# Patient Record
Sex: MALE | Race: BLACK OR AFRICAN AMERICAN | NOT HISPANIC OR LATINO | URBAN - METROPOLITAN AREA
[De-identification: names, ages, dates, MRNs, and addresses within clinical notes are randomized per-mention and may not be internally consistent; named-entity substitution may affect disease eponyms.]

---

## 2016-04-27 RX ORDER — FUROSEMIDE 40 MG
1 TABLET ORAL
Qty: 0 | Refills: 0 | COMMUNITY
Start: 2016-04-27

## 2017-03-06 ENCOUNTER — INPATIENT (INPATIENT)
Facility: HOSPITAL | Age: 67
LOS: 2 days | Discharge: HOME | DRG: 195 | End: 2017-03-09
Attending: HOSPITALIST | Admitting: HOSPITALIST
Payer: COMMERCIAL

## 2017-03-06 VITALS
SYSTOLIC BLOOD PRESSURE: 143 MMHG | RESPIRATION RATE: 22 BRPM | DIASTOLIC BLOOD PRESSURE: 83 MMHG | WEIGHT: 153 LBS | HEART RATE: 83 BPM | TEMPERATURE: 98 F | HEIGHT: 68 IN | OXYGEN SATURATION: 99 %

## 2017-03-06 DIAGNOSIS — J18.9 PNEUMONIA, UNSPECIFIED ORGANISM: ICD-10-CM

## 2017-03-06 DIAGNOSIS — I10 ESSENTIAL (PRIMARY) HYPERTENSION: ICD-10-CM

## 2017-03-06 DIAGNOSIS — I73.9 PERIPHERAL VASCULAR DISEASE, UNSPECIFIED: ICD-10-CM

## 2017-03-06 DIAGNOSIS — Z95.1 PRESENCE OF AORTOCORONARY BYPASS GRAFT: Chronic | ICD-10-CM

## 2017-03-06 DIAGNOSIS — R06.00 DYSPNEA, UNSPECIFIED: ICD-10-CM

## 2017-03-06 DIAGNOSIS — E78.5 HYPERLIPIDEMIA, UNSPECIFIED: ICD-10-CM

## 2017-03-06 DIAGNOSIS — E11.59 TYPE 2 DIABETES MELLITUS WITH OTHER CIRCULATORY COMPLICATIONS: ICD-10-CM

## 2017-03-06 DIAGNOSIS — Z95.9 PRESENCE OF CARDIAC AND VASCULAR IMPLANT AND GRAFT, UNSPECIFIED: Chronic | ICD-10-CM

## 2017-03-06 DIAGNOSIS — I25.10 ATHEROSCLEROTIC HEART DISEASE OF NATIVE CORONARY ARTERY WITHOUT ANGINA PECTORIS: ICD-10-CM

## 2017-03-06 DIAGNOSIS — Z95.828 PRESENCE OF OTHER VASCULAR IMPLANTS AND GRAFTS: Chronic | ICD-10-CM

## 2017-03-06 DIAGNOSIS — I50.9 HEART FAILURE, UNSPECIFIED: ICD-10-CM

## 2017-03-06 DIAGNOSIS — Z89.9 ACQUIRED ABSENCE OF LIMB, UNSPECIFIED: Chronic | ICD-10-CM

## 2017-03-06 LAB
ALBUMIN SERPL ELPH-MCNC: 3.9 G/DL — SIGNIFICANT CHANGE UP (ref 3.3–5)
ALP SERPL-CCNC: 90 U/L — SIGNIFICANT CHANGE UP (ref 40–120)
ALT FLD-CCNC: 26 U/L RC — SIGNIFICANT CHANGE UP (ref 10–45)
ANION GAP SERPL CALC-SCNC: 12 MMOL/L — SIGNIFICANT CHANGE UP (ref 5–17)
APTT BLD: 32.9 SEC — SIGNIFICANT CHANGE UP (ref 27.5–37.4)
AST SERPL-CCNC: 28 U/L — SIGNIFICANT CHANGE UP (ref 10–40)
BASOPHILS # BLD AUTO: 0 K/UL — SIGNIFICANT CHANGE UP (ref 0–0.2)
BASOPHILS NFR BLD AUTO: 0.4 % — SIGNIFICANT CHANGE UP (ref 0–2)
BILIRUB SERPL-MCNC: 0.6 MG/DL — SIGNIFICANT CHANGE UP (ref 0.2–1.2)
BUN SERPL-MCNC: 16 MG/DL — SIGNIFICANT CHANGE UP (ref 7–23)
CALCIUM SERPL-MCNC: 8.4 MG/DL — SIGNIFICANT CHANGE UP (ref 8.4–10.5)
CHLORIDE SERPL-SCNC: 98 MMOL/L — SIGNIFICANT CHANGE UP (ref 96–108)
CK MB BLD-MCNC: 3.4 % — SIGNIFICANT CHANGE UP (ref 0–3.5)
CK MB BLD-MCNC: 3.6 % — HIGH (ref 0–3.5)
CK MB CFR SERPL CALC: 10.5 NG/ML — HIGH (ref 0–6.7)
CK MB CFR SERPL CALC: 7.2 NG/ML — HIGH (ref 0–6.7)
CK SERPL-CCNC: 213 U/L — HIGH (ref 30–200)
CK SERPL-CCNC: 290 U/L — HIGH (ref 30–200)
CO2 SERPL-SCNC: 31 MMOL/L — SIGNIFICANT CHANGE UP (ref 22–31)
CREAT SERPL-MCNC: 1.17 MG/DL — SIGNIFICANT CHANGE UP (ref 0.5–1.3)
EOSINOPHIL # BLD AUTO: 0.4 K/UL — SIGNIFICANT CHANGE UP (ref 0–0.5)
EOSINOPHIL NFR BLD AUTO: 4.5 % — SIGNIFICANT CHANGE UP (ref 0–6)
GLUCOSE SERPL-MCNC: 234 MG/DL — HIGH (ref 70–99)
HCT VFR BLD CALC: 47.6 % — SIGNIFICANT CHANGE UP (ref 39–50)
HGB BLD-MCNC: 16.2 G/DL — SIGNIFICANT CHANGE UP (ref 13–17)
HMPV RNA SPEC QL NAA+PROBE: DETECTED
INR BLD: 1.24 RATIO — HIGH (ref 0.88–1.16)
LYMPHOCYTES # BLD AUTO: 1.8 K/UL — SIGNIFICANT CHANGE UP (ref 1–3.3)
LYMPHOCYTES # BLD AUTO: 20.2 % — SIGNIFICANT CHANGE UP (ref 13–44)
MCHC RBC-ENTMCNC: 29.2 PG — SIGNIFICANT CHANGE UP (ref 27–34)
MCHC RBC-ENTMCNC: 34.1 GM/DL — SIGNIFICANT CHANGE UP (ref 32–36)
MCV RBC AUTO: 85.6 FL — SIGNIFICANT CHANGE UP (ref 80–100)
MONOCYTES # BLD AUTO: 1 K/UL — HIGH (ref 0–0.9)
MONOCYTES NFR BLD AUTO: 10.4 % — SIGNIFICANT CHANGE UP (ref 2–14)
NEUTROPHILS # BLD AUTO: 5.9 K/UL — SIGNIFICANT CHANGE UP (ref 1.8–7.4)
NEUTROPHILS NFR BLD AUTO: 64.5 % — SIGNIFICANT CHANGE UP (ref 43–77)
NT-PROBNP SERPL-SCNC: 1068 PG/ML — HIGH (ref 0–300)
PLATELET # BLD AUTO: 150 K/UL — SIGNIFICANT CHANGE UP (ref 150–400)
POTASSIUM SERPL-MCNC: 4.3 MMOL/L — SIGNIFICANT CHANGE UP (ref 3.5–5.3)
POTASSIUM SERPL-SCNC: 4.3 MMOL/L — SIGNIFICANT CHANGE UP (ref 3.5–5.3)
PROT SERPL-MCNC: 7.5 G/DL — SIGNIFICANT CHANGE UP (ref 6–8.3)
PROTHROM AB SERPL-ACNC: 13.5 SEC — HIGH (ref 10–13.1)
RAPID RVP RESULT: DETECTED
RBC # BLD: 5.55 M/UL — SIGNIFICANT CHANGE UP (ref 4.2–5.8)
RBC # FLD: 12.8 % — SIGNIFICANT CHANGE UP (ref 10.3–14.5)
SODIUM SERPL-SCNC: 141 MMOL/L — SIGNIFICANT CHANGE UP (ref 135–145)
TROPONIN T SERPL-MCNC: <0.01 NG/ML — SIGNIFICANT CHANGE UP (ref 0–0.06)
TROPONIN T SERPL-MCNC: <0.01 NG/ML — SIGNIFICANT CHANGE UP (ref 0–0.06)
WBC # BLD: 9.2 K/UL — SIGNIFICANT CHANGE UP (ref 3.8–10.5)
WBC # FLD AUTO: 9.2 K/UL — SIGNIFICANT CHANGE UP (ref 3.8–10.5)

## 2017-03-06 PROCEDURE — 71010: CPT | Mod: 26

## 2017-03-06 PROCEDURE — 99285 EMERGENCY DEPT VISIT HI MDM: CPT | Mod: 25

## 2017-03-06 PROCEDURE — 93010 ELECTROCARDIOGRAM REPORT: CPT

## 2017-03-06 PROCEDURE — 99223 1ST HOSP IP/OBS HIGH 75: CPT | Mod: AI

## 2017-03-06 RX ORDER — INSULIN LISPRO 100/ML
VIAL (ML) SUBCUTANEOUS AT BEDTIME
Qty: 0 | Refills: 0 | Status: DISCONTINUED | OUTPATIENT
Start: 2017-03-06 | End: 2017-03-09

## 2017-03-06 RX ORDER — CLOPIDOGREL BISULFATE 75 MG/1
75 TABLET, FILM COATED ORAL DAILY
Qty: 0 | Refills: 0 | Status: DISCONTINUED | OUTPATIENT
Start: 2017-03-06 | End: 2017-03-09

## 2017-03-06 RX ORDER — DEXTROSE 50 % IN WATER 50 %
12.5 SYRINGE (ML) INTRAVENOUS ONCE
Qty: 0 | Refills: 0 | Status: DISCONTINUED | OUTPATIENT
Start: 2017-03-06 | End: 2017-03-09

## 2017-03-06 RX ORDER — VALSARTAN 80 MG/1
40 TABLET ORAL DAILY
Qty: 0 | Refills: 0 | Status: DISCONTINUED | OUTPATIENT
Start: 2017-03-06 | End: 2017-03-09

## 2017-03-06 RX ORDER — DEXTROSE 50 % IN WATER 50 %
25 SYRINGE (ML) INTRAVENOUS ONCE
Qty: 0 | Refills: 0 | Status: DISCONTINUED | OUTPATIENT
Start: 2017-03-06 | End: 2017-03-09

## 2017-03-06 RX ORDER — IPRATROPIUM/ALBUTEROL SULFATE 18-103MCG
3 AEROSOL WITH ADAPTER (GRAM) INHALATION ONCE
Qty: 0 | Refills: 0 | Status: COMPLETED | OUTPATIENT
Start: 2017-03-06 | End: 2017-03-06

## 2017-03-06 RX ORDER — IPRATROPIUM/ALBUTEROL SULFATE 18-103MCG
3 AEROSOL WITH ADAPTER (GRAM) INHALATION EVERY 6 HOURS
Qty: 0 | Refills: 0 | Status: DISCONTINUED | OUTPATIENT
Start: 2017-03-06 | End: 2017-03-06

## 2017-03-06 RX ORDER — HEPARIN SODIUM 5000 [USP'U]/ML
5000 INJECTION INTRAVENOUS; SUBCUTANEOUS EVERY 12 HOURS
Qty: 0 | Refills: 0 | Status: DISCONTINUED | OUTPATIENT
Start: 2017-03-06 | End: 2017-03-09

## 2017-03-06 RX ORDER — CARVEDILOL PHOSPHATE 80 MG/1
6.25 CAPSULE, EXTENDED RELEASE ORAL EVERY 12 HOURS
Qty: 0 | Refills: 0 | Status: DISCONTINUED | OUTPATIENT
Start: 2017-03-06 | End: 2017-03-09

## 2017-03-06 RX ORDER — ASPIRIN/CALCIUM CARB/MAGNESIUM 324 MG
81 TABLET ORAL DAILY
Qty: 0 | Refills: 0 | Status: DISCONTINUED | OUTPATIENT
Start: 2017-03-06 | End: 2017-03-09

## 2017-03-06 RX ORDER — IPRATROPIUM/ALBUTEROL SULFATE 18-103MCG
3 AEROSOL WITH ADAPTER (GRAM) INHALATION EVERY 4 HOURS
Qty: 0 | Refills: 0 | Status: DISCONTINUED | OUTPATIENT
Start: 2017-03-06 | End: 2017-03-09

## 2017-03-06 RX ORDER — GLUCAGON INJECTION, SOLUTION 0.5 MG/.1ML
1 INJECTION, SOLUTION SUBCUTANEOUS ONCE
Qty: 0 | Refills: 0 | Status: DISCONTINUED | OUTPATIENT
Start: 2017-03-06 | End: 2017-03-09

## 2017-03-06 RX ORDER — FUROSEMIDE 40 MG
40 TABLET ORAL DAILY
Qty: 0 | Refills: 0 | Status: DISCONTINUED | OUTPATIENT
Start: 2017-03-06 | End: 2017-03-09

## 2017-03-06 RX ORDER — INSULIN LISPRO 100/ML
VIAL (ML) SUBCUTANEOUS
Qty: 0 | Refills: 0 | Status: DISCONTINUED | OUTPATIENT
Start: 2017-03-06 | End: 2017-03-09

## 2017-03-06 RX ORDER — TIOTROPIUM BROMIDE 18 UG/1
1 CAPSULE ORAL; RESPIRATORY (INHALATION) DAILY
Qty: 0 | Refills: 0 | Status: DISCONTINUED | OUTPATIENT
Start: 2017-03-06 | End: 2017-03-09

## 2017-03-06 RX ORDER — DEXTROSE 50 % IN WATER 50 %
1 SYRINGE (ML) INTRAVENOUS ONCE
Qty: 0 | Refills: 0 | Status: DISCONTINUED | OUTPATIENT
Start: 2017-03-06 | End: 2017-03-09

## 2017-03-06 RX ORDER — ALBUTEROL 90 UG/1
1 AEROSOL, METERED ORAL EVERY 4 HOURS
Qty: 0 | Refills: 0 | Status: DISCONTINUED | OUTPATIENT
Start: 2017-03-06 | End: 2017-03-09

## 2017-03-06 RX ORDER — INSULIN GLARGINE 100 [IU]/ML
30 INJECTION, SOLUTION SUBCUTANEOUS AT BEDTIME
Qty: 0 | Refills: 0 | Status: DISCONTINUED | OUTPATIENT
Start: 2017-03-06 | End: 2017-03-09

## 2017-03-06 RX ORDER — ATORVASTATIN CALCIUM 80 MG/1
40 TABLET, FILM COATED ORAL AT BEDTIME
Qty: 0 | Refills: 0 | Status: DISCONTINUED | OUTPATIENT
Start: 2017-03-06 | End: 2017-03-09

## 2017-03-06 RX ORDER — SODIUM CHLORIDE 9 MG/ML
1000 INJECTION, SOLUTION INTRAVENOUS
Qty: 0 | Refills: 0 | Status: DISCONTINUED | OUTPATIENT
Start: 2017-03-06 | End: 2017-03-09

## 2017-03-06 RX ADMIN — Medication 3 MILLILITER(S): at 22:09

## 2017-03-06 RX ADMIN — Medication 1 DROP(S): at 22:09

## 2017-03-06 RX ADMIN — Medication 3 MILLILITER(S): at 16:47

## 2017-03-06 RX ADMIN — ATORVASTATIN CALCIUM 40 MILLIGRAM(S): 80 TABLET, FILM COATED ORAL at 22:09

## 2017-03-06 RX ADMIN — INSULIN GLARGINE 30 UNIT(S): 100 INJECTION, SOLUTION SUBCUTANEOUS at 22:08

## 2017-03-06 RX ADMIN — Medication 40 MILLIGRAM(S): at 22:09

## 2017-03-06 RX ADMIN — Medication 3 MILLILITER(S): at 16:46

## 2017-03-06 NOTE — H&P ADULT. - PROBLEM SELECTOR PLAN 2
given CXR finding of RLL opacity, levaquin was started  will cont for now ? superimposed bacterial PNA with new sx of sob in last 2 days  monitor oxygenation

## 2017-03-06 NOTE — H&P ADULT. - PROBLEM SELECTOR PLAN 3
cont lasix for now as pt tolerating PO and no s/s dehydration  cont arb/beta blocker for optimum regimen

## 2017-03-06 NOTE — ED ADULT NURSE NOTE - PMH
CAD (coronary artery disease)    Chronic congestive heart failure, unspecified congestive heart failure type    Diabetes    Hyperlipidemia    Hypertension    PAD (peripheral artery disease)    Stented coronary artery

## 2017-03-06 NOTE — ED ADULT TRIAGE NOTE - CHIEF COMPLAINT QUOTE
[facility] days with abnormal ekg per son denies chest pain productive cough

## 2017-03-06 NOTE — ED ADULT NURSE NOTE - OBJECTIVE STATEMENT
65 yo male A&OX3 presents to the ED with the c/o SOB and cold like symptoms x 7 days. Pt states that he hears fluid behind his lungs when he take a deep breath. According to the pt he was seen at urgent care who sent him in for abd ekg. Denies fevers or chills, HX of chf, DM, high cholesterol. Lungs have noted crackles b/l. Pt also has hx of stent placement on blood thinners. Abd round, soft non tender and non distended. According to the pt he doubled up on lasix for relief.

## 2017-03-06 NOTE — H&P ADULT. - HISTORY OF PRESENT ILLNESS
66 y/o male with pmh of HTN, HLD, DM2 (with complication s/p 3 toes amputated rt foot), CAD s/p CABG 4V (1997), PCI/stents about 5 years ago, PAD s/p RLE bypass graft, 4/16 admit for NSTEMI s/p stent to OM1 now a/w few days of cough productive of pink sputum and 2 d of sob/wheezing.  No f/c/anorexia, + sinus sx and sore throat.  No sick contacts.  Per ED notes went to urgent care and sent to ED for EKG changes.  Pt denies cp but has occasional palpitations.  + ORTA last two days increased from baseline.    In /83, hr 82, rr 22, sat 99, temp 98.4  Given levaquin 750mg and duoneb x 3  CXR c/w RLL opacity

## 2017-03-06 NOTE — ED PROVIDER NOTE - OBJECTIVE STATEMENT
66yom pmhx CAD s/p CABG HTN HLD PVD CHF DM c/o SOB and wheezing. Pt seen and eval in the Urgent care with abnl ekg and sent in for eval. Pt reports URI symx for past week now with productive cough with pinkish sputum. No fever or chills, No chest pain, No nausea or vomiting. Non smoker. NO visual changes.

## 2017-03-06 NOTE — H&P ADULT. - ASSESSMENT
66 m multiple medical problems a/w pneumometaneumovirus, ? superimposed bacterial pneumonia and concern for ACS

## 2017-03-06 NOTE — ED PROVIDER NOTE - ATTENDING CONTRIBUTION TO CARE
Patient with history of CHF/CAD presenting c/o productive cough/malaise and SOB for past 6 days.  No known sick contacts, no worsening lower extremity edema or weight gain.  SOB even at rest.  Worse with lying flat.  Denying CP.    On exam patient room air O2 sat in mid/low 90s, VS otherwise WNL, RRR S1/S2, no peripheral edema, lungs diffusely wheezy with bronchospastic cough, abdomen soft, NT, ND.    Suspect possible viral bronchitis, however given underlying cardiac history cannot rule out cardiogenic wheeze - labs, RVP, CXR, CE, albuterol, re-evaluate.

## 2017-03-06 NOTE — H&P ADULT. - RS GEN PE MLT RESP DETAILS PC
clear to auscultation bilaterally/airway patent/breath sounds equal/respirations non-labored/good air movement breath sounds equal/clear to auscultation bilaterally/airway patent/respirations non-labored/wheezes/good air movement

## 2017-03-06 NOTE — H&P ADULT. - PROBLEM SELECTOR PLAN 1
likely 2nd pneumometaneumovirus which often precipitates bronchospasm  cont duoneb q4hr for now and monitor clinically  start prednisone 40mg daily - will avoid IV for now given h/o DM and pt is clinically stable - speaking well in full sentences, not hypoxic  tessalon perles prn cough

## 2017-03-06 NOTE — H&P ADULT. - PROBLEM SELECTOR PLAN 5
concern for ekg at urgent care (not concerning EKG from ED and borderline ck/ck-mb  -telemtry monitoring  -ck Deyvi x 3  -can call outpt cards in am  cont asa/plavix concern for ekg at urgent care (not concerning EKG from ED and borderline ck/ck-mb  -telemtry monitoring  -ck Deyvi x 3  -can call outpt cards in am  cont asa/plavix  if next Deyvi are unremarkable, can start SQH for DVT prophylaxis

## 2017-03-06 NOTE — ED ADULT NURSE NOTE - PSH
S/P amputation  right great toe and 4th and 5th digit  S/P angioplasty with stent  about 5 years ago  S/P bypass graft of extremity  RLE  S/P CABG x 3

## 2017-03-06 NOTE — ED ADULT NURSE NOTE - CHIEF COMPLAINT QUOTE
[facility] days with abnormal ekg per son denies chest pain productive cough

## 2017-03-06 NOTE — H&P ADULT. - FAMILY HISTORY
Mother  Still living? Unknown  Family history of diabetes mellitus (DM), Age at diagnosis: Age Unknown

## 2017-03-06 NOTE — ED PROVIDER NOTE - CARE PLAN
Principal Discharge DX:	Dyspnea and respiratory abnormalities  Secondary Diagnosis:	Viral illness  Secondary Diagnosis:	Pneumonia

## 2017-03-07 LAB
ANION GAP SERPL CALC-SCNC: 15 MMOL/L — SIGNIFICANT CHANGE UP (ref 5–17)
BASOPHILS # BLD AUTO: 0.04 K/UL — SIGNIFICANT CHANGE UP (ref 0–0.2)
BASOPHILS NFR BLD AUTO: 0.3 % — SIGNIFICANT CHANGE UP (ref 0–2)
BUN SERPL-MCNC: 16 MG/DL — SIGNIFICANT CHANGE UP (ref 7–23)
CALCIUM SERPL-MCNC: 9.4 MG/DL — SIGNIFICANT CHANGE UP (ref 8.4–10.5)
CHLORIDE SERPL-SCNC: 96 MMOL/L — SIGNIFICANT CHANGE UP (ref 96–108)
CK MB BLD-MCNC: 3.8 % — HIGH (ref 0–3.5)
CK MB CFR SERPL CALC: 8.1 NG/ML — HIGH (ref 0–6.7)
CK SERPL-CCNC: 216 U/L — HIGH (ref 30–200)
CO2 SERPL-SCNC: 27 MMOL/L — SIGNIFICANT CHANGE UP (ref 22–31)
CREAT SERPL-MCNC: 1.12 MG/DL — SIGNIFICANT CHANGE UP (ref 0.5–1.3)
EOSINOPHIL # BLD AUTO: 0.03 K/UL — SIGNIFICANT CHANGE UP (ref 0–0.5)
EOSINOPHIL NFR BLD AUTO: 0.3 % — SIGNIFICANT CHANGE UP (ref 0–6)
GLUCOSE SERPL-MCNC: 250 MG/DL — HIGH (ref 70–99)
HBA1C BLD-MCNC: 9.5 % — HIGH (ref 4–5.6)
HCT VFR BLD CALC: 49 % — SIGNIFICANT CHANGE UP (ref 39–50)
HGB BLD-MCNC: 17.3 G/DL — HIGH (ref 13–17)
IMM GRANULOCYTES NFR BLD AUTO: 0.2 % — SIGNIFICANT CHANGE UP (ref 0–1.5)
LYMPHOCYTES # BLD AUTO: 1 K/UL — SIGNIFICANT CHANGE UP (ref 1–3.3)
LYMPHOCYTES # BLD AUTO: 8.6 % — LOW (ref 13–44)
MAGNESIUM SERPL-MCNC: 2 MG/DL — SIGNIFICANT CHANGE UP (ref 1.6–2.6)
MCHC RBC-ENTMCNC: 29.1 PG — SIGNIFICANT CHANGE UP (ref 27–34)
MCHC RBC-ENTMCNC: 35.3 GM/DL — SIGNIFICANT CHANGE UP (ref 32–36)
MCV RBC AUTO: 82.4 FL — SIGNIFICANT CHANGE UP (ref 80–100)
MONOCYTES # BLD AUTO: 0.28 K/UL — SIGNIFICANT CHANGE UP (ref 0–0.9)
MONOCYTES NFR BLD AUTO: 2.4 % — SIGNIFICANT CHANGE UP (ref 2–14)
NEUTROPHILS # BLD AUTO: 10.21 K/UL — HIGH (ref 1.8–7.4)
NEUTROPHILS NFR BLD AUTO: 88.2 % — HIGH (ref 43–77)
PHOSPHATE SERPL-MCNC: 2.3 MG/DL — LOW (ref 2.5–4.5)
PLATELET # BLD AUTO: 188 K/UL — SIGNIFICANT CHANGE UP (ref 150–400)
POTASSIUM SERPL-MCNC: 4.9 MMOL/L — SIGNIFICANT CHANGE UP (ref 3.5–5.3)
POTASSIUM SERPL-SCNC: 4.9 MMOL/L — SIGNIFICANT CHANGE UP (ref 3.5–5.3)
RBC # BLD: 5.95 M/UL — HIGH (ref 4.2–5.8)
RBC # FLD: 13.7 % — SIGNIFICANT CHANGE UP (ref 10.3–14.5)
SODIUM SERPL-SCNC: 138 MMOL/L — SIGNIFICANT CHANGE UP (ref 135–145)
TROPONIN T SERPL-MCNC: <0.01 NG/ML — SIGNIFICANT CHANGE UP (ref 0–0.06)
WBC # BLD: 11.58 K/UL — HIGH (ref 3.8–10.5)
WBC # FLD AUTO: 11.58 K/UL — HIGH (ref 3.8–10.5)

## 2017-03-07 RX ORDER — ACETAMINOPHEN 500 MG
650 TABLET ORAL ONCE
Qty: 0 | Refills: 0 | Status: COMPLETED | OUTPATIENT
Start: 2017-03-07 | End: 2017-03-07

## 2017-03-07 RX ADMIN — Medication 1 DROP(S): at 21:20

## 2017-03-07 RX ADMIN — Medication 40 MILLIGRAM(S): at 05:07

## 2017-03-07 RX ADMIN — INSULIN GLARGINE 30 UNIT(S): 100 INJECTION, SOLUTION SUBCUTANEOUS at 22:15

## 2017-03-07 RX ADMIN — Medication 63.75 MILLIMOLE(S): at 08:45

## 2017-03-07 RX ADMIN — Medication 3 MILLILITER(S): at 05:07

## 2017-03-07 RX ADMIN — Medication 5: at 14:42

## 2017-03-07 RX ADMIN — Medication 1 DROP(S): at 05:01

## 2017-03-07 RX ADMIN — CARVEDILOL PHOSPHATE 6.25 MILLIGRAM(S): 80 CAPSULE, EXTENDED RELEASE ORAL at 21:20

## 2017-03-07 RX ADMIN — CLOPIDOGREL BISULFATE 75 MILLIGRAM(S): 75 TABLET, FILM COATED ORAL at 11:27

## 2017-03-07 RX ADMIN — Medication 3 MILLILITER(S): at 14:45

## 2017-03-07 RX ADMIN — Medication 5: at 18:58

## 2017-03-07 RX ADMIN — Medication 81 MILLIGRAM(S): at 11:27

## 2017-03-07 RX ADMIN — VALSARTAN 40 MILLIGRAM(S): 80 TABLET ORAL at 05:07

## 2017-03-07 RX ADMIN — Medication 40 MILLIGRAM(S): at 22:45

## 2017-03-07 RX ADMIN — Medication 3 MILLILITER(S): at 21:20

## 2017-03-07 RX ADMIN — CARVEDILOL PHOSPHATE 6.25 MILLIGRAM(S): 80 CAPSULE, EXTENDED RELEASE ORAL at 05:07

## 2017-03-07 RX ADMIN — Medication 650 MILLIGRAM(S): at 21:19

## 2017-03-07 RX ADMIN — Medication 3: at 08:45

## 2017-03-07 RX ADMIN — Medication 3 MILLILITER(S): at 11:26

## 2017-03-07 RX ADMIN — Medication 40 MILLIGRAM(S): at 22:15

## 2017-03-07 RX ADMIN — ATORVASTATIN CALCIUM 40 MILLIGRAM(S): 80 TABLET, FILM COATED ORAL at 21:19

## 2017-03-07 NOTE — PROVIDER CONTACT NOTE (MEDICATION) - ACTION/TREATMENT ORDERED:
NP notified and made aware. pt educated on risks of refusing hep. no further interventions at this time. will continue to monitor.

## 2017-03-08 ENCOUNTER — TRANSCRIPTION ENCOUNTER (OUTPATIENT)
Age: 67
End: 2017-03-08

## 2017-03-08 LAB
ANION GAP SERPL CALC-SCNC: 12 MMOL/L — SIGNIFICANT CHANGE UP (ref 5–17)
BASOPHILS # BLD AUTO: 0.02 K/UL — SIGNIFICANT CHANGE UP (ref 0–0.2)
BASOPHILS NFR BLD AUTO: 0.2 % — SIGNIFICANT CHANGE UP (ref 0–2)
BUN SERPL-MCNC: 20 MG/DL — SIGNIFICANT CHANGE UP (ref 7–23)
CALCIUM SERPL-MCNC: 8.8 MG/DL — SIGNIFICANT CHANGE UP (ref 8.4–10.5)
CHLORIDE SERPL-SCNC: 96 MMOL/L — SIGNIFICANT CHANGE UP (ref 96–108)
CO2 SERPL-SCNC: 26 MMOL/L — SIGNIFICANT CHANGE UP (ref 22–31)
CREAT SERPL-MCNC: 1.03 MG/DL — SIGNIFICANT CHANGE UP (ref 0.5–1.3)
EOSINOPHIL # BLD AUTO: 0.03 K/UL — SIGNIFICANT CHANGE UP (ref 0–0.5)
EOSINOPHIL NFR BLD AUTO: 0.3 % — SIGNIFICANT CHANGE UP (ref 0–6)
GLUCOSE SERPL-MCNC: 259 MG/DL — HIGH (ref 70–99)
HCT VFR BLD CALC: 44.8 % — SIGNIFICANT CHANGE UP (ref 39–50)
HGB BLD-MCNC: 15.6 G/DL — SIGNIFICANT CHANGE UP (ref 13–17)
IMM GRANULOCYTES NFR BLD AUTO: 0.1 % — SIGNIFICANT CHANGE UP (ref 0–1.5)
LYMPHOCYTES # BLD AUTO: 1.05 K/UL — SIGNIFICANT CHANGE UP (ref 1–3.3)
LYMPHOCYTES # BLD AUTO: 10 % — LOW (ref 13–44)
MCHC RBC-ENTMCNC: 28.7 PG — SIGNIFICANT CHANGE UP (ref 27–34)
MCHC RBC-ENTMCNC: 34.8 GM/DL — SIGNIFICANT CHANGE UP (ref 32–36)
MCV RBC AUTO: 82.4 FL — SIGNIFICANT CHANGE UP (ref 80–100)
MONOCYTES # BLD AUTO: 0.27 K/UL — SIGNIFICANT CHANGE UP (ref 0–0.9)
MONOCYTES NFR BLD AUTO: 2.6 % — SIGNIFICANT CHANGE UP (ref 2–14)
NEUTROPHILS # BLD AUTO: 9.09 K/UL — HIGH (ref 1.8–7.4)
NEUTROPHILS NFR BLD AUTO: 86.8 % — HIGH (ref 43–77)
PLATELET # BLD AUTO: 216 K/UL — SIGNIFICANT CHANGE UP (ref 150–400)
POTASSIUM SERPL-MCNC: 4.4 MMOL/L — SIGNIFICANT CHANGE UP (ref 3.5–5.3)
POTASSIUM SERPL-SCNC: 4.4 MMOL/L — SIGNIFICANT CHANGE UP (ref 3.5–5.3)
RBC # BLD: 5.44 M/UL — SIGNIFICANT CHANGE UP (ref 4.2–5.8)
RBC # FLD: 13.7 % — SIGNIFICANT CHANGE UP (ref 10.3–14.5)
SODIUM SERPL-SCNC: 134 MMOL/L — LOW (ref 135–145)
WBC # BLD: 10.47 K/UL — SIGNIFICANT CHANGE UP (ref 3.8–10.5)
WBC # FLD AUTO: 10.47 K/UL — SIGNIFICANT CHANGE UP (ref 3.8–10.5)

## 2017-03-08 RX ADMIN — Medication 1 DROP(S): at 18:49

## 2017-03-08 RX ADMIN — Medication 40 MILLIGRAM(S): at 05:13

## 2017-03-08 RX ADMIN — INSULIN GLARGINE 30 UNIT(S): 100 INJECTION, SOLUTION SUBCUTANEOUS at 21:42

## 2017-03-08 RX ADMIN — VALSARTAN 40 MILLIGRAM(S): 80 TABLET ORAL at 05:13

## 2017-03-08 RX ADMIN — Medication 3: at 08:49

## 2017-03-08 RX ADMIN — Medication 1 DROP(S): at 11:11

## 2017-03-08 RX ADMIN — Medication 3 MILLILITER(S): at 22:26

## 2017-03-08 RX ADMIN — Medication 1 DROP(S): at 05:13

## 2017-03-08 RX ADMIN — Medication 4: at 13:16

## 2017-03-08 RX ADMIN — Medication 3 MILLILITER(S): at 11:09

## 2017-03-08 RX ADMIN — Medication 1 DROP(S): at 23:14

## 2017-03-08 RX ADMIN — Medication 3: at 21:41

## 2017-03-08 RX ADMIN — CARVEDILOL PHOSPHATE 6.25 MILLIGRAM(S): 80 CAPSULE, EXTENDED RELEASE ORAL at 08:51

## 2017-03-08 RX ADMIN — CARVEDILOL PHOSPHATE 6.25 MILLIGRAM(S): 80 CAPSULE, EXTENDED RELEASE ORAL at 18:50

## 2017-03-08 RX ADMIN — Medication 3 MILLILITER(S): at 13:18

## 2017-03-08 RX ADMIN — Medication 40 MILLIGRAM(S): at 22:25

## 2017-03-08 RX ADMIN — ATORVASTATIN CALCIUM 40 MILLIGRAM(S): 80 TABLET, FILM COATED ORAL at 21:42

## 2017-03-08 RX ADMIN — Medication 3: at 17:47

## 2017-03-08 RX ADMIN — Medication 3 MILLILITER(S): at 18:49

## 2017-03-08 RX ADMIN — Medication 3 MILLILITER(S): at 05:13

## 2017-03-08 RX ADMIN — HEPARIN SODIUM 5000 UNIT(S): 5000 INJECTION INTRAVENOUS; SUBCUTANEOUS at 18:51

## 2017-03-08 RX ADMIN — CLOPIDOGREL BISULFATE 75 MILLIGRAM(S): 75 TABLET, FILM COATED ORAL at 11:10

## 2017-03-08 RX ADMIN — Medication 81 MILLIGRAM(S): at 11:09

## 2017-03-08 NOTE — DISCHARGE NOTE ADULT - MEDICATION SUMMARY - MEDICATIONS TO TAKE
I will START or STAY ON the medications listed below when I get home from the hospital:    aspirin 81 mg oral delayed release tablet  -- 1 tab(s) by mouth once a day  -- Indication: For Anti-Platelet    valsartan 40 mg oral tablet  -- 1 tab(s) by mouth once a day  -- Indication: For High Blood Pressure    Lantus Solostar Pen 100 units/mL subcutaneous solution  -- 30 unit(s) subcutaneous once a day (at bedtime)  -- Indication: For Diabetes    atorvastatin 40 mg oral tablet  -- 1 tab(s) by mouth once a day (at bedtime)  -- Indication: For High Cholesterol    clopidogrel 75 mg oral tablet  -- 1 tab(s) by mouth once a day  -- Indication: For Anti-Platelet    carvedilol 6.25 mg oral tablet  -- 1 tab(s) by mouth every 12 hours  -- Indication: For Blood Pressure    furosemide 40 mg oral tablet  -- 1 tab(s) by mouth once a day  -- Indication: For Diuretic    ocular lubricant ophthalmic solution  -- 1 drop(s) to each affected eye 4 times a day  -- Indication: For Topical agent    levoFLOXacin 500 mg oral tablet  -- 1 tab(s) by mouth every 24 hours  -- Indication: For Antibiotic

## 2017-03-08 NOTE — DISCHARGE NOTE ADULT - SECONDARY DIAGNOSIS.
Diabetes Chronic congestive heart failure, unspecified congestive heart failure type PAD (peripheral artery disease)

## 2017-03-08 NOTE — DIETITIAN INITIAL EVALUATION ADULT. - ENERGY NEEDS
HT 67 inches,  pounds,  pounds,  pounds, BMI 23.4  Dxd with Dyspnea, Pneumovirus.  Skin intact.  takes Iron, Vit C and MVI at home.

## 2017-03-08 NOTE — DISCHARGE NOTE ADULT - PLAN OF CARE
improved; + respiratory virus panel Rx levaquin 500mg oral x 10 days controlled carb diet  continue present Insulin/ FSBS 2 gram sodium diet; continue home meds. Complete the antibiotic course as ordered. HgA1C this admission 9.5.  Make sure you get your HgA1c checked every three months.  If you take oral diabetes medications, check your blood glucose two times a day.  If you take insulin, check your blood glucose before meals and at bedtime.  It's important not to skip any meals.  Keep a log of your blood glucose results and always take it with you to your doctor appointments.  Keep a list of your current medications including injectables and over the counter medications and bring this medication list with you to all your doctor appointments.  If you have not seen your opthalmologist this year call for appointment.  Check your feet daily for redness, sores, or openings. Do not self treat. If no improvement in two days call your primary care physician for an appointment.  Low blood sugar (hypoglycemia) is a blood sugar below 70mg/dl. Check your blood sugar if you feel signs/symptoms of hypoglycemia. If your blood sugar is below 70 take 15 grams of carbohydrates (ex 4 oz of apple juice, 3-4 glucosr tablets, or 4-6 oz of regular soda) wait 15 minutes and repeat blood sugar to make sure it comes up above 70.  If your blood sugar is above 70 and you are due for a meal, have a meal.  If you are not due for a meal have a snack.  This snack helps keeps your blood sugar at a safe range.  controlled carb diet  continue present Insulin/ FSBS Weigh yourself daily.  If you gain 3lbs in 3 days, or 5lbs in a week call your Health Care Provider.  Do not eat or drink foods containing more than 2000mg of salt (sodium) in your diet every day.  Call your Health Care Provider if you have any swelling or increased swelling in your feet, ankles, and/or stomach.  Take all of your medication as directed.  If you become dizzy call your Health Care Provider.

## 2017-03-08 NOTE — DIETITIAN INITIAL EVALUATION ADULT. - OTHER INFO
Patient consulted for A1c 9.5.  Patient found walking around the room, pleasant and wanting to go home.  reports a good appetite and intake.  Lives with wife who does most of the cooking and shopping.  Patient admits that diabetes has not been as well controlled as usual during the winter months.  Fingersticks fasting run in 90'2.  After lunch, finger stick is high 100's.  Breakfast is usually a hot cereal with milk.  Lunch is homemade soup with chicken and vegetables.  Dinner is usually fish, chicken, beef with potato or rice and vegetables.  Snacks on biscuits or cake.  Weight stable.  Appetite good. No complaints.

## 2017-03-08 NOTE — DISCHARGE NOTE ADULT - PATIENT PORTAL LINK FT
“You can access the Follow[employer] Patient Portal, offered by [facility], by registering with the following website: [email or web address]”

## 2017-03-08 NOTE — DISCHARGE NOTE ADULT - HOSPITAL COURSE
to be completed by MD 64 y/o male with pmh of HTN, HLD, DM2 (with complication s/p 3 toes amputated rt foot), CAD s/p CABG 4V (1997), PCI/stents about 5 years ago, PAD s/p RLE bypass graft, 4/16 admit for NSTEMI s/p stent to OM1 now a/w few days of cough productive of pink sputum and 2 d of sob/wheezing.     Dx-- dyspnea/brochospasm, PNA,   CXR:Right lower lung opacity which is slightly more prominent as compared to prior xray which may represent atelectasis or an early pneumonia.    3/7	admitted with dyspnea/bronchospasm, pna----levaquin, lasix, duoneb  	cards consult [facility] called by attending 2/2 extebsuve cards history  	ekg changes and abnormal cardiac labs  3/8; OK to d/c today per cardio/Hosp. Levaquin 500mg daily x 10 days. continue present medications.Repeat TTE if pt. agrees to ICD per Cardio.- refusing @ present.   3/9 :Discharged home in stable condition         Follow up with PCP and  Cardiology in 1 - 2 wks         PCP informed via PCP EMR

## 2017-03-08 NOTE — DISCHARGE NOTE ADULT - CARE PROVIDERS DIRECT ADDRESSES
,DirectAddress_Unknown,DirectAddress_Unknown,DirectAddress_Unknown ,DirectAddress_Unknown,[email or web address],DirectAddress_Unknown

## 2017-03-08 NOTE — DISCHARGE NOTE ADULT - ADDITIONAL INSTRUCTIONS
followup PMD or Dr. De Leon  cardio Dr. Tony Millan within one week after discharge Follow up with Dr. Danielle, primary care doctor within 1 week of discharge. Follow up with Dr. Millan, Cardiologist within 1 week of discharge. Complete your antibiotic course as ordered.

## 2017-03-08 NOTE — DISCHARGE NOTE ADULT - CARE PROVIDER_API CALL
Gera Cooper (LOVELY), Internal Medicine  [address] [place] [address]  Phone: [phone]  Fax: [phone]    Tony Millan), Cardiovascular Disease; Internal Medicine  [address] [facility] [address]  [place] [address]  Phone: [phone]  Fax: [phone] Tony Millan), Cardiovascular Disease; Internal Medicine  [address]  [place] [address]  Phone: [phone]  Fax: [phone]    Joseph Danielle (MD), Medicine  [address] [place] [address]  Phone: [phone]  Fax: [phone]

## 2017-03-08 NOTE — DIETITIAN INITIAL EVALUATION ADULT. - NS AS NUTRI INTERV ED CONTENT
Reviewed diabetic meal planning including need for protein at all meals, and moderate CHO intake, portion control as well as need for activity./Recommended modifications/Purpose of the nutrition education

## 2017-03-09 VITALS — WEIGHT: 154.1 LBS

## 2017-03-09 PROCEDURE — 85730 THROMBOPLASTIN TIME PARTIAL: CPT

## 2017-03-09 PROCEDURE — 94640 AIRWAY INHALATION TREATMENT: CPT

## 2017-03-09 PROCEDURE — 87633 RESP VIRUS 12-25 TARGETS: CPT

## 2017-03-09 PROCEDURE — 99285 EMERGENCY DEPT VISIT HI MDM: CPT | Mod: 25

## 2017-03-09 PROCEDURE — 80048 BASIC METABOLIC PNL TOTAL CA: CPT

## 2017-03-09 PROCEDURE — 83880 ASSAY OF NATRIURETIC PEPTIDE: CPT

## 2017-03-09 PROCEDURE — 87581 M.PNEUMON DNA AMP PROBE: CPT

## 2017-03-09 PROCEDURE — 80053 COMPREHEN METABOLIC PANEL: CPT

## 2017-03-09 PROCEDURE — 85027 COMPLETE CBC AUTOMATED: CPT

## 2017-03-09 PROCEDURE — 71045 X-RAY EXAM CHEST 1 VIEW: CPT

## 2017-03-09 PROCEDURE — 87486 CHLMYD PNEUM DNA AMP PROBE: CPT

## 2017-03-09 PROCEDURE — 87798 DETECT AGENT NOS DNA AMP: CPT

## 2017-03-09 PROCEDURE — 82553 CREATINE MB FRACTION: CPT

## 2017-03-09 PROCEDURE — 96374 THER/PROPH/DIAG INJ IV PUSH: CPT

## 2017-03-09 PROCEDURE — 87040 BLOOD CULTURE FOR BACTERIA: CPT

## 2017-03-09 PROCEDURE — 85610 PROTHROMBIN TIME: CPT

## 2017-03-09 PROCEDURE — 82550 ASSAY OF CK (CPK): CPT

## 2017-03-09 PROCEDURE — 83735 ASSAY OF MAGNESIUM: CPT

## 2017-03-09 PROCEDURE — 83036 HEMOGLOBIN GLYCOSYLATED A1C: CPT

## 2017-03-09 PROCEDURE — 84484 ASSAY OF TROPONIN QUANT: CPT

## 2017-03-09 PROCEDURE — 93005 ELECTROCARDIOGRAM TRACING: CPT

## 2017-03-09 PROCEDURE — 84100 ASSAY OF PHOSPHORUS: CPT

## 2017-03-09 RX ORDER — CLOPIDOGREL BISULFATE 75 MG/1
1 TABLET, FILM COATED ORAL
Qty: 0 | Refills: 0 | COMMUNITY
Start: 2017-03-09

## 2017-03-09 RX ORDER — CLOPIDOGREL BISULFATE 75 MG/1
1 TABLET, FILM COATED ORAL
Qty: 0 | Refills: 0 | COMMUNITY

## 2017-03-09 RX ORDER — ATORVASTATIN CALCIUM 80 MG/1
1 TABLET, FILM COATED ORAL
Qty: 0 | Refills: 0 | COMMUNITY

## 2017-03-09 RX ORDER — CARVEDILOL PHOSPHATE 80 MG/1
1 CAPSULE, EXTENDED RELEASE ORAL
Qty: 0 | Refills: 0 | COMMUNITY
Start: 2017-03-09

## 2017-03-09 RX ORDER — ASPIRIN/CALCIUM CARB/MAGNESIUM 324 MG
1 TABLET ORAL
Qty: 0 | Refills: 0 | COMMUNITY
Start: 2017-03-09

## 2017-03-09 RX ORDER — ATORVASTATIN CALCIUM 80 MG/1
1 TABLET, FILM COATED ORAL
Qty: 0 | Refills: 0 | COMMUNITY
Start: 2017-03-09

## 2017-03-09 RX ORDER — VALSARTAN 80 MG/1
1 TABLET ORAL
Qty: 0 | Refills: 0 | COMMUNITY
Start: 2017-03-09

## 2017-03-09 RX ADMIN — Medication 3 MILLILITER(S): at 05:01

## 2017-03-09 RX ADMIN — Medication 3 MILLILITER(S): at 02:08

## 2017-03-09 RX ADMIN — Medication 3: at 11:45

## 2017-03-09 RX ADMIN — Medication 1 DROP(S): at 05:01

## 2017-03-09 RX ADMIN — Medication 3: at 07:55

## 2017-03-09 RX ADMIN — CARVEDILOL PHOSPHATE 6.25 MILLIGRAM(S): 80 CAPSULE, EXTENDED RELEASE ORAL at 05:01

## 2017-03-09 RX ADMIN — Medication 81 MILLIGRAM(S): at 11:33

## 2017-03-09 RX ADMIN — Medication 1 DROP(S): at 11:34

## 2017-03-09 RX ADMIN — CLOPIDOGREL BISULFATE 75 MILLIGRAM(S): 75 TABLET, FILM COATED ORAL at 11:33

## 2017-03-09 RX ADMIN — Medication 40 MILLIGRAM(S): at 05:01

## 2017-03-09 RX ADMIN — Medication 3 MILLILITER(S): at 11:37

## 2017-03-09 RX ADMIN — VALSARTAN 40 MILLIGRAM(S): 80 TABLET ORAL at 05:01

## 2017-03-13 LAB
CULTURE RESULTS: SIGNIFICANT CHANGE UP
CULTURE RESULTS: SIGNIFICANT CHANGE UP
SPECIMEN SOURCE: SIGNIFICANT CHANGE UP
SPECIMEN SOURCE: SIGNIFICANT CHANGE UP

## 2018-09-06 ENCOUNTER — INPATIENT (INPATIENT)
Facility: HOSPITAL | Age: 68
LOS: 17 days | Discharge: HOME | DRG: 253 | End: 2018-09-24
Attending: STUDENT IN AN ORGANIZED HEALTH CARE EDUCATION/TRAINING PROGRAM | Admitting: HOSPITALIST
Payer: SELF-PAY

## 2018-09-06 VITALS
SYSTOLIC BLOOD PRESSURE: 119 MMHG | HEIGHT: 67 IN | RESPIRATION RATE: 18 BRPM | TEMPERATURE: 98 F | DIASTOLIC BLOOD PRESSURE: 78 MMHG | OXYGEN SATURATION: 99 % | HEART RATE: 69 BPM | WEIGHT: 156.09 LBS

## 2018-09-06 DIAGNOSIS — Z95.9 PRESENCE OF CARDIAC AND VASCULAR IMPLANT AND GRAFT, UNSPECIFIED: Chronic | ICD-10-CM

## 2018-09-06 DIAGNOSIS — Z89.9 ACQUIRED ABSENCE OF LIMB, UNSPECIFIED: Chronic | ICD-10-CM

## 2018-09-06 DIAGNOSIS — E11.621 TYPE 2 DIABETES MELLITUS WITH FOOT ULCER: ICD-10-CM

## 2018-09-06 DIAGNOSIS — Z95.1 PRESENCE OF AORTOCORONARY BYPASS GRAFT: Chronic | ICD-10-CM

## 2018-09-06 DIAGNOSIS — Z95.828 PRESENCE OF OTHER VASCULAR IMPLANTS AND GRAFTS: Chronic | ICD-10-CM

## 2018-09-06 LAB
ALBUMIN SERPL ELPH-MCNC: 4.3 G/DL — SIGNIFICANT CHANGE UP (ref 3.3–5)
ALP SERPL-CCNC: 153 U/L — HIGH (ref 40–120)
ALT FLD-CCNC: 25 U/L — SIGNIFICANT CHANGE UP (ref 10–45)
ANION GAP SERPL CALC-SCNC: 13 MMOL/L — SIGNIFICANT CHANGE UP (ref 5–17)
AST SERPL-CCNC: 26 U/L — SIGNIFICANT CHANGE UP (ref 10–40)
BASOPHILS # BLD AUTO: 0 K/UL — SIGNIFICANT CHANGE UP (ref 0–0.2)
BASOPHILS NFR BLD AUTO: 0.2 % — SIGNIFICANT CHANGE UP (ref 0–2)
BILIRUB SERPL-MCNC: 1 MG/DL — SIGNIFICANT CHANGE UP (ref 0.2–1.2)
BUN SERPL-MCNC: 12 MG/DL — SIGNIFICANT CHANGE UP (ref 7–23)
CALCIUM SERPL-MCNC: 9.6 MG/DL — SIGNIFICANT CHANGE UP (ref 8.4–10.5)
CHLORIDE SERPL-SCNC: 100 MMOL/L — SIGNIFICANT CHANGE UP (ref 96–108)
CO2 SERPL-SCNC: 26 MMOL/L — SIGNIFICANT CHANGE UP (ref 22–31)
CREAT SERPL-MCNC: 0.99 MG/DL — SIGNIFICANT CHANGE UP (ref 0.5–1.3)
CRP SERPL-MCNC: 2.13 MG/DL — HIGH (ref 0–0.4)
EOSINOPHIL # BLD AUTO: 0.2 K/UL — SIGNIFICANT CHANGE UP (ref 0–0.5)
EOSINOPHIL NFR BLD AUTO: 2.4 % — SIGNIFICANT CHANGE UP (ref 0–6)
ERYTHROCYTE [SEDIMENTATION RATE] IN BLOOD: 14 MM/HR — SIGNIFICANT CHANGE UP (ref 0–20)
GAS PNL BLDV: SIGNIFICANT CHANGE UP
GLUCOSE SERPL-MCNC: 85 MG/DL — SIGNIFICANT CHANGE UP (ref 70–99)
HCT VFR BLD CALC: 48.3 % — SIGNIFICANT CHANGE UP (ref 39–50)
HGB BLD-MCNC: 15.5 G/DL — SIGNIFICANT CHANGE UP (ref 13–17)
LYMPHOCYTES # BLD AUTO: 1.1 K/UL — SIGNIFICANT CHANGE UP (ref 1–3.3)
LYMPHOCYTES # BLD AUTO: 12.2 % — LOW (ref 13–44)
MCHC RBC-ENTMCNC: 27.5 PG — SIGNIFICANT CHANGE UP (ref 27–34)
MCHC RBC-ENTMCNC: 32 GM/DL — SIGNIFICANT CHANGE UP (ref 32–36)
MCV RBC AUTO: 85.8 FL — SIGNIFICANT CHANGE UP (ref 80–100)
MONOCYTES # BLD AUTO: 0.7 K/UL — SIGNIFICANT CHANGE UP (ref 0–0.9)
MONOCYTES NFR BLD AUTO: 8.2 % — SIGNIFICANT CHANGE UP (ref 2–14)
NEUTROPHILS # BLD AUTO: 6.8 K/UL — SIGNIFICANT CHANGE UP (ref 1.8–7.4)
NEUTROPHILS NFR BLD AUTO: 76.9 % — SIGNIFICANT CHANGE UP (ref 43–77)
PLATELET # BLD AUTO: 223 K/UL — SIGNIFICANT CHANGE UP (ref 150–400)
POTASSIUM SERPL-MCNC: 5.1 MMOL/L — SIGNIFICANT CHANGE UP (ref 3.5–5.3)
POTASSIUM SERPL-SCNC: 5.1 MMOL/L — SIGNIFICANT CHANGE UP (ref 3.5–5.3)
PROT SERPL-MCNC: 6.9 G/DL — SIGNIFICANT CHANGE UP (ref 6–8.3)
RBC # BLD: 5.63 M/UL — SIGNIFICANT CHANGE UP (ref 4.2–5.8)
RBC # FLD: 14.9 % — HIGH (ref 10.3–14.5)
SODIUM SERPL-SCNC: 139 MMOL/L — SIGNIFICANT CHANGE UP (ref 135–145)
WBC # BLD: 8.8 K/UL — SIGNIFICANT CHANGE UP (ref 3.8–10.5)
WBC # FLD AUTO: 8.8 K/UL — SIGNIFICANT CHANGE UP (ref 3.8–10.5)

## 2018-09-06 PROCEDURE — 93010 ELECTROCARDIOGRAM REPORT: CPT

## 2018-09-06 PROCEDURE — 73660 X-RAY EXAM OF TOE(S): CPT | Mod: 26,LT

## 2018-09-06 PROCEDURE — 99284 EMERGENCY DEPT VISIT MOD MDM: CPT | Mod: 25

## 2018-09-06 PROCEDURE — 99223 1ST HOSP IP/OBS HIGH 75: CPT

## 2018-09-06 RX ORDER — VANCOMYCIN HCL 1 G
1000 VIAL (EA) INTRAVENOUS ONCE
Qty: 0 | Refills: 0 | Status: COMPLETED | OUTPATIENT
Start: 2018-09-06 | End: 2018-09-06

## 2018-09-06 RX ORDER — PIPERACILLIN AND TAZOBACTAM 4; .5 G/20ML; G/20ML
3.38 INJECTION, POWDER, LYOPHILIZED, FOR SOLUTION INTRAVENOUS ONCE
Qty: 0 | Refills: 0 | Status: COMPLETED | OUTPATIENT
Start: 2018-09-06 | End: 2018-09-06

## 2018-09-06 RX ADMIN — PIPERACILLIN AND TAZOBACTAM 200 GRAM(S): 4; .5 INJECTION, POWDER, LYOPHILIZED, FOR SOLUTION INTRAVENOUS at 18:36

## 2018-09-06 RX ADMIN — Medication 250 MILLIGRAM(S): at 19:22

## 2018-09-06 RX ADMIN — PIPERACILLIN AND TAZOBACTAM 3.38 GRAM(S): 4; .5 INJECTION, POWDER, LYOPHILIZED, FOR SOLUTION INTRAVENOUS at 19:06

## 2018-09-06 RX ADMIN — Medication 1000 MILLIGRAM(S): at 20:23

## 2018-09-06 NOTE — CONSULT NOTE ADULT - ASSESSMENT
68 yo M w/ likely osteomyelitis L 2nd digit   - Pt seen and examined  - Left foot stable at this time  - Outpt HAILEE/PVR and non audible on dopple pulses demonstrate poor vascularity to the foot  - Wound debrided and flushed w/ saline  - Cultured wound bed  - Dressed w/ betadine and DSD  - Discussed likely amputation with the pt   - Vascular likely planning for angio, will follow up vascular recommendations  - Admit for IV abx for full vascular and osteomyelitis work up  - D/w attending

## 2018-09-06 NOTE — ED PROVIDER NOTE - PHYSICAL EXAMINATION
left foot with ulcer on 2nd tow with surrounding grey and dusky skin. with erythema to distal aspect of foot.   weak pulses bilaterally in dp/tp.

## 2018-09-06 NOTE — ED PROVIDER NOTE - ATTENDING CONTRIBUTION TO CARE
Attending MD Hudson:  I personally have seen and examined this patient.  Resident note reviewed and agree on plan of care and except where noted.  See HPI, PE, and MDM for details.         Attending MD Hudson: A & O x 3, NAD, EOMI b/l, PERRL b/l; lungs CTAB, heart with reg rhythm without murmur; abdomen soft NTND; extremities with no edema; left 2nd toe with exposed bone to distal toe, dusky remainder of digit, faint erythema tracking to dorsum left foot, affect appropriate. neuro exam non focal with no motor or sensory deficits.

## 2018-09-06 NOTE — CONSULT NOTE ADULT - ASSESSMENT
Jesse Gonzalez is a 67 y.o. man with history of CHF, CAD s/p CABG (20 years ago) and stent, DM, and PVD s/p R 1,2,5 toe amputations who presents to the ED with a left 2nd toe wound x1 week, with exposed bone.    Plan:  - Podiatry is admitting for amputation  - HAILEE/PVR already complete  - Will evaluate for possible angiogram    hSar Carpenter, PGY2  [id number] Jesse Gonzalez is a 67 y.o. man with history of CHF, CAD s/p CABG (20 years ago) and stent, DM, and PVD s/p R 1,2,5 toe amputations who presents to the ED with a left 2nd toe wound x1 week, with exposed bone.    Plan:  - Podiatry is admitting for amputation  - HAILEE/PVR already complete  - CTA SJ with run off  - Will evaluate for possible angiogram pending CTA results    Shar Carpenter, PGY2  [phone]

## 2018-09-06 NOTE — ED PROVIDER NOTE - PROGRESS NOTE DETAILS
Paged vascular surgery. Awaiting callback. Spoke with vascular surgery. Recommending admission to medicine. Paged Dr. Ramin Burt. Awaiting callback. Spoke w/ Dr. Ramin Burt. Recommending admission to hospitalist. Spoke w/ Dr. Ramin Burt. Recommending admission to hospitalist. Paged hospitalist. Awaiting callback.

## 2018-09-06 NOTE — ED ADULT NURSE NOTE - OBJECTIVE STATEMENT
67 y.o. Male presents to the ED accompanied by family for left second toe wound. Hx DM type 2 - on insulin, HTN, hyperlipidemia, CAD - stent 20 years ago. Pt reports noticing a "black spot" on bottom of L second toe last week and throughout the week, pt's toe has "turned black" with open wound. Pt went to podiatry today and was told to come to ED for further evaluation and IV antibiotics. Pt denies sensation of L second toe. +swelling of L foot noted. +pedal pulses b/l. Limited ROM of toes on L foot. Hx of amputated R great toe, and R fourth and fifth toe. Denies CP, SOB, N/V/D, urinary/bowel complications, fever/chills. Pt is in no current distress. Comfort and safety provided. Will continue to monitor.

## 2018-09-06 NOTE — ED PROVIDER NOTE - OBJECTIVE STATEMENT
67yoM hx of cardiac disease pw left foot ulver, worsening, with worsening redness on toe, failing outpatient treatment. some weaknes. no fevers, chills, nausea, vomiting, diarrhea, chest pain, sob.

## 2018-09-06 NOTE — CONSULT NOTE ADULT - SUBJECTIVE AND OBJECTIVE BOX
Patient is a 67y old  Male who presents with a chief complaint of left foot 2nd digit wound    HPI: 68 yo M sent in by outside doctor for left foot wound and non palpable pulses. Pt states he noticed the wound about 2 weeks ago, and it has progressed rapidly over that time. Pt has history of previous amputations on the right foot at an outside hospital.       PAST MEDICAL & SURGICAL HISTORY:  Chronic congestive heart failure, unspecified congestive heart failure type  PAD (peripheral artery disease)  Stented coronary artery  Hyperlipidemia  Diabetes  Hypertension  CAD (coronary artery disease)  S/P amputation: right great toe and 4th and 5th digit  S/P bypass graft of extremity: RLE  S/P angioplasty with stent: about 5 years ago  S/P CABG x 3      MEDICATIONS  (STANDING):  vancomycin  IVPB 1000 milliGRAM(s) IV Intermittent once    MEDICATIONS  (PRN):      Allergies    No Known Allergies    Intolerances        VITALS:    Vital Signs Last 24 Hrs  T(C): 36.7 (06 Sep 2018 18:07), Max: 36.7 (06 Sep 2018 14:49)  T(F): 98 (06 Sep 2018 18:07), Max: 98.1 (06 Sep 2018 14:49)  HR: 78 (06 Sep 2018 18:07) (69 - 78)  BP: 148/90 (06 Sep 2018 18:07) (119/78 - 148/90)  BP(mean): --  RR: 16 (06 Sep 2018 18:07) (16 - 18)  SpO2: 96% (06 Sep 2018 18:07) (96% - 99%)    LABS:                CAPILLARY BLOOD GLUCOSE              LOWER EXTREMITY PHYSICAL EXAM:    Vascular: DP/PT 0/4, B/L, CFT <5 seconds B/L, Temperature gradient warm, B/L.   Neuro: Epicritic sensation absent to b/l feet  Musculoskeletal/Ortho: R foot amputations of digits 1, 4, and 5 (only 2+3 remain)  Skin:  Wound #1: left foot distal 2nd digit  Size: 0.8 cm in diameter  Depth: to bone  Wound bed: bone and subcutaneous tissue  Drainage: sanguinous, no purulence noted  Odor:  none  Periwound: hypkeratotic, ischemic changes and cellulitis noted to focal 2nd digit  Etiology: pressure/neuropathy

## 2018-09-06 NOTE — ED ADULT TRIAGE NOTE - CHIEF COMPLAINT QUOTE
left diabetic foot with gangrene sent from [facility] after they amputated part of the toe, needs IV antibiotics

## 2018-09-06 NOTE — ED PROVIDER NOTE - MEDICAL DECISION MAKING DETAILS
Attending MD Hudson: 67M with DM, CHF, CAD, PVD, pw gangrene of left 2nd toe, referred into ED by vascular surgery. Exam with gangrene of left 2nd toe, plan for vascular surg cs, iv abx, admission

## 2018-09-06 NOTE — CONSULT NOTE ADULT - SUBJECTIVE AND OBJECTIVE BOX
General Surgery Consult  Consulting surgical team: Vascular  Consulting attending: Javier    HPI: Jesse Gonzalez is a 67 y.o. man with history of CHF, CAD s/p CABG (20 years ago) and stent, DM, and PVD s/p R 1,2,5 toe amputations who presents to the ED with a left 2nd toe wound x1 week. The patient denies any trauma to the foot. The patient was seen by a vascular surgeon today (Dr. Lopes) who performed a debridement and directed him to go to the ED. The patient denies any associated fever, chills, diaphoresis, weakness, or fatigue.       PAST MEDICAL HISTORY:  Chronic congestive heart failure, unspecified congestive heart failure type  PAD (peripheral artery disease)  Stented coronary artery  Hyperlipidemia  Diabetes  Hypertension  CAD (coronary artery disease)      PAST SURGICAL HISTORY:  S/P amputation  S/P bypass graft of extremity  S/P angioplasty with stent  S/P CABG x 3      MEDICATIONS:  Home Medications:  aspirin 81 mg oral delayed release tablet: 1 tab(s) orally once a day (09 Mar 2017 09:43)  atorvastatin 40 mg oral tablet: 1 tab(s) orally once a day (at bedtime) (09 Mar 2017 09:43)  carvedilol 6.25 mg oral tablet: 1 tab(s) orally every 12 hours (09 Mar 2017 09:43)  clopidogrel 75 mg oral tablet: 1 tab(s) orally once a day (09 Mar 2017 09:43)  furosemide 40 mg oral tablet: 1 tab(s) orally once a day (06 Mar 2017 20:59)  Lantus Solostar Pen 100 units/mL subcutaneous solution: 30 unit(s) subcutaneous once a day (at bedtime) (06 Mar 2017 20:59)  ocular lubricant ophthalmic solution: 1 drop(s) to each affected eye 4 times a day (09 Mar 2017 09:43)  valsartan 40 mg oral tablet: 1 tab(s) orally once a day (09 Mar 2017 09:43)      ALLERGIES:  No Known Allergies      VITALS & I/Os:  Vital Signs Last 24 Hrs  T(C): 36.9 (06 Sep 2018 19:20), Max: 36.9 (06 Sep 2018 19:20)  T(F): 98.5 (06 Sep 2018 19:20), Max: 98.5 (06 Sep 2018 19:20)  HR: 76 (06 Sep 2018 19:20) (69 - 78)  BP: 148/82 (06 Sep 2018 19:20) (119/78 - 148/90)  BP(mean): --  RR: 18 (06 Sep 2018 19:20) (16 - 18)  SpO2: 95% (06 Sep 2018 19:20) (95% - 99%)      PHYSICAL EXAM:  General: No acute distress  Respiratory: Nonlabored  Cardiovascular: normotensive, regular rate   Extremities: Warm, distal aspect of left 2nd toe eroded with exposed bone, toe is also darkened   Vascular:  - RLE: DP + Pop signal, NO PT signal, palp fem  - LLE: DP + PT + Pop signal, palp fem      LABS:                        15.5   8.8   )-----------( 223      ( 06 Sep 2018 18:56 )             48.3     09-06    139  |  100  |  12  ----------------------------<  85  5.1   |  26  |  0.99    Ca    9.6      06 Sep 2018 18:56    TPro  6.9  /  Alb  4.3  /  TBili  1.0  /  DBili  x   /  AST  26  /  ALT  25  /  AlkPhos  153<H>  09-06    Lactate:  09-06 @ 18:56  2.1

## 2018-09-07 DIAGNOSIS — M86.10 OTHER ACUTE OSTEOMYELITIS, UNSPECIFIED SITE: ICD-10-CM

## 2018-09-07 DIAGNOSIS — I50.22 CHRONIC SYSTOLIC (CONGESTIVE) HEART FAILURE: ICD-10-CM

## 2018-09-07 DIAGNOSIS — I25.10 ATHEROSCLEROTIC HEART DISEASE OF NATIVE CORONARY ARTERY WITHOUT ANGINA PECTORIS: ICD-10-CM

## 2018-09-07 DIAGNOSIS — Z29.9 ENCOUNTER FOR PROPHYLACTIC MEASURES, UNSPECIFIED: ICD-10-CM

## 2018-09-07 DIAGNOSIS — E11.59 TYPE 2 DIABETES MELLITUS WITH OTHER CIRCULATORY COMPLICATIONS: ICD-10-CM

## 2018-09-07 LAB
ANION GAP SERPL CALC-SCNC: 9 MMOL/L — SIGNIFICANT CHANGE UP (ref 5–17)
APPEARANCE UR: CLEAR — SIGNIFICANT CHANGE UP
BACTERIA # UR AUTO: NEGATIVE — SIGNIFICANT CHANGE UP
BILIRUB UR-MCNC: NEGATIVE — SIGNIFICANT CHANGE UP
BUN SERPL-MCNC: 13 MG/DL — SIGNIFICANT CHANGE UP (ref 7–23)
CALCIUM SERPL-MCNC: 8.9 MG/DL — SIGNIFICANT CHANGE UP (ref 8.4–10.5)
CHLORIDE SERPL-SCNC: 101 MMOL/L — SIGNIFICANT CHANGE UP (ref 96–108)
CO2 SERPL-SCNC: 28 MMOL/L — SIGNIFICANT CHANGE UP (ref 22–31)
COLOR SPEC: YELLOW — SIGNIFICANT CHANGE UP
CREAT SERPL-MCNC: 1.03 MG/DL — SIGNIFICANT CHANGE UP (ref 0.5–1.3)
DIFF PNL FLD: NEGATIVE — SIGNIFICANT CHANGE UP
EPI CELLS # UR: 0 /HPF — SIGNIFICANT CHANGE UP
ERYTHROCYTE [SEDIMENTATION RATE] IN BLOOD: 14 MM/HR — SIGNIFICANT CHANGE UP (ref 0–20)
GLUCOSE BLDC GLUCOMTR-MCNC: 120 MG/DL — HIGH (ref 70–99)
GLUCOSE BLDC GLUCOMTR-MCNC: 191 MG/DL — HIGH (ref 70–99)
GLUCOSE BLDC GLUCOMTR-MCNC: 199 MG/DL — HIGH (ref 70–99)
GLUCOSE BLDC GLUCOMTR-MCNC: 221 MG/DL — HIGH (ref 70–99)
GLUCOSE BLDC GLUCOMTR-MCNC: 230 MG/DL — HIGH (ref 70–99)
GLUCOSE SERPL-MCNC: 130 MG/DL — HIGH (ref 70–99)
GLUCOSE UR QL: NEGATIVE — SIGNIFICANT CHANGE UP
HCT VFR BLD CALC: 42.8 % — SIGNIFICANT CHANGE UP (ref 39–50)
HGB BLD-MCNC: 14.7 G/DL — SIGNIFICANT CHANGE UP (ref 13–17)
HYALINE CASTS # UR AUTO: 0 /LPF — SIGNIFICANT CHANGE UP (ref 0–2)
KETONES UR-MCNC: ABNORMAL
LEUKOCYTE ESTERASE UR-ACNC: NEGATIVE — SIGNIFICANT CHANGE UP
MCHC RBC-ENTMCNC: 28.3 PG — SIGNIFICANT CHANGE UP (ref 27–34)
MCHC RBC-ENTMCNC: 34.3 GM/DL — SIGNIFICANT CHANGE UP (ref 32–36)
MCV RBC AUTO: 82.3 FL — SIGNIFICANT CHANGE UP (ref 80–100)
NITRITE UR-MCNC: NEGATIVE — SIGNIFICANT CHANGE UP
PH UR: 6 — SIGNIFICANT CHANGE UP (ref 5–8)
PLATELET # BLD AUTO: 180 K/UL — SIGNIFICANT CHANGE UP (ref 150–400)
POTASSIUM SERPL-MCNC: 4.5 MMOL/L — SIGNIFICANT CHANGE UP (ref 3.5–5.3)
POTASSIUM SERPL-SCNC: 4.5 MMOL/L — SIGNIFICANT CHANGE UP (ref 3.5–5.3)
PROT UR-MCNC: ABNORMAL
RBC # BLD: 5.2 M/UL — SIGNIFICANT CHANGE UP (ref 4.2–5.8)
RBC # FLD: 15.5 % — HIGH (ref 10.3–14.5)
RBC CASTS # UR COMP ASSIST: 1 /HPF — SIGNIFICANT CHANGE UP (ref 0–4)
SODIUM SERPL-SCNC: 138 MMOL/L — SIGNIFICANT CHANGE UP (ref 135–145)
SP GR SPEC: 1.02 — SIGNIFICANT CHANGE UP (ref 1.01–1.02)
UROBILINOGEN FLD QL: NEGATIVE — SIGNIFICANT CHANGE UP
WBC # BLD: 5.96 K/UL — SIGNIFICANT CHANGE UP (ref 3.8–10.5)
WBC # FLD AUTO: 5.96 K/UL — SIGNIFICANT CHANGE UP (ref 3.8–10.5)
WBC UR QL: 1 /HPF — SIGNIFICANT CHANGE UP (ref 0–5)

## 2018-09-07 PROCEDURE — 99233 SBSQ HOSP IP/OBS HIGH 50: CPT

## 2018-09-07 PROCEDURE — 75635 CT ANGIO ABDOMINAL ARTERIES: CPT | Mod: 26

## 2018-09-07 RX ORDER — INSULIN LISPRO 100/ML
VIAL (ML) SUBCUTANEOUS
Qty: 0 | Refills: 0 | Status: DISCONTINUED | OUTPATIENT
Start: 2018-09-07 | End: 2018-09-13

## 2018-09-07 RX ORDER — INFLUENZA VIRUS VACCINE 15; 15; 15; 15 UG/.5ML; UG/.5ML; UG/.5ML; UG/.5ML
0.5 SUSPENSION INTRAMUSCULAR ONCE
Qty: 0 | Refills: 0 | Status: DISCONTINUED | OUTPATIENT
Start: 2018-09-07 | End: 2018-09-24

## 2018-09-07 RX ORDER — DEXTROSE 50 % IN WATER 50 %
25 SYRINGE (ML) INTRAVENOUS ONCE
Qty: 0 | Refills: 0 | Status: DISCONTINUED | OUTPATIENT
Start: 2018-09-07 | End: 2018-09-13

## 2018-09-07 RX ORDER — FUROSEMIDE 40 MG
40 TABLET ORAL DAILY
Qty: 0 | Refills: 0 | Status: DISCONTINUED | OUTPATIENT
Start: 2018-09-07 | End: 2018-09-10

## 2018-09-07 RX ORDER — GLUCAGON INJECTION, SOLUTION 0.5 MG/.1ML
1 INJECTION, SOLUTION SUBCUTANEOUS ONCE
Qty: 0 | Refills: 0 | Status: DISCONTINUED | OUTPATIENT
Start: 2018-09-07 | End: 2018-09-13

## 2018-09-07 RX ORDER — HEPARIN SODIUM 5000 [USP'U]/ML
5000 INJECTION INTRAVENOUS; SUBCUTANEOUS EVERY 8 HOURS
Qty: 0 | Refills: 0 | Status: DISCONTINUED | OUTPATIENT
Start: 2018-09-07 | End: 2018-09-07

## 2018-09-07 RX ORDER — VANCOMYCIN HCL 1 G
1000 VIAL (EA) INTRAVENOUS EVERY 12 HOURS
Qty: 0 | Refills: 0 | Status: DISCONTINUED | OUTPATIENT
Start: 2018-09-07 | End: 2018-09-08

## 2018-09-07 RX ORDER — PIPERACILLIN AND TAZOBACTAM 4; .5 G/20ML; G/20ML
3.38 INJECTION, POWDER, LYOPHILIZED, FOR SOLUTION INTRAVENOUS EVERY 8 HOURS
Qty: 0 | Refills: 0 | Status: DISCONTINUED | OUTPATIENT
Start: 2018-09-07 | End: 2018-09-10

## 2018-09-07 RX ORDER — PIPERACILLIN AND TAZOBACTAM 4; .5 G/20ML; G/20ML
3.38 INJECTION, POWDER, LYOPHILIZED, FOR SOLUTION INTRAVENOUS ONCE
Qty: 0 | Refills: 0 | Status: COMPLETED | OUTPATIENT
Start: 2018-09-07 | End: 2018-09-07

## 2018-09-07 RX ORDER — CLOPIDOGREL BISULFATE 75 MG/1
75 TABLET, FILM COATED ORAL DAILY
Qty: 0 | Refills: 0 | Status: DISCONTINUED | OUTPATIENT
Start: 2018-09-07 | End: 2018-09-07

## 2018-09-07 RX ORDER — DEXTROSE 50 % IN WATER 50 %
12.5 SYRINGE (ML) INTRAVENOUS ONCE
Qty: 0 | Refills: 0 | Status: DISCONTINUED | OUTPATIENT
Start: 2018-09-07 | End: 2018-09-13

## 2018-09-07 RX ORDER — CARVEDILOL PHOSPHATE 80 MG/1
6.25 CAPSULE, EXTENDED RELEASE ORAL EVERY 12 HOURS
Qty: 0 | Refills: 0 | Status: DISCONTINUED | OUTPATIENT
Start: 2018-09-07 | End: 2018-09-10

## 2018-09-07 RX ORDER — INSULIN GLARGINE 100 [IU]/ML
15 INJECTION, SOLUTION SUBCUTANEOUS AT BEDTIME
Qty: 0 | Refills: 0 | Status: DISCONTINUED | OUTPATIENT
Start: 2018-09-07 | End: 2018-09-10

## 2018-09-07 RX ORDER — ASPIRIN/CALCIUM CARB/MAGNESIUM 324 MG
81 TABLET ORAL DAILY
Qty: 0 | Refills: 0 | Status: DISCONTINUED | OUTPATIENT
Start: 2018-09-07 | End: 2018-09-07

## 2018-09-07 RX ORDER — INSULIN LISPRO 100/ML
VIAL (ML) SUBCUTANEOUS AT BEDTIME
Qty: 0 | Refills: 0 | Status: DISCONTINUED | OUTPATIENT
Start: 2018-09-07 | End: 2018-09-13

## 2018-09-07 RX ORDER — ATORVASTATIN CALCIUM 80 MG/1
40 TABLET, FILM COATED ORAL AT BEDTIME
Qty: 0 | Refills: 0 | Status: DISCONTINUED | OUTPATIENT
Start: 2018-09-07 | End: 2018-09-13

## 2018-09-07 RX ADMIN — Medication 1: at 13:49

## 2018-09-07 RX ADMIN — Medication 250 MILLIGRAM(S): at 06:41

## 2018-09-07 RX ADMIN — Medication 2: at 17:34

## 2018-09-07 RX ADMIN — Medication 250 MILLIGRAM(S): at 23:01

## 2018-09-07 RX ADMIN — PIPERACILLIN AND TAZOBACTAM 25 GRAM(S): 4; .5 INJECTION, POWDER, LYOPHILIZED, FOR SOLUTION INTRAVENOUS at 13:49

## 2018-09-07 RX ADMIN — PIPERACILLIN AND TAZOBACTAM 200 GRAM(S): 4; .5 INJECTION, POWDER, LYOPHILIZED, FOR SOLUTION INTRAVENOUS at 05:22

## 2018-09-07 RX ADMIN — CARVEDILOL PHOSPHATE 6.25 MILLIGRAM(S): 80 CAPSULE, EXTENDED RELEASE ORAL at 17:09

## 2018-09-07 RX ADMIN — Medication 40 MILLIGRAM(S): at 14:24

## 2018-09-07 RX ADMIN — INSULIN GLARGINE 15 UNIT(S): 100 INJECTION, SOLUTION SUBCUTANEOUS at 22:18

## 2018-09-07 NOTE — H&P ADULT - NSHPLABSRESULTS_GEN_ALL_CORE
15.5   8.8   )-----------( 223      ( 06 Sep 2018 18:56 )             48.3           139  |  100  |  12  ----------------------------<  85  5.1   |  26  |  0.99    Ca    9.6      06 Sep 2018 18:56    TPro  6.9  /  Alb  4.3  /  TBili  1.0  /  DBili  x   /  AST  26  /  ALT  25  /  AlkPhos  153<H>              Urinalysis Basic - ( 07 Sep 2018 00:32 )    Color: Yellow / Appearance: Clear / S.018 / pH: x  Gluc: x / Ketone: Small  / Bili: Negative / Urobili: Negative   Blood: x / Protein: 30 mg/dL / Nitrite: Negative   Leuk Esterase: Negative / RBC: 1 /hpf / WBC 1 /hpf   Sq Epi: x / Non Sq Epi: 0 /hpf / Bacteria: Negative    I personally reviewed & interpreted the lab findings above; CBC and CMP unremarkable.   I personally reviewed & interpreted the radiographic findings; Xray of left foot c/w OM   I personally reviewed & interpreted the EKG findings; Nonischemic , lateral lead ischemia but unchanged from prior EKG

## 2018-09-07 NOTE — H&P ADULT - ASSESSMENT
66 yo M w/ hx of DM2 c/b prior right three toe amputation for infection, CAD w/ stent (last placed in 2016 per patient) & CABG, HTN, severe LV dysfunction (based on TTE 2016), PAD s/p RLE bypass grafting presenting with left 2nd digit toe infection 2/2 to osteomyelitis.

## 2018-09-07 NOTE — PROGRESS NOTE ADULT - SUBJECTIVE AND OBJECTIVE BOX
ERICKA [id number]  67y  Male  Type 2 diabetes mellitus with foot ulcer  H/o or current diagnosis of HF- ACEI/ARB contraindication unknown  H/o or current diagnosis of HF- no contraindication to ACEI/ARBs  H/o or current diagnosis of HF- ACEI/ARB contraindication unknown  Family history of diabetes mellitus (DM) (Father)  No pertinent family history in first degree relatives  Handoff  MEWS Score  Chronic congestive heart failure, unspecified congestive heart failure type  PAD (peripheral artery disease)  Stented coronary artery  Hyperlipidemia  Diabetes  Hypertension  CAD (coronary artery disease)  Diabetic foot ulcer  Prophylactic measure  Coronary artery disease involving native coronary artery of native heart without angina pectoris  Type 2 diabetes mellitus with other circulatory complication  Chronic systolic (congestive) heart failure  Acute osteomyelitis  S/P amputation  S/P bypass graft of extremity  S/P angioplasty with stent  S/P CABG x 3  GANGRENE L TOE/DIABETIC  RAD CDS  90+    atorvastatin 40 milliGRAM(s) Oral at bedtime  carvedilol 6.25 milliGRAM(s) Oral every 12 hours  dextrose 50% Injectable 12.5 Gram(s) IV Push once  dextrose 50% Injectable 25 Gram(s) IV Push once  dextrose 50% Injectable 25 Gram(s) IV Push once  furosemide    Tablet 40 milliGRAM(s) Oral daily  glucagon  Injectable 1 milliGRAM(s) IntraMuscular once PRN  influenza   Vaccine 0.5 milliLiter(s) IntraMuscular once  insulin glargine Injectable (LANTUS) 15 Unit(s) SubCutaneous at bedtime  insulin lispro (HumaLOG) corrective regimen sliding scale   SubCutaneous three times a day before meals  insulin lispro (HumaLOG) corrective regimen sliding scale   SubCutaneous at bedtime  piperacillin/tazobactam IVPB. 3.375 Gram(s) IV Intermittent every 8 hours  vancomycin  IVPB 1000 milliGRAM(s) IV Intermittent every 12 hours  No Known Allergies    CBC Full  -  ( 07 Sep 2018 10:25 )  WBC Count : 5.96 K/uL  Hemoglobin : 14.7 g/dL  Hematocrit : 42.8 %  Platelet Count - Automated : 180 K/uL  Mean Cell Volume : 82.3 fl  Mean Cell Hemoglobin : 28.3 pg  Mean Cell Hemoglobin Concentration : 34.3 gm/dL  Auto Neutrophil # : x  Auto Lymphocyte # : x  Auto Monocyte # : x  Auto Eosinophil # : x  Auto Basophil # : x  Auto Neutrophil % : x  Auto Lymphocyte % : x  Auto Monocyte % : x  Auto Eosinophil % : x  Auto Basophil % : x    Patient visited at bedside for infected left 2 toe probing to bone  DP and PT non-palpable with +PVD left foot  Await vascular plan and revasc prior to podiatric surgery. patient awaiting 2 toe amputation left foot.  Continue daily dressing changes  podiatry will follow

## 2018-09-07 NOTE — PROGRESS NOTE ADULT - PROBLEM SELECTOR PLAN 1
-patient presenting w/ OM and d/w podiatry and vascular, may need amputation.  hold ASA /plavix for now.   -c/w Vancomycin and Zosyn given patient is diabetic   -CT run off study pending per vascular.  -Follow up blood cultures.

## 2018-09-07 NOTE — ED ADULT NURSE REASSESSMENT NOTE - NS ED NURSE REASSESS COMMENT FT1
Patient resting comfortably in bed. Glucose 199. Given a urinal for UA sample. RN will continue to monitor.

## 2018-09-07 NOTE — PROGRESS NOTE ADULT - SUBJECTIVE AND OBJECTIVE BOX
VASCULAR SURGERY PROGRESS NOTE    Subjective: Patient seen and examined during morning rounds.    Objective:  Vital Signs Last 24 Hrs  T(C): 36.7 (07 Sep 2018 15:04), Max: 36.9 (06 Sep 2018 19:20)  T(F): 98.1 (07 Sep 2018 15:04), Max: 98.5 (06 Sep 2018 19:20)  HR: 83 (07 Sep 2018 15:04) (76 - 87)  BP: 145/97 (07 Sep 2018 15:04) (115/76 - 148/90)  BP(mean): --  RR: 19 (07 Sep 2018 15:04) (16 - 19)  SpO2: 97% (07 Sep 2018 15:04) (95% - 98%)    Physical Exam:  General: Well developed, well nourished, No Acute Distress  HEENT: Normocephalic Atraumatic, EOMI bl  Neurology: A&Ox3  Abdominal: Soft, Non-Tender, Non-Distended  Extremities: Warm, no clubbing, cyanosis or edema, FROM  Skin: warm, dry, normal color, no rash or abnormal lesions    I&O's Detail      MEDICATIONS  (STANDING):  atorvastatin 40 milliGRAM(s) Oral at bedtime  carvedilol 6.25 milliGRAM(s) Oral every 12 hours  dextrose 50% Injectable 12.5 Gram(s) IV Push once  dextrose 50% Injectable 25 Gram(s) IV Push once  dextrose 50% Injectable 25 Gram(s) IV Push once  furosemide    Tablet 40 milliGRAM(s) Oral daily  influenza   Vaccine 0.5 milliLiter(s) IntraMuscular once  insulin glargine Injectable (LANTUS) 15 Unit(s) SubCutaneous at bedtime  insulin lispro (HumaLOG) corrective regimen sliding scale   SubCutaneous three times a day before meals  insulin lispro (HumaLOG) corrective regimen sliding scale   SubCutaneous at bedtime  piperacillin/tazobactam IVPB. 3.375 Gram(s) IV Intermittent every 8 hours  vancomycin  IVPB 1000 milliGRAM(s) IV Intermittent every 12 hours    MEDICATIONS  (PRN):  glucagon  Injectable 1 milliGRAM(s) IntraMuscular once PRN Glucose LESS THAN 70 milligrams/deciliter      LABS:                        14.7   5.96  )-----------( 180      ( 07 Sep 2018 10:25 )             42.8       09-07    138  |  101  |  13  ----------------------------<  130<H>  4.5   |  28  |  1.03    Ca    8.9      07 Sep 2018 09:02    TPro  6.9  /  Alb  4.3  /  TBili  1.0  /  DBili  x   /  AST  26  /  ALT  25  /  AlkPhos  153<H>  09-06          Negative 09-07-18 @ 00:32 VASCULAR SURGERY PROGRESS NOTE    Subjective: Patient seen and examined during morning rounds.    Objective:  Vital Signs Last 24 Hrs  T(C): 36.7 (07 Sep 2018 15:04), Max: 36.9 (06 Sep 2018 19:20)  T(F): 98.1 (07 Sep 2018 15:04), Max: 98.5 (06 Sep 2018 19:20)  HR: 83 (07 Sep 2018 15:04) (76 - 87)  BP: 145/97 (07 Sep 2018 15:04) (115/76 - 148/90)  BP(mean): --  RR: 19 (07 Sep 2018 15:04) (16 - 19)  SpO2: 97% (07 Sep 2018 15:04) (95% - 98%)    Physical Exam:  General: Well developed, well nourished, No Acute Distress  HEENT: Normocephalic Atraumatic, EOMI bl  Neurology: A&Ox3  Abdominal: Soft, Non-Tender, Non-Distended  Extremities: Warm, distal aspect of left 2nd toe eroded with exposed bone, toe is also darkened; betadine on; +DP signals bilat    I&O's Detail      MEDICATIONS  (STANDING):  atorvastatin 40 milliGRAM(s) Oral at bedtime  carvedilol 6.25 milliGRAM(s) Oral every 12 hours  dextrose 50% Injectable 12.5 Gram(s) IV Push once  dextrose 50% Injectable 25 Gram(s) IV Push once  dextrose 50% Injectable 25 Gram(s) IV Push once  furosemide    Tablet 40 milliGRAM(s) Oral daily  influenza   Vaccine 0.5 milliLiter(s) IntraMuscular once  insulin glargine Injectable (LANTUS) 15 Unit(s) SubCutaneous at bedtime  insulin lispro (HumaLOG) corrective regimen sliding scale   SubCutaneous three times a day before meals  insulin lispro (HumaLOG) corrective regimen sliding scale   SubCutaneous at bedtime  piperacillin/tazobactam IVPB. 3.375 Gram(s) IV Intermittent every 8 hours  vancomycin  IVPB 1000 milliGRAM(s) IV Intermittent every 12 hours    MEDICATIONS  (PRN):  glucagon  Injectable 1 milliGRAM(s) IntraMuscular once PRN Glucose LESS THAN 70 milligrams/deciliter      LABS:                        14.7   5.96  )-----------( 180      ( 07 Sep 2018 10:25 )             42.8       09-07    138  |  101  |  13  ----------------------------<  130<H>  4.5   |  28  |  1.03    Ca    8.9      07 Sep 2018 09:02    TPro  6.9  /  Alb  4.3  /  TBili  1.0  /  DBili  x   /  AST  26  /  ALT  25  /  AlkPhos  153<H>  09-06          Negative 09-07-18 @ 00:32

## 2018-09-07 NOTE — H&P ADULT - HISTORY OF PRESENT ILLNESS
68 yo M w/ hx of DM2 c/b prior right three toe amputation for infection, CAD w/ stent (last placed in 2016 per patient) & CABG, HTN, severe LV dysfunction (based on TTE 2016), PAD s/p RLE bypass grafting presenting with left 2nd digit toe infection.    Patient reports one week hx of noticing a dark colored region of the left 2nd toe of foot. Patient reports he has noticed dark red drainage. He has had no trauma, tenderness, or fevers. He denies any saltwater exposure or any animal bites/dog/cat exposure to toe. He reports because of the persistent drainage he went to his primary care doctor yesterday for check-up and was told to come to the hospital. He denies recent antimicrobial therapy and otherwise has no symptoms. Specifically he has no chest pain, palpitation, atypical anginal symptoms, back pain, abdominal pain, nausea/vomiting.

## 2018-09-07 NOTE — H&P ADULT - PROBLEM SELECTOR PLAN 3
-Hold home basal therapy given risk of hypoglycemia is greater than hyperglycemia. Per pods/vascular no need for NPO for now given unclear on plan.   -c/w low dose humalog sliding scale.

## 2018-09-07 NOTE — PROGRESS NOTE ADULT - ASSESSMENT
68 yo M w/ hx of DM2 c/b prior right three toe amputation for infection, CAD w/ stent (last placed in 2016 per patient) & CABG, HTN, severe LV dysfunction (based on TTE 2016), PAD s/p RLE bypass grafting presenting with left 2nd digit toe infection 2/2 to osteomyelitis.

## 2018-09-07 NOTE — PROGRESS NOTE ADULT - ASSESSMENT
Jesse Gonzalez is a 67 y.o. man with history of CHF, CAD s/p CABG (20 years ago) and stent, DM, and PVD s/p R 1,2,5 toe amputations who presents to the ED with a left 2nd toe wound x1 week, with exposed bone.    Plan:  - Podiatry is following  - HAILEE/PVR already complete  - CTA BL with run off is pending  - Will evaluate for possible angiogram pending CTA results    ZENOBIA Haynes, PGY-2  Vascular Surgery  p. [phone]

## 2018-09-07 NOTE — H&P ADULT - NSHPPHYSICALEXAM_GEN_ALL_CORE
PHYSICAL EXAM:  Vital Signs Last 24 Hrs  T(C): 36.9 (09-07-18 @ 00:26)  T(F): 98.5 (09-07-18 @ 00:26), Max: 98.5 (09-06-18 @ 19:20)  HR: 87 (09-07-18 @ 00:26) (69 - 87)  BP: 132/80 (09-07-18 @ 00:26)  BP(mean): --  RR: 18 (09-07-18 @ 00:26) (16 - 18)  SpO2: 98% (09-07-18 @ 00:26) (95% - 99%)  Wt(kg): --    Constitutional: NAD, awake and alert  EYES: EOMI  ENT:  Normal Hearing, no tonsillar exudates   Neck: Soft and supple , no thyromegaly   Respiratory: Breath sounds are clear bilaterally, No wheezing, rales or rhonchi  Cardiovascular: S1 and S2, regular rate and rhythm, no Murmurs, gallops or rubs, no JVD,    Gastrointestinal: Bowel Sounds present, soft, nontender, nondistended, no guarding, no rebound  Extremities: No cyanosis or clubbing; warm to touch  Vascular: 2+ peripheral pulses lower ex  Neurological: No focal deficits, CN II-XII intact bilaterally, sensation to light touch intact in upper extremities but diminished in lower extremities , gait deferred. Pupils are equally reactive to light and symmetrical in size.   Musculoskeletal: 5/5 strength b/l upper and lower extremities; no joint swelling.  Skin: Left foot distal 2nd digit , +sangianous drain, +overlying cellulitis , no foul odor. Right foot amputation of digits 1, 4, and 5  Psych: no depression or anhedonia, AAOx3  HEME: no nose bleeds or lymphadenopathy PHYSICAL EXAM:  Vital Signs Last 24 Hrs  T(C): 36.9 (09-07-18 @ 00:26)  T(F): 98.5 (09-07-18 @ 00:26), Max: 98.5 (09-06-18 @ 19:20)  HR: 87 (09-07-18 @ 00:26) (69 - 87)  BP: 132/80 (09-07-18 @ 00:26)  BP(mean): --  RR: 18 (09-07-18 @ 00:26) (16 - 18)  SpO2: 98% (09-07-18 @ 00:26) (95% - 99%)  Wt(kg): --    Constitutional: NAD, awake and alert  EYES: EOMI  ENT:  Normal Hearing, no tonsillar exudates   Neck: Soft and supple , no thyromegaly   Respiratory: Breath sounds are clear bilaterally, No wheezing, rales or rhonchi  Cardiovascular: S1 and S2, regular rate and rhythm, no Murmurs, gallops or rubs, no JVD,    Gastrointestinal: Bowel Sounds present, soft, nontender, nondistended, no guarding, no rebound  Extremities: No cyanosis or clubbing; warm to touch  Vascular: No palpable pulses to lower extremities. Doppler difficult to . No signs of compartment syndrome   Neurological: No focal deficits, CN II-XII intact bilaterally, sensation to light touch intact in upper extremities but diminished in lower extremities , gait deferred. Pupils are equally reactive to light and symmetrical in size.   Musculoskeletal: 5/5 strength b/l upper and lower extremities; no joint swelling.  Skin: Left foot distal 2nd digit , +sangianous drain, +overlying cellulitis , no foul odor. Right foot amputation of digits 1, 4, and 5  Psych: no depression or anhedonia, AAOx3  HEME: no nose bleeds or lymphadenopathy

## 2018-09-07 NOTE — PROGRESS NOTE ADULT - SUBJECTIVE AND OBJECTIVE BOX
Patient is a 67y old  Male who presents with a chief complaint of left 2nd toe infection (07 Sep 2018 08:30)      INTERVAL HPI/OVERNIGHT EVENTS:  no overnight events       Type 2 diabetes mellitus with foot ulcer  H/o or current diagnosis of HF- ACEI/ARB contraindication unknown  H/o or current diagnosis of HF- no contraindication to ACEI/ARBs  H/o or current diagnosis of HF- ACEI/ARB contraindication unknown  Family history of diabetes mellitus (DM) (Father)  No pertinent family history in first degree relatives  Handoff  MEWS Score  Chronic congestive heart failure, unspecified congestive heart failure type  PAD (peripheral artery disease)  Stented coronary artery  Hyperlipidemia  Diabetes  Hypertension  CAD (coronary artery disease)  Diabetic foot ulcer  Prophylactic measure  Coronary artery disease involving native coronary artery of native heart without angina pectoris  Type 2 diabetes mellitus with other circulatory complication  Chronic systolic (congestive) heart failure  Acute osteomyelitis  S/P amputation  S/P bypass graft of extremity  S/P angioplasty with stent  S/P CABG x 3  GANGRENE L TOE/DIABETIC  RAD CDS  90+      Review of Systems: 12 point review of systems otherwise negative  ( - )fevers/chills  ( - ) dyspnea  ( - ) cough  ( - ) chest pain  ( - ) palpatations  ( - ) dizziness/lightheadedness  ( - ) nausea/vomiting  ( - ) abd pain  ( - ) diarrhea  ( - ) melena  ( - ) hematochezia  ( - ) dysuria  ( - ) hematuria  ( - ) leg swelling  ( -) calf tenderness  ( - ) motor weakness  ( - ) extremity numbness  ( - ) back pain  ( + ) tolerating POs  ( + ) BM    MEDICATIONS  (STANDING):  atorvastatin 40 milliGRAM(s) Oral at bedtime  carvedilol 6.25 milliGRAM(s) Oral every 12 hours  dextrose 50% Injectable 12.5 Gram(s) IV Push once  dextrose 50% Injectable 25 Gram(s) IV Push once  dextrose 50% Injectable 25 Gram(s) IV Push once  furosemide    Tablet 40 milliGRAM(s) Oral daily  influenza   Vaccine 0.5 milliLiter(s) IntraMuscular once  insulin glargine Injectable (LANTUS) 15 Unit(s) SubCutaneous at bedtime  insulin lispro (HumaLOG) corrective regimen sliding scale   SubCutaneous three times a day before meals  insulin lispro (HumaLOG) corrective regimen sliding scale   SubCutaneous at bedtime  piperacillin/tazobactam IVPB. 3.375 Gram(s) IV Intermittent every 8 hours  vancomycin  IVPB 1000 milliGRAM(s) IV Intermittent every 12 hours    MEDICATIONS  (PRN):  glucagon  Injectable 1 milliGRAM(s) IntraMuscular once PRN Glucose LESS THAN 70 milligrams/deciliter      Allergies    No Known Allergies    Intolerances          Vital Signs Last 24 Hrs  T(C): 36.7 (07 Sep 2018 15:04), Max: 36.9 (06 Sep 2018 19:20)  T(F): 98.1 (07 Sep 2018 15:04), Max: 98.5 (06 Sep 2018 19:20)  HR: 83 (07 Sep 2018 15:04) (76 - 87)  BP: 145/97 (07 Sep 2018 15:04) (115/76 - 148/90)  BP(mean): --  RR: 19 (07 Sep 2018 15:04) (16 - 19)  SpO2: 97% (07 Sep 2018 15:04) (95% - 98%)  CAPILLARY BLOOD GLUCOSE      POCT Blood Glucose.: 191 mg/dL (07 Sep 2018 13:05)  POCT Blood Glucose.: 120 mg/dL (07 Sep 2018 09:04)  POCT Blood Glucose.: 199 mg/dL (07 Sep 2018 00:15)        Physical Exam:    Daily     Daily   General:  Well appearing, NAD,   HEENT:  Nonicteric, PERRLA  CV:  RRR, no murmur,  Lungs:  CTA B/L, no wheezes,   Abdomen:  Soft, non-tender, no distended,  Extremities:  Toe covered in bandage as per patients request not unwrapped   Skin:  Warm and dry, no rashes  :  No degroot  Neuro:  AAOx3, non-focal, CN II-XII grossly intact  No Restraints    LABS:                        14.7   5.96  )-----------( 180      ( 07 Sep 2018 10:25 )             42.8     09-    138  |  101  |  13  ----------------------------<  130<H>  4.5   |  28  |  1.03    Ca    8.9      07 Sep 2018 09:02    TPro  6.9  /  Alb  4.3  /  TBili  1.0  /  DBili  x   /  AST  26  /  ALT  25  /  AlkPhos  153<H>  09-06      Urinalysis Basic - ( 07 Sep 2018 00:32 )    Color: Yellow / Appearance: Clear / S.018 / pH: x  Gluc: x / Ketone: Small  / Bili: Negative / Urobili: Negative   Blood: x / Protein: 30 mg/dL / Nitrite: Negative   Leuk Esterase: Negative / RBC: 1 /hpf / WBC 1 /hpf   Sq Epi: x / Non Sq Epi: 0 /hpf / Bacteria: Negative          RADIOLOGY & ADDITIONAL TESTS:    ---------------------------------------------------------------------------  I personally reviewed: [  ]EKG   [  ]CXR    [  ] CT    [  ]Other  ---------------------------------------------------------------------------  PLEASE CHECK WHEN PRESENT:     [  ]Heart Failure     [  ] Acute     [  ] Acute on Chronic     [  ] Chronic  -------------------------------------------------------------------     [  ]Diastolic [HFpEF]     [  ]Systolic [HFrEF]     [  ]Combined [HFpEF & HFrEF]     [  ]Other:  -------------------------------------------------------------------  [  ]KESHAV     [  ]ATN     [  ]Reneal Medullary Necrosis     [  ]Renal Cortical Necrosis     [  ]Other Pathological Lesions:    [  ]CKD 1  [  ]CKD 2  [  ]CKD 3  [  ]CKD 4  [  ]CKD 5  [  ]Other  -------------------------------------------------------------------  [  ]Other/Unspecified:    --------------------------------------------------------------------    Abdominal Nutritional Status  [  ]Malnutrition: See Nutrition Note  [  ]Cachexia  [  ]Other:   [  ]Supplement Ordered:  [  ]Morbid Obesity (BMI >=40]

## 2018-09-07 NOTE — H&P ADULT - PROBLEM SELECTOR PLAN 1
-patient presenting w/ OM and d/w podiatry and vascular, may need amputation.  In anticipation for amputation and in absences of angina/atypical angina and last stent in 2016, will hold ASA /plavix for now.   -c/w Vancomycin and Zosyn given patient is diabetic   -ESR unremarkable  -CT run off study pending per vascular. -patient presenting w/ OM and d/w podiatry and vascular, may need amputation.  In anticipation for amputation and in absences of angina/atypical angina and last stent in 2016, will hold ASA /plavix for now.   -c/w Vancomycin and Zosyn given patient is diabetic   -ESR unremarkable  -CT run off study pending per vascular.  -Unlikely this is cardioembolic source. -patient presenting w/ OM and d/w podiatry and vascular, may need amputation.  In anticipation for amputation and in absences of angina/atypical angina and last stent in 2016, will hold ASA /plavix for now.   -c/w Vancomycin and Zosyn given patient is diabetic   -ESR unremarkable  -CT run off study pending per vascular.  -Follow up blood cultures.   -Unlikely this is cardioembolic source.

## 2018-09-08 LAB
ANION GAP SERPL CALC-SCNC: 14 MMOL/L — SIGNIFICANT CHANGE UP (ref 5–17)
BUN SERPL-MCNC: 15 MG/DL — SIGNIFICANT CHANGE UP (ref 7–23)
CALCIUM SERPL-MCNC: 9.1 MG/DL — SIGNIFICANT CHANGE UP (ref 8.4–10.5)
CHLORIDE SERPL-SCNC: 98 MMOL/L — SIGNIFICANT CHANGE UP (ref 96–108)
CO2 SERPL-SCNC: 25 MMOL/L — SIGNIFICANT CHANGE UP (ref 22–31)
CREAT SERPL-MCNC: 1.08 MG/DL — SIGNIFICANT CHANGE UP (ref 0.5–1.3)
GLUCOSE BLDC GLUCOMTR-MCNC: 155 MG/DL — HIGH (ref 70–99)
GLUCOSE BLDC GLUCOMTR-MCNC: 161 MG/DL — HIGH (ref 70–99)
GLUCOSE BLDC GLUCOMTR-MCNC: 190 MG/DL — HIGH (ref 70–99)
GLUCOSE BLDC GLUCOMTR-MCNC: 352 MG/DL — HIGH (ref 70–99)
GLUCOSE SERPL-MCNC: 176 MG/DL — HIGH (ref 70–99)
MAGNESIUM SERPL-MCNC: 2.1 MG/DL — SIGNIFICANT CHANGE UP (ref 1.6–2.6)
PHOSPHATE SERPL-MCNC: 2.7 MG/DL — SIGNIFICANT CHANGE UP (ref 2.5–4.5)
POTASSIUM SERPL-MCNC: 4.2 MMOL/L — SIGNIFICANT CHANGE UP (ref 3.5–5.3)
POTASSIUM SERPL-SCNC: 4.2 MMOL/L — SIGNIFICANT CHANGE UP (ref 3.5–5.3)
SODIUM SERPL-SCNC: 137 MMOL/L — SIGNIFICANT CHANGE UP (ref 135–145)
VANCOMYCIN TROUGH SERPL-MCNC: 8.3 UG/ML — LOW (ref 10–20)

## 2018-09-08 PROCEDURE — 99233 SBSQ HOSP IP/OBS HIGH 50: CPT | Mod: GC

## 2018-09-08 RX ORDER — VANCOMYCIN HCL 1 G
1250 VIAL (EA) INTRAVENOUS EVERY 12 HOURS
Qty: 0 | Refills: 0 | Status: DISCONTINUED | OUTPATIENT
Start: 2018-09-08 | End: 2018-09-09

## 2018-09-08 RX ORDER — POLYETHYLENE GLYCOL 3350 17 G/17G
17 POWDER, FOR SOLUTION ORAL DAILY
Qty: 0 | Refills: 0 | Status: DISCONTINUED | OUTPATIENT
Start: 2018-09-08 | End: 2018-09-13

## 2018-09-08 RX ADMIN — PIPERACILLIN AND TAZOBACTAM 25 GRAM(S): 4; .5 INJECTION, POWDER, LYOPHILIZED, FOR SOLUTION INTRAVENOUS at 21:45

## 2018-09-08 RX ADMIN — Medication 40 MILLIGRAM(S): at 05:36

## 2018-09-08 RX ADMIN — PIPERACILLIN AND TAZOBACTAM 25 GRAM(S): 4; .5 INJECTION, POWDER, LYOPHILIZED, FOR SOLUTION INTRAVENOUS at 09:17

## 2018-09-08 RX ADMIN — POLYETHYLENE GLYCOL 3350 17 GRAM(S): 17 POWDER, FOR SOLUTION ORAL at 22:28

## 2018-09-08 RX ADMIN — Medication 3: at 21:46

## 2018-09-08 RX ADMIN — INSULIN GLARGINE 15 UNIT(S): 100 INJECTION, SOLUTION SUBCUTANEOUS at 21:47

## 2018-09-08 RX ADMIN — Medication 1: at 13:15

## 2018-09-08 RX ADMIN — Medication 166.67 MILLIGRAM(S): at 18:12

## 2018-09-08 RX ADMIN — CARVEDILOL PHOSPHATE 6.25 MILLIGRAM(S): 80 CAPSULE, EXTENDED RELEASE ORAL at 18:11

## 2018-09-08 RX ADMIN — Medication 1: at 18:11

## 2018-09-08 RX ADMIN — PIPERACILLIN AND TAZOBACTAM 25 GRAM(S): 4; .5 INJECTION, POWDER, LYOPHILIZED, FOR SOLUTION INTRAVENOUS at 01:20

## 2018-09-08 RX ADMIN — Medication 1: at 08:51

## 2018-09-08 RX ADMIN — PIPERACILLIN AND TAZOBACTAM 25 GRAM(S): 4; .5 INJECTION, POWDER, LYOPHILIZED, FOR SOLUTION INTRAVENOUS at 17:11

## 2018-09-08 RX ADMIN — ATORVASTATIN CALCIUM 40 MILLIGRAM(S): 80 TABLET, FILM COATED ORAL at 21:46

## 2018-09-08 RX ADMIN — CARVEDILOL PHOSPHATE 6.25 MILLIGRAM(S): 80 CAPSULE, EXTENDED RELEASE ORAL at 05:36

## 2018-09-08 NOTE — PROGRESS NOTE ADULT - SUBJECTIVE AND OBJECTIVE BOX
Patient is a 67y old  Male who presents with a chief complaint of toe infection (07 Sep 2018 17:57)    INTERVAL HPI/OVERNIGHT EVENTS:  Patient seen and evaluated at bedside.  Pt is resting comfortable in NAD. Denies N/V/F/C.  Pain rated at 0/10    Allergies    No Known Allergies    Intolerances    Vital Signs Last 24 Hrs  T(C): 37.1 (08 Sep 2018 04:53), Max: 37.1 (07 Sep 2018 21:00)  T(F): 98.7 (08 Sep 2018 04:53), Max: 98.7 (07 Sep 2018 21:00)  HR: 55 (08 Sep 2018 04:53) (55 - 83)  BP: 125/81 (08 Sep 2018 04:53) (120/77 - 145/97)  BP(mean): --  RR: 18 (08 Sep 2018 04:53) (18 - 20)  SpO2: 95% (08 Sep 2018 04:53) (94% - 97%)    LABS:                        14.7   5.96  )-----------( 180      ( 07 Sep 2018 10:25 )             42.8     -    137  |  98  |  15  ----------------------------<  176<H>  4.2   |  25  |  1.08    Ca    9.1      08 Sep 2018 06:37  Phos  2.7       Mg     2.1         TPro  6.9  /  Alb  4.3  /  TBili  1.0  /  DBili  x   /  AST  26  /  ALT  25  /  AlkPhos  153<H>        Urinalysis Basic - ( 07 Sep 2018 00:32 )    Color: Yellow / Appearance: Clear / S.018 / pH: x  Gluc: x / Ketone: Small  / Bili: Negative / Urobili: Negative   Blood: x / Protein: 30 mg/dL / Nitrite: Negative   Leuk Esterase: Negative / RBC: 1 /hpf / WBC 1 /hpf   Sq Epi: x / Non Sq Epi: 0 /hpf / Bacteria: Negative    CAPILLARY BLOOD GLUCOSE    POCT Blood Glucose.: 190 mg/dL (08 Sep 2018 08:35)  POCT Blood Glucose.: 221 mg/dL (07 Sep 2018 22:04)  POCT Blood Glucose.: 230 mg/dL (07 Sep 2018 17:29)  POCT Blood Glucose.: 191 mg/dL (07 Sep 2018 13:05)    Lower Extremity Physical Exam:  Vascular: DP/PT 0/4, B/L, CFT <5 seconds B/L, Temperature gradient warm, B/L.   Neuro: Epicritic sensation absent to b/l feet  Musculoskeletal/Ortho: R foot amputations of digits 1, 4, and 5 (only 2+3 remain)  Skin:  Wound #1: left foot distal 2nd digit  Size: 0.8 cm in diameter  Depth: to bone  Wound bed: bone and subcutaneous tissue  Drainage: sanguinous, no purulence noted  Odor:  none  Periwound: hypkeratotic, ischemic changes and cellulitis noted to focal 2nd digit  Etiology: pressure/neuropathy    RADIOLOGY & ADDITIONAL TESTS:

## 2018-09-08 NOTE — PROGRESS NOTE ADULT - SUBJECTIVE AND OBJECTIVE BOX
Julian Perez MD PGY3    Pager [phone]/ [phone]    For Night coverage 7pm-7am: NS- page [phone] Team1-3, page [phone] Team4 & Care Model  Sat/[place] Cross Coverage 12pm-7pm: NS- page [phone] for Team1-4, [facility]- pager forwarded to covering Resident    Patient is a 67y old  Male who presents with a chief complaint of toe infection (08 Sep 2018 09:24)      INTERVAL HPI/OVERNIGHT EVENTS: No overnight events. Denies CP, SOB, abdominal pain, nausea, vomiting.    MEDICATIONS  (STANDING):  atorvastatin 40 milliGRAM(s) Oral at bedtime  carvedilol 6.25 milliGRAM(s) Oral every 12 hours  dextrose 50% Injectable 12.5 Gram(s) IV Push once  dextrose 50% Injectable 25 Gram(s) IV Push once  dextrose 50% Injectable 25 Gram(s) IV Push once  furosemide    Tablet 40 milliGRAM(s) Oral daily  influenza   Vaccine 0.5 milliLiter(s) IntraMuscular once  insulin glargine Injectable (LANTUS) 15 Unit(s) SubCutaneous at bedtime  insulin lispro (HumaLOG) corrective regimen sliding scale   SubCutaneous three times a day before meals  insulin lispro (HumaLOG) corrective regimen sliding scale   SubCutaneous at bedtime  piperacillin/tazobactam IVPB. 3.375 Gram(s) IV Intermittent every 8 hours  vancomycin  IVPB 1250 milliGRAM(s) IV Intermittent every 12 hours    MEDICATIONS  (PRN):  glucagon  Injectable 1 milliGRAM(s) IntraMuscular once PRN Glucose LESS THAN 70 milligrams/deciliter      Allergies    No Known Allergies    Intolerances      Vital Signs Last 24 Hrs  T(C): 36.9 (08 Sep 2018 14:52), Max: 37.1 (07 Sep 2018 21:00)  T(F): 98.4 (08 Sep 2018 14:52), Max: 98.7 (07 Sep 2018 21:00)  HR: 73 (08 Sep 2018 14:52) (55 - 83)  BP: 135/79 (08 Sep 2018 14:52) (125/81 - 135/79)  BP(mean): --  RR: 18 (08 Sep 2018 14:52) (18 - 20)  SpO2: 95% (08 Sep 2018 14:52) (94% - 95%)  I&O's Summary    07 Sep 2018 07:01  -  08 Sep 2018 07:00  --------------------------------------------------------  IN: 470 mL / OUT: 700 mL / NET: -230 mL        PHYSICAL EXAM:  GENERAL: NAD, well-groomed, well-developed  HEAD:  Atraumatic, Normocephalic  EYES: EOMI, PERRLA, conjunctiva and sclera clear  ENMT: No tonsillar erythema, exudates, or enlargement; Moist mucous membranes.  NECK: Supple, No JVD  NERVOUS SYSTEM:  Alert & Oriented X3, Good concentration; Motor Strength 5/5 B/L upper and lower extremities  CHEST/LUNG: Clear to auscultation bilaterally; No rales, rhonchi, wheezing, or rubs  HEART: Regular rate and rhythm; No murmurs, rubs, or gallops  ABDOMEN: Soft, Nontender, Nondistended; Bowel sounds present  EXTREMITIES:  2+ Peripheral Pulses, No clubbing, cyanosis, or edema  LYMPH: No lymphadenopathy noted  Musculoskeletal/Ortho: R foot amputations of digits 1, 4, and 5 (only 2+3 remain)  Skin: Wound left foot distal 2nd digit, roughly 0.8 cm in diameter, no purulence     LABS:                        14.7   5.96  )-----------( 180      ( 07 Sep 2018 10:25 )             42.8     09-08    137  |  98  |  15  ----------------------------<  176<H>  4.2   |  25  |  1.08    Ca    9.1      08 Sep 2018 06:37  Phos  2.7     -  Mg     2.1     -08    TPro  6.9  /  Alb  4.3  /  TBili  1.0  /  DBili  x   /  AST  26  /  ALT  25  /  AlkPhos  153<H>  -06      Urinalysis Basic - ( 07 Sep 2018 00:32 )    Color: Yellow / Appearance: Clear / S.018 / pH: x  Gluc: x / Ketone: Small  / Bili: Negative / Urobili: Negative   Blood: x / Protein: 30 mg/dL / Nitrite: Negative   Leuk Esterase: Negative / RBC: 1 /hpf / WBC 1 /hpf   Sq Epi: x / Non Sq Epi: 0 /hpf / Bacteria: Negative      CAPILLARY BLOOD GLUCOSE      POCT Blood Glucose.: 161 mg/dL (08 Sep 2018 13:00)  POCT Blood Glucose.: 190 mg/dL (08 Sep 2018 08:35)  POCT Blood Glucose.: 221 mg/dL (07 Sep 2018 22:04)  POCT Blood Glucose.: 230 mg/dL (07 Sep 2018 17:29)      Microbiology   @ 22:17  .Blood Blood-Peripheral  --  --  --    < from: CT Angio Abd Aorta w/run-off w/ IV Cont (18 @ 23:23) >  Bilateral SFA occlusion.    Occluded right femoral bypass graft.    Limited evaluation below the knee with poor runoff.    < end of copied text >

## 2018-09-08 NOTE — PROGRESS NOTE ADULT - PROBLEM SELECTOR PLAN 1
-patient presenting w/ OM and d/w podiatry and vascular, may need amputation.  -c/w Vancomycin and Zosyn given patient is diabetic. Redose 1250mg Vanc BID as Vanc trough was 8.3  -CT run off study Bilateral SFA occlusion. Occluded right femoral bypass graft.  - BCX NGTD

## 2018-09-08 NOTE — PROGRESS NOTE ADULT - ASSESSMENT
68 yo M w/ likely osteomyelitis L 2nd digit   - Pt seen and examined  - Left foot stable at this time  - Outpt HAILEE/PVR and non audible on dopple pulses demonstrate poor vascularity to the foot  - Dressed w/ betadine and DSD  - Discussed likely amputation with the pt --> Vascular likely planning for angio, will follow up vascular recommendations --> Pod surg planning 2nd digit amputation   - D/w attending

## 2018-09-09 LAB
ANION GAP SERPL CALC-SCNC: 11 MMOL/L — SIGNIFICANT CHANGE UP (ref 5–17)
BUN SERPL-MCNC: 14 MG/DL — SIGNIFICANT CHANGE UP (ref 7–23)
CALCIUM SERPL-MCNC: 8.9 MG/DL — SIGNIFICANT CHANGE UP (ref 8.4–10.5)
CHLORIDE SERPL-SCNC: 100 MMOL/L — SIGNIFICANT CHANGE UP (ref 96–108)
CO2 SERPL-SCNC: 27 MMOL/L — SIGNIFICANT CHANGE UP (ref 22–31)
CREAT SERPL-MCNC: 1.03 MG/DL — SIGNIFICANT CHANGE UP (ref 0.5–1.3)
GLUCOSE BLDC GLUCOMTR-MCNC: 190 MG/DL — HIGH (ref 70–99)
GLUCOSE BLDC GLUCOMTR-MCNC: 234 MG/DL — HIGH (ref 70–99)
GLUCOSE BLDC GLUCOMTR-MCNC: 238 MG/DL — HIGH (ref 70–99)
GLUCOSE BLDC GLUCOMTR-MCNC: 262 MG/DL — HIGH (ref 70–99)
GLUCOSE SERPL-MCNC: 181 MG/DL — HIGH (ref 70–99)
MAGNESIUM SERPL-MCNC: 2 MG/DL — SIGNIFICANT CHANGE UP (ref 1.6–2.6)
PHOSPHATE SERPL-MCNC: 2.9 MG/DL — SIGNIFICANT CHANGE UP (ref 2.5–4.5)
POTASSIUM SERPL-MCNC: 4 MMOL/L — SIGNIFICANT CHANGE UP (ref 3.5–5.3)
POTASSIUM SERPL-SCNC: 4 MMOL/L — SIGNIFICANT CHANGE UP (ref 3.5–5.3)
SODIUM SERPL-SCNC: 138 MMOL/L — SIGNIFICANT CHANGE UP (ref 135–145)

## 2018-09-09 PROCEDURE — 99233 SBSQ HOSP IP/OBS HIGH 50: CPT | Mod: GC

## 2018-09-09 RX ORDER — VANCOMYCIN HCL 1 G
1250 VIAL (EA) INTRAVENOUS EVERY 12 HOURS
Qty: 0 | Refills: 0 | Status: DISCONTINUED | OUTPATIENT
Start: 2018-09-09 | End: 2018-09-10

## 2018-09-09 RX ADMIN — PIPERACILLIN AND TAZOBACTAM 25 GRAM(S): 4; .5 INJECTION, POWDER, LYOPHILIZED, FOR SOLUTION INTRAVENOUS at 13:37

## 2018-09-09 RX ADMIN — Medication 2: at 09:04

## 2018-09-09 RX ADMIN — Medication 166.67 MILLIGRAM(S): at 18:36

## 2018-09-09 RX ADMIN — CARVEDILOL PHOSPHATE 6.25 MILLIGRAM(S): 80 CAPSULE, EXTENDED RELEASE ORAL at 18:33

## 2018-09-09 RX ADMIN — Medication 166.67 MILLIGRAM(S): at 05:26

## 2018-09-09 RX ADMIN — Medication 2: at 13:37

## 2018-09-09 RX ADMIN — Medication 1: at 18:33

## 2018-09-09 RX ADMIN — Medication 1: at 22:48

## 2018-09-09 RX ADMIN — POLYETHYLENE GLYCOL 3350 17 GRAM(S): 17 POWDER, FOR SOLUTION ORAL at 13:37

## 2018-09-09 RX ADMIN — PIPERACILLIN AND TAZOBACTAM 25 GRAM(S): 4; .5 INJECTION, POWDER, LYOPHILIZED, FOR SOLUTION INTRAVENOUS at 05:26

## 2018-09-09 RX ADMIN — ATORVASTATIN CALCIUM 40 MILLIGRAM(S): 80 TABLET, FILM COATED ORAL at 22:00

## 2018-09-09 RX ADMIN — PIPERACILLIN AND TAZOBACTAM 25 GRAM(S): 4; .5 INJECTION, POWDER, LYOPHILIZED, FOR SOLUTION INTRAVENOUS at 22:00

## 2018-09-09 RX ADMIN — INSULIN GLARGINE 15 UNIT(S): 100 INJECTION, SOLUTION SUBCUTANEOUS at 22:48

## 2018-09-09 RX ADMIN — Medication 40 MILLIGRAM(S): at 05:27

## 2018-09-09 RX ADMIN — CARVEDILOL PHOSPHATE 6.25 MILLIGRAM(S): 80 CAPSULE, EXTENDED RELEASE ORAL at 05:27

## 2018-09-09 NOTE — PROGRESS NOTE ADULT - SUBJECTIVE AND OBJECTIVE BOX
Patient is a 67y old  Male who presents with a chief complaint of toe infection (09 Sep 2018 07:29)     INTERVAL HPI/OVERNIGHT EVENTS:  Patient seen and evaluated at bedside.  Pt is resting comfortable in NAD. Denies N/V/F/C.  Pain rated at 0/10    Allergies    No Known Allergies    Intolerances    Vital Signs Last 24 Hrs  T(C): 37.2 (09 Sep 2018 05:24), Max: 37.2 (09 Sep 2018 05:24)  T(F): 98.9 (09 Sep 2018 05:24), Max: 98.9 (09 Sep 2018 05:24)  HR: 84 (09 Sep 2018 05:24) (73 - 84)  BP: 126/76 (09 Sep 2018 05:24) (126/76 - 135/79)  BP(mean): --  RR: 18 (09 Sep 2018 05:24) (18 - 18)  SpO2: 96% (09 Sep 2018 05:24) (95% - 96%)    LABS:                        14.7   5.96  )-----------( 180      ( 07 Sep 2018 10:25 )             42.8     09-09    138  |  100  |  14  ----------------------------<  181<H>  4.0   |  27  |  1.03    Ca    8.9      09 Sep 2018 06:54  Phos  2.9     09-09  Mg     2.0     09-09    CAPILLARY BLOOD GLUCOSE    POCT Blood Glucose.: 238 mg/dL (09 Sep 2018 08:43)  POCT Blood Glucose.: 352 mg/dL (08 Sep 2018 21:43)  POCT Blood Glucose.: 155 mg/dL (08 Sep 2018 17:28)  POCT Blood Glucose.: 161 mg/dL (08 Sep 2018 13:00)    Lower Extremity Physical Exam:  Vascular: DP/PT 0/4, B/L, CFT <5 seconds B/L, Temperature gradient warm, B/L.   Neuro: Epicritic sensation absent to b/l feet  Musculoskeletal/Ortho: R foot amputations of digits 1, 4, and 5 (only 2+3 remain)  Skin:  Wound #1: left foot distal 2nd digit  Size: 0.8 cm in diameter  Depth: to bone  Wound bed: bone and subcutaneous tissue  Drainage: sanguinous, no purulence noted  Odor:  none  Periwound: hypkeratotic, ischemic changes and cellulitis noted to focal 2nd digit  Etiology: pressure/neuropathy    RADIOLOGY & ADDITIONAL TESTS:

## 2018-09-09 NOTE — PROGRESS NOTE ADULT - PROBLEM SELECTOR PLAN 4
-c/w home statin therapy  -hold ASA and plavix in setting of possible amputation -c/w home statin therapy  -hold ASA and plavix in setting of possible amputation, will discuss w/ cards re DAPT

## 2018-09-09 NOTE — PROGRESS NOTE ADULT - PROBLEM SELECTOR PLAN 1
-patient presenting w/ OM and d/w podiatry and vascular, may need amputation.  -c/w Vancomycin and Zosyn given patient is diabetic. Redose 1250mg Vanc BID as Vanc trough was 8.3  -CT run off study Bilateral SFA occlusion. Occluded right femoral bypass graft.  - BCX NGTD -patient presenting w/ OM and d/w podiatry and vascular, may need amputation.  -c/w Vancomycin and Zosyn given patient is diabetic. Redosed to 1250mg Vanc BID as Vanc trough was 8.3. Bcx NGTD.   -CT run off study showed Bilateral SFA occlusion. Occluded right femoral bypass graft.   - vascular planning for angio, will f/u vascular recs  - podiatry planning 2nd digit amputation after vascular procedure  - will c/s cardiology for cardiac optimization and risk stratification -patient presenting w/ OM   -c/w Vancomycin and Zosyn given patient is diabetic. Redosed to 1250mg Vanc BID as Vanc trough was 8.3. Bcx NGTD.   -CT run off study showed Bilateral SFA occlusion. Occluded right femoral bypass graft.   - vascular planning for angio, will f/u vascular recs  - podiatry planning 2nd digit amputation after vascular procedure  - will c/s cardiology for cardiac optimization and risk stratification

## 2018-09-09 NOTE — PROGRESS NOTE ADULT - SUBJECTIVE AND OBJECTIVE BOX
ERICKA GUILLERMO  67y  Male      Patient is a 67y old  Male who presents with a chief complaint of toe infection (08 Sep 2018 16:08)      INTERVAL HPI/OVERNIGHT EVENTS:      T(C): 37.2 (09-09-18 @ 05:24), Max: 37.2 (09-09-18 @ 05:24)  HR: 84 (09-09-18 @ 05:24) (73 - 84)  BP: 126/76 (09-09-18 @ 05:24) (126/76 - 135/79)  RR: 18 (09-09-18 @ 05:24) (18 - 18)  SpO2: 96% (09-09-18 @ 05:24) (95% - 96%)  Wt(kg): --Vital Signs Last 24 Hrs    PHYSICAL EXAM:  GENERAL: NAD, well-groomed, well-developed  HEAD:  Atraumatic, Normocephalic  EYES: EOMI, PERRLA, conjunctiva and sclera clear  ENMT: No tonsillar erythema, exudates, or enlargement; Moist mucous membranes.  NECK: Supple, No JVD  NERVOUS SYSTEM:  Alert & Oriented X3, Good concentration; Motor Strength 5/5 B/L upper and lower extremities  CHEST/LUNG: Clear to auscultation bilaterally; No rales, rhonchi, wheezing, or rubs  HEART: Regular rate and rhythm; No murmurs, rubs, or gallops  ABDOMEN: Soft, Nontender, Nondistended; Bowel sounds present  EXTREMITIES:  2+ Peripheral Pulses, No clubbing, cyanosis, or edema  LYMPH: No lymphadenopathy noted  Musculoskeletal/Ortho: R foot amputations of digits 1, 4, and 5 (only 2+3 remain)  Skin: Wound left foot distal 2nd digit, roughly 0.8 cm in diameter, no purulence     Consultant(s) Notes Reviewed:  [x ] YES  [ ] NO  Care Discussed with Consultants/Other Providers [ x] YES  [ ] NO    LABS:                        14.7   5.96  )-----------( 180      ( 07 Sep 2018 10:25 )             42.8     09-09    138  |  100  |  14  ----------------------------<  181<H>  4.0   |  27  |  1.03    Ca    8.9      09 Sep 2018 06:54  Phos  2.9     09-09  Mg     2.0     09-09          CAPILLARY BLOOD GLUCOSE      POCT Blood Glucose.: 352 mg/dL (08 Sep 2018 21:43)  POCT Blood Glucose.: 155 mg/dL (08 Sep 2018 17:28)  POCT Blood Glucose.: 161 mg/dL (08 Sep 2018 13:00)  POCT Blood Glucose.: 190 mg/dL (08 Sep 2018 08:35)            RADIOLOGY & ADDITIONAL TESTS:    Imaging Personally Reviewed:  [ ] YES  [ ] NO ERICKA GUILLERMO  67y  Male      Patient is a 67y old  Male who presents with a chief complaint of toe infection (08 Sep 2018 16:08)    INTERVAL HPI/OVERNIGHT EVENTS:      T(C): 37.2 (09-09-18 @ 05:24), Max: 37.2 (09-09-18 @ 05:24)  HR: 84 (09-09-18 @ 05:24) (73 - 84)  BP: 126/76 (09-09-18 @ 05:24) (126/76 - 135/79)  RR: 18 (09-09-18 @ 05:24) (18 - 18)  SpO2: 96% (09-09-18 @ 05:24) (95% - 96%)  Wt(kg): --Vital Signs Last 24 Hrs    PHYSICAL EXAM:  GENERAL: NAD, well-groomed, well-developed  HEAD:  Atraumatic, Normocephalic  EYES: EOMI, PERRLA, conjunctiva and sclera clear  ENMT: No tonsillar erythema, exudates, or enlargement; Moist mucous membranes.  NECK: Supple, No JVD  NERVOUS SYSTEM:  Alert & Oriented X3, Good concentration; Motor Strength 5/5 B/L upper and lower extremities  CHEST/LUNG: Clear to auscultation bilaterally; No rales, rhonchi, wheezing, or rubs  HEART: Regular rate and rhythm; No murmurs, rubs, or gallops  ABDOMEN: Soft, Nontender, Nondistended; Bowel sounds present  EXTREMITIES:  2+ Peripheral Pulses, No clubbing, cyanosis, or edema  LYMPH: No lymphadenopathy noted  Musculoskeletal/Ortho: R foot amputations of digits 1, 4, and 5 (only 2+3 remain)  Skin: Wound left foot distal 2nd digit, roughly 0.8 cm in diameter, no purulence     Consultant(s) Notes Reviewed:  [x ] YES  [ ] NO  Care Discussed with Consultants/Other Providers [ x] YES  [ ] NO    LABS:                        14.7   5.96  )-----------( 180      ( 07 Sep 2018 10:25 )             42.8     09-09    138  |  100  |  14  ----------------------------<  181<H>  4.0   |  27  |  1.03    Ca    8.9      09 Sep 2018 06:54  Phos  2.9     09-09  Mg     2.0     09-09          CAPILLARY BLOOD GLUCOSE      POCT Blood Glucose.: 352 mg/dL (08 Sep 2018 21:43)  POCT Blood Glucose.: 155 mg/dL (08 Sep 2018 17:28)  POCT Blood Glucose.: 161 mg/dL (08 Sep 2018 13:00)  POCT Blood Glucose.: 190 mg/dL (08 Sep 2018 08:35)            RADIOLOGY & ADDITIONAL TESTS:    Imaging Personally Reviewed:  [ ] YES  [ ] NO ERICKA GUILLERMO  67y  Male      Patient is a 67y old  Male who presents with a chief complaint of toe infection (08 Sep 2018 16:08)    INTERVAL HPI/OVERNIGHT EVENTS: No events overnight. Pt denies CP, SOB, F/C.       T(C): 37.2 (09-09-18 @ 05:24), Max: 37.2 (09-09-18 @ 05:24)  HR: 84 (09-09-18 @ 05:24) (73 - 84)  BP: 126/76 (09-09-18 @ 05:24) (126/76 - 135/79)  RR: 18 (09-09-18 @ 05:24) (18 - 18)  SpO2: 96% (09-09-18 @ 05:24) (95% - 96%)  Wt(kg): --Vital Signs Last 24 Hrs    PHYSICAL EXAM:  GENERAL: NAD, well-groomed, well-developed  HEAD:  Atraumatic, Normocephalic  EYES: EOMI, PERRLA, conjunctiva and sclera clear  ENMT: No tonsillar erythema, exudates, or enlargement; Moist mucous membranes.  NECK: Supple, No JVD  NERVOUS SYSTEM:  Alert & Oriented X3, Good concentration; Motor Strength 5/5 B/L upper and lower extremities  CHEST/LUNG: Clear to auscultation bilaterally; No rales, rhonchi, wheezing, or rubs  HEART: Regular rate and rhythm; No murmurs, rubs, or gallops  ABDOMEN: Soft, Nontender, Nondistended; Bowel sounds present  EXTREMITIES:  2+ Peripheral Pulses, No clubbing, cyanosis, or edema  LYMPH: No lymphadenopathy noted  Musculoskeletal/Ortho: R foot amputations of digits 1, 4, and 5 (only 2+3 remain)  Skin: Wound left foot distal 2nd digit, roughly 0.8 cm in diameter, no purulence     Consultant(s) Notes Reviewed:  [x ] YES  [ ] NO  Care Discussed with Consultants/Other Providers [ x] YES  [ ] NO    LABS:                        14.7   5.96  )-----------( 180      ( 07 Sep 2018 10:25 )             42.8     09-09    138  |  100  |  14  ----------------------------<  181<H>  4.0   |  27  |  1.03    Ca    8.9      09 Sep 2018 06:54  Phos  2.9     09-09  Mg     2.0     09-09      CAPILLARY BLOOD GLUCOSE      POCT Blood Glucose.: 352 mg/dL (08 Sep 2018 21:43)  POCT Blood Glucose.: 155 mg/dL (08 Sep 2018 17:28)  POCT Blood Glucose.: 161 mg/dL (08 Sep 2018 13:00)  POCT Blood Glucose.: 190 mg/dL (08 Sep 2018 08:35)            RADIOLOGY & ADDITIONAL TESTS:    Imaging Personally Reviewed:  [ ] YES  [ ] NO

## 2018-09-09 NOTE — PROGRESS NOTE ADULT - ASSESSMENT
66 yo M w/ likely osteomyelitis L 2nd digit   - Pt seen and examined  - Left foot stable at this time  - Outpt HAILEE/PVR and non audible on dopple pulses demonstrate poor vascularity to the foot  - Dressed w/ betadine and DSD  - Discussed likely amputation with the pt --> Vascular planning for angio, will follow up vascular recommendations --> Pod surg planning 2nd digit amputation  - Please optimize for OR (sedation w/ local anesthesia) and document clearance in chart. Pt has previous CABG  x3, rec Cardiac c/s for cardiac optimization and risk stratification. Thank you.   - D/w attending

## 2018-09-09 NOTE — PROGRESS NOTE ADULT - PROBLEM SELECTOR PLAN 3
-Continue Lantus 15 half of home dose.   -sliding scale. -Continue Lantus 15, half of home dose.   -sliding scale.

## 2018-09-10 DIAGNOSIS — M86.9 OSTEOMYELITIS, UNSPECIFIED: ICD-10-CM

## 2018-09-10 DIAGNOSIS — Z01.818 ENCOUNTER FOR OTHER PREPROCEDURAL EXAMINATION: ICD-10-CM

## 2018-09-10 LAB
GLUCOSE BLDC GLUCOMTR-MCNC: 115 MG/DL — HIGH (ref 70–99)
GLUCOSE BLDC GLUCOMTR-MCNC: 212 MG/DL — HIGH (ref 70–99)
GLUCOSE BLDC GLUCOMTR-MCNC: 229 MG/DL — HIGH (ref 70–99)
GLUCOSE BLDC GLUCOMTR-MCNC: 262 MG/DL — HIGH (ref 70–99)
VANCOMYCIN TROUGH SERPL-MCNC: 15.9 UG/ML — SIGNIFICANT CHANGE UP (ref 10–20)

## 2018-09-10 PROCEDURE — 99223 1ST HOSP IP/OBS HIGH 75: CPT

## 2018-09-10 PROCEDURE — 99233 SBSQ HOSP IP/OBS HIGH 50: CPT | Mod: GC

## 2018-09-10 RX ORDER — CARVEDILOL PHOSPHATE 80 MG/1
25 CAPSULE, EXTENDED RELEASE ORAL EVERY 12 HOURS
Qty: 0 | Refills: 0 | Status: DISCONTINUED | OUTPATIENT
Start: 2018-09-10 | End: 2018-09-13

## 2018-09-10 RX ORDER — FUROSEMIDE 40 MG
40 TABLET ORAL
Qty: 0 | Refills: 0 | Status: DISCONTINUED | OUTPATIENT
Start: 2018-09-10 | End: 2018-09-10

## 2018-09-10 RX ORDER — CARVEDILOL PHOSPHATE 80 MG/1
25 CAPSULE, EXTENDED RELEASE ORAL EVERY 12 HOURS
Qty: 0 | Refills: 0 | Status: DISCONTINUED | OUTPATIENT
Start: 2018-09-10 | End: 2018-09-10

## 2018-09-10 RX ORDER — INSULIN GLARGINE 100 [IU]/ML
20 INJECTION, SOLUTION SUBCUTANEOUS AT BEDTIME
Qty: 0 | Refills: 0 | Status: DISCONTINUED | OUTPATIENT
Start: 2018-09-10 | End: 2018-09-13

## 2018-09-10 RX ADMIN — Medication 40 MILLIGRAM(S): at 05:57

## 2018-09-10 RX ADMIN — CARVEDILOL PHOSPHATE 6.25 MILLIGRAM(S): 80 CAPSULE, EXTENDED RELEASE ORAL at 05:57

## 2018-09-10 RX ADMIN — ATORVASTATIN CALCIUM 40 MILLIGRAM(S): 80 TABLET, FILM COATED ORAL at 22:10

## 2018-09-10 RX ADMIN — INSULIN GLARGINE 20 UNIT(S): 100 INJECTION, SOLUTION SUBCUTANEOUS at 22:10

## 2018-09-10 RX ADMIN — Medication 2: at 13:04

## 2018-09-10 RX ADMIN — PIPERACILLIN AND TAZOBACTAM 25 GRAM(S): 4; .5 INJECTION, POWDER, LYOPHILIZED, FOR SOLUTION INTRAVENOUS at 07:46

## 2018-09-10 RX ADMIN — POLYETHYLENE GLYCOL 3350 17 GRAM(S): 17 POWDER, FOR SOLUTION ORAL at 12:29

## 2018-09-10 RX ADMIN — Medication 3: at 08:51

## 2018-09-10 RX ADMIN — CARVEDILOL PHOSPHATE 25 MILLIGRAM(S): 80 CAPSULE, EXTENDED RELEASE ORAL at 17:59

## 2018-09-10 RX ADMIN — Medication 166.67 MILLIGRAM(S): at 05:57

## 2018-09-10 NOTE — PROGRESS NOTE ADULT - PROBLEM SELECTOR PLAN 1
-patient presenting w/ OM   -c/w Vancomycin and Zosyn given patient is diabetic. Redosed to 1250mg Vanc BID as Vanc trough was 8.3. Bcx NGTD.   -CT run off study showed Bilateral SFA occlusion. Occluded right femoral bypass graft.   - vascular planning for angio, will f/u vascular recs  - podiatry planning 2nd digit amputation after vascular procedure  - will c/s cardiology for cardiac optimization and risk stratification -patient presenting w/ OM, afebrile, no leukocytosis   -d/c abx today as osteo likely chronic, 2/2 to low perfusion. Bcx NGTD.   -CT run off study showed Bilateral SFA occlusion. Occluded right femoral bypass graft.   - vascular planning for angio, will f/u vascular recs  - podiatry planning 2nd digit amputation after vascular procedure  - ordered TTE per cards recs, will obtain outpatient stress test records

## 2018-09-10 NOTE — CONSULT NOTE ADULT - ASSESSMENT
68 yo M w/ hx of DM2 c/b prior right three toe amputation for infection, CAD w/ stent (last placed in 2016 per patient) & CABG, HTN, severe LV dysfunction (based on TTE 2016), PAD s/p RLE bypass grafting presenting with left 2nd digit toe infection.

## 2018-09-10 NOTE — CONSULT NOTE ADULT - SUBJECTIVE AND OBJECTIVE BOX
CARDIOLOGY CONSULTATION NOTE                                                                                             Consult requested by:  Dr. kaufman    Reason for Consultation: preop clearance    History obtained by: Patient and medical record     obtained: No    Chief complaint:    Patient is a 67y old  Male who presents with a chief complaint of toe infection (09 Sep 2018 09:19)      HPI:  66 yo M w/ hx of DM2 c/b prior right three toe amputation for infection, CAD w/ stent (last placed in 2016 per patient) & CABG, HTN, severe LV dysfunction (based on TTE 2016), PAD s/p RLE bypass grafting presenting with left 2nd digit toe infection. Patient reports one week hx of noticing a dark colored region of the left 2nd toe of foot. Patient reports he has noticed dark red drainage. He has had no trauma, tenderness, or fevers. He denies any saltwater exposure or any animal bites/dog/cat exposure to toe. He reports because of the persistent drainage he went to his primary care doctor yesterday for check-up and was told to come to the hospital. He denies recent antimicrobial therapy and otherwise has no symptoms. Patient states that he is short of breath after walking two blocks which is new for him and one month ago he had a stress test which was positive at his cardiologist office. No c/o chest pain, PND, dizziness, loc, diaphoresis, palpitations.       REVIEW OF SYMPTOMS: Cardiovascular:  See HPI. No chest pain,  No dyspnea,  No syncope,  No palpitations, No dizziness, No Orthopnea,      No Paroxsymal nocturnal dyspnea;  Respiratory:  No Dyspnea, No cough,     Genitourinary:  No dysuria, no hematuria; Gastrointestinal:  No nausea, no vomiting. No diarrhea.  No abdominal pain. No dark color stool, no melena ; Neurological: No headache, no dizziness, no slurred speech;  Psychiatric: No agitation, no anxiety.  ALL OTHER REVIEW OF SYSTEMS ARE NEGATIVE.    ALLERGIES: No Known Allergies      CURRENT MEDICATIONS:  carvedilol 6.25 milliGRAM(s) Oral every 12 hours  furosemide    Tablet 40 milliGRAM(s) Oral daily     polyethylene glycol 3350  atorvastatin  dextrose 50% Injectable  dextrose 50% Injectable  dextrose 50% Injectable  influenza   Vaccine  insulin glargine Injectable (LANTUS)  insulin lispro (HumaLOG) corrective regimen sliding scale  insulin lispro (HumaLOG) corrective regimen sliding scale  piperacillin/tazobactam IVPB.  vancomycin  IVPB      HOME MEDICATIONS:  as per med rec    PAST MEDICAL HISTORY:  Chronic congestive heart failure, unspecified congestive heart failure type  PAD (peripheral artery disease)  Stented coronary artery  Hyperlipidemia  Diabetes  Hypertension  CAD (coronary artery disease)      PAST SURGICAL HISTORY:  S/P amputation  S/P bypass graft of extremity  S/P angioplasty with stent  S/P CABG x 3      FAMILY HISTORY:  Family history of diabetes mellitus (DM) (Father)      SOCIAL HISTORY:  Denies smoking/alcohol/drugs        Vital Signs Last 24 Hrs  T(C): 36.6 (10 Sep 2018 04:46), Max: 36.8 (09 Sep 2018 14:31)  T(F): 97.9 (10 Sep 2018 04:46), Max: 98.3 (09 Sep 2018 14:31)  HR: 73 (10 Sep 2018 04:46) (69 - 73)  BP: 127/85 (10 Sep 2018 04:46) (124/73 - 145/89)  BP(mean): --  RR: 18 (10 Sep 2018 04:46) (18 - 18)  SpO2: 95% (10 Sep 2018 04:46) (94% - 96%)      PHYSICAL EXAM:  Constitutional: Comfortable . No acute distress.   HEENT: Atraumatic and normcephalic , neck is supple . no JVD. No carotid bruit. PEERL   CNS: A&Ox3. No focal deficits. EOMI.   Lymph Nodes: Cervical : Not palpable.  Respiratory: CTAB  Cardiovascular: S1S2 RRR. No murmur/rubs or gallop.  Gastrointestinal: Soft non-tender and non distended . +Bowel sounds.   Extremities: No edema.   Psychiatric: Calm . no agitation.  Skin: No skin rash/ulcers visualized to face, hands or feet.    Intake and output:   09-08 @ 07:01  -  09-09 @ 07:00  --------------------------------------------------------  IN: 1160 mL / OUT: 0 mL / NET: 1160 mL        LABS:    09-09    138  |  100  |  14  ----------------------------<  181<H>  4.0   |  27  |  1.03    Ca    8.9      09 Sep 2018 06:54  Phos  2.9     09-09  Mg     2.0     09-09        ;p-BNP=        INTERPRETATION OF TELEMETRY: Reviewed by me.   ECG: Reviewed by me. sinus rhythm, first degree av block, T wave inversion lateral leads, EKG unchanged from 2017    RADIOLOGY & ADDITIONAL STUDIES:    X-ray:  reviewed by me. no vascular congestion    < from: Transthoracic Echocardiogram (04.26.16 @ 16:03) >  Conclusions:  1. Tethered mitralvalve leaflets with normal opening.  Moderate mitral regurgitation.  2. Mildly dilated left atrium.  LA volume index = 35 cc/m2.  3. Severe global left ventricular systolic dysfunction.  Endocardial visualization enhanced with intravenous  injection of echo contrast (Definity).  4. Normal right ventricular size with mildly decreased  right ventricular systolic function.  *** No previous Echo exam.    < end of copied text >

## 2018-09-10 NOTE — PROGRESS NOTE ADULT - SUBJECTIVE AND OBJECTIVE BOX
ERICKA GUILLERMO  67y  Male    Subjective: No events overnight.      T(C): 36.6 (09-10-18 @ 04:46), Max: 36.8 (09-09-18 @ 14:31)  HR: 73 (09-10-18 @ 04:46) (69 - 73)  BP: 127/85 (09-10-18 @ 04:46) (124/73 - 145/89)  RR: 18 (09-10-18 @ 04:46) (18 - 18)  SpO2: 95% (09-10-18 @ 04:46) (94% - 96%)     PHYSICAL EXAM:  GENERAL: NAD, well-groomed, well-developed  HEAD:  Atraumatic, Normocephalic  EYES: EOMI, PERRLA, conjunctiva and sclera clear  ENMT: No tonsillar erythema, exudates, or enlargement; Moist mucous membranes.  NECK: Supple, No JVD  NERVOUS SYSTEM:  Alert & Oriented X3, Good concentration; Motor Strength 5/5 B/L upper and lower extremities  CHEST/LUNG: Clear to auscultation bilaterally; No rales, rhonchi, wheezing, or rubs  HEART: Regular rate and rhythm; No murmurs, rubs, or gallops  ABDOMEN: Soft, Nontender, Nondistended; Bowel sounds present  EXTREMITIES:  2+ Peripheral Pulses, No clubbing, cyanosis, or edema  LYMPH: No lymphadenopathy noted  Musculoskeletal/Ortho: R foot amputations of digits 1, 4, and 5 (only 2+3 remain)  Skin: Wound left foot distal 2nd digit, roughly 0.8 cm in diameter, no purulence     Consultant(s) Notes Reviewed:  [x ] YES  [ ] NO  Care Discussed with Consultants/Other Providers [ x] YES  [ ] NO    LABS:  09-09    138  |  100  |  14  ----------------------------<  181<H>  4.0   |  27  |  1.03    Ca    8.9      09 Sep 2018 06:54  Phos  2.9     09-09  Mg     2.0     09-09      RADIOLOGY, EKG & ADDITIONAL TESTS: Reviewed.         RADIOLOGY & ADDITIONAL TESTS:    Imaging Personally Reviewed:  [ ] YES  [ ] NO  MedsMEDICATIONS  (STANDING):  atorvastatin 40 milliGRAM(s) Oral at bedtime  carvedilol 6.25 milliGRAM(s) Oral every 12 hours  dextrose 50% Injectable 12.5 Gram(s) IV Push once  dextrose 50% Injectable 25 Gram(s) IV Push once  dextrose 50% Injectable 25 Gram(s) IV Push once  furosemide    Tablet 40 milliGRAM(s) Oral daily  influenza   Vaccine 0.5 milliLiter(s) IntraMuscular once  insulin glargine Injectable (LANTUS) 20 Unit(s) SubCutaneous at bedtime  insulin lispro (HumaLOG) corrective regimen sliding scale   SubCutaneous three times a day before meals  insulin lispro (HumaLOG) corrective regimen sliding scale   SubCutaneous at bedtime  piperacillin/tazobactam IVPB. 3.375 Gram(s) IV Intermittent every 8 hours  polyethylene glycol 3350 17 Gram(s) Oral daily  vancomycin  IVPB 1250 milliGRAM(s) IV Intermittent every 12 hours    MEDICATIONS  (PRN):  glucagon  Injectable 1 milliGRAM(s) IntraMuscular once PRN Glucose LESS THAN 70 milligrams/deciliter ERICKA GUILLERMO  67y  Male    Subjective: No events overnight. Pt denies CP, SOB, F/C.        T(C): 36.6 (09-10-18 @ 04:46), Max: 36.8 (09-09-18 @ 14:31)  HR: 73 (09-10-18 @ 04:46) (69 - 73)  BP: 127/85 (09-10-18 @ 04:46) (124/73 - 145/89)  RR: 18 (09-10-18 @ 04:46) (18 - 18)  SpO2: 95% (09-10-18 @ 04:46) (94% - 96%)     PHYSICAL EXAM:  GENERAL: NAD, well-groomed, well-developed  HEAD:  Atraumatic, Normocephalic  EYES: EOMI, PERRLA, conjunctiva and sclera clear  ENMT: No tonsillar erythema, exudates, or enlargement; Moist mucous membranes.  NECK: Supple, No JVD  NERVOUS SYSTEM:  Alert & Oriented X3, Good concentration; Motor Strength 5/5 B/L upper and lower extremities  CHEST/LUNG: Clear to auscultation bilaterally; No rales, rhonchi, wheezing, or rubs  HEART: Regular rate and rhythm; No murmurs, rubs, or gallops  ABDOMEN: Soft, Nontender, Nondistended; Bowel sounds present  EXTREMITIES:  2+ Peripheral Pulses, No clubbing, cyanosis, or edema  LYMPH: No lymphadenopathy noted  Musculoskeletal/Ortho: R foot amputations of digits 1, 4, and 5 (only 2+3 remain)  Skin: Wound left foot distal 2nd digit, roughly 0.8 cm in diameter, no purulence     Consultant(s) Notes Reviewed:  [x ] YES  [ ] NO  Care Discussed with Consultants/Other Providers [ x] YES  [ ] NO    LABS:  09-09    138  |  100  |  14  ----------------------------<  181<H>  4.0   |  27  |  1.03    Ca    8.9      09 Sep 2018 06:54  Phos  2.9     09-09  Mg     2.0     09-09      RADIOLOGY, EKG & ADDITIONAL TESTS: Reviewed.         RADIOLOGY & ADDITIONAL TESTS:    Imaging Personally Reviewed:  [ ] YES  [ ] NO  MedsMEDICATIONS  (STANDING):  atorvastatin 40 milliGRAM(s) Oral at bedtime  carvedilol 6.25 milliGRAM(s) Oral every 12 hours  dextrose 50% Injectable 12.5 Gram(s) IV Push once  dextrose 50% Injectable 25 Gram(s) IV Push once  dextrose 50% Injectable 25 Gram(s) IV Push once  furosemide    Tablet 40 milliGRAM(s) Oral daily  influenza   Vaccine 0.5 milliLiter(s) IntraMuscular once  insulin glargine Injectable (LANTUS) 20 Unit(s) SubCutaneous at bedtime  insulin lispro (HumaLOG) corrective regimen sliding scale   SubCutaneous three times a day before meals  insulin lispro (HumaLOG) corrective regimen sliding scale   SubCutaneous at bedtime  piperacillin/tazobactam IVPB. 3.375 Gram(s) IV Intermittent every 8 hours  polyethylene glycol 3350 17 Gram(s) Oral daily  vancomycin  IVPB 1250 milliGRAM(s) IV Intermittent every 12 hours    MEDICATIONS  (PRN):  glucagon  Injectable 1 milliGRAM(s) IntraMuscular once PRN Glucose LESS THAN 70 milligrams/deciliter

## 2018-09-10 NOTE — PROGRESS NOTE ADULT - PROBLEM SELECTOR PLAN 3
-Continue Lantus 15, half of home dose.   -sliding scale. FS high, increased Lantus from 15 to 20 today.   -sliding scale.

## 2018-09-10 NOTE — PROGRESS NOTE ADULT - PROBLEM SELECTOR PLAN 4
-c/w home statin therapy  -hold ASA and plavix in setting of possible amputation, will discuss w/ cards re DAPT -c/w home statin therapy  -hold ASA and plavix in setting of possible amputation

## 2018-09-10 NOTE — CONSULT NOTE ADULT - PROBLEM SELECTOR RECOMMENDATION 9
Pre-operative cardiovascular risk evaluation and management. No cardiac symptoms. Non-ischemic ECG unchanged from 2017. METs < 4.  RCRI (revised cardiac risk index score) >11%. Warner perioperative cardiac risk score 0.17. Patient has had a recent stress test which as per patient was abnormal. Please obtain results.  Patient does complain of shortness of breath after walking two blocks and climbing two flights of stairs which is he has noticed recently. Continue ASA 81mg po daily, check TTE for evaluation of LV function and existing valvular disease, continue coreg 6.25mg po bid, plavix 75mg po daily, lipitor 40mg po daily.

## 2018-09-10 NOTE — PROGRESS NOTE ADULT - SUBJECTIVE AND OBJECTIVE BOX
ERICKA GUILLERMO  67y  Male    Complaints:  Subjective:         T(C): 36.6 (09-10-18 @ 04:46), Max: 36.8 (09-09-18 @ 14:31)  HR: 73 (09-10-18 @ 04:46) (69 - 73)  BP: 127/85 (09-10-18 @ 04:46) (124/73 - 145/89)  RR: 18 (09-10-18 @ 04:46) (18 - 18)  SpO2: 95% (09-10-18 @ 04:46) (94% - 96%)    PHYSICAL EXAM:    Consultant(s) Notes Reviewed:  [x ] YES  [ ] NO  Care Discussed with Consultants/Other Providers [ x] YES  [ ] NO    LABS:        RADIOLOGY & ADDITIONAL TESTS:    Imaging Personally Reviewed:  [ ] YES  [ ] NO  MedsMEDICATIONS  (STANDING):  atorvastatin 40 milliGRAM(s) Oral at bedtime  carvedilol 6.25 milliGRAM(s) Oral every 12 hours  dextrose 50% Injectable 12.5 Gram(s) IV Push once  dextrose 50% Injectable 25 Gram(s) IV Push once  dextrose 50% Injectable 25 Gram(s) IV Push once  furosemide    Tablet 40 milliGRAM(s) Oral daily  influenza   Vaccine 0.5 milliLiter(s) IntraMuscular once  insulin glargine Injectable (LANTUS) 15 Unit(s) SubCutaneous at bedtime  insulin lispro (HumaLOG) corrective regimen sliding scale   SubCutaneous three times a day before meals  insulin lispro (HumaLOG) corrective regimen sliding scale   SubCutaneous at bedtime  piperacillin/tazobactam IVPB. 3.375 Gram(s) IV Intermittent every 8 hours  polyethylene glycol 3350 17 Gram(s) Oral daily  vancomycin  IVPB 1250 milliGRAM(s) IV Intermittent every 12 hours    MEDICATIONS  (PRN):  glucagon  Injectable 1 milliGRAM(s) IntraMuscular once PRN Glucose LESS THAN 70 milligrams/deciliter      HEALTH ISSUES - PROBLEM Dx:  Preoperative clearance: Preoperative clearance  Prophylactic measure: Prophylactic measure  Coronary artery disease involving native coronary artery of native heart without angina pectoris: Coronary artery disease involving native coronary artery of native heart without angina pectoris  Type 2 diabetes mellitus with other circulatory complication: Type 2 diabetes mellitus with other circulatory complication  Chronic systolic (congestive) heart failure: Chronic systolic (congestive) heart failure  Acute osteomyelitis: Acute osteomyelitis          PLAN:      DISPOSITION:

## 2018-09-10 NOTE — CONSULT NOTE ADULT - ATTENDING COMMENTS
Patient seen and examined. PAD w tissue loss. CTA ordered. Dr. Jung to cover in my absence, will perform angiogram if indicated.
Thank you. My team will follow.    Agus Griffin M.D, F.A.C.C  [facility]   [phone]

## 2018-09-10 NOTE — PROGRESS NOTE ADULT - PROBLEM SELECTOR PLAN 2
- C/w Coreq and Lasix  - Hold ACE-I in setting of contrast - C/w Coreg and Lasix  - Hold ACE-I in setting of contrast

## 2018-09-11 LAB
CULTURE RESULTS: SIGNIFICANT CHANGE UP
CULTURE RESULTS: SIGNIFICANT CHANGE UP
GLUCOSE BLDC GLUCOMTR-MCNC: 169 MG/DL — HIGH (ref 70–99)
GLUCOSE BLDC GLUCOMTR-MCNC: 172 MG/DL — HIGH (ref 70–99)
GLUCOSE BLDC GLUCOMTR-MCNC: 222 MG/DL — HIGH (ref 70–99)
GLUCOSE BLDC GLUCOMTR-MCNC: 252 MG/DL — HIGH (ref 70–99)
SPECIMEN SOURCE: SIGNIFICANT CHANGE UP
SPECIMEN SOURCE: SIGNIFICANT CHANGE UP

## 2018-09-11 PROCEDURE — 99232 SBSQ HOSP IP/OBS MODERATE 35: CPT

## 2018-09-11 PROCEDURE — 99233 SBSQ HOSP IP/OBS HIGH 50: CPT | Mod: GC

## 2018-09-11 PROCEDURE — 93306 TTE W/DOPPLER COMPLETE: CPT | Mod: 26

## 2018-09-11 RX ADMIN — Medication 1: at 13:07

## 2018-09-11 RX ADMIN — Medication 2: at 17:35

## 2018-09-11 RX ADMIN — Medication 1: at 21:51

## 2018-09-11 RX ADMIN — CARVEDILOL PHOSPHATE 25 MILLIGRAM(S): 80 CAPSULE, EXTENDED RELEASE ORAL at 05:50

## 2018-09-11 RX ADMIN — INSULIN GLARGINE 20 UNIT(S): 100 INJECTION, SOLUTION SUBCUTANEOUS at 21:51

## 2018-09-11 RX ADMIN — Medication 1: at 09:15

## 2018-09-11 RX ADMIN — CARVEDILOL PHOSPHATE 25 MILLIGRAM(S): 80 CAPSULE, EXTENDED RELEASE ORAL at 17:36

## 2018-09-11 RX ADMIN — ATORVASTATIN CALCIUM 40 MILLIGRAM(S): 80 TABLET, FILM COATED ORAL at 21:51

## 2018-09-11 NOTE — PROGRESS NOTE ADULT - SUBJECTIVE AND OBJECTIVE BOX
VASCULAR SURGERY PROGRESS NOTE    Subjective: No acute events overnight. Patient seen and examined during afternoon rounds. Patient indicated being bored and tired of being in the hospital.    Objective:  Vital Signs Last 24 Hrs  T(C): 36.6 (11 Sep 2018 17:33), Max: 36.9 (11 Sep 2018 05:12)  T(F): 97.8 (11 Sep 2018 17:33), Max: 98.4 (11 Sep 2018 05:12)  HR: 86 (11 Sep 2018 17:33) (68 - 86)  BP: 159/98 (11 Sep 2018 17:33) (114/67 - 159/98)  BP(mean): --  RR: 17 (11 Sep 2018 17:33) (17 - 18)  SpO2: 95% (11 Sep 2018 12:39) (92% - 98%)    Physical Exam:  General: Well developed, well nourished, No Acute Distress  HEENT: Normocephalic Atraumatic, EOMI bl  Neurology: A&Ox3  Abdominal: Soft, Non-Tender, Non-Distended  Extremities: Warm, distal aspect of left 2nd toe eroded with exposed bone, toe is also darkened; betadine on; +DP signals bilat    I&O's Detail    10 Sep 2018 07:01  -  11 Sep 2018 07:00  --------------------------------------------------------  IN:    Oral Fluid: 600 mL  Total IN: 600 mL    OUT:    Voided: 200 mL  Total OUT: 200 mL    Total NET: 400 mL      11 Sep 2018 07:01  -  11 Sep 2018 18:13  --------------------------------------------------------  IN:    Oral Fluid: 480 mL  Total IN: 480 mL    OUT:  Total OUT: 0 mL    Total NET: 480 mL      MEDICATIONS  (STANDING):  atorvastatin 40 milliGRAM(s) Oral at bedtime  carvedilol 25 milliGRAM(s) Oral every 12 hours  dextrose 50% Injectable 12.5 Gram(s) IV Push once  dextrose 50% Injectable 25 Gram(s) IV Push once  dextrose 50% Injectable 25 Gram(s) IV Push once  influenza   Vaccine 0.5 milliLiter(s) IntraMuscular once  insulin glargine Injectable (LANTUS) 20 Unit(s) SubCutaneous at bedtime  insulin lispro (HumaLOG) corrective regimen sliding scale   SubCutaneous three times a day before meals  insulin lispro (HumaLOG) corrective regimen sliding scale   SubCutaneous at bedtime  polyethylene glycol 3350 17 Gram(s) Oral daily    MEDICATIONS  (PRN):  glucagon  Injectable 1 milliGRAM(s) IntraMuscular once PRN Glucose LESS THAN 70 milligrams/deciliter

## 2018-09-11 NOTE — PROGRESS NOTE ADULT - SUBJECTIVE AND OBJECTIVE BOX
ERICKA GUILLERMO  67y  Male    Subjective: No events overnight. Pt denies CP, SOB, F/C, N/V.      T(C): 36.9 (09-11-18 @ 05:12), Max: 36.9 (09-11-18 @ 05:12)  HR: 69 (09-11-18 @ 05:12) (68 - 81)  BP: 132/78 (09-11-18 @ 05:12) (132/77 - 134/81)  RR: 18 (09-11-18 @ 05:12) (18 - 18)  SpO2: 98% (09-11-18 @ 05:12) (92% - 98%)       PHYSICAL EXAM:  GENERAL: NAD, well-groomed, well-developed  HEAD:  Atraumatic, Normocephalic  EYES: EOMI, PERRLA, conjunctiva and sclera clear  ENMT: No tonsillar erythema, exudates, or enlargement; Moist mucous membranes.  NECK: Supple, No JVD  NERVOUS SYSTEM:  Alert & Oriented X3, Good concentration; Motor Strength 5/5 B/L upper and lower extremities  CHEST/LUNG: Clear to auscultation bilaterally; No rales, rhonchi, wheezing, or rubs  HEART: Regular rate and rhythm; No murmurs, rubs, or gallops  ABDOMEN: Soft, Nontender, Nondistended; Bowel sounds present  EXTREMITIES:  2+ Peripheral Pulses, No clubbing, cyanosis, or edema  Musculoskeletal/Ortho: R foot amputations of digits 1, 4, and 5 (only 2+3 remain)  Skin: Wound left foot distal 2nd digit, roughly 0.8 cm in diameter, no purulence     Consultant(s) Notes Reviewed:  [x ] YES  [ ] NO  Care Discussed with Consultants/Other Providers [ x] YES  [ ] NO    LABS:    RADIOLOGY & ADDITIONAL TESTS:    Imaging Personally Reviewed:  [ ] YES  [ ] NO  MedsMEDICATIONS  (STANDING):  atorvastatin 40 milliGRAM(s) Oral at bedtime  carvedilol 25 milliGRAM(s) Oral every 12 hours  dextrose 50% Injectable 12.5 Gram(s) IV Push once  dextrose 50% Injectable 25 Gram(s) IV Push once  dextrose 50% Injectable 25 Gram(s) IV Push once  influenza   Vaccine 0.5 milliLiter(s) IntraMuscular once  insulin glargine Injectable (LANTUS) 20 Unit(s) SubCutaneous at bedtime  insulin lispro (HumaLOG) corrective regimen sliding scale   SubCutaneous three times a day before meals  insulin lispro (HumaLOG) corrective regimen sliding scale   SubCutaneous at bedtime  polyethylene glycol 3350 17 Gram(s) Oral daily    MEDICATIONS  (PRN):  glucagon  Injectable 1 milliGRAM(s) IntraMuscular once PRN Glucose LESS THAN 70 milligrams/deciliter

## 2018-09-11 NOTE — PROGRESS NOTE ADULT - PROBLEM SELECTOR PLAN 1
-patient presenting w/ OM, afebrile, no leukocytosis   -d/c abx today as osteo likely chronic, 2/2 to low perfusion. Bcx NGTD.   -CT run off study showed Bilateral SFA occlusion. Occluded right femoral bypass graft.   - vascular planning for angio, will f/u vascular recs  - podiatry planning 2nd digit amputation after vascular procedure  - ordered TTE per cards recs, will obtain outpatient stress test records

## 2018-09-11 NOTE — PROGRESS NOTE ADULT - SUBJECTIVE AND OBJECTIVE BOX
Cardiology Attending Progress Note    CHIEF COMPLAINT/REASON FOR CONSULT: pre-op risk stratification    HISTORY OF PRESENT ILLNESS:    66 yo M w/ DM2 c/b prior right three toe amputation for infection, CAD s/p PCI (last stent placed in 2016 per patient), s/p CABG, HTN, HFrEF (severe LV dysfunction, TTE 2016), PAD s/p RLE bypass grafting presenting with left 2nd digit toe infection, now for possible amputation.    INTERVAL EVENTS:   Patient seen and examined. Overall he states that he feels well. No Chest Pain. No SOB. No HA/Dizziness. No Palpitations. No N/V/D/F/C.      Allergies    No Known Allergies    Intolerances    	    MEDICATIONS:  carvedilol 25 milliGRAM(s) Oral every 12 hours  polyethylene glycol 3350 17 Gram(s) Oral daily  atorvastatin 40 milliGRAM(s) Oral at bedtime  dextrose 50% Injectable 12.5 Gram(s) IV Push once  dextrose 50% Injectable 25 Gram(s) IV Push once  dextrose 50% Injectable 25 Gram(s) IV Push once  glucagon  Injectable 1 milliGRAM(s) IntraMuscular once PRN  insulin glargine Injectable (LANTUS) 20 Unit(s) SubCutaneous at bedtime  insulin lispro (HumaLOG) corrective regimen sliding scale   SubCutaneous three times a day before meals  insulin lispro (HumaLOG) corrective regimen sliding scale   SubCutaneous at bedtime    influenza   Vaccine 0.5 milliLiter(s) IntraMuscular once      PAST MEDICAL & SURGICAL HISTORY:  Chronic congestive heart failure, unspecified congestive heart failure type  PAD (peripheral artery disease)  Stented coronary artery  Hyperlipidemia  Diabetes  Hypertension  CAD (coronary artery disease)  S/P amputation: right great toe and 4th and 5th digit  S/P bypass graft of extremity: RLE  S/P angioplasty with stent: about 5 years ago  S/P CABG x 3      FAMILY HISTORY:  Family history of diabetes mellitus (DM) (Father)      SOCIAL HISTORY:    Never smoked, no ETOH, no IVDU    REVIEW OF SYSTEMS:    CONSTITUTIONAL: No weakness, fevers or chills  EYES/ENT: No visual changes;  No vertigo or throat pain   NECK: No pain or stiffness  RESPIRATORY: No cough, wheezing, hemoptysis; No shortness of breath  CARDIOVASCULAR: No chest pain or palpitations  GASTROINTESTINAL: No abdominal or epigastric pain. No nausea, vomiting, or hematemesis; No diarrhea or constipation. No melena or hematochezia.  GENITOURINARY: No dysuria, frequency or hematuria  NEUROLOGICAL: No numbness or weakness  SKIN: No itching, burning, rashes, or lesions   All other review of systems is negative unless indicated above.    PHYSICAL EXAM:  T(C): 36.8 (09-11-18 @ 12:39), Max: 36.9 (09-11-18 @ 05:12)  HR: 68 (09-11-18 @ 12:39) (68 - 81)  BP: 114/67 (09-11-18 @ 12:39) (114/67 - 132/78)  RR: 18 (09-11-18 @ 12:39) (18 - 18)  SpO2: 95% (09-11-18 @ 12:39) (92% - 98%)  Wt(kg): --  I&O's Summary    10 Sep 2018 07:01  -  11 Sep 2018 07:00  --------------------------------------------------------  IN: 600 mL / OUT: 200 mL / NET: 400 mL    11 Sep 2018 07:01  -  11 Sep 2018 14:45  --------------------------------------------------------  IN: 480 mL / OUT: 0 mL / NET: 480 mL        Appearance: Normal	  HEENT:   Normal oral mucosa, PERRL, EOMI	  Lymphatic: No lymphadenopathy  Cardiovascular: Normal S1 S2, No JVD, 1/6 GREYSON  Respiratory: Lungs clear to auscultation	  Psychiatry: A & O x 3, Mood & affect appropriate  Gastrointestinal:  Soft, Non-tender, + BS	  Skin: No rashes, No ecchymoses, No cyanosis	  Neurologic: Non-focal  Extremities: Normal range of motion, No clubbing, cyanosis or edema  Vascular: pedal pulses diminished BL LE    LABS:	 	      Telemetry: NSR, no events    ECG: NSR first degree av block, T wave inversion lateral leads, unchanged from 2017    CXR: Clear lungs BL    < from: Transthoracic Echocardiogram (04.26.16 @ 16:03) >  Conclusions:  1. Tethered mitralvalve leaflets with normal opening.  Moderate mitral regurgitation.  2. Mildly dilated left atrium.  LA volume index = 35 cc/m2.  3. Severe global left ventricular systolic dysfunction.  Endocardial visualization enhanced with intravenous  injection of echo contrast (Definity).  4. Normal right ventricular size with mildly decreased  right ventricular systolic function.  *** No previous Echo exam.    < end of copied text >    CT-Aorta with run-off:  IMPRESSION:   Bilateral SFA occlusion.    Occluded right femoral bypass graft.    Limited evaluation below the knee with poor runoff.      A/P: 66 yo M w/ DM2 c/b prior right three toe amputation for infection, CAD s/p PCI (last stent placed in 2016 per patient), s/p CABG, HTN, HFrEF (severe LV dysfunction, TTE 2016), PAD s/p RLE bypass grafting presenting with left 2nd digit toe infection, now for possible amputation.    1. CAD s/p CABG s/p PCI (last 2016) - Cont ASA 81 mg po QD  -Cont Atorvastatin 40 mg po QHS    2. HFrEF - Last TTE 2016 with severe global LV dysfunction, moderate MR  -Cont Coreg 25 mg po BID  -Appears well compensated at this time, no indication for diuretics    3. Cardiovascular Risk Stratification - RCRI =  3 ( ischemic heart disease, HFrEF, DM2 on insulin ).  - Overall this patient is as high risk (>11%) for cardiac death, nonfatal myocardial infarction, and nonfatal cardiac arrest perioperatively for this moderate risk procedure (toe amputation, angiography)  -Please order routine TTE prior to the planned procedure  -He is able to walk 2 flights of stairs without difficulty (>4METS), however he reports a recent stress test with imaging performed at his cardiolgists office. Would obtain results to ensure no significant reversible ischemia is noted. Please call his outpatient cardiologist (Dr. Sj Ochoa - [phone]) to obtain records.  -Cont ASA 81 mg po QD through the procedure    Thank you for this interesting consult. My team will continue to follow along with you.      Zaid White MD  Cardiology Attending  [facility]  Cell: [phone]  (Cardiology Nocturnist cell number available 7 PM -7 AM every night; available day time weekdays for follow-up only; day time weekends covered by general cardiology consult service)

## 2018-09-11 NOTE — PROGRESS NOTE ADULT - ASSESSMENT
66 yo M w/ likely osteomyelitis L 2nd digit   - Pt seen and examined  - Left foot stable at this time  - Outpt HAILEE/PVR and non audible on dopple pulses demonstrate poor vascularity to the foot --> Vasc surg planning angio at some point while in-house   - Dressed w/ betadine and DSD  - Discussed likely amputation with the pt --> Vascular planning for angio, will follow up vascular recommendations --> Pod surg planning 2nd digit amputation  - Please optimize for OR (sedation w/ local anesthesia) and document clearance in chart. Pt has previous CABG  x3, rec Cardiac c/s for cardiac optimization and risk stratification. Thank you.   - D/w attending

## 2018-09-11 NOTE — PROGRESS NOTE ADULT - ASSESSMENT
Jesse Gonzalez is a 67 y.o. man with history of CHF, CAD s/p CABG (20 years ago) and stent, DM, and PVD s/p R 1,2,5 toe amputations who presents to the ED with a left 2nd toe wound x1 week, with exposed bone.    Plan:  - Podiatry is following  - HAILEE/PVR and CTA already complete  - Please document cardiac optimization and risk stratification. Last note from cardiology indicated TTE was pending and waiting for outside records, which patient reports have already been delivered.  - OR planning for angiogram    ZENOBIA Haynes, PGY-2  Vascular Surgery  p. [phone]

## 2018-09-11 NOTE — PROGRESS NOTE ADULT - SUBJECTIVE AND OBJECTIVE BOX
Patient is a 67y old  Male who presents with a chief complaint of toe infection (11 Sep 2018 14:44)       INTERVAL HPI/OVERNIGHT EVENTS:  Patient seen and evaluated at bedside.  Pt is resting comfortable in NAD. Denies N/V/F/C.  Pain rated at X/10    Allergies    No Known Allergies    Intolerances        Vital Signs Last 24 Hrs  T(C): 36.8 (11 Sep 2018 12:39), Max: 36.9 (11 Sep 2018 05:12)  T(F): 98.3 (11 Sep 2018 12:39), Max: 98.4 (11 Sep 2018 05:12)  HR: 68 (11 Sep 2018 12:39) (68 - 81)  BP: 114/67 (11 Sep 2018 12:39) (114/67 - 132/78)  BP(mean): --  RR: 18 (11 Sep 2018 12:39) (18 - 18)  SpO2: 95% (11 Sep 2018 12:39) (92% - 98%)    LABS:    CAPILLARY BLOOD GLUCOSE    POCT Blood Glucose.: 172 mg/dL (11 Sep 2018 12:52)  POCT Blood Glucose.: 169 mg/dL (11 Sep 2018 08:36)  POCT Blood Glucose.: 229 mg/dL (10 Sep 2018 21:47)  POCT Blood Glucose.: 115 mg/dL (10 Sep 2018 17:31)      Lower Extremity Physical Exam:  Vascular: DP/PT 0/4, B/L, CFT <5 seconds B/L, Temperature gradient warm, B/L.   Neuro: Epicritic sensation absent to b/l feet  Musculoskeletal/Ortho: R foot amputations of digits 1, 4, and 5 (only 2+3 remain)  Skin:  Wound #1: left foot distal 2nd digit  Size: 0.8 cm in diameter  Depth: to bone  Wound bed: bone and subcutaneous tissue  Drainage: sanguinous, no purulence noted  Odor:  none  Periwound: hypkeratotic, ischemic changes and cellulitis noted to focal 2nd digit  Etiology: pressure/neuropathy    RADIOLOGY & ADDITIONAL TESTS:

## 2018-09-12 ENCOUNTER — TRANSCRIPTION ENCOUNTER (OUTPATIENT)
Age: 68
End: 2018-09-12

## 2018-09-12 LAB
ANION GAP SERPL CALC-SCNC: 12 MMOL/L — SIGNIFICANT CHANGE UP (ref 5–17)
BUN SERPL-MCNC: 19 MG/DL — SIGNIFICANT CHANGE UP (ref 7–23)
CALCIUM SERPL-MCNC: 9.7 MG/DL — SIGNIFICANT CHANGE UP (ref 8.4–10.5)
CHLORIDE SERPL-SCNC: 101 MMOL/L — SIGNIFICANT CHANGE UP (ref 96–108)
CO2 SERPL-SCNC: 26 MMOL/L — SIGNIFICANT CHANGE UP (ref 22–31)
CREAT SERPL-MCNC: 1.16 MG/DL — SIGNIFICANT CHANGE UP (ref 0.5–1.3)
GLUCOSE BLDC GLUCOMTR-MCNC: 138 MG/DL — HIGH (ref 70–99)
GLUCOSE BLDC GLUCOMTR-MCNC: 146 MG/DL — HIGH (ref 70–99)
GLUCOSE BLDC GLUCOMTR-MCNC: 158 MG/DL — HIGH (ref 70–99)
GLUCOSE BLDC GLUCOMTR-MCNC: 173 MG/DL — HIGH (ref 70–99)
GLUCOSE SERPL-MCNC: 138 MG/DL — HIGH (ref 70–99)
HBA1C BLD-MCNC: 8.8 % — HIGH (ref 4–5.6)
HCT VFR BLD CALC: 40.8 % — SIGNIFICANT CHANGE UP (ref 39–50)
HGB BLD-MCNC: 13.7 G/DL — SIGNIFICANT CHANGE UP (ref 13–17)
MAGNESIUM SERPL-MCNC: 2.2 MG/DL — SIGNIFICANT CHANGE UP (ref 1.6–2.6)
MCHC RBC-ENTMCNC: 28.8 PG — SIGNIFICANT CHANGE UP (ref 27–34)
MCHC RBC-ENTMCNC: 33.6 GM/DL — SIGNIFICANT CHANGE UP (ref 32–36)
MCV RBC AUTO: 85.7 FL — SIGNIFICANT CHANGE UP (ref 80–100)
PHOSPHATE SERPL-MCNC: 3.9 MG/DL — SIGNIFICANT CHANGE UP (ref 2.5–4.5)
PLATELET # BLD AUTO: 206 K/UL — SIGNIFICANT CHANGE UP (ref 150–400)
POTASSIUM SERPL-MCNC: 4.1 MMOL/L — SIGNIFICANT CHANGE UP (ref 3.5–5.3)
POTASSIUM SERPL-SCNC: 4.1 MMOL/L — SIGNIFICANT CHANGE UP (ref 3.5–5.3)
RBC # BLD: 4.76 M/UL — SIGNIFICANT CHANGE UP (ref 4.2–5.8)
RBC # FLD: 15 % — HIGH (ref 10.3–14.5)
SODIUM SERPL-SCNC: 139 MMOL/L — SIGNIFICANT CHANGE UP (ref 135–145)
WBC # BLD: 7.1 K/UL — SIGNIFICANT CHANGE UP (ref 3.8–10.5)
WBC # FLD AUTO: 7.1 K/UL — SIGNIFICANT CHANGE UP (ref 3.8–10.5)

## 2018-09-12 PROCEDURE — 99232 SBSQ HOSP IP/OBS MODERATE 35: CPT

## 2018-09-12 PROCEDURE — 99233 SBSQ HOSP IP/OBS HIGH 50: CPT | Mod: GC

## 2018-09-12 RX ORDER — ASPIRIN/CALCIUM CARB/MAGNESIUM 324 MG
81 TABLET ORAL DAILY
Qty: 0 | Refills: 0 | Status: DISCONTINUED | OUTPATIENT
Start: 2018-09-12 | End: 2018-09-13

## 2018-09-12 RX ORDER — FUROSEMIDE 40 MG
40 TABLET ORAL
Qty: 0 | Refills: 0 | Status: DISCONTINUED | OUTPATIENT
Start: 2018-09-12 | End: 2018-09-12

## 2018-09-12 RX ADMIN — CARVEDILOL PHOSPHATE 25 MILLIGRAM(S): 80 CAPSULE, EXTENDED RELEASE ORAL at 17:31

## 2018-09-12 RX ADMIN — INSULIN GLARGINE 20 UNIT(S): 100 INJECTION, SOLUTION SUBCUTANEOUS at 22:36

## 2018-09-12 RX ADMIN — CARVEDILOL PHOSPHATE 25 MILLIGRAM(S): 80 CAPSULE, EXTENDED RELEASE ORAL at 06:25

## 2018-09-12 RX ADMIN — Medication 1: at 13:26

## 2018-09-12 RX ADMIN — Medication 81 MILLIGRAM(S): at 12:31

## 2018-09-12 RX ADMIN — ATORVASTATIN CALCIUM 40 MILLIGRAM(S): 80 TABLET, FILM COATED ORAL at 22:36

## 2018-09-12 NOTE — DIETITIAN INITIAL EVALUATION ADULT. - NS AS NUTRI INTERV ED APPLICATION
gave pt 15-20 minute verbal and written education on T2DM nutrition therapy. Discussed keeping carbohydrates consistent throughout the day, and having protein and fiber with each meal to delay carbohydrate absorption and blood sugar spikes. Spoke about eliminating fruit juice unless in the event of a low blood sugar. Label reading was discussed, and exercise as medically cleared to do so. Understanding appeared to be good.

## 2018-09-12 NOTE — PROGRESS NOTE ADULT - PROBLEM SELECTOR PLAN 4
-c/w home statin therapy  -hold plavix in setting of possible amputation  - per cards, may give aspirin through procedure

## 2018-09-12 NOTE — DIETITIAN INITIAL EVALUATION ADULT. - SIGNS/SYMPTOMS
HbA1c 8.8%, 24hr dietary recall with excessive carbohydrate choices such as fruit juice at breakfast

## 2018-09-12 NOTE — DIETITIAN INITIAL EVALUATION ADULT. - ENERGY NEEDS
Ht: 5'7" Wt: 156.7lbs BMI: 24.5kg/m2 IBW: 148lbs(+/-10%) 106%IBW  66 yo M admitted 9/6 w/ hx of DM2 c/b prior right three toe amputation for infection, CAD w/ stent (last placed in 2016 per patient) & CABG, HTN, severe LV dysfunction (based on TTE 2016), PAD s/p RLE bypass grafting presenting with left 2nd digit toe infection 2/2 to osteomyelitis, now for possible amputation.   1+ B/L ankle Edema / L toe diabetic ulcer. No pressure related injuries.

## 2018-09-12 NOTE — PROGRESS NOTE ADULT - SUBJECTIVE AND OBJECTIVE BOX
VASCULAR SURGERY PROGRESS NOTE    Subjective: No acute events overnight. Patient seen and examined during morning rounds.    Objective:  Vital Signs Last 24 Hrs  T(C): 36.5 (12 Sep 2018 06:28), Max: 36.8 (11 Sep 2018 12:39)  T(F): 97.7 (12 Sep 2018 06:28), Max: 98.3 (11 Sep 2018 12:39)  HR: 73 (12 Sep 2018 06:28) (68 - 86)  BP: 144/92 (12 Sep 2018 06:28) (114/67 - 159/98)  BP(mean): --  RR: 18 (12 Sep 2018 06:28) (17 - 18)  SpO2: 100% (12 Sep 2018 06:28) (95% - 100%)      I&O's Detail    11 Sep 2018 07:01  -  12 Sep 2018 07:00  --------------------------------------------------------  IN:    Oral Fluid: 480 mL  Total IN: 480 mL    OUT:  Total OUT: 0 mL    Total NET: 480 mL      MEDICATIONS  (STANDING):  aspirin  chewable 81 milliGRAM(s) Oral daily  atorvastatin 40 milliGRAM(s) Oral at bedtime  carvedilol 25 milliGRAM(s) Oral every 12 hours  dextrose 50% Injectable 12.5 Gram(s) IV Push once  dextrose 50% Injectable 25 Gram(s) IV Push once  dextrose 50% Injectable 25 Gram(s) IV Push once  influenza   Vaccine 0.5 milliLiter(s) IntraMuscular once  insulin glargine Injectable (LANTUS) 20 Unit(s) SubCutaneous at bedtime  insulin lispro (HumaLOG) corrective regimen sliding scale   SubCutaneous three times a day before meals  insulin lispro (HumaLOG) corrective regimen sliding scale   SubCutaneous at bedtime  polyethylene glycol 3350 17 Gram(s) Oral daily    MEDICATIONS  (PRN):  glucagon  Injectable 1 milliGRAM(s) IntraMuscular once PRN Glucose LESS THAN 70 milligrams/deciliter      LABS:                        13.7   7.1   )-----------( 206      ( 12 Sep 2018 06:51 )             40.8       09-12    139  |  101  |  19  ----------------------------<  138<H>  4.1   |  26  |  1.16    Ca    9.7      12 Sep 2018 06:48  Phos  3.9     09-12  Mg     2.2     09-12      --   09-06-18 @ 22:17   No growth at 5 days.

## 2018-09-12 NOTE — PROGRESS NOTE ADULT - SUBJECTIVE AND OBJECTIVE BOX
Cardiology Attending Progress Note    CHIEF COMPLAINT/REASON FOR CONSULT: pre-op risk stratification    HISTORY OF PRESENT ILLNESS:    66 yo M w/ DM2 c/b prior right three toe amputation for infection, CAD s/p PCI (last stent placed in 2016 per patient), s/p CABG, HTN, HFrEF ( TTE 09/11/2018 with severe global LV dysfunction LVEF 15-20%, moderate MR, severe diastolic dysfunction, decreased RV function, mild pHTN)., PAD s/p RLE bypass grafting presenting with left 2nd digit toe infection, now for possible amputation.    INTERVAL EVENTS:   Patient seen and examined. Overall he states that he feels well. No Chest Pain. No SOB. No HA/Dizziness. No Palpitations. No N/V/D/F/C.    Allergies    No Known Allergies    Intolerances    Home Medications:  aspirin 81 mg oral delayed release tablet: 1 tab(s) orally once a day (10 Sep 2018 15:47)  atorvastatin 40 mg oral tablet: 1 tab(s) orally once a day (at bedtime) (10 Sep 2018 15:47)  carvedilol 25 mg oral tablet: 1 tab(s) orally 2 times a day (10 Sep 2018 15:47)  clopidogrel 75 mg oral tablet: 1 tab(s) orally once a day (10 Sep 2018 15:47)  Entresto 24 mg-26 mg oral tablet: 1 tab(s) orally 2 times a day (10 Sep 2018 15:47)  furosemide 40 mg oral tablet: 1 tab(s) orally 2 times a day (10 Sep 2018 15:47)  Lantus Solostar Pen 100 units/mL subcutaneous solution: 30 unit(s) subcutaneous once a day (at bedtime) (10 Sep 2018 15:47)    	    MEDICATIONS:  aspirin  chewable 81 milliGRAM(s) Oral daily  carvedilol 25 milliGRAM(s) Oral every 12 hours          polyethylene glycol 3350 17 Gram(s) Oral daily    atorvastatin 40 milliGRAM(s) Oral at bedtime  dextrose 50% Injectable 12.5 Gram(s) IV Push once  dextrose 50% Injectable 25 Gram(s) IV Push once  dextrose 50% Injectable 25 Gram(s) IV Push once  glucagon  Injectable 1 milliGRAM(s) IntraMuscular once PRN  insulin glargine Injectable (LANTUS) 20 Unit(s) SubCutaneous at bedtime  insulin lispro (HumaLOG) corrective regimen sliding scale   SubCutaneous three times a day before meals  insulin lispro (HumaLOG) corrective regimen sliding scale   SubCutaneous at bedtime    influenza   Vaccine 0.5 milliLiter(s) IntraMuscular once      PAST MEDICAL & SURGICAL HISTORY:  Chronic congestive heart failure, unspecified congestive heart failure type  PAD (peripheral artery disease)  Stented coronary artery  Hyperlipidemia  Diabetes  Hypertension  CAD (coronary artery disease)  S/P amputation: right great toe and 4th and 5th digit  S/P bypass graft of extremity: RLE  S/P angioplasty with stent: about 5 years ago  S/P CABG x 3      FAMILY HISTORY:  Family history of diabetes mellitus (DM) (Father)      SOCIAL HISTORY:    Never smoked, no ETOH, no IVDU    REVIEW OF SYSTEMS:    CONSTITUTIONAL: No weakness, fevers or chills  EYES/ENT: No visual changes;  No vertigo or throat pain   NECK: No pain or stiffness  RESPIRATORY: No cough, wheezing, hemoptysis; No shortness of breath  CARDIOVASCULAR: No chest pain or palpitations  GASTROINTESTINAL: No abdominal or epigastric pain. No nausea, vomiting, or hematemesis; No diarrhea or constipation. No melena or hematochezia.  GENITOURINARY: No dysuria, frequency or hematuria  NEUROLOGICAL: No numbness or weakness  SKIN: No itching, burning, rashes, or lesions   All other review of systems is negative unless indicated above.    PHYSICAL EXAM:  T(C): 36.5 (09-12-18 @ 06:28), Max: 36.8 (09-11-18 @ 12:39)  HR: 73 (09-12-18 @ 06:28) (68 - 86)  BP: 144/92 (09-12-18 @ 06:28) (114/67 - 159/98)  RR: 18 (09-12-18 @ 06:28) (17 - 18)  SpO2: 100% (09-12-18 @ 06:28) (95% - 100%)  Wt(kg): --  I&O's Summary    11 Sep 2018 07:01  -  12 Sep 2018 07:00  --------------------------------------------------------  IN: 480 mL / OUT: 0 mL / NET: 480 mL        Appearance: Normal	  HEENT:   Normal oral mucosa, PERRL, EOMI	  Lymphatic: No lymphadenopathy  Cardiovascular: Normal S1 S2, No JVD, 1/6 GREYSON  Respiratory: Lungs clear to auscultation	  Psychiatry: A & O x 3, Mood & affect appropriate  Gastrointestinal:  Soft, Non-tender, + BS	  Skin: No rashes, No ecchymoses, No cyanosis	  Neurologic: Non-focal  Extremities: Normal range of motion, No clubbing, cyanosis or edema  Vascular: pedal pulses diminished BL LE  LABS:	 	    CBC Full  -  ( 12 Sep 2018 06:51 )  WBC Count : 7.1 K/uL  Hemoglobin : 13.7 g/dL  Hematocrit : 40.8 %  Platelet Count - Automated : 206 K/uL  Mean Cell Volume : 85.7 fl  Mean Cell Hemoglobin : 28.8 pg  Mean Cell Hemoglobin Concentration : 33.6 gm/dL  Auto Neutrophil # : x  Auto Lymphocyte # : x  Auto Monocyte # : x  Auto Eosinophil # : x  Auto Basophil # : x  Auto Neutrophil % : x  Auto Lymphocyte % : x  Auto Monocyte % : x  Auto Eosinophil % : x  Auto Basophil % : x    09-12    139  |  101  |  19  ----------------------------<  138<H>  4.1   |  26  |  1.16    Ca    9.7      12 Sep 2018 06:48  Phos  3.9     09-12  Mg     2.2     09-12        HgA1c: Hemoglobin A1C, Whole Blood: 8.8 % (09-12 @ 07:39)      Telemetry: NSR, no events    ECG: NSR first degree av block, T wave inversion lateral leads, unchanged from 2017    CXR: Clear lungs BL    TTE: 09/11/2018:  EF (Visual Estimate): 15-20 %  Doppler Peak Velocity (m/sec): AoV=1.1  ------------------------------------------------------------------------  Observations:  Mitral Valve: Tethered mitral valve leaflets with normal  opening. Moderate mitral regurgitation.  Aortic Valve/Aorta: Normal trileaflet aortic valve. Peak  transaortic valve gradient equals 5 mm Hg. Minimal aortic  regurgitation.  Peak left ventricular outflow tract  gradient equals 1 mm Hg.  Aortic Root: 3.2 cm.  LVOT diameter: 1.6 cm.  Left Atrium: Severely dilated left atrium.  LA volume index  = 58 cc/m2.  Left Ventricle: Severe global left ventricular systolic  dysfunction with increased apical and patricia-apical  contractility relative to other segments.  Septal motion is  consistent with conduction defect.  Normal left ventricular  internal dimensions and wall thicknesses. Severe diastolic  dysfunction (Stage III).  Right Heart: Moderate right atrial enlargement. Normal  right ventricular size with decreased right ventricular  systolic function. Normal tricuspid valve. Mild tricuspid  regurgitation. Mild pulmonic regurgitation.  Pericardium/Pleura: Normal pericardium with no pericardial  effusion.  Hemodynamic: Estimated right ventricular systolic pressure  equals 49 mm Hg, assuming right atrial pressure equals 8 mm  Hg, consistent with mild pulmonary hypertension.  ------------------------------------------------------------------------  Conclusions:  1. Tethered mitral valve leaflets with normal opening.  Moderate mitral regurgitation.  2. Severely dilated left atrium.  LA volume index = 58  cc/m2.  3. Severe global left ventricular systolic dysfunction with  increased apical and patricia-apical contractility relative to  other segments.  Septal motion is consistent with  conduction defect.  4. Severe diastolic dysfunction (Stage III).  5. Normal right ventricular size with decreased right  ventricular systolic function.  6. Estimated pulmonary artery systolic pressure equals 49  mm Hg, assuming right atrial pressure equals 10 - 15 mm Hg,  consistent with mild pulmonary pressures.  *** Compared with echocardiogram of 4/26/2016, no  significant changes noted.      CT-Aorta with run-off:  IMPRESSION:   Bilateral SFA occlusion.    Occluded right femoral bypass graft.    Limited evaluation below the knee with poor runoff.    MPI: (Done at his cardiolgogists office with Dr. Burt) - 06/13/2018:  Large area of severe infarct in apical anterior segements  Medium area of severe infarct located in the basal inferior segment  LVEF 25  -No areas of reversible ischemia were noted.    A/P: 66 yo M w/ DM2 c/b prior right three toe amputation for infection, CAD s/p PCI (last stent placed in 2016 per patient), s/p CABG, HTN, HFrEF ( TTE 09/11/2018 with severe global LV dysfunction LVEF 15-20%, moderate MR, severe diastolic dysfunction, decreased RV function, mild pHTN)., PAD s/p RLE bypass grafting presenting with left 2nd digit toe infection, now for possible amputation.    1. CAD s/p CABG s/p PCI (last 2016) - Cont ASA 81 mg po QD  -Cont Atorvastatin 40 mg po QHS    2. HFrEF - TTE 09/11/2018 with severe global LV dysfunction LVEF 15-20%, moderate MR, severe diastolic dysfunction, decreased RV function, mild pHTN.  -Cont Coreg 25 mg po BID  -Appears well compensated at this time  -Resume maintenance dosing of lasix 40 mg po BID    3. HTN - BP elevated  -Start Lisinopril 20 mg po QD for afterload reduction in the setting of CHF and moderate MR    4. Cardiovascular Risk Stratification - RCRI =  3 ( ischemic heart disease, HFrEF, DM2 on insulin ).  - Overall this patient is as high risk (>11%) for cardiac death, nonfatal myocardial infarction, and nonfatal cardiac arrest perioperatively for this moderate risk procedure (toe amputation, angiography)  -He is able to walk 2 flights of stairs without difficulty (>4METS). Recent MPI done at his cardiologists office with evidence of prior infarct in the apical anterior and basal inferior segments consistent with HFrEF with severe LV dysfunction seen on TTE, but no areas of reversible ischemia.  -Cont ASA 81 mg po QD through the procedure  -Cont Coreg 25 mg po BID through the procedure  -No further diagnostic or interventional procedures are required prior to the planned procedure.      -Cardiology will sign off at this time. Please re-consult as needed.  -Outpatient Cardiologist: Dr. Sj Ochoa - [phone]    Zaid White MD  Cardiology Attending  [facility]  Cell: [phone]  (Cardiology Nocturnist cell number available 7 PM -7 AM every night; available day time weekdays for follow-up only; day time weekends covered by general cardiology consult service)

## 2018-09-12 NOTE — DIETITIAN INITIAL EVALUATION ADULT. - OTHER INFO
Seeing pt for length of stay. Pt's UBW is 156lbs and states that he's been that weight for a few years now. RD note from March 2017 had a wt of 157lbs. Wt seems to be stable. Current wt is 156.7lbs 24hr typical dietary recall obtained. Lives with wife, who does most of the cooking/shopping. Breakfast is usually a hot cereal with milk, eggs, coffee, and fruit juice.  Lunch is soup or sandwich, Dinner is usually fish, chicken, beef with potato or rice and vegetables. No chewing/swallowing difficulties. Confirms NKFAs. No GI distress. Stated last BM was this morning.

## 2018-09-12 NOTE — DIETITIAN INITIAL EVALUATION ADULT. - PROBLEM SELECTOR PLAN 1
-patient presenting w/ OM and d/w podiatry and vascular, may need amputation.  In anticipation for amputation and in absences of angina/atypical angina and last stent in 2016, will hold ASA /plavix for now.   -c/w Vancomycin and Zosyn given patient is diabetic   -ESR unremarkable  -CT run off study pending per vascular.  -Follow up blood cultures.   -Unlikely this is cardioembolic source.

## 2018-09-12 NOTE — DIETITIAN INITIAL EVALUATION ADULT. - ADHERENCE
poor/pt had some knowledge on carbohydrate counting and consistency, however 24hr dietary recall did include fruit juice, and higher carbohydrates in the morning. HbA1c was 8.8%. HbA1c from previous RD note in March 2017 was 9.5%; has been decreasing, but still overall poor glycemic control.

## 2018-09-12 NOTE — PROGRESS NOTE ADULT - ASSESSMENT
Jesse Gonzalez is a 67 y.o. man with history of CHF, CAD s/p CABG (20 years ago) and stent, DM, and PVD s/p R 1,2,5 toe amputations who presents to the ED with a left 2nd toe wound x1 week, with exposed bone.    Plan:  - Podiatry is following  - HAILEE/PVR and CTA already complete  - Appreciate cardiology's patient optimization and risk stratification (already documented in chart)  - OR planning for LE angiogram tomorrow  - Patient seen and discussed with Dr. Erich Haynes, PGY-2  Vascular Surgery  p. [phone]

## 2018-09-12 NOTE — PROGRESS NOTE ADULT - SUBJECTIVE AND OBJECTIVE BOX
ERICKA GUILLERMO  67y  Male      Patient is a 67y old  Male who presents with a chief complaint of toe infection (11 Sep 2018 18:13)    INTERVAL HPI/OVERNIGHT EVENTS: No events overnight. Pt denies CP, endorses SOB when lying flat that is unchanged from baseline.     T(C): 36.5 (09-12-18 @ 06:28), Max: 36.8 (09-11-18 @ 12:39)  HR: 73 (09-12-18 @ 06:28) (68 - 86)  BP: 144/92 (09-12-18 @ 06:28) (114/67 - 159/98)  RR: 18 (09-12-18 @ 06:28) (17 - 18)  SpO2: 100% (09-12-18 @ 06:28) (95% - 100%)      PHYSICAL EXAM:  GENERAL: NAD, well-groomed, well-developed  HEAD:  Atraumatic, Normocephalic  EYES: EOMI, PERRLA, conjunctiva and sclera clear  ENMT: No tonsillar erythema, exudates, or enlargement; Moist mucous membranes.  NECK: Supple, No JVD  NERVOUS SYSTEM:  Alert & Oriented X3, Good concentration; Motor Strength 5/5 B/L upper and lower extremities  CHEST/LUNG: Clear to auscultation bilaterally; No rales, rhonchi, wheezing, or rubs  HEART: Regular rate and rhythm; No murmurs, rubs, or gallops  ABDOMEN: Soft, Nontender, Nondistended; Bowel sounds present  EXTREMITIES:  2+ Peripheral Pulses, No clubbing, cyanosis, or edema  Musculoskeletal/Ortho: R foot amputations of digits 1, 4, and 5 (only 2+3 remain)  Skin: Wound left foot distal 2nd digit, roughly 0.8 cm in diameter, no purulence     Consultant(s) Notes Reviewed:  [x ] YES  [ ] NO  Care Discussed with Consultants/Other Providers [ x] YES  [ ] NO    LABS:                        13.7   7.1   )-----------( 206      ( 12 Sep 2018 06:51 )             40.8     09-12    139  |  101  |  19  ----------------------------<  138<H>  4.1   |  26  |  1.16    Ca    9.7      12 Sep 2018 06:48  Phos  3.9     09-12  Mg     2.2     09-12          CAPILLARY BLOOD GLUCOSE      POCT Blood Glucose.: 252 mg/dL (11 Sep 2018 21:36)  POCT Blood Glucose.: 222 mg/dL (11 Sep 2018 17:23)  POCT Blood Glucose.: 172 mg/dL (11 Sep 2018 12:52)  POCT Blood Glucose.: 169 mg/dL (11 Sep 2018 08:36)            RADIOLOGY & ADDITIONAL TESTS:    Imaging Personally Reviewed:  [ ] YES  [ ] NO

## 2018-09-12 NOTE — PROGRESS NOTE ADULT - PROBLEM SELECTOR PLAN 1
-patient presenting w/ OM, afebrile, no leukocytosis   -Abx d/c-ed as osteo likely chronic, 2/2 to low perfusion. Bcx NGTD.   -CT run off study showed Bilateral SFA occlusion. Occluded right femoral bypass graft.   - vascular planning for angio, will f/u vascular recs  - podiatry planning 2nd digit amputation after vascular procedure  - TTE results: EF 15-20%, moderate MR, severely dilated LA, severe global LV systolic dysfunction with increased apical and patricia-apical contractility relative to  other segments, Severe diastolic dysfunction (Stage III). Unchanged from 4/2016.   - results of outpatient stress test obtained and placed in chart  - Per cardiology, pt RCI 3

## 2018-09-12 NOTE — DIETITIAN INITIAL EVALUATION ADULT. - PERTINENT LABORATORY DATA
HbA1c 8.8% / Glucose 138, 252, 222 / Na 139 / K 4.1 HbA1c 8.8% / Fingersticks: (9/12): 138 (9/11): 169-252 / Na 139 / K 4.1

## 2018-09-13 LAB
ANION GAP SERPL CALC-SCNC: 11 MMOL/L — SIGNIFICANT CHANGE UP (ref 5–17)
BLD GP AB SCN SERPL QL: NEGATIVE — SIGNIFICANT CHANGE UP
BUN SERPL-MCNC: 19 MG/DL — SIGNIFICANT CHANGE UP (ref 7–23)
CALCIUM SERPL-MCNC: 9.6 MG/DL — SIGNIFICANT CHANGE UP (ref 8.4–10.5)
CHLORIDE SERPL-SCNC: 101 MMOL/L — SIGNIFICANT CHANGE UP (ref 96–108)
CO2 SERPL-SCNC: 27 MMOL/L — SIGNIFICANT CHANGE UP (ref 22–31)
CREAT SERPL-MCNC: 1.22 MG/DL — SIGNIFICANT CHANGE UP (ref 0.5–1.3)
GLUCOSE BLDC GLUCOMTR-MCNC: 78 MG/DL — SIGNIFICANT CHANGE UP (ref 70–99)
GLUCOSE BLDC GLUCOMTR-MCNC: 82 MG/DL — SIGNIFICANT CHANGE UP (ref 70–99)
GLUCOSE BLDC GLUCOMTR-MCNC: 91 MG/DL — SIGNIFICANT CHANGE UP (ref 70–99)
GLUCOSE BLDC GLUCOMTR-MCNC: 94 MG/DL — SIGNIFICANT CHANGE UP (ref 70–99)
GLUCOSE BLDC GLUCOMTR-MCNC: 95 MG/DL — SIGNIFICANT CHANGE UP (ref 70–99)
GLUCOSE SERPL-MCNC: 110 MG/DL — HIGH (ref 70–99)
HCT VFR BLD CALC: 42.2 % — SIGNIFICANT CHANGE UP (ref 39–50)
HGB BLD-MCNC: 14.2 G/DL — SIGNIFICANT CHANGE UP (ref 13–17)
INR BLD: 1.39 RATIO — HIGH (ref 0.88–1.16)
MCHC RBC-ENTMCNC: 28.9 PG — SIGNIFICANT CHANGE UP (ref 27–34)
MCHC RBC-ENTMCNC: 33.6 GM/DL — SIGNIFICANT CHANGE UP (ref 32–36)
MCV RBC AUTO: 86.1 FL — SIGNIFICANT CHANGE UP (ref 80–100)
PLATELET # BLD AUTO: 215 K/UL — SIGNIFICANT CHANGE UP (ref 150–400)
POTASSIUM SERPL-MCNC: 4.3 MMOL/L — SIGNIFICANT CHANGE UP (ref 3.5–5.3)
POTASSIUM SERPL-SCNC: 4.3 MMOL/L — SIGNIFICANT CHANGE UP (ref 3.5–5.3)
PROTHROM AB SERPL-ACNC: 15.2 SEC — HIGH (ref 9.8–12.7)
RBC # BLD: 4.91 M/UL — SIGNIFICANT CHANGE UP (ref 4.2–5.8)
RBC # FLD: 15.5 % — HIGH (ref 10.3–14.5)
RH IG SCN BLD-IMP: NEGATIVE — SIGNIFICANT CHANGE UP
RH IG SCN BLD-IMP: NEGATIVE — SIGNIFICANT CHANGE UP
SODIUM SERPL-SCNC: 139 MMOL/L — SIGNIFICANT CHANGE UP (ref 135–145)
WBC # BLD: 6.2 K/UL — SIGNIFICANT CHANGE UP (ref 3.8–10.5)
WBC # FLD AUTO: 6.2 K/UL — SIGNIFICANT CHANGE UP (ref 3.8–10.5)

## 2018-09-13 PROCEDURE — 37226: CPT | Mod: 26

## 2018-09-13 PROCEDURE — 76937 US GUIDE VASCULAR ACCESS: CPT | Mod: 26

## 2018-09-13 PROCEDURE — 99233 SBSQ HOSP IP/OBS HIGH 50: CPT | Mod: GC

## 2018-09-13 RX ORDER — SODIUM CHLORIDE 9 MG/ML
1000 INJECTION, SOLUTION INTRAVENOUS
Qty: 0 | Refills: 0 | Status: DISCONTINUED | OUTPATIENT
Start: 2018-09-13 | End: 2018-09-13

## 2018-09-13 RX ORDER — DEXTROSE 50 % IN WATER 50 %
25 SYRINGE (ML) INTRAVENOUS ONCE
Qty: 0 | Refills: 0 | Status: DISCONTINUED | OUTPATIENT
Start: 2018-09-13 | End: 2018-09-24

## 2018-09-13 RX ORDER — ONDANSETRON 8 MG/1
4 TABLET, FILM COATED ORAL ONCE
Qty: 0 | Refills: 0 | Status: DISCONTINUED | OUTPATIENT
Start: 2018-09-13 | End: 2018-09-13

## 2018-09-13 RX ORDER — FUROSEMIDE 40 MG
40 TABLET ORAL ONCE
Qty: 0 | Refills: 0 | Status: COMPLETED | OUTPATIENT
Start: 2018-09-13 | End: 2018-09-13

## 2018-09-13 RX ORDER — DEXTROSE 50 % IN WATER 50 %
12.5 SYRINGE (ML) INTRAVENOUS ONCE
Qty: 0 | Refills: 0 | Status: DISCONTINUED | OUTPATIENT
Start: 2018-09-13 | End: 2018-09-24

## 2018-09-13 RX ORDER — CARVEDILOL PHOSPHATE 80 MG/1
25 CAPSULE, EXTENDED RELEASE ORAL EVERY 12 HOURS
Qty: 0 | Refills: 0 | Status: DISCONTINUED | OUTPATIENT
Start: 2018-09-13 | End: 2018-09-24

## 2018-09-13 RX ORDER — FUROSEMIDE 40 MG
40 TABLET ORAL
Qty: 0 | Refills: 0 | Status: CANCELLED | OUTPATIENT
Start: 2018-09-14 | End: 2018-09-13

## 2018-09-13 RX ORDER — DEXTROSE 50 % IN WATER 50 %
12.5 SYRINGE (ML) INTRAVENOUS ONCE
Qty: 0 | Refills: 0 | Status: DISCONTINUED | OUTPATIENT
Start: 2018-09-13 | End: 2018-09-13

## 2018-09-13 RX ORDER — POLYETHYLENE GLYCOL 3350 17 G/17G
17 POWDER, FOR SOLUTION ORAL DAILY
Qty: 0 | Refills: 0 | Status: DISCONTINUED | OUTPATIENT
Start: 2018-09-13 | End: 2018-09-24

## 2018-09-13 RX ORDER — INSULIN LISPRO 100/ML
VIAL (ML) SUBCUTANEOUS AT BEDTIME
Qty: 0 | Refills: 0 | Status: DISCONTINUED | OUTPATIENT
Start: 2018-09-13 | End: 2018-09-18

## 2018-09-13 RX ORDER — ASPIRIN/CALCIUM CARB/MAGNESIUM 324 MG
81 TABLET ORAL DAILY
Qty: 0 | Refills: 0 | Status: DISCONTINUED | OUTPATIENT
Start: 2018-09-13 | End: 2018-09-24

## 2018-09-13 RX ORDER — ATORVASTATIN CALCIUM 80 MG/1
40 TABLET, FILM COATED ORAL AT BEDTIME
Qty: 0 | Refills: 0 | Status: DISCONTINUED | OUTPATIENT
Start: 2018-09-13 | End: 2018-09-24

## 2018-09-13 RX ORDER — INSULIN GLARGINE 100 [IU]/ML
20 INJECTION, SOLUTION SUBCUTANEOUS AT BEDTIME
Qty: 0 | Refills: 0 | Status: DISCONTINUED | OUTPATIENT
Start: 2018-09-13 | End: 2018-09-17

## 2018-09-13 RX ORDER — INSULIN LISPRO 100/ML
VIAL (ML) SUBCUTANEOUS
Qty: 0 | Refills: 0 | Status: DISCONTINUED | OUTPATIENT
Start: 2018-09-13 | End: 2018-09-18

## 2018-09-13 RX ORDER — HYDROMORPHONE HYDROCHLORIDE 2 MG/ML
0.5 INJECTION INTRAMUSCULAR; INTRAVENOUS; SUBCUTANEOUS
Qty: 0 | Refills: 0 | Status: DISCONTINUED | OUTPATIENT
Start: 2018-09-13 | End: 2018-09-13

## 2018-09-13 RX ORDER — HEPARIN SODIUM 5000 [USP'U]/ML
5000 INJECTION INTRAVENOUS; SUBCUTANEOUS EVERY 8 HOURS
Qty: 0 | Refills: 0 | Status: DISCONTINUED | OUTPATIENT
Start: 2018-09-13 | End: 2018-09-19

## 2018-09-13 RX ORDER — GLUCAGON INJECTION, SOLUTION 0.5 MG/.1ML
1 INJECTION, SOLUTION SUBCUTANEOUS ONCE
Qty: 0 | Refills: 0 | Status: DISCONTINUED | OUTPATIENT
Start: 2018-09-13 | End: 2018-09-24

## 2018-09-13 RX ADMIN — INSULIN GLARGINE 20 UNIT(S): 100 INJECTION, SOLUTION SUBCUTANEOUS at 22:38

## 2018-09-13 RX ADMIN — Medication 40 MILLIGRAM(S): at 08:56

## 2018-09-13 RX ADMIN — SODIUM CHLORIDE 15 MILLILITER(S): 9 INJECTION, SOLUTION INTRAVENOUS at 14:04

## 2018-09-13 RX ADMIN — CARVEDILOL PHOSPHATE 25 MILLIGRAM(S): 80 CAPSULE, EXTENDED RELEASE ORAL at 05:40

## 2018-09-13 RX ADMIN — CARVEDILOL PHOSPHATE 25 MILLIGRAM(S): 80 CAPSULE, EXTENDED RELEASE ORAL at 19:56

## 2018-09-13 RX ADMIN — Medication 81 MILLIGRAM(S): at 11:33

## 2018-09-13 RX ADMIN — ATORVASTATIN CALCIUM 40 MILLIGRAM(S): 80 TABLET, FILM COATED ORAL at 22:38

## 2018-09-13 RX ADMIN — HEPARIN SODIUM 5000 UNIT(S): 5000 INJECTION INTRAVENOUS; SUBCUTANEOUS at 22:40

## 2018-09-13 NOTE — PROGRESS NOTE ADULT - SUBJECTIVE AND OBJECTIVE BOX
Patient is a 67y old  Male who presents with a chief complaint of toe infection (13 Sep 2018 07:21)     INTERVAL HPI/OVERNIGHT EVENTS:  Patient seen and evaluated at bedside.  Pt is resting comfortable in NAD. Denies N/V/F/C.  Pain rated at 0/10    Allergies    No Known Allergies    Intolerances    Vital Signs Last 24 Hrs  T(C): 36.4 (13 Sep 2018 09:36), Max: 36.8 (12 Sep 2018 22:02)  T(F): 97.5 (13 Sep 2018 09:36), Max: 98.2 (12 Sep 2018 22:02)  HR: 90 (13 Sep 2018 09:36) (60 - 90)  BP: 127/86 (13 Sep 2018 09:36) (122/79 - 153/95)  BP(mean): --  RR: 18 (13 Sep 2018 09:36) (18 - 18)  SpO2: 99% (13 Sep 2018 09:36) (97% - 99%)    LABS:                        14.2   6.2   )-----------( 215      ( 13 Sep 2018 06:10 )             42.2     09-13    139  |  101  |  19  ----------------------------<  110<H>  4.3   |  27  |  1.22    Ca    9.6      13 Sep 2018 06:09  Phos  3.9     09-12  Mg     2.2     09-12      PT/INR - ( 13 Sep 2018 06:10 )   PT: 15.2 sec;   INR: 1.39 ratio      CAPILLARY BLOOD GLUCOSE    POCT Blood Glucose.: 78 mg/dL (13 Sep 2018 12:42)  POCT Blood Glucose.: 95 mg/dL (13 Sep 2018 08:48)  POCT Blood Glucose.: 91 mg/dL (13 Sep 2018 05:42)  POCT Blood Glucose.: 173 mg/dL (12 Sep 2018 21:54)  POCT Blood Glucose.: 146 mg/dL (12 Sep 2018 17:33)    Lower Extremity Physical Exam:  Vascular: DP/PT 0/4, B/L, CFT <5 seconds B/L, Temperature gradient warm, B/L.   Neuro: Epicritic sensation absent to b/l feet  Musculoskeletal/Ortho: R foot amputations of digits 1, 4, and 5 (only 2+3 remain)  Skin:  Wound #1: left foot distal 2nd digit  Size: 0.8 cm in diameter  Depth: to bone  Wound bed: bone and subcutaneous tissue  Drainage: sanguinous, no purulence noted  Odor:  none  Periwound: hypkeratotic, ischemic changes and cellulitis noted to focal 2nd digit  Etiology: pressure/neuropathy    RADIOLOGY & ADDITIONAL TESTS:

## 2018-09-13 NOTE — PROGRESS NOTE ADULT - PROBLEM SELECTOR PLAN 1
-patient presenting w/ OM, afebrile, no leukocytosis   -Abx d/c-ed as osteo likely chronic, 2/2 to low perfusion. Bcx NGTD.   -CT run off study showed Bilateral SFA occlusion. Occluded right femoral bypass graft.   - plan for angio today   - podiatry planning 2nd digit amputation after vascular procedure

## 2018-09-13 NOTE — CHART NOTE - NSCHARTNOTEFT_GEN_A_CORE
SURGERY POST-OP NOTE:    S: Patient underwent LLE angiogram with placement of L iliac stent, tolerated procedure without any complications, sent to PACU, and then to the floors.  Patient denies chest pain, shortness of breath, nausea, vomiting, lightheadedness, or dizziness.      O:  T(C): 36.5 (09-13-18 @ 19:30), Max: 37.2 (09-13-18 @ 16:40)  HR: 71 (09-13-18 @ 19:30) (64 - 71)  BP: 156/94 (09-13-18 @ 19:30) (139/85 - 156/94)  RR: 18 (09-13-18 @ 19:30) (14 - 18)  SpO2: 94% (09-13-18 @ 19:30) (94% - 100%)  Wt(kg): --                        14.2   6.2   )-----------( 215      ( 13 Sep 2018 06:10 )             42.2        09-13    139  |  101  |  19  ----------------------------<  110<H>  4.3   |  27  |  1.22    Ca    9.6      13 Sep 2018 06:09  Phos  3.9     09-12  Mg     2.2     09-12      Gen: NAD, lying in bed comfortably  HEENT: NC/AT  RESP: nonlabored breathing  Abd: Soft, nontender, distended, L groin dressing c/d/i, no hematoma or collections in the groin  EXT: L 2nd toe bone exposed w/ dried tissue, cold, nonpalpable DP/PT, R foot also cold, nonpalpable DP/PT     Assessment/Plan:  67y Male now POD#0 s/p LLE angiogram, recovering appropriately on the floor    - f/u with primary medicine team for cardiac risk stratification and optimization for possible bypass planning  - f/u vein mapping results  - please resume Abx  - Pain control PRN  - resume reg diet  - resume ASA/plavix,  - encourage OOB/amb              Darryl Martinez PGY-1

## 2018-09-13 NOTE — PROGRESS NOTE ADULT - SUBJECTIVE AND OBJECTIVE BOX
ERICKA GUILLERMO  67y  Male      Patient is a 67y old  Male who presents with a chief complaint of toe infection (12 Sep 2018 11:43)      INTERVAL HPI/OVERNIGHT EVENTS: No events overnight, plan for LE angio today. Denies F/C, SOB, CP.          REVIEW OF SYSTEMS:  CONSTITUTIONAL: No fever, weight loss, or fatigue  EYES: No eye pain, visual disturbances, or discharge  ENMT:  No difficulty hearing, tinnitus, vertigo; No sinus or throat pain  NECK: No pain or stiffness  BREASTS: No pain, masses, or nipple discharge  RESPIRATORY: No cough, wheezing, chills or hemoptysis; No shortness of breath  CARDIOVASCULAR: No chest pain, palpitations, dizziness, or leg swelling  GASTROINTESTINAL: No abdominal or epigastric pain. No nausea, vomiting, or hematemesis; No diarrhea or constipation. No melena or hematochezia.  GENITOURINARY: No dysuria, frequency, hematuria, or incontinence  NEUROLOGICAL: No headaches, memory loss, loss of strength, numbness, or tremors  SKIN: No itching, burning, rashes, or lesions   LYMPH NODES: No enlarged glands  ENDOCRINE: No heat or cold intolerance; No hair loss  MUSCULOSKELETAL: No joint pain or swelling; No muscle, back, or extremity pain  PSYCHIATRIC: No depression, anxiety, mood swings, or difficulty sleeping  HEME/LYMPH: No easy bruising, or bleeding gums  ALLERY AND IMMUNOLOGIC: No hives or eczema    T(C): 36.5 (09-13-18 @ 05:06), Max: 36.8 (09-12-18 @ 22:02)  HR: 63 (09-13-18 @ 05:06) (63 - 76)  BP: 132/85 (09-13-18 @ 05:06) (129/79 - 153/95)  RR: 18 (09-13-18 @ 05:06) (18 - 18)  SpO2: 98% (09-13-18 @ 05:06) (97% - 99%)    PHYSICAL EXAM:  GENERAL: NAD, well-groomed, well-developed  HEAD:  Atraumatic, Normocephalic  EYES: EOMI, PERRLA, conjunctiva and sclera clear  ENMT: No tonsillar erythema, exudates, or enlargement; Moist mucous membranes.  NECK: Supple, No JVD  NERVOUS SYSTEM:  Alert & Oriented X3, Good concentration; Motor Strength 5/5 B/L upper and lower extremities  CHEST/LUNG: Clear to auscultation bilaterally; No rales, rhonchi, wheezing, or rubs  HEART: Regular rate and rhythm; No murmurs, rubs, or gallops  ABDOMEN: Soft, Nontender, Nondistended; Bowel sounds present  EXTREMITIES:  2+ Peripheral Pulses, No clubbing, cyanosis, or edema  Musculoskeletal/Ortho: R foot amputations of digits 1, 4, and 5 (only 2+3 remain)  Skin: Wound left foot distal 2nd digit, roughly 0.8 cm in diameter, no purulence     Consultant(s) Notes Reviewed:  [x ] YES  [ ] NO  Care Discussed with Consultants/Other Providers [ x] YES  [ ] NO    LABS:                        14.2   6.2   )-----------( 215      ( 13 Sep 2018 06:10 )             42.2     09-13    139  |  101  |  19  ----------------------------<  110<H>  4.3   |  27  |  1.22    Ca    9.6      13 Sep 2018 06:09  Phos  3.9     09-12  Mg     2.2     09-12      PT/INR - ( 13 Sep 2018 06:10 )   PT: 15.2 sec;   INR: 1.39 ratio             CAPILLARY BLOOD GLUCOSE      POCT Blood Glucose.: 91 mg/dL (13 Sep 2018 05:42)  POCT Blood Glucose.: 173 mg/dL (12 Sep 2018 21:54)  POCT Blood Glucose.: 146 mg/dL (12 Sep 2018 17:33)  POCT Blood Glucose.: 158 mg/dL (12 Sep 2018 12:58)  POCT Blood Glucose.: 138 mg/dL (12 Sep 2018 08:37)      RADIOLOGY & ADDITIONAL TESTS:    Imaging Personally Reviewed:  [ ] YES  [ ] NO ERICKA GUILLERMO  67y  Male      Patient is a 67y old  Male who presents with a chief complaint of toe infection (12 Sep 2018 11:43)      INTERVAL HPI/OVERNIGHT EVENTS: No events overnight, plan for LE angio today. Endorses some SOB today when lying flat that improves on standing.       T(C): 36.5 (09-13-18 @ 05:06), Max: 36.8 (09-12-18 @ 22:02)  HR: 63 (09-13-18 @ 05:06) (63 - 76)  BP: 132/85 (09-13-18 @ 05:06) (129/79 - 153/95)  RR: 18 (09-13-18 @ 05:06) (18 - 18)  SpO2: 98% (09-13-18 @ 05:06) (97% - 99%)    PHYSICAL EXAM:  GENERAL: NAD, well-groomed, well-developed  HEAD:  Atraumatic, Normocephalic  EYES: EOMI, PERRLA, conjunctiva and sclera clear  ENMT: No tonsillar erythema, exudates, or enlargement; Moist mucous membranes.  NECK: Supple, No JVD  NERVOUS SYSTEM:  Alert & Oriented X3, Good concentration; Motor Strength 5/5 B/L upper and lower extremities  CHEST/LUNG: bibasilar crackles  HEART: Regular rate and rhythm; No murmurs, rubs, or gallops  ABDOMEN: Soft, Nontender, Nondistended; Bowel sounds present  EXTREMITIES:  LE edema  Musculoskeletal/Ortho: R foot amputations of digits 1, 4, and 5 (only 2+3 remain)  Skin: Wound left foot distal 2nd digit, roughly 0.8 cm in diameter, no purulence     Consultant(s) Notes Reviewed:  [x ] YES  [ ] NO  Care Discussed with Consultants/Other Providers [ x] YES  [ ] NO    LABS:                        14.2   6.2   )-----------( 215      ( 13 Sep 2018 06:10 )             42.2     09-13    139  |  101  |  19  ----------------------------<  110<H>  4.3   |  27  |  1.22    Ca    9.6      13 Sep 2018 06:09  Phos  3.9     09-12  Mg     2.2     09-12      PT/INR - ( 13 Sep 2018 06:10 )   PT: 15.2 sec;   INR: 1.39 ratio             CAPILLARY BLOOD GLUCOSE      POCT Blood Glucose.: 91 mg/dL (13 Sep 2018 05:42)  POCT Blood Glucose.: 173 mg/dL (12 Sep 2018 21:54)  POCT Blood Glucose.: 146 mg/dL (12 Sep 2018 17:33)  POCT Blood Glucose.: 158 mg/dL (12 Sep 2018 12:58)  POCT Blood Glucose.: 138 mg/dL (12 Sep 2018 08:37)      RADIOLOGY & ADDITIONAL TESTS:    Imaging Personally Reviewed:  [ ] YES  [ ] NO

## 2018-09-13 NOTE — PROGRESS NOTE ADULT - PROBLEM SELECTOR PLAN 2
- C/w Coreg  - will restart lasix and ACE-I after procedure  - TTE results: EF 15-20%, moderate MR, severely dilated LA, severe global LV systolic dysfunction with increased apical and patricia-apical contractility relative to  other segments, Severe diastolic dysfunction (Stage III). Unchanged from 4/2016. - fluid overloaded today, given 1 dose 40 IV Lasix in am  - C/w Coreg  - will restart lasix and ACE-I after procedure  - TTE results: EF 15-20%, moderate MR, severely dilated LA, severe global LV systolic dysfunction with increased apical and patricia-apical contractility relative to  other segments, Severe diastolic dysfunction (Stage III). Unchanged from 4/2016.

## 2018-09-13 NOTE — PROGRESS NOTE ADULT - ASSESSMENT
68 yo M w/ likely osteomyelitis L 2nd digit   - Pt seen and examined  - Left foot stable at this time  - Outpt HAILEE/PVR and non audible on dopple pulses demonstrate poor vascularity to the foot --> Vasc surg planning angio at some point while in-house   - Dressed w/ betadine and DSD  - Discussed likely amputation with the pt --> Vascular planning for angio, will follow up vascular recommendations --> Pod surg planning 2nd digit amputation  - Please optimize for OR (sedation w/ local anesthesia) and document clearance in chart. Pt has previous CABG  x3, rec Cardiac c/s for cardiac optimization and risk stratification. Thank you.   - D/w attending 68 yo M w/ likely osteomyelitis L 2nd digit   - Pt seen and examined  - Left foot stable at this time  - Outpt HAILEE/PVR and non audible on dopple pulses demonstrate poor vascularity to the foot --> Vasc surg planning angio at some point while in-house   - Dressed w/ betadine and DSD  - Discussed likely amputation with the pt --> Vascular planning for angio, will follow up vascular recommendations --> Pod surg planning 2nd digit amputation  - Appreciate Cards work-up and clearance   - D/w attending

## 2018-09-14 LAB
ANION GAP SERPL CALC-SCNC: 14 MMOL/L — SIGNIFICANT CHANGE UP (ref 5–17)
BUN SERPL-MCNC: 23 MG/DL — SIGNIFICANT CHANGE UP (ref 7–23)
CALCIUM SERPL-MCNC: 8.6 MG/DL — SIGNIFICANT CHANGE UP (ref 8.4–10.5)
CHLORIDE SERPL-SCNC: 102 MMOL/L — SIGNIFICANT CHANGE UP (ref 96–108)
CO2 SERPL-SCNC: 24 MMOL/L — SIGNIFICANT CHANGE UP (ref 22–31)
CREAT SERPL-MCNC: 1.34 MG/DL — HIGH (ref 0.5–1.3)
GLUCOSE BLDC GLUCOMTR-MCNC: 168 MG/DL — HIGH (ref 70–99)
GLUCOSE BLDC GLUCOMTR-MCNC: 191 MG/DL — HIGH (ref 70–99)
GLUCOSE BLDC GLUCOMTR-MCNC: 194 MG/DL — HIGH (ref 70–99)
GLUCOSE BLDC GLUCOMTR-MCNC: 76 MG/DL — SIGNIFICANT CHANGE UP (ref 70–99)
GLUCOSE SERPL-MCNC: 82 MG/DL — SIGNIFICANT CHANGE UP (ref 70–99)
HCT VFR BLD CALC: 38.9 % — LOW (ref 39–50)
HGB BLD-MCNC: 13.1 G/DL — SIGNIFICANT CHANGE UP (ref 13–17)
MCHC RBC-ENTMCNC: 28.9 PG — SIGNIFICANT CHANGE UP (ref 27–34)
MCHC RBC-ENTMCNC: 33.6 GM/DL — SIGNIFICANT CHANGE UP (ref 32–36)
MCV RBC AUTO: 86 FL — SIGNIFICANT CHANGE UP (ref 80–100)
PLATELET # BLD AUTO: 203 K/UL — SIGNIFICANT CHANGE UP (ref 150–400)
POTASSIUM SERPL-MCNC: 4 MMOL/L — SIGNIFICANT CHANGE UP (ref 3.5–5.3)
POTASSIUM SERPL-SCNC: 4 MMOL/L — SIGNIFICANT CHANGE UP (ref 3.5–5.3)
RBC # BLD: 4.52 M/UL — SIGNIFICANT CHANGE UP (ref 4.2–5.8)
RBC # FLD: 16.1 % — HIGH (ref 10.3–14.5)
SODIUM SERPL-SCNC: 140 MMOL/L — SIGNIFICANT CHANGE UP (ref 135–145)
WBC # BLD: 8.2 K/UL — SIGNIFICANT CHANGE UP (ref 3.8–10.5)
WBC # FLD AUTO: 8.2 K/UL — SIGNIFICANT CHANGE UP (ref 3.8–10.5)

## 2018-09-14 PROCEDURE — 99233 SBSQ HOSP IP/OBS HIGH 50: CPT | Mod: GC

## 2018-09-14 RX ADMIN — CARVEDILOL PHOSPHATE 25 MILLIGRAM(S): 80 CAPSULE, EXTENDED RELEASE ORAL at 06:04

## 2018-09-14 RX ADMIN — Medication 1: at 17:52

## 2018-09-14 RX ADMIN — Medication 81 MILLIGRAM(S): at 11:10

## 2018-09-14 RX ADMIN — CARVEDILOL PHOSPHATE 25 MILLIGRAM(S): 80 CAPSULE, EXTENDED RELEASE ORAL at 17:11

## 2018-09-14 RX ADMIN — HEPARIN SODIUM 5000 UNIT(S): 5000 INJECTION INTRAVENOUS; SUBCUTANEOUS at 06:04

## 2018-09-14 RX ADMIN — HEPARIN SODIUM 5000 UNIT(S): 5000 INJECTION INTRAVENOUS; SUBCUTANEOUS at 12:52

## 2018-09-14 RX ADMIN — Medication 1: at 12:51

## 2018-09-14 RX ADMIN — HEPARIN SODIUM 5000 UNIT(S): 5000 INJECTION INTRAVENOUS; SUBCUTANEOUS at 22:37

## 2018-09-14 RX ADMIN — ATORVASTATIN CALCIUM 40 MILLIGRAM(S): 80 TABLET, FILM COATED ORAL at 22:36

## 2018-09-14 RX ADMIN — INSULIN GLARGINE 20 UNIT(S): 100 INJECTION, SOLUTION SUBCUTANEOUS at 22:36

## 2018-09-14 NOTE — PROGRESS NOTE ADULT - SUBJECTIVE AND OBJECTIVE BOX
ERICKA GUILLERMO  67y  Male      Patient is a 67y old  Male who presents with a chief complaint of toe infection (13 Sep 2018 13:25)      INTERVAL HPI/OVERNIGHT EVENTS: Pt underwent angio yesterday w/ placement of L iliac stent.         T(C): 36.9 (09-14-18 @ 05:01), Max: 37.2 (09-13-18 @ 16:40)  HR: 70 (09-14-18 @ 05:01) (60 - 90)  BP: 125/76 (09-14-18 @ 05:01) (122/79 - 156/94)  RR: 18 (09-14-18 @ 05:01) (14 - 18)  SpO2: 94% (09-14-18 @ 05:01) (94% - 100%)    PHYSICAL EXAM:  GENERAL: NAD, well-groomed, well-developed  HEAD:  Atraumatic, Normocephalic  EYES: EOMI, PERRLA, conjunctiva and sclera clear  ENMT: No tonsillar erythema, exudates, or enlargement; Moist mucous membranes.  NECK: Supple, No JVD  NERVOUS SYSTEM:  Alert & Oriented X3, Good concentration; Motor Strength 5/5 B/L upper and lower extremities  CHEST/LUNG: bibasilar crackles  HEART: Regular rate and rhythm; No murmurs, rubs, or gallops  ABDOMEN: Soft, Nontender, Nondistended; Bowel sounds present  EXTREMITIES:  LE edema  Musculoskeletal/Ortho: R foot amputations of digits 1, 4, and 5 (only 2+3 remain)  Skin: Wound left foot distal 2nd digit, roughly 0.8 cm in diameter, no purulence   Consultant(s) Notes Reviewed:  [x ] YES  [ ] NO  Care Discussed with Consultants/Other Providers [ x] YES  [ ] NO    LABS:                        13.1   8.2   )-----------( 203      ( 14 Sep 2018 07:05 )             38.9     09-13    139  |  101  |  19  ----------------------------<  110<H>  4.3   |  27  |  1.22    Ca    9.6      13 Sep 2018 06:09      PT/INR - ( 13 Sep 2018 06:10 )   PT: 15.2 sec;   INR: 1.39 ratio             CAPILLARY BLOOD GLUCOSE      POCT Blood Glucose.: 94 mg/dL (13 Sep 2018 22:30)  POCT Blood Glucose.: 82 mg/dL (13 Sep 2018 18:03)  POCT Blood Glucose.: 78 mg/dL (13 Sep 2018 12:42)  POCT Blood Glucose.: 95 mg/dL (13 Sep 2018 08:48)            RADIOLOGY & ADDITIONAL TESTS:    Imaging Personally Reviewed:  [ ] YES  [ ] NO ERICKA GUILLERMO  67y  Male      Patient is a 67y old  Male who presents with a chief complaint of toe infection (13 Sep 2018 13:25)      INTERVAL HPI/OVERNIGHT EVENTS: Pt underwent angio yesterday w/ placement of L iliac stent. Today denying CP, SOB, F/C.       T(C): 36.9 (09-14-18 @ 05:01), Max: 37.2 (09-13-18 @ 16:40)  HR: 70 (09-14-18 @ 05:01) (60 - 90)  BP: 125/76 (09-14-18 @ 05:01) (122/79 - 156/94)  RR: 18 (09-14-18 @ 05:01) (14 - 18)  SpO2: 94% (09-14-18 @ 05:01) (94% - 100%)    PHYSICAL EXAM:  GENERAL: NAD, well-groomed, well-developed  HEAD:  Atraumatic, Normocephalic  EYES: EOMI, PERRLA, conjunctiva and sclera clear  ENMT: No tonsillar erythema, exudates, or enlargement; Moist mucous membranes.  NECK: Supple, No JVD  NERVOUS SYSTEM:  Alert & Oriented X3   CHEST/LUNG: CTABL  HEART: Regular rate and rhythm; No murmurs, rubs, or gallops  ABDOMEN: Soft, Nontender, Nondistended; Bowel sounds present  EXTREMITIES:  LE edema  Musculoskeletal/Ortho: R foot amputations of digits 1, 4, and 5 (only 2+3 remain)  Skin: Wound left foot distal 2nd digit, roughly 0.8 cm in diameter, no purulence     Consultant(s) Notes Reviewed:  [x ] YES  [ ] NO  Care Discussed with Consultants/Other Providers [ x] YES  [ ] NO    LABS:                        13.1   8.2   )-----------( 203      ( 14 Sep 2018 07:05 )             38.9     09-13    139  |  101  |  19  ----------------------------<  110<H>  4.3   |  27  |  1.22    Ca    9.6      13 Sep 2018 06:09      PT/INR - ( 13 Sep 2018 06:10 )   PT: 15.2 sec;   INR: 1.39 ratio             CAPILLARY BLOOD GLUCOSE      POCT Blood Glucose.: 94 mg/dL (13 Sep 2018 22:30)  POCT Blood Glucose.: 82 mg/dL (13 Sep 2018 18:03)  POCT Blood Glucose.: 78 mg/dL (13 Sep 2018 12:42)  POCT Blood Glucose.: 95 mg/dL (13 Sep 2018 08:48)            RADIOLOGY & ADDITIONAL TESTS:    Imaging Personally Reviewed:  [ ] YES  [ ] NO

## 2018-09-14 NOTE — PROGRESS NOTE ADULT - SUBJECTIVE AND OBJECTIVE BOX
ERICKA [id number]  67y  Male  Type 2 diabetes mellitus with foot ulcer  H/o or current diagnosis of HF- ACEI/ARB contraindication unknown  H/o or current diagnosis of HF- no contraindication to ACEI/ARBs  H/o or current diagnosis of HF- ACEI/ARB contraindication unknown  Family history of diabetes mellitus (DM) (Father)  No pertinent family history in first degree relatives  Handoff  MEWS Score  Chronic congestive heart failure, unspecified congestive heart failure type  PAD (peripheral artery disease)  Stented coronary artery  Hyperlipidemia  Diabetes  Hypertension  CAD (coronary artery disease)  Diabetic foot ulcer  Osteomyelitis  Preoperative clearance  Prophylactic measure  Coronary artery disease involving native coronary artery of native heart without angina pectoris  Type 2 diabetes mellitus with other circulatory complication  Chronic systolic (congestive) heart failure  Acute osteomyelitis  S/P amputation  S/P bypass graft of extremity  S/P angioplasty with stent  S/P CABG x 3  GANGRENE L TOE/DIABETIC  RAD CDS  90+    aspirin enteric coated 81 milliGRAM(s) Oral daily  atorvastatin 40 milliGRAM(s) Oral at bedtime  carvedilol 25 milliGRAM(s) Oral every 12 hours  dextrose 50% Injectable 12.5 Gram(s) IV Push once  dextrose 50% Injectable 25 Gram(s) IV Push once  dextrose 50% Injectable 25 Gram(s) IV Push once  glucagon  Injectable 1 milliGRAM(s) IntraMuscular once PRN  heparin  Injectable 5000 Unit(s) SubCutaneous every 8 hours  influenza   Vaccine 0.5 milliLiter(s) IntraMuscular once  insulin glargine Injectable (LANTUS) 20 Unit(s) SubCutaneous at bedtime  insulin lispro (HumaLOG) corrective regimen sliding scale   SubCutaneous three times a day before meals  insulin lispro (HumaLOG) corrective regimen sliding scale   SubCutaneous at bedtime  polyethylene glycol 3350 17 Gram(s) Oral daily  No Known Allergies    CBC Full  -  ( 14 Sep 2018 07:05 )  WBC Count : 8.2 K/uL  Hemoglobin : 13.1 g/dL  Hematocrit : 38.9 %  Platelet Count - Automated : 203 K/uL  Mean Cell Volume : 86.0 fl  Mean Cell Hemoglobin : 28.9 pg  Mean Cell Hemoglobin Concentration : 33.6 gm/dL  Auto Neutrophil # : x  Auto Lymphocyte # : x  Auto Monocyte # : x  Auto Eosinophil # : x  Auto Basophil # : x  Auto Neutrophil % : x  Auto Lymphocyte % : x  Auto Monocyte % : x  Auto Eosinophil % : x  Auto Basophil % : x  Patient seen at bedside with gangrene left 2 toe progressing. Patient waiting on vascular plan with possible bypass. 2 toe is becoming more ischemic and necrotic. podiatric surgery after vascular clearance. continue daily dressing changes. will discuss plan with vascular and podiatry will follow

## 2018-09-14 NOTE — PROGRESS NOTE ADULT - PROBLEM SELECTOR PLAN 2
- C/w Coreg  - restarted on lasix 40 mg bid  - ACE-I after procedure  - TTE results: EF 15-20%, moderate MR, severely dilated LA, severe global LV systolic dysfunction with increased apical and patricia-apical contractility relative to  other segments, Severe diastolic dysfunction (Stage III). Unchanged from 4/2016.

## 2018-09-14 NOTE — PROGRESS NOTE ADULT - PROBLEM SELECTOR PLAN 3
FS high, increased Lantus from 15 to 20 today.   -sliding scale. on Lantus 20 U. FS within good range.  -sliding scale.

## 2018-09-14 NOTE — PROGRESS NOTE ADULT - PROBLEM SELECTOR PLAN 4
-c/w home statin therapy  - c/w ASA  -hold plavix in setting of possible amputation -c/w home statin therapy  - c/w ASA  - holding plavix in setting of possible amputation

## 2018-09-14 NOTE — CHART NOTE - NSCHARTNOTEFT_GEN_A_CORE
NUTRITION FOLLOW UP NOTE   Nutrition consult received for education, seen by another RD on 9/12.    66 y/o male with history of L 3rd toe amputation now with gangrene left 2 toe progressing. Patient waiting on vascular plan with possible bypass. 2 toe is becoming more ischemic and necrotic. podiatric surgery after vascular clearance.       Source: Patient [x]    Family [ ]     other [x]    Diet : Consistent Carbohydrate (with evening snacks)       Patient reports:     PO intake:  100%     Source for PO intake [ ] Patient [ ] family [x] chart [ ] staff [ ] other        No new Wt, last weighed on 9/12: 156.7lbs/.    Pertinent Medications: MEDICATIONS  (STANDING):  aspirin enteric coated 81 milliGRAM(s) Oral daily  atorvastatin 40 milliGRAM(s) Oral at bedtime  carvedilol 25 milliGRAM(s) Oral every 12 hours  dextrose 50% Injectable 12.5 Gram(s) IV Push once  dextrose 50% Injectable 25 Gram(s) IV Push once  dextrose 50% Injectable 25 Gram(s) IV Push once  heparin  Injectable 5000 Unit(s) SubCutaneous every 8 hours  influenza   Vaccine 0.5 milliLiter(s) IntraMuscular once  insulin glargine Injectable (LANTUS) 20 Unit(s) SubCutaneous at bedtime  insulin lispro (HumaLOG) corrective regimen sliding scale   SubCutaneous three times a day before meals  insulin lispro (HumaLOG) corrective regimen sliding scale   SubCutaneous at bedtime  polyethylene glycol 3350 17 Gram(s) Oral daily    MEDICATIONS  (PRN):  glucagon  Injectable 1 milliGRAM(s) IntraMuscular once PRN Glucose LESS THAN 70 milligrams/deciliter    Pertinent Labs:  09-14 Na140 mmol/L Glu 82 mg/dL K+ 4.0 mmol/L Cr  1.34 mg/dL<H> BUN 23 mg/dL 09-12 Phos 3.9 mg/dL 09-12 [id number] 8.8 %<H>  Finger sticks: 9/14: 76mg/dL, 9/13: 78-95mg/dL    Skin: DM ulcer L 3rd toe, necrotic L 2nd toe    Estimated Needs:   [x] no change since previous assessment  [ ] recalculated:       Previous Nutrition Diagnosis: [x] limited adherence to nutrition-related recommendations         Nutrition Diagnosis is [x] ongoing        New Nutrition Diagnosis: [x] not applicable       Interventions:     Recommend  Continue to encourage good po intake at meals   Continue to provide food preferences within diet restrictions as feasible   Reviewed T2DM Nutrition Therapy. Discussed balanced meal pattern, monitoring portion sizes, carbohydrate counting, including protein at each meal and snack, and limiting concentrated sweets. Reinforced importance of self-monitoring blood glucose levels.        Monitoring and Evaluation:     [x] PO intake [x] Tolerance to diet prescription [x] weights [x] follow up per protocol    RD to remain available for further nutritional interventions as indicated/requested by medical team/pt.   Semaj Haque, JOSE, CDN, CDE. Pager: [phone] NUTRITION FOLLOW UP NOTE   Nutrition consult received for education, seen by another RD on 9/12.    68 y/o male with history of L 3rd toe amputation now with gangrene left 2 toe progressing. Patient waiting on vascular plan with possible bypass. 2 toe is becoming more ischemic and necrotic. podiatric surgery after vascular clearance.       Source: Patient [x]    Family [ ]     other [x] comprehensive chart review     Diet : Consistent Carbohydrate (with evening snacks)       Patient reports good appetite, denies any GI distress. Amenable to additional diet education.     PO intake:  100%     Source for PO intake [ ] Patient [ ] family [x] chart [ ] staff [ ] other        No new Wt, last weighed on 9/12: 156.7lbs/.    Pertinent Medications: MEDICATIONS  (STANDING):  aspirin enteric coated 81 milliGRAM(s) Oral daily  atorvastatin 40 milliGRAM(s) Oral at bedtime  carvedilol 25 milliGRAM(s) Oral every 12 hours  dextrose 50% Injectable 12.5 Gram(s) IV Push once  dextrose 50% Injectable 25 Gram(s) IV Push once  dextrose 50% Injectable 25 Gram(s) IV Push once  heparin  Injectable 5000 Unit(s) SubCutaneous every 8 hours  influenza   Vaccine 0.5 milliLiter(s) IntraMuscular once  insulin glargine Injectable (LANTUS) 20 Unit(s) SubCutaneous at bedtime  insulin lispro (HumaLOG) corrective regimen sliding scale   SubCutaneous three times a day before meals  insulin lispro (HumaLOG) corrective regimen sliding scale   SubCutaneous at bedtime  polyethylene glycol 3350 17 Gram(s) Oral daily    MEDICATIONS  (PRN):  glucagon  Injectable 1 milliGRAM(s) IntraMuscular once PRN Glucose LESS THAN 70 milligrams/deciliter    Pertinent Labs:  09-14 Na140 mmol/L Glu 82 mg/dL K+ 4.0 mmol/L Cr  1.34 mg/dL<H> BUN 23 mg/dL 09-12 Phos 3.9 mg/dL 09-12 [id number] 8.8 %<H>  Finger sticks: 9/14: 76mg/dL, 9/13: 78-95mg/dL    Skin: DM ulcer L 3rd toe, necrotic L 2nd toe    Estimated Needs:   [x] no change since previous assessment  [ ] recalculated:       Previous Nutrition Diagnosis: [x] limited adherence to nutrition-related recommendations         Nutrition Diagnosis is [x] ongoing        New Nutrition Diagnosis: [x] not applicable       Interventions:     Recommend  Continue to encourage good po intake at meals   Continue to provide food preferences within diet restrictions as feasible   Reviewed T2DM Nutrition Therapy. Discussed balanced meal pattern, monitoring portion sizes, carbohydrate counting, including protein at each meal and snack, and limiting concentrated sweets. Reinforced importance of self-monitoring blood glucose levels.        Monitoring and Evaluation:     [x] PO intake [x] Tolerance to diet prescription [x] weights [x] follow up per protocol    RD to remain available for further nutritional interventions as indicated/requested by medical team/pt.   Semaj Haque RD, CDN, CDE. Pager: [phone]

## 2018-09-14 NOTE — PROGRESS NOTE ADULT - ASSESSMENT
68 yo M w/ hx of DM2 c/b prior right three toe amputation for infection, CAD w/ stent (last placed in 2016 per patient) & CABG, HTN, severe LV dysfunction (based on TTE 2016), PAD s/p RLE bypass grafting presenting with left 2nd digit toe infection 2/2 to osteomyelitis. 68 yo M w/ hx of DM2 c/b prior right three toe amputation for infection, CAD w/ stent (last placed in 2016 per patient) & CABG, HTN, severe LV dysfunction (based on TTE 2016), PAD s/p RLE bypass grafting presenting with left 2nd digit toe infection 2/2 to osteomyelitis. Pt s/p LLE angio w/ L iliac stent placement.

## 2018-09-14 NOTE — PROGRESS NOTE ADULT - PROBLEM SELECTOR PLAN 1
-patient presenting w/ OM, afebrile, no leukocytosis. CT run off study showed Bilateral SFA occlusion. Occluded right femoral bypass graft.   - LLE angio w/ L iliac placement performed yesterday.   - f/u vascular recs, possible bypass planning  - Abx d/c-ed as osteo likely chronic, 2/2 to low perfusion. Bcx NGTD.   - podiatry planning 2nd digit amputation after vascular procedure

## 2018-09-15 LAB
GLUCOSE BLDC GLUCOMTR-MCNC: 104 MG/DL — HIGH (ref 70–99)
GLUCOSE BLDC GLUCOMTR-MCNC: 118 MG/DL — HIGH (ref 70–99)
GLUCOSE BLDC GLUCOMTR-MCNC: 206 MG/DL — HIGH (ref 70–99)
GLUCOSE BLDC GLUCOMTR-MCNC: 219 MG/DL — HIGH (ref 70–99)

## 2018-09-15 PROCEDURE — 93970 EXTREMITY STUDY: CPT | Mod: 26

## 2018-09-15 PROCEDURE — 99233 SBSQ HOSP IP/OBS HIGH 50: CPT | Mod: GC

## 2018-09-15 PROCEDURE — 99232 SBSQ HOSP IP/OBS MODERATE 35: CPT

## 2018-09-15 RX ORDER — FUROSEMIDE 40 MG
40 TABLET ORAL
Qty: 0 | Refills: 0 | Status: DISCONTINUED | OUTPATIENT
Start: 2018-09-15 | End: 2018-09-15

## 2018-09-15 RX ADMIN — INSULIN GLARGINE 20 UNIT(S): 100 INJECTION, SOLUTION SUBCUTANEOUS at 22:01

## 2018-09-15 RX ADMIN — Medication 81 MILLIGRAM(S): at 12:15

## 2018-09-15 RX ADMIN — CARVEDILOL PHOSPHATE 25 MILLIGRAM(S): 80 CAPSULE, EXTENDED RELEASE ORAL at 07:26

## 2018-09-15 RX ADMIN — ATORVASTATIN CALCIUM 40 MILLIGRAM(S): 80 TABLET, FILM COATED ORAL at 21:43

## 2018-09-15 RX ADMIN — CARVEDILOL PHOSPHATE 25 MILLIGRAM(S): 80 CAPSULE, EXTENDED RELEASE ORAL at 18:00

## 2018-09-15 RX ADMIN — Medication 2: at 18:02

## 2018-09-15 RX ADMIN — HEPARIN SODIUM 5000 UNIT(S): 5000 INJECTION INTRAVENOUS; SUBCUTANEOUS at 07:26

## 2018-09-15 RX ADMIN — Medication 40 MILLIGRAM(S): at 14:08

## 2018-09-15 NOTE — PROGRESS NOTE ADULT - PROBLEM SELECTOR PLAN 1
-patient presenting w/ OM, afebrile, no leukocytosis. CT run off study showed Bilateral SFA occlusion. Occluded right femoral bypass graft.   - LLE angio w/ L iliac placement performed yesterday.   - f/u vascular recs, possible bypass planning  - Abx d/c-ed as osteo likely chronic, 2/2 to low perfusion. Bcx NGTD.   - podiatry planning 2nd digit amputation after vascular procedure -patient presenting w/ OM, afebrile, no leukocytosis. CT run off study showed Bilateral SFA occlusion. Occluded right femoral bypass graft.   - s/p LLE angio w/ L iliac placement    - f/u vascular recs, possible bypass planning after vein mapping   - Abx d/c-ed as osteo likely chronic, 2/2 to low perfusion. Bcx NGTD.   - podiatry planning 2nd digit amputation after vascular procedure

## 2018-09-15 NOTE — PROGRESS NOTE ADULT - ASSESSMENT
68 yo M w/ hx of DM2 c/b prior right three toe amputation for infection, CAD w/ stent (last placed in 2016 per patient) & CABG, HTN, severe LV dysfunction (based on TTE 2016), PAD s/p RLE bypass grafting presenting with left 2nd digit toe infection 2/2 to osteomyelitis. Pt s/p LLE angio w/ L iliac stent placement.

## 2018-09-15 NOTE — PROGRESS NOTE ADULT - ASSESSMENT
A/P: 67 year old M patient with PMH of DM2 c/b prior right three toe amputation for infection, CAD s/p PCI (last stent placed in 2016 per patient), s/p CABG, HTN, HFrEF (TTE 09/11/2018 with severe global LV dysfunction LVEF 15-20%, moderate MR, severe diastolic dysfunction, decreased RV function, mild pHTN), PAD s/p RLE bypass grafting presenting with left 2nd digit toe infection, now for possible amputation.    - clinically stable with no chest pain, palpitations, and sob  - htn well-controlled with coreg  - slight volume overload on exam with LE edema, lasix on hold due to laura  - previous recommendation of lisinopril, start when laura resolved  - rcri score of 3, equivalent for 11% risk for major cardiac event  - recent mpi report reviewed, no further cardiac work-up at this time prior to LLE bypass    Chico Pedroza, #[id number]  Cardiology Fellow

## 2018-09-15 NOTE — PROGRESS NOTE ADULT - SUBJECTIVE AND OBJECTIVE BOX
ERICKA GUILLERMO  67y  Male      Patient is a 67y old  Male who presents with a chief complaint of toe infection (14 Sep 2018 07:57)      INTERVAL HPI/OVERNIGHT EVENTS: No events overnight.       T(C): 36.8 (09-15-18 @ 04:19), Max: 37 (09-14-18 @ 14:44)  HR: 64 (09-15-18 @ 04:19) (64 - 68)  BP: 125/80 (09-15-18 @ 04:19) (119/79 - 125/80)  RR: 18 (09-15-18 @ 04:19) (18 - 18)  SpO2: 94% (09-15-18 @ 04:19) (94% - 95%)      PHYSICAL EXAM:  GENERAL: NAD, well-groomed, well-developed  HEAD:  Atraumatic, Normocephalic  EYES: EOMI, PERRLA, conjunctiva and sclera clear  ENMT: No tonsillar erythema, exudates, or enlargement; Moist mucous membranes.  NECK: Supple, No JVD  NERVOUS SYSTEM:  Alert & Oriented X3   CHEST/LUNG: CTABL  HEART: Regular rate and rhythm; No murmurs, rubs, or gallops  ABDOMEN: Soft, Nontender, Nondistended; Bowel sounds present  EXTREMITIES:  LE edema  Musculoskeletal/Ortho: R foot amputations of digits 1, 4, and 5 (only 2+3 remain)  Skin: Wound left foot distal 2nd digit, roughly 0.8 cm in diameter, no purulence     Consultant(s) Notes Reviewed:  [x ] YES  [ ] NO  Care Discussed with Consultants/Other Providers [ x] YES  [ ] NO    LABS:                        13.1   8.2   )-----------( 203      ( 14 Sep 2018 07:05 )             38.9     09-14    140  |  102  |  23  ----------------------------<  82  4.0   |  24  |  1.34<H>    Ca    8.6      14 Sep 2018 07:02          CAPILLARY BLOOD GLUCOSE      POCT Blood Glucose.: 194 mg/dL (14 Sep 2018 21:40)  POCT Blood Glucose.: 191 mg/dL (14 Sep 2018 17:48)  POCT Blood Glucose.: 168 mg/dL (14 Sep 2018 12:40)  POCT Blood Glucose.: 76 mg/dL (14 Sep 2018 08:37)            RADIOLOGY & ADDITIONAL TESTS:    Imaging Personally Reviewed:  [ ] YES  [ ] NO ERICKA GUILLERMO  67y  Male      Patient is a 67y old  Male who presents with a chief complaint of toe infection (14 Sep 2018 07:57)      INTERVAL HPI/OVERNIGHT EVENTS: No events overnight. Pt denies CP, SOB, F/C.     T(C): 36.8 (09-15-18 @ 04:19), Max: 37 (09-14-18 @ 14:44)  HR: 64 (09-15-18 @ 04:19) (64 - 68)  BP: 125/80 (09-15-18 @ 04:19) (119/79 - 125/80)  RR: 18 (09-15-18 @ 04:19) (18 - 18)  SpO2: 94% (09-15-18 @ 04:19) (94% - 95%)    PHYSICAL EXAM:  GENERAL: NAD, well-groomed, well-developed  HEAD:  Atraumatic, Normocephalic  EYES: EOMI, PERRLA, conjunctiva and sclera clear  ENMT: No tonsillar erythema, exudates, or enlargement; Moist mucous membranes.  NECK: Supple, No JVD  NERVOUS SYSTEM:  Alert & Oriented X3   CHEST/LUNG: CTABL  HEART: Regular rate and rhythm; No murmurs, rubs, or gallops  ABDOMEN: Soft, Nontender, Nondistended; Bowel sounds present  EXTREMITIES:  LE edema  Musculoskeletal/Ortho: R foot amputations of digits 1, 4, and 5 (only 2+3 remain)  Skin: Wound left foot distal 2nd digit, roughly 0.8 cm in diameter, no purulence. Site of LLE angio C/D/I.      Consultant(s) Notes Reviewed:  [x ] YES  [ ] NO  Care Discussed with Consultants/Other Providers [ x] YES  [ ] NO    LABS:                        13.1   8.2   )-----------( 203      ( 14 Sep 2018 07:05 )             38.9     09-14    140  |  102  |  23  ----------------------------<  82  4.0   |  24  |  1.34<H>    Ca    8.6      14 Sep 2018 07:02          CAPILLARY BLOOD GLUCOSE      POCT Blood Glucose.: 194 mg/dL (14 Sep 2018 21:40)  POCT Blood Glucose.: 191 mg/dL (14 Sep 2018 17:48)  POCT Blood Glucose.: 168 mg/dL (14 Sep 2018 12:40)  POCT Blood Glucose.: 76 mg/dL (14 Sep 2018 08:37)            RADIOLOGY & ADDITIONAL TESTS:    Imaging Personally Reviewed:  [ ] YES  [ ] NO

## 2018-09-15 NOTE — PROGRESS NOTE ADULT - SUBJECTIVE AND OBJECTIVE BOX
Cardiology Progress Note    HPI:  68 yo M w/ hx of DM2 c/b prior right three toe amputation for infection, CAD w/ stent (last placed in 2016 per patient) & CABG, HTN, severe LV dysfunction (based on TTE 2016), PAD s/p RLE bypass grafting presenting with left 2nd digit toe infection.    Patient reports one week hx of noticing a dark colored region of the left 2nd toe of foot. Patient reports he has noticed dark red drainage. He has had no trauma, tenderness, or fevers. He denies any saltwater exposure or any animal bites/dog/cat exposure to toe. He reports because of the persistent drainage he went to his primary care doctor yesterday for check-up and was told to come to the hospital. He denies recent antimicrobial therapy and otherwise has no symptoms. Specifically he has no chest pain, palpitation, atypical anginal symptoms, back pain, abdominal pain, nausea/vomiting. (07 Sep 2018 03:59)      Interval events: Pt sitting up in a chair. Resting comfortably. Noted he tolerated LLE angio and iliac stent with no complications. Denies any chest pain, palpitations, and sob. Noted persistent discomfort in L foot but otherwise doing well overall.    Tele:    Medications:  aspirin enteric coated 81 milliGRAM(s) Oral daily  atorvastatin 40 milliGRAM(s) Oral at bedtime  carvedilol 25 milliGRAM(s) Oral every 12 hours  dextrose 50% Injectable 12.5 Gram(s) IV Push once  dextrose 50% Injectable 25 Gram(s) IV Push once  dextrose 50% Injectable 25 Gram(s) IV Push once  glucagon  Injectable 1 milliGRAM(s) IntraMuscular once PRN  heparin  Injectable 5000 Unit(s) SubCutaneous every 8 hours  influenza   Vaccine 0.5 milliLiter(s) IntraMuscular once  insulin glargine Injectable (LANTUS) 20 Unit(s) SubCutaneous at bedtime  insulin lispro (HumaLOG) corrective regimen sliding scale   SubCutaneous three times a day before meals  insulin lispro (HumaLOG) corrective regimen sliding scale   SubCutaneous at bedtime  polyethylene glycol 3350 17 Gram(s) Oral daily      Review of Systems:  Constitutional: [ ] Fever [ ] Chills [ ] Fatigue [ ] Weight change   HEENT: [ ] Blurred vision [ ] Eye Pain [ ] Headache [ ] Runny nose [ ] Sore Throat   Respiratory: [ ] Cough [ ] Wheezing [ ] Shortness of breath  Cardiovascular: [ ] Chest Pain [ ] Palpitations [ ] ORTA [ ] PND [ ] Orthopnea  Gastrointestinal: [ ] Abdominal Pain [ ] Diarrhea [ ] Constipation [ ] Hemorrhoids [ ] Nausea [ ] Vomiting  Genitourinary: [ ] Nocturia [ ] Dysuria [ ] Incontinence  Extremities: [ ] Swelling [ ] Joint Pain  Neurologic: [ ] Focal deficit [ ] Paresthesias [ ] Syncope  Lymphatic: [ ] Swelling [ ] Lymphadenopathy   Skin: [ ] Rash [ ] Ecchymoses [ ] Wounds [ ] Lesions  Psychiatry: [ ] Depression [ ] Suicidal/Homicidal Ideation [ ] Anxiety [ ] Sleep Disturbances  [ ] 10 point review of systems is otherwise negative except as mentioned above            [ ]Unable to obtain    Vitals:  T(C): 36.7 (09-15-18 @ 15:05), Max: 36.9 (09-14-18 @ 21:35)  HR: 64 (09-15-18 @ 15:05) (64 - 64)  BP: 133/86 (09-15-18 @ 15:05) (120/81 - 133/86)  BP(mean): --  RR: 18 (09-15-18 @ 15:05) (18 - 18)  SpO2: 95% (09-15-18 @ 15:05) (94% - 95%)  Wt(kg): --  Daily     Daily   I&O's Summary    14 Sep 2018 07:01  -  15 Sep 2018 07:00  --------------------------------------------------------  IN: 0 mL / OUT: 900 mL / NET: -900 mL        Physical Exam:  NAD, resting comfortably in a chair  PERRL, EOMI  Normal oral muscosa NC/AT  L iliac stent site c/d/i, S1/S2 appreciated, RRR, 1/6 GREYSON, b/l LE cool to touch and 1+ pitting edema, no elevation in JVP  Clear to auscultation bilaterally, no wheezing, no crackles  Soft, Non-tender, Non-distended, BS+  No clubbing, No joint deformity, R foot toe amputations noted   Non-focal  No lymphadenopathy  AAOx3, Mood & affect appropriate  No rashes, No ecchymoses, No cyanosis  Procedural Access Site: No hematoma, Non-tender to palpation, 2+ pulse , No bruit, No Ecchymosis    Labs:                        13.1   8.2   )-----------( 203      ( 14 Sep 2018 07:05 )             38.9     09-14    140  |  102  |  23  ----------------------------<  82  4.0   |  24  |  1.34<H>    Ca    8.6      14 Sep 2018 07:02                    New results/imaging:

## 2018-09-16 DIAGNOSIS — N17.9 ACUTE KIDNEY FAILURE, UNSPECIFIED: ICD-10-CM

## 2018-09-16 LAB
GLUCOSE BLDC GLUCOMTR-MCNC: 144 MG/DL — HIGH (ref 70–99)
GLUCOSE BLDC GLUCOMTR-MCNC: 148 MG/DL — HIGH (ref 70–99)
GLUCOSE BLDC GLUCOMTR-MCNC: 201 MG/DL — HIGH (ref 70–99)
GLUCOSE BLDC GLUCOMTR-MCNC: 215 MG/DL — HIGH (ref 70–99)

## 2018-09-16 PROCEDURE — 99233 SBSQ HOSP IP/OBS HIGH 50: CPT | Mod: GC

## 2018-09-16 RX ADMIN — Medication 2: at 13:18

## 2018-09-16 RX ADMIN — CARVEDILOL PHOSPHATE 25 MILLIGRAM(S): 80 CAPSULE, EXTENDED RELEASE ORAL at 18:13

## 2018-09-16 RX ADMIN — Medication 81 MILLIGRAM(S): at 11:27

## 2018-09-16 RX ADMIN — CARVEDILOL PHOSPHATE 25 MILLIGRAM(S): 80 CAPSULE, EXTENDED RELEASE ORAL at 05:49

## 2018-09-16 RX ADMIN — Medication 2: at 18:13

## 2018-09-16 RX ADMIN — INSULIN GLARGINE 20 UNIT(S): 100 INJECTION, SOLUTION SUBCUTANEOUS at 22:43

## 2018-09-16 RX ADMIN — ATORVASTATIN CALCIUM 40 MILLIGRAM(S): 80 TABLET, FILM COATED ORAL at 22:43

## 2018-09-16 NOTE — PROGRESS NOTE ADULT - PROBLEM SELECTOR PLAN 1
-patient presenting w/ OM, afebrile, no leukocytosis. CT run off study showed Bilateral SFA occlusion. Occluded right femoral bypass graft.   - s/p LLE angio w/ L iliac placement    - f/u vascular recs, possible bypass planning after vein mapping   - Abx d/c-ed as osteo likely chronic, 2/2 to low perfusion. Bcx NGTD.   - podiatry planning 2nd digit amputation after vascular procedure - Patient with KESHAV on 9/14, likely in setting of recent angio/contrast load  - will need repeat BMP  - Hold ACE-I for now, avoid nephrotoxic agents

## 2018-09-16 NOTE — PROGRESS NOTE ADULT - PROBLEM SELECTOR PROBLEM 3
Type 2 diabetes mellitus with other circulatory complication Chronic systolic (congestive) heart failure

## 2018-09-16 NOTE — PROGRESS NOTE ADULT - ASSESSMENT
Patient seen at bedside with gangrene left 2 toe progressing. Patient waiting on vascular plan with possible bypass.   ·	Pt & children at bedside, concerned with bypass pending discussion with vascular team for plan  ·	Pt and family considering a second opinion  ·	Dressing left clean dry intact at this time  ·	No podiatric intervention at this time until revascularization performed  ·	d/w attending Patient seen at bedside with gangrene left 2 toe progressing. Patient waiting on vascular plan with possible bypass.   ·	Pt & children at bedside, concerned with bypass pending discussion with vascular team for plan  ·	Pt and family considering a second opinion  ·	Dressing left clean dry intact at this time  ·	No podiatric intervention at this time until revascularization performed

## 2018-09-16 NOTE — PROGRESS NOTE ADULT - PROBLEM SELECTOR PLAN 2
- C/w Coreg  - restarted on lasix 40 mg bid  - ACE-I after procedure  - TTE results: EF 15-20%, moderate MR, severely dilated LA, severe global LV systolic dysfunction with increased apical and patricia-apical contractility relative to  other segments, Severe diastolic dysfunction (Stage III). Unchanged from 4/2016. -patient presenting w/ OM, afebrile, no leukocytosis. CT run off study showed Bilateral SFA occlusion. Occluded right femoral bypass graft.   - s/p LLE angio w/ L iliac placement    - f/u vascular recs, possible bypass planning after vein mapping   - Abx d/c-ed as osteo likely chronic, 2/2 to low perfusion. Bcx NGTD.   - podiatry planning 2nd digit amputation after vascular procedure

## 2018-09-16 NOTE — PROGRESS NOTE ADULT - PROBLEM SELECTOR PLAN 4
-c/w home statin therapy  - c/w ASA  - holding plavix in setting of possible amputation on Lantus 20 U. FS within good range.  -sliding scale.

## 2018-09-16 NOTE — PROGRESS NOTE ADULT - PROBLEM SELECTOR PROBLEM 5
Prophylactic measure Coronary artery disease involving native coronary artery of native heart without angina pectoris

## 2018-09-16 NOTE — PROGRESS NOTE ADULT - PROBLEM SELECTOR PLAN 5
- Improve score 0, no VTE ppx -c/w home statin therapy  - c/w ASA  - holding plavix in setting of possible amputation

## 2018-09-16 NOTE — PROGRESS NOTE ADULT - PROBLEM SELECTOR PROBLEM 4
Coronary artery disease involving native coronary artery of native heart without angina pectoris Type 2 diabetes mellitus with other circulatory complication

## 2018-09-16 NOTE — PROGRESS NOTE ADULT - SUBJECTIVE AND OBJECTIVE BOX
Julian Perez MD PGY3    Pager [phone]/ [phone]    For Night coverage 7pm-7am: NS- page [phone] Team1-3, page [phone] Team4 & Care Model  Sat/[place] Cross Coverage 12pm-7pm: NS- page [phone] for Team1-4, [facility]- pager forwarded to covering Resident    Patient is a 67y old  Male who presents with a chief complaint of toe infection (15 Sep 2018 17:58)      INTERVAL HPI/OVERNIGHT EVENTS:    MEDICATIONS  (STANDING):  aspirin enteric coated 81 milliGRAM(s) Oral daily  atorvastatin 40 milliGRAM(s) Oral at bedtime  carvedilol 25 milliGRAM(s) Oral every 12 hours  dextrose 50% Injectable 12.5 Gram(s) IV Push once  dextrose 50% Injectable 25 Gram(s) IV Push once  dextrose 50% Injectable 25 Gram(s) IV Push once  heparin  Injectable 5000 Unit(s) SubCutaneous every 8 hours  influenza   Vaccine 0.5 milliLiter(s) IntraMuscular once  insulin glargine Injectable (LANTUS) 20 Unit(s) SubCutaneous at bedtime  insulin lispro (HumaLOG) corrective regimen sliding scale   SubCutaneous three times a day before meals  insulin lispro (HumaLOG) corrective regimen sliding scale   SubCutaneous at bedtime  polyethylene glycol 3350 17 Gram(s) Oral daily    MEDICATIONS  (PRN):  glucagon  Injectable 1 milliGRAM(s) IntraMuscular once PRN Glucose LESS THAN 70 milligrams/deciliter      Allergies    No Known Allergies    Intolerances        REVIEW OF SYSTEMS:  CONSTITUTIONAL: No fever, weight loss, or fatigue  EYES: No eye pain, visual disturbances, or discharge  ENMT:  No difficulty hearing, tinnitus, vertigo; No sinus or throat pain  NECK: No pain or stiffness  BREASTS: No pain or masses  RESPIRATORY: No cough, wheezing, chills or hemoptysis; No shortness of breath  CARDIOVASCULAR: No chest pain, palpitations, dizziness, or leg swelling  GASTROINTESTINAL: No abdominal or epigastric pain. No nausea, vomiting, or hematemesis; No diarrhea or constipation. GENITOURINARY: No dysuria, frequency, hematuria, or incontinence  NEUROLOGICAL: No headaches, memory loss, loss of strength, numbness, or tremors  SKIN: No itching, burning, rashes, or lesions   LYMPH NODES: No enlarged glands  ENDOCRINE: No heat or cold intolerance  MUSCULOSKELETAL: No joint pain or swelling; No muscle, back, or extremity pain  PSYCHIATRIC: No depression, anxiety, mood swings, or difficulty sleeping  HEME/LYMPH: No easy bruising, or bleeding gums  ALLERGY AND IMMUNOLOGIC: No hives or eczema    Vital Signs Last 24 Hrs  T(C): 36.7 (16 Sep 2018 04:41), Max: 37.1 (15 Sep 2018 21:25)  T(F): 98.1 (16 Sep 2018 04:41), Max: 98.7 (15 Sep 2018 21:25)  HR: 65 (16 Sep 2018 04:41) (64 - 69)  BP: 109/61 (16 Sep 2018 04:41) (109/61 - 134/84)  BP(mean): --  RR: 18 (16 Sep 2018 04:41) (18 - 18)  SpO2: 95% (16 Sep 2018 04:41) (95% - 95%)  I&O's Summary      PHYSICAL EXAM:  GENERAL: NAD, well-groomed, well-developed  HEAD:  Atraumatic, Normocephalic  EYES: EOMI, PERRLA, conjunctiva and sclera clear  ENMT: No tonsillar erythema, exudates, or enlargement; Moist mucous membranes, Good dentition, No lesions  NECK: Supple, No JVD  NERVOUS SYSTEM:  Alert & Oriented X3, Good concentration; Motor Strength 5/5 B/L upper and lower extremities  CHEST/LUNG: Clear to auscultation bilaterally; No rales, rhonchi, wheezing, or rubs  HEART: Regular rate and rhythm; No murmurs, rubs, or gallops  ABDOMEN: Soft, Nontender, Nondistended; Bowel sounds present  EXTREMITIES:  2+ Peripheral Pulses, No clubbing, cyanosis, or edema  LYMPH: No lymphadenopathy noted  SKIN: No rashes or lesions    LABS:              CAPILLARY BLOOD GLUCOSE      POCT Blood Glucose.: 219 mg/dL (15 Sep 2018 21:56)  POCT Blood Glucose.: 206 mg/dL (15 Sep 2018 17:59)  POCT Blood Glucose.: 104 mg/dL (15 Sep 2018 12:22)  POCT Blood Glucose.: 118 mg/dL (15 Sep 2018 07:55)      Microbiology        RADIOLOGY & ADDITIONAL TESTS:    Imaging Personally Reviewed:  [ ] YES  [ ] NO    Consultant(s) Notes Reviewed:  [ ] YES  [ ] NO    Care Discussed with Consultants/Other Providers [ ] YES  [ ] NO Julian Perez MD PGY3    Pager [phone]/ [phone]    For Night coverage 7pm-7am: NS- page [phone] Team1-3, page [phone] Team4 & Care Model  Sat/[place] Cross Coverage 12pm-7pm: NS- page [phone] for Team1-4, [facility]- pager forwarded to covering Resident    Patient is a 67y old  Male who presents with a chief complaint of toe infection (15 Sep 2018 17:58)      INTERVAL HPI/OVERNIGHT EVENTS: No acute overnight events. Patient states he does not want Fem-pop bypass. Denies any fevers, chills, nausea, vomiting, diarrhea,.     MEDICATIONS  (STANDING):  aspirin enteric coated 81 milliGRAM(s) Oral daily  atorvastatin 40 milliGRAM(s) Oral at bedtime  carvedilol 25 milliGRAM(s) Oral every 12 hours  dextrose 50% Injectable 12.5 Gram(s) IV Push once  dextrose 50% Injectable 25 Gram(s) IV Push once  dextrose 50% Injectable 25 Gram(s) IV Push once  heparin  Injectable 5000 Unit(s) SubCutaneous every 8 hours  influenza   Vaccine 0.5 milliLiter(s) IntraMuscular once  insulin glargine Injectable (LANTUS) 20 Unit(s) SubCutaneous at bedtime  insulin lispro (HumaLOG) corrective regimen sliding scale   SubCutaneous three times a day before meals  insulin lispro (HumaLOG) corrective regimen sliding scale   SubCutaneous at bedtime  polyethylene glycol 3350 17 Gram(s) Oral daily    MEDICATIONS  (PRN):  glucagon  Injectable 1 milliGRAM(s) IntraMuscular once PRN Glucose LESS THAN 70 milligrams/deciliter      Allergies    No Known Allergies    Intolerances    Vital Signs Last 24 Hrs  T(C): 36.7 (16 Sep 2018 04:41), Max: 37.1 (15 Sep 2018 21:25)  T(F): 98.1 (16 Sep 2018 04:41), Max: 98.7 (15 Sep 2018 21:25)  HR: 65 (16 Sep 2018 04:41) (64 - 69)  BP: 109/61 (16 Sep 2018 04:41) (109/61 - 134/84)  BP(mean): --  RR: 18 (16 Sep 2018 04:41) (18 - 18)  SpO2: 95% (16 Sep 2018 04:41) (95% - 95%)  I&O's Summary      PHYSICAL EXAM:  GENERAL: NAD, well-groomed, well-developed  HEAD:  Atraumatic, Normocephalic  EYES: EOMI, PERRLA, conjunctiva and sclera clear  ENMT: No tonsillar erythema, exudates, or enlargement; Moist mucous membranes.  NECK: Supple, No JVD  NERVOUS SYSTEM:  Alert & Oriented X3   CHEST/LUNG: CTABL  HEART: Regular rate and rhythm; No murmurs, rubs, or gallops  ABDOMEN: Soft, Nontender, Nondistended; Bowel sounds present. L groin cath site TTP no hematoma appreciable.   EXTREMITIES:  LE edema  Musculoskeletal/Ortho: R foot amputations of digits 1, 4, and 5 (only 2+3 remain)  Skin: Wound left foot distal 2nd digit, roughly 0.8 cm in diameter, no purulence. Site of LLE angio C/D/I, TTP    LABS:              CAPILLARY BLOOD GLUCOSE      POCT Blood Glucose.: 219 mg/dL (15 Sep 2018 21:56)  POCT Blood Glucose.: 206 mg/dL (15 Sep 2018 17:59)  POCT Blood Glucose.: 104 mg/dL (15 Sep 2018 12:22)  POCT Blood Glucose.: 118 mg/dL (15 Sep 2018 07:55)      Microbiology

## 2018-09-16 NOTE — PROGRESS NOTE ADULT - PROBLEM SELECTOR PLAN 3
on Lantus 20 U. FS within good range.  -sliding scale. - C/w Coreg  - restarted on lasix 40 mg bid  - Awaiting repeat BMP to assess restarting ACE-I as patient had KESHAV on 9/14  - TTE results: EF 15-20%, moderate MR, severely dilated LA, severe global LV systolic dysfunction with increased apical and patricia-apical contractility relative to  other segments, Severe diastolic dysfunction (Stage III). Unchanged from 4/2016.

## 2018-09-16 NOTE — PROGRESS NOTE ADULT - ASSESSMENT
66 yo M w/ hx of DM2 c/b prior right three toe amputation for infection, CAD w/ stent (last placed in 2016 per patient) & CABG, HTN, severe LV dysfunction (based on TTE 2016), PAD s/p RLE bypass grafting presenting with left 2nd digit toe infection 2/2 to osteomyelitis. Pt s/p LLE angio w/ L iliac stent placement. 68 yo M w/ hx of DM2 c/b prior right three toe amputation for infection, CAD w/ stent (last placed in 2016 per patient) & CABG, HTN, severe LV dysfunction (based on TTE 2016), PAD s/p RLE bypass grafting presenting with left 2nd digit toe infection 2/2 to osteomyelitis. Pt s/p LLE angio w/ L iliac stent placement, awaiting Vascular input regarding alternatives to LLE bypass.

## 2018-09-16 NOTE — PROGRESS NOTE ADULT - SUBJECTIVE AND OBJECTIVE BOX
Patient is a 67y old  Male who presents with a chief complaint of toe infection (16 Sep 2018 07:49)       INTERVAL HPI/OVERNIGHT EVENTS:  Patient seen and evaluated at bedside.  Pt is resting comfortable in NAD. Denies N/V/F/C.     Allergies    No Known Allergies    Intolerances        Vital Signs Last 24 Hrs  T(C): 36.7 (16 Sep 2018 04:41), Max: 37.1 (15 Sep 2018 21:25)  T(F): 98.1 (16 Sep 2018 04:41), Max: 98.7 (15 Sep 2018 21:25)  HR: 65 (16 Sep 2018 04:41) (64 - 69)  BP: 109/61 (16 Sep 2018 04:41) (109/61 - 134/84)  BP(mean): --  RR: 18 (16 Sep 2018 04:41) (18 - 18)  SpO2: 95% (16 Sep 2018 04:41) (95% - 95%)    LABS:              CAPILLARY BLOOD GLUCOSE      POCT Blood Glucose.: 148 mg/dL (16 Sep 2018 08:38)  POCT Blood Glucose.: 219 mg/dL (15 Sep 2018 21:56)  POCT Blood Glucose.: 206 mg/dL (15 Sep 2018 17:59)  POCT Blood Glucose.: 104 mg/dL (15 Sep 2018 12:22)      Lower Extremity Physical Exam:  Dressing left clean dry intact    RADIOLOGY & ADDITIONAL TESTS:

## 2018-09-17 ENCOUNTER — TRANSCRIPTION ENCOUNTER (OUTPATIENT)
Age: 68
End: 2018-09-17

## 2018-09-17 LAB
ANION GAP SERPL CALC-SCNC: 11 MMOL/L — SIGNIFICANT CHANGE UP (ref 5–17)
BLD GP AB SCN SERPL QL: NEGATIVE — SIGNIFICANT CHANGE UP
BUN SERPL-MCNC: 18 MG/DL — SIGNIFICANT CHANGE UP (ref 7–23)
CALCIUM SERPL-MCNC: 9.6 MG/DL — SIGNIFICANT CHANGE UP (ref 8.4–10.5)
CHLORIDE SERPL-SCNC: 98 MMOL/L — SIGNIFICANT CHANGE UP (ref 96–108)
CO2 SERPL-SCNC: 26 MMOL/L — SIGNIFICANT CHANGE UP (ref 22–31)
CREAT SERPL-MCNC: 1.24 MG/DL — SIGNIFICANT CHANGE UP (ref 0.5–1.3)
GLUCOSE BLDC GLUCOMTR-MCNC: 111 MG/DL — HIGH (ref 70–99)
GLUCOSE BLDC GLUCOMTR-MCNC: 143 MG/DL — HIGH (ref 70–99)
GLUCOSE BLDC GLUCOMTR-MCNC: 182 MG/DL — HIGH (ref 70–99)
GLUCOSE BLDC GLUCOMTR-MCNC: 203 MG/DL — HIGH (ref 70–99)
GLUCOSE SERPL-MCNC: 151 MG/DL — HIGH (ref 70–99)
POTASSIUM SERPL-MCNC: 4.8 MMOL/L — SIGNIFICANT CHANGE UP (ref 3.5–5.3)
POTASSIUM SERPL-SCNC: 4.8 MMOL/L — SIGNIFICANT CHANGE UP (ref 3.5–5.3)
RH IG SCN BLD-IMP: NEGATIVE — SIGNIFICANT CHANGE UP
SODIUM SERPL-SCNC: 135 MMOL/L — SIGNIFICANT CHANGE UP (ref 135–145)

## 2018-09-17 PROCEDURE — 99233 SBSQ HOSP IP/OBS HIGH 50: CPT | Mod: GC

## 2018-09-17 RX ORDER — INSULIN GLARGINE 100 [IU]/ML
10 INJECTION, SOLUTION SUBCUTANEOUS AT BEDTIME
Qty: 0 | Refills: 0 | Status: DISCONTINUED | OUTPATIENT
Start: 2018-09-17 | End: 2018-09-17

## 2018-09-17 RX ORDER — FUROSEMIDE 40 MG
40 TABLET ORAL
Qty: 0 | Refills: 0 | Status: DISCONTINUED | OUTPATIENT
Start: 2018-09-17 | End: 2018-09-18

## 2018-09-17 RX ORDER — INSULIN GLARGINE 100 [IU]/ML
16 INJECTION, SOLUTION SUBCUTANEOUS AT BEDTIME
Qty: 0 | Refills: 0 | Status: DISCONTINUED | OUTPATIENT
Start: 2018-09-17 | End: 2018-09-17

## 2018-09-17 RX ORDER — INSULIN GLARGINE 100 [IU]/ML
10 INJECTION, SOLUTION SUBCUTANEOUS AT BEDTIME
Qty: 0 | Refills: 0 | Status: DISCONTINUED | OUTPATIENT
Start: 2018-09-18 | End: 2018-09-18

## 2018-09-17 RX ORDER — INSULIN GLARGINE 100 [IU]/ML
5 INJECTION, SOLUTION SUBCUTANEOUS ONCE
Qty: 0 | Refills: 0 | Status: COMPLETED | OUTPATIENT
Start: 2018-09-17 | End: 2018-09-17

## 2018-09-17 RX ORDER — SODIUM CHLORIDE 9 MG/ML
1000 INJECTION INTRAMUSCULAR; INTRAVENOUS; SUBCUTANEOUS
Qty: 0 | Refills: 0 | Status: DISCONTINUED | OUTPATIENT
Start: 2018-09-17 | End: 2018-09-18

## 2018-09-17 RX ADMIN — HEPARIN SODIUM 5000 UNIT(S): 5000 INJECTION INTRAVENOUS; SUBCUTANEOUS at 08:07

## 2018-09-17 RX ADMIN — CARVEDILOL PHOSPHATE 25 MILLIGRAM(S): 80 CAPSULE, EXTENDED RELEASE ORAL at 05:34

## 2018-09-17 RX ADMIN — Medication 40 MILLIGRAM(S): at 21:42

## 2018-09-17 RX ADMIN — Medication 2: at 18:06

## 2018-09-17 RX ADMIN — Medication 81 MILLIGRAM(S): at 12:00

## 2018-09-17 RX ADMIN — HEPARIN SODIUM 5000 UNIT(S): 5000 INJECTION INTRAVENOUS; SUBCUTANEOUS at 15:31

## 2018-09-17 RX ADMIN — HEPARIN SODIUM 5000 UNIT(S): 5000 INJECTION INTRAVENOUS; SUBCUTANEOUS at 21:42

## 2018-09-17 RX ADMIN — ATORVASTATIN CALCIUM 40 MILLIGRAM(S): 80 TABLET, FILM COATED ORAL at 21:41

## 2018-09-17 RX ADMIN — Medication 40 MILLIGRAM(S): at 12:00

## 2018-09-17 RX ADMIN — INSULIN GLARGINE 5 UNIT(S): 100 INJECTION, SOLUTION SUBCUTANEOUS at 22:04

## 2018-09-17 RX ADMIN — CARVEDILOL PHOSPHATE 25 MILLIGRAM(S): 80 CAPSULE, EXTENDED RELEASE ORAL at 18:09

## 2018-09-17 NOTE — PROGRESS NOTE ADULT - ASSESSMENT
68 yo M w/ hx of DM2 c/b prior right three toe amputation for infection, CAD w/ stent (last placed in 2016 per patient) & CABG, HTN, severe LV dysfunction (based on TTE 2016), PAD s/p RLE bypass grafting presenting with left 2nd digit toe infection 2/2 to osteomyelitis. Pt s/p LLE angio w/ L iliac stent placement, awaiting Vascular input regarding alternatives to LLE bypass. 68 yo M w/ hx of DM2 c/b prior right three toe amputation for infection, CAD w/ stent (last placed in 2016 per patient) & CABG, HTN, severe LV dysfunction (based on TTE 2016), PAD s/p RLE bypass grafting presenting with left 2nd digit toe infection 2/2 to osteomyelitis. Pt s/p LLE angio w/ L iliac stent placement, awaiting Vascular LLE bypass tomorrow.

## 2018-09-17 NOTE — PROGRESS NOTE ADULT - SUBJECTIVE AND OBJECTIVE BOX
Silvio Little, PGY1  Pager: [phone]  After 7: Night Float pager  Alternate contact:     Patient is a 67y old  Male who presents with a chief complaint of toe infection (17 Sep 2018 08:43)      SUBJECTIVE / OVERNIGHT EVENTS: No acute events overnight. Patient denies n/v, abdominal, GI or  complaints.     MEDICATIONS  (STANDING):  aspirin enteric coated 81 milliGRAM(s) Oral daily  atorvastatin 40 milliGRAM(s) Oral at bedtime  carvedilol 25 milliGRAM(s) Oral every 12 hours  dextrose 50% Injectable 12.5 Gram(s) IV Push once  dextrose 50% Injectable 25 Gram(s) IV Push once  dextrose 50% Injectable 25 Gram(s) IV Push once  heparin  Injectable 5000 Unit(s) SubCutaneous every 8 hours  influenza   Vaccine 0.5 milliLiter(s) IntraMuscular once  insulin glargine Injectable (LANTUS) 20 Unit(s) SubCutaneous at bedtime  insulin lispro (HumaLOG) corrective regimen sliding scale   SubCutaneous three times a day before meals  insulin lispro (HumaLOG) corrective regimen sliding scale   SubCutaneous at bedtime  polyethylene glycol 3350 17 Gram(s) Oral daily    MEDICATIONS  (PRN):  glucagon  Injectable 1 milliGRAM(s) IntraMuscular once PRN Glucose LESS THAN 70 milligrams/deciliter      Vital Signs Last 24 Hrs  T(C): 36.8 (17 Sep 2018 05:33), Max: 36.8 (16 Sep 2018 13:37)  T(F): 98.3 (17 Sep 2018 05:33), Max: 98.3 (16 Sep 2018 13:37)  HR: 71 (17 Sep 2018 05:33) (69 - 71)  BP: 123/77 (17 Sep 2018 05:33) (115/73 - 123/77)  BP(mean): --  RR: 18 (17 Sep 2018 05:33) (18 - 18)  SpO2: 97% (17 Sep 2018 05:33) (96% - 97%)  CAPILLARY BLOOD GLUCOSE      POCT Blood Glucose.: 144 mg/dL (16 Sep 2018 22:09)  POCT Blood Glucose.: 201 mg/dL (16 Sep 2018 17:42)  POCT Blood Glucose.: 215 mg/dL (16 Sep 2018 12:34)    I&O's Summary      PHYSICAL EXAM:  GENERAL: NAD, well-developed  EYES: EOMI, PERRLA, conjunctiva and sclera clear  NECK:  No JVD  CHEST/LUNG: CTABL ; No wheeze  HEART: RRR; No murmurs  ABDOMEN: Soft, Nontender, Nondistended; Bowel sounds present  : [place]  EXTREMITIES:  2+ Peripheral Pulses, No edema  PSYCH: AAOx3  NEUROLOGY: non-focal  SKIN: No rashes or lesions. No sacral ulcer    LABS:                    Microbiology;        RADIOLOGY & ADDITIONAL TESTS:    Imaging Personally Reviewed:          Consultant(s) Notes Reviewed:      Care Discussed with Consultants/Other Providers: Silvio Little, PGY1  Pager: [phone]  After 7: Night Float pager    Patient is a 67y old  Male who presents with a chief complaint of toe infection (17 Sep 2018 08:43)      SUBJECTIVE / OVERNIGHT EVENTS: No acute events overnight. Patient denies n/v, abdominal, GI or  complaints. Has been having some shortness of breath. Normally takes lasix at home for these symptoms.     MEDICATIONS  (STANDING):  aspirin enteric coated 81 milliGRAM(s) Oral daily  atorvastatin 40 milliGRAM(s) Oral at bedtime  carvedilol 25 milliGRAM(s) Oral every 12 hours  dextrose 50% Injectable 12.5 Gram(s) IV Push once  dextrose 50% Injectable 25 Gram(s) IV Push once  dextrose 50% Injectable 25 Gram(s) IV Push once  heparin  Injectable 5000 Unit(s) SubCutaneous every 8 hours  influenza   Vaccine 0.5 milliLiter(s) IntraMuscular once  insulin glargine Injectable (LANTUS) 20 Unit(s) SubCutaneous at bedtime  insulin lispro (HumaLOG) corrective regimen sliding scale   SubCutaneous three times a day before meals  insulin lispro (HumaLOG) corrective regimen sliding scale   SubCutaneous at bedtime  polyethylene glycol 3350 17 Gram(s) Oral daily    MEDICATIONS  (PRN):  glucagon  Injectable 1 milliGRAM(s) IntraMuscular once PRN Glucose LESS THAN 70 milligrams/deciliter      Vital Signs Last 24 Hrs  T(C): 36.8 (17 Sep 2018 05:33), Max: 36.8 (16 Sep 2018 13:37)  T(F): 98.3 (17 Sep 2018 05:33), Max: 98.3 (16 Sep 2018 13:37)  HR: 71 (17 Sep 2018 05:33) (69 - 71)  BP: 123/77 (17 Sep 2018 05:33) (115/73 - 123/77)  BP(mean): --  RR: 18 (17 Sep 2018 05:33) (18 - 18)  SpO2: 97% (17 Sep 2018 05:33) (96% - 97%)  CAPILLARY BLOOD GLUCOSE      POCT Blood Glucose.: 144 mg/dL (16 Sep 2018 22:09)  POCT Blood Glucose.: 201 mg/dL (16 Sep 2018 17:42)  POCT Blood Glucose.: 215 mg/dL (16 Sep 2018 12:34)    I&O's Summary      PHYSICAL EXAM:  GENERAL: NAD, well-developed  EYES: EOMI, PERRLA, conjunctiva and sclera clear  NECK: b/l JVD seen while patient sitting   CHEST/LUNG: CTABL; No wheeze  HEART: RRR; No murmurs  ABDOMEN: Soft, Nontender, Nondistended; Bowel sounds present  EXTREMITIES: Peripheral Pulses difficult to palpate, 3+ pitting edema bilaterally, gangrene of the 2nd L toe  PSYCH: AAOx3  NEUROLOGY: non-focal  SKIN: No rashes or lesions. No sacral ulcer    LABS:                    Microbiology;        RADIOLOGY & ADDITIONAL TESTS:    Imaging Personally Reviewed:          Consultant(s) Notes Reviewed:      Care Discussed with Consultants/Other Providers:

## 2018-09-17 NOTE — CHART NOTE - NSCHARTNOTEFT_GEN_A_CORE
Surgeon: Dr. Partida  Procedure: Left leg bypass    Vital Signs Last 24 Hrs  T(C): 36.8 (17 Sep 2018 05:33), Max: 36.8 (16 Sep 2018 13:37)  T(F): 98.3 (17 Sep 2018 05:33), Max: 98.3 (16 Sep 2018 13:37)  HR: 71 (17 Sep 2018 05:33) (69 - 71)  BP: 123/77 (17 Sep 2018 05:33) (115/73 - 123/77)  BP(mean): --  RR: 18 (17 Sep 2018 05:33) (18 - 18)  SpO2: 95% (17 Sep 2018 09:41) (95% - 97%)        135  |  98  |  18  ----------------------------<  151<H>  4.8   |  26  |  1.24    Ca    9.6      17 Sep 2018 09:01        Daily     Daily Weight in k.9 (17 Sep 2018 10:45)    EKG: complete  CXR: ordered  Type and Screen #1: ordered  Type and Screen #2: ordered    - Blood: Not needed.         Plan:  - OR 18 Left leg bypass with Dr. Jung  - NPO after midnight except meds  - IVF while NPO  - Adjust Diabetic orders for NPO period.  - Perioperative antibiotics not needed.  - Morning Labs: CBC, BMP, coags, type & screen  - Medical clearance for OR complete     - Permits:  > Consent in chart.  > Case scheduled with OR.  > May continue VTE ppx perioperativley Surgeon: Dr. Partida  Procedure: Left leg bypass    Vital Signs Last 24 Hrs  T(C): 36.8 (17 Sep 2018 05:33), Max: 36.8 (16 Sep 2018 13:37)  T(F): 98.3 (17 Sep 2018 05:33), Max: 98.3 (16 Sep 2018 13:37)  HR: 71 (17 Sep 2018 05:33) (69 - 71)  BP: 123/77 (17 Sep 2018 05:33) (115/73 - 123/77)  BP(mean): --  RR: 18 (17 Sep 2018 05:33) (18 - 18)  SpO2: 95% (17 Sep 2018 09:41) (95% - 97%)        135  |  98  |  18  ----------------------------<  151<H>  4.8   |  26  |  1.24    Ca    9.6      17 Sep 2018 09:01        Daily     Daily Weight in k.9 (17 Sep 2018 10:45)    EKG: complete  CXR: ordered  Type and Screen #1: ordered  Type and Screen #2: ordered    - Blood: Not needed.         Plan:  - OR 18 Left leg bypass with Dr. Jung  - NPO after midnight except meds  - IVF while NPO  - Adjust Diabetic orders for NPO period.  - Perioperative antibiotics not needed.  - Morning Labs: CBC, BMP, coags, type & screen  - Medical clearance for OR complete     - Permits:  > Will place Consent in chart.  > Case scheduled with OR.  > May continue VTE ppx perioperativley Surgeon: Dr. Partida  Procedure: Left leg bypass    Vital Signs Last 24 Hrs  T(C): 36.8 (17 Sep 2018 05:33), Max: 36.8 (16 Sep 2018 13:37)  T(F): 98.3 (17 Sep 2018 05:33), Max: 98.3 (16 Sep 2018 13:37)  HR: 71 (17 Sep 2018 05:33) (69 - 71)  BP: 123/77 (17 Sep 2018 05:33) (115/73 - 123/77)  BP(mean): --  RR: 18 (17 Sep 2018 05:33) (18 - 18)  SpO2: 95% (17 Sep 2018 09:41) (95% - 97%)        135  |  98  |  18  ----------------------------<  151<H>  4.8   |  26  |  1.24    Ca    9.6      17 Sep 2018 09:01        Daily     Daily Weight in k.9 (17 Sep 2018 10:45)    EKG: complete  Type and Screen #1: ordered  Type and Screen #2: ordered    - Blood: Not needed.         Plan:  - OR 18 Left leg bypass with Dr. Jung  - NPO after midnight except meds  - IVF while NPO  - Adjust Diabetic orders for NPO period.  - Perioperative antibiotics not needed.  - Morning Labs: CBC, BMP, coags, type & screen  - Medical clearance for OR complete     - Permits:  > Will place Consent in chart.  > Case scheduled with OR.  > May continue VTE ppx perioperativley

## 2018-09-17 NOTE — PROGRESS NOTE ADULT - SUBJECTIVE AND OBJECTIVE BOX
Patient is a 67y old  Male who presents with a chief complaint of toe infection (16 Sep 2018 12:00)       INTERVAL HPI/OVERNIGHT EVENTS:  Patient seen and evaluated at bedside.  Pt is resting comfortable in NAD. Denies N/V/F/C.  Pain rated at X/10    Allergies    No Known Allergies    Intolerances        Vital Signs Last 24 Hrs  T(C): 36.8 (17 Sep 2018 05:33), Max: 36.8 (16 Sep 2018 13:37)  T(F): 98.3 (17 Sep 2018 05:33), Max: 98.3 (16 Sep 2018 13:37)  HR: 71 (17 Sep 2018 05:33) (69 - 71)  BP: 123/77 (17 Sep 2018 05:33) (115/73 - 123/77)  BP(mean): --  RR: 18 (17 Sep 2018 05:33) (18 - 18)  SpO2: 97% (17 Sep 2018 05:33) (96% - 97%)    LABS:              CAPILLARY BLOOD GLUCOSE      POCT Blood Glucose.: 144 mg/dL (16 Sep 2018 22:09)  POCT Blood Glucose.: 201 mg/dL (16 Sep 2018 17:42)  POCT Blood Glucose.: 215 mg/dL (16 Sep 2018 12:34)      Lower Extremity Physical Exam:    Vascular: DP/PT 0/4, B/L, CFT <5 seconds B/L, Temperature gradient warm, B/L.   Neuro: Epicritic sensation absent to b/l feet  Musculoskeletal/Ortho: R foot amputations of digits 1, 4, and 5 (only 2+3 remain)  Skin:  Wound #1: left foot distal 2nd digit  Size: 0.8 cm in diameter  Depth: to bone  Wound bed: bone and subcutaneous tissue  Drainage: sanguinous, no purulence noted  Odor:  none  Periwound: hypkeratotic, ischemic changes and cellulitis noted to focal 2nd digit  Etiology: pressure/neuropathy  RADIOLOGY & ADDITIONAL TESTS:

## 2018-09-17 NOTE — PROGRESS NOTE ADULT - ASSESSMENT
Patient seen at bedside with gangrene left 2 toe progressing. Patient waiting on vascular plan with possible bypass.   ·	Ulceration stable at this time no acute SOI  ·	Pt seems agreeable to vascular plan for bypass tomorrow  ·	DSD betadine applied  ·	Planning for OR later this week following bypass  ·	d/w attending

## 2018-09-17 NOTE — PROGRESS NOTE ADULT - PROBLEM SELECTOR PLAN 3
- C/w Coreg  - restarted on lasix 40 mg bid  - Awaiting repeat BMP to assess restarting ACE-I as patient had KESHAV on 9/14  - TTE results: EF 15-20%, moderate MR, severely dilated LA, severe global LV systolic dysfunction with increased apical and patricia-apical contractility relative to  other segments, Severe diastolic dysfunction (Stage III). Unchanged from 4/2016.

## 2018-09-17 NOTE — PROGRESS NOTE ADULT - PROBLEM SELECTOR PLAN 2
-patient presenting w/ OM, afebrile, no leukocytosis. CT run off study showed Bilateral SFA occlusion. Occluded right femoral bypass graft.   - s/p LLE angio w/ L iliac placement    - f/u vascular recs, possible bypass planning after vein mapping vasu 9/18  - Abx d/c-ed as osteo likely chronic, 2/2 to low perfusion. Bcx NGTD.   - podiatry planning 2nd digit amputation after vascular procedure -patient presenting w/ OM, afebrile, no leukocytosis. CT run off study showed Bilateral SFA occlusion. Occluded right femoral bypass graft.   - s/p LLE angio w/ L iliac placement    - f/u vascular recs, bypass procedure tomorrow   - Abx d/c-ed as osteo likely chronic, 2/2 to low perfusion. Bcx NGTD.   - podiatry planning 2nd digit amputation after vascular procedure

## 2018-09-17 NOTE — PROGRESS NOTE ADULT - PROBLEM SELECTOR PLAN 4
on Lantus 20 U. FS within good range.  -sliding scale. on Lantus 20 U. FS within good range.  Decrease to 10 while NPO.   -sliding scale.

## 2018-09-17 NOTE — PROGRESS NOTE ADULT - PROBLEM SELECTOR PLAN 1
- Patient with KESHAV on 9/14, likely in setting of recent angio/contrast load  - will need repeat BMP  - Hold ACE-I for now, avoid nephrotoxic agents - Patient with KESHAV on 9/14, likely in setting of recent angio/contrast load  - now resolved  - patient restarted on lasix  - continue to monitor BMP  - Hold ACE-I for now, avoid nephrotoxic agents

## 2018-09-18 ENCOUNTER — TRANSCRIPTION ENCOUNTER (OUTPATIENT)
Age: 68
End: 2018-09-18

## 2018-09-18 LAB
ANION GAP SERPL CALC-SCNC: 15 MMOL/L — SIGNIFICANT CHANGE UP (ref 5–17)
APPEARANCE UR: CLEAR — SIGNIFICANT CHANGE UP
APTT BLD: 34.5 SEC — SIGNIFICANT CHANGE UP (ref 27.5–37.4)
BILIRUB UR-MCNC: NEGATIVE — SIGNIFICANT CHANGE UP
BLD GP AB SCN SERPL QL: NEGATIVE — SIGNIFICANT CHANGE UP
BUN SERPL-MCNC: 16 MG/DL — SIGNIFICANT CHANGE UP (ref 7–23)
CALCIUM SERPL-MCNC: 9.1 MG/DL — SIGNIFICANT CHANGE UP (ref 8.4–10.5)
CHLORIDE SERPL-SCNC: 103 MMOL/L — SIGNIFICANT CHANGE UP (ref 96–108)
CO2 SERPL-SCNC: 25 MMOL/L — SIGNIFICANT CHANGE UP (ref 22–31)
COLOR SPEC: SIGNIFICANT CHANGE UP
CREAT SERPL-MCNC: 1.09 MG/DL — SIGNIFICANT CHANGE UP (ref 0.5–1.3)
DIFF PNL FLD: NEGATIVE — SIGNIFICANT CHANGE UP
GAS PNL BLDA: SIGNIFICANT CHANGE UP
GLUCOSE BLDC GLUCOMTR-MCNC: 113 MG/DL — HIGH (ref 70–99)
GLUCOSE BLDC GLUCOMTR-MCNC: 187 MG/DL — HIGH (ref 70–99)
GLUCOSE BLDC GLUCOMTR-MCNC: 203 MG/DL — HIGH (ref 70–99)
GLUCOSE BLDC GLUCOMTR-MCNC: 75 MG/DL — SIGNIFICANT CHANGE UP (ref 70–99)
GLUCOSE BLDC GLUCOMTR-MCNC: 81 MG/DL — SIGNIFICANT CHANGE UP (ref 70–99)
GLUCOSE BLDC GLUCOMTR-MCNC: 85 MG/DL — SIGNIFICANT CHANGE UP (ref 70–99)
GLUCOSE SERPL-MCNC: 80 MG/DL — SIGNIFICANT CHANGE UP (ref 70–99)
GLUCOSE UR QL: NEGATIVE — SIGNIFICANT CHANGE UP
HCT VFR BLD CALC: 40.1 % — SIGNIFICANT CHANGE UP (ref 39–50)
HGB BLD-MCNC: 13.2 G/DL — SIGNIFICANT CHANGE UP (ref 13–17)
INR BLD: 1.42 RATIO — HIGH (ref 0.88–1.16)
KETONES UR-MCNC: NEGATIVE — SIGNIFICANT CHANGE UP
LEUKOCYTE ESTERASE UR-ACNC: NEGATIVE — SIGNIFICANT CHANGE UP
MAGNESIUM SERPL-MCNC: 2.2 MG/DL — SIGNIFICANT CHANGE UP (ref 1.6–2.6)
MCHC RBC-ENTMCNC: 28.7 PG — SIGNIFICANT CHANGE UP (ref 27–34)
MCHC RBC-ENTMCNC: 33 GM/DL — SIGNIFICANT CHANGE UP (ref 32–36)
MCV RBC AUTO: 87 FL — SIGNIFICANT CHANGE UP (ref 80–100)
NITRITE UR-MCNC: NEGATIVE — SIGNIFICANT CHANGE UP
PH UR: 6.5 — SIGNIFICANT CHANGE UP (ref 5–8)
PHOSPHATE SERPL-MCNC: 3.5 MG/DL — SIGNIFICANT CHANGE UP (ref 2.5–4.5)
PLATELET # BLD AUTO: 181 K/UL — SIGNIFICANT CHANGE UP (ref 150–400)
POTASSIUM SERPL-MCNC: 4 MMOL/L — SIGNIFICANT CHANGE UP (ref 3.5–5.3)
POTASSIUM SERPL-SCNC: 4 MMOL/L — SIGNIFICANT CHANGE UP (ref 3.5–5.3)
PROT UR-MCNC: NEGATIVE — SIGNIFICANT CHANGE UP
PROTHROM AB SERPL-ACNC: 15.4 SEC — HIGH (ref 9.8–12.7)
RBC # BLD: 4.62 M/UL — SIGNIFICANT CHANGE UP (ref 4.2–5.8)
RBC # FLD: 16.8 % — HIGH (ref 10.3–14.5)
RH IG SCN BLD-IMP: NEGATIVE — SIGNIFICANT CHANGE UP
SODIUM SERPL-SCNC: 143 MMOL/L — SIGNIFICANT CHANGE UP (ref 135–145)
SP GR SPEC: 1.01 — SIGNIFICANT CHANGE UP
UROBILINOGEN FLD QL: NEGATIVE — SIGNIFICANT CHANGE UP
WBC # BLD: 7.6 K/UL — SIGNIFICANT CHANGE UP (ref 3.8–10.5)
WBC # FLD AUTO: 7.6 K/UL — SIGNIFICANT CHANGE UP (ref 3.8–10.5)

## 2018-09-18 PROCEDURE — 35656 BPG FEMORAL-POPLITEAL: CPT

## 2018-09-18 PROCEDURE — 99233 SBSQ HOSP IP/OBS HIGH 50: CPT | Mod: GC

## 2018-09-18 PROCEDURE — 75710 ARTERY X-RAYS ARM/LEG: CPT | Mod: 26

## 2018-09-18 RX ORDER — ONDANSETRON 8 MG/1
4 TABLET, FILM COATED ORAL ONCE
Qty: 0 | Refills: 0 | Status: DISCONTINUED | OUTPATIENT
Start: 2018-09-19 | End: 2018-09-20

## 2018-09-18 RX ORDER — DEXTROSE 50 % IN WATER 50 %
50 SYRINGE (ML) INTRAVENOUS ONCE
Qty: 0 | Refills: 0 | Status: COMPLETED | OUTPATIENT
Start: 2018-09-18 | End: 2018-09-18

## 2018-09-18 RX ORDER — NALOXONE HYDROCHLORIDE 4 MG/.1ML
0.1 SPRAY NASAL
Qty: 0 | Refills: 0 | Status: DISCONTINUED | OUTPATIENT
Start: 2018-09-19 | End: 2018-09-24

## 2018-09-18 RX ORDER — HYDROMORPHONE HYDROCHLORIDE 2 MG/ML
0.5 INJECTION INTRAMUSCULAR; INTRAVENOUS; SUBCUTANEOUS
Qty: 0 | Refills: 0 | Status: DISCONTINUED | OUTPATIENT
Start: 2018-09-19 | End: 2018-09-20

## 2018-09-18 RX ORDER — SODIUM CHLORIDE 9 MG/ML
1000 INJECTION, SOLUTION INTRAVENOUS
Qty: 0 | Refills: 0 | Status: DISCONTINUED | OUTPATIENT
Start: 2018-09-18 | End: 2018-09-19

## 2018-09-18 RX ORDER — ONDANSETRON 8 MG/1
4 TABLET, FILM COATED ORAL EVERY 6 HOURS
Qty: 0 | Refills: 0 | Status: DISCONTINUED | OUTPATIENT
Start: 2018-09-19 | End: 2018-09-24

## 2018-09-18 RX ORDER — HYDROMORPHONE HYDROCHLORIDE 2 MG/ML
0.25 INJECTION INTRAMUSCULAR; INTRAVENOUS; SUBCUTANEOUS
Qty: 0 | Refills: 0 | Status: DISCONTINUED | OUTPATIENT
Start: 2018-09-19 | End: 2018-09-20

## 2018-09-18 RX ORDER — HYDROMORPHONE HYDROCHLORIDE 2 MG/ML
30 INJECTION INTRAMUSCULAR; INTRAVENOUS; SUBCUTANEOUS
Qty: 0 | Refills: 0 | Status: DISCONTINUED | OUTPATIENT
Start: 2018-09-19 | End: 2018-09-20

## 2018-09-18 RX ADMIN — Medication 50 MILLILITER(S): at 17:02

## 2018-09-18 RX ADMIN — CARVEDILOL PHOSPHATE 25 MILLIGRAM(S): 80 CAPSULE, EXTENDED RELEASE ORAL at 17:00

## 2018-09-18 RX ADMIN — Medication 40 MILLIGRAM(S): at 05:18

## 2018-09-18 RX ADMIN — Medication 50 MILLILITER(S): at 11:31

## 2018-09-18 RX ADMIN — SODIUM CHLORIDE 25 MILLILITER(S): 9 INJECTION INTRAMUSCULAR; INTRAVENOUS; SUBCUTANEOUS at 01:03

## 2018-09-18 RX ADMIN — Medication 40 MILLIGRAM(S): at 17:00

## 2018-09-18 RX ADMIN — SODIUM CHLORIDE 50 MILLILITER(S): 9 INJECTION, SOLUTION INTRAVENOUS at 17:02

## 2018-09-18 RX ADMIN — CARVEDILOL PHOSPHATE 25 MILLIGRAM(S): 80 CAPSULE, EXTENDED RELEASE ORAL at 05:18

## 2018-09-18 NOTE — PROGRESS NOTE ADULT - PROBLEM SELECTOR PLAN 1
- Patient with KESHAV on 9/14, likely in setting of recent angio/contrast load  - now resolved  - patient restarted on lasix 40 BID  - continue to monitor BMP  - Hold ACE-I for now, avoid nephrotoxic agents

## 2018-09-18 NOTE — PROGRESS NOTE ADULT - SUBJECTIVE AND OBJECTIVE BOX
Silvio Little, PGY1  Pager: [phone]  After 7: Night Float pager  Alternate contact:     Patient is a 67y old  Male who presents with a chief complaint of toe infection (18 Sep 2018 08:31)      SUBJECTIVE / OVERNIGHT EVENTS: No acute events overnight. Patient only got 5 U of Lantus as opposed to usual 20U since NPO for procedure today. Patient doing well. Denies shortness of breath. No chest pain, nausea, or vomiting. Regular bowel movements.     MEDICATIONS  (STANDING):  aspirin enteric coated 81 milliGRAM(s) Oral daily  atorvastatin 40 milliGRAM(s) Oral at bedtime  carvedilol 25 milliGRAM(s) Oral every 12 hours  dextrose 50% Injectable 12.5 Gram(s) IV Push once  dextrose 50% Injectable 25 Gram(s) IV Push once  dextrose 50% Injectable 25 Gram(s) IV Push once  furosemide    Tablet 40 milliGRAM(s) Oral two times a day  heparin  Injectable 5000 Unit(s) SubCutaneous every 8 hours  influenza   Vaccine 0.5 milliLiter(s) IntraMuscular once  insulin glargine Injectable (LANTUS) 10 Unit(s) SubCutaneous at bedtime  insulin lispro (HumaLOG) corrective regimen sliding scale   SubCutaneous three times a day before meals  insulin lispro (HumaLOG) corrective regimen sliding scale   SubCutaneous at bedtime  polyethylene glycol 3350 17 Gram(s) Oral daily  sodium chloride 0.9%. 1000 milliLiter(s) (25 mL/Hr) IV Continuous <Continuous>    MEDICATIONS  (PRN):  glucagon  Injectable 1 milliGRAM(s) IntraMuscular once PRN Glucose LESS THAN 70 milligrams/deciliter      Vital Signs Last 24 Hrs  T(C): 36.5 (18 Sep 2018 04:38), Max: 37.2 (17 Sep 2018 20:12)  T(F): 97.7 (18 Sep 2018 04:38), Max: 99 (17 Sep 2018 20:12)  HR: 68 (18 Sep 2018 04:38) (68 - 78)  BP: 120/73 (18 Sep 2018 04:38) (120/73 - 135/84)  BP(mean): --  RR: 18 (18 Sep 2018 04:38) (18 - 20)  SpO2: 96% (18 Sep 2018 04:38) (95% - 98%)  CAPILLARY BLOOD GLUCOSE      POCT Blood Glucose.: 85 mg/dL (18 Sep 2018 05:48)  POCT Blood Glucose.: 182 mg/dL (17 Sep 2018 21:29)  POCT Blood Glucose.: 203 mg/dL (17 Sep 2018 17:35)  POCT Blood Glucose.: 111 mg/dL (17 Sep 2018 12:56)    I&O's Summary    17 Sep 2018 07:01  -  18 Sep 2018 07:00  --------------------------------------------------------  IN: 1120 mL / OUT: 1400 mL / NET: -280 mL    18 Sep 2018 07:01  -  18 Sep 2018 09:34  --------------------------------------------------------  IN: 0 mL / OUT: 375 mL / NET: -375 mL        PHYSICAL EXAM:  GENERAL: NAD, well-developed  EYES: conjunctiva and sclera clear  CHEST/LUNG: CTABL ; No wheeze  HEART: RRR; No murmurs  ABDOMEN: Soft, Nontender, Nondistended; Bowel sounds present  EXTREMITIES:  2+ pitting edema, bilaterally up to knees/ L 2nd toe gangrenous   PSYCH: AAOx3  NEUROLOGY: non-focal  SKIN: No rashes or lesions. No sacral ulcer    LABS:                        13.2   7.6   )-----------( 181      ( 18 Sep 2018 04:59 )             40.1     -18    143  |  103  |  16  ----------------------------<  80  4.0   |  25  |  1.09    Ca    9.1      18 Sep 2018 04:59  Phos  3.5       Mg     2.2     18      PT/INR - ( 18 Sep 2018 04:59 )   PT: 15.4 sec;   INR: 1.42 ratio         PTT - ( 18 Sep 2018 04:59 )  PTT:34.5 sec      Urinalysis Basic - ( 18 Sep 2018 07:24 )    Color: Light Yellow / Appearance: Clear / S.008 / pH: x  Gluc: x / Ketone: Negative  / Bili: Negative / Urobili: Negative   Blood: x / Protein: Negative / Nitrite: Negative   Leuk Esterase: Negative / RBC: x / WBC x   Sq Epi: x / Non Sq Epi: x / Bacteria: x

## 2018-09-18 NOTE — PROGRESS NOTE ADULT - ASSESSMENT
Patient seen at bedside with gangrene left 2 toe progressing. Patient waiting on vascular plan with possible bypass.   ·	Dressing left clean dry intact  ·	Bypass today with vascular team  ·	OR planning following bypass with podiatry for amputation toe  ·	d/w attending

## 2018-09-18 NOTE — PROGRESS NOTE ADULT - ATTENDING COMMENTS
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  plan angio and revascularization
Agree.  Raeann seen and examined, clinically well today.   On vanc/zosyn, redose vanc w/ lower trough.   WE will need to coordinate w/ podiatry and vascular re: OR time to optimize from insulin standpoint. Holding anti-platelets for now prior to OR.   Constipated, miralax.
Awaiting vascular to see patient to decide on angio. Discussed with vascular fellow, to see patient today.
Vein mapping done.   Vascular considering OR on Tuesday. Will f/u.
Pending vascular input for possible bypass.   Podiatry planning surgical intervention after vascular procedures are complete.
Agree.  Continue antibiotics for now. Vanc increased w/ lower trough.   For angiogram, then OR w/ podiatry (will confirm in this order).   Cardiac consulted for cardiac optimization prior to OR.  Can cont beta blocker, on DAPT at home- currently holding in anticipation of OR, await cardiology input.  Please tighten glu control, fasting is fine though pre-meal can be improved w/ standing lispro. Please halve lantus evening prior to OR.
Agree.  Unusual that sugars low-normal in 70s-80s today after lantus 5 last evening (baseline dose is 20). NPO today while awaiting procedure, though still documented that he received less than half his usual dose. Never symptomatic, remained HD stable. We repeated several FS and gave an amp (x2) to get > 100, kept dropping. Tried to avoid D5 drip given fluid overload on lasix bid now, though will initiate this afternoon prior to OR to ensure he doesn't keep dropping. No lantus tonight. Glu drip d/w team, RN and team d/w vascular: will be maintained on this drip for now, q2 hr finger stick initially. Otherwise INR 1.4, about INR of FFP, d/w vascular who is okay with this INR for surgery. Hold PM lasix after surg, can resume tomorrow assuming continued Hd stability. ASA/heparin ok per vascular, cont beta blocker. Patient on schedule for tonight. D/w family and patient at length.
Agree.  KESHAV resolved, beginning lasix today w/ JVD and 3+ pitting edema. Still satting well on RA.   Keep coreg and ASA, holding 2nd antiplatelet prior to surg.   Lantus halved while NPO.   Surgery set for tmrw, AM labs in.   Discussed w/ patient at length today. He understands the procedure he's going for including relevant risks, benefits and alternatives. Patient explicitly confirmed this with me. Vascular also discussed w/ family, consent is signed and in the chart.

## 2018-09-18 NOTE — PROGRESS NOTE ADULT - PROBLEM SELECTOR PLAN 3
- C/w Coreg  - restarted on lasix 40 mg bid, patient lost about 7 pounds since   - Awaiting repeat BMP to assess restarting ACE-I as patient had KESHAV on 9/14  - TTE results: EF 15-20%, moderate MR, severely dilated LA, severe global LV systolic dysfunction with increased apical and patricia-apical contractility relative to  other segments, Severe diastolic dysfunction (Stage III). Unchanged from 4/2016. - C/w Coreg  - restarted on lasix 40 mg bid, put off some weight since  - Awaiting repeat BMP to assess restarting ACE-I as patient had KESHAV on 9/14  - TTE results: EF 15-20%, moderate MR, severely dilated LA, severe global LV systolic dysfunction with increased apical and patricia-apical contractility relative to  other segments, Severe diastolic dysfunction (Stage III). Unchanged from 4/2016.

## 2018-09-18 NOTE — PRE-OP CHECKLIST - SELECT TESTS ORDERED
PT/PTT/Type and Screen/BMP/CBC/INR/Urinalysis INR/Urinalysis/POCT Blood Glucose/PT/PTT/Type and Screen/CBC/BMP/187

## 2018-09-18 NOTE — PROGRESS NOTE ADULT - PROBLEM SELECTOR PLAN 2
-patient presenting w/ OM, afebrile, no leukocytosis. CT run off study showed Bilateral SFA occlusion. Occluded right femoral bypass graft.   - Patient scheduled for bypass procedure today with vascular surgery   - s/p LLE angio w/ L iliac placement    - Abx d/c-ed as osteo likely chronic, 2/2 to low perfusion. Bcx NGTD.   - podiatry planning 2nd digit amputation after vascular procedure

## 2018-09-18 NOTE — PROGRESS NOTE ADULT - PROBLEM SELECTOR PLAN 4
on Lantus 20 U. FS within good range.  Decrease to 10 while NPO. Only got 5 U last night for procedure today  -Will resume Lantus 20 U qhs today    -sliding scale. on Lantus 20 U at home. FS within good range.  Decrease to 10 while NPO. Only got 5 U last night for procedure today  -Will hold Lantus and all insulin has Fingersticks have been on the lower end, FS 75  -Patient got 1 amp of D50 which brought up FS to 113  -Started on D5 IVF since patient hypoglycemic and NPO for procedure  -NPO after 9 am on 9/19 for L toe amputation on Lantus 20 U at home. FS within good range.  Decrease to 10 while NPO. Only got 5 U last night for procedure today  -Will hold Lantus and all insulin has Fingersticks have been on the lower end, FS 75  -Patient got 1 amp of D50 which brought up FS to 113  -Started on D5 IVF since patient hypoglycemic and NPO for procedure  -NPO after 9 am on 9/19 for L toe amputation at 5pm, podiatry

## 2018-09-18 NOTE — PROGRESS NOTE ADULT - SUBJECTIVE AND OBJECTIVE BOX
Patient is a 67y old  Male who presents with a chief complaint of toe infection (17 Sep 2018 09:01)       INTERVAL HPI/OVERNIGHT EVENTS:  Patient seen and evaluated at bedside.  Pt is resting comfortable in NAD. Denies N/V/F/C.    Allergies    No Known Allergies    Intolerances        Vital Signs Last 24 Hrs  T(C): 36.5 (18 Sep 2018 04:38), Max: 37.2 (17 Sep 2018 20:12)  T(F): 97.7 (18 Sep 2018 04:38), Max: 99 (17 Sep 2018 20:12)  HR: 68 (18 Sep 2018 04:38) (68 - 78)  BP: 120/73 (18 Sep 2018 04:38) (120/73 - 135/84)  BP(mean): --  RR: 18 (18 Sep 2018 04:38) (18 - 20)  SpO2: 96% (18 Sep 2018 04:38) (95% - 98%)    LABS:                        13.2   7.6   )-----------( 181      ( 18 Sep 2018 04:59 )             40.1     09-18    143  |  103  |  16  ----------------------------<  80  4.0   |  25  |  1.09    Ca    9.1      18 Sep 2018 04:59  Phos  3.5     09-18  Mg     2.2     09-18      PT/INR - ( 18 Sep 2018 04:59 )   PT: 15.4 sec;   INR: 1.42 ratio         PTT - ( 18 Sep 2018 04:59 )  PTT:34.5 sec    CAPILLARY BLOOD GLUCOSE      POCT Blood Glucose.: 85 mg/dL (18 Sep 2018 05:48)  POCT Blood Glucose.: 182 mg/dL (17 Sep 2018 21:29)  POCT Blood Glucose.: 203 mg/dL (17 Sep 2018 17:35)  POCT Blood Glucose.: 111 mg/dL (17 Sep 2018 12:56)  POCT Blood Glucose.: 143 mg/dL (17 Sep 2018 09:04)      Lower Extremity Physical Exam:  Dressing left clean dry intact    RADIOLOGY & ADDITIONAL TESTS:

## 2018-09-18 NOTE — PROGRESS NOTE ADULT - ASSESSMENT
68 yo M w/ hx of DM2 c/b prior right three toe amputation for infection, CAD w/ stent (last placed in 2016 per patient) & CABG, HTN, severe LV dysfunction (based on TTE 2016), PAD s/p RLE bypass grafting presenting with left 2nd digit toe infection 2/2 to osteomyelitis. Pt s/p LLE angio w/ L iliac stent placement, awaiting Vascular LLE today.

## 2018-09-19 LAB
ANION GAP SERPL CALC-SCNC: 15 MMOL/L — SIGNIFICANT CHANGE UP (ref 5–17)
ANION GAP SERPL CALC-SCNC: 17 MMOL/L — SIGNIFICANT CHANGE UP (ref 5–17)
APTT BLD: 143.8 SEC — CRITICAL HIGH (ref 27.5–37.4)
APTT BLD: > 200 SEC (ref 27.5–37.4)
APTT BLD: > 200 SEC (ref 27.5–37.4)
BASOPHILS # BLD AUTO: 0 K/UL — SIGNIFICANT CHANGE UP (ref 0–0.2)
BASOPHILS NFR BLD AUTO: 0.1 % — SIGNIFICANT CHANGE UP (ref 0–2)
BUN SERPL-MCNC: 15 MG/DL — SIGNIFICANT CHANGE UP (ref 7–23)
BUN SERPL-MCNC: 16 MG/DL — SIGNIFICANT CHANGE UP (ref 7–23)
CALCIUM SERPL-MCNC: 7.9 MG/DL — LOW (ref 8.4–10.5)
CALCIUM SERPL-MCNC: 8.6 MG/DL — SIGNIFICANT CHANGE UP (ref 8.4–10.5)
CHLORIDE SERPL-SCNC: 100 MMOL/L — SIGNIFICANT CHANGE UP (ref 96–108)
CHLORIDE SERPL-SCNC: 102 MMOL/L — SIGNIFICANT CHANGE UP (ref 96–108)
CO2 SERPL-SCNC: 20 MMOL/L — LOW (ref 22–31)
CO2 SERPL-SCNC: 21 MMOL/L — LOW (ref 22–31)
CREAT SERPL-MCNC: 0.9 MG/DL — SIGNIFICANT CHANGE UP (ref 0.5–1.3)
CREAT SERPL-MCNC: 1.16 MG/DL — SIGNIFICANT CHANGE UP (ref 0.5–1.3)
EOSINOPHIL # BLD AUTO: 0 K/UL — SIGNIFICANT CHANGE UP (ref 0–0.5)
EOSINOPHIL NFR BLD AUTO: 0.1 % — SIGNIFICANT CHANGE UP (ref 0–6)
GLUCOSE BLDC GLUCOMTR-MCNC: 142 MG/DL — HIGH (ref 70–99)
GLUCOSE BLDC GLUCOMTR-MCNC: 159 MG/DL — HIGH (ref 70–99)
GLUCOSE BLDC GLUCOMTR-MCNC: 162 MG/DL — HIGH (ref 70–99)
GLUCOSE SERPL-MCNC: 148 MG/DL — HIGH (ref 70–99)
GLUCOSE SERPL-MCNC: 164 MG/DL — HIGH (ref 70–99)
HCT VFR BLD CALC: 36.9 % — LOW (ref 39–50)
HCT VFR BLD CALC: 37.7 % — LOW (ref 39–50)
HCT VFR BLD CALC: 38.4 % — LOW (ref 39–50)
HGB BLD-MCNC: 12.4 G/DL — LOW (ref 13–17)
HGB BLD-MCNC: 12.7 G/DL — LOW (ref 13–17)
HGB BLD-MCNC: 12.8 G/DL — LOW (ref 13–17)
INR BLD: 1.72 RATIO — HIGH (ref 0.88–1.16)
INR BLD: 1.76 RATIO — HIGH (ref 0.88–1.16)
INR BLD: 1.83 RATIO — HIGH (ref 0.88–1.16)
LYMPHOCYTES # BLD AUTO: 0.9 K/UL — LOW (ref 1–3.3)
LYMPHOCYTES # BLD AUTO: 5.9 % — LOW (ref 13–44)
MAGNESIUM SERPL-MCNC: 1.9 MG/DL — SIGNIFICANT CHANGE UP (ref 1.6–2.6)
MCHC RBC-ENTMCNC: 28.7 PG — SIGNIFICANT CHANGE UP (ref 27–34)
MCHC RBC-ENTMCNC: 29.2 PG — SIGNIFICANT CHANGE UP (ref 27–34)
MCHC RBC-ENTMCNC: 29.4 PG — SIGNIFICANT CHANGE UP (ref 27–34)
MCHC RBC-ENTMCNC: 33 GM/DL — SIGNIFICANT CHANGE UP (ref 32–36)
MCHC RBC-ENTMCNC: 33.5 GM/DL — SIGNIFICANT CHANGE UP (ref 32–36)
MCHC RBC-ENTMCNC: 33.9 GM/DL — SIGNIFICANT CHANGE UP (ref 32–36)
MCV RBC AUTO: 86.7 FL — SIGNIFICANT CHANGE UP (ref 80–100)
MCV RBC AUTO: 87 FL — SIGNIFICANT CHANGE UP (ref 80–100)
MCV RBC AUTO: 87 FL — SIGNIFICANT CHANGE UP (ref 80–100)
MONOCYTES # BLD AUTO: 1.2 K/UL — HIGH (ref 0–0.9)
MONOCYTES NFR BLD AUTO: 7.6 % — SIGNIFICANT CHANGE UP (ref 2–14)
NEUTROPHILS # BLD AUTO: 13.3 K/UL — HIGH (ref 1.8–7.4)
NEUTROPHILS NFR BLD AUTO: 86.3 % — HIGH (ref 43–77)
PHOSPHATE SERPL-MCNC: 4.2 MG/DL — SIGNIFICANT CHANGE UP (ref 2.5–4.5)
PLATELET # BLD AUTO: 175 K/UL — SIGNIFICANT CHANGE UP (ref 150–400)
PLATELET # BLD AUTO: 198 K/UL — SIGNIFICANT CHANGE UP (ref 150–400)
PLATELET # BLD AUTO: 209 K/UL — SIGNIFICANT CHANGE UP (ref 150–400)
POTASSIUM SERPL-MCNC: 3.8 MMOL/L — SIGNIFICANT CHANGE UP (ref 3.5–5.3)
POTASSIUM SERPL-MCNC: 4.1 MMOL/L — SIGNIFICANT CHANGE UP (ref 3.5–5.3)
POTASSIUM SERPL-SCNC: 3.8 MMOL/L — SIGNIFICANT CHANGE UP (ref 3.5–5.3)
POTASSIUM SERPL-SCNC: 4.1 MMOL/L — SIGNIFICANT CHANGE UP (ref 3.5–5.3)
PROTHROM AB SERPL-ACNC: 19 SEC — HIGH (ref 9.8–12.7)
PROTHROM AB SERPL-ACNC: 19.2 SEC — HIGH (ref 9.8–12.7)
PROTHROM AB SERPL-ACNC: 20 SEC — HIGH (ref 9.8–12.7)
RBC # BLD: 4.24 M/UL — SIGNIFICANT CHANGE UP (ref 4.2–5.8)
RBC # BLD: 4.34 M/UL — SIGNIFICANT CHANGE UP (ref 4.2–5.8)
RBC # BLD: 4.42 M/UL — SIGNIFICANT CHANGE UP (ref 4.2–5.8)
RBC # FLD: 16.5 % — HIGH (ref 10.3–14.5)
RBC # FLD: 16.5 % — HIGH (ref 10.3–14.5)
RBC # FLD: 16.8 % — HIGH (ref 10.3–14.5)
SODIUM SERPL-SCNC: 136 MMOL/L — SIGNIFICANT CHANGE UP (ref 135–145)
SODIUM SERPL-SCNC: 139 MMOL/L — SIGNIFICANT CHANGE UP (ref 135–145)
WBC # BLD: 11 K/UL — HIGH (ref 3.8–10.5)
WBC # BLD: 15.4 K/UL — HIGH (ref 3.8–10.5)
WBC # BLD: 16.9 K/UL — HIGH (ref 3.8–10.5)
WBC # FLD AUTO: 11 K/UL — HIGH (ref 3.8–10.5)
WBC # FLD AUTO: 15.4 K/UL — HIGH (ref 3.8–10.5)
WBC # FLD AUTO: 16.9 K/UL — HIGH (ref 3.8–10.5)

## 2018-09-19 PROCEDURE — 73630 X-RAY EXAM OF FOOT: CPT | Mod: 26,LT

## 2018-09-19 PROCEDURE — 99233 SBSQ HOSP IP/OBS HIGH 50: CPT

## 2018-09-19 RX ORDER — OXYCODONE AND ACETAMINOPHEN 5; 325 MG/1; MG/1
1 TABLET ORAL EVERY 6 HOURS
Qty: 0 | Refills: 0 | Status: DISCONTINUED | OUTPATIENT
Start: 2018-09-19 | End: 2018-09-24

## 2018-09-19 RX ORDER — INSULIN LISPRO 100/ML
VIAL (ML) SUBCUTANEOUS
Qty: 0 | Refills: 0 | Status: DISCONTINUED | OUTPATIENT
Start: 2018-09-19 | End: 2018-09-24

## 2018-09-19 RX ORDER — DEXTROSE 50 % IN WATER 50 %
12.5 SYRINGE (ML) INTRAVENOUS ONCE
Qty: 0 | Refills: 0 | Status: DISCONTINUED | OUTPATIENT
Start: 2018-09-19 | End: 2018-09-24

## 2018-09-19 RX ORDER — HEPARIN SODIUM 5000 [USP'U]/ML
1800 INJECTION INTRAVENOUS; SUBCUTANEOUS
Qty: 25000 | Refills: 0 | Status: DISCONTINUED | OUTPATIENT
Start: 2018-09-19 | End: 2018-09-19

## 2018-09-19 RX ORDER — CEFAZOLIN SODIUM 1 G
2000 VIAL (EA) INJECTION EVERY 8 HOURS
Qty: 0 | Refills: 0 | Status: COMPLETED | OUTPATIENT
Start: 2018-09-19 | End: 2018-09-19

## 2018-09-19 RX ORDER — FUROSEMIDE 40 MG
40 TABLET ORAL
Qty: 0 | Refills: 0 | Status: DISCONTINUED | OUTPATIENT
Start: 2018-09-19 | End: 2018-09-24

## 2018-09-19 RX ORDER — SODIUM CHLORIDE 9 MG/ML
1000 INJECTION, SOLUTION INTRAVENOUS
Qty: 0 | Refills: 0 | Status: DISCONTINUED | OUTPATIENT
Start: 2018-09-19 | End: 2018-09-19

## 2018-09-19 RX ORDER — INSULIN LISPRO 100/ML
VIAL (ML) SUBCUTANEOUS AT BEDTIME
Qty: 0 | Refills: 0 | Status: DISCONTINUED | OUTPATIENT
Start: 2018-09-19 | End: 2018-09-24

## 2018-09-19 RX ORDER — ACETAMINOPHEN 500 MG
650 TABLET ORAL EVERY 6 HOURS
Qty: 0 | Refills: 0 | Status: DISCONTINUED | OUTPATIENT
Start: 2018-09-19 | End: 2018-09-24

## 2018-09-19 RX ORDER — INSULIN GLARGINE 100 [IU]/ML
20 INJECTION, SOLUTION SUBCUTANEOUS AT BEDTIME
Qty: 0 | Refills: 0 | Status: DISCONTINUED | OUTPATIENT
Start: 2018-09-19 | End: 2018-09-20

## 2018-09-19 RX ORDER — SODIUM CHLORIDE 9 MG/ML
3 INJECTION INTRAMUSCULAR; INTRAVENOUS; SUBCUTANEOUS EVERY 8 HOURS
Qty: 0 | Refills: 0 | Status: DISCONTINUED | OUTPATIENT
Start: 2018-09-19 | End: 2018-09-24

## 2018-09-19 RX ORDER — HEPARIN SODIUM 5000 [USP'U]/ML
1600 INJECTION INTRAVENOUS; SUBCUTANEOUS
Qty: 25000 | Refills: 0 | Status: DISCONTINUED | OUTPATIENT
Start: 2018-09-19 | End: 2018-09-20

## 2018-09-19 RX ADMIN — HYDROMORPHONE HYDROCHLORIDE 30 MILLILITER(S): 2 INJECTION INTRAMUSCULAR; INTRAVENOUS; SUBCUTANEOUS at 01:00

## 2018-09-19 RX ADMIN — HYDROMORPHONE HYDROCHLORIDE 30 MILLILITER(S): 2 INJECTION INTRAMUSCULAR; INTRAVENOUS; SUBCUTANEOUS at 08:24

## 2018-09-19 RX ADMIN — HYDROMORPHONE HYDROCHLORIDE 30 MILLILITER(S): 2 INJECTION INTRAMUSCULAR; INTRAVENOUS; SUBCUTANEOUS at 23:12

## 2018-09-19 RX ADMIN — ATORVASTATIN CALCIUM 40 MILLIGRAM(S): 80 TABLET, FILM COATED ORAL at 23:21

## 2018-09-19 RX ADMIN — HYDROMORPHONE HYDROCHLORIDE 0.25 MILLIGRAM(S): 2 INJECTION INTRAMUSCULAR; INTRAVENOUS; SUBCUTANEOUS at 00:30

## 2018-09-19 RX ADMIN — Medication 100 MILLIGRAM(S): at 15:03

## 2018-09-19 RX ADMIN — Medication 1: at 17:06

## 2018-09-19 RX ADMIN — Medication 100 MILLIGRAM(S): at 05:10

## 2018-09-19 RX ADMIN — HEPARIN SODIUM 16 UNIT(S)/HR: 5000 INJECTION INTRAVENOUS; SUBCUTANEOUS at 16:46

## 2018-09-19 RX ADMIN — SODIUM CHLORIDE 3 MILLILITER(S): 9 INJECTION INTRAMUSCULAR; INTRAVENOUS; SUBCUTANEOUS at 22:34

## 2018-09-19 RX ADMIN — SODIUM CHLORIDE 3 MILLILITER(S): 9 INJECTION INTRAMUSCULAR; INTRAVENOUS; SUBCUTANEOUS at 14:00

## 2018-09-19 RX ADMIN — INSULIN GLARGINE 20 UNIT(S): 100 INJECTION, SOLUTION SUBCUTANEOUS at 23:47

## 2018-09-19 RX ADMIN — HYDROMORPHONE HYDROCHLORIDE 30 MILLILITER(S): 2 INJECTION INTRAMUSCULAR; INTRAVENOUS; SUBCUTANEOUS at 20:29

## 2018-09-19 RX ADMIN — HEPARIN SODIUM 18 UNIT(S)/HR: 5000 INJECTION INTRAVENOUS; SUBCUTANEOUS at 03:00

## 2018-09-19 RX ADMIN — HEPARIN SODIUM 14 UNIT(S)/HR: 5000 INJECTION INTRAVENOUS; SUBCUTANEOUS at 23:14

## 2018-09-19 RX ADMIN — HYDROMORPHONE HYDROCHLORIDE 0.25 MILLIGRAM(S): 2 INJECTION INTRAMUSCULAR; INTRAVENOUS; SUBCUTANEOUS at 00:45

## 2018-09-19 RX ADMIN — HEPARIN SODIUM 16 UNIT(S)/HR: 5000 INJECTION INTRAVENOUS; SUBCUTANEOUS at 11:57

## 2018-09-19 RX ADMIN — SODIUM CHLORIDE 3 MILLILITER(S): 9 INJECTION INTRAMUSCULAR; INTRAVENOUS; SUBCUTANEOUS at 05:07

## 2018-09-19 NOTE — BRIEF OPERATIVE NOTE - PROCEDURE
<<-----Click on this checkbox to enter Procedure Femoral popliteal bypass, left  09/19/2018    Active  [id number]

## 2018-09-19 NOTE — PROGRESS NOTE ADULT - ASSESSMENT
A/P: 68M POD0 s/p Left femoral to popliteal artery bypass with PTFE graft.    - Podiatry OR  today for toe amp at 5pm  - Hep gtt at rate 18 ml/hr regardless of PTT  - CLD, but NPO @9am per Podiatry  - pain control with PCA  - keep -130s, avoid pressors  - d/c degroot  - bedrest, keep LLE straight  - monitor pedal pulses (L DP, R AT)  - PT consult

## 2018-09-19 NOTE — PROGRESS NOTE ADULT - PROBLEM SELECTOR PLAN 6
- Improve score 0, no VTE ppx; change to hep sq after surgery, 1st dose tomorrow am -pt currently on hep gtt

## 2018-09-19 NOTE — BRIEF OPERATIVE NOTE - PRE-OP DX
Gangrene  09/19/2018    Active  Nixon aBss  PAD (peripheral artery disease)  09/19/2018    Active  Edouard Lo

## 2018-09-19 NOTE — PROGRESS NOTE ADULT - SUBJECTIVE AND OBJECTIVE BOX
VASCULAR SURGERY PROGRESS NOTE        SUBJECTIVE: Pt seen and examined at bedside in pacu. s/p L fem-pop bypass Pt resting comfortably, Denies LE pain, chest pain, sob, palpitation, HA, fever, chills, N/v.  Using PCA for pain control.        OBJECTIVE:     ** VITAL SIGNS / I&O's **    Vital Signs Last 24 Hrs  T(C): 36.8 (19 Sep 2018 07:00), Max: 36.8 (18 Sep 2018 12:43)  T(F): 98.2 (19 Sep 2018 07:00), Max: 98.2 (18 Sep 2018 12:43)  HR: 66 (19 Sep 2018 08:00) (59 - 95)  BP: 137/76 (19 Sep 2018 08:00) (119/65 - 157/98)  BP(mean): 101 (19 Sep 2018 08:00) (87 - 104)  RR: 15 (19 Sep 2018 08:00) (14 - 20)  SpO2: 100% (19 Sep 2018 08:00) (93% - 100%)      18 Sep 2018 07:01  -  19 Sep 2018 07:00  --------------------------------------------------------  IN:    dextrose 5%.: 25 mL    heparin Infusion: 72 mL    IV PiggyBack: 100 mL    lactated ringers.: 525 mL  Total IN: 722 mL    OUT:    Indwelling Catheter - Urethral: 770 mL    Voided: 900 mL  Total OUT: 1670 mL    Total NET: -948 mL      19 Sep 2018 07:01  -  19 Sep 2018 08:54  --------------------------------------------------------  IN:    heparin Infusion: 18 mL    lactated ringers.: 75 mL  Total IN: 93 mL    OUT:  Total OUT: 0 mL    Total NET: 93 mL          Physical Exam:  General Appearance: Appears well, NAD,   HEENT: NC/AT  RESP: nonlabored  L groin: soft, no active bleeding or hematoma present, NTTP, dressing c/d/i   b/l LE: soft, warm, sensation/motor intact, L DP signal, R AT signal      ** LABS **                          12.4   16.9  )-----------( 175      ( 19 Sep 2018 05:27 )             36.9     19 Sep 2018 05:27    139    |  102    |  15     ----------------------------<  164    4.1     |  20     |  0.90     Ca    7.9        19 Sep 2018 05:27  Phos  4.2       19 Sep 2018 05:27  Mg     1.9       19 Sep 2018 05:27      PT/INR - ( 19 Sep 2018 00:54 )   PT: 19.0 sec;   INR: 1.72 ratio         PTT - ( 19 Sep 2018 00:54 )  PTT:143.8 sec  CAPILLARY BLOOD GLUCOSE      POCT Blood Glucose.: 203 mg/dL (18 Sep 2018 17:42)  POCT Blood Glucose.: 187 mg/dL (18 Sep 2018 17:26)  POCT Blood Glucose.: 81 mg/dL (18 Sep 2018 16:39)  POCT Blood Glucose.: 113 mg/dL (18 Sep 2018 14:03)  POCT Blood Glucose.: 75 mg/dL (18 Sep 2018 11:08)            Urinalysis Basic - ( 18 Sep 2018 07:24 )    Color: Light Yellow / Appearance: Clear / S.008 / pH: x  Gluc: x / Ketone: Negative  / Bili: Negative / Urobili: Negative   Blood: x / Protein: Negative / Nitrite: Negative   Leuk Esterase: Negative / RBC: x / WBC x   Sq Epi: x / Non Sq Epi: x / Bacteria: x        MEDICATIONS  (STANDING):  aspirin enteric coated 81 milliGRAM(s) Oral daily  atorvastatin 40 milliGRAM(s) Oral at bedtime  carvedilol 25 milliGRAM(s) Oral every 12 hours  ceFAZolin   IVPB 2000 milliGRAM(s) IV Intermittent every 8 hours  dextrose 50% Injectable 12.5 Gram(s) IV Push once  dextrose 50% Injectable 12.5 Gram(s) IV Push once  dextrose 50% Injectable 25 Gram(s) IV Push once  dextrose 50% Injectable 25 Gram(s) IV Push once  furosemide    Tablet 40 milliGRAM(s) Oral two times a day  heparin  Infusion 1800 Unit(s)/Hr (18 mL/Hr) IV Continuous <Continuous>  HYDROmorphone PCA (1 mG/mL) 30 milliLiter(s) PCA Continuous PCA Continuous  influenza   Vaccine 0.5 milliLiter(s) IntraMuscular once  lactated ringers. 1000 milliLiter(s) (75 mL/Hr) IV Continuous <Continuous>  polyethylene glycol 3350 17 Gram(s) Oral daily  sodium chloride 0.9% lock flush 3 milliLiter(s) IV Push every 8 hours    MEDICATIONS  (PRN):  glucagon  Injectable 1 milliGRAM(s) IntraMuscular once PRN Glucose LESS THAN 70 milligrams/deciliter  HYDROmorphone  Injectable 0.25 milliGRAM(s) IV Push every 10 minutes PRN Moderate Pain (4 - 6)  HYDROmorphone PCA (1 mG/mL) Rescue Clinician Bolus 0.5 milliGRAM(s) IV Push every 15 minutes PRN for Pain Scale GREATER THAN 6  naloxone Injectable 0.1 milliGRAM(s) IV Push every 3 minutes PRN For ANY of the following changes in patient status:  A. RR LESS THAN 10 breaths per minute, B. Oxygen saturation LESS THAN 90%, C. Sedation score of 6  ondansetron Injectable 4 milliGRAM(s) IV Push every 6 hours PRN Nausea  ondansetron Injectable 4 milliGRAM(s) IV Push once PRN Nausea and/or Vomiting

## 2018-09-19 NOTE — PROGRESS NOTE ADULT - ASSESSMENT
Pt is scheduled for left foot partial 2nd ray resection with Dr. Cronin at 5PM.   CXR on sunrise.  EKG on sunrise.  Medical/Cardiac clearance since 9/9 and documented in chart.  Consent signed and in chart.  Procedure was explained to patient in detail. All alternatives, risks and complications were discussed. All questions answered.

## 2018-09-19 NOTE — PROGRESS NOTE ADULT - PROBLEM SELECTOR PLAN 2
-patient presenting w/ OM, afebrile, no leukocytosis. CT run off study showed Bilateral SFA occlusion. Occluded right femoral bypass graft.   - s/p LLE angio w/ L iliac placement    - f/u vascular recs, bypass procedure today   - Abx d/c-ed as osteo likely chronic, 2/2 to low perfusion. Bcx NGTD.   - podiatry planning 2nd digit amputation today

## 2018-09-19 NOTE — PROGRESS NOTE ADULT - ASSESSMENT
68 yo M w/ hx of DM2 c/b prior right three toe amputation for infection, CAD w/ stent (last placed in 2016 per patient) & CABG, HTN, severe LV dysfunction (based on TTE 2016), PAD s/p RLE bypass grafting presenting with left 2nd digit toe infection 2/2 to osteomyelitis. Pt s/p LLE angio w/ L iliac stent placement, now for L toe amputation

## 2018-09-19 NOTE — PROGRESS NOTE ADULT - ASSESSMENT
A/P: 68M POD0 s/p Left femoral to popliteal artery bypass with PTFE graft.  - Post op Labs WNL  - start Hep gtt at rate 18 ml/hr regardless of PTT  - CLD  - pain control with PCA  - keep -130s, avoid pressors  - monitor I&O, degroot in place  - bedrest, keep LLE straight  - monitor pedal pulses (L DP, R AT)  - AM labs  - re-eval at 6 AM  - will continue to monitor    Radha Aleman PA-C  [id number]

## 2018-09-19 NOTE — PROGRESS NOTE ADULT - PROBLEM SELECTOR PLAN 4
on Lantus 20 U. FS within good range.  Decrease to 10 while NPO for surgery, can restart 20 after procedures today  -sliding scale.

## 2018-09-19 NOTE — BRIEF OPERATIVE NOTE - POST-OP DX
Gangrene  09/19/2018    Active  Nixon Bass  PAD (peripheral artery disease)  09/19/2018    Active  Edouard Lo

## 2018-09-19 NOTE — PROGRESS NOTE ADULT - SUBJECTIVE AND OBJECTIVE BOX
Day __1_ of Anesthesia Pain Management Service    SUBJECTIVE:    Pain Scale Score	At rest: ___ 	With Activity: ___ 	[ x] Refer to charted pain scores    THERAPY:    [ ] IV PCA Morphine		[ ] 5 mg/mL	[ ] 1 mg/mL  [x ] IV PCA Hydromorphone	[ ] 5 mg/mL	[x ] 1 mg/mL  [ ] IV PCA Fentanyl		[ ] 50 micrograms/mL    Demand dose 0.2mg    lockout  6  (minutes) 0Continuous Rate          MEDICATIONS  (STANDING):  aspirin enteric coated 81 milliGRAM(s) Oral daily  atorvastatin 40 milliGRAM(s) Oral at bedtime  carvedilol 25 milliGRAM(s) Oral every 12 hours  ceFAZolin   IVPB 2000 milliGRAM(s) IV Intermittent every 8 hours  dextrose 50% Injectable 12.5 Gram(s) IV Push once  dextrose 50% Injectable 12.5 Gram(s) IV Push once  dextrose 50% Injectable 25 Gram(s) IV Push once  dextrose 50% Injectable 25 Gram(s) IV Push once  furosemide    Tablet 40 milliGRAM(s) Oral two times a day  heparin  Infusion 1800 Unit(s)/Hr (18 mL/Hr) IV Continuous <Continuous>  HYDROmorphone PCA (1 mG/mL) 30 milliLiter(s) PCA Continuous PCA Continuous  influenza   Vaccine 0.5 milliLiter(s) IntraMuscular once  lactated ringers. 1000 milliLiter(s) (75 mL/Hr) IV Continuous <Continuous>  polyethylene glycol 3350 17 Gram(s) Oral daily  sodium chloride 0.9% lock flush 3 milliLiter(s) IV Push every 8 hours    MEDICATIONS  (PRN):  glucagon  Injectable 1 milliGRAM(s) IntraMuscular once PRN Glucose LESS THAN 70 milligrams/deciliter  HYDROmorphone  Injectable 0.25 milliGRAM(s) IV Push every 10 minutes PRN Moderate Pain (4 - 6)  HYDROmorphone PCA (1 mG/mL) Rescue Clinician Bolus 0.5 milliGRAM(s) IV Push every 15 minutes PRN for Pain Scale GREATER THAN 6  naloxone Injectable 0.1 milliGRAM(s) IV Push every 3 minutes PRN For ANY of the following changes in patient status:  A. RR LESS THAN 10 breaths per minute, B. Oxygen saturation LESS THAN 90%, C. Sedation score of 6  ondansetron Injectable 4 milliGRAM(s) IV Push every 6 hours PRN Nausea  ondansetron Injectable 4 milliGRAM(s) IV Push once PRN Nausea and/or Vomiting      OBJECTIVE:    Sedation Score:	[x ] Alert	[ ] Drowsy 	[ ] Arousable	[ ] Asleep	[ ] Unresponsive    Side Effects:	[ x] None	[ ] Nausea	[ ] Vomiting	[ ] Pruritus  		[ ] Other:    Vital Signs Last 24 Hrs  T(C): 36.8 (19 Sep 2018 07:00), Max: 36.8 (18 Sep 2018 12:43)  T(F): 98.2 (19 Sep 2018 07:00), Max: 98.2 (18 Sep 2018 12:43)  HR: 66 (19 Sep 2018 10:00) (59 - 95)  BP: 138/74 (19 Sep 2018 09:00) (119/65 - 157/98)  BP(mean): 99 (19 Sep 2018 09:00) (87 - 104)  RR: 16 (19 Sep 2018 10:00) (14 - 20)  SpO2: 100% (19 Sep 2018 10:00) (93% - 100%)    ASSESSMENT/ PLAN    Therapy to  be:	[x ] Continue   [ ] Discontinued   [ ] Change to prn Analgesics    Documentation and Verification of current medications:   [X] Done	[ ] Not done, not elligible    Comments: I was physically present for the key portionjs of the evaluation and management service provided. I agree with the above history, physical, and plan which I have reviewed and edited where appropriate

## 2018-09-19 NOTE — PROGRESS NOTE ADULT - SUBJECTIVE AND OBJECTIVE BOX
SUBJECTIVE:    No acute events overnight, afebrile, hds.  Pt's pain controlled for now in left foot.    VITAL SIGNS:    Vital Signs Last 24 Hrs  T(C): 36.8 (19 Sep 2018 07:00), Max: 36.8 (18 Sep 2018 16:50)  T(F): 98.2 (19 Sep 2018 07:00), Max: 98.2 (18 Sep 2018 16:50)  HR: 69 (19 Sep 2018 14:00) (59 - 95)  BP: 128/75 (19 Sep 2018 14:00) (119/65 - 157/98)  BP(mean): 95 (19 Sep 2018 14:00) (87 - 106)  RR: 16 (19 Sep 2018 14:00) (14 - 20)  SpO2: 100% (19 Sep 2018 14:00) (97% - 100%)    PHYSICAL EXAM:     GENERAL: no acute distress  HEENT: NC/AT, EOMI, neck supple, MMM  RESPIRATORY: LCTAB/L, no rhonchi, rales, or wheezing  CARDIOVASCULAR: RRR, no murmurs, gallops, rubs  ABDOMINAL: soft, non-tender, non-distended, positive bowel sounds   EXTREMITIES: no clubbing, cyanosis, or edema; left foot toe ulcer in dressing                          12.8   15.4  )-----------( 198      ( 19 Sep 2018 09:19 )             37.7     09-19    139  |  102  |  15  ----------------------------<  164<H>  4.1   |  20<L>  |  0.90    Ca    7.9<L>      19 Sep 2018 05:27  Phos  4.2     09-19  Mg     1.9     09-19        CAPILLARY BLOOD GLUCOSE      POCT Blood Glucose.: 162 mg/dL (19 Sep 2018 12:31)  POCT Blood Glucose.: 203 mg/dL (18 Sep 2018 17:42)  POCT Blood Glucose.: 187 mg/dL (18 Sep 2018 17:26)  POCT Blood Glucose.: 81 mg/dL (18 Sep 2018 16:39)      MEDICATIONS  (STANDING):  aspirin enteric coated 81 milliGRAM(s) Oral daily  atorvastatin 40 milliGRAM(s) Oral at bedtime  carvedilol 25 milliGRAM(s) Oral every 12 hours  dextrose 50% Injectable 12.5 Gram(s) IV Push once  dextrose 50% Injectable 12.5 Gram(s) IV Push once  dextrose 50% Injectable 25 Gram(s) IV Push once  dextrose 50% Injectable 25 Gram(s) IV Push once  furosemide    Tablet 40 milliGRAM(s) Oral two times a day  heparin  Infusion 1600 Unit(s)/Hr (16 mL/Hr) IV Continuous <Continuous>  HYDROmorphone PCA (1 mG/mL) 30 milliLiter(s) PCA Continuous PCA Continuous  influenza   Vaccine 0.5 milliLiter(s) IntraMuscular once  insulin lispro (HumaLOG) corrective regimen sliding scale   SubCutaneous three times a day before meals  insulin lispro (HumaLOG) corrective regimen sliding scale   SubCutaneous at bedtime  lactated ringers. 1000 milliLiter(s) (75 mL/Hr) IV Continuous <Continuous>  polyethylene glycol 3350 17 Gram(s) Oral daily  sodium chloride 0.9% lock flush 3 milliLiter(s) IV Push every 8 hours

## 2018-09-19 NOTE — PROGRESS NOTE ADULT - SUBJECTIVE AND OBJECTIVE BOX
Patient is a 68y old  Male who presents with a chief complaint of toe infection (19 Sep 2018 02:23)      INTERVAL HPI/OVERNIGHT EVENTS:   Pt is scheduled for left foot partial 2nd ray resection with Dr. Cronin at 5PM. Patient is aware of procedure and is NPO since 9AM.    MEDICATIONS  (STANDING):  aspirin enteric coated 81 milliGRAM(s) Oral daily  atorvastatin 40 milliGRAM(s) Oral at bedtime  carvedilol 25 milliGRAM(s) Oral every 12 hours  ceFAZolin   IVPB 2000 milliGRAM(s) IV Intermittent every 8 hours  dextrose 50% Injectable 12.5 Gram(s) IV Push once  dextrose 50% Injectable 12.5 Gram(s) IV Push once  dextrose 50% Injectable 25 Gram(s) IV Push once  dextrose 50% Injectable 25 Gram(s) IV Push once  furosemide    Tablet 40 milliGRAM(s) Oral two times a day  heparin  Infusion 1800 Unit(s)/Hr (18 mL/Hr) IV Continuous <Continuous>  HYDROmorphone PCA (1 mG/mL) 30 milliLiter(s) PCA Continuous PCA Continuous  influenza   Vaccine 0.5 milliLiter(s) IntraMuscular once  lactated ringers. 1000 milliLiter(s) (75 mL/Hr) IV Continuous <Continuous>  polyethylene glycol 3350 17 Gram(s) Oral daily  sodium chloride 0.9% lock flush 3 milliLiter(s) IV Push every 8 hours    MEDICATIONS  (PRN):  glucagon  Injectable 1 milliGRAM(s) IntraMuscular once PRN Glucose LESS THAN 70 milligrams/deciliter  HYDROmorphone  Injectable 0.25 milliGRAM(s) IV Push every 10 minutes PRN Moderate Pain (4 - 6)  HYDROmorphone PCA (1 mG/mL) Rescue Clinician Bolus 0.5 milliGRAM(s) IV Push every 15 minutes PRN for Pain Scale GREATER THAN 6  naloxone Injectable 0.1 milliGRAM(s) IV Push every 3 minutes PRN For ANY of the following changes in patient status:  A. RR LESS THAN 10 breaths per minute, B. Oxygen saturation LESS THAN 90%, C. Sedation score of 6  ondansetron Injectable 4 milliGRAM(s) IV Push every 6 hours PRN Nausea  ondansetron Injectable 4 milliGRAM(s) IV Push once PRN Nausea and/or Vomiting      Allergies    No Known Allergies    Intolerances        Vital Signs Last 24 Hrs  T(C): 36.8 (19 Sep 2018 07:00), Max: 36.8 (18 Sep 2018 12:43)  T(F): 98.2 (19 Sep 2018 07:00), Max: 98.2 (18 Sep 2018 12:43)  HR: 66 (19 Sep 2018 08:00) (59 - 95)  BP: 137/76 (19 Sep 2018 08:00) (119/65 - 157/98)  BP(mean): 101 (19 Sep 2018 08:00) (87 - 104)  RR: 15 (19 Sep 2018 08:00) (14 - 20)  SpO2: 100% (19 Sep 2018 08:00) (93% - 100%)    LABS:                        12.4   16.9  )-----------( 175      ( 19 Sep 2018 05:27 )             36.9     09-19    139  |  102  |  15  ----------------------------<  164<H>  4.1   |  20<L>  |  0.90    Ca    7.9<L>      19 Sep 2018 05:27  Phos  4.2     -19  Mg     1.9     09-19      PT/INR - ( 19 Sep 2018 00:54 )   PT: 19.0 sec;   INR: 1.72 ratio         PTT - ( 19 Sep 2018 00:54 )  PTT:143.8 sec  Urinalysis Basic - ( 18 Sep 2018 07:24 )    Color: Light Yellow / Appearance: Clear / S.008 / pH: x  Gluc: x / Ketone: Negative  / Bili: Negative / Urobili: Negative   Blood: x / Protein: Negative / Nitrite: Negative   Leuk Esterase: Negative / RBC: x / WBC x   Sq Epi: x / Non Sq Epi: x / Bacteria: x      CAPILLARY BLOOD GLUCOSE      POCT Blood Glucose.: 203 mg/dL (18 Sep 2018 17:42)  POCT Blood Glucose.: 187 mg/dL (18 Sep 2018 17:26)  POCT Blood Glucose.: 81 mg/dL (18 Sep 2018 16:39)  POCT Blood Glucose.: 113 mg/dL (18 Sep 2018 14:03)  POCT Blood Glucose.: 75 mg/dL (18 Sep 2018 11:08)      RADIOLOGY & ADDITIONAL TESTS:

## 2018-09-19 NOTE — PROGRESS NOTE ADULT - PROBLEM SELECTOR PLAN 1
- Patient with KESHAV on 9/14, likely in setting of recent angio/contrast load  - now resolved  - patient restarted on lasix  - continue to monitor BMP  - Hold ACE-I for now, avoid nephrotoxic agents

## 2018-09-19 NOTE — PROGRESS NOTE ADULT - SUBJECTIVE AND OBJECTIVE BOX
VASCULAR SURGERY POST OP NOTE    STATUS POST:  Femoral popliteal bypass, left    POST OPERATIVE DAY #: 0    SUBJECTIVE: Pt seen and examined at bedside in pacu. Pt resting comfortably, c/o of dryness in mouth.  Denies LE pain, chest pain, sob, palpitation, HA, fever, chills, N/v.  Using PCA for pain control.        OBJECTIVE:  Vital Signs Last 24 Hrs  T(C): 36 (19 Sep 2018 00:15), Max: 36.8 (18 Sep 2018 06:39)  T(F): 96.8 (19 Sep 2018 00:15), Max: 98.2 (18 Sep 2018 06:39)  HR: 63 (19 Sep 2018 02:00) (62 - 95)  BP: 135/74 (19 Sep 2018 02:00) (119/65 - 157/98)  BP(mean): 99 (19 Sep 2018 02:00) (87 - 104)  RR: 17 (19 Sep 2018 02:00) (15 - 20)  SpO2: 97% (19 Sep 2018 02:00) (93% - 100%)    I&O's Summary    17 Sep 2018 07:01  -  18 Sep 2018 07:00  --------------------------------------------------------  IN: 1120 mL / OUT: 1400 mL / NET: -280 mL    18 Sep 2018 07:01  -  19 Sep 2018 02:24  --------------------------------------------------------  IN: 350 mL / OUT: 1180 mL / NET: -830 mL      Physical Exam:  General Appearance: Appears well, NAD, NC for O2  Neck: Supple  Chest: Equal expansion bilaterally, equal breath sounds  CV: Pulse regular presently  Abd: soft, NT, ND  L groin: soft, no active bleeding or hematoma present, NTTP, dressing c/d/i   b/l LE: soft, warm, sensation/motor intact, L DP signal, R AT signal    LABS:                        12.7   11.0  )-----------( 209      ( 19 Sep 2018 00:54 )             38.4     09-    136  |  100  |  16  ----------------------------<  148<H>  3.8   |  21<L>  |  1.16    Ca    8.6      19 Sep 2018 00:54  Phos  3.5       Mg     2.2     18      PT/INR - ( 19 Sep 2018 00:54 )   PT: 19.0 sec;   INR: 1.72 ratio         PTT - ( 19 Sep 2018 00:54 )  PTT:143.8 sec  Urinalysis Basic - ( 18 Sep 2018 07:24 )    Color: Light Yellow / Appearance: Clear / S.008 / pH: x  Gluc: x / Ketone: Negative  / Bili: Negative / Urobili: Negative   Blood: x / Protein: Negative / Nitrite: Negative   Leuk Esterase: Negative / RBC: x / WBC x   Sq Epi: x / Non Sq Epi: x / Bacteria: x        RADIOLOGY & ADDITIONAL STUDIES:

## 2018-09-19 NOTE — BRIEF OPERATIVE NOTE - OPERATION/FINDINGS
Left groin access  Aorto-iliac angiogram   Left iliac stent   LLE angiogram- reconstitution at BK pop with AT runoff  Mynx closure
s/p left foot partial 2nd toe amputation - closed    - minimal bleeding intraoperatively
Left femoral to popliteal artery bypass with PTFE graft.  Intra-operative angiogram - patent proximal and distal anastomoses anastomoses. Flow to peroneal and AT->DP arteries.    Dopplerable Left DP and Right AT post-operatively  >

## 2018-09-20 ENCOUNTER — RESULT REVIEW (OUTPATIENT)
Age: 68
End: 2018-09-20

## 2018-09-20 LAB
ANION GAP SERPL CALC-SCNC: 13 MMOL/L — SIGNIFICANT CHANGE UP (ref 5–17)
APTT BLD: 128.1 SEC — CRITICAL HIGH (ref 27.5–37.4)
APTT BLD: 191.3 SEC — CRITICAL HIGH (ref 27.5–37.4)
APTT BLD: 69.3 SEC — HIGH (ref 27.5–37.4)
BUN SERPL-MCNC: 23 MG/DL — SIGNIFICANT CHANGE UP (ref 7–23)
CALCIUM SERPL-MCNC: 7.9 MG/DL — LOW (ref 8.4–10.5)
CHLORIDE SERPL-SCNC: 103 MMOL/L — SIGNIFICANT CHANGE UP (ref 96–108)
CO2 SERPL-SCNC: 23 MMOL/L — SIGNIFICANT CHANGE UP (ref 22–31)
CREAT SERPL-MCNC: 1.16 MG/DL — SIGNIFICANT CHANGE UP (ref 0.5–1.3)
GLUCOSE BLDC GLUCOMTR-MCNC: 180 MG/DL — HIGH (ref 70–99)
GLUCOSE BLDC GLUCOMTR-MCNC: 220 MG/DL — HIGH (ref 70–99)
GLUCOSE BLDC GLUCOMTR-MCNC: 247 MG/DL — HIGH (ref 70–99)
GLUCOSE BLDC GLUCOMTR-MCNC: 307 MG/DL — HIGH (ref 70–99)
GLUCOSE SERPL-MCNC: 280 MG/DL — HIGH (ref 70–99)
GRAM STN FLD: SIGNIFICANT CHANGE UP
HCT VFR BLD CALC: 34.2 % — LOW (ref 39–50)
HCT VFR BLD CALC: 38 % — LOW (ref 39–50)
HGB BLD-MCNC: 11.3 G/DL — LOW (ref 13–17)
HGB BLD-MCNC: 12.3 G/DL — LOW (ref 13–17)
INR BLD: 1.69 RATIO — HIGH (ref 0.88–1.16)
MAGNESIUM SERPL-MCNC: 1.9 MG/DL — SIGNIFICANT CHANGE UP (ref 1.6–2.6)
MCHC RBC-ENTMCNC: 28.5 PG — SIGNIFICANT CHANGE UP (ref 27–34)
MCHC RBC-ENTMCNC: 28.7 PG — SIGNIFICANT CHANGE UP (ref 27–34)
MCHC RBC-ENTMCNC: 32.4 GM/DL — SIGNIFICANT CHANGE UP (ref 32–36)
MCHC RBC-ENTMCNC: 33 GM/DL — SIGNIFICANT CHANGE UP (ref 32–36)
MCV RBC AUTO: 87 FL — SIGNIFICANT CHANGE UP (ref 80–100)
MCV RBC AUTO: 87.8 FL — SIGNIFICANT CHANGE UP (ref 80–100)
PHOSPHATE SERPL-MCNC: 4 MG/DL — SIGNIFICANT CHANGE UP (ref 2.5–4.5)
PLATELET # BLD AUTO: 183 K/UL — SIGNIFICANT CHANGE UP (ref 150–400)
PLATELET # BLD AUTO: 208 K/UL — SIGNIFICANT CHANGE UP (ref 150–400)
POTASSIUM SERPL-MCNC: 4.6 MMOL/L — SIGNIFICANT CHANGE UP (ref 3.5–5.3)
POTASSIUM SERPL-SCNC: 4.6 MMOL/L — SIGNIFICANT CHANGE UP (ref 3.5–5.3)
PROTHROM AB SERPL-ACNC: 18.4 SEC — HIGH (ref 9.8–12.7)
RBC # BLD: 3.93 M/UL — LOW (ref 4.2–5.8)
RBC # BLD: 4.33 M/UL — SIGNIFICANT CHANGE UP (ref 4.2–5.8)
RBC # FLD: 16.4 % — HIGH (ref 10.3–14.5)
RBC # FLD: 16.8 % — HIGH (ref 10.3–14.5)
SODIUM SERPL-SCNC: 139 MMOL/L — SIGNIFICANT CHANGE UP (ref 135–145)
SPECIMEN SOURCE: SIGNIFICANT CHANGE UP
WBC # BLD: 14.7 K/UL — HIGH (ref 3.8–10.5)
WBC # BLD: 16.1 K/UL — HIGH (ref 3.8–10.5)
WBC # FLD AUTO: 14.7 K/UL — HIGH (ref 3.8–10.5)
WBC # FLD AUTO: 16.1 K/UL — HIGH (ref 3.8–10.5)

## 2018-09-20 PROCEDURE — 99233 SBSQ HOSP IP/OBS HIGH 50: CPT

## 2018-09-20 PROCEDURE — 88304 TISSUE EXAM BY PATHOLOGIST: CPT | Mod: 26

## 2018-09-20 PROCEDURE — 88305 TISSUE EXAM BY PATHOLOGIST: CPT | Mod: 26

## 2018-09-20 PROCEDURE — 88311 DECALCIFY TISSUE: CPT | Mod: 26

## 2018-09-20 RX ORDER — CEFAZOLIN SODIUM 1 G
VIAL (EA) INJECTION
Qty: 0 | Refills: 0 | Status: DISCONTINUED | OUTPATIENT
Start: 2018-09-20 | End: 2018-09-21

## 2018-09-20 RX ORDER — CEFAZOLIN SODIUM 1 G
1000 VIAL (EA) INJECTION ONCE
Qty: 0 | Refills: 0 | Status: COMPLETED | OUTPATIENT
Start: 2018-09-20 | End: 2018-09-20

## 2018-09-20 RX ORDER — CEFAZOLIN SODIUM 1 G
1000 VIAL (EA) INJECTION EVERY 8 HOURS
Qty: 0 | Refills: 0 | Status: DISCONTINUED | OUTPATIENT
Start: 2018-09-20 | End: 2018-09-21

## 2018-09-20 RX ORDER — OXYCODONE HYDROCHLORIDE 5 MG/1
5 TABLET ORAL EVERY 4 HOURS
Qty: 0 | Refills: 0 | Status: DISCONTINUED | OUTPATIENT
Start: 2018-09-20 | End: 2018-09-24

## 2018-09-20 RX ORDER — OXYCODONE HYDROCHLORIDE 5 MG/1
10 TABLET ORAL EVERY 4 HOURS
Qty: 0 | Refills: 0 | Status: DISCONTINUED | OUTPATIENT
Start: 2018-09-20 | End: 2018-09-24

## 2018-09-20 RX ORDER — HEPARIN SODIUM 5000 [USP'U]/ML
1100 INJECTION INTRAVENOUS; SUBCUTANEOUS
Qty: 25000 | Refills: 0 | Status: DISCONTINUED | OUTPATIENT
Start: 2018-09-20 | End: 2018-09-20

## 2018-09-20 RX ORDER — INSULIN GLARGINE 100 [IU]/ML
30 INJECTION, SOLUTION SUBCUTANEOUS AT BEDTIME
Qty: 0 | Refills: 0 | Status: DISCONTINUED | OUTPATIENT
Start: 2018-09-20 | End: 2018-09-24

## 2018-09-20 RX ORDER — HEPARIN SODIUM 5000 [USP'U]/ML
800 INJECTION INTRAVENOUS; SUBCUTANEOUS
Qty: 25000 | Refills: 0 | Status: DISCONTINUED | OUTPATIENT
Start: 2018-09-20 | End: 2018-09-21

## 2018-09-20 RX ADMIN — HEPARIN SODIUM 8 UNIT(S)/HR: 5000 INJECTION INTRAVENOUS; SUBCUTANEOUS at 23:26

## 2018-09-20 RX ADMIN — Medication 40 MILLIGRAM(S): at 17:54

## 2018-09-20 RX ADMIN — HYDROMORPHONE HYDROCHLORIDE 30 MILLILITER(S): 2 INJECTION INTRAMUSCULAR; INTRAVENOUS; SUBCUTANEOUS at 01:22

## 2018-09-20 RX ADMIN — OXYCODONE HYDROCHLORIDE 10 MILLIGRAM(S): 5 TABLET ORAL at 20:30

## 2018-09-20 RX ADMIN — Medication 2: at 06:29

## 2018-09-20 RX ADMIN — SODIUM CHLORIDE 3 MILLILITER(S): 9 INJECTION INTRAMUSCULAR; INTRAVENOUS; SUBCUTANEOUS at 23:14

## 2018-09-20 RX ADMIN — HEPARIN SODIUM 8 UNIT(S)/HR: 5000 INJECTION INTRAVENOUS; SUBCUTANEOUS at 15:46

## 2018-09-20 RX ADMIN — SODIUM CHLORIDE 3 MILLILITER(S): 9 INJECTION INTRAMUSCULAR; INTRAVENOUS; SUBCUTANEOUS at 06:16

## 2018-09-20 RX ADMIN — Medication 100 MILLIGRAM(S): at 23:18

## 2018-09-20 RX ADMIN — Medication 100 MILLIGRAM(S): at 12:35

## 2018-09-20 RX ADMIN — CARVEDILOL PHOSPHATE 25 MILLIGRAM(S): 80 CAPSULE, EXTENDED RELEASE ORAL at 17:54

## 2018-09-20 RX ADMIN — SODIUM CHLORIDE 3 MILLILITER(S): 9 INJECTION INTRAMUSCULAR; INTRAVENOUS; SUBCUTANEOUS at 14:01

## 2018-09-20 RX ADMIN — HYDROMORPHONE HYDROCHLORIDE 30 MILLILITER(S): 2 INJECTION INTRAMUSCULAR; INTRAVENOUS; SUBCUTANEOUS at 08:09

## 2018-09-20 RX ADMIN — Medication 4: at 11:18

## 2018-09-20 RX ADMIN — Medication 81 MILLIGRAM(S): at 12:35

## 2018-09-20 RX ADMIN — ATORVASTATIN CALCIUM 40 MILLIGRAM(S): 80 TABLET, FILM COATED ORAL at 23:18

## 2018-09-20 RX ADMIN — OXYCODONE HYDROCHLORIDE 10 MILLIGRAM(S): 5 TABLET ORAL at 19:55

## 2018-09-20 RX ADMIN — INSULIN GLARGINE 30 UNIT(S): 100 INJECTION, SOLUTION SUBCUTANEOUS at 23:18

## 2018-09-20 RX ADMIN — Medication 1: at 18:36

## 2018-09-20 RX ADMIN — HEPARIN SODIUM 11 UNIT(S)/HR: 5000 INJECTION INTRAVENOUS; SUBCUTANEOUS at 06:55

## 2018-09-20 NOTE — PROVIDER CONTACT NOTE (CRITICAL VALUE NOTIFICATION) - ACTION/TREATMENT ORDERED:
Hold heparin for an hour and then will order to decrease to 8 mL/hour.
assess for bleeding
no heparin drip now and restart at lower rate

## 2018-09-20 NOTE — PROVIDER CONTACT NOTE (CRITICAL VALUE NOTIFICATION) - ASSESSMENT
Patient a&ox4. Patient VSS. Patient safety maintained
no bleeding
no signs and symptoms of bleeding.

## 2018-09-20 NOTE — PROGRESS NOTE ADULT - SUBJECTIVE AND OBJECTIVE BOX
Day 2\1 of Anesthesia Pain Management Service    SUBJECTIVE: Patient is doing well with IV PCA    Pain Scale Score:	[X] Refer to charted pain scores    THERAPY:    [ ] IV PCA Morphine		[ ] 5 mg/mL	[ ] 1 mg/mL  [X] IV PCA Hydromorphone	[ ] 5 mg/mL	[X] 1 mg/mL  [ ] IV PCA Fentanyl		[ ] 50 micrograms/mL    Demand dose: 0.2 mg     Lockout: 6 minutes   Continuous Rate: 0 mg/hr  4 Hour Limit: 4 mg    MEDICATIONS  (STANDING):  aspirin enteric coated 81 milliGRAM(s) Oral daily  atorvastatin 40 milliGRAM(s) Oral at bedtime  carvedilol 25 milliGRAM(s) Oral every 12 hours  dextrose 50% Injectable 12.5 Gram(s) IV Push once  dextrose 50% Injectable 12.5 Gram(s) IV Push once  dextrose 50% Injectable 25 Gram(s) IV Push once  dextrose 50% Injectable 25 Gram(s) IV Push once  furosemide    Tablet 40 milliGRAM(s) Oral two times a day  heparin  Infusion 1100 Unit(s)/Hr (11 mL/Hr) IV Continuous <Continuous>  HYDROmorphone PCA (1 mG/mL) 30 milliLiter(s) PCA Continuous PCA Continuous  influenza   Vaccine 0.5 milliLiter(s) IntraMuscular once  insulin glargine Injectable (LANTUS) 20 Unit(s) SubCutaneous at bedtime  insulin lispro (HumaLOG) corrective regimen sliding scale   SubCutaneous three times a day before meals  insulin lispro (HumaLOG) corrective regimen sliding scale   SubCutaneous at bedtime  polyethylene glycol 3350 17 Gram(s) Oral daily  sodium chloride 0.9% lock flush 3 milliLiter(s) IV Push every 8 hours    MEDICATIONS  (PRN):  acetaminophen   Tablet .. 650 milliGRAM(s) Oral every 6 hours PRN Mild Pain (1 - 3)  glucagon  Injectable 1 milliGRAM(s) IntraMuscular once PRN Glucose LESS THAN 70 milligrams/deciliter  HYDROmorphone  Injectable 0.25 milliGRAM(s) IV Push every 10 minutes PRN Moderate Pain (4 - 6)  HYDROmorphone PCA (1 mG/mL) Rescue Clinician Bolus 0.5 milliGRAM(s) IV Push every 15 minutes PRN for Pain Scale GREATER THAN 6  naloxone Injectable 0.1 milliGRAM(s) IV Push every 3 minutes PRN For ANY of the following changes in patient status:  A. RR LESS THAN 10 breaths per minute, B. Oxygen saturation LESS THAN 90%, C. Sedation score of 6  ondansetron Injectable 4 milliGRAM(s) IV Push every 6 hours PRN Nausea  ondansetron Injectable 4 milliGRAM(s) IV Push once PRN Nausea and/or Vomiting  oxyCODONE    5 mG/acetaminophen 325 mG 1 Tablet(s) Oral every 6 hours PRN Moderate Pain (4 - 6)      OBJECTIVE:    Sedation Score:	[ X] Alert	[ ] Drowsy 	[ ] Arousable	[ ] Asleep	[ ] Unresponsive    Side Effects:	[X ] None	[ ] Nausea	[ ] Vomiting	[ ] Pruritus  		[ ] Other:    Vital Signs Last 24 Hrs  T(C): 36.6 (20 Sep 2018 07:30), Max: 36.8 (20 Sep 2018 03:00)  T(F): 97.9 (20 Sep 2018 07:30), Max: 98.2 (20 Sep 2018 03:00)  HR: 79 (20 Sep 2018 07:30) (66 - 85)  BP: 126/63 (20 Sep 2018 07:30) (109/63 - 146/78)  BP(mean): 89 (20 Sep 2018 07:30) (82 - 106)  RR: 16 (20 Sep 2018 07:30) (14 - 19)  SpO2: 100% (20 Sep 2018 07:30) (97% - 100%)    ASSESSMENT/ PLAN    Therapy to  be:               [X] Continued   [ ] Discontinued   [ ] Changed to PRN Analgesics    Documentation and Verification of current medications:   [X] Done	[ ] Not done, not eligible    Comments: Using0-2x/hr. Reeducated to use. alert

## 2018-09-20 NOTE — PROGRESS NOTE ADULT - PROBLEM SELECTOR PLAN 5
-c/w home statin therapy  - c/w ASA  - holding plavix patricia-opertaively, will f/u with pulm when safe to resume -c/w home statin therapy  - c/w ASA  - will restart plavix as per d/w podiatry -c/w home statin therapy  - c/w ASA  - will restart plavix once ok with vascular

## 2018-09-20 NOTE — PROGRESS NOTE ADULT - PROBLEM SELECTOR PLAN 2
-patient presenting w/ OM, afebrile, no leukocytosis. CT run off study showed Bilateral SFA occlusion. Occluded right femoral bypass graft.   - s/p LLE angio w/ L iliac placement    - s/b bypass, and s/p left 2nd digit toe amputations  - case discussed with podiatry - they recommended IV Ancef for now, and then d/c home on po abx likely in next 1-2 days -patient presenting w/ OM, afebrile, no leukocytosis. CT run off study showed Bilateral SFA occlusion. Occluded right femoral bypass graft.   - s/p LLE angio w/ L iliac placement    - s/b bypass, and s/p left 2nd digit toe amputations  - case discussed with podiatry - they recommended IV Ancef for now, and then d/c home on po abx likely tomorrow -patient presenting w/ OM, afebrile, no leukocytosis. CT run off study showed Bilateral SFA occlusion. Occluded right femoral bypass graft.   - s/p LLE angio w/ L iliac placement    - s/b bypass, and s/p left 2nd digit toe amputations  - case discussed with podiatry - they recommended IV Ancef for now, and then d/c home on po abx likely tomorrow, they also state plavix can be restarted -patient presenting w/ OM, afebrile, no leukocytosis. CT run off study showed Bilateral SFA occlusion. Occluded right femoral bypass graft.   - s/p LLE angio w/ L iliac placement    - s/b bypass, and s/p left 2nd digit toe amputations  - case discussed with podiatry - they recommended IV Ancef for now, and then d/c home on po abx likely tomorrow, they also state plavix can be restarted from their perspective but case also discussed with vascular resident, they will be discussing with attending when safe to restart plavix definitvely

## 2018-09-20 NOTE — PROGRESS NOTE ADULT - ASSESSMENT
68 yo M w/ hx of DM2 c/b prior right three toe amputation for infection, CAD w/ stent (last placed in 2016 per patient) & CABG, HTN, severe LV dysfunction (based on TTE 2016), PAD s/p RLE bypass grafting presenting with left 2nd digit toe infection 2/2 to osteomyelitis. Pt s/p LLE angio w/ L iliac stent placement, s/p L toe 2nd digit amputation

## 2018-09-20 NOTE — PROGRESS NOTE ADULT - ASSESSMENT
A/P: 68M s/p Left femoral to popliteal artery bypass with PTFE graft and partial 2nd toe amputation 9/19.      - Hep gtt   - reg diet  - pain control with PCA  - keep -130s, avoid pressors  - f/u UOP  - OOB as tolerated  - Weight bearing per podiatry   - monitor pedal pulses (L DP, R AT)  - PT consult

## 2018-09-20 NOTE — PROGRESS NOTE ADULT - SUBJECTIVE AND OBJECTIVE BOX
Patient is a 68y old  Male who presents with a chief complaint of toe infection (20 Sep 2018 10:06)       INTERVAL HPI/OVERNIGHT EVENTS:  Patient seen and evaluated at bedside.  Pt is resting comfortable in NAD. Denies N/V/F/C.    Allergies    No Known Allergies    Intolerances        Vital Signs Last 24 Hrs  T(C): 37.3 (20 Sep 2018 10:00), Max: 37.3 (20 Sep 2018 10:00)  T(F): 99.1 (20 Sep 2018 10:00), Max: 99.1 (20 Sep 2018 10:00)  HR: 83 (20 Sep 2018 12:00) (66 - 85)  BP: 133/68 (20 Sep 2018 12:00) (109/63 - 143/77)  BP(mean): 91 (20 Sep 2018 09:00) (82 - 104)  RR: 14 (20 Sep 2018 12:00) (14 - 19)  SpO2: 100% (20 Sep 2018 12:00) (97% - 100%)    LABS:                        11.3   14.7  )-----------( 183      ( 20 Sep 2018 05:00 )             34.2     09-20    139  |  103  |  23  ----------------------------<  280<H>  4.6   |  23  |  1.16    Ca    7.9<L>      20 Sep 2018 05:00  Phos  4.0     09-20  Mg     1.9     09-20      PT/INR - ( 20 Sep 2018 05:00 )   PT: 18.4 sec;   INR: 1.69 ratio         PTT - ( 20 Sep 2018 05:00 )  PTT:191.3 sec    CAPILLARY BLOOD GLUCOSE      POCT Blood Glucose.: 307 mg/dL (20 Sep 2018 11:14)  POCT Blood Glucose.: 247 mg/dL (20 Sep 2018 06:27)  POCT Blood Glucose.: 142 mg/dL (19 Sep 2018 23:05)  POCT Blood Glucose.: 159 mg/dL (19 Sep 2018 16:51)  POCT Blood Glucose.: 162 mg/dL (19 Sep 2018 12:31)      Lower Extremity Physical Exam:  surgical site well coapted no SOI, no drainage, flap viable at this time    RADIOLOGY & ADDITIONAL TESTS:  < from: Xray Foot AP + Lateral + Oblique, Left (09.19.18 @ 23:00) >    EXAM:  FOOT COMPLETE LEFT (MIN 3 VIEWS)                            PROCEDURE DATE:  09/19/2018            INTERPRETATION:  EXAMINATION: 3 views of the left foot    CLINICAL INFORMATION: Partial second toe amputation    Comparison: 9/6/2018    IMPRESSION:     Interval amputation of the second ray at the level of the midshaft of the   proximal phalanx. No other interval change.    Findings of Charcot arthropathy at the midfoot. Vascular calcifications.                    MAYTE SPARROW M.D., ATTENDING RADIOLOGIST  This document has been electronically signed. Sep 20 2018  7:48AM                < end of copied text >

## 2018-09-20 NOTE — PROGRESS NOTE ADULT - SUBJECTIVE AND OBJECTIVE BOX
VASCULAR SURGERY PROGRESS NOTE        SUBJECTIVE: Pt seen and examined at bedside in pacu. s/p L fem-pop bypass 9/19 and partial 2nd toe amputation 9/19. Pt resting comfortably, No new complaints. No acute events overnight. Using PCA for pain control.        OBJECTIVE:     ** VITAL SIGNS / I&O's **    Vital Signs Last 24 Hrs  T(C): 36.6 (20 Sep 2018 07:30), Max: 36.8 (20 Sep 2018 03:00)  T(F): 97.9 (20 Sep 2018 07:30), Max: 98.2 (20 Sep 2018 03:00)  HR: 79 (20 Sep 2018 07:30) (66 - 85)  BP: 126/63 (20 Sep 2018 07:30) (109/63 - 146/78)  BP(mean): 89 (20 Sep 2018 07:30) (82 - 106)  RR: 16 (20 Sep 2018 07:30) (14 - 19)  SpO2: 100% (20 Sep 2018 07:30) (97% - 100%)    I&O's Detail    19 Sep 2018 07:01  -  20 Sep 2018 07:00  --------------------------------------------------------  IN:    heparin Infusion: 54 mL    heparin Infusion: 84 mL    heparin Infusion: 14 mL    lactated ringers.: 450 mL    Oral Fluid: 340 mL    Solution: 240 mL  Total IN: 1182 mL    OUT:    Voided: 750 mL  Total OUT: 750 mL    Total NET: 432 mL      20 Sep 2018 07:01  -  20 Sep 2018 08:43  --------------------------------------------------------  IN:    heparin Infusion: 11 mL    Solution: 60 mL  Total IN: 71 mL    OUT:  Total OUT: 0 mL    Total NET: 71 mL    Physical Exam:  General Appearance: Appears well, NAD,   HEENT: NC/AT  RESP: nonlabored  L groin: soft, no active bleeding or hematoma present, NTTP, incision c/d/i, dressings changed  b/l LE: soft, warm, sensation/motor intact, L DP signal, R AT signal, LLE incision c/d/i, dressing changed      ** LABS **                                     11.3   14.7  )-----------( 183      ( 20 Sep 2018 05:00 )             34.2   09-20    139  |  103  |  23  ----------------------------<  280<H>  4.6   |  23  |  1.16    Ca    7.9<L>      20 Sep 2018 05:00  Phos  4.0     09-20  Mg     1.9     09-20    PT/INR - ( 20 Sep 2018 05:00 )   PT: 18.4 sec;   INR: 1.69 ratio         PTT - ( 20 Sep 2018 05:00 )  PTT:191.3 sec      MEDICATIONS  (STANDING):  aspirin enteric coated 81 milliGRAM(s) Oral daily  atorvastatin 40 milliGRAM(s) Oral at bedtime  carvedilol 25 milliGRAM(s) Oral every 12 hours  dextrose 50% Injectable 12.5 Gram(s) IV Push once  dextrose 50% Injectable 12.5 Gram(s) IV Push once  dextrose 50% Injectable 25 Gram(s) IV Push once  dextrose 50% Injectable 25 Gram(s) IV Push once  furosemide    Tablet 40 milliGRAM(s) Oral two times a day  heparin  Infusion 1100 Unit(s)/Hr (11 mL/Hr) IV Continuous <Continuous>  HYDROmorphone PCA (1 mG/mL) 30 milliLiter(s) PCA Continuous PCA Continuous  influenza   Vaccine 0.5 milliLiter(s) IntraMuscular once  insulin glargine Injectable (LANTUS) 20 Unit(s) SubCutaneous at bedtime  insulin lispro (HumaLOG) corrective regimen sliding scale   SubCutaneous three times a day before meals  insulin lispro (HumaLOG) corrective regimen sliding scale   SubCutaneous at bedtime  polyethylene glycol 3350 17 Gram(s) Oral daily  sodium chloride 0.9% lock flush 3 milliLiter(s) IV Push every 8 hours    MEDICATIONS  (PRN):  acetaminophen   Tablet .. 650 milliGRAM(s) Oral every 6 hours PRN Mild Pain (1 - 3)  glucagon  Injectable 1 milliGRAM(s) IntraMuscular once PRN Glucose LESS THAN 70 milligrams/deciliter  HYDROmorphone  Injectable 0.25 milliGRAM(s) IV Push every 10 minutes PRN Moderate Pain (4 - 6)  HYDROmorphone PCA (1 mG/mL) Rescue Clinician Bolus 0.5 milliGRAM(s) IV Push every 15 minutes PRN for Pain Scale GREATER THAN 6  naloxone Injectable 0.1 milliGRAM(s) IV Push every 3 minutes PRN For ANY of the following changes in patient status:  A. RR LESS THAN 10 breaths per minute, B. Oxygen saturation LESS THAN 90%, C. Sedation score of 6  ondansetron Injectable 4 milliGRAM(s) IV Push every 6 hours PRN Nausea  ondansetron Injectable 4 milliGRAM(s) IV Push once PRN Nausea and/or Vomiting  oxyCODONE    5 mG/acetaminophen 325 mG 1 Tablet(s) Oral every 6 hours PRN Moderate Pain (4 - 6)

## 2018-09-20 NOTE — PROGRESS NOTE ADULT - PROBLEM SELECTOR PLAN 3
- C/w Coreg  - on lasix 40 mg bid  - Cr at 1.16  - TTE results: EF 15-20%, moderate MR, severely dilated LA, severe global LV systolic dysfunction with increased apical and patricia-apical contractility relative to  other segments, Severe diastolic dysfunction (Stage III). Unchanged from 4/2016.

## 2018-09-20 NOTE — PROGRESS NOTE ADULT - ASSESSMENT
69yo M s/p right foot partial 2nd toe resection 9/19  ·	Pt seen evaluated  ·	Surgical site well coapted  ·	No acute SOI at this time  ·	Low concern residual OM  ·	Will follow cultures, if remains negative likely stable for discharge tomorrow on po abx  ·	dsd applied  ·	seen w/ attending

## 2018-09-20 NOTE — PROGRESS NOTE ADULT - SUBJECTIVE AND OBJECTIVE BOX
SUBJECTIVE:    No acute events overnight, afebrile, hds.  No cp, sob, n/v, abd pn.    VITAL SIGNS:    Vital Signs Last 24 Hrs  T(C): 36.7 (20 Sep 2018 13:46), Max: 37.3 (20 Sep 2018 10:00)  T(F): 98.1 (20 Sep 2018 13:46), Max: 99.1 (20 Sep 2018 10:00)  HR: 94 (20 Sep 2018 13:46) (66 - 94)  BP: 151/81 (20 Sep 2018 13:46) (109/63 - 151/81)  BP(mean): 91 (20 Sep 2018 09:00) (82 - 104)  RR: 15 (20 Sep 2018 13:46) (14 - 19)  SpO2: 100% (20 Sep 2018 13:46) (98% - 100%)    PHYSICAL EXAM:     GENERAL: no acute distress  HEENT: NC/AT, EOMI, neck supple, MMM  RESPIRATORY: LCTAB/L, no rhonchi, rales, or wheezing  CARDIOVASCULAR: RRR, no murmurs, gallops, rubs  ABDOMINAL: soft, non-tender, non-distended, positive bowel sounds   EXTREMITIES: no clubbing, cyanosis, Left foot in gauze dressing                          12.3   16.1  )-----------( 208      ( 20 Sep 2018 13:38 )             38.0     09-20    139  |  103  |  23  ----------------------------<  280<H>  4.6   |  23  |  1.16    Ca    7.9<L>      20 Sep 2018 05:00  Phos  4.0     09-20  Mg     1.9     09-20        CAPILLARY BLOOD GLUCOSE      POCT Blood Glucose.: 307 mg/dL (20 Sep 2018 11:14)  POCT Blood Glucose.: 247 mg/dL (20 Sep 2018 06:27)  POCT Blood Glucose.: 142 mg/dL (19 Sep 2018 23:05)  POCT Blood Glucose.: 159 mg/dL (19 Sep 2018 16:51)      MEDICATIONS  (STANDING):  aspirin enteric coated 81 milliGRAM(s) Oral daily  atorvastatin 40 milliGRAM(s) Oral at bedtime  carvedilol 25 milliGRAM(s) Oral every 12 hours  ceFAZolin   IVPB 1000 milliGRAM(s) IV Intermittent every 8 hours  ceFAZolin   IVPB      dextrose 50% Injectable 12.5 Gram(s) IV Push once  dextrose 50% Injectable 12.5 Gram(s) IV Push once  dextrose 50% Injectable 25 Gram(s) IV Push once  dextrose 50% Injectable 25 Gram(s) IV Push once  furosemide    Tablet 40 milliGRAM(s) Oral two times a day  heparin  Infusion 800 Unit(s)/Hr (8 mL/Hr) IV Continuous <Continuous>  influenza   Vaccine 0.5 milliLiter(s) IntraMuscular once  insulin glargine Injectable (LANTUS) 20 Unit(s) SubCutaneous at bedtime  insulin lispro (HumaLOG) corrective regimen sliding scale   SubCutaneous three times a day before meals  insulin lispro (HumaLOG) corrective regimen sliding scale   SubCutaneous at bedtime  polyethylene glycol 3350 17 Gram(s) Oral daily  sodium chloride 0.9% lock flush 3 milliLiter(s) IV Push every 8 hours

## 2018-09-20 NOTE — CHART NOTE - NSCHARTNOTEFT_GEN_A_CORE
Post-operative Check    SUBJECTIVE: No acute events in the immediate post-operative period. Pain well controlled.     OBJECTIVE:  T(C): 36.8 (09-20-18 @ 03:00), Max: 36.8 (09-19-18 @ 07:00)  HR: 72 (09-20-18 @ 03:00) (59 - 85)  BP: 116/59 (09-20-18 @ 03:00) (116/59 - 146/78)  RR: 14 (09-20-18 @ 04:00) (14 - 19)  SpO2: 99% (09-20-18 @ 04:00) (97% - 100%)      09-18-18 @ 07:01  -  09-19-18 @ 07:00  --------------------------------------------------------  IN: 722 mL / OUT: 1670 mL / NET: -948 mL    09-19-18 @ 07:01  -  09-20-18 @ 04:00  --------------------------------------------------------  IN: 738 mL / OUT: 250 mL / NET: 488 mL      Physical Exam:  General Appearance: Appears well, NAD,   HEENT: NC/AT  RESP: nonlabored  L groin: soft, no active bleeding or hematoma present, NTTP, dressing c/d/i   b/l LE: soft, warm, sensation/motor intact, R PT signal, left foot covered by bandage      ASSESSMENT:   ERICKA GUILLERMO is a 68y Male POD#0 s/p left foot partial 2nd toe amputation    PLAN:  - Pain management  - Follow UOP  - Resume regular diet  - Appropriate for transfer to the floor Post-operative Check    Exam completed at 9/20/2018 at 12:22am  SUBJECTIVE: No acute events in the immediate post-operative period. Pain well controlled.     OBJECTIVE:  T(C): 36.8 (09-20-18 @ 03:00), Max: 36.8 (09-19-18 @ 07:00)  HR: 72 (09-20-18 @ 03:00) (59 - 85)  BP: 116/59 (09-20-18 @ 03:00) (116/59 - 146/78)  RR: 14 (09-20-18 @ 04:00) (14 - 19)  SpO2: 99% (09-20-18 @ 04:00) (97% - 100%)      09-18-18 @ 07:01  -  09-19-18 @ 07:00  --------------------------------------------------------  IN: 722 mL / OUT: 1670 mL / NET: -948 mL    09-19-18 @ 07:01  -  09-20-18 @ 04:00  --------------------------------------------------------  IN: 738 mL / OUT: 250 mL / NET: 488 mL      Physical Exam:  General Appearance: Appears well, NAD,   HEENT: NC/AT  RESP: nonlabored  L groin: soft, no active bleeding or hematoma present, NTTP, dressing c/d/i   b/l LE: soft, warm, sensation/motor intact, R PT signal, left foot covered by bandage      ASSESSMENT:   ERICKA GUILLERMO is a 68y Male POD#0 s/p left foot partial 2nd toe amputation    PLAN:  - Pain management  - Follow UOP  - Resume regular diet  - Appropriate for transfer to the floor

## 2018-09-21 ENCOUNTER — TRANSCRIPTION ENCOUNTER (OUTPATIENT)
Age: 68
End: 2018-09-21

## 2018-09-21 DIAGNOSIS — K59.00 CONSTIPATION, UNSPECIFIED: ICD-10-CM

## 2018-09-21 LAB
ANION GAP SERPL CALC-SCNC: 10 MMOL/L — SIGNIFICANT CHANGE UP (ref 5–17)
APTT BLD: 60.4 SEC — HIGH (ref 27.5–37.4)
BUN SERPL-MCNC: 19 MG/DL — SIGNIFICANT CHANGE UP (ref 7–23)
CALCIUM SERPL-MCNC: 8.7 MG/DL — SIGNIFICANT CHANGE UP (ref 8.4–10.5)
CHLORIDE SERPL-SCNC: 97 MMOL/L — SIGNIFICANT CHANGE UP (ref 96–108)
CO2 SERPL-SCNC: 28 MMOL/L — SIGNIFICANT CHANGE UP (ref 22–31)
CREAT SERPL-MCNC: 1.18 MG/DL — SIGNIFICANT CHANGE UP (ref 0.5–1.3)
GLUCOSE BLDC GLUCOMTR-MCNC: 122 MG/DL — HIGH (ref 70–99)
GLUCOSE BLDC GLUCOMTR-MCNC: 145 MG/DL — HIGH (ref 70–99)
GLUCOSE BLDC GLUCOMTR-MCNC: 164 MG/DL — HIGH (ref 70–99)
GLUCOSE BLDC GLUCOMTR-MCNC: 196 MG/DL — HIGH (ref 70–99)
GLUCOSE SERPL-MCNC: 145 MG/DL — HIGH (ref 70–99)
HCT VFR BLD CALC: 33.4 % — LOW (ref 39–50)
HGB BLD-MCNC: 11 G/DL — LOW (ref 13–17)
MCHC RBC-ENTMCNC: 28.9 PG — SIGNIFICANT CHANGE UP (ref 27–34)
MCHC RBC-ENTMCNC: 32.9 GM/DL — SIGNIFICANT CHANGE UP (ref 32–36)
MCV RBC AUTO: 87.7 FL — SIGNIFICANT CHANGE UP (ref 80–100)
PLATELET # BLD AUTO: 195 K/UL — SIGNIFICANT CHANGE UP (ref 150–400)
POTASSIUM SERPL-MCNC: 4.2 MMOL/L — SIGNIFICANT CHANGE UP (ref 3.5–5.3)
POTASSIUM SERPL-SCNC: 4.2 MMOL/L — SIGNIFICANT CHANGE UP (ref 3.5–5.3)
RBC # BLD: 3.81 M/UL — LOW (ref 4.2–5.8)
RBC # FLD: 16.4 % — HIGH (ref 10.3–14.5)
SODIUM SERPL-SCNC: 135 MMOL/L — SIGNIFICANT CHANGE UP (ref 135–145)
WBC # BLD: 14.1 K/UL — HIGH (ref 3.8–10.5)
WBC # FLD AUTO: 14.1 K/UL — HIGH (ref 3.8–10.5)

## 2018-09-21 PROCEDURE — 99233 SBSQ HOSP IP/OBS HIGH 50: CPT

## 2018-09-21 RX ORDER — RIVAROXABAN 15 MG-20MG
15 KIT ORAL
Qty: 0 | Refills: 0 | Status: DISCONTINUED | OUTPATIENT
Start: 2018-09-21 | End: 2018-09-24

## 2018-09-21 RX ADMIN — ATORVASTATIN CALCIUM 40 MILLIGRAM(S): 80 TABLET, FILM COATED ORAL at 21:05

## 2018-09-21 RX ADMIN — Medication 40 MILLIGRAM(S): at 18:11

## 2018-09-21 RX ADMIN — SODIUM CHLORIDE 3 MILLILITER(S): 9 INJECTION INTRAMUSCULAR; INTRAVENOUS; SUBCUTANEOUS at 06:19

## 2018-09-21 RX ADMIN — SODIUM CHLORIDE 3 MILLILITER(S): 9 INJECTION INTRAMUSCULAR; INTRAVENOUS; SUBCUTANEOUS at 15:02

## 2018-09-21 RX ADMIN — OXYCODONE HYDROCHLORIDE 10 MILLIGRAM(S): 5 TABLET ORAL at 21:35

## 2018-09-21 RX ADMIN — INSULIN GLARGINE 30 UNIT(S): 100 INJECTION, SOLUTION SUBCUTANEOUS at 21:08

## 2018-09-21 RX ADMIN — HEPARIN SODIUM 8 UNIT(S)/HR: 5000 INJECTION INTRAVENOUS; SUBCUTANEOUS at 08:12

## 2018-09-21 RX ADMIN — Medication 100 MILLIGRAM(S): at 06:21

## 2018-09-21 RX ADMIN — RIVAROXABAN 15 MILLIGRAM(S): KIT at 08:55

## 2018-09-21 RX ADMIN — Medication 1 TABLET(S): at 21:05

## 2018-09-21 RX ADMIN — OXYCODONE HYDROCHLORIDE 10 MILLIGRAM(S): 5 TABLET ORAL at 01:52

## 2018-09-21 RX ADMIN — CARVEDILOL PHOSPHATE 25 MILLIGRAM(S): 80 CAPSULE, EXTENDED RELEASE ORAL at 06:21

## 2018-09-21 RX ADMIN — OXYCODONE HYDROCHLORIDE 10 MILLIGRAM(S): 5 TABLET ORAL at 01:22

## 2018-09-21 RX ADMIN — SODIUM CHLORIDE 3 MILLILITER(S): 9 INJECTION INTRAMUSCULAR; INTRAVENOUS; SUBCUTANEOUS at 21:00

## 2018-09-21 RX ADMIN — OXYCODONE HYDROCHLORIDE 10 MILLIGRAM(S): 5 TABLET ORAL at 21:05

## 2018-09-21 RX ADMIN — Medication 81 MILLIGRAM(S): at 13:30

## 2018-09-21 RX ADMIN — RIVAROXABAN 15 MILLIGRAM(S): KIT at 21:05

## 2018-09-21 RX ADMIN — Medication 1: at 21:09

## 2018-09-21 RX ADMIN — Medication 40 MILLIGRAM(S): at 06:21

## 2018-09-21 RX ADMIN — Medication 100 MILLIGRAM(S): at 13:30

## 2018-09-21 RX ADMIN — Medication 1: at 15:34

## 2018-09-21 RX ADMIN — OXYCODONE HYDROCHLORIDE 10 MILLIGRAM(S): 5 TABLET ORAL at 08:50

## 2018-09-21 RX ADMIN — CARVEDILOL PHOSPHATE 25 MILLIGRAM(S): 80 CAPSULE, EXTENDED RELEASE ORAL at 18:11

## 2018-09-21 NOTE — PHYSICAL THERAPY INITIAL EVALUATION ADULT - ADDITIONAL COMMENTS
Pt lives with spouse and son in a private house with 3 [address] and no steps to enter. Pt was ind with all ADLs and amb with RW.

## 2018-09-21 NOTE — PROGRESS NOTE ADULT - PROBLEM SELECTOR PLAN 5
-c/w home statin therapy  - c/w ASA  - will restart plavix once ok with vascular -c/w home statin therapy  - c/w ASA  - consider restarting Plavix

## 2018-09-21 NOTE — DISCHARGE NOTE ADULT - PLAN OF CARE
healing Podiatry Discharge Instructions:  Follow up: Please follow up with  __Suze__ within 1 week of discharge from the hospital, please call [phone]_ for appointment and discuss that you recently were seen in the hospital.  Wound Care: Please leave your dressing clean dry intact until your follow up appointment   Weight bearing: Please weight bear as tolerated in a surgical shoe on left foot   Antibiotics: Please continue as instructed. wound healing Please follow up with Dr. Cordero 1 week after discharge. Please call his office to make an appointment [phone] Podiatry Discharge Instructions:  Follow up: Please follow up with Dr. Arciniega within 1 week of discharge from the hospital, please call [phone] for appointment and discuss that you recently were seen in the hospital.    Weight bearing: Please weight bear as tolerated in a surgical shoe on left foot Please follow up with Dr. Jung 1 week after discharge. Please call his office to make an appointment [phone] Please follow up with Dr. Jung one week after discharge. Please call his office to make an appointment [phone]. Please discuss at your follow up at appointment, how much longer to stay on Xarelto.  You were given a 21 day supply, and will need a new prescription if necessary to continue based on your discussion with Dr. Jung.

## 2018-09-21 NOTE — PHYSICAL THERAPY INITIAL EVALUATION ADULT - PERTINENT HX OF CURRENT PROBLEM, REHAB EVAL
Pt is a 67 y/o male admitted to [facility] on 9/6/18 w/ hx of DM2 c/b prior right three toe amputation for infection, CAD w/ stent (last placed in 2016 per patient) & CABG, HTN, severe LV dysfunction (based on TTE 2016), PAD s/p RLE bypass grafting presenting with left 2nd digit toe infection.

## 2018-09-21 NOTE — PROGRESS NOTE ADULT - PROBLEM SELECTOR PLAN 3
- C/w Coreg  - on lasix 40 mg bid  - Cr at 1.16  - TTE results: EF 15-20%, moderate MR, severely dilated LA, severe global LV systolic dysfunction with increased apical and patricia-apical contractility relative to  other segments, Severe diastolic dysfunction (Stage III). Unchanged from 4/2016. - C/w Coreg  - c/w lasix 40 mg bid  - Cr at 1.18  - TTE results: EF 15-20%, moderate MR, severely dilated LA, severe global LV systolic dysfunction with increased apical and patricia-apical contractility relative to  other segments, Severe diastolic dysfunction (Stage III). Unchanged from 4/2016.

## 2018-09-21 NOTE — DISCHARGE NOTE ADULT - ADDITIONAL INSTRUCTIONS
WOUND CARE: left foot daily dressing changes with gauze placed above incision site and  wrap with cling, secure with paper tape, change once a day.  Left leg: Please keep incision sites clean and dry, shower only.  Do not bathe or immerse incision sites in water for a prolonged amount of time.  Steri-strips may fall of on their own in the shower. WOUND CARE:   Left foot daily dressing changes with gauze placed above incision site and  wrap with cling, secure with paper tape, change once a day.    Left leg: Please keep incision sites clean and dry, shower only.  Do not bathe or immerse incision sites in water for a prolonged amount of time.  Steri-strips may fall of on their own in the shower.  Left gron: place sterile dry gauze directly above incision site and secure with paper tape, change daily   Leg blisters: Silvadeen Cream, apply three times a day. WOUND CARE:   Left foot daily dressing changes with gauze placed above incision site and  wrap with cling, secure with paper tape, change once a day.    Left leg: Please keep incision sites clean and dry, shower only.  Do not bathe or immerse incision sites in water for a prolonged amount of time.  Steri-strips may fall of on their own in the shower.  Left gron: place sterile dry gauze directly above incision site and secure with paper tape, change daily   Leg blisters: Silvadeen Cream, apply three times a day. May pat blisters dry, if blisters are actively draining.

## 2018-09-21 NOTE — PHYSICAL THERAPY INITIAL EVALUATION ADULT - STRENGTHENING, PT EVAL
GOAL: Pt will improve  LLE strength to 3+/5 , for increased limb stability, to improve gait and facilitate stair negotiation in 4 weeks.

## 2018-09-21 NOTE — DISCHARGE NOTE ADULT - HOSPITAL COURSE
68 yo M w/ hx of DM2 c/b prior right three toe amputation for infection, CAD w/ stent (last placed in 2016 per patient) & CABG, HTN, severe LV dysfunction (based on TTE 2016), PAD s/p RLE bypass grafting presenting with left 2nd digit toe infection.    Patient reports one week hx of noticing a dark colored region of the left 2nd toe of foot. Patient reports he has noticed dark red drainage. He has had no trauma, tenderness, or fevers. He denies any saltwater exposure or any animal bites/dog/cat exposure to toe. He reports because of the persistent drainage he went to his primary care doctor yesterday for check-up and was told to come to the hospital. He denies recent antimicrobial therapy and otherwise has no symptoms. Specifically he has no chest pain, palpitation, atypical anginal symptoms, back pain, abdominal pain, nausea/vomiting.     Pt was admitted for symptomatic relief, pain control, IV fluids and further evaluation and treatment. On  9/8: patient with likely chronic OM, currently ON vanc/zosyn, awaiting Vascular recs regarding angio vs. amputation  On 9/9,  plan for angio by vascular followed by amputation by podiatry, continuing w/ vanc and zosyn. On 9/10: attempting to obtain outpatient cardiology records (results of stress test). On 9/12: cleared by Cards for angio. On 9/14, pt underwent LLE angio and stent to L iliac. On 9/15, vein mapping performed, per vascular for, bypass planning for Tuesday 9/18. Called cards for clearance for procedure.  BUN/Cr checked before restarting Lasix.    On 9/18, pt underwent Left femoral to popliteal artery bypass with PTFE graft. Intra-operative angiogram - patent proximal and distal anastomoses anastomoses. Flow to peroneal and AT->DP arteries, dopplerable Left DP and Right AT post-operatively: OR for Left fem-pop bypass with PTFE graft. On 9/19 OR by podiatry for L toe amputation. on 9/20: transferred to the floor  Physical therapy evaluated pt and recommended Subacute Rehabilitation Facility.    Pt refused rehab, and prefered to go home with Visiting Nurse Services. Pain is now well controlled.  Pt is tolerating a diet, voiding and ambulating.  Pt is ready for discharge in stable condition.  Pt will follow up with Dr. Jung, Vascular Surgery, and Dr. Arciniega Podiatry, as an outpatient in one to two weeks. 66 yo M w/ hx of DM2 c/b prior right three toe amputation for infection, CAD w/ stent (last placed in 2016 per patient) & CABG, HTN, severe LV dysfunction (based on TTE 2016), PAD s/p RLE bypass grafting presenting with left 2nd digit toe infection.    Patient reports one week hx of noticing a dark colored region of the left 2nd toe of foot. Patient reports he has noticed dark red drainage. He has had no trauma, tenderness, or fevers. He denies any saltwater exposure or any animal bites/dog/cat exposure to toe. He reports because of the persistent drainage he went to his primary care doctor yesterday for check-up and was told to come to the hospital. He denies recent antimicrobial therapy and otherwise has no symptoms. Specifically he has no chest pain, palpitation, atypical anginal symptoms, back pain, abdominal pain, nausea/vomiting.     Pt was admitted for symptomatic relief, pain control, IV fluids and further evaluation and treatment. On  9/8: patient with likely chronic OM, currently ON vanc/zosyn, awaiting Vascular recs regarding angio vs. amputation.    On 9/9,  plan for angio by vascular followed by amputation by podiatry, continuing w/ vanc and zosyn. On 9/10: attempting to obtain outpatient cardiology records (results of stress test). On 9/12: cleared by Cards for angio. On 9/14, pt underwent LLE angio and stent to L iliac. On 9/15, vein mapping performed, per vascular for, bypass planning for Tuesday 9/18. Called cards for clearance for procedure.  BUN/Cr checked before restarting Lasix.    On 9/18, pt underwent Left femoral to popliteal artery bypass with PTFE graft. Intra-operative angiogram - patent proximal and distal anastomoses anastomoses. Flow to peroneal and AT->DP arteries, dopplerable Left DP and Right AT post-operatively: OR for Left fem-pop bypass with PTFE graft. On 9/19 OR by podiatry for L toe amputation. on 9/20: transferred to the floor  Physical therapy evaluated pt and recommended Subacute Rehabilitation Facility.    Pt is tolerating a diet, voiding and ambulating.  Pt is ready for discharge in stable condition.  Pt will follow up with Dr. Jung, Vascular Surgery, and Dr. Arciniega Podiatry, as an outpatient in one to two weeks.

## 2018-09-21 NOTE — PROGRESS NOTE ADULT - PROBLEM SELECTOR PLAN 6
-pt currently on hep gtt - would start bowel regimen since pt is on opiate analgesia  - start Senna, Colace

## 2018-09-21 NOTE — DISCHARGE NOTE ADULT - PATIENT PORTAL LINK FT
You can access the [facility] Patient Portal, offered by [facility], by registering with the following website: [email or web address]

## 2018-09-21 NOTE — DISCHARGE NOTE ADULT - INSTRUCTIONS
Regular diet  Activity as tolerated. Avoid heavy lifting, no heavy exercise  or straining over 15 lbs for the next two weeks;  Driving- Please do not drive until your pain is well controlled and you do not need to take pain medications.  You may shower-Do not submerge or scrub incision sites.

## 2018-09-21 NOTE — DISCHARGE NOTE ADULT - CONDITIONS AT DISCHARGE
Vital signs stable, left foot dressing clean dry and intact, left leg incison healing well with steris, blisters noted around incision

## 2018-09-21 NOTE — PROGRESS NOTE ADULT - ASSESSMENT
· Assessment		  67 yo M s/p right foot partial 2nd toe resection 9/19  ·	Pt seen evaluated  ·	dressing clean dry and intact  ·	Low concern for residual OM  ·	cultures remain negative, pt is stable for discharge on PO abx x14 days   ·	d w/ attending

## 2018-09-21 NOTE — PROGRESS NOTE ADULT - ASSESSMENT
A/P: 68M s/p Left femoral to popliteal artery bypass with PTFE graft and partial right 2nd toe amputation 9/19.      - Hep gtt discontinued, started on Xarelto   - reg diet  - pain control   - keep -130s, avoid pressors  - OOB as tolerated  - Weight bearing per podiatry   - monitor pedal pulses (L DP, R AT)  - PT consult  - discharge planning     Leyla Cook PA-C   Vascular Surgery #[id number] A/P: 68M s/p Left femoral to popliteal artery bypass with PTFE graft and partial left 2nd toe amputation 9/19.      - Hep gtt discontinued, started on Xarelto   - reg diet  - pain control   - keep -130s, avoid pressors  - OOB as tolerated  - Weight bearing per podiatry   - monitor pedal pulses (L DP, R AT)  - PT consult  - daily dressing changes with gauze and cling   - discharge planning     Leyla Cook PA-C   Vascular Surgery #[id number]

## 2018-09-21 NOTE — PROGRESS NOTE ADULT - SUBJECTIVE AND OBJECTIVE BOX
Surgery Progress Note: Vascular Surgery     S: Patient seen and examined. No acute events overnight. Resting comfortably. Pain is well controlled.     O:  Vital Signs Last 24 Hrs  T(C): 36.9 (21 Sep 2018 10:11), Max: 37.7 (21 Sep 2018 01:24)  T(F): 98.4 (21 Sep 2018 10:11), Max: 99.9 (21 Sep 2018 01:24)  HR: 70 (21 Sep 2018 10:11) (69 - 94)  BP: 118/78 (21 Sep 2018 10:11) (118/78 - 151/81)  BP(mean): --  RR: 16 (21 Sep 2018 10:11) (15 - 18)  SpO2: 97% (21 Sep 2018 10:11) (92% - 100%)    I&O's Detail    20 Sep 2018 07:01  -  21 Sep 2018 07:00  --------------------------------------------------------  IN:    heparin Infusion: 55 mL    heparin Infusion: 128 mL    Oral Fluid: 1140 mL    Solution: 60 mL  Total IN: 1383 mL    OUT:    Voided: 1575 mL  Total OUT: 1575 mL    Total NET: -192 mL          MEDICATIONS  (STANDING):  aspirin enteric coated 81 milliGRAM(s) Oral daily  atorvastatin 40 milliGRAM(s) Oral at bedtime  carvedilol 25 milliGRAM(s) Oral every 12 hours  ceFAZolin   IVPB 1000 milliGRAM(s) IV Intermittent every 8 hours  ceFAZolin   IVPB      dextrose 50% Injectable 12.5 Gram(s) IV Push once  dextrose 50% Injectable 12.5 Gram(s) IV Push once  dextrose 50% Injectable 25 Gram(s) IV Push once  dextrose 50% Injectable 25 Gram(s) IV Push once  furosemide    Tablet 40 milliGRAM(s) Oral two times a day  influenza   Vaccine 0.5 milliLiter(s) IntraMuscular once  insulin glargine Injectable (LANTUS) 30 Unit(s) SubCutaneous at bedtime  insulin lispro (HumaLOG) corrective regimen sliding scale   SubCutaneous three times a day before meals  insulin lispro (HumaLOG) corrective regimen sliding scale   SubCutaneous at bedtime  polyethylene glycol 3350 17 Gram(s) Oral daily  rivaroxaban 15 milliGRAM(s) Oral two times a day  sodium chloride 0.9% lock flush 3 milliLiter(s) IV Push every 8 hours    MEDICATIONS  (PRN):  acetaminophen   Tablet .. 650 milliGRAM(s) Oral every 6 hours PRN Mild Pain (1 - 3)  glucagon  Injectable 1 milliGRAM(s) IntraMuscular once PRN Glucose LESS THAN 70 milligrams/deciliter  naloxone Injectable 0.1 milliGRAM(s) IV Push every 3 minutes PRN For ANY of the following changes in patient status:  A. RR LESS THAN 10 breaths per minute, B. Oxygen saturation LESS THAN 90%, C. Sedation score of 6  ondansetron Injectable 4 milliGRAM(s) IV Push every 6 hours PRN Nausea  oxyCODONE    5 mG/acetaminophen 325 mG 1 Tablet(s) Oral every 6 hours PRN Moderate Pain (4 - 6)  oxyCODONE    IR 5 milliGRAM(s) Oral every 4 hours PRN Moderate Pain (4 - 6)  oxyCODONE    IR 10 milliGRAM(s) Oral every 4 hours PRN Severe Pain (7 - 10)                            11.0   14.1  )-----------( 195      ( 21 Sep 2018 07:15 )             33.4       09-21    135  |  97  |  19  ----------------------------<  145<H>  4.2   |  28  |  1.18    Ca    8.7      21 Sep 2018 07:13  Phos  4.0     09-20  Mg     1.9     09-20        Physical Exam:  Gen: Laying in bed, NAD, AAO x 3   Resp: Unlabored breathing   L groin:  soft, no active bleed or hematoma, nontender to palpation, incision c/d/i  Extremities: soft, warm, sensation/motor intact, bilateral Pt/DP signals, right foot dressing changed, steri strips on incisions on upper and lower leg

## 2018-09-21 NOTE — DISCHARGE NOTE ADULT - CARE PROVIDER_API CALL
Nagi Jung), Surgery  Vascular  [address] [facility]  [address], [place] [address]  Phone: [phone]  Fax: [phone]    Darryl Arciniega), Podiatric Medicine and Surgery  [address] [place] [address]  Phone: [phone]  Fax: [phone]

## 2018-09-21 NOTE — PHYSICAL THERAPY INITIAL EVALUATION ADULT - PRECAUTIONS/LIMITATIONS, REHAB EVAL
Pt reports one week hx of noticing a dark colored region of the left 2nd toe of foot. Pt reports he has noticed dark red drainage. He has had no trauma, tenderness, or fevers. He reports because of the persistent drainage he went to his primary care doctor yesterday for check-up and was told to come to the hospital.  XR L foot: Soft tissue swelling involving the distal left second digit with erosion of the tuft of the distal phalanx. Findings are suspicious for   osteomyelitis. CTA ABDOMEN, PELVIS, AND LOWER EXTREMITIES: Bilateral SFA occlusion. Occluded right femoral bypass graft. Limited evaluation below the knee with poor runoff. moderately reduced LV function

## 2018-09-21 NOTE — PHYSICAL THERAPY INITIAL EVALUATION ADULT - GAIT DEVIATIONS NOTED, PT EVAL
decreased martin/decreased velocity of limb motion/decreased weight-shifting ability/decreased step length

## 2018-09-21 NOTE — PHYSICAL THERAPY INITIAL EVALUATION ADULT - DID THE PATIENT HAVE SURGERY?
s/p LLE angio and stent to L iliac (9/14), s/p L toe amputation (9/19), s/p Left femoral to popliteal artery bypass with PTFE graft (9/19)/yes

## 2018-09-21 NOTE — PROGRESS NOTE ADULT - PROBLEM SELECTOR PLAN 1
Cr of 1.16, eGFR wnl  - continue to monitor BMP  - Hold ACE-I for now, avoid nephrotoxic agents - now resolved ; Cr 1.18 today;  eGFR wnl  - continue to monitor BMP  - consider restarting Entresto - now resolved ; Cr 1.18 today;  eGFR wnl  - continue to monitor BMP  - consider restarting ACEI

## 2018-09-21 NOTE — DISCHARGE NOTE ADULT - CARE PLAN
Principal Discharge DX:	S/P amputation  Goal:	healing  Assessment and plan of treatment:	Podiatry Discharge Instructions:  Follow up: Please follow up with  __Suze__ within 1 week of discharge from the hospital, please call [phone]_ for appointment and discuss that you recently were seen in the hospital.  Wound Care: Please leave your dressing clean dry intact until your follow up appointment   Weight bearing: Please weight bear as tolerated in a surgical shoe on left foot   Antibiotics: Please continue as instructed. Principal Discharge DX:	S/P amputation  Goal:	healing  Assessment and plan of treatment:	Podiatry Discharge Instructions:  Follow up: Please follow up with  __Suze__ within 1 week of discharge from the hospital, please call [phone]_ for appointment and discuss that you recently were seen in the hospital.  Wound Care: Please leave your dressing clean dry intact until your follow up appointment   Weight bearing: Please weight bear as tolerated in a surgical shoe on left foot   Antibiotics: Please continue as instructed.  Secondary Diagnosis:	S/P angioplasty with stent  Goal:	wound healing  Assessment and plan of treatment:	Please follow up with Dr. Cordero 1 week after discharge. Please call his office to make an appointment [phone] Principal Discharge DX:	S/P amputation  Goal:	healing  Assessment and plan of treatment:	Podiatry Discharge Instructions:  Follow up: Please follow up with Dr. Arciniega within 1 week of discharge from the hospital, please call [phone] for appointment and discuss that you recently were seen in the hospital.    Weight bearing: Please weight bear as tolerated in a surgical shoe on left foot  Secondary Diagnosis:	S/P angioplasty with stent  Goal:	wound healing  Assessment and plan of treatment:	Please follow up with Dr. Jung 1 week after discharge. Please call his office to make an appointment [phone] Principal Discharge DX:	S/P amputation  Goal:	healing  Assessment and plan of treatment:	Podiatry Discharge Instructions:  Follow up: Please follow up with Dr. Arciniega within 1 week of discharge from the hospital, please call [phone] for appointment and discuss that you recently were seen in the hospital.    Weight bearing: Please weight bear as tolerated in a surgical shoe on left foot  Secondary Diagnosis:	S/P angioplasty with stent  Goal:	wound healing  Assessment and plan of treatment:	Please follow up with Dr. Jung one week after discharge. Please call his office to make an appointment [phone]. Please discuss at your follow up at appointment, how much longer to stay on Xarelto.  You were given a 21 day supply, and will need a new prescription if necessary to continue based on your discussion with Dr. Jung.

## 2018-09-21 NOTE — CHART NOTE - NSCHARTNOTEFT_GEN_A_CORE
Source: Patient [x ]    Family [ ]     other [x ] chart since 4/27/2016  seen for follow up  Reason for Admission: toe infection  S/P Femoral popliteal bypass, left  09/19/2018      s/p left foot partial 2nd toe amputation - closed      Diet : CSTCHOSN      Patient reports [ ] nausea  [ ] vomiting [ ] diarrhea [ ] constipation  [ ]chewing problems [ ] swallowing issues  [ ] other: no issues/concerns related to food/nutrition, refused diet ed reinforcement     PO intake:  < 50% [ ] 50-75% [ ]   % [ x]  other :     Source for PO intake [x ] Patient [ ] family [ ] chart [ ] staff [ ] other          Current Weight: 73.9 on 9/17  % Weight Change: 4% gain since 9/12    Pertinent Medications: MEDICATIONS  (STANDING):  aspirin enteric coated 81 milliGRAM(s) Oral daily  atorvastatin 40 milliGRAM(s) Oral at bedtime  carvedilol 25 milliGRAM(s) Oral every 12 hours  ceFAZolin   IVPB 1000 milliGRAM(s) IV Intermittent every 8 hours  ceFAZolin   IVPB      dextrose 50% Injectable 12.5 Gram(s) IV Push once  dextrose 50% Injectable 12.5 Gram(s) IV Push once  dextrose 50% Injectable 25 Gram(s) IV Push once  dextrose 50% Injectable 25 Gram(s) IV Push once  furosemide    Tablet 40 milliGRAM(s) Oral two times a day  influenza   Vaccine 0.5 milliLiter(s) IntraMuscular once  insulin glargine Injectable (LANTUS) 30 Unit(s) SubCutaneous at bedtime  insulin lispro (HumaLOG) corrective regimen sliding scale   SubCutaneous three times a day before meals  insulin lispro (HumaLOG) corrective regimen sliding scale   SubCutaneous at bedtime  polyethylene glycol 3350 17 Gram(s) Oral daily  rivaroxaban 15 milliGRAM(s) Oral two times a day  sodium chloride 0.9% lock flush 3 milliLiter(s) IV Push every 8 hours    MEDICATIONS  (PRN):  acetaminophen   Tablet .. 650 milliGRAM(s) Oral every 6 hours PRN Mild Pain (1 - 3)  glucagon  Injectable 1 milliGRAM(s) IntraMuscular once PRN Glucose LESS THAN 70 milligrams/deciliter  naloxone Injectable 0.1 milliGRAM(s) IV Push every 3 minutes PRN For ANY of the following changes in patient status:  A. RR LESS THAN 10 breaths per minute, B. Oxygen saturation LESS THAN 90%, C. Sedation score of 6  ondansetron Injectable 4 milliGRAM(s) IV Push every 6 hours PRN Nausea  oxyCODONE    5 mG/acetaminophen 325 mG 1 Tablet(s) Oral every 6 hours PRN Moderate Pain (4 - 6)  oxyCODONE    IR 5 milliGRAM(s) Oral every 4 hours PRN Moderate Pain (4 - 6)  oxyCODONE    IR 10 milliGRAM(s) Oral every 4 hours PRN Severe Pain (7 - 10)    Pertinent Labs:  09-21 Na135 mmol/L Glu 145 mg/dL<H> K+ 4.2 mmol/L Cr  1.18 mg/dL BUN 19 mg/dL 09-20 Phos 4.0 mg/dL 09-12 [id number] 8.8 %<H>      CAPILLARY BLOOD GLUCOSE      POCT Blood Glucose.: 145 mg/dL (21 Sep 2018 10:03)  POCT Blood Glucose.: 122 mg/dL (21 Sep 2018 09:05)  POCT Blood Glucose.: 220 mg/dL (20 Sep 2018 22:37)  POCT Blood Glucose.: 180 mg/dL (20 Sep 2018 18:30)    Hemoglobin A1C, Whole Blood: 8.8 % (09-12 @ 07:39)        Skin: +2 scrotum, no pressure ulcers as per flow sheet    Estimated Needs:   [x ] no change since previous assessment  [ ] recalculated:       Previous Nutrition Diagnosis:  [x] limited adherence to nutrition-related recommendations    [ ] Inadequate Energy Intake [ ]Inadequate Oral Intake [ ] Excessive Energy Intake     [ ] Underweight [ ] Increased Nutrient Needs [ ] Overweight/Obesity     [ ] Altered GI Function [ ] Unintended Weight Loss [ ] Food & Nutrition Related Knowledge Deficit [ ] Malnutrition          Nutrition Diagnosis is [ x] ongoing  [ ] resolved [ ] not applicable          New Nutrition Diagnosis: [x ] not applicable    [ ] Inadequate Protein Energy Intake [ ]Inadequate Oral Intake [ ] Excessive Energy Intake     [ ] Underweight [ ] Increased Nutrient Needs [ ] Overweight/Obesity     [ ] Altered GI Function [ ] Unintended Weight Loss [ ] Food & Nutrition Related Knowledge Deficit[ ] Limited Adherence to nutrition related recommendations [ ] Malnutrition  [ ] other: Free text           Recommend    [x ] Continue CSTCHOSN               Monitoring and Evaluation:     [x ] PO intake [x ] Tolerance to diet prescription [x ] weights [x ] follow up per protocol    [ ] other:

## 2018-09-21 NOTE — DISCHARGE NOTE ADULT - MEDICATION SUMMARY - MEDICATIONS TO TAKE
I will START or STAY ON the medications listed below when I get home from the hospital:    aspirin 81 mg oral delayed release tablet  -- 1 tab(s) by mouth once a day  -- Indication: For Coronary artery disease involving native coronary artery of native heart without angina pectoris    acetaminophen 325 mg oral tablet  -- 2 tab(s) by mouth every 6 hours, As needed, Mild Pain (1 - 3)  -- Indication: For Pain    oxyCODONE 5 mg oral tablet  -- 1-2 tab(s) by mouth every 6-8 hours, As Needed -Moderate to Severe Pain  MDD:4  -- Indication: For Pain    Entresto 24 mg-26 mg oral tablet  -- 1 tab(s) by mouth 2 times a day  -- Indication: For HTN    rivaroxaban 15 mg oral tablet  -- 1 tab(s) by mouth 2 times a day  -- Indication: For Anticoag    Lantus Solostar Pen 100 units/mL subcutaneous solution  -- 30 unit(s) subcutaneous once a day (at bedtime)  -- Indication: For diabetes    atorvastatin 40 mg oral tablet  -- 1 tab(s) by mouth once a day (at bedtime)  -- Indication: For hypercholesterolemia    carvedilol 25 mg oral tablet  -- 1 tab(s) by mouth 2 times a day  -- Indication: For Coronary artery disease involving native coronary artery of native heart without angina pectoris    Silvadene 1% topical cream  -- Apply on skin to affected area 3 times a day MDD:3  -- For external use only.    -- Indication: For blisters    furosemide 40 mg oral tablet  -- 1 tab(s) by mouth 2 times a day  -- Indication: For HTN    amoxicillin-clavulanate 875 mg-125 mg oral tablet  -- 1 tab(s) by mouth 2 times a day MDD:2  -- Indication: For infection I will START or STAY ON the medications listed below when I get home from the hospital:    aspirin 81 mg oral delayed release tablet  -- 1 tab(s) by mouth once a day  -- Indication: For Coronary artery disease involving native coronary artery of native heart without angina pectoris    acetaminophen 325 mg oral tablet  -- 2 tab(s) by mouth every 6 hours, As needed, Mild Pain (1 - 3)  -- Indication: For Pain    oxyCODONE 5 mg oral tablet  -- 1-2 tab(s) by mouth every 6-8 hours, As Needed -Moderate to Severe Pain  MDD:4  -- Indication: For Pain    Entresto 24 mg-26 mg oral tablet  -- 1 tab(s) by mouth 2 times a day  -- Indication: For HTN    rivaroxaban 15 mg oral tablet  -- 1 tab(s) by mouth 2 times a day MDD:2  -- Indication: For Anticoagulation    Lantus Solostar Pen 100 units/mL subcutaneous solution  -- 30 unit(s) subcutaneous once a day (at bedtime)  -- Indication: For diabetes    atorvastatin 40 mg oral tablet  -- 1 tab(s) by mouth once a day (at bedtime)  -- Indication: For hypercholesterolemia    carvedilol 25 mg oral tablet  -- 1 tab(s) by mouth 2 times a day  -- Indication: For Coronary artery disease involving native coronary artery of native heart without angina pectoris    Silvadene 1% topical cream  -- Apply on skin to affected area 3 times a day MDD:3  -- For external use only.    -- Indication: For blisters    furosemide 40 mg oral tablet  -- 1 tab(s) by mouth 2 times a day  -- Indication: For HTN    amoxicillin-clavulanate 875 mg-125 mg oral tablet  -- 1 tab(s) by mouth 2 times a day MDD:2  -- Indication: For infection

## 2018-09-21 NOTE — PROGRESS NOTE ADULT - SUBJECTIVE AND OBJECTIVE BOX
Patient is a 68y old  Male who presents with a chief complaint of toe infection (21 Sep 2018 07:58)      SUBJECTIVE / OVERNIGHT EVENTS:    MEDICATIONS  (STANDING):  aspirin enteric coated 81 milliGRAM(s) Oral daily  atorvastatin 40 milliGRAM(s) Oral at bedtime  carvedilol 25 milliGRAM(s) Oral every 12 hours  ceFAZolin   IVPB 1000 milliGRAM(s) IV Intermittent every 8 hours  ceFAZolin   IVPB      dextrose 50% Injectable 12.5 Gram(s) IV Push once  dextrose 50% Injectable 12.5 Gram(s) IV Push once  dextrose 50% Injectable 25 Gram(s) IV Push once  dextrose 50% Injectable 25 Gram(s) IV Push once  furosemide    Tablet 40 milliGRAM(s) Oral two times a day  influenza   Vaccine 0.5 milliLiter(s) IntraMuscular once  insulin glargine Injectable (LANTUS) 30 Unit(s) SubCutaneous at bedtime  insulin lispro (HumaLOG) corrective regimen sliding scale   SubCutaneous three times a day before meals  insulin lispro (HumaLOG) corrective regimen sliding scale   SubCutaneous at bedtime  polyethylene glycol 3350 17 Gram(s) Oral daily  rivaroxaban 15 milliGRAM(s) Oral two times a day  sodium chloride 0.9% lock flush 3 milliLiter(s) IV Push every 8 hours    MEDICATIONS  (PRN):  acetaminophen   Tablet .. 650 milliGRAM(s) Oral every 6 hours PRN Mild Pain (1 - 3)  glucagon  Injectable 1 milliGRAM(s) IntraMuscular once PRN Glucose LESS THAN 70 milligrams/deciliter  naloxone Injectable 0.1 milliGRAM(s) IV Push every 3 minutes PRN For ANY of the following changes in patient status:  A. RR LESS THAN 10 breaths per minute, B. Oxygen saturation LESS THAN 90%, C. Sedation score of 6  ondansetron Injectable 4 milliGRAM(s) IV Push every 6 hours PRN Nausea  oxyCODONE    5 mG/acetaminophen 325 mG 1 Tablet(s) Oral every 6 hours PRN Moderate Pain (4 - 6)  oxyCODONE    IR 5 milliGRAM(s) Oral every 4 hours PRN Moderate Pain (4 - 6)  oxyCODONE    IR 10 milliGRAM(s) Oral every 4 hours PRN Severe Pain (7 - 10)      Vital Signs Last 24 Hrs  T(C): 37.3 (21 Sep 2018 06:07), Max: 37.7 (21 Sep 2018 01:24)  T(F): 99.1 (21 Sep 2018 06:07), Max: 99.9 (21 Sep 2018 01:24)  HR: 75 (21 Sep 2018 06:07) (69 - 94)  BP: 125/75 (21 Sep 2018 06:07) (120/76 - 151/81)  BP(mean): --  RR: 17 (21 Sep 2018 06:07) (14 - 18)  SpO2: 95% (21 Sep 2018 06:07) (92% - 100%)  CAPILLARY BLOOD GLUCOSE      POCT Blood Glucose.: 220 mg/dL (20 Sep 2018 22:37)  POCT Blood Glucose.: 180 mg/dL (20 Sep 2018 18:30)  POCT Blood Glucose.: 307 mg/dL (20 Sep 2018 11:14)    I&O's Summary    20 Sep 2018 07:01  -  21 Sep 2018 07:00  --------------------------------------------------------  IN: 1383 mL / OUT: 1575 mL / NET: -192 mL        PHYSICAL EXAM:  GENERAL: NAD, well-developed  HEAD:  Atraumatic, Normocephalic  EYES: EOMI, PERRLA, conjunctiva and sclera clear  NECK: Supple, No JVD  CHEST/LUNG: Clear to auscultation bilaterally; No wheeze  HEART: Regular rate and rhythm; No murmurs, rubs, or gallops  ABDOMEN: Soft, Nontender, Nondistended; Bowel sounds present  EXTREMITIES:  2+ Peripheral Pulses, No clubbing, cyanosis, or edema  PSYCH: AAOx3  NEUROLOGY: non-focal  SKIN: No rashes or lesions    LABS:                        11.0   14.1  )-----------( 195      ( 21 Sep 2018 07:15 )             33.4     09-21    135  |  97  |  19  ----------------------------<  145<H>  4.2   |  28  |  1.18    Ca    8.7      21 Sep 2018 07:13  Phos  4.0     09-20  Mg     1.9     09-20      PT/INR - ( 20 Sep 2018 05:00 )   PT: 18.4 sec;   INR: 1.69 ratio         PTT - ( 21 Sep 2018 07:15 )  PTT:60.4 sec          RADIOLOGY & ADDITIONAL TESTS:    Imaging Personally Reviewed:    Consultant(s) Notes Reviewed:      Care Discussed with Consultants/Other Providers: Patient is a 68y old  Male who presents with a chief complaint of toe infection (21 Sep 2018 07:58)      SUBJECTIVE / OVERNIGHT EVENTS: Pt seen and examined. No acute events overnight. Pt denies fever/chills. He denies LLE pain. He does c/o left sided back pain ( reports lying on that side since his procedure). He does c/o constipation x  2 days    MEDICATIONS  (STANDING):  aspirin enteric coated 81 milliGRAM(s) Oral daily  atorvastatin 40 milliGRAM(s) Oral at bedtime  carvedilol 25 milliGRAM(s) Oral every 12 hours  ceFAZolin   IVPB 1000 milliGRAM(s) IV Intermittent every 8 hours  ceFAZolin   IVPB      dextrose 50% Injectable 12.5 Gram(s) IV Push once  dextrose 50% Injectable 12.5 Gram(s) IV Push once  dextrose 50% Injectable 25 Gram(s) IV Push once  dextrose 50% Injectable 25 Gram(s) IV Push once  furosemide    Tablet 40 milliGRAM(s) Oral two times a day  influenza   Vaccine 0.5 milliLiter(s) IntraMuscular once  insulin glargine Injectable (LANTUS) 30 Unit(s) SubCutaneous at bedtime  insulin lispro (HumaLOG) corrective regimen sliding scale   SubCutaneous three times a day before meals  insulin lispro (HumaLOG) corrective regimen sliding scale   SubCutaneous at bedtime  polyethylene glycol 3350 17 Gram(s) Oral daily  rivaroxaban 15 milliGRAM(s) Oral two times a day  sodium chloride 0.9% lock flush 3 milliLiter(s) IV Push every 8 hours    MEDICATIONS  (PRN):  acetaminophen   Tablet .. 650 milliGRAM(s) Oral every 6 hours PRN Mild Pain (1 - 3)  glucagon  Injectable 1 milliGRAM(s) IntraMuscular once PRN Glucose LESS THAN 70 milligrams/deciliter  naloxone Injectable 0.1 milliGRAM(s) IV Push every 3 minutes PRN For ANY of the following changes in patient status:  A. RR LESS THAN 10 breaths per minute, B. Oxygen saturation LESS THAN 90%, C. Sedation score of 6  ondansetron Injectable 4 milliGRAM(s) IV Push every 6 hours PRN Nausea  oxyCODONE    5 mG/acetaminophen 325 mG 1 Tablet(s) Oral every 6 hours PRN Moderate Pain (4 - 6)  oxyCODONE    IR 5 milliGRAM(s) Oral every 4 hours PRN Moderate Pain (4 - 6)  oxyCODONE    IR 10 milliGRAM(s) Oral every 4 hours PRN Severe Pain (7 - 10)      Vital Signs Last 24 Hrs  T(C): 37.3 (21 Sep 2018 06:07), Max: 37.7 (21 Sep 2018 01:24)  T(F): 99.1 (21 Sep 2018 06:07), Max: 99.9 (21 Sep 2018 01:24)  HR: 75 (21 Sep 2018 06:07) (69 - 94)  BP: 125/75 (21 Sep 2018 06:07) (120/76 - 151/81)  BP(mean): --  RR: 17 (21 Sep 2018 06:07) (14 - 18)  SpO2: 95% (21 Sep 2018 06:07) (92% - 100%)  CAPILLARY BLOOD GLUCOSE      POCT Blood Glucose.: 220 mg/dL (20 Sep 2018 22:37)  POCT Blood Glucose.: 180 mg/dL (20 Sep 2018 18:30)  POCT Blood Glucose.: 307 mg/dL (20 Sep 2018 11:14)    I&O's Summary    20 Sep 2018 07:01  -  21 Sep 2018 07:00  --------------------------------------------------------  IN: 1383 mL / OUT: 1575 mL / NET: -192 mL        PHYSICAL EXAM:  GENERAL: NAD, anicteric, afebrile to touch.  HEAD:  Atraumatic, Normocephalic  EYES: EOMI, PERRLA, conjunctiva and sclera clear  NECK: Supple, No JVD  CHEST/LUNG: Clear to auscultation bilaterally; No wheeze  HEART: Regular rate and rhythm; No murmurs, rubs, or gallops  ABDOMEN: distended, Nontender, Bowel sounds present  EXTREMITIES:  left foot dressing CDI; graft site dressing CDI; 4-5 fluid filled blebs on medial aspect of the thigh  PSYCH: AAOx3  NEUROLOGY: non-focal  SKIN: No rashes or lesions    LABS:                        11.0   14.1  )-----------( 195      ( 21 Sep 2018 07:15 )             33.4     09-21    135  |  97  |  19  ----------------------------<  145<H>  4.2   |  28  |  1.18    Ca    8.7      21 Sep 2018 07:13  Phos  4.0     09-20  Mg     1.9     09-20      PT/INR - ( 20 Sep 2018 05:00 )   PT: 18.4 sec;   INR: 1.69 ratio         PTT - ( 21 Sep 2018 07:15 )  PTT:60.4 sec            Consultant(s) Notes Reviewed: Podiatry     Care Discussed with Consultants/Other Providers:

## 2018-09-21 NOTE — PROGRESS NOTE ADULT - PROBLEM SELECTOR PLAN 2
-patient presenting w/ OM, afebrile, no leukocytosis. CT run off study showed Bilateral SFA occlusion. Occluded right femoral bypass graft.   - s/p LLE angio w/ L iliac placement    - s/b bypass, and s/p left 2nd digit toe amputations  - case discussed with podiatry - they recommended IV Ancef for now, and then d/c home on po abx likely tomorrow, they also state plavix can be restarted from their perspective but case also discussed with vascular resident, they will be discussing with attending when safe to restart plavix definitvely - afebrile, no leukocytosis  - per Podiatry no residual signs of OM  - consider switching to PO abx  -  s/p LLE angio w/ L iliac placement    - s/b bypass, and s/p left 2nd digit toe amputations

## 2018-09-21 NOTE — PROGRESS NOTE ADULT - SUBJECTIVE AND OBJECTIVE BOX
Podiatry pager #: [phone]    Patient is a 68y old  Male who presents with a chief complaint of toe infection (20 Sep 2018 14:38)       INTERVAL HPI/OVERNIGHT EVENTS:  Patient seen and evaluated at bedside.  Pt is resting comfortable in NAD. Denies N/V/F/C.      Allergies    No Known Allergies    Intolerances        Vital Signs Last 24 Hrs  T(C): 37.3 (21 Sep 2018 06:07), Max: 37.7 (21 Sep 2018 01:24)  T(F): 99.1 (21 Sep 2018 06:07), Max: 99.9 (21 Sep 2018 01:24)  HR: 75 (21 Sep 2018 06:07) (69 - 94)  BP: 125/75 (21 Sep 2018 06:07) (120/76 - 151/81)  BP(mean): 91 (20 Sep 2018 09:00) (91 - 91)  RR: 17 (21 Sep 2018 06:07) (14 - 18)  SpO2: 95% (21 Sep 2018 06:07) (92% - 100%)    LABS:                        11.0   14.1  )-----------( 195      ( 21 Sep 2018 07:15 )             33.4     09-20    139  |  103  |  23  ----------------------------<  280<H>  4.6   |  23  |  1.16    Ca    7.9<L>      20 Sep 2018 05:00  Phos  4.0     09-20  Mg     1.9     09-20      PT/INR - ( 20 Sep 2018 05:00 )   PT: 18.4 sec;   INR: 1.69 ratio         PTT - ( 21 Sep 2018 07:15 )  PTT:60.4 sec    CAPILLARY BLOOD GLUCOSE      POCT Blood Glucose.: 220 mg/dL (20 Sep 2018 22:37)  POCT Blood Glucose.: 180 mg/dL (20 Sep 2018 18:30)  POCT Blood Glucose.: 307 mg/dL (20 Sep 2018 11:14)      Lower Extremity Physical Exam:  dressing CDI     RADIOLOGY & ADDITIONAL TESTS:

## 2018-09-21 NOTE — PHYSICAL THERAPY INITIAL EVALUATION ADULT - PLANNED THERAPY INTERVENTIONS, PT EVAL
transfer training/gait training/stair negotiation: GOAL: Pt will be able to negotiate 3 steps +HR independently with reciprocal pattern in 2 weeks./strengthening/bed mobility training

## 2018-09-22 LAB
ANION GAP SERPL CALC-SCNC: 10 MMOL/L — SIGNIFICANT CHANGE UP (ref 5–17)
APTT BLD: 51.9 SEC — HIGH (ref 27.5–37.4)
BUN SERPL-MCNC: 18 MG/DL — SIGNIFICANT CHANGE UP (ref 7–23)
CALCIUM SERPL-MCNC: 8.5 MG/DL — SIGNIFICANT CHANGE UP (ref 8.4–10.5)
CHLORIDE SERPL-SCNC: 100 MMOL/L — SIGNIFICANT CHANGE UP (ref 96–108)
CO2 SERPL-SCNC: 28 MMOL/L — SIGNIFICANT CHANGE UP (ref 22–31)
CREAT SERPL-MCNC: 1.16 MG/DL — SIGNIFICANT CHANGE UP (ref 0.5–1.3)
GLUCOSE BLDC GLUCOMTR-MCNC: 129 MG/DL — HIGH (ref 70–99)
GLUCOSE BLDC GLUCOMTR-MCNC: 166 MG/DL — HIGH (ref 70–99)
GLUCOSE BLDC GLUCOMTR-MCNC: 219 MG/DL — HIGH (ref 70–99)
GLUCOSE BLDC GLUCOMTR-MCNC: 235 MG/DL — HIGH (ref 70–99)
GLUCOSE BLDC GLUCOMTR-MCNC: 265 MG/DL — HIGH (ref 70–99)
GLUCOSE SERPL-MCNC: 112 MG/DL — HIGH (ref 70–99)
HCT VFR BLD CALC: 32.2 % — LOW (ref 39–50)
HGB BLD-MCNC: 10.6 G/DL — LOW (ref 13–17)
MAGNESIUM SERPL-MCNC: 2 MG/DL — SIGNIFICANT CHANGE UP (ref 1.6–2.6)
MCHC RBC-ENTMCNC: 28.7 PG — SIGNIFICANT CHANGE UP (ref 27–34)
MCHC RBC-ENTMCNC: 33 GM/DL — SIGNIFICANT CHANGE UP (ref 32–36)
MCV RBC AUTO: 87.1 FL — SIGNIFICANT CHANGE UP (ref 80–100)
PHOSPHATE SERPL-MCNC: 2.6 MG/DL — SIGNIFICANT CHANGE UP (ref 2.5–4.5)
PLATELET # BLD AUTO: 202 K/UL — SIGNIFICANT CHANGE UP (ref 150–400)
POTASSIUM SERPL-MCNC: 4.8 MMOL/L — SIGNIFICANT CHANGE UP (ref 3.5–5.3)
POTASSIUM SERPL-SCNC: 4.8 MMOL/L — SIGNIFICANT CHANGE UP (ref 3.5–5.3)
RBC # BLD: 3.7 M/UL — LOW (ref 4.2–5.8)
RBC # FLD: 16.4 % — HIGH (ref 10.3–14.5)
SODIUM SERPL-SCNC: 138 MMOL/L — SIGNIFICANT CHANGE UP (ref 135–145)
WBC # BLD: 13.6 K/UL — HIGH (ref 3.8–10.5)
WBC # FLD AUTO: 13.6 K/UL — HIGH (ref 3.8–10.5)

## 2018-09-22 PROCEDURE — 99232 SBSQ HOSP IP/OBS MODERATE 35: CPT

## 2018-09-22 RX ORDER — ACETAMINOPHEN 500 MG
2 TABLET ORAL
Qty: 0 | Refills: 0 | COMMUNITY
Start: 2018-09-22

## 2018-09-22 RX ORDER — RIVAROXABAN 15 MG-20MG
1 KIT ORAL
Qty: 0 | Refills: 0 | COMMUNITY
Start: 2018-09-22

## 2018-09-22 RX ORDER — OXYCODONE HYDROCHLORIDE 5 MG/1
1 TABLET ORAL
Qty: 16 | Refills: 0 | OUTPATIENT
Start: 2018-09-22 | End: 2018-09-25

## 2018-09-22 RX ADMIN — RIVAROXABAN 15 MILLIGRAM(S): KIT at 22:24

## 2018-09-22 RX ADMIN — Medication 2: at 17:36

## 2018-09-22 RX ADMIN — Medication 1: at 22:24

## 2018-09-22 RX ADMIN — SODIUM CHLORIDE 3 MILLILITER(S): 9 INJECTION INTRAMUSCULAR; INTRAVENOUS; SUBCUTANEOUS at 13:52

## 2018-09-22 RX ADMIN — Medication 40 MILLIGRAM(S): at 17:45

## 2018-09-22 RX ADMIN — SODIUM CHLORIDE 3 MILLILITER(S): 9 INJECTION INTRAMUSCULAR; INTRAVENOUS; SUBCUTANEOUS at 05:29

## 2018-09-22 RX ADMIN — RIVAROXABAN 15 MILLIGRAM(S): KIT at 08:45

## 2018-09-22 RX ADMIN — OXYCODONE HYDROCHLORIDE 10 MILLIGRAM(S): 5 TABLET ORAL at 18:30

## 2018-09-22 RX ADMIN — ATORVASTATIN CALCIUM 40 MILLIGRAM(S): 80 TABLET, FILM COATED ORAL at 22:24

## 2018-09-22 RX ADMIN — CARVEDILOL PHOSPHATE 25 MILLIGRAM(S): 80 CAPSULE, EXTENDED RELEASE ORAL at 17:37

## 2018-09-22 RX ADMIN — INSULIN GLARGINE 30 UNIT(S): 100 INJECTION, SOLUTION SUBCUTANEOUS at 22:25

## 2018-09-22 RX ADMIN — Medication 1 TABLET(S): at 22:24

## 2018-09-22 RX ADMIN — Medication 1 TABLET(S): at 10:40

## 2018-09-22 RX ADMIN — OXYCODONE AND ACETAMINOPHEN 1 TABLET(S): 5; 325 TABLET ORAL at 10:39

## 2018-09-22 RX ADMIN — Medication 1: at 13:58

## 2018-09-22 RX ADMIN — SODIUM CHLORIDE 3 MILLILITER(S): 9 INJECTION INTRAMUSCULAR; INTRAVENOUS; SUBCUTANEOUS at 22:25

## 2018-09-22 RX ADMIN — CARVEDILOL PHOSPHATE 25 MILLIGRAM(S): 80 CAPSULE, EXTENDED RELEASE ORAL at 05:30

## 2018-09-22 RX ADMIN — Medication 81 MILLIGRAM(S): at 12:56

## 2018-09-22 RX ADMIN — Medication 1 APPLICATION(S): at 17:45

## 2018-09-22 RX ADMIN — Medication 40 MILLIGRAM(S): at 05:30

## 2018-09-22 RX ADMIN — OXYCODONE AND ACETAMINOPHEN 1 TABLET(S): 5; 325 TABLET ORAL at 11:30

## 2018-09-22 RX ADMIN — OXYCODONE HYDROCHLORIDE 10 MILLIGRAM(S): 5 TABLET ORAL at 17:44

## 2018-09-22 NOTE — PROGRESS NOTE ADULT - SUBJECTIVE AND OBJECTIVE BOX
Patient is a 68y old  Male who presents with a chief complaint of toe infection (22 Sep 2018 09:56)       INTERVAL HPI/OVERNIGHT EVENTS:  Patient seen and evaluated at bedside.  Pt is resting comfortable in NAD. Denies N/V/F/C.     Allergies    No Known Allergies    Intolerances        Vital Signs Last 24 Hrs  T(C): 36.3 (22 Sep 2018 10:54), Max: 37.7 (22 Sep 2018 01:02)  T(F): 97.4 (22 Sep 2018 10:54), Max: 99.8 (22 Sep 2018 01:02)  HR: 78 (22 Sep 2018 10:54) (71 - 79)  BP: 118/73 (22 Sep 2018 10:54) (110/71 - 122/76)  BP(mean): --  RR: 18 (22 Sep 2018 10:54) (16 - 18)  SpO2: 94% (22 Sep 2018 10:54) (94% - 97%)    LABS:                        10.6   13.6  )-----------( 202      ( 22 Sep 2018 05:24 )             32.2     09-22    138  |  100  |  18  ----------------------------<  112<H>  4.8   |  28  |  1.16    Ca    8.5      22 Sep 2018 05:24  Phos  2.6     09-22  Mg     2.0     09-22      PTT - ( 22 Sep 2018 05:24 )  PTT:51.9 sec    CAPILLARY BLOOD GLUCOSE      POCT Blood Glucose.: 129 mg/dL (22 Sep 2018 08:03)  POCT Blood Glucose.: 196 mg/dL (21 Sep 2018 21:08)  POCT Blood Glucose.: 164 mg/dL (21 Sep 2018 15:29)      Lower Extremity Physical Exam:  dressing CDI

## 2018-09-22 NOTE — PROGRESS NOTE ADULT - PROBLEM SELECTOR PLAN 1
- now resolved ; Cr 1.18 today;  eGFR wnl  - continue to monitor BMP  - consider restarting Entresto - now resolved ; Cr 1.16 today;  eGFR wnl  - consider restarting Entresto - now resolved ; Cr 1.16 today;  eGFR wnl  - consider restarting ACEI

## 2018-09-22 NOTE — PROGRESS NOTE ADULT - PROBLEM SELECTOR PLAN 3
- C/w Coreg  - c/w lasix 40 mg bid  - Cr at 1.18  - TTE results: EF 15-20%, moderate MR, severely dilated LA, severe global LV systolic dysfunction with increased apical and patricia-apical contractility relative to  other segments, Severe diastolic dysfunction (Stage III). Unchanged from 4/2016. - C/w Coreg  - c/w lasix 40 mg bid  - Cr at 1.16  - TTE results: EF 15-20%, moderate MR, severely dilated LA, severe global LV systolic dysfunction with increased apical and patricia-apical contractility relative to  other segments, Severe diastolic dysfunction (Stage III). Unchanged from 4/2016.

## 2018-09-22 NOTE — PROGRESS NOTE ADULT - ASSESSMENT
69 yo M s/p right foot partial 2nd toe resection 9/19  ·	Pt seen evaluated  ·	dressing clean dry and intact  ·	Low concern for residual OM  ·	cultures remain negative, pt is stable for discharge on PO abx x14 days   ·	d w/ attending

## 2018-09-22 NOTE — PROGRESS NOTE ADULT - SUBJECTIVE AND OBJECTIVE BOX
Surgery Progress Note: Vascular Surgery     S: Patient seen and examined. No acute events overnight. Resting comfortably. Pain is well controlled. Patient wants to go home today    O:  Vital Signs Last 24 Hrs  T(C): 37.6 (22 Sep 2018 05:13), Max: 37.7 (22 Sep 2018 01:02)  T(F): 99.7 (22 Sep 2018 05:13), Max: 99.8 (22 Sep 2018 01:02)  HR: 79 (22 Sep 2018 05:13) (70 - 79)  BP: 122/72 (22 Sep 2018 05:13) (110/71 - 125/75)  BP(mean): --  RR: 18 (22 Sep 2018 05:13) (16 - 18)  SpO2: 96% (22 Sep 2018 05:13) (95% - 97%)    I&O's Detail    20 Sep 2018 07:01  -  21 Sep 2018 07:00  --------------------------------------------------------  IN:    heparin Infusion: 55 mL    heparin Infusion: 128 mL    Oral Fluid: 1140 mL    Solution: 60 mL  Total IN: 1383 mL    OUT:    Voided: 1575 mL  Total OUT: 1575 mL    Total NET: -192 mL      21 Sep 2018 07:01  -  22 Sep 2018 06:01  --------------------------------------------------------  IN:    Oral Fluid: 1300 mL  Total IN: 1300 mL    OUT:    Voided: 1500 mL  Total OUT: 1500 mL    Total NET: -200 mL                MEDICATIONS  (STANDING):  amoxicillin  875 milliGRAM(s)/clavulanate 1 Tablet(s) Oral two times a day  aspirin enteric coated 81 milliGRAM(s) Oral daily  atorvastatin 40 milliGRAM(s) Oral at bedtime  carvedilol 25 milliGRAM(s) Oral every 12 hours  dextrose 50% Injectable 12.5 Gram(s) IV Push once  dextrose 50% Injectable 12.5 Gram(s) IV Push once  dextrose 50% Injectable 25 Gram(s) IV Push once  dextrose 50% Injectable 25 Gram(s) IV Push once  furosemide    Tablet 40 milliGRAM(s) Oral two times a day  influenza   Vaccine 0.5 milliLiter(s) IntraMuscular once  insulin glargine Injectable (LANTUS) 30 Unit(s) SubCutaneous at bedtime  insulin lispro (HumaLOG) corrective regimen sliding scale   SubCutaneous three times a day before meals  insulin lispro (HumaLOG) corrective regimen sliding scale   SubCutaneous at bedtime  polyethylene glycol 3350 17 Gram(s) Oral daily  rivaroxaban 15 milliGRAM(s) Oral two times a day  sodium chloride 0.9% lock flush 3 milliLiter(s) IV Push every 8 hours    MEDICATIONS  (PRN):  acetaminophen   Tablet .. 650 milliGRAM(s) Oral every 6 hours PRN Mild Pain (1 - 3)  glucagon  Injectable 1 milliGRAM(s) IntraMuscular once PRN Glucose LESS THAN 70 milligrams/deciliter  naloxone Injectable 0.1 milliGRAM(s) IV Push every 3 minutes PRN For ANY of the following changes in patient status:  A. RR LESS THAN 10 breaths per minute, B. Oxygen saturation LESS THAN 90%, C. Sedation score of 6  ondansetron Injectable 4 milliGRAM(s) IV Push every 6 hours PRN Nausea  oxyCODONE    5 mG/acetaminophen 325 mG 1 Tablet(s) Oral every 6 hours PRN Moderate Pain (4 - 6)  oxyCODONE    IR 5 milliGRAM(s) Oral every 4 hours PRN Moderate Pain (4 - 6)  oxyCODONE    IR 10 milliGRAM(s) Oral every 4 hours PRN Severe Pain (7 - 10)                                       10.6   13.6  )-----------( 202      ( 22 Sep 2018 05:24 )             32.2       09-22    138  |  100  |  18  ----------------------------<  112<H>  4.8   |  28  |  1.16    Ca    8.5      22 Sep 2018 05:24  Phos  2.6     09-22  Mg     2.0     09-22    PTT - ( 22 Sep 2018 05:24 )  PTT:51.9 sec      Physical Exam:  Gen: Laying in bed, NAD   Resp: Unlabored breathing   L groin:  soft, no active bleed or hematoma, nontender to palpation, incision c/d/i, blisters present where tape was previously  Extremities: soft, warm, sensation/motor intact, bilateral Pt/DP signals, right foot dressing changed, steri strips on incisions on upper and lower leg, blisters present where patient previously had tape placed

## 2018-09-22 NOTE — PROGRESS NOTE ADULT - PROBLEM SELECTOR PLAN 2
- afebrile, no leukocytosis  - per Podiatry no residual signs of OM  - consider switching to PO abx  -  s/p LLE angio w/ L iliac placement    - s/b bypass, and s/p left 2nd digit toe amputations - afebrile, no leukocytosis  - per Podiatry no residual signs of OM  - now on PO abx ( Augmentin)  -  s/p LLE angio w/ L iliac placement    - s/b bypass, and s/p left 2nd digit toe amputations

## 2018-09-22 NOTE — PROGRESS NOTE ADULT - SUBJECTIVE AND OBJECTIVE BOX
Patient is a 68y old  Male who presents with a chief complaint of toe infection (22 Sep 2018 06:00)      SUBJECTIVE / OVERNIGHT EVENTS:    MEDICATIONS  (STANDING):  amoxicillin  875 milliGRAM(s)/clavulanate 1 Tablet(s) Oral two times a day  aspirin enteric coated 81 milliGRAM(s) Oral daily  atorvastatin 40 milliGRAM(s) Oral at bedtime  carvedilol 25 milliGRAM(s) Oral every 12 hours  dextrose 50% Injectable 12.5 Gram(s) IV Push once  dextrose 50% Injectable 12.5 Gram(s) IV Push once  dextrose 50% Injectable 25 Gram(s) IV Push once  dextrose 50% Injectable 25 Gram(s) IV Push once  furosemide    Tablet 40 milliGRAM(s) Oral two times a day  influenza   Vaccine 0.5 milliLiter(s) IntraMuscular once  insulin glargine Injectable (LANTUS) 30 Unit(s) SubCutaneous at bedtime  insulin lispro (HumaLOG) corrective regimen sliding scale   SubCutaneous three times a day before meals  insulin lispro (HumaLOG) corrective regimen sliding scale   SubCutaneous at bedtime  polyethylene glycol 3350 17 Gram(s) Oral daily  rivaroxaban 15 milliGRAM(s) Oral two times a day  silver sulfADIAZINE 1% Cream 1 Application(s) Topical two times a day  sodium chloride 0.9% lock flush 3 milliLiter(s) IV Push every 8 hours    MEDICATIONS  (PRN):  acetaminophen   Tablet .. 650 milliGRAM(s) Oral every 6 hours PRN Mild Pain (1 - 3)  glucagon  Injectable 1 milliGRAM(s) IntraMuscular once PRN Glucose LESS THAN 70 milligrams/deciliter  naloxone Injectable 0.1 milliGRAM(s) IV Push every 3 minutes PRN For ANY of the following changes in patient status:  A. RR LESS THAN 10 breaths per minute, B. Oxygen saturation LESS THAN 90%, C. Sedation score of 6  ondansetron Injectable 4 milliGRAM(s) IV Push every 6 hours PRN Nausea  oxyCODONE    5 mG/acetaminophen 325 mG 1 Tablet(s) Oral every 6 hours PRN Moderate Pain (4 - 6)  oxyCODONE    IR 5 milliGRAM(s) Oral every 4 hours PRN Moderate Pain (4 - 6)  oxyCODONE    IR 10 milliGRAM(s) Oral every 4 hours PRN Severe Pain (7 - 10)      Vital Signs Last 24 Hrs  T(C): 37.6 (22 Sep 2018 05:13), Max: 37.7 (22 Sep 2018 01:02)  T(F): 99.7 (22 Sep 2018 05:13), Max: 99.8 (22 Sep 2018 01:02)  HR: 79 (22 Sep 2018 05:13) (70 - 79)  BP: 122/72 (22 Sep 2018 05:13) (110/71 - 122/76)  BP(mean): --  RR: 18 (22 Sep 2018 05:13) (16 - 18)  SpO2: 96% (22 Sep 2018 05:13) (95% - 97%)  CAPILLARY BLOOD GLUCOSE      POCT Blood Glucose.: 129 mg/dL (22 Sep 2018 08:03)  POCT Blood Glucose.: 196 mg/dL (21 Sep 2018 21:08)  POCT Blood Glucose.: 164 mg/dL (21 Sep 2018 15:29)  POCT Blood Glucose.: 145 mg/dL (21 Sep 2018 10:03)    I&O's Summary    21 Sep 2018 07:01  -  22 Sep 2018 07:00  --------------------------------------------------------  IN: 1300 mL / OUT: 2100 mL / NET: -800 mL        PHYSICAL EXAM:  GENERAL: NAD, well-developed  HEAD:  Atraumatic, Normocephalic  EYES: EOMI, PERRLA, conjunctiva and sclera clear  NECK: Supple, No JVD  CHEST/LUNG: Clear to auscultation bilaterally; No wheeze  HEART: Regular rate and rhythm; No murmurs, rubs, or gallops  ABDOMEN: Soft, Nontender, Nondistended; Bowel sounds present  EXTREMITIES:  2+ Peripheral Pulses, No clubbing, cyanosis, or edema  PSYCH: AAOx3  NEUROLOGY: non-focal  SKIN: No rashes or lesions    LABS:                        10.6   13.6  )-----------( 202      ( 22 Sep 2018 05:24 )             32.2     09-22    138  |  100  |  18  ----------------------------<  112<H>  4.8   |  28  |  1.16    Ca    8.5      22 Sep 2018 05:24  Phos  2.6     09-22  Mg     2.0     09-22      PTT - ( 22 Sep 2018 05:24 )  PTT:51.9 sec          RADIOLOGY & ADDITIONAL TESTS:    Imaging Personally Reviewed:    Consultant(s) Notes Reviewed:      Care Discussed with Consultants/Other Providers: Patient is a 68y old  Male who presents with a chief complaint of toe infection (22 Sep 2018 06:00)      SUBJECTIVE / OVERNIGHT EVENTS:  Pt seen and examined. No acute events overnight. Pt denies LLE pain.   Seen by PT ; reccommendation is VERONICA but pt would like to be discharged home with VNS service. Abx has been switched to PO.    MEDICATIONS  (STANDING):  amoxicillin  875 milliGRAM(s)/clavulanate 1 Tablet(s) Oral two times a day  aspirin enteric coated 81 milliGRAM(s) Oral daily  atorvastatin 40 milliGRAM(s) Oral at bedtime  carvedilol 25 milliGRAM(s) Oral every 12 hours  dextrose 50% Injectable 12.5 Gram(s) IV Push once  dextrose 50% Injectable 12.5 Gram(s) IV Push once  dextrose 50% Injectable 25 Gram(s) IV Push once  dextrose 50% Injectable 25 Gram(s) IV Push once  furosemide    Tablet 40 milliGRAM(s) Oral two times a day  influenza   Vaccine 0.5 milliLiter(s) IntraMuscular once  insulin glargine Injectable (LANTUS) 30 Unit(s) SubCutaneous at bedtime  insulin lispro (HumaLOG) corrective regimen sliding scale   SubCutaneous three times a day before meals  insulin lispro (HumaLOG) corrective regimen sliding scale   SubCutaneous at bedtime  polyethylene glycol 3350 17 Gram(s) Oral daily  rivaroxaban 15 milliGRAM(s) Oral two times a day  silver sulfADIAZINE 1% Cream 1 Application(s) Topical two times a day  sodium chloride 0.9% lock flush 3 milliLiter(s) IV Push every 8 hours    MEDICATIONS  (PRN):  acetaminophen   Tablet .. 650 milliGRAM(s) Oral every 6 hours PRN Mild Pain (1 - 3)  glucagon  Injectable 1 milliGRAM(s) IntraMuscular once PRN Glucose LESS THAN 70 milligrams/deciliter  naloxone Injectable 0.1 milliGRAM(s) IV Push every 3 minutes PRN For ANY of the following changes in patient status:  A. RR LESS THAN 10 breaths per minute, B. Oxygen saturation LESS THAN 90%, C. Sedation score of 6  ondansetron Injectable 4 milliGRAM(s) IV Push every 6 hours PRN Nausea  oxyCODONE    5 mG/acetaminophen 325 mG 1 Tablet(s) Oral every 6 hours PRN Moderate Pain (4 - 6)  oxyCODONE    IR 5 milliGRAM(s) Oral every 4 hours PRN Moderate Pain (4 - 6)  oxyCODONE    IR 10 milliGRAM(s) Oral every 4 hours PRN Severe Pain (7 - 10)      Vital Signs Last 24 Hrs  T(C): 37.6 (22 Sep 2018 05:13), Max: 37.7 (22 Sep 2018 01:02)  T(F): 99.7 (22 Sep 2018 05:13), Max: 99.8 (22 Sep 2018 01:02)  HR: 79 (22 Sep 2018 05:13) (70 - 79)  BP: 122/72 (22 Sep 2018 05:13) (110/71 - 122/76)  BP(mean): --  RR: 18 (22 Sep 2018 05:13) (16 - 18)  SpO2: 96% (22 Sep 2018 05:13) (95% - 97%)  CAPILLARY BLOOD GLUCOSE      POCT Blood Glucose.: 129 mg/dL (22 Sep 2018 08:03)  POCT Blood Glucose.: 196 mg/dL (21 Sep 2018 21:08)  POCT Blood Glucose.: 164 mg/dL (21 Sep 2018 15:29)  POCT Blood Glucose.: 145 mg/dL (21 Sep 2018 10:03)    I&O's Summary    21 Sep 2018 07:01  -  22 Sep 2018 07:00  --------------------------------------------------------  IN: 1300 mL / OUT: 2100 mL / NET: -800 mL        PHYSICAL EXAM:  GENERAL: NAD, anicteric, afebrile  HEAD:  Atraumatic, Normocephalic  EYES: EOMI, PERRLA, conjunctiva and sclera clear  NECK: Supple, No JVD  CHEST/LUNG: Clear to auscultation bilaterally; No wheeze  HEART: Regular rate and rhythm; No murmurs, rubs, or gallops  ABDOMEN: distended, Nontender, Bowel sounds present  EXTREMITIES:  left foot dressing CDI; graft site dressing CDI; 4-5 fluid filled blebs on medial aspect of the thigh  PSYCH: AAOx3  NEUROLOGY: non-focal  SKIN: No rashes or lesions    LABS:                        10.6   13.6  )-----------( 202      ( 22 Sep 2018 05:24 )             32.2     09-22    138  |  100  |  18  ----------------------------<  112<H>  4.8   |  28  |  1.16    Ca    8.5      22 Sep 2018 05:24  Phos  2.6     09-22  Mg     2.0     09-22      PTT - ( 22 Sep 2018 05:24 )  PTT:51.9 sec          RADIOLOGY & ADDITIONAL TESTS:    Imaging Personally Reviewed:    Consultant(s) Notes Reviewed:      Care Discussed with Consultants/Other Providers:

## 2018-09-22 NOTE — PROGRESS NOTE ADULT - PROBLEM SELECTOR PLAN 6
- would start bowel regimen since pt is on opiate analgesia  - start Senna, Colace - c/w start Catia Cedeno

## 2018-09-23 LAB
ANION GAP SERPL CALC-SCNC: 14 MMOL/L — SIGNIFICANT CHANGE UP (ref 5–17)
APTT BLD: 54 SEC — HIGH (ref 27.5–37.4)
BUN SERPL-MCNC: 21 MG/DL — SIGNIFICANT CHANGE UP (ref 7–23)
CALCIUM SERPL-MCNC: 8.1 MG/DL — LOW (ref 8.4–10.5)
CHLORIDE SERPL-SCNC: 97 MMOL/L — SIGNIFICANT CHANGE UP (ref 96–108)
CO2 SERPL-SCNC: 24 MMOL/L — SIGNIFICANT CHANGE UP (ref 22–31)
CREAT SERPL-MCNC: 1.09 MG/DL — SIGNIFICANT CHANGE UP (ref 0.5–1.3)
GLUCOSE BLDC GLUCOMTR-MCNC: 160 MG/DL — HIGH (ref 70–99)
GLUCOSE BLDC GLUCOMTR-MCNC: 172 MG/DL — HIGH (ref 70–99)
GLUCOSE BLDC GLUCOMTR-MCNC: 225 MG/DL — HIGH (ref 70–99)
GLUCOSE BLDC GLUCOMTR-MCNC: 254 MG/DL — HIGH (ref 70–99)
GLUCOSE SERPL-MCNC: 177 MG/DL — HIGH (ref 70–99)
HCT VFR BLD CALC: 30.8 % — LOW (ref 39–50)
HGB BLD-MCNC: 10.4 G/DL — LOW (ref 13–17)
MAGNESIUM SERPL-MCNC: 2 MG/DL — SIGNIFICANT CHANGE UP (ref 1.6–2.6)
MCHC RBC-ENTMCNC: 29.2 PG — SIGNIFICANT CHANGE UP (ref 27–34)
MCHC RBC-ENTMCNC: 33.7 GM/DL — SIGNIFICANT CHANGE UP (ref 32–36)
MCV RBC AUTO: 86.6 FL — SIGNIFICANT CHANGE UP (ref 80–100)
PHOSPHATE SERPL-MCNC: 2.5 MG/DL — SIGNIFICANT CHANGE UP (ref 2.5–4.5)
PLATELET # BLD AUTO: 223 K/UL — SIGNIFICANT CHANGE UP (ref 150–400)
POTASSIUM SERPL-MCNC: 3.8 MMOL/L — SIGNIFICANT CHANGE UP (ref 3.5–5.3)
POTASSIUM SERPL-SCNC: 3.8 MMOL/L — SIGNIFICANT CHANGE UP (ref 3.5–5.3)
RBC # BLD: 3.56 M/UL — LOW (ref 4.2–5.8)
RBC # FLD: 16.4 % — HIGH (ref 10.3–14.5)
SODIUM SERPL-SCNC: 135 MMOL/L — SIGNIFICANT CHANGE UP (ref 135–145)
WBC # BLD: 13.9 K/UL — HIGH (ref 3.8–10.5)
WBC # FLD AUTO: 13.9 K/UL — HIGH (ref 3.8–10.5)

## 2018-09-23 PROCEDURE — 99232 SBSQ HOSP IP/OBS MODERATE 35: CPT

## 2018-09-23 RX ORDER — LISINOPRIL 2.5 MG/1
5 TABLET ORAL DAILY
Qty: 0 | Refills: 0 | Status: DISCONTINUED | OUTPATIENT
Start: 2018-09-23 | End: 2018-09-24

## 2018-09-23 RX ADMIN — Medication 3: at 16:37

## 2018-09-23 RX ADMIN — Medication 1 TABLET(S): at 09:28

## 2018-09-23 RX ADMIN — INSULIN GLARGINE 30 UNIT(S): 100 INJECTION, SOLUTION SUBCUTANEOUS at 21:53

## 2018-09-23 RX ADMIN — RIVAROXABAN 15 MILLIGRAM(S): KIT at 09:28

## 2018-09-23 RX ADMIN — Medication 1 TABLET(S): at 21:23

## 2018-09-23 RX ADMIN — SODIUM CHLORIDE 3 MILLILITER(S): 9 INJECTION INTRAMUSCULAR; INTRAVENOUS; SUBCUTANEOUS at 21:22

## 2018-09-23 RX ADMIN — POLYETHYLENE GLYCOL 3350 17 GRAM(S): 17 POWDER, FOR SOLUTION ORAL at 11:46

## 2018-09-23 RX ADMIN — Medication 1 APPLICATION(S): at 19:39

## 2018-09-23 RX ADMIN — SODIUM CHLORIDE 3 MILLILITER(S): 9 INJECTION INTRAMUSCULAR; INTRAVENOUS; SUBCUTANEOUS at 13:05

## 2018-09-23 RX ADMIN — Medication 2: at 14:11

## 2018-09-23 RX ADMIN — Medication 81 MILLIGRAM(S): at 11:46

## 2018-09-23 RX ADMIN — OXYCODONE HYDROCHLORIDE 10 MILLIGRAM(S): 5 TABLET ORAL at 04:26

## 2018-09-23 RX ADMIN — CARVEDILOL PHOSPHATE 25 MILLIGRAM(S): 80 CAPSULE, EXTENDED RELEASE ORAL at 04:26

## 2018-09-23 RX ADMIN — ATORVASTATIN CALCIUM 40 MILLIGRAM(S): 80 TABLET, FILM COATED ORAL at 21:23

## 2018-09-23 RX ADMIN — Medication 40 MILLIGRAM(S): at 19:47

## 2018-09-23 RX ADMIN — CARVEDILOL PHOSPHATE 25 MILLIGRAM(S): 80 CAPSULE, EXTENDED RELEASE ORAL at 19:47

## 2018-09-23 RX ADMIN — OXYCODONE HYDROCHLORIDE 10 MILLIGRAM(S): 5 TABLET ORAL at 21:30

## 2018-09-23 RX ADMIN — RIVAROXABAN 15 MILLIGRAM(S): KIT at 21:23

## 2018-09-23 RX ADMIN — Medication 1 APPLICATION(S): at 04:27

## 2018-09-23 RX ADMIN — Medication 62.5 MILLIMOLE(S): at 11:46

## 2018-09-23 RX ADMIN — Medication 40 MILLIGRAM(S): at 04:26

## 2018-09-23 RX ADMIN — Medication 1: at 09:28

## 2018-09-23 RX ADMIN — SODIUM CHLORIDE 3 MILLILITER(S): 9 INJECTION INTRAMUSCULAR; INTRAVENOUS; SUBCUTANEOUS at 04:24

## 2018-09-23 RX ADMIN — OXYCODONE HYDROCHLORIDE 10 MILLIGRAM(S): 5 TABLET ORAL at 20:48

## 2018-09-23 NOTE — PROGRESS NOTE ADULT - SUBJECTIVE AND OBJECTIVE BOX
Patient is a 68y old  Male who presents with a chief complaint of toe infection (23 Sep 2018 00:36)       INTERVAL HPI/OVERNIGHT EVENTS:  Patient seen and evaluated at bedside.  Pt is resting comfortable in NAD. Denies N/V/F/C.      Allergies    No Known Allergies    Intolerances        Vital Signs Last 24 Hrs  T(C): 37.4 (23 Sep 2018 04:29), Max: 37.6 (23 Sep 2018 01:09)  T(F): 99.3 (23 Sep 2018 04:29), Max: 99.7 (23 Sep 2018 01:09)  HR: 84 (23 Sep 2018 04:29) (69 - 84)  BP: 117/56 (23 Sep 2018 04:29) (117/56 - 133/79)  BP(mean): --  RR: 18 (23 Sep 2018 04:29) (18 - 18)  SpO2: 96% (23 Sep 2018 04:29) (96% - 98%)    LABS:                        10.4   13.9  )-----------( 223      ( 23 Sep 2018 07:15 )             30.8     09-23    135  |  97  |  21  ----------------------------<  177<H>  3.8   |  24  |  1.09    Ca    8.1<L>      23 Sep 2018 07:15  Phos  2.5     09-23  Mg     2.0     09-23      PTT - ( 23 Sep 2018 07:15 )  PTT:54.0 sec    CAPILLARY BLOOD GLUCOSE      POCT Blood Glucose.: 160 mg/dL (23 Sep 2018 09:23)  POCT Blood Glucose.: 265 mg/dL (22 Sep 2018 22:21)  POCT Blood Glucose.: 235 mg/dL (22 Sep 2018 21:11)  POCT Blood Glucose.: 219 mg/dL (22 Sep 2018 17:20)  POCT Blood Glucose.: 166 mg/dL (22 Sep 2018 13:52)      Lower Extremity Physical Exam:  s/p left foot partial 2nd toe amputation, surgical site stable with suture intact, no signs of dehiscence, no erythema, no drainage, slight warmth to touch noted.

## 2018-09-23 NOTE — PROGRESS NOTE ADULT - ASSESSMENT
69 yo M s/p right foot partial 2nd toe resection 9/19  ·	Pt seen evaluated  ·	surgical site stable at this time  ·	slight warmth noted to the forefoot w/ no erythema or drainage  ·	Low concern for residual OM  ·	cultures remain negative, rec PO Augmentin x14 days   ·	will d/w attending

## 2018-09-23 NOTE — PROGRESS NOTE ADULT - SUBJECTIVE AND OBJECTIVE BOX
Surgery Progress Note: Vascular Surgery     S: Patient seen and examined. No acute events overnight. Pain is well controlled. Patient and son had a discussion yday, decided to follow PT rec for rehab.    OBJECTIVE:     ** VITAL SIGNS / I&O's **    Vital Signs Last 24 Hrs  T(C): 36.9 (22 Sep 2018 21:57), Max: 37.7 (22 Sep 2018 01:02)  T(F): 98.4 (22 Sep 2018 21:57), Max: 99.8 (22 Sep 2018 01:02)  HR: 83 (22 Sep 2018 21:57) (69 - 83)  BP: 133/79 (22 Sep 2018 21:57) (118/73 - 133/79)  BP(mean): --  RR: 18 (22 Sep 2018 21:57) (16 - 18)  SpO2: 97% (22 Sep 2018 21:57) (94% - 98%)      21 Sep 2018 07:01  -  22 Sep 2018 07:00  --------------------------------------------------------  IN:    Oral Fluid: 1300 mL  Total IN: 1300 mL    OUT:    Voided: 2100 mL  Total OUT: 2100 mL    Total NET: -800 mL      22 Sep 2018 07:01  -  23 Sep 2018 00:39  --------------------------------------------------------  IN:    Oral Fluid: 840 mL  Total IN: 840 mL    OUT:    Voided: 1250 mL  Total OUT: 1250 mL    Total NET: -410 mL          Physical Exam:  Gen: Laying in bed, NAD   Resp: Unlabored breathing  L groin:  soft, no active bleed or hematoma, nontender to palpation, incision clean, but draining some serosanguinous discharge, blisters present where tape was previously  Extremities: soft, warm, sensation/motor intact, bilateral Pt/DP signals, right foot dressing changed, steri strips on incisions on upper and lower leg, blisters present where patient previously had tape placed        ** LABS **                          10.6   13.6  )-----------( 202      ( 22 Sep 2018 05:24 )             32.2     22 Sep 2018 05:24    138    |  100    |  18     ----------------------------<  112    4.8     |  28     |  1.16     Ca    8.5        22 Sep 2018 05:24  Phos  2.6       22 Sep 2018 05:24  Mg     2.0       22 Sep 2018 05:24      PTT - ( 22 Sep 2018 05:24 )  PTT:51.9 sec  CAPILLARY BLOOD GLUCOSE      POCT Blood Glucose.: 265 mg/dL (22 Sep 2018 22:21)  POCT Blood Glucose.: 235 mg/dL (22 Sep 2018 21:11)  POCT Blood Glucose.: 219 mg/dL (22 Sep 2018 17:20)  POCT Blood Glucose.: 166 mg/dL (22 Sep 2018 13:52)  POCT Blood Glucose.: 129 mg/dL (22 Sep 2018 08:03)            Culture - Tissue with Gram Stain (collected 20 Sep 2018 10:29)  Source: .Tissue Other, clear margin left 2nd toe  Gram Stain (20 Sep 2018 14:35):    No polymorphonuclear leukocytes seen per low power field    No organisms seen per oil power field  Preliminary Report (21 Sep 2018 13:18):    No growth          MEDICATIONS  (STANDING):  amoxicillin  875 milliGRAM(s)/clavulanate 1 Tablet(s) Oral two times a day  aspirin enteric coated 81 milliGRAM(s) Oral daily  atorvastatin 40 milliGRAM(s) Oral at bedtime  carvedilol 25 milliGRAM(s) Oral every 12 hours  dextrose 50% Injectable 12.5 Gram(s) IV Push once  dextrose 50% Injectable 12.5 Gram(s) IV Push once  dextrose 50% Injectable 25 Gram(s) IV Push once  dextrose 50% Injectable 25 Gram(s) IV Push once  furosemide    Tablet 40 milliGRAM(s) Oral two times a day  influenza   Vaccine 0.5 milliLiter(s) IntraMuscular once  insulin glargine Injectable (LANTUS) 30 Unit(s) SubCutaneous at bedtime  insulin lispro (HumaLOG) corrective regimen sliding scale   SubCutaneous three times a day before meals  insulin lispro (HumaLOG) corrective regimen sliding scale   SubCutaneous at bedtime  polyethylene glycol 3350 17 Gram(s) Oral daily  rivaroxaban 15 milliGRAM(s) Oral two times a day  silver sulfADIAZINE 1% Cream 1 Application(s) Topical two times a day  sodium chloride 0.9% lock flush 3 milliLiter(s) IV Push every 8 hours    MEDICATIONS  (PRN):  acetaminophen   Tablet .. 650 milliGRAM(s) Oral every 6 hours PRN Mild Pain (1 - 3)  glucagon  Injectable 1 milliGRAM(s) IntraMuscular once PRN Glucose LESS THAN 70 milligrams/deciliter  naloxone Injectable 0.1 milliGRAM(s) IV Push every 3 minutes PRN For ANY of the following changes in patient status:  A. RR LESS THAN 10 breaths per minute, B. Oxygen saturation LESS THAN 90%, C. Sedation score of 6  ondansetron Injectable 4 milliGRAM(s) IV Push every 6 hours PRN Nausea  oxyCODONE    5 mG/acetaminophen 325 mG 1 Tablet(s) Oral every 6 hours PRN Moderate Pain (4 - 6)  oxyCODONE    IR 5 milliGRAM(s) Oral every 4 hours PRN Moderate Pain (4 - 6)  oxyCODONE    IR 10 milliGRAM(s) Oral every 4 hours PRN Severe Pain (7 - 10)

## 2018-09-23 NOTE — PROGRESS NOTE ADULT - PROBLEM SELECTOR PLAN 5
-c/w home statin therapy  - c/w ASA  - consider restarting Plavix -c/w home statin therapy  - c/w ASA  - hold off on Plavix per Primary team

## 2018-09-23 NOTE — PROGRESS NOTE ADULT - ASSESSMENT
A/P: 68M s/p Left femoral to popliteal artery bypass with PTFE graft and partial left 2nd toe amputation 9/19.    - Keep on xaralto & ASA, but NO plavix, per Dr. Jung  - cont local blister wound care w/ silvadene    - reg diet  - pain control   - keep -130s, avoid pressors  - OOB as tolerated  - Weight bearing per podiatry   - monitor pedal pulses (L DP, R AT)  - daily dressing changes with gauze and cling (NO tape)  - PT rec: VERONICA A/P: 68M s/p Left femoral to popliteal artery bypass with PTFE graft and partial left 2nd toe amputation 9/19.    - Keep on xaralto & ASA, but NO plavix, per Dr. Jung  - cont local blister wound care w/ silvadene    - reg diet  - pain control   - keep -130s, avoid pressors  - OOB as tolerated  - Weight bearing per podiatry   - monitor pedal pulses (L DP, R AT)  - daily dressing changes with gauze and cling (NO tape)  - PT rec: VERONICA  - Dispo: [facility]ONICA Monday 9/24

## 2018-09-23 NOTE — PROGRESS NOTE ADULT - ASSESSMENT
66 yo M w/ hx of DM2 c/b prior right three toe amputation for infection, CAD w/ stent (last placed in 2016 per patient) & CABG, HTN, severe LV dysfunction (based on TTE 2016), PAD s/p RLE bypass grafting presenting with left 2nd digit toe infection 2/2 to osteomyelitis. Pt s/p LLE angio w/ L iliac stent placement, s/p L toe 2nd digit amputation

## 2018-09-23 NOTE — PROGRESS NOTE ADULT - PROBLEM SELECTOR PLAN 3
- C/w Coreg  - c/w lasix 40 mg bid  - Cr at 1.16  - TTE results: EF 15-20%, moderate MR, severely dilated LA, severe global LV systolic dysfunction with increased apical and patricia-apical contractility relative to  other segments, Severe diastolic dysfunction (Stage III). Unchanged from 4/2016. - C/w Coreg  - c/w lasix 40 mg bid  - Cr at 1.09  - consider restarting ACE inhibitor prior to discharge ; Lisinopril 5mg qd ; can uptitrate as BP tolerates  - Cardiology f/up

## 2018-09-23 NOTE — PROGRESS NOTE ADULT - SUBJECTIVE AND OBJECTIVE BOX
Patient is a 68y old  Male who presents with a chief complaint of toe infection (23 Sep 2018 11:02)      SUBJECTIVE / OVERNIGHT EVENTS:    MEDICATIONS  (STANDING):  amoxicillin  875 milliGRAM(s)/clavulanate 1 Tablet(s) Oral two times a day  aspirin enteric coated 81 milliGRAM(s) Oral daily  atorvastatin 40 milliGRAM(s) Oral at bedtime  carvedilol 25 milliGRAM(s) Oral every 12 hours  dextrose 50% Injectable 12.5 Gram(s) IV Push once  dextrose 50% Injectable 12.5 Gram(s) IV Push once  dextrose 50% Injectable 25 Gram(s) IV Push once  dextrose 50% Injectable 25 Gram(s) IV Push once  furosemide    Tablet 40 milliGRAM(s) Oral two times a day  influenza   Vaccine 0.5 milliLiter(s) IntraMuscular once  insulin glargine Injectable (LANTUS) 30 Unit(s) SubCutaneous at bedtime  insulin lispro (HumaLOG) corrective regimen sliding scale   SubCutaneous three times a day before meals  insulin lispro (HumaLOG) corrective regimen sliding scale   SubCutaneous at bedtime  polyethylene glycol 3350 17 Gram(s) Oral daily  rivaroxaban 15 milliGRAM(s) Oral two times a day  silver sulfADIAZINE 1% Cream 1 Application(s) Topical two times a day  sodium chloride 0.9% lock flush 3 milliLiter(s) IV Push every 8 hours    MEDICATIONS  (PRN):  acetaminophen   Tablet .. 650 milliGRAM(s) Oral every 6 hours PRN Mild Pain (1 - 3)  glucagon  Injectable 1 milliGRAM(s) IntraMuscular once PRN Glucose LESS THAN 70 milligrams/deciliter  naloxone Injectable 0.1 milliGRAM(s) IV Push every 3 minutes PRN For ANY of the following changes in patient status:  A. RR LESS THAN 10 breaths per minute, B. Oxygen saturation LESS THAN 90%, C. Sedation score of 6  ondansetron Injectable 4 milliGRAM(s) IV Push every 6 hours PRN Nausea  oxyCODONE    5 mG/acetaminophen 325 mG 1 Tablet(s) Oral every 6 hours PRN Moderate Pain (4 - 6)  oxyCODONE    IR 5 milliGRAM(s) Oral every 4 hours PRN Moderate Pain (4 - 6)  oxyCODONE    IR 10 milliGRAM(s) Oral every 4 hours PRN Severe Pain (7 - 10)      Vital Signs Last 24 Hrs  T(C): 36.8 (23 Sep 2018 11:25), Max: 37.6 (23 Sep 2018 01:09)  T(F): 98.2 (23 Sep 2018 11:25), Max: 99.7 (23 Sep 2018 01:09)  HR: 73 (23 Sep 2018 11:25) (73 - 84)  BP: 114/66 (23 Sep 2018 11:25) (114/66 - 133/79)  BP(mean): --  RR: 17 (23 Sep 2018 11:25) (17 - 18)  SpO2: 93% (23 Sep 2018 11:25) (93% - 98%)  CAPILLARY BLOOD GLUCOSE      POCT Blood Glucose.: 160 mg/dL (23 Sep 2018 09:23)  POCT Blood Glucose.: 265 mg/dL (22 Sep 2018 22:21)  POCT Blood Glucose.: 235 mg/dL (22 Sep 2018 21:11)  POCT Blood Glucose.: 219 mg/dL (22 Sep 2018 17:20)    I&O's Summary    22 Sep 2018 07:01  -  23 Sep 2018 07:00  --------------------------------------------------------  IN: 1560 mL / OUT: 2100 mL / NET: -540 mL        PHYSICAL EXAM:  GENERAL: NAD, well-developed  HEAD:  Atraumatic, Normocephalic  EYES: EOMI, PERRLA, conjunctiva and sclera clear  NECK: Supple, No JVD  CHEST/LUNG: Clear to auscultation bilaterally; No wheeze  HEART: Regular rate and rhythm; No murmurs, rubs, or gallops  ABDOMEN: Soft, Nontender, Nondistended; Bowel sounds present  EXTREMITIES:  2+ Peripheral Pulses, No clubbing, cyanosis, or edema  PSYCH: AAOx3  NEUROLOGY: non-focal  SKIN: No rashes or lesions    LABS:                        10.4   13.9  )-----------( 223      ( 23 Sep 2018 07:15 )             30.8     09-23    135  |  97  |  21  ----------------------------<  177<H>  3.8   |  24  |  1.09    Ca    8.1<L>      23 Sep 2018 07:15  Phos  2.5     09-23  Mg     2.0     09-23      PTT - ( 23 Sep 2018 07:15 )  PTT:54.0 sec          RADIOLOGY & ADDITIONAL TESTS:    Imaging Personally Reviewed:    Consultant(s) Notes Reviewed:      Care Discussed with Consultants/Other Providers: Patient is a 68y old  Male who presents with a chief complaint of toe infection (23 Sep 2018 11:02)      SUBJECTIVE / OVERNIGHT EVENTS:   Pt seen and examined. No acute events overnight. Pt denies LLE pain.   Seen by PT ; reccommendation is VERONICA ; possible discharge tomorrow.      MEDICATIONS  (STANDING):  amoxicillin  875 milliGRAM(s)/clavulanate 1 Tablet(s) Oral two times a day  aspirin enteric coated 81 milliGRAM(s) Oral daily  atorvastatin 40 milliGRAM(s) Oral at bedtime  carvedilol 25 milliGRAM(s) Oral every 12 hours  dextrose 50% Injectable 12.5 Gram(s) IV Push once  dextrose 50% Injectable 12.5 Gram(s) IV Push once  dextrose 50% Injectable 25 Gram(s) IV Push once  dextrose 50% Injectable 25 Gram(s) IV Push once  furosemide    Tablet 40 milliGRAM(s) Oral two times a day  influenza   Vaccine 0.5 milliLiter(s) IntraMuscular once  insulin glargine Injectable (LANTUS) 30 Unit(s) SubCutaneous at bedtime  insulin lispro (HumaLOG) corrective regimen sliding scale   SubCutaneous three times a day before meals  insulin lispro (HumaLOG) corrective regimen sliding scale   SubCutaneous at bedtime  polyethylene glycol 3350 17 Gram(s) Oral daily  rivaroxaban 15 milliGRAM(s) Oral two times a day  silver sulfADIAZINE 1% Cream 1 Application(s) Topical two times a day  sodium chloride 0.9% lock flush 3 milliLiter(s) IV Push every 8 hours    MEDICATIONS  (PRN):  acetaminophen   Tablet .. 650 milliGRAM(s) Oral every 6 hours PRN Mild Pain (1 - 3)  glucagon  Injectable 1 milliGRAM(s) IntraMuscular once PRN Glucose LESS THAN 70 milligrams/deciliter  naloxone Injectable 0.1 milliGRAM(s) IV Push every 3 minutes PRN For ANY of the following changes in patient status:  A. RR LESS THAN 10 breaths per minute, B. Oxygen saturation LESS THAN 90%, C. Sedation score of 6  ondansetron Injectable 4 milliGRAM(s) IV Push every 6 hours PRN Nausea  oxyCODONE    5 mG/acetaminophen 325 mG 1 Tablet(s) Oral every 6 hours PRN Moderate Pain (4 - 6)  oxyCODONE    IR 5 milliGRAM(s) Oral every 4 hours PRN Moderate Pain (4 - 6)  oxyCODONE    IR 10 milliGRAM(s) Oral every 4 hours PRN Severe Pain (7 - 10)      Vital Signs Last 24 Hrs  T(C): 36.8 (23 Sep 2018 11:25), Max: 37.6 (23 Sep 2018 01:09)  T(F): 98.2 (23 Sep 2018 11:25), Max: 99.7 (23 Sep 2018 01:09)  HR: 73 (23 Sep 2018 11:25) (73 - 84)  BP: 114/66 (23 Sep 2018 11:25) (114/66 - 133/79)  BP(mean): --  RR: 17 (23 Sep 2018 11:25) (17 - 18)  SpO2: 93% (23 Sep 2018 11:25) (93% - 98%)  CAPILLARY BLOOD GLUCOSE      POCT Blood Glucose.: 160 mg/dL (23 Sep 2018 09:23)  POCT Blood Glucose.: 265 mg/dL (22 Sep 2018 22:21)  POCT Blood Glucose.: 235 mg/dL (22 Sep 2018 21:11)  POCT Blood Glucose.: 219 mg/dL (22 Sep 2018 17:20)    I&O's Summary    22 Sep 2018 07:01  -  23 Sep 2018 07:00  --------------------------------------------------------  IN: 1560 mL / OUT: 2100 mL / NET: -540 mL        PHYSICAL EXAM:  GENERAL: NAD, anicteric, afebrile  HEAD:  Atraumatic, Normocephalic  EYES: EOMI, PERRLA, conjunctiva and sclera clear  NECK: Supple, No JVD  CHEST/LUNG: Clear to auscultation bilaterally; No wheeze  HEART: Regular rate and rhythm; No murmurs, rubs, or gallops  ABDOMEN: Soft, Nontender, Nondistended; Bowel sounds present  EXTREMITIES:  soft, warm, sensation/motor intact, bilateral Pt/DP signals, right foot dressing changed, steri strips on incisions on upper and lower leg, blisters present where patient previously had tape placed  PSYCH: AAOx3  NEUROLOGY: non-focal  SKIN: No rashes or lesions    LABS:                        10.4   13.9  )-----------( 223      ( 23 Sep 2018 07:15 )             30.8     09-23    135  |  97  |  21  ----------------------------<  177<H>  3.8   |  24  |  1.09    Ca    8.1<L>      23 Sep 2018 07:15  Phos  2.5     09-23  Mg     2.0     09-23      PTT - ( 23 Sep 2018 07:15 )  PTT:54.0 sec              Consultant(s) Notes Reviewed:  Podiatry, Surgery

## 2018-09-23 NOTE — PROGRESS NOTE ADULT - PROBLEM SELECTOR PLAN 1
- now resolved ; Cr 1.16 today;  eGFR wnl  - consider restarting Entresto - now resolved ; Cr 1. 09 today;  eGFR wnl  - consider restarting ACE inhibitor prior to discharge ; Lisinopril 5mg qd ; can uptitrate as BP tolerates  - Cardiology f/up

## 2018-09-23 NOTE — PROGRESS NOTE ADULT - PROBLEM SELECTOR PLAN 2
- afebrile, no leukocytosis  - per Podiatry no residual signs of OM  - now on PO abx ( Augmentin)  -  s/p LLE angio w/ L iliac placement    - s/b bypass, and s/p left 2nd digit toe amputations

## 2018-09-24 VITALS
RESPIRATION RATE: 17 BRPM | TEMPERATURE: 98 F | DIASTOLIC BLOOD PRESSURE: 54 MMHG | HEART RATE: 63 BPM | OXYGEN SATURATION: 94 % | SYSTOLIC BLOOD PRESSURE: 92 MMHG

## 2018-09-24 LAB
ANION GAP SERPL CALC-SCNC: 10 MMOL/L — SIGNIFICANT CHANGE UP (ref 5–17)
BUN SERPL-MCNC: 20 MG/DL — SIGNIFICANT CHANGE UP (ref 7–23)
CALCIUM SERPL-MCNC: 8.2 MG/DL — LOW (ref 8.4–10.5)
CHLORIDE SERPL-SCNC: 98 MMOL/L — SIGNIFICANT CHANGE UP (ref 96–108)
CO2 SERPL-SCNC: 27 MMOL/L — SIGNIFICANT CHANGE UP (ref 22–31)
CREAT SERPL-MCNC: 1.08 MG/DL — SIGNIFICANT CHANGE UP (ref 0.5–1.3)
GLUCOSE BLDC GLUCOMTR-MCNC: 129 MG/DL — HIGH (ref 70–99)
GLUCOSE BLDC GLUCOMTR-MCNC: 173 MG/DL — HIGH (ref 70–99)
GLUCOSE SERPL-MCNC: 79 MG/DL — SIGNIFICANT CHANGE UP (ref 70–99)
HCT VFR BLD CALC: 33.7 % — LOW (ref 39–50)
HGB BLD-MCNC: 10.5 G/DL — LOW (ref 13–17)
MAGNESIUM SERPL-MCNC: 2.2 MG/DL — SIGNIFICANT CHANGE UP (ref 1.6–2.6)
MCHC RBC-ENTMCNC: 27.2 PG — SIGNIFICANT CHANGE UP (ref 27–34)
MCHC RBC-ENTMCNC: 31.2 GM/DL — LOW (ref 32–36)
MCV RBC AUTO: 87.3 FL — SIGNIFICANT CHANGE UP (ref 80–100)
PHOSPHATE SERPL-MCNC: 3.6 MG/DL — SIGNIFICANT CHANGE UP (ref 2.5–4.5)
PLATELET # BLD AUTO: 199 K/UL — SIGNIFICANT CHANGE UP (ref 150–400)
POTASSIUM SERPL-MCNC: 4.5 MMOL/L — SIGNIFICANT CHANGE UP (ref 3.5–5.3)
POTASSIUM SERPL-SCNC: 4.5 MMOL/L — SIGNIFICANT CHANGE UP (ref 3.5–5.3)
RBC # BLD: 3.86 M/UL — LOW (ref 4.2–5.8)
RBC # FLD: 16.3 % — HIGH (ref 10.3–14.5)
SODIUM SERPL-SCNC: 135 MMOL/L — SIGNIFICANT CHANGE UP (ref 135–145)
WBC # BLD: 11.3 K/UL — HIGH (ref 3.8–10.5)
WBC # FLD AUTO: 11.3 K/UL — HIGH (ref 3.8–10.5)

## 2018-09-24 PROCEDURE — 85610 PROTHROMBIN TIME: CPT

## 2018-09-24 PROCEDURE — 86140 C-REACTIVE PROTEIN: CPT

## 2018-09-24 PROCEDURE — 86901 BLOOD TYPING SEROLOGIC RH(D): CPT

## 2018-09-24 PROCEDURE — 82962 GLUCOSE BLOOD TEST: CPT

## 2018-09-24 PROCEDURE — 83036 HEMOGLOBIN GLYCOSYLATED A1C: CPT

## 2018-09-24 PROCEDURE — 96375 TX/PRO/DX INJ NEW DRUG ADDON: CPT | Mod: XU

## 2018-09-24 PROCEDURE — 76000 FLUOROSCOPY <1 HR PHYS/QHP: CPT

## 2018-09-24 PROCEDURE — 82330 ASSAY OF CALCIUM: CPT

## 2018-09-24 PROCEDURE — 88305 TISSUE EXAM BY PATHOLOGIST: CPT

## 2018-09-24 PROCEDURE — 96374 THER/PROPH/DIAG INJ IV PUSH: CPT | Mod: XU

## 2018-09-24 PROCEDURE — C1769: CPT

## 2018-09-24 PROCEDURE — 85730 THROMBOPLASTIN TIME PARTIAL: CPT

## 2018-09-24 PROCEDURE — 93306 TTE W/DOPPLER COMPLETE: CPT

## 2018-09-24 PROCEDURE — 99285 EMERGENCY DEPT VISIT HI MDM: CPT | Mod: 25

## 2018-09-24 PROCEDURE — 85652 RBC SED RATE AUTOMATED: CPT

## 2018-09-24 PROCEDURE — 73660 X-RAY EXAM OF TOE(S): CPT

## 2018-09-24 PROCEDURE — 88311 DECALCIFY TISSUE: CPT

## 2018-09-24 PROCEDURE — C1874: CPT

## 2018-09-24 PROCEDURE — 80053 COMPREHEN METABOLIC PANEL: CPT

## 2018-09-24 PROCEDURE — 97161 PT EVAL LOW COMPLEX 20 MIN: CPT

## 2018-09-24 PROCEDURE — 93005 ELECTROCARDIOGRAM TRACING: CPT

## 2018-09-24 PROCEDURE — 87070 CULTURE OTHR SPECIMN AEROBIC: CPT

## 2018-09-24 PROCEDURE — 80202 ASSAY OF VANCOMYCIN: CPT

## 2018-09-24 PROCEDURE — 88304 TISSUE EXAM BY PATHOLOGIST: CPT

## 2018-09-24 PROCEDURE — 82947 ASSAY GLUCOSE BLOOD QUANT: CPT

## 2018-09-24 PROCEDURE — 82803 BLOOD GASES ANY COMBINATION: CPT

## 2018-09-24 PROCEDURE — C1889: CPT

## 2018-09-24 PROCEDURE — 97530 THERAPEUTIC ACTIVITIES: CPT

## 2018-09-24 PROCEDURE — 85027 COMPLETE CBC AUTOMATED: CPT

## 2018-09-24 PROCEDURE — 84100 ASSAY OF PHOSPHORUS: CPT

## 2018-09-24 PROCEDURE — C1768: CPT

## 2018-09-24 PROCEDURE — 86900 BLOOD TYPING SEROLOGIC ABO: CPT

## 2018-09-24 PROCEDURE — 87040 BLOOD CULTURE FOR BACTERIA: CPT

## 2018-09-24 PROCEDURE — 97116 GAIT TRAINING THERAPY: CPT

## 2018-09-24 PROCEDURE — 85014 HEMATOCRIT: CPT

## 2018-09-24 PROCEDURE — 83605 ASSAY OF LACTIC ACID: CPT

## 2018-09-24 PROCEDURE — 81003 URINALYSIS AUTO W/O SCOPE: CPT

## 2018-09-24 PROCEDURE — 81001 URINALYSIS AUTO W/SCOPE: CPT

## 2018-09-24 PROCEDURE — 93970 EXTREMITY STUDY: CPT

## 2018-09-24 PROCEDURE — P9016: CPT

## 2018-09-24 PROCEDURE — 86850 RBC ANTIBODY SCREEN: CPT

## 2018-09-24 PROCEDURE — 83735 ASSAY OF MAGNESIUM: CPT

## 2018-09-24 PROCEDURE — 75635 CT ANGIO ABDOMINAL ARTERIES: CPT

## 2018-09-24 PROCEDURE — 84295 ASSAY OF SERUM SODIUM: CPT

## 2018-09-24 PROCEDURE — 82435 ASSAY OF BLOOD CHLORIDE: CPT

## 2018-09-24 PROCEDURE — 86923 COMPATIBILITY TEST ELECTRIC: CPT

## 2018-09-24 PROCEDURE — 80048 BASIC METABOLIC PNL TOTAL CA: CPT

## 2018-09-24 PROCEDURE — 73630 X-RAY EXAM OF FOOT: CPT

## 2018-09-24 PROCEDURE — C1760: CPT

## 2018-09-24 PROCEDURE — 36430 TRANSFUSION BLD/BLD COMPNT: CPT

## 2018-09-24 PROCEDURE — 84132 ASSAY OF SERUM POTASSIUM: CPT

## 2018-09-24 PROCEDURE — C1894: CPT

## 2018-09-24 RX ADMIN — Medication 81 MILLIGRAM(S): at 13:55

## 2018-09-24 RX ADMIN — RIVAROXABAN 15 MILLIGRAM(S): KIT at 09:37

## 2018-09-24 RX ADMIN — Medication 1 APPLICATION(S): at 05:28

## 2018-09-24 RX ADMIN — CARVEDILOL PHOSPHATE 25 MILLIGRAM(S): 80 CAPSULE, EXTENDED RELEASE ORAL at 05:27

## 2018-09-24 RX ADMIN — SODIUM CHLORIDE 3 MILLILITER(S): 9 INJECTION INTRAMUSCULAR; INTRAVENOUS; SUBCUTANEOUS at 05:16

## 2018-09-24 RX ADMIN — POLYETHYLENE GLYCOL 3350 17 GRAM(S): 17 POWDER, FOR SOLUTION ORAL at 13:55

## 2018-09-24 RX ADMIN — SODIUM CHLORIDE 3 MILLILITER(S): 9 INJECTION INTRAMUSCULAR; INTRAVENOUS; SUBCUTANEOUS at 13:38

## 2018-09-24 RX ADMIN — Medication 40 MILLIGRAM(S): at 05:27

## 2018-09-24 RX ADMIN — OXYCODONE HYDROCHLORIDE 10 MILLIGRAM(S): 5 TABLET ORAL at 14:28

## 2018-09-24 RX ADMIN — Medication 1 TABLET(S): at 09:37

## 2018-09-24 RX ADMIN — OXYCODONE HYDROCHLORIDE 10 MILLIGRAM(S): 5 TABLET ORAL at 13:58

## 2018-09-24 RX ADMIN — LISINOPRIL 5 MILLIGRAM(S): 2.5 TABLET ORAL at 05:28

## 2018-09-24 RX ADMIN — OXYCODONE HYDROCHLORIDE 10 MILLIGRAM(S): 5 TABLET ORAL at 09:37

## 2018-09-24 RX ADMIN — Medication 1: at 09:36

## 2018-09-24 RX ADMIN — OXYCODONE HYDROCHLORIDE 10 MILLIGRAM(S): 5 TABLET ORAL at 10:07

## 2018-09-24 NOTE — PROGRESS NOTE ADULT - SUBJECTIVE AND OBJECTIVE BOX
Letter of Medical Compliance:    Due to patient's deconditioning and generalized weakness secondary to recent Left leg bypass surgery.  Patient will require a wheelchair with swing away  legs. This is necessary to achieve daily tasks and therapies which cannot be achieved with the use of a cane or rolling walker.  Patient and family are in agreement with wheelchair use at home and assistance will be provided if needed.    Otilia Lopes PA-C  Office of Dr. Jung: [phone] if any questions

## 2018-09-24 NOTE — PROGRESS NOTE ADULT - SUBJECTIVE AND OBJECTIVE BOX
SUBJECTIVE: Pt seen, no c/os, no ON events    Vital Signs Last 24 Hrs  T(C): 36.8 (24 Sep 2018 05:11), Max: 37.4 (24 Sep 2018 00:33)  T(F): 98.3 (24 Sep 2018 05:11), Max: 99.3 (24 Sep 2018 00:33)  HR: 69 (24 Sep 2018 05:11) (69 - 74)  BP: 111/66 (24 Sep 2018 05:11) (111/66 - 128/74)  BP(mean): --  RR: 17 (24 Sep 2018 05:11) (17 - 18)  SpO2: 96% (24 Sep 2018 05:11) (93% - 99%)    General Appearance: Appears well, NAD  Chest: Equal expansion bilaterally,  Abdomen: Soft, nontense,   L groin:  soft, no active bleed or hematoma, nontender to palpation, incision c/d/i, blisters present where tape was previously  Extremities: soft, warm, sensation/motor intact, bilateral Pt/DP signals, right foot dressing changed, steri strips on incisions on upper and lower leg, blisters present where patient previously had tape placed    I&O's Summary    23 Sep 2018 07:01  -  24 Sep 2018 07:00  --------------------------------------------------------  IN: 1280 mL / OUT: 1200 mL / NET: 80 mL      I&O's Detail    23 Sep 2018 07:01  -  24 Sep 2018 07:00  --------------------------------------------------------  IN:    Oral Fluid: 1280 mL  Total IN: 1280 mL    OUT:    Voided: 1200 mL  Total OUT: 1200 mL    Total NET: 80 mL          MEDICATIONS  (STANDING):  amoxicillin  875 milliGRAM(s)/clavulanate 1 Tablet(s) Oral two times a day  aspirin enteric coated 81 milliGRAM(s) Oral daily  atorvastatin 40 milliGRAM(s) Oral at bedtime  carvedilol 25 milliGRAM(s) Oral every 12 hours  dextrose 50% Injectable 12.5 Gram(s) IV Push once  dextrose 50% Injectable 12.5 Gram(s) IV Push once  dextrose 50% Injectable 25 Gram(s) IV Push once  dextrose 50% Injectable 25 Gram(s) IV Push once  furosemide    Tablet 40 milliGRAM(s) Oral two times a day  influenza   Vaccine 0.5 milliLiter(s) IntraMuscular once  insulin glargine Injectable (LANTUS) 30 Unit(s) SubCutaneous at bedtime  insulin lispro (HumaLOG) corrective regimen sliding scale   SubCutaneous three times a day before meals  insulin lispro (HumaLOG) corrective regimen sliding scale   SubCutaneous at bedtime  lisinopril 5 milliGRAM(s) Oral daily  polyethylene glycol 3350 17 Gram(s) Oral daily  rivaroxaban 15 milliGRAM(s) Oral two times a day  silver sulfADIAZINE 1% Cream 1 Application(s) Topical two times a day  sodium chloride 0.9% lock flush 3 milliLiter(s) IV Push every 8 hours    MEDICATIONS  (PRN):  acetaminophen   Tablet .. 650 milliGRAM(s) Oral every 6 hours PRN Mild Pain (1 - 3)  glucagon  Injectable 1 milliGRAM(s) IntraMuscular once PRN Glucose LESS THAN 70 milligrams/deciliter  naloxone Injectable 0.1 milliGRAM(s) IV Push every 3 minutes PRN For ANY of the following changes in patient status:  A. RR LESS THAN 10 breaths per minute, B. Oxygen saturation LESS THAN 90%, C. Sedation score of 6  ondansetron Injectable 4 milliGRAM(s) IV Push every 6 hours PRN Nausea  oxyCODONE    5 mG/acetaminophen 325 mG 1 Tablet(s) Oral every 6 hours PRN Moderate Pain (4 - 6)  oxyCODONE    IR 5 milliGRAM(s) Oral every 4 hours PRN Moderate Pain (4 - 6)  oxyCODONE    IR 10 milliGRAM(s) Oral every 4 hours PRN Severe Pain (7 - 10)      LABS:                        10.4   13.9  )-----------( 223      ( 23 Sep 2018 07:15 )             30.8     09-24    135  |  98  |  20  ----------------------------<  79  4.5   |  27  |  1.08    Ca    8.2<L>      24 Sep 2018 07:10  Phos  3.6     09-24  Mg     2.2     09-24      PTT - ( 23 Sep 2018 07:15 )  PTT:54.0 sec      RADIOLOGY & ADDITIONAL STUDIES:

## 2018-09-24 NOTE — PROGRESS NOTE ADULT - ASSESSMENT
A/P: 68M s/p Left femoral to popliteal artery bypass with PTFE graft and partial left 2nd toe amputation 9/19.    - Keep on xaralto & ASA, but NO plavix, per Dr. Jung  - cont local blister wound care w/ silvadene    - reg diet  - pain control   - keep -130s, avoid pressors  - OOB as tolerated  - Weight bearing per podiatry   - monitor pedal pulses (L DP, R AT)  - daily dressing changes with gauze and cling (NO tape)  - PT rec: VERONICA  - Dispo: VERONICA Monday 9/24    [phone]

## 2018-09-24 NOTE — PROGRESS NOTE ADULT - ASSESSMENT
69 yo M s/p right foot partial 2nd toe resection 9/19  ·	Pt seen evaluated  ·	surgical site stable at this time  ·	pt stable for d/c from podiatry stand point   ·	Low concern for residual OM  ·	cultures remain negative, rec PO Augmentin x14 days   ·	seen w/ attending

## 2018-09-24 NOTE — PROGRESS NOTE ADULT - SUBJECTIVE AND OBJECTIVE BOX
Patient is a 68y old  Male who presents with a chief complaint of toe infection (24 Sep 2018 08:00)       INTERVAL HPI/OVERNIGHT EVENTS:  Patient seen and evaluated at bedside.  Pt is resting comfortable in NAD. Denies N/V/F/C.      Allergies    No Known Allergies    Intolerances        Vital Signs Last 24 Hrs  T(C): 36.8 (24 Sep 2018 09:35), Max: 37.4 (24 Sep 2018 00:33)  T(F): 98.2 (24 Sep 2018 09:35), Max: 99.3 (24 Sep 2018 00:33)  HR: 63 (24 Sep 2018 09:35) (63 - 74)  BP: 92/54 (24 Sep 2018 09:35) (92/54 - 128/74)  BP(mean): --  RR: 17 (24 Sep 2018 09:35) (17 - 18)  SpO2: 94% (24 Sep 2018 09:35) (93% - 99%)    LABS:                        10.5   11.3  )-----------( 199      ( 24 Sep 2018 07:10 )             33.7     09-24    135  |  98  |  20  ----------------------------<  79  4.5   |  27  |  1.08    Ca    8.2<L>      24 Sep 2018 07:10  Phos  3.6     09-24  Mg     2.2     09-24      PTT - ( 23 Sep 2018 07:15 )  PTT:54.0 sec    CAPILLARY BLOOD GLUCOSE      POCT Blood Glucose.: 173 mg/dL (24 Sep 2018 08:43)  POCT Blood Glucose.: 172 mg/dL (23 Sep 2018 21:42)  POCT Blood Glucose.: 254 mg/dL (23 Sep 2018 16:25)  POCT Blood Glucose.: 225 mg/dL (23 Sep 2018 14:09)      Lower Extremity Physical Exam:  s/p left foot partial 2nd toe amputation, surgical site stable with suture intact, no signs of dehiscence, no erythema, no drainage, no signs of infection

## 2018-09-24 NOTE — PROGRESS NOTE ADULT - PROVIDER SPECIALTY LIST ADULT
Anesthesia
Cardiology
Cardiology
Hospitalist
Internal Medicine
Podiatry
Surgery
Vascular Surgery
Cardiology
Internal Medicine
Podiatry
Podiatry
Hospitalist
Hospitalist

## 2018-09-24 NOTE — PROGRESS NOTE ADULT - REASON FOR ADMISSION
toe infection

## 2018-09-25 LAB
CULTURE RESULTS: NO GROWTH — SIGNIFICANT CHANGE UP
SPECIMEN SOURCE: SIGNIFICANT CHANGE UP

## 2018-10-06 ENCOUNTER — INPATIENT (INPATIENT)
Facility: HOSPITAL | Age: 68
LOS: 23 days | Discharge: HOME | End: 2018-10-30
Attending: STUDENT IN AN ORGANIZED HEALTH CARE EDUCATION/TRAINING PROGRAM | Admitting: STUDENT IN AN ORGANIZED HEALTH CARE EDUCATION/TRAINING PROGRAM
Payer: COMMERCIAL

## 2018-10-06 VITALS
OXYGEN SATURATION: 100 % | RESPIRATION RATE: 16 BRPM | DIASTOLIC BLOOD PRESSURE: 68 MMHG | TEMPERATURE: 99 F | SYSTOLIC BLOOD PRESSURE: 110 MMHG | HEART RATE: 71 BPM

## 2018-10-06 DIAGNOSIS — Z95.828 PRESENCE OF OTHER VASCULAR IMPLANTS AND GRAFTS: Chronic | ICD-10-CM

## 2018-10-06 DIAGNOSIS — Z95.9 PRESENCE OF CARDIAC AND VASCULAR IMPLANT AND GRAFT, UNSPECIFIED: Chronic | ICD-10-CM

## 2018-10-06 DIAGNOSIS — Z95.1 PRESENCE OF AORTOCORONARY BYPASS GRAFT: Chronic | ICD-10-CM

## 2018-10-06 DIAGNOSIS — Z89.9 ACQUIRED ABSENCE OF LIMB, UNSPECIFIED: Chronic | ICD-10-CM

## 2018-10-06 LAB
ALBUMIN SERPL ELPH-MCNC: 3.7 G/DL — SIGNIFICANT CHANGE UP (ref 3.3–5)
ALP SERPL-CCNC: 226 U/L — HIGH (ref 40–120)
ALT FLD-CCNC: 72 U/L — HIGH (ref 4–41)
APTT BLD: 50.1 SEC — HIGH (ref 27.5–37.4)
AST SERPL-CCNC: 40 U/L — SIGNIFICANT CHANGE UP (ref 4–40)
BASE EXCESS BLDV CALC-SCNC: 3.2 MMOL/L — SIGNIFICANT CHANGE UP
BASOPHILS # BLD AUTO: 0.05 K/UL — SIGNIFICANT CHANGE UP (ref 0–0.2)
BASOPHILS NFR BLD AUTO: 0.6 % — SIGNIFICANT CHANGE UP (ref 0–2)
BILIRUB SERPL-MCNC: 1.5 MG/DL — HIGH (ref 0.2–1.2)
BLOOD GAS VENOUS - CREATININE: 1.2 MG/DL — SIGNIFICANT CHANGE UP (ref 0.5–1.3)
BUN SERPL-MCNC: 30 MG/DL — HIGH (ref 7–23)
CALCIUM SERPL-MCNC: 9 MG/DL — SIGNIFICANT CHANGE UP (ref 8.4–10.5)
CHLORIDE BLDV-SCNC: 104 MMOL/L — SIGNIFICANT CHANGE UP (ref 96–108)
CHLORIDE SERPL-SCNC: 100 MMOL/L — SIGNIFICANT CHANGE UP (ref 98–107)
CO2 SERPL-SCNC: 27 MMOL/L — SIGNIFICANT CHANGE UP (ref 22–31)
CREAT SERPL-MCNC: 1.37 MG/DL — HIGH (ref 0.5–1.3)
EOSINOPHIL # BLD AUTO: 0.21 K/UL — SIGNIFICANT CHANGE UP (ref 0–0.5)
EOSINOPHIL NFR BLD AUTO: 2.5 % — SIGNIFICANT CHANGE UP (ref 0–6)
GAS PNL BLDV: 137 MMOL/L — SIGNIFICANT CHANGE UP (ref 136–146)
GLUCOSE BLDV-MCNC: 143 — HIGH (ref 70–99)
GLUCOSE SERPL-MCNC: 137 MG/DL — HIGH (ref 70–99)
HCO3 BLDV-SCNC: 25 MMOL/L — SIGNIFICANT CHANGE UP (ref 20–27)
HCT VFR BLD CALC: 33.7 % — LOW (ref 39–50)
HCT VFR BLDV CALC: 34.5 % — LOW (ref 39–51)
HGB BLD-MCNC: 10.4 G/DL — LOW (ref 13–17)
HGB BLDV-MCNC: 11.2 G/DL — LOW (ref 13–17)
IMM GRANULOCYTES # BLD AUTO: 0.02 # — SIGNIFICANT CHANGE UP
IMM GRANULOCYTES NFR BLD AUTO: 0.2 % — SIGNIFICANT CHANGE UP (ref 0–1.5)
INR BLD: 3.55 — HIGH (ref 0.88–1.17)
LACTATE BLDV-MCNC: 3.5 MMOL/L — HIGH (ref 0.5–2)
LYMPHOCYTES # BLD AUTO: 1.4 K/UL — SIGNIFICANT CHANGE UP (ref 1–3.3)
LYMPHOCYTES # BLD AUTO: 16.6 % — SIGNIFICANT CHANGE UP (ref 13–44)
MCHC RBC-ENTMCNC: 27.5 PG — SIGNIFICANT CHANGE UP (ref 27–34)
MCHC RBC-ENTMCNC: 30.9 % — LOW (ref 32–36)
MCV RBC AUTO: 89.2 FL — SIGNIFICANT CHANGE UP (ref 80–100)
MONOCYTES # BLD AUTO: 0.67 K/UL — SIGNIFICANT CHANGE UP (ref 0–0.9)
MONOCYTES NFR BLD AUTO: 7.9 % — SIGNIFICANT CHANGE UP (ref 2–14)
NEUTROPHILS # BLD AUTO: 6.1 K/UL — SIGNIFICANT CHANGE UP (ref 1.8–7.4)
NEUTROPHILS NFR BLD AUTO: 72.2 % — SIGNIFICANT CHANGE UP (ref 43–77)
NRBC # FLD: 0 — SIGNIFICANT CHANGE UP
NT-PROBNP SERPL-SCNC: 4369 PG/ML — SIGNIFICANT CHANGE UP
PCO2 BLDV: 56 MMHG — HIGH (ref 41–51)
PH BLDV: 7.33 PH — SIGNIFICANT CHANGE UP (ref 7.32–7.43)
PLATELET # BLD AUTO: 248 K/UL — SIGNIFICANT CHANGE UP (ref 150–400)
PMV BLD: 11.9 FL — SIGNIFICANT CHANGE UP (ref 7–13)
PO2 BLDV: 16 MMHG — LOW (ref 35–40)
POTASSIUM BLDV-SCNC: 4.7 MMOL/L — HIGH (ref 3.4–4.5)
POTASSIUM SERPL-MCNC: 5 MMOL/L — SIGNIFICANT CHANGE UP (ref 3.5–5.3)
POTASSIUM SERPL-SCNC: 5 MMOL/L — SIGNIFICANT CHANGE UP (ref 3.5–5.3)
PROT SERPL-MCNC: 7.6 G/DL — SIGNIFICANT CHANGE UP (ref 6–8.3)
PROTHROM AB SERPL-ACNC: 41.9 SEC — HIGH (ref 9.8–13.1)
RBC # BLD: 3.78 M/UL — LOW (ref 4.2–5.8)
RBC # FLD: 20.3 % — HIGH (ref 10.3–14.5)
SAO2 % BLDV: 9.8 % — LOW (ref 60–85)
SODIUM SERPL-SCNC: 141 MMOL/L — SIGNIFICANT CHANGE UP (ref 135–145)
WBC # BLD: 8.45 K/UL — SIGNIFICANT CHANGE UP (ref 3.8–10.5)
WBC # FLD AUTO: 8.45 K/UL — SIGNIFICANT CHANGE UP (ref 3.8–10.5)

## 2018-10-06 PROCEDURE — 71046 X-RAY EXAM CHEST 2 VIEWS: CPT | Mod: 26

## 2018-10-06 RX ORDER — VANCOMYCIN HCL 1 G
1000 VIAL (EA) INTRAVENOUS ONCE
Qty: 0 | Refills: 0 | Status: COMPLETED | OUTPATIENT
Start: 2018-10-06 | End: 2018-10-07

## 2018-10-06 RX ORDER — MORPHINE SULFATE 50 MG/1
4 CAPSULE, EXTENDED RELEASE ORAL ONCE
Qty: 0 | Refills: 0 | Status: DISCONTINUED | OUTPATIENT
Start: 2018-10-06 | End: 2018-10-06

## 2018-10-06 RX ORDER — PIPERACILLIN AND TAZOBACTAM 4; .5 G/20ML; G/20ML
3.38 INJECTION, POWDER, LYOPHILIZED, FOR SOLUTION INTRAVENOUS ONCE
Qty: 0 | Refills: 0 | Status: COMPLETED | OUTPATIENT
Start: 2018-10-06 | End: 2018-10-07

## 2018-10-06 RX ORDER — SODIUM CHLORIDE 9 MG/ML
500 INJECTION INTRAMUSCULAR; INTRAVENOUS; SUBCUTANEOUS ONCE
Qty: 0 | Refills: 0 | Status: COMPLETED | OUTPATIENT
Start: 2018-10-06 | End: 2018-10-06

## 2018-10-06 RX ADMIN — MORPHINE SULFATE 4 MILLIGRAM(S): 50 CAPSULE, EXTENDED RELEASE ORAL at 20:55

## 2018-10-06 RX ADMIN — SODIUM CHLORIDE 500 MILLILITER(S): 9 INJECTION INTRAMUSCULAR; INTRAVENOUS; SUBCUTANEOUS at 21:50

## 2018-10-06 RX ADMIN — MORPHINE SULFATE 4 MILLIGRAM(S): 50 CAPSULE, EXTENDED RELEASE ORAL at 20:39

## 2018-10-06 NOTE — ED PROVIDER NOTE - ATTENDING CONTRIBUTION TO CARE
DR. CAAL, ATTENDING MD-  I performed a face to face bedside interview with patient regarding history of present illness, review of symptoms and past medical history. I completed an independent physical exam.  I have discussed patient's plan of care with the resident.   Documentation as above in the note.    67 y/o male h/o chf, cabg, htn, hld, dm with recent fem pop bypass 3 wks prior with pain, redness, swelling, white d/c from surg incision sites x2 days.  Denies f/c, ha, neck stiffness, cp, sob, cough, abd pain, n/v/d, dysuria.  Afebrile, vs wnl, nad, ctabil, s1s2 rrr no m/r/g, abd soft non ttp no r/g, no cva tenderness b/l, b/l le swelling, pitting edema, left leg with mild erythema around incision sites with granulation tissue.  Infection vs chf exac vs post-op pain.  Obtain cbc, bmp, blood cx x2, lactate, bnp, consult surg, give pain med, reassess.

## 2018-10-06 NOTE — ED ADULT NURSE NOTE - NSIMPLEMENTINTERV_GEN_ALL_ED
Implemented All Universal Safety Interventions:  [place] to call system. Call bell, personal items and telephone within reach. Instruct patient to call for assistance. Room bathroom lighting operational. Non-slip footwear when patient is off stretcher. Physically safe environment: no spills, clutter or unnecessary equipment. Stretcher in lowest position, wheels locked, appropriate side rails in place.

## 2018-10-06 NOTE — ED ADULT TRIAGE NOTE - CHIEF COMPLAINT QUOTE
pt bibems from home, here for left leg pain, purulent drainage, and redness s/p femoral artery bypass done at [facility] 3 weeks ago. no fevers at home.

## 2018-10-06 NOTE — ED PROVIDER NOTE - OBJECTIVE STATEMENT
EM PGY1 Evi Jones MD: 69yo male with PMH of femoral to popliteal artery bypass b/l with stents, CAD s/p CABG on ASA and Plavix, DM, CHF, HLD, HTN who presents with left leg infection s/p left femoral to popliteal artery bypass 3 weeks ago by Dr. Jung at [facility]. States that over 2 days, has been having purulent and bloody drainage with increasing erythema around incision sites. Pt had an appointment with surgeon but was canceled, has another appointment this week. Took 7 day course of amoxicillin. Dyspnea unchanged from baseline. Denies fever, chills, chest pain, lightheadedness, N/V/D, abdominal pain, numbness, tingling, weakness. EM PGY1 Evi Jones MD: 67yo male with PMH of femoral to popliteal artery bypass b/l with stents, CAD s/p CABG on ASA and Plavix, DM, CHF, HLD, HTN who presents with left leg infection s/p left femoral to popliteal artery bypass 3 weeks ago by Dr. Jung at [facility]. States that over 2 days, has been having purulent and bloody drainage with increasing erythema around incision sites. Pt had an appointment with surgeon but was canceled, has another appointment this week. Took 7 day course of amoxicillin. Dyspnea unchanged from baseline. Ambulating with walker, difficulty due to pain and swelling. Denies fever, chills, chest pain, lightheadedness, N/V/D, abdominal pain, numbness, tingling, weakness. EM PGY1 Evi Jones MD: 67yo male with PMH of femoropopliteal artery bypass b/l with stents, CAD s/p CABG on ASA and Plavix, DM s/p multiple toe amputations, CHF, HLD, HTN who presents with left leg infection s/p left femoral to popliteal artery bypass 3 weeks ago by Dr. Jung at [facility]. States that over 2 days, has been having purulent and bloody drainage with increasing erythema around incision sites. Pt had an appointment with surgeon but was canceled, has another appointment this week. Took 7 day course of amoxicillin. Dyspnea unchanged from baseline. Ambulating with walker, difficulty due to pain and swelling. Denies fever, chills, chest pain, lightheadedness, N/V/D, abdominal pain, numbness, tingling, weakness.

## 2018-10-06 NOTE — ED PROVIDER NOTE - MEDICAL DECISION MAKING DETAILS
EM PGY1 Evi Jones MD: 67yo male with PMH of femoral to popliteal artery bypass b/l with stents, CAD s/p CABG on ASA and Plavix, DM, CHF, HLD, HTN who presents with left leg infection s/p left femoral to popliteal artery bypass 3 weeks ago. No s/s of systemic infection. Plan: Labs, cultures, rectal temp. Will consult surgery for abx and recommendations.

## 2018-10-06 NOTE — ED PROVIDER NOTE - PHYSICAL EXAMINATION
EM PGY1 Evi Joens MD:   CONSTITUTIONAL: Nontoxic, well nourished, well developed, elderly male, resting comfortably in no acute distress  HEAD: Normocephalic; atraumatic  EYES: Normal inspection, EOMI  ENMT: External appears normal  NECK: Supple; non-tender  CARD: RRR; no audible murmurs, rubs, or gallops  RESP: No respiratory distress, lungs ctab/l  ABD: distended; non-tender; no rebound or guarding  EXT: 2+ LE pitting edema R>L, no calf tenderness; radial pulses equal and intact b/l, left 2nd digit and right 1st, 4th and 5th digit amputations of the feet   SKIN: incision site at proximal left femoral site clean and dry, distal incisions at left medal thigh and calf with purulent material, not currently draining, with surrounding erythema; popped blister on left leg   NEURO: aaox3

## 2018-10-06 NOTE — ED PROVIDER NOTE - PROGRESS NOTE DETAILS
EM PGY1 Evi Jones MD: Surgery recommends getting CTA of abd/pelvis and left extremity. EM PGY1 Evi Jones MD: BNP 4369. No s/s of fluid overload. Lungs are clear, no SOB, no CP. Pt is resting comfortably. States that pain in left leg is coming back. Will give morphine. Pt got 500mL of fluid, will repeat lactate. Awaiting CTA of LE. Surgery cancelled abd/pelvis. EM PGY1 Evi Jones MD: BNP 4369. No s/s of fluid overload. Lungs are clear, no SOB, no CP. Pt is resting comfortably. States that pain in left leg is coming back. Will give morphine. Pt got 500mL of fluid, will repeat lactate. Awaiting CTA of LE. Surgery cancelled abd/pelvis. Start on Zosyn and vancomycin.

## 2018-10-06 NOTE — ED ADULT NURSE NOTE - OBJECTIVE STATEMENT
Pt rcvd to room 7 c/o L leg pain, swelling, and purulent drainage and forming wounds from a femoral to popliteal art bypass that was done 3 weeks ago with amputation to L 2nd toe. Increased pain after surgery. + purulent drainage observed to LLE incision. Wound observed to L popliteal area. B/L lower extrem edema observed worse to L leg with redness. Denies CP, N/V, worsening SOB, numbness/tingling, fever. Hx: CABG x 3, stents, CAD, PAD, HTN, DM. Currently on plavix. Evaluated by MD. Will continue to monitor. Aox4, ambulatory at baseline but has been using a walker/cane since sx.

## 2018-10-06 NOTE — ED PROVIDER NOTE - NS ED ROS FT
EM PGY1 Evi Jones MD:   General: denies fever, chills  HENT: denies nasal congestion, sore throat, rhinorrhea  Eyes: denies vision changes  CV: denies chest pain  Resp: +difficulty breathing, denies cough  Abdominal: denies nausea, vomiting, diarrhea, abdominal pain, blood in stool, dark stool  : denies pain with urination  MSK: +pain the left leg  Neuro: denies headaches, numbness, tingling, dizziness, lightheadedness.  Skin: +blister

## 2018-10-06 NOTE — ED ADULT NURSE NOTE - CHIEF COMPLAINT QUOTE
pt bibems from home, here for left leg pain, purulent drainage, and redness s/p femoral artery bypass done at [facility] 3 weeks ago. no fevers at home.

## 2018-10-07 DIAGNOSIS — L03.116 CELLULITIS OF LEFT LOWER LIMB: ICD-10-CM

## 2018-10-07 DIAGNOSIS — M79.605 PAIN IN LEFT LEG: ICD-10-CM

## 2018-10-07 DIAGNOSIS — R09.89 OTHER SPECIFIED SYMPTOMS AND SIGNS INVOLVING THE CIRCULATORY AND RESPIRATORY SYSTEMS: ICD-10-CM

## 2018-10-07 DIAGNOSIS — E11.52 TYPE 2 DIABETES MELLITUS WITH DIABETIC PERIPHERAL ANGIOPATHY WITH GANGRENE: ICD-10-CM

## 2018-10-07 LAB
APTT BLD: 142.5 SEC — CRITICAL HIGH (ref 27.5–37.4)
BASE EXCESS BLDV CALC-SCNC: 5.2 MMOL/L — SIGNIFICANT CHANGE UP
BLOOD GAS VENOUS - CREATININE: 1.03 MG/DL — SIGNIFICANT CHANGE UP (ref 0.5–1.3)
CHLORIDE BLDV-SCNC: 104 MMOL/L — SIGNIFICANT CHANGE UP (ref 96–108)
GAS PNL BLDV: 136 MMOL/L — SIGNIFICANT CHANGE UP (ref 136–146)
GLUCOSE BLDV-MCNC: 108 — HIGH (ref 70–99)
HCO3 BLDV-SCNC: 27 MMOL/L — SIGNIFICANT CHANGE UP (ref 20–27)
HCT VFR BLDV CALC: 32 % — LOW (ref 39–51)
HGB BLDV-MCNC: 10.4 G/DL — LOW (ref 13–17)
LACTATE BLDV-MCNC: 1.3 MMOL/L — SIGNIFICANT CHANGE UP (ref 0.5–2)
PCO2 BLDV: 49 MMHG — SIGNIFICANT CHANGE UP (ref 41–51)
PH BLDV: 7.4 PH — SIGNIFICANT CHANGE UP (ref 7.32–7.43)
PO2 BLDV: 19 MMHG — LOW (ref 35–40)
POTASSIUM BLDV-SCNC: 4.3 MMOL/L — SIGNIFICANT CHANGE UP (ref 3.4–4.5)
SAO2 % BLDV: 16.7 % — LOW (ref 60–85)
SPECIMEN SOURCE: SIGNIFICANT CHANGE UP
SPECIMEN SOURCE: SIGNIFICANT CHANGE UP

## 2018-10-07 PROCEDURE — 99253 IP/OBS CNSLTJ NEW/EST LOW 45: CPT | Mod: 24

## 2018-10-07 PROCEDURE — 73701 CT LOWER EXTREMITY W/DYE: CPT | Mod: 26,LT

## 2018-10-07 RX ORDER — SENNA PLUS 8.6 MG/1
2 TABLET ORAL AT BEDTIME
Qty: 0 | Refills: 0 | Status: DISCONTINUED | OUTPATIENT
Start: 2018-10-07 | End: 2018-10-15

## 2018-10-07 RX ORDER — DEXTROSE 50 % IN WATER 50 %
12.5 SYRINGE (ML) INTRAVENOUS ONCE
Qty: 0 | Refills: 0 | Status: DISCONTINUED | OUTPATIENT
Start: 2018-10-07 | End: 2018-10-15

## 2018-10-07 RX ORDER — HEPARIN SODIUM 5000 [USP'U]/ML
1300 INJECTION INTRAVENOUS; SUBCUTANEOUS
Qty: 25000 | Refills: 0 | Status: DISCONTINUED | OUTPATIENT
Start: 2018-10-07 | End: 2018-10-08

## 2018-10-07 RX ORDER — INSULIN LISPRO 100/ML
VIAL (ML) SUBCUTANEOUS EVERY 6 HOURS
Qty: 0 | Refills: 0 | Status: DISCONTINUED | OUTPATIENT
Start: 2018-10-07 | End: 2018-10-07

## 2018-10-07 RX ORDER — FUROSEMIDE 40 MG
40 TABLET ORAL
Qty: 0 | Refills: 0 | Status: DISCONTINUED | OUTPATIENT
Start: 2018-10-07 | End: 2018-10-10

## 2018-10-07 RX ORDER — INSULIN LISPRO 100/ML
VIAL (ML) SUBCUTANEOUS AT BEDTIME
Qty: 0 | Refills: 0 | Status: DISCONTINUED | OUTPATIENT
Start: 2018-10-07 | End: 2018-10-15

## 2018-10-07 RX ORDER — GLUCAGON INJECTION, SOLUTION 0.5 MG/.1ML
1 INJECTION, SOLUTION SUBCUTANEOUS ONCE
Qty: 0 | Refills: 0 | Status: DISCONTINUED | OUTPATIENT
Start: 2018-10-07 | End: 2018-10-15

## 2018-10-07 RX ORDER — INSULIN LISPRO 100/ML
VIAL (ML) SUBCUTANEOUS
Qty: 0 | Refills: 0 | Status: DISCONTINUED | OUTPATIENT
Start: 2018-10-07 | End: 2018-10-07

## 2018-10-07 RX ORDER — ATORVASTATIN CALCIUM 80 MG/1
40 TABLET, FILM COATED ORAL AT BEDTIME
Qty: 0 | Refills: 0 | Status: DISCONTINUED | OUTPATIENT
Start: 2018-10-07 | End: 2018-10-15

## 2018-10-07 RX ORDER — PIPERACILLIN AND TAZOBACTAM 4; .5 G/20ML; G/20ML
3.38 INJECTION, POWDER, LYOPHILIZED, FOR SOLUTION INTRAVENOUS EVERY 8 HOURS
Qty: 0 | Refills: 0 | Status: DISCONTINUED | OUTPATIENT
Start: 2018-10-07 | End: 2018-10-15

## 2018-10-07 RX ORDER — DEXTROSE 50 % IN WATER 50 %
15 SYRINGE (ML) INTRAVENOUS ONCE
Qty: 0 | Refills: 0 | Status: DISCONTINUED | OUTPATIENT
Start: 2018-10-07 | End: 2018-10-15

## 2018-10-07 RX ORDER — INSULIN LISPRO 100/ML
VIAL (ML) SUBCUTANEOUS
Qty: 0 | Refills: 0 | Status: DISCONTINUED | OUTPATIENT
Start: 2018-10-07 | End: 2018-10-12

## 2018-10-07 RX ORDER — VANCOMYCIN HCL 1 G
1000 VIAL (EA) INTRAVENOUS DAILY
Qty: 0 | Refills: 0 | Status: DISCONTINUED | OUTPATIENT
Start: 2018-10-07 | End: 2018-10-09

## 2018-10-07 RX ORDER — INFLUENZA VIRUS VACCINE 15; 15; 15; 15 UG/.5ML; UG/.5ML; UG/.5ML; UG/.5ML
0.5 SUSPENSION INTRAMUSCULAR ONCE
Qty: 0 | Refills: 0 | Status: COMPLETED | OUTPATIENT
Start: 2018-10-07 | End: 2018-10-30

## 2018-10-07 RX ORDER — DEXTROSE 50 % IN WATER 50 %
25 SYRINGE (ML) INTRAVENOUS ONCE
Qty: 0 | Refills: 0 | Status: DISCONTINUED | OUTPATIENT
Start: 2018-10-07 | End: 2018-10-15

## 2018-10-07 RX ORDER — ONDANSETRON 8 MG/1
4 TABLET, FILM COATED ORAL EVERY 6 HOURS
Qty: 0 | Refills: 0 | Status: DISCONTINUED | OUTPATIENT
Start: 2018-10-07 | End: 2018-10-12

## 2018-10-07 RX ORDER — METOPROLOL TARTRATE 50 MG
5 TABLET ORAL EVERY 6 HOURS
Qty: 0 | Refills: 0 | Status: DISCONTINUED | OUTPATIENT
Start: 2018-10-07 | End: 2018-10-12

## 2018-10-07 RX ORDER — SODIUM CHLORIDE 9 MG/ML
1000 INJECTION, SOLUTION INTRAVENOUS
Qty: 0 | Refills: 0 | Status: DISCONTINUED | OUTPATIENT
Start: 2018-10-07 | End: 2018-10-10

## 2018-10-07 RX ORDER — OXYCODONE HYDROCHLORIDE 5 MG/1
5 TABLET ORAL EVERY 6 HOURS
Qty: 0 | Refills: 0 | Status: DISCONTINUED | OUTPATIENT
Start: 2018-10-07 | End: 2018-10-14

## 2018-10-07 RX ORDER — ASPIRIN/CALCIUM CARB/MAGNESIUM 324 MG
81 TABLET ORAL DAILY
Qty: 0 | Refills: 0 | Status: DISCONTINUED | OUTPATIENT
Start: 2018-10-07 | End: 2018-10-15

## 2018-10-07 RX ORDER — DOCUSATE SODIUM 100 MG
100 CAPSULE ORAL THREE TIMES A DAY
Qty: 0 | Refills: 0 | Status: DISCONTINUED | OUTPATIENT
Start: 2018-10-07 | End: 2018-10-15

## 2018-10-07 RX ORDER — SACUBITRIL AND VALSARTAN 24; 26 MG/1; MG/1
1 TABLET, FILM COATED ORAL
Qty: 0 | Refills: 0 | Status: DISCONTINUED | OUTPATIENT
Start: 2018-10-07 | End: 2018-10-15

## 2018-10-07 RX ORDER — ACETAMINOPHEN 500 MG
650 TABLET ORAL EVERY 6 HOURS
Qty: 0 | Refills: 0 | Status: DISCONTINUED | OUTPATIENT
Start: 2018-10-07 | End: 2018-10-10

## 2018-10-07 RX ORDER — SODIUM CHLORIDE 9 MG/ML
1000 INJECTION, SOLUTION INTRAVENOUS
Qty: 0 | Refills: 0 | Status: DISCONTINUED | OUTPATIENT
Start: 2018-10-07 | End: 2018-10-09

## 2018-10-07 RX ADMIN — Medication 5 MILLIGRAM(S): at 13:06

## 2018-10-07 RX ADMIN — OXYCODONE HYDROCHLORIDE 5 MILLIGRAM(S): 5 TABLET ORAL at 13:44

## 2018-10-07 RX ADMIN — MORPHINE SULFATE 4 MILLIGRAM(S): 50 CAPSULE, EXTENDED RELEASE ORAL at 00:16

## 2018-10-07 RX ADMIN — PIPERACILLIN AND TAZOBACTAM 25 GRAM(S): 4; .5 INJECTION, POWDER, LYOPHILIZED, FOR SOLUTION INTRAVENOUS at 17:28

## 2018-10-07 RX ADMIN — OXYCODONE HYDROCHLORIDE 5 MILLIGRAM(S): 5 TABLET ORAL at 22:05

## 2018-10-07 RX ADMIN — Medication 81 MILLIGRAM(S): at 13:05

## 2018-10-07 RX ADMIN — Medication 40 MILLIGRAM(S): at 17:28

## 2018-10-07 RX ADMIN — SODIUM CHLORIDE 50 MILLILITER(S): 9 INJECTION, SOLUTION INTRAVENOUS at 07:49

## 2018-10-07 RX ADMIN — MORPHINE SULFATE 4 MILLIGRAM(S): 50 CAPSULE, EXTENDED RELEASE ORAL at 00:30

## 2018-10-07 RX ADMIN — HEPARIN SODIUM 13 UNIT(S)/HR: 5000 INJECTION INTRAVENOUS; SUBCUTANEOUS at 15:41

## 2018-10-07 RX ADMIN — Medication 5 MILLIGRAM(S): at 17:27

## 2018-10-07 RX ADMIN — Medication 40 MILLIGRAM(S): at 05:54

## 2018-10-07 RX ADMIN — Medication 2: at 02:15

## 2018-10-07 RX ADMIN — PIPERACILLIN AND TAZOBACTAM 25 GRAM(S): 4; .5 INJECTION, POWDER, LYOPHILIZED, FOR SOLUTION INTRAVENOUS at 07:56

## 2018-10-07 RX ADMIN — Medication 100 MILLIGRAM(S): at 05:54

## 2018-10-07 RX ADMIN — PIPERACILLIN AND TAZOBACTAM 3.38 GRAM(S): 4; .5 INJECTION, POWDER, LYOPHILIZED, FOR SOLUTION INTRAVENOUS at 01:19

## 2018-10-07 RX ADMIN — ATORVASTATIN CALCIUM 40 MILLIGRAM(S): 80 TABLET, FILM COATED ORAL at 21:01

## 2018-10-07 RX ADMIN — PIPERACILLIN AND TAZOBACTAM 200 GRAM(S): 4; .5 INJECTION, POWDER, LYOPHILIZED, FOR SOLUTION INTRAVENOUS at 00:16

## 2018-10-07 RX ADMIN — Medication 100 MILLIGRAM(S): at 13:05

## 2018-10-07 RX ADMIN — OXYCODONE HYDROCHLORIDE 5 MILLIGRAM(S): 5 TABLET ORAL at 21:06

## 2018-10-07 RX ADMIN — OXYCODONE HYDROCHLORIDE 5 MILLIGRAM(S): 5 TABLET ORAL at 14:59

## 2018-10-07 RX ADMIN — Medication 250 MILLIGRAM(S): at 13:06

## 2018-10-07 RX ADMIN — SODIUM CHLORIDE 50 MILLILITER(S): 9 INJECTION, SOLUTION INTRAVENOUS at 02:21

## 2018-10-07 RX ADMIN — Medication 250 MILLIGRAM(S): at 01:19

## 2018-10-07 RX ADMIN — Medication 1: at 18:29

## 2018-10-07 RX ADMIN — SACUBITRIL AND VALSARTAN 1 TABLET(S): 24; 26 TABLET, FILM COATED ORAL at 17:27

## 2018-10-07 RX ADMIN — SODIUM CHLORIDE 500 MILLILITER(S): 9 INJECTION INTRAMUSCULAR; INTRAVENOUS; SUBCUTANEOUS at 00:08

## 2018-10-07 RX ADMIN — SODIUM CHLORIDE 50 MILLILITER(S): 9 INJECTION, SOLUTION INTRAVENOUS at 21:01

## 2018-10-07 NOTE — H&P ADULT - HISTORY OF PRESENT ILLNESS
68 M – h/o DM, CAD (s/p stent in 2016), CABG, HTN, CHF (EF 20% by last TTE on 9/11/18), PAD – s/p L KEI stenting on 9/11, LLE CFA to below-knee bypass with PTFE for long-segment SFA occlusion on 9/18, and L 2nd toe amputation on 9/19. Patient complaining of two days of LLE pain, swelling, and redness. Earlier on the day of admission, he reports discharge of "pus" from the incisions above and below the level of his knee (nothing noted from his groin incision), followed thereafter by a "large" amount of bloody discharge. There has been no further bleeding since. He denies any fevers/chills, and denies any pain in the L foot during this time. He has been ambulating at home fairly regularly. Of note, he is also reporting swelling of the RLE over the past two weeks, without any associated pain or redness.  He has not yet seen Dr. Jung (his operating surgeon) for a post-op appointment. He was begun on anticoagulation – heparin gtt early post-op, continued as Xarelto for discharge – due to concern for sluggish flow through the graft intra-operatively and as prophylaxis against graft thrombosis.

## 2018-10-07 NOTE — H&P ADULT - NSHPLABSRESULTS_GEN_ALL_CORE
10.4   8.45  )-----------( 248      ( 06 Oct 2018 20:28 )             33.7                 PT/INR - ( 06 Oct 2018 20:23 )   PT: 41.9 SEC;   INR: 3.55          PTT - ( 06 Oct 2018 20:23 )  PTT:50.1 SEC    10-06    141  |  100  |  30<H>  ----------------------------<  137<H>  5.0   |  27  |  1.37<H>    Ca    9.0      06 Oct 2018 20:28    TPro  7.6  /  Alb  3.7  /  TBili  1.5<H>  /  DBili  x   /  AST  40  /  ALT  72<H>  /  AlkPhos  226<H>  10-06      LIVER FUNCTIONS - ( 06 Oct 2018 20:28 )  Alb: 3.7 g/dL / Pro: 7.6 g/dL / ALK PHOS: 226 u/L / ALT: 72 u/L / AST: 40 u/L / GGT: x                 IMAGING  LLE CT with IV contrast performed, official read is pending. On review, there appears to be phlegmon deep to skin along medial border of popliteal fossa, no discrete abscess cavity.

## 2018-10-07 NOTE — H&P ADULT - PROBLEM SELECTOR PLAN 1
- Admit to Vascular Surgery  - IV antibiotics (Vancomycin/Zosyn)  - Conservative IVF, given cardiac function  - PRN pain control  - Q4 neurovascular checks, with vital signs  - Hold anticoagulation for now  - Continue ASA 81  - F/U official read of CT LLE

## 2018-10-07 NOTE — PROGRESS NOTE ADULT - SUBJECTIVE AND OBJECTIVE BOX
Vascular Surgery Progress Note    Subjective:   Pt seen & examined at bedside. Patient is complaining of shooting pain in the left leg.  Denies fevers/chills/nausea/vomiting/SOB/chest pain.      Medications:  piperacillin/tazobactam IVPB. 3.375  vancomycin  IVPB 1000  aspirin enteric coated 81  furosemide   Injectable 40  heparin  Infusion 1300  metoprolol tartrate Injectable 5  piperacillin/tazobactam IVPB. 3.375  sacubitril 24 mG/valsartan 26 mG 1  vancomycin  IVPB 1000      Vital Signs Last 24 Hrs  T(C): 36.6 (07 Oct 2018 13:00), Max: 37.3 (06 Oct 2018 18:50)  T(F): 97.8 (07 Oct 2018 13:00), Max: 99.1 (06 Oct 2018 18:50)  HR: 71 (07 Oct 2018 13:00) (56 - 73)  BP: 132/65 (07 Oct 2018 13:00) (110/68 - 133/71)  BP(mean): --  RR: 17 (07 Oct 2018 13:00) (15 - 17)  SpO2: 99% (07 Oct 2018 13:00) (97% - 100%)    I&O's Summary    07 Oct 2018 07:01  -  07 Oct 2018 15:40  --------------------------------------------------------  IN: 0 mL / OUT: 400 mL / NET: -400 mL        Physical Exam:  Gen: NAD  CV: No increased WOB  Extr: LLE TTP, erythematous and mildly swollen.  Incision below knee oozing small amount of pus   Pulses:  LLE +DP and graft signals    LABS:                        10.4   8.45  )-----------( 248      ( 06 Oct 2018 20:28 )             33.7     10-06    141  |  100  |  30<H>  ----------------------------<  137<H>  5.0   |  27  |  1.37<H>    Ca    9.0      06 Oct 2018 20:28    TPro  7.6  /  Alb  3.7  /  TBili  1.5<H>  /  DBili  x   /  AST  40  /  ALT  72<H>  /  AlkPhos  226<H>  10-06    PT/INR - ( 06 Oct 2018 20:23 )   PT: 41.9 SEC;   INR: 3.55          PTT - ( 06 Oct 2018 20:23 )  PTT:50.1 SEC    < from: CT Lower Extremity w/ IV Cont, Left (10.07.18 @ 00:41) >  IMPRESSION:    1.  Status post common femoral artery to popliteal artery bypass graft   which is patent.  2.  Small peripheral enhancing fluid collection within the proximal   sartorius muscle at the level of surgery in the inguinal region.   Differential includes postsurgical change such as seroma or hematoma.   Superimposed infection is not excluded.  3.  Fluid collection in the left inguinal region may represent seroma.   Superimposed infection is not excluded.  4.  Rim-enhancing fluid surrounding the bypass graft at the level of the   distal femur. Although these findings may be postoperative, superimposed   infection is not excluded.  5.  Moderate sized heterogeneous fluid collection resulting in mass   effect upon the medial gastrocnemius muscle within the calf. Differential   includes hematoma. Superimposed infection is not excluded.  6.  Heterogeneous fluid collection within the medial subcutaneous tissues   at the level of the distal graft at the medial distal thigh likely a   hematoma. Superimposed infection is not excluded.  7.  Extensive soft tissue swelling some of which may be infectious in   etiology. Given bilaterality of findings, superimposed anasarca is   suspected.  8.  Small right hip joint effusion and small knee joint effusion of   infectious or inflammatory etiology    < end of copied text >

## 2018-10-07 NOTE — H&P ADULT - NSHPPHYSICALEXAM_GEN_ALL_CORE
Vital Signs Last 24 Hrs  T(C): 36.5 (07 Oct 2018 01:19), Max: 37.3 (06 Oct 2018 18:50)  T(F): 97.7 (07 Oct 2018 01:19), Max: 99.1 (06 Oct 2018 18:50)  HR: 71 (07 Oct 2018 00:16) (71 - 73)  BP: 133/71 (07 Oct 2018 00:16) (110/68 - 133/71)  BP(mean): --  RR: 16 (07 Oct 2018 00:16) (16 - 16)  SpO2: 97% (07 Oct 2018 00:16) (97% - 100%)    GEN: NAD, alert and oriented x 3  HEENT: WNL  CHEST: Symmetrical chest rise, no increased work of breathing.  HEART: RRR, non-muffled heart sounds  ABD: Soft, non-tender, non-distended  EXT/VASC:                          RLE - Warm, capillary refill < 2 secs, motor/sensory grossly intact, hallux amputation site is well-healed, bypass scar is well-healed. Diffuse pitting edema, > LLE.                                       femoral  -  palpable [ x ]       doppler signal [  ]                                       popliteal  -  palpable [  ]       doppler signal [  ]                                       DP -  palpable [  ]       doppler signal [  ]                                       PT -  palpable [  ]       doppler signal [  ]                       LLE - Groin incision is without erythema/induration or drainage, and non-tender. Erythema and induration tracking from the area above knee medially around the posterior (popliteal fossa) and down the posterior and medial regions of the leg. Above knee medial incision with coagulated blood and no spontaneous drainage. Below knee medial incision with spontaneous sero-purulent drainage (no blood). No fluctuant areas palpable. No crepitus/bullae/skin necrosis. Limb is warm, motor/sensory grossly intact. Second digit amputation site is clean/dry/intact.                                       femoral  -  palpable [  ]       doppler signal [  ]                                       popliteal  -  palpable [  ]       doppler signal [  ]                                       DP -  palpable [  ]       doppler signal [ x ]                                       PT -  palpable [  ]       doppler signal [  ]

## 2018-10-07 NOTE — PROGRESS NOTE ADULT - ASSESSMENT
Assessment:  68y male, s/p LLE CFA to below knee popliteal arterial bypass with PTFE on 9/18, returning with SSI.  CTA reveals that the graft is patent, fluid collection surrounding the graft indicating infection tracking down the leg.  Vital signs stable, afebrile.    Plan:  - Arterial duplex LLE ordered  - IV vancomycin and zosyn   - heparin drip, follow up ptt at 7:30p  - consistent carb diet  - started on home meds  - Dr. Jung to see patient tomorrow, will discuss possible surgical interventions at that time

## 2018-10-07 NOTE — H&P ADULT - ASSESSMENT
68y male, s/p LLE CFA to below knee popliteal arterial bypass with PTFE, returning with SSI. It is unclear at this time (prior to official CT read) how deeply the infection tracks, and whether or not it involves the synthetic graft material. Patient is nontoxic-appearing, and bypass appears patent, no endangerment of either extremity

## 2018-10-08 LAB
APTT BLD: 126.9 SEC — CRITICAL HIGH (ref 27.5–37.4)
APTT BLD: 131 SEC — CRITICAL HIGH (ref 27.5–37.4)
APTT BLD: 69.8 SEC — HIGH (ref 27.5–37.4)
APTT BLD: 90.4 SEC — HIGH (ref 27.5–37.4)
BASOPHILS # BLD AUTO: 0.06 K/UL — SIGNIFICANT CHANGE UP (ref 0–0.2)
BASOPHILS NFR BLD AUTO: 0.9 % — SIGNIFICANT CHANGE UP (ref 0–2)
BUN SERPL-MCNC: 22 MG/DL — SIGNIFICANT CHANGE UP (ref 7–23)
CALCIUM SERPL-MCNC: 8.3 MG/DL — LOW (ref 8.4–10.5)
CHLORIDE SERPL-SCNC: 97 MMOL/L — LOW (ref 98–107)
CO2 SERPL-SCNC: 22 MMOL/L — SIGNIFICANT CHANGE UP (ref 22–31)
CREAT SERPL-MCNC: 1.21 MG/DL — SIGNIFICANT CHANGE UP (ref 0.5–1.3)
EOSINOPHIL # BLD AUTO: 0.14 K/UL — SIGNIFICANT CHANGE UP (ref 0–0.5)
EOSINOPHIL NFR BLD AUTO: 2 % — SIGNIFICANT CHANGE UP (ref 0–6)
GLUCOSE SERPL-MCNC: 194 MG/DL — HIGH (ref 70–99)
HBA1C BLD-MCNC: 7.1 % — HIGH (ref 4–5.6)
HCT VFR BLD CALC: 32.3 % — LOW (ref 39–50)
HGB BLD-MCNC: 10.2 G/DL — LOW (ref 13–17)
IMM GRANULOCYTES # BLD AUTO: 0.03 # — SIGNIFICANT CHANGE UP
IMM GRANULOCYTES NFR BLD AUTO: 0.4 % — SIGNIFICANT CHANGE UP (ref 0–1.5)
INR BLD: 2.6 — HIGH (ref 0.88–1.17)
LYMPHOCYTES # BLD AUTO: 0.57 K/UL — LOW (ref 1–3.3)
LYMPHOCYTES # BLD AUTO: 8.2 % — LOW (ref 13–44)
MAGNESIUM SERPL-MCNC: 2.1 MG/DL — SIGNIFICANT CHANGE UP (ref 1.6–2.6)
MCHC RBC-ENTMCNC: 27.9 PG — SIGNIFICANT CHANGE UP (ref 27–34)
MCHC RBC-ENTMCNC: 31.6 % — LOW (ref 32–36)
MCV RBC AUTO: 88.5 FL — SIGNIFICANT CHANGE UP (ref 80–100)
MONOCYTES # BLD AUTO: 0.74 K/UL — SIGNIFICANT CHANGE UP (ref 0–0.9)
MONOCYTES NFR BLD AUTO: 10.6 % — SIGNIFICANT CHANGE UP (ref 2–14)
NEUTROPHILS # BLD AUTO: 5.43 K/UL — SIGNIFICANT CHANGE UP (ref 1.8–7.4)
NEUTROPHILS NFR BLD AUTO: 77.9 % — HIGH (ref 43–77)
NRBC # FLD: 0 — SIGNIFICANT CHANGE UP
PHOSPHATE SERPL-MCNC: 2.9 MG/DL — SIGNIFICANT CHANGE UP (ref 2.5–4.5)
PLATELET # BLD AUTO: 235 K/UL — SIGNIFICANT CHANGE UP (ref 150–400)
PMV BLD: 12.2 FL — SIGNIFICANT CHANGE UP (ref 7–13)
POTASSIUM SERPL-MCNC: 4.3 MMOL/L — SIGNIFICANT CHANGE UP (ref 3.5–5.3)
POTASSIUM SERPL-SCNC: 4.3 MMOL/L — SIGNIFICANT CHANGE UP (ref 3.5–5.3)
PROTHROM AB SERPL-ACNC: 30.5 SEC — HIGH (ref 9.8–13.1)
RBC # BLD: 3.65 M/UL — LOW (ref 4.2–5.8)
RBC # FLD: 20.1 % — HIGH (ref 10.3–14.5)
SODIUM SERPL-SCNC: 133 MMOL/L — LOW (ref 135–145)
WBC # BLD: 6.97 K/UL — SIGNIFICANT CHANGE UP (ref 3.8–10.5)
WBC # FLD AUTO: 6.97 K/UL — SIGNIFICANT CHANGE UP (ref 3.8–10.5)

## 2018-10-08 RX ORDER — HEPARIN SODIUM 5000 [USP'U]/ML
900 INJECTION INTRAVENOUS; SUBCUTANEOUS
Qty: 25000 | Refills: 0 | Status: DISCONTINUED | OUTPATIENT
Start: 2018-10-08 | End: 2018-10-13

## 2018-10-08 RX ADMIN — OXYCODONE HYDROCHLORIDE 5 MILLIGRAM(S): 5 TABLET ORAL at 15:43

## 2018-10-08 RX ADMIN — Medication 5 MILLIGRAM(S): at 00:22

## 2018-10-08 RX ADMIN — OXYCODONE HYDROCHLORIDE 5 MILLIGRAM(S): 5 TABLET ORAL at 11:40

## 2018-10-08 RX ADMIN — Medication 2: at 13:18

## 2018-10-08 RX ADMIN — OXYCODONE HYDROCHLORIDE 5 MILLIGRAM(S): 5 TABLET ORAL at 22:11

## 2018-10-08 RX ADMIN — ATORVASTATIN CALCIUM 40 MILLIGRAM(S): 80 TABLET, FILM COATED ORAL at 22:11

## 2018-10-08 RX ADMIN — PIPERACILLIN AND TAZOBACTAM 25 GRAM(S): 4; .5 INJECTION, POWDER, LYOPHILIZED, FOR SOLUTION INTRAVENOUS at 17:03

## 2018-10-08 RX ADMIN — HEPARIN SODIUM 11 UNIT(S)/HR: 5000 INJECTION INTRAVENOUS; SUBCUTANEOUS at 01:04

## 2018-10-08 RX ADMIN — Medication 81 MILLIGRAM(S): at 13:18

## 2018-10-08 RX ADMIN — SODIUM CHLORIDE 50 MILLILITER(S): 9 INJECTION, SOLUTION INTRAVENOUS at 08:43

## 2018-10-08 RX ADMIN — Medication 5 MILLIGRAM(S): at 06:08

## 2018-10-08 RX ADMIN — OXYCODONE HYDROCHLORIDE 5 MILLIGRAM(S): 5 TABLET ORAL at 16:53

## 2018-10-08 RX ADMIN — Medication 2: at 17:42

## 2018-10-08 RX ADMIN — HEPARIN SODIUM 9 UNIT(S)/HR: 5000 INJECTION INTRAVENOUS; SUBCUTANEOUS at 09:13

## 2018-10-08 RX ADMIN — Medication 2: at 09:01

## 2018-10-08 RX ADMIN — PIPERACILLIN AND TAZOBACTAM 25 GRAM(S): 4; .5 INJECTION, POWDER, LYOPHILIZED, FOR SOLUTION INTRAVENOUS at 07:25

## 2018-10-08 RX ADMIN — Medication 40 MILLIGRAM(S): at 06:08

## 2018-10-08 RX ADMIN — Medication 100 MILLIGRAM(S): at 13:18

## 2018-10-08 RX ADMIN — Medication 250 MILLIGRAM(S): at 13:16

## 2018-10-08 RX ADMIN — Medication 5 MILLIGRAM(S): at 13:18

## 2018-10-08 RX ADMIN — PIPERACILLIN AND TAZOBACTAM 25 GRAM(S): 4; .5 INJECTION, POWDER, LYOPHILIZED, FOR SOLUTION INTRAVENOUS at 00:21

## 2018-10-08 RX ADMIN — SACUBITRIL AND VALSARTAN 1 TABLET(S): 24; 26 TABLET, FILM COATED ORAL at 06:08

## 2018-10-08 RX ADMIN — OXYCODONE HYDROCHLORIDE 5 MILLIGRAM(S): 5 TABLET ORAL at 09:07

## 2018-10-08 RX ADMIN — Medication 40 MILLIGRAM(S): at 17:41

## 2018-10-08 NOTE — PROGRESS NOTE ADULT - ASSESSMENT
Surgical site infection, possible graft infection  - broad spectrum abx.  - at this point, pt non toxic.  will monitor response to abx.  No drainable collection on CT, phlegmon seems superficial.  - low threshold for I and d of the lower leg wound, may need graft excision

## 2018-10-08 NOTE — PROGRESS NOTE ADULT - ASSESSMENT
Assessment:  68y male, s/p LLE CFA to below knee popliteal arterial bypass with PTFE on 9/18, returning with SSI.  CTA reveals that the graft is patent, fluid collection surrounding the graft indicating infection tracking down the leg.  Vital signs stable, afebrile. Will continue abx, no surgery.     Plan:  - Arterial duplex LLE ordered  - IV vancomycin and zosyn   - heparin drip, follow up ptt at 7:30p  - consistent carb diet  - started on home meds  - continue abx treatment, no surgery for now

## 2018-10-08 NOTE — PROGRESS NOTE ADULT - SUBJECTIVE AND OBJECTIVE BOX
s/p left fem to below knee pop bypass with ptfe, with serosanguinous drainage from the bk incision as well as surrounding cellulitis.  pt denies fevers, chills.    Vital Signs Last 24 Hrs  T(C): 36.9 (08 Oct 2018 10:22), Max: 37.2 (08 Oct 2018 00:19)  T(F): 98.5 (08 Oct 2018 10:22), Max: 98.9 (08 Oct 2018 00:19)  HR: 72 (08 Oct 2018 10:22) (71 - 85)  BP: 111/62 (08 Oct 2018 10:22) (110/67 - 132/65)  BP(mean): --  RR: 18 (08 Oct 2018 10:22) (16 - 18)  SpO2: 97% (08 Oct 2018 10:22) (93% - 99%)                        10.2   6.97  )-----------( 235      ( 08 Oct 2018 07:33 )             32.3   10-08    133<L>  |  97<L>  |  22  ----------------------------<  194<H>  4.3   |  22  |  1.21    Ca    8.3<L>      08 Oct 2018 07:33  Phos  2.9     10-08  Mg     2.1     10-08    TPro  7.6  /  Alb  3.7  /  TBili  1.5<H>  /  DBili  x   /  AST  40  /  ALT  72<H>  /  AlkPhos  226<H>  10-06    mild serosang drainage fro mthe below knee pop incision.  patricia incisional erythema and tenderness, without underlying fluctuance    CT with subcutaneous phlegmon at the below knee incision.  non enhancing fluid adjacent to the graft at the proximally

## 2018-10-08 NOTE — PROGRESS NOTE ADULT - SUBJECTIVE AND OBJECTIVE BOX
GENERAL SURGERY DAILY PROGRESS NOTE:     Subjective:  Pt seen and examined on am rounds. No acute events overnight. Dressing off, pt prefers as it causes some pain. Denies foot pain. Denies f/c/n/v.       Objective:  Physical Exam:  Gen: NAD  CV: No increased WOB  Extr: LLE TTP, erythematous and mildly swollen.  Incision below knee oozing small amount of pus   Pulses:  LLE +DP and graft signals    MEDICATIONS  (STANDING):  aspirin enteric coated 81 milliGRAM(s) Oral daily  atorvastatin 40 milliGRAM(s) Oral at bedtime  dextrose 5%. 1000 milliLiter(s) (50 mL/Hr) IV Continuous <Continuous>  dextrose 50% Injectable 12.5 Gram(s) IV Push once  dextrose 50% Injectable 25 Gram(s) IV Push once  dextrose 50% Injectable 25 Gram(s) IV Push once  docusate sodium 100 milliGRAM(s) Oral three times a day  furosemide   Injectable 40 milliGRAM(s) IV Push two times a day  heparin  Infusion 900 Unit(s)/Hr (9 mL/Hr) IV Continuous <Continuous>  influenza   Vaccine 0.5 milliLiter(s) IntraMuscular once  insulin lispro (HumaLOG) corrective regimen sliding scale   SubCutaneous three times a day before meals  insulin lispro (HumaLOG) corrective regimen sliding scale   SubCutaneous at bedtime  lactated ringers. 1000 milliLiter(s) (50 mL/Hr) IV Continuous <Continuous>  metoprolol tartrate Injectable 5 milliGRAM(s) IV Push every 6 hours  piperacillin/tazobactam IVPB. 3.375 Gram(s) IV Intermittent every 8 hours  sacubitril 24 mG/valsartan 26 mG 1 Tablet(s) Oral two times a day  vancomycin  IVPB 1000 milliGRAM(s) IV Intermittent daily    MEDICATIONS  (PRN):  acetaminophen    Suspension .. 650 milliGRAM(s) Oral every 6 hours PRN Mild Pain (1 - 3)  dextrose 40% Gel 15 Gram(s) Oral once PRN Blood Glucose LESS THAN 70 milliGRAM(s)/deciliter  glucagon  Injectable 1 milliGRAM(s) IntraMuscular once PRN Glucose LESS THAN 70 milligrams/deciliter  ondansetron Injectable 4 milliGRAM(s) IV Push every 6 hours PRN Nausea  oxyCODONE    IR 5 milliGRAM(s) Oral every 6 hours PRN moderate to severe pain  senna 2 Tablet(s) Oral at bedtime PRN Constipation      Vital Signs Last 24 Hrs  T(C): 36.9 (08 Oct 2018 10:22), Max: 37.2 (08 Oct 2018 00:19)  T(F): 98.5 (08 Oct 2018 10:22), Max: 98.9 (08 Oct 2018 00:19)  HR: 72 (08 Oct 2018 10:22) (72 - 85)  BP: 111/62 (08 Oct 2018 10:22) (110/67 - 122/72)  BP(mean): --  RR: 18 (08 Oct 2018 10:22) (16 - 18)  SpO2: 97% (08 Oct 2018 10:22) (93% - 97%)    I&O's Detail    07 Oct 2018 07:01  -  08 Oct 2018 07:00  --------------------------------------------------------  IN:    heparin Infusion: 65 mL    IV PiggyBack: 350 mL    lactated ringers.: 600 mL  Total IN: 1015 mL    OUT:    Voided: 1400 mL  Total OUT: 1400 mL    Total NET: -385 mL          Daily     Daily     LABS:                        10.2   6.97  )-----------( 235      ( 08 Oct 2018 07:33 )             32.3     10-08    133<L>  |  97<L>  |  22  ----------------------------<  194<H>  4.3   |  22  |  1.21    Ca    8.3<L>      08 Oct 2018 07:33  Phos  2.9     10-08  Mg     2.1     10-08    TPro  7.6  /  Alb  3.7  /  TBili  1.5<H>  /  DBili  x   /  AST  40  /  ALT  72<H>  /  AlkPhos  226<H>  10-06    PT/INR - ( 08 Oct 2018 07:33 )   PT: 30.5 SEC;   INR: 2.60          PTT - ( 08 Oct 2018 07:33 )  PTT:131.0 SEC      RADIOLOGY & ADDITIONAL STUDIES:

## 2018-10-09 LAB
APTT BLD: 59.3 SEC — HIGH (ref 27.5–37.4)
APTT BLD: 59.7 SEC — HIGH (ref 27.5–37.4)
BUN SERPL-MCNC: 21 MG/DL — SIGNIFICANT CHANGE UP (ref 7–23)
CALCIUM SERPL-MCNC: 8.2 MG/DL — LOW (ref 8.4–10.5)
CHLORIDE SERPL-SCNC: 97 MMOL/L — LOW (ref 98–107)
CO2 SERPL-SCNC: 23 MMOL/L — SIGNIFICANT CHANGE UP (ref 22–31)
CREAT SERPL-MCNC: 1.2 MG/DL — SIGNIFICANT CHANGE UP (ref 0.5–1.3)
GLUCOSE SERPL-MCNC: 212 MG/DL — HIGH (ref 70–99)
HCT VFR BLD CALC: 31 % — LOW (ref 39–50)
HGB BLD-MCNC: 9.7 G/DL — LOW (ref 13–17)
INR BLD: 1.75 — HIGH (ref 0.88–1.17)
MAGNESIUM SERPL-MCNC: 2.2 MG/DL — SIGNIFICANT CHANGE UP (ref 1.6–2.6)
MCHC RBC-ENTMCNC: 27.2 PG — SIGNIFICANT CHANGE UP (ref 27–34)
MCHC RBC-ENTMCNC: 31.3 % — LOW (ref 32–36)
MCV RBC AUTO: 86.8 FL — SIGNIFICANT CHANGE UP (ref 80–100)
NRBC # FLD: 0 — SIGNIFICANT CHANGE UP
PHOSPHATE SERPL-MCNC: 2.3 MG/DL — LOW (ref 2.5–4.5)
PLATELET # BLD AUTO: 212 K/UL — SIGNIFICANT CHANGE UP (ref 150–400)
PMV BLD: 11.9 FL — SIGNIFICANT CHANGE UP (ref 7–13)
POTASSIUM SERPL-MCNC: 4 MMOL/L — SIGNIFICANT CHANGE UP (ref 3.5–5.3)
POTASSIUM SERPL-SCNC: 4 MMOL/L — SIGNIFICANT CHANGE UP (ref 3.5–5.3)
PROTHROM AB SERPL-ACNC: 19.6 SEC — HIGH (ref 9.8–13.1)
RBC # BLD: 3.57 M/UL — LOW (ref 4.2–5.8)
RBC # FLD: 19.9 % — HIGH (ref 10.3–14.5)
SODIUM SERPL-SCNC: 134 MMOL/L — LOW (ref 135–145)
VANCOMYCIN TROUGH SERPL-MCNC: 8.9 UG/ML — LOW (ref 10–20)
WBC # BLD: 6.5 K/UL — SIGNIFICANT CHANGE UP (ref 3.8–10.5)
WBC # FLD AUTO: 6.5 K/UL — SIGNIFICANT CHANGE UP (ref 3.8–10.5)

## 2018-10-09 RX ORDER — VANCOMYCIN HCL 1 G
1000 VIAL (EA) INTRAVENOUS EVERY 12 HOURS
Qty: 0 | Refills: 0 | Status: DISCONTINUED | OUTPATIENT
Start: 2018-10-09 | End: 2018-10-15

## 2018-10-09 RX ORDER — INSULIN GLARGINE 100 [IU]/ML
5 INJECTION, SOLUTION SUBCUTANEOUS AT BEDTIME
Qty: 0 | Refills: 0 | Status: DISCONTINUED | OUTPATIENT
Start: 2018-10-09 | End: 2018-10-12

## 2018-10-09 RX ORDER — VANCOMYCIN HCL 1 G
1250 VIAL (EA) INTRAVENOUS EVERY 12 HOURS
Qty: 0 | Refills: 0 | Status: DISCONTINUED | OUTPATIENT
Start: 2018-10-09 | End: 2018-10-09

## 2018-10-09 RX ORDER — VANCOMYCIN HCL 1 G
1250 VIAL (EA) INTRAVENOUS EVERY 24 HOURS
Qty: 0 | Refills: 0 | Status: DISCONTINUED | OUTPATIENT
Start: 2018-10-09 | End: 2018-10-09

## 2018-10-09 RX ADMIN — PIPERACILLIN AND TAZOBACTAM 25 GRAM(S): 4; .5 INJECTION, POWDER, LYOPHILIZED, FOR SOLUTION INTRAVENOUS at 01:04

## 2018-10-09 RX ADMIN — Medication 5 MILLIGRAM(S): at 06:16

## 2018-10-09 RX ADMIN — ATORVASTATIN CALCIUM 40 MILLIGRAM(S): 80 TABLET, FILM COATED ORAL at 21:38

## 2018-10-09 RX ADMIN — OXYCODONE HYDROCHLORIDE 5 MILLIGRAM(S): 5 TABLET ORAL at 18:42

## 2018-10-09 RX ADMIN — OXYCODONE HYDROCHLORIDE 5 MILLIGRAM(S): 5 TABLET ORAL at 11:15

## 2018-10-09 RX ADMIN — INSULIN GLARGINE 5 UNIT(S): 100 INJECTION, SOLUTION SUBCUTANEOUS at 21:37

## 2018-10-09 RX ADMIN — Medication 63.75 MILLIMOLE(S): at 11:52

## 2018-10-09 RX ADMIN — OXYCODONE HYDROCHLORIDE 5 MILLIGRAM(S): 5 TABLET ORAL at 19:15

## 2018-10-09 RX ADMIN — SACUBITRIL AND VALSARTAN 1 TABLET(S): 24; 26 TABLET, FILM COATED ORAL at 06:16

## 2018-10-09 RX ADMIN — Medication 40 MILLIGRAM(S): at 18:43

## 2018-10-09 RX ADMIN — PIPERACILLIN AND TAZOBACTAM 25 GRAM(S): 4; .5 INJECTION, POWDER, LYOPHILIZED, FOR SOLUTION INTRAVENOUS at 08:05

## 2018-10-09 RX ADMIN — OXYCODONE HYDROCHLORIDE 5 MILLIGRAM(S): 5 TABLET ORAL at 10:44

## 2018-10-09 RX ADMIN — Medication 5 MILLIGRAM(S): at 01:21

## 2018-10-09 RX ADMIN — Medication 3: at 17:48

## 2018-10-09 RX ADMIN — Medication 100 MILLIGRAM(S): at 21:38

## 2018-10-09 RX ADMIN — Medication 5 MILLIGRAM(S): at 12:57

## 2018-10-09 RX ADMIN — SODIUM CHLORIDE 50 MILLILITER(S): 9 INJECTION, SOLUTION INTRAVENOUS at 10:45

## 2018-10-09 RX ADMIN — Medication 5 MILLIGRAM(S): at 18:43

## 2018-10-09 RX ADMIN — SACUBITRIL AND VALSARTAN 1 TABLET(S): 24; 26 TABLET, FILM COATED ORAL at 18:43

## 2018-10-09 RX ADMIN — PIPERACILLIN AND TAZOBACTAM 25 GRAM(S): 4; .5 INJECTION, POWDER, LYOPHILIZED, FOR SOLUTION INTRAVENOUS at 17:10

## 2018-10-09 RX ADMIN — Medication 2: at 12:57

## 2018-10-09 RX ADMIN — HEPARIN SODIUM 9 UNIT(S)/HR: 5000 INJECTION INTRAVENOUS; SUBCUTANEOUS at 01:24

## 2018-10-09 RX ADMIN — Medication 250 MILLIGRAM(S): at 15:33

## 2018-10-09 RX ADMIN — Medication 81 MILLIGRAM(S): at 12:57

## 2018-10-09 RX ADMIN — Medication 2: at 08:57

## 2018-10-09 RX ADMIN — Medication 40 MILLIGRAM(S): at 06:14

## 2018-10-09 NOTE — PROGRESS NOTE ADULT - SUBJECTIVE AND OBJECTIVE BOX
Vascular Surgery Progress Note    Subjective:   Pt seen & examined at bedside. Pain is well controlled. No new complaints. No F/C, N/V, CP/SOB.    Medications:  piperacillin/tazobactam IVPB. 3.375  vancomycin  IVPB 1000  aspirin enteric coated 81  furosemide   Injectable 40  heparin  Infusion 900  metoprolol tartrate Injectable 5  piperacillin/tazobactam IVPB. 3.375  sacubitril 24 mG/valsartan 26 mG 1  vancomycin  IVPB 1000      Vital Signs Last 24 Hrs  T(C): 36.8 (09 Oct 2018 10:21), Max: 36.9 (08 Oct 2018 14:10)  T(F): 98.3 (09 Oct 2018 10:21), Max: 98.4 (08 Oct 2018 14:10)  HR: 76 (09 Oct 2018 10:21) (72 - 78)  BP: 109/65 (09 Oct 2018 10:21) (106/60 - 125/70)  BP(mean): --  RR: 18 (09 Oct 2018 10:21) (17 - 18)  SpO2: 98% (09 Oct 2018 10:21) (96% - 99%)    I&O's Summary    08 Oct 2018 07:01  -  09 Oct 2018 07:00  --------------------------------------------------------  IN: 0 mL / OUT: 2050 mL / NET: -2050 mL    09 Oct 2018 07:01  -  09 Oct 2018 11:03  --------------------------------------------------------  IN: 0 mL / OUT: 550 mL / NET: -550 mL        Physical Exam:  Gen: NAD  CV: No increased WOB  Extr: LLE mild TTP. Incision below knee with loose gauze covering, dry.  Dried blood over incision, no expressible drainage.  Erythema surrounding BK incision, appears somewhat improved since yesterday  Pulses:  LLE +DP and graft signals    LABS:                        9.7    6.50  )-----------( 212      ( 09 Oct 2018 06:04 )             31.0     10-09    134<L>  |  97<L>  |  21  ----------------------------<  212<H>  4.0   |  23  |  1.20    Ca    8.2<L>      09 Oct 2018 06:04  Phos  2.3     10-09  Mg     2.2     10-09      PT/INR - ( 09 Oct 2018 06:04 )   PT: 19.6 SEC;   INR: 1.75          PTT - ( 09 Oct 2018 06:04 )  PTT:59.7 SEC

## 2018-10-09 NOTE — PROGRESS NOTE ADULT - ASSESSMENT
68y male, s/p LLE CFA to below knee popliteal arterial bypass with PTFE on 9/18, returning with SSI.  CTA reveals that the graft is patent, fluid collection surrounding the graft indicating infection tracking down the leg.  Vital signs stable, afebrile. Will continue abx, no surgery.     Plan:  - Arterial duplex LLE ordered  - IV vancomycin and zosyn, FU vanco trough  - heparin drip, currently therapeutic  - consistent carb diet  - continue abx treatment, no surgery for now 68y male, s/p LLE CFA to below knee popliteal arterial bypass with PTFE on 9/18, returning with SSI.  CTA reveals that the graft is patent,  Vital signs stable, afebrile. Will continue abx, no surgery.     Plan:  - Arterial duplex LLE ordered  - IV vancomycin and zosyn, FU vanco trough  - heparin drip, currently therapeutic  - consistent carb diet  - continue abx treatment, no surgery for now

## 2018-10-10 LAB
BUN SERPL-MCNC: 17 MG/DL — SIGNIFICANT CHANGE UP (ref 7–23)
CALCIUM SERPL-MCNC: 8 MG/DL — LOW (ref 8.4–10.5)
CHLORIDE SERPL-SCNC: 97 MMOL/L — LOW (ref 98–107)
CO2 SERPL-SCNC: 24 MMOL/L — SIGNIFICANT CHANGE UP (ref 22–31)
CREAT SERPL-MCNC: 1.07 MG/DL — SIGNIFICANT CHANGE UP (ref 0.5–1.3)
GLUCOSE SERPL-MCNC: 207 MG/DL — HIGH (ref 70–99)
HCT VFR BLD CALC: 32.4 % — LOW (ref 39–50)
HGB BLD-MCNC: 10.2 G/DL — LOW (ref 13–17)
MAGNESIUM SERPL-MCNC: 2 MG/DL — SIGNIFICANT CHANGE UP (ref 1.6–2.6)
MCHC RBC-ENTMCNC: 27.9 PG — SIGNIFICANT CHANGE UP (ref 27–34)
MCHC RBC-ENTMCNC: 31.5 % — LOW (ref 32–36)
MCV RBC AUTO: 88.5 FL — SIGNIFICANT CHANGE UP (ref 80–100)
NRBC # FLD: 0 — SIGNIFICANT CHANGE UP
PHOSPHATE SERPL-MCNC: 2.4 MG/DL — LOW (ref 2.5–4.5)
PLATELET # BLD AUTO: 214 K/UL — SIGNIFICANT CHANGE UP (ref 150–400)
PMV BLD: 12.1 FL — SIGNIFICANT CHANGE UP (ref 7–13)
POTASSIUM SERPL-MCNC: 3.8 MMOL/L — SIGNIFICANT CHANGE UP (ref 3.5–5.3)
POTASSIUM SERPL-SCNC: 3.8 MMOL/L — SIGNIFICANT CHANGE UP (ref 3.5–5.3)
RBC # BLD: 3.66 M/UL — LOW (ref 4.2–5.8)
RBC # FLD: 19.8 % — HIGH (ref 10.3–14.5)
SODIUM SERPL-SCNC: 135 MMOL/L — SIGNIFICANT CHANGE UP (ref 135–145)
WBC # BLD: 6.31 K/UL — SIGNIFICANT CHANGE UP (ref 3.8–10.5)
WBC # FLD AUTO: 6.31 K/UL — SIGNIFICANT CHANGE UP (ref 3.8–10.5)

## 2018-10-10 PROCEDURE — 99231 SBSQ HOSP IP/OBS SF/LOW 25: CPT | Mod: 24

## 2018-10-10 RX ORDER — FUROSEMIDE 40 MG
40 TABLET ORAL
Qty: 0 | Refills: 0 | Status: DISCONTINUED | OUTPATIENT
Start: 2018-10-10 | End: 2018-10-15

## 2018-10-10 RX ORDER — ACETAMINOPHEN 500 MG
650 TABLET ORAL EVERY 6 HOURS
Qty: 0 | Refills: 0 | Status: DISCONTINUED | OUTPATIENT
Start: 2018-10-10 | End: 2018-10-15

## 2018-10-10 RX ADMIN — Medication 2: at 09:13

## 2018-10-10 RX ADMIN — OXYCODONE HYDROCHLORIDE 5 MILLIGRAM(S): 5 TABLET ORAL at 01:35

## 2018-10-10 RX ADMIN — INSULIN GLARGINE 5 UNIT(S): 100 INJECTION, SOLUTION SUBCUTANEOUS at 22:50

## 2018-10-10 RX ADMIN — SACUBITRIL AND VALSARTAN 1 TABLET(S): 24; 26 TABLET, FILM COATED ORAL at 06:09

## 2018-10-10 RX ADMIN — Medication 250 MILLIGRAM(S): at 02:56

## 2018-10-10 RX ADMIN — PIPERACILLIN AND TAZOBACTAM 25 GRAM(S): 4; .5 INJECTION, POWDER, LYOPHILIZED, FOR SOLUTION INTRAVENOUS at 00:19

## 2018-10-10 RX ADMIN — Medication 40 MILLIGRAM(S): at 18:01

## 2018-10-10 RX ADMIN — OXYCODONE HYDROCHLORIDE 5 MILLIGRAM(S): 5 TABLET ORAL at 00:56

## 2018-10-10 RX ADMIN — Medication 81 MILLIGRAM(S): at 12:10

## 2018-10-10 RX ADMIN — Medication 3: at 18:01

## 2018-10-10 RX ADMIN — Medication 5 MILLIGRAM(S): at 00:19

## 2018-10-10 RX ADMIN — HEPARIN SODIUM 9 UNIT(S)/HR: 5000 INJECTION INTRAVENOUS; SUBCUTANEOUS at 02:56

## 2018-10-10 RX ADMIN — Medication 5 MILLIGRAM(S): at 06:09

## 2018-10-10 RX ADMIN — ATORVASTATIN CALCIUM 40 MILLIGRAM(S): 80 TABLET, FILM COATED ORAL at 21:25

## 2018-10-10 RX ADMIN — SACUBITRIL AND VALSARTAN 1 TABLET(S): 24; 26 TABLET, FILM COATED ORAL at 18:01

## 2018-10-10 RX ADMIN — Medication 650 MILLIGRAM(S): at 10:39

## 2018-10-10 RX ADMIN — PIPERACILLIN AND TAZOBACTAM 25 GRAM(S): 4; .5 INJECTION, POWDER, LYOPHILIZED, FOR SOLUTION INTRAVENOUS at 08:33

## 2018-10-10 RX ADMIN — HEPARIN SODIUM 9 UNIT(S)/HR: 5000 INJECTION INTRAVENOUS; SUBCUTANEOUS at 00:57

## 2018-10-10 RX ADMIN — Medication 650 MILLIGRAM(S): at 11:08

## 2018-10-10 RX ADMIN — Medication 250 MILLIGRAM(S): at 14:56

## 2018-10-10 RX ADMIN — Medication 5 MILLIGRAM(S): at 18:01

## 2018-10-10 RX ADMIN — Medication 5 MILLIGRAM(S): at 12:10

## 2018-10-10 RX ADMIN — Medication 40 MILLIGRAM(S): at 06:09

## 2018-10-10 RX ADMIN — Medication 3: at 12:14

## 2018-10-10 RX ADMIN — PIPERACILLIN AND TAZOBACTAM 25 GRAM(S): 4; .5 INJECTION, POWDER, LYOPHILIZED, FOR SOLUTION INTRAVENOUS at 16:52

## 2018-10-10 NOTE — PROGRESS NOTE ADULT - SUBJECTIVE AND OBJECTIVE BOX
Vascular Surgery Progress Note    Subjective:   Pt seen & examined at bedside.  LLE pain is well controlled.  Patient denies any fevers/chills or nausea/vomiting.    Medications:  piperacillin/tazobactam IVPB. 3.375  vancomycin  IVPB 1000  aspirin enteric coated 81  furosemide    Tablet 40  heparin  Infusion 900  metoprolol tartrate Injectable 5  piperacillin/tazobactam IVPB. 3.375  sacubitril 24 mG/valsartan 26 mG 1  vancomycin  IVPB 1000      Vital Signs Last 24 Hrs  T(C): 37 (10 Oct 2018 21:21), Max: 37 (10 Oct 2018 21:21)  T(F): 98.6 (10 Oct 2018 21:21), Max: 98.6 (10 Oct 2018 21:21)  HR: 74 (10 Oct 2018 21:21) (61 - 75)  BP: 129/76 (10 Oct 2018 21:21) (105/57 - 131/77)  BP(mean): --  RR: 16 (10 Oct 2018 21:21) (16 - 18)  SpO2: 100% (10 Oct 2018 21:21) (97% - 100%)    I&O's Summary    09 Oct 2018 07:01  -  10 Oct 2018 07:00  --------------------------------------------------------  IN: 0 mL / OUT: 2700 mL / NET: -2700 mL    10 Oct 2018 07:01  -  10 Oct 2018 22:19  --------------------------------------------------------  IN: 0 mL / OUT: 1100 mL / NET: -1100 mL        Physical Exam:  Gen: NAD  CV: No increased WOB  Extr: LLE mild TTP. Incision below knee now dry, small amount of overlying dried blood  Erythema surrounding BK incision appears improved.  Pulses:  LLE +DP and graft signals    LABS:                        10.2   6.31  )-----------( 214      ( 10 Oct 2018 06:20 )             32.4     10-10    135  |  97<L>  |  17  ----------------------------<  207<H>  3.8   |  24  |  1.07    Ca    8.0<L>      10 Oct 2018 06:20  Phos  2.4     10-10  Mg     2.0     10-10      PT/INR - ( 09 Oct 2018 06:04 )   PT: 19.6 SEC;   INR: 1.75          PTT - ( 09 Oct 2018 23:14 )  PTT:59.3 SEC

## 2018-10-10 NOTE — PROGRESS NOTE ADULT - ASSESSMENT
soft tissue infection, unclear if graft is involved.  continue IV abx for now, monitor for signs of systemic infection

## 2018-10-10 NOTE — PROGRESS NOTE ADULT - ASSESSMENT
68y male, s/p LLE CFA to below knee popliteal arterial bypass with PTFE on 9/18, returning with SSI.  CTA reveals that the graft is patent,  Vital signs stable, afebrile. Will continue abx, no surgery.     Plan:  - Arterial duplex LLE ordered  - IV vancomycin and zosyn, FU vanco trough tomorrow AM  - heparin drip, currently therapeutic  - consistent carb diet  - continue to monitor vital signs, labs

## 2018-10-10 NOTE — PROGRESS NOTE ADULT - SUBJECTIVE AND OBJECTIVE BOX
pt states pain is improved. no fevers/chills.      Vital Signs Last 24 Hrs  T(C): 36.7 (10 Oct 2018 10:00), Max: 36.8 (09 Oct 2018 14:35)  T(F): 98 (10 Oct 2018 10:00), Max: 98.2 (09 Oct 2018 14:35)  HR: 66 (10 Oct 2018 12:07) (66 - 83)  BP: 111/65 (10 Oct 2018 12:07) (105/57 - 133/65)  BP(mean): --  RR: 18 (10 Oct 2018 12:07) (16 - 18)  SpO2: 100% (10 Oct 2018 12:07) (97% - 100%)                        10.2   6.31  )-----------( 214      ( 10 Oct 2018 06:20 )             32.4   10-10    135  |  97<L>  |  17  ----------------------------<  207<H>  3.8   |  24  |  1.07    Ca    8.0<L>      10 Oct 2018 06:20  Phos  2.4     10-10  Mg     2.0     10-10    less periincisional erythema  no flucutance, however there is still fullness of the calf as well as serosanguinous drainage from the wound  there is no tenderness in the thigh along the course of the graft or in the groin

## 2018-10-11 LAB
APTT BLD: 58.2 SEC — HIGH (ref 27.5–37.4)
BACTERIA BLD CULT: SIGNIFICANT CHANGE UP
BACTERIA BLD CULT: SIGNIFICANT CHANGE UP
BUN SERPL-MCNC: 16 MG/DL — SIGNIFICANT CHANGE UP (ref 7–23)
CALCIUM SERPL-MCNC: 8.4 MG/DL — SIGNIFICANT CHANGE UP (ref 8.4–10.5)
CHLORIDE SERPL-SCNC: 99 MMOL/L — SIGNIFICANT CHANGE UP (ref 98–107)
CO2 SERPL-SCNC: 22 MMOL/L — SIGNIFICANT CHANGE UP (ref 22–31)
CREAT SERPL-MCNC: 1.03 MG/DL — SIGNIFICANT CHANGE UP (ref 0.5–1.3)
GLUCOSE SERPL-MCNC: 206 MG/DL — HIGH (ref 70–99)
HCT VFR BLD CALC: 31.5 % — LOW (ref 39–50)
HGB BLD-MCNC: 9.9 G/DL — LOW (ref 13–17)
INR BLD: 1.51 — HIGH (ref 0.88–1.17)
MAGNESIUM SERPL-MCNC: 2.1 MG/DL — SIGNIFICANT CHANGE UP (ref 1.6–2.6)
MCHC RBC-ENTMCNC: 27 PG — SIGNIFICANT CHANGE UP (ref 27–34)
MCHC RBC-ENTMCNC: 31.4 % — LOW (ref 32–36)
MCV RBC AUTO: 86.1 FL — SIGNIFICANT CHANGE UP (ref 80–100)
NRBC # FLD: 0 — SIGNIFICANT CHANGE UP
PHOSPHATE SERPL-MCNC: 1.9 MG/DL — LOW (ref 2.5–4.5)
PLATELET # BLD AUTO: 210 K/UL — SIGNIFICANT CHANGE UP (ref 150–400)
PMV BLD: 11.8 FL — SIGNIFICANT CHANGE UP (ref 7–13)
POTASSIUM SERPL-MCNC: 3.9 MMOL/L — SIGNIFICANT CHANGE UP (ref 3.5–5.3)
POTASSIUM SERPL-SCNC: 3.9 MMOL/L — SIGNIFICANT CHANGE UP (ref 3.5–5.3)
PROTHROM AB SERPL-ACNC: 16.9 SEC — HIGH (ref 9.8–13.1)
RBC # BLD: 3.66 M/UL — LOW (ref 4.2–5.8)
RBC # FLD: 19.6 % — HIGH (ref 10.3–14.5)
SODIUM SERPL-SCNC: 135 MMOL/L — SIGNIFICANT CHANGE UP (ref 135–145)
VANCOMYCIN TROUGH SERPL-MCNC: 15.5 UG/ML — SIGNIFICANT CHANGE UP (ref 10–20)
WBC # BLD: 6.44 K/UL — SIGNIFICANT CHANGE UP (ref 3.8–10.5)
WBC # FLD AUTO: 6.44 K/UL — SIGNIFICANT CHANGE UP (ref 3.8–10.5)

## 2018-10-11 RX ADMIN — PIPERACILLIN AND TAZOBACTAM 25 GRAM(S): 4; .5 INJECTION, POWDER, LYOPHILIZED, FOR SOLUTION INTRAVENOUS at 23:10

## 2018-10-11 RX ADMIN — INSULIN GLARGINE 5 UNIT(S): 100 INJECTION, SOLUTION SUBCUTANEOUS at 21:53

## 2018-10-11 RX ADMIN — PIPERACILLIN AND TAZOBACTAM 25 GRAM(S): 4; .5 INJECTION, POWDER, LYOPHILIZED, FOR SOLUTION INTRAVENOUS at 00:27

## 2018-10-11 RX ADMIN — HEPARIN SODIUM 9 UNIT(S)/HR: 5000 INJECTION INTRAVENOUS; SUBCUTANEOUS at 04:37

## 2018-10-11 RX ADMIN — OXYCODONE HYDROCHLORIDE 5 MILLIGRAM(S): 5 TABLET ORAL at 17:00

## 2018-10-11 RX ADMIN — Medication 63.75 MILLIMOLE(S): at 16:06

## 2018-10-11 RX ADMIN — OXYCODONE HYDROCHLORIDE 5 MILLIGRAM(S): 5 TABLET ORAL at 16:14

## 2018-10-11 RX ADMIN — ATORVASTATIN CALCIUM 40 MILLIGRAM(S): 80 TABLET, FILM COATED ORAL at 21:36

## 2018-10-11 RX ADMIN — OXYCODONE HYDROCHLORIDE 5 MILLIGRAM(S): 5 TABLET ORAL at 07:05

## 2018-10-11 RX ADMIN — Medication 5 MILLIGRAM(S): at 23:14

## 2018-10-11 RX ADMIN — Medication 40 MILLIGRAM(S): at 05:22

## 2018-10-11 RX ADMIN — Medication 2: at 08:33

## 2018-10-11 RX ADMIN — Medication 250 MILLIGRAM(S): at 02:50

## 2018-10-11 RX ADMIN — SACUBITRIL AND VALSARTAN 1 TABLET(S): 24; 26 TABLET, FILM COATED ORAL at 18:05

## 2018-10-11 RX ADMIN — PIPERACILLIN AND TAZOBACTAM 25 GRAM(S): 4; .5 INJECTION, POWDER, LYOPHILIZED, FOR SOLUTION INTRAVENOUS at 15:33

## 2018-10-11 RX ADMIN — Medication 40 MILLIGRAM(S): at 18:05

## 2018-10-11 RX ADMIN — SACUBITRIL AND VALSARTAN 1 TABLET(S): 24; 26 TABLET, FILM COATED ORAL at 05:21

## 2018-10-11 RX ADMIN — Medication 250 MILLIGRAM(S): at 14:09

## 2018-10-11 RX ADMIN — Medication 1: at 21:53

## 2018-10-11 RX ADMIN — Medication 2: at 13:09

## 2018-10-11 RX ADMIN — Medication 5 MILLIGRAM(S): at 18:05

## 2018-10-11 RX ADMIN — HEPARIN SODIUM 9 UNIT(S)/HR: 5000 INJECTION INTRAVENOUS; SUBCUTANEOUS at 07:29

## 2018-10-11 RX ADMIN — Medication 2: at 18:05

## 2018-10-11 RX ADMIN — Medication 81 MILLIGRAM(S): at 12:31

## 2018-10-11 RX ADMIN — OXYCODONE HYDROCHLORIDE 5 MILLIGRAM(S): 5 TABLET ORAL at 07:40

## 2018-10-11 RX ADMIN — Medication 5 MILLIGRAM(S): at 13:09

## 2018-10-11 RX ADMIN — PIPERACILLIN AND TAZOBACTAM 25 GRAM(S): 4; .5 INJECTION, POWDER, LYOPHILIZED, FOR SOLUTION INTRAVENOUS at 07:29

## 2018-10-11 NOTE — PROGRESS NOTE ADULT - SUBJECTIVE AND OBJECTIVE BOX
VASCULAR SURGERY  Pager: [phone]      INTERVAL EVENTS/SUBJECTIVE:   No acute events overnight.   Left leg still draining serosanguinous fluid.  ______________________________________________  OBJECTIVE:   T(C): 36.7 (10-11-18 @ 18:02), Max: 37 (10-10-18 @ 21:21)  HR: 81 (10-11-18 @ 18:02) (74 - 82)  BP: 134/74 (10-11-18 @ 18:02) (116/62 - 135/73)  RR: 18 (10-11-18 @ 18:02) (16 - 18)  SpO2: 99% (10-11-18 @ 18:02) (98% - 100%)  Wt(kg): --  CAPILLARY BLOOD GLUCOSE      POCT Blood Glucose.: 229 mg/dL (11 Oct 2018 17:50)  POCT Blood Glucose.: 241 mg/dL (11 Oct 2018 12:42)  POCT Blood Glucose.: 205 mg/dL (11 Oct 2018 08:30)  POCT Blood Glucose.: 215 mg/dL (10 Oct 2018 22:14)    I&O's Detail    10 Oct 2018 07:01  -  11 Oct 2018 07:00  --------------------------------------------------------  IN:  Total IN: 0 mL    OUT:    Voided: 1600 mL  Total OUT: 1600 mL    Total NET: -1600 mL      11 Oct 2018 07:01  -  11 Oct 2018 18:42  --------------------------------------------------------  IN:  Total IN: 0 mL    OUT:    Voided: 1450 mL  Total OUT: 1450 mL    Total NET: -1450 mL          Physical exam:  Gen: NAD  Vascular: Left medial, below knee incision with serosanguinous drainage. PT and graft signals.  ______________________________________________  LABS:  CBC Full  -  ( 11 Oct 2018 06:27 )  WBC Count : 6.44 K/uL  Hemoglobin : 9.9 g/dL  Hematocrit : 31.5 %  Platelet Count - Automated : 210 K/uL  Mean Cell Volume : 86.1 fL  Mean Cell Hemoglobin : 27.0 pg  Mean Cell Hemoglobin Concentration : 31.4 %  Auto Neutrophil # : x  Auto Lymphocyte # : x  Auto Monocyte # : x  Auto Eosinophil # : x  Auto Basophil # : x  Auto Neutrophil % : x  Auto Lymphocyte % : x  Auto Monocyte % : x  Auto Eosinophil % : x  Auto Basophil % : x    10-11    135  |  99  |  16  ----------------------------<  206<H>  3.9   |  22  |  1.03    Ca    8.4      11 Oct 2018 06:27  Phos  1.9     10-11  Mg     2.1     10-11

## 2018-10-11 NOTE — PROGRESS NOTE ADULT - ASSESSMENT
68y male, s/p LLE CFA to below knee popliteal arterial bypass with PTFE on 9/18, returning with SSI.  CTA reveals that the graft is patent.  Will continue treating with antibiotics.  No leukocytosis or other systemic signs of infection.    Plan:  - IV vancomycin and zosyn, FU vanco trough tomorrow AM  - C/w heparin gtt. Patient takes Eliquis at home.  - Consistent carb diet  - Will consider re-imaging    Pager [phone]

## 2018-10-12 ENCOUNTER — APPOINTMENT (OUTPATIENT)
Dept: VASCULAR SURGERY | Facility: CLINIC | Age: 68
End: 2018-10-12

## 2018-10-12 LAB
APTT BLD: 48.8 SEC — HIGH (ref 27.5–37.4)
APTT BLD: 93.9 SEC — HIGH (ref 27.5–37.4)
BUN SERPL-MCNC: 13 MG/DL — SIGNIFICANT CHANGE UP (ref 7–23)
CALCIUM SERPL-MCNC: 8.9 MG/DL — SIGNIFICANT CHANGE UP (ref 8.4–10.5)
CHLORIDE SERPL-SCNC: 98 MMOL/L — SIGNIFICANT CHANGE UP (ref 98–107)
CO2 SERPL-SCNC: 23 MMOL/L — SIGNIFICANT CHANGE UP (ref 22–31)
CREAT SERPL-MCNC: 0.99 MG/DL — SIGNIFICANT CHANGE UP (ref 0.5–1.3)
GLUCOSE SERPL-MCNC: 197 MG/DL — HIGH (ref 70–99)
HCT VFR BLD CALC: 32.9 % — LOW (ref 39–50)
HGB BLD-MCNC: 10.4 G/DL — LOW (ref 13–17)
MAGNESIUM SERPL-MCNC: 2.1 MG/DL — SIGNIFICANT CHANGE UP (ref 1.6–2.6)
MCHC RBC-ENTMCNC: 27.8 PG — SIGNIFICANT CHANGE UP (ref 27–34)
MCHC RBC-ENTMCNC: 31.6 % — LOW (ref 32–36)
MCV RBC AUTO: 88 FL — SIGNIFICANT CHANGE UP (ref 80–100)
NRBC # FLD: 0 — SIGNIFICANT CHANGE UP
PHOSPHATE SERPL-MCNC: 2.5 MG/DL — SIGNIFICANT CHANGE UP (ref 2.5–4.5)
PLATELET # BLD AUTO: 219 K/UL — SIGNIFICANT CHANGE UP (ref 150–400)
PMV BLD: 12 FL — SIGNIFICANT CHANGE UP (ref 7–13)
POTASSIUM SERPL-MCNC: 4.2 MMOL/L — SIGNIFICANT CHANGE UP (ref 3.5–5.3)
POTASSIUM SERPL-SCNC: 4.2 MMOL/L — SIGNIFICANT CHANGE UP (ref 3.5–5.3)
RBC # BLD: 3.74 M/UL — LOW (ref 4.2–5.8)
RBC # FLD: 19.5 % — HIGH (ref 10.3–14.5)
SODIUM SERPL-SCNC: 135 MMOL/L — SIGNIFICANT CHANGE UP (ref 135–145)
VANCOMYCIN TROUGH SERPL-MCNC: 16.8 UG/ML — SIGNIFICANT CHANGE UP (ref 10–20)
WBC # BLD: 6.49 K/UL — SIGNIFICANT CHANGE UP (ref 3.8–10.5)
WBC # FLD AUTO: 6.49 K/UL — SIGNIFICANT CHANGE UP (ref 3.8–10.5)

## 2018-10-12 RX ORDER — CARVEDILOL PHOSPHATE 80 MG/1
25 CAPSULE, EXTENDED RELEASE ORAL EVERY 12 HOURS
Qty: 0 | Refills: 0 | Status: DISCONTINUED | OUTPATIENT
Start: 2018-10-12 | End: 2018-10-15

## 2018-10-12 RX ORDER — INSULIN GLARGINE 100 [IU]/ML
10 INJECTION, SOLUTION SUBCUTANEOUS AT BEDTIME
Qty: 0 | Refills: 0 | Status: DISCONTINUED | OUTPATIENT
Start: 2018-10-12 | End: 2018-10-15

## 2018-10-12 RX ORDER — INSULIN LISPRO 100/ML
2 VIAL (ML) SUBCUTANEOUS
Qty: 0 | Refills: 0 | Status: DISCONTINUED | OUTPATIENT
Start: 2018-10-12 | End: 2018-10-15

## 2018-10-12 RX ORDER — INSULIN LISPRO 100/ML
VIAL (ML) SUBCUTANEOUS
Qty: 0 | Refills: 0 | Status: DISCONTINUED | OUTPATIENT
Start: 2018-10-12 | End: 2018-10-15

## 2018-10-12 RX ADMIN — Medication 3: at 17:58

## 2018-10-12 RX ADMIN — Medication 2: at 12:24

## 2018-10-12 RX ADMIN — OXYCODONE HYDROCHLORIDE 5 MILLIGRAM(S): 5 TABLET ORAL at 07:00

## 2018-10-12 RX ADMIN — SACUBITRIL AND VALSARTAN 1 TABLET(S): 24; 26 TABLET, FILM COATED ORAL at 05:29

## 2018-10-12 RX ADMIN — CARVEDILOL PHOSPHATE 25 MILLIGRAM(S): 80 CAPSULE, EXTENDED RELEASE ORAL at 18:05

## 2018-10-12 RX ADMIN — SACUBITRIL AND VALSARTAN 1 TABLET(S): 24; 26 TABLET, FILM COATED ORAL at 17:57

## 2018-10-12 RX ADMIN — Medication 3: at 08:57

## 2018-10-12 RX ADMIN — Medication 40 MILLIGRAM(S): at 17:57

## 2018-10-12 RX ADMIN — HEPARIN SODIUM 11 UNIT(S)/HR: 5000 INJECTION INTRAVENOUS; SUBCUTANEOUS at 10:31

## 2018-10-12 RX ADMIN — Medication 250 MILLIGRAM(S): at 16:30

## 2018-10-12 RX ADMIN — OXYCODONE HYDROCHLORIDE 5 MILLIGRAM(S): 5 TABLET ORAL at 07:25

## 2018-10-12 RX ADMIN — Medication 2 UNIT(S): at 17:58

## 2018-10-12 RX ADMIN — OXYCODONE HYDROCHLORIDE 5 MILLIGRAM(S): 5 TABLET ORAL at 21:50

## 2018-10-12 RX ADMIN — ATORVASTATIN CALCIUM 40 MILLIGRAM(S): 80 TABLET, FILM COATED ORAL at 21:47

## 2018-10-12 RX ADMIN — OXYCODONE HYDROCHLORIDE 5 MILLIGRAM(S): 5 TABLET ORAL at 22:27

## 2018-10-12 RX ADMIN — Medication 250 MILLIGRAM(S): at 03:11

## 2018-10-12 RX ADMIN — Medication 3: at 22:26

## 2018-10-12 RX ADMIN — Medication 5 MILLIGRAM(S): at 05:30

## 2018-10-12 RX ADMIN — PIPERACILLIN AND TAZOBACTAM 25 GRAM(S): 4; .5 INJECTION, POWDER, LYOPHILIZED, FOR SOLUTION INTRAVENOUS at 18:50

## 2018-10-12 RX ADMIN — Medication 100 MILLIGRAM(S): at 21:47

## 2018-10-12 RX ADMIN — Medication 40 MILLIGRAM(S): at 05:29

## 2018-10-12 RX ADMIN — PIPERACILLIN AND TAZOBACTAM 25 GRAM(S): 4; .5 INJECTION, POWDER, LYOPHILIZED, FOR SOLUTION INTRAVENOUS at 07:00

## 2018-10-12 RX ADMIN — Medication 5 MILLIGRAM(S): at 12:24

## 2018-10-12 RX ADMIN — Medication 81 MILLIGRAM(S): at 12:24

## 2018-10-12 RX ADMIN — Medication 100 MILLIGRAM(S): at 13:31

## 2018-10-12 RX ADMIN — INSULIN GLARGINE 10 UNIT(S): 100 INJECTION, SOLUTION SUBCUTANEOUS at 22:26

## 2018-10-12 NOTE — PROGRESS NOTE ADULT - SUBJECTIVE AND OBJECTIVE BOX
GENERAL SURGERY DAILY PROGRESS NOTE:     Subjective:  Pt seen and examined during am rounds. No acute events overnight. Pain well controlled. Denies n/v/f/c.     Objective:  Physical exam:  Gen: NAD  Vascular: Left medial, below knee incision with serosanguinous drainage. PT and graft signals.    MEDICATIONS  (STANDING):  aspirin enteric coated 81 milliGRAM(s) Oral daily  atorvastatin 40 milliGRAM(s) Oral at bedtime  carvedilol 25 milliGRAM(s) Oral every 12 hours  dextrose 50% Injectable 12.5 Gram(s) IV Push once  dextrose 50% Injectable 25 Gram(s) IV Push once  dextrose 50% Injectable 25 Gram(s) IV Push once  docusate sodium 100 milliGRAM(s) Oral three times a day  furosemide    Tablet 40 milliGRAM(s) Oral two times a day  heparin  Infusion 900 Unit(s)/Hr (11 mL/Hr) IV Continuous <Continuous>  influenza   Vaccine 0.5 milliLiter(s) IntraMuscular once  insulin glargine Injectable (LANTUS) 10 Unit(s) SubCutaneous at bedtime  insulin lispro (HumaLOG) corrective regimen sliding scale   SubCutaneous three times a day before meals  insulin lispro (HumaLOG) corrective regimen sliding scale   SubCutaneous at bedtime  insulin lispro Injectable (HumaLOG) 2 Unit(s) SubCutaneous three times a day before meals  metoprolol tartrate Injectable 5 milliGRAM(s) IV Push every 6 hours  piperacillin/tazobactam IVPB. 3.375 Gram(s) IV Intermittent every 8 hours  sacubitril 24 mG/valsartan 26 mG 1 Tablet(s) Oral two times a day  vancomycin  IVPB 1000 milliGRAM(s) IV Intermittent every 12 hours    MEDICATIONS  (PRN):  acetaminophen   Tablet .. 650 milliGRAM(s) Oral every 6 hours PRN Temp greater or equal to 38C (100.4F), Mild Pain (1 - 3)  dextrose 40% Gel 15 Gram(s) Oral once PRN Blood Glucose LESS THAN 70 milliGRAM(s)/deciliter  glucagon  Injectable 1 milliGRAM(s) IntraMuscular once PRN Glucose LESS THAN 70 milligrams/deciliter  ondansetron Injectable 4 milliGRAM(s) IV Push every 6 hours PRN Nausea  oxyCODONE    IR 5 milliGRAM(s) Oral every 6 hours PRN moderate to severe pain  senna 2 Tablet(s) Oral at bedtime PRN Constipation      Vital Signs Last 24 Hrs  T(C): 36.7 (12 Oct 2018 13:00), Max: 37.3 (12 Oct 2018 10:10)  T(F): 98.1 (12 Oct 2018 13:00), Max: 99.1 (12 Oct 2018 10:10)  HR: 80 (12 Oct 2018 13:00) (74 - 81)  BP: 128/76 (12 Oct 2018 13:00) (126/75 - 146/85)  BP(mean): --  RR: 18 (12 Oct 2018 13:00) (16 - 18)  SpO2: 98% (12 Oct 2018 13:00) (98% - 99%)    I&O's Detail    11 Oct 2018 07:01  -  12 Oct 2018 07:00  --------------------------------------------------------  IN:  Total IN: 0 mL    OUT:    Voided: 2750 mL  Total OUT: 2750 mL    Total NET: -2750 mL      12 Oct 2018 07:01  -  12 Oct 2018 16:35  --------------------------------------------------------  IN:  Total IN: 0 mL    OUT:    Voided: 525 mL  Total OUT: 525 mL    Total NET: -525 mL          Daily     Daily Weight in k.6 (12 Oct 2018 01:10)    LABS:                        10.4   6.49  )-----------( 219      ( 12 Oct 2018 06:18 )             32.9     10-12    135  |  98  |  13  ----------------------------<  197<H>  4.2   |  23  |  0.99    Ca    8.9      12 Oct 2018 06:18  Phos  2.5     10-12  Mg     2.1     10-12      PT/INR - ( 11 Oct 2018 06:27 )   PT: 16.9 SEC;   INR: 1.51          PTT - ( 12 Oct 2018 06:18 )  PTT:48.8 SEC      RADIOLOGY & ADDITIONAL STUDIES:

## 2018-10-12 NOTE — PROGRESS NOTE ADULT - ASSESSMENT
68y male, s/p LLE CFA to below knee popliteal arterial bypass with PTFE on 9/18, returning with SSI.  CTA reveals that the graft is patent,  Vital signs stable, afebrile. Will continue abx, no surgery currently, however potential plan for OR monday.     Plan:  - potential plan for OR monday, pt still deciding  - IV vancomycin and zosyn, FU vanco trough tomorrow AM  - heparin drip, currently therapeutic  - consistent carb diet  - continue to monitor vital signs, labs    [id number], C team

## 2018-10-13 LAB
APTT BLD: 83.5 SEC — HIGH (ref 27.5–37.4)
APTT BLD: 93.8 SEC — HIGH (ref 27.5–37.4)
BUN SERPL-MCNC: 12 MG/DL — SIGNIFICANT CHANGE UP (ref 7–23)
CALCIUM SERPL-MCNC: 8.1 MG/DL — LOW (ref 8.4–10.5)
CHLORIDE SERPL-SCNC: 99 MMOL/L — SIGNIFICANT CHANGE UP (ref 98–107)
CO2 SERPL-SCNC: 22 MMOL/L — SIGNIFICANT CHANGE UP (ref 22–31)
CREAT SERPL-MCNC: 1.07 MG/DL — SIGNIFICANT CHANGE UP (ref 0.5–1.3)
GLUCOSE SERPL-MCNC: 157 MG/DL — HIGH (ref 70–99)
HCT VFR BLD CALC: 30.3 % — LOW (ref 39–50)
HGB BLD-MCNC: 9.5 G/DL — LOW (ref 13–17)
INR BLD: 1.53 — HIGH (ref 0.88–1.17)
MAGNESIUM SERPL-MCNC: 2 MG/DL — SIGNIFICANT CHANGE UP (ref 1.6–2.6)
MCHC RBC-ENTMCNC: 27.5 PG — SIGNIFICANT CHANGE UP (ref 27–34)
MCHC RBC-ENTMCNC: 31.4 % — LOW (ref 32–36)
MCV RBC AUTO: 87.6 FL — SIGNIFICANT CHANGE UP (ref 80–100)
NRBC # FLD: 0 — SIGNIFICANT CHANGE UP
PHOSPHATE SERPL-MCNC: 2.8 MG/DL — SIGNIFICANT CHANGE UP (ref 2.5–4.5)
PLATELET # BLD AUTO: 181 K/UL — SIGNIFICANT CHANGE UP (ref 150–400)
PMV BLD: 11.3 FL — SIGNIFICANT CHANGE UP (ref 7–13)
POTASSIUM SERPL-MCNC: 4.2 MMOL/L — SIGNIFICANT CHANGE UP (ref 3.5–5.3)
POTASSIUM SERPL-SCNC: 4.2 MMOL/L — SIGNIFICANT CHANGE UP (ref 3.5–5.3)
PROTHROM AB SERPL-ACNC: 17.1 SEC — HIGH (ref 9.8–13.1)
RBC # BLD: 3.46 M/UL — LOW (ref 4.2–5.8)
RBC # FLD: 19 % — HIGH (ref 10.3–14.5)
SODIUM SERPL-SCNC: 135 MMOL/L — SIGNIFICANT CHANGE UP (ref 135–145)
WBC # BLD: 5.34 K/UL — SIGNIFICANT CHANGE UP (ref 3.8–10.5)
WBC # FLD AUTO: 5.34 K/UL — SIGNIFICANT CHANGE UP (ref 3.8–10.5)

## 2018-10-13 PROCEDURE — 99222 1ST HOSP IP/OBS MODERATE 55: CPT | Mod: GC

## 2018-10-13 PROCEDURE — 99231 SBSQ HOSP IP/OBS SF/LOW 25: CPT | Mod: 24

## 2018-10-13 RX ORDER — HEPARIN SODIUM 5000 [USP'U]/ML
1100 INJECTION INTRAVENOUS; SUBCUTANEOUS
Qty: 25000 | Refills: 0 | Status: DISCONTINUED | OUTPATIENT
Start: 2018-10-13 | End: 2018-10-15

## 2018-10-13 RX ADMIN — SACUBITRIL AND VALSARTAN 1 TABLET(S): 24; 26 TABLET, FILM COATED ORAL at 17:57

## 2018-10-13 RX ADMIN — Medication 40 MILLIGRAM(S): at 05:17

## 2018-10-13 RX ADMIN — OXYCODONE HYDROCHLORIDE 5 MILLIGRAM(S): 5 TABLET ORAL at 09:00

## 2018-10-13 RX ADMIN — HEPARIN SODIUM 11 UNIT(S)/HR: 5000 INJECTION INTRAVENOUS; SUBCUTANEOUS at 00:35

## 2018-10-13 RX ADMIN — PIPERACILLIN AND TAZOBACTAM 25 GRAM(S): 4; .5 INJECTION, POWDER, LYOPHILIZED, FOR SOLUTION INTRAVENOUS at 03:24

## 2018-10-13 RX ADMIN — Medication 2 UNIT(S): at 08:26

## 2018-10-13 RX ADMIN — HEPARIN SODIUM 11 UNIT(S)/HR: 5000 INJECTION INTRAVENOUS; SUBCUTANEOUS at 12:49

## 2018-10-13 RX ADMIN — CARVEDILOL PHOSPHATE 25 MILLIGRAM(S): 80 CAPSULE, EXTENDED RELEASE ORAL at 05:17

## 2018-10-13 RX ADMIN — Medication 100 MILLIGRAM(S): at 05:17

## 2018-10-13 RX ADMIN — PIPERACILLIN AND TAZOBACTAM 25 GRAM(S): 4; .5 INJECTION, POWDER, LYOPHILIZED, FOR SOLUTION INTRAVENOUS at 19:02

## 2018-10-13 RX ADMIN — OXYCODONE HYDROCHLORIDE 5 MILLIGRAM(S): 5 TABLET ORAL at 23:37

## 2018-10-13 RX ADMIN — Medication 2 UNIT(S): at 17:57

## 2018-10-13 RX ADMIN — Medication 250 MILLIGRAM(S): at 16:35

## 2018-10-13 RX ADMIN — HEPARIN SODIUM 11 UNIT(S)/HR: 5000 INJECTION INTRAVENOUS; SUBCUTANEOUS at 06:23

## 2018-10-13 RX ADMIN — CARVEDILOL PHOSPHATE 25 MILLIGRAM(S): 80 CAPSULE, EXTENDED RELEASE ORAL at 17:57

## 2018-10-13 RX ADMIN — INSULIN GLARGINE 10 UNIT(S): 100 INJECTION, SOLUTION SUBCUTANEOUS at 23:44

## 2018-10-13 RX ADMIN — ATORVASTATIN CALCIUM 40 MILLIGRAM(S): 80 TABLET, FILM COATED ORAL at 22:20

## 2018-10-13 RX ADMIN — Medication 40 MILLIGRAM(S): at 17:57

## 2018-10-13 RX ADMIN — PIPERACILLIN AND TAZOBACTAM 25 GRAM(S): 4; .5 INJECTION, POWDER, LYOPHILIZED, FOR SOLUTION INTRAVENOUS at 11:11

## 2018-10-13 RX ADMIN — Medication 81 MILLIGRAM(S): at 11:11

## 2018-10-13 RX ADMIN — OXYCODONE HYDROCHLORIDE 5 MILLIGRAM(S): 5 TABLET ORAL at 08:00

## 2018-10-13 RX ADMIN — Medication 100 MILLIGRAM(S): at 13:31

## 2018-10-13 RX ADMIN — SACUBITRIL AND VALSARTAN 1 TABLET(S): 24; 26 TABLET, FILM COATED ORAL at 05:17

## 2018-10-13 RX ADMIN — Medication 100 MILLIGRAM(S): at 22:20

## 2018-10-13 RX ADMIN — Medication 2 UNIT(S): at 13:31

## 2018-10-13 RX ADMIN — Medication 2: at 17:57

## 2018-10-13 RX ADMIN — Medication 250 MILLIGRAM(S): at 03:24

## 2018-10-13 NOTE — PROGRESS NOTE ADULT - ASSESSMENT
s/p left fem pop bypass with ssi  - extensive discussion with the patient.  I explained given the soft tissue infection of the lower leg, and sterile fluid collection adjacent to the proximal anastamosis, risk of developing graft infection.  Plan to do groin exploration, washout, and sartorious muscle flap, as well as incision and drainage of the lower incision, as an attempt to salvage the graft from infection.  Pt agreeable to surgery.

## 2018-10-13 NOTE — CONSULT NOTE ADULT - SUBJECTIVE AND OBJECTIVE BOX
Date of Admission:  10/7/18  CHIEF COMPLAINT:  left leg pain and drainage  HISTORY OF PRESENT ILLNESS:  Patient is a 69 yo M with PMH of DM2 c/b prior amputations of toes, CAD w/ stent (last placed in 2016 per patient) & CABG, HTN, severe LV dysfunction, (recent ECHO in September 2018, and last cath 2016), PAD s/p RLE bypass grafting, s/p LLE CFA to below knee popliteal arterial bypass with PTFE on 9/18. Patient now returning with SSI.  CTA reveals that the graft is patent.   Vascular surgeon,Dr. Alvarado, would like cardiac clearance and optimization, he plans to take Mr. Gonzalez to the OR Monday for groin incision I&D of perigraft fluid collection with vac placement. and I&D distal incision SSI and leave open after recovered from initial surgery.      NO KNOWN ALLERGIES    MEDICATIONS:  aspirin enteric coated 81 milliGRAM(s) Oral daily  carvedilol 25 milliGRAM(s) Oral every 12 hours  furosemide    Tablet 40 milliGRAM(s) Oral two times a day  heparin  Infusion 1100 Unit(s)/Hr IV Continuous <Continuous>  sacubitril 24 mG/valsartan 26 mG 1 Tablet(s) Oral two times a day  piperacillin/tazobactam IVPB. 3.375 Gram(s) IV Intermittent every 8 hours  vancomycin  IVPB 1000 milliGRAM(s) IV Intermittent every 12 hours  acetaminophen   Tablet .. 650 milliGRAM(s) Oral every 6 hours PRN  oxyCODONE    IR 5 milliGRAM(s) Oral every 6 hours PRN    docusate sodium 100 milliGRAM(s) Oral three times a day  senna 2 Tablet(s) Oral at bedtime PRN    atorvastatin 40 milliGRAM(s) Oral at bedtime  dextrose 40% Gel 15 Gram(s) Oral once PRN  dextrose 50% Injectable 12.5 Gram(s) IV Push once  dextrose 50% Injectable 25 Gram(s) IV Push once  dextrose 50% Injectable 25 Gram(s) IV Push once  glucagon  Injectable 1 milliGRAM(s) IntraMuscular once PRN  insulin glargine Injectable (LANTUS) 10 Unit(s) SubCutaneous at bedtime  insulin lispro (HumaLOG) corrective regimen sliding scale   SubCutaneous three times a day before meals  insulin lispro (HumaLOG) corrective regimen sliding scale   SubCutaneous at bedtime  insulin lispro Injectable (HumaLOG) 2 Unit(s) SubCutaneous three times a day before meals    influenza   Vaccine 0.5 milliLiter(s) IntraMuscular once      PAST MEDICAL & SURGICAL HISTORY:  Chronic congestive heart failure, unspecified congestive heart failure type  PAD (peripheral artery disease)  Stented coronary artery  Hyperlipidemia  Diabetes  Hypertension  CAD (coronary artery disease)  S/P amputation: right great toe and 4th and 5th digit  S/P bypass graft of extremity: RLE  S/P angioplasty with stent: about 5 years ago  S/P CABG x 3      FAMILY HISTORY:  Family history of diabetes mellitus (DM)      SOCIAL HISTORY:    [ ] Non-smoker  [ ] Smoker  [ ] Alcohol      REVIEW OF SYSTEMS:  See HPI. Otherwise, 10 point ROS done and otherwise negative.      T(C): 36.7 (10-13-18 @ 13:31), Max: 37.1 (10-13-18 @ 10:12)  HR: 68 (10-13-18 @ 13:31) (64 - 81)  BP: 128/67 (10-13-18 @ 13:31) (99/51 - 138/86)  RR: 16 (10-13-18 @ 13:31) (16 - 18)  SpO2: 99% (10-13-18 @ 13:31) (97% - 100%)  Wt(kg): --  I&O's Summary    12 Oct 2018 07:01  -  13 Oct 2018 07:00  --------------------------------------------------------  IN: 416 mL / OUT: 1075 mL / NET: -659 mL    13 Oct 2018 07:01  -  13 Oct 2018 14:26  --------------------------------------------------------  IN: 133 mL / OUT: 300 mL / NET: -167 mL        Physical Exam:    LABS:	   	    CBC Full  -  ( 13 Oct 2018 05:20 )  WBC Count : 5.34 K/uL  Hemoglobin : 9.5 g/dL  Hematocrit : 30.3 %  Platelet Count - Automated : 181 K/uL  Mean Cell Volume : 87.6 fL  Mean Cell Hemoglobin : 27.5 pg  Mean Cell Hemoglobin Concentration : 31.4 %  Auto Neutrophil # : x  Auto Lymphocyte # : x  Auto Monocyte # : x  Auto Eosinophil # : x  Auto Basophil # : x  Auto Neutrophil % : x  Auto Lymphocyte % : x  Auto Monocyte % : x  Auto Eosinophil % : x  Auto Basophil % : x    10-13    135  |  99  |  12  ----------------------------<  157<H>  4.2   |  22  |  1.07  10-12    135  |  98  |  13  ----------------------------<  197<H>  4.2   |  23  |  0.99    Ca    8.1<L>      13 Oct 2018 05:20  Ca    8.9      12 Oct 2018 06:18  Phos  2.8     10-13  Phos  2.5     10-12  Mg     2.0     10-13    TELEMETRY: 	  normal sinus rhtyhm  ECG:  	  RADIOLOGY:  OTHER: 	    PREVIOUS DIAGNOSTIC TESTING:     Transthoracic Echocardiogram (09.11.18 @ 14:54) >  EF (Visual Estimate): 15-20 %  Conclusions:  1. Tethered mitral valve leaflets with normal opening.  Moderate mitral regurgitation.  2. Severely dilated left atrium.  LA volume index = 58  cc/m2.  3. Severe global left ventricular systolic dysfunction with  increased apical and patricia-apical contractility relative to  other segments.  Septal motion is consistent with  conduction defect.  4. Severe diastolic dysfunction (Stage III).  5. Normal right ventricular size with decreased right  ventricular systolic function.  6. Estimated pulmonary artery systolic pressure equals 49  mm Hg, assuming right atrial pressure equals 10 - 15 mm Hg,  consistent with mild pulmonary pressures.  *** Compared with echocardiogram of 4/26/2016, no  significant changes noted.     2016 MOST RECENT CARDIAC Catheterization:   from: Cardiac Cath Lab - Adult (04.26.16 @ 17:43)   EF15%  CORONARY VESSELS: The coronary circulation is right dominant.  LM:   --  Distal left main: There was a 100 % stenosis.  RCA:   --  Mid RCA: There was a 100 % stenosis.  GRAFTS:   --  Graft to the mid LAD: The graft was a LIMA. Graft angiography  showed minor luminal irregularities.  --  Graft to the 1st diagonal: The graft was a saphenous vein graft from  the aorta. It was occluded.  --  Graft to the 1st obtuse marginal: The graft was a saphenous vein graft  from the aorta. There was a 90 % stenosis in the proximal third of the  graft.  COMPLICATIONS: There were no complications.  DIAGNOSTIC RECOMMENDATIONS: The patient should continue with the present  medications.  Prepared and signed by  Ankit Alexander M.D.  Signed 04/29/2016 10:11:06    < end of copied text > Date of Admission:  10/7/18  CHIEF COMPLAINT:  left leg pain and drainage  HISTORY OF PRESENT ILLNESS:  Patient is a 67 yo M with PMH of DM2 c/b prior amputations of toes, CAD w/ stent (last placed in 2016 per patient) & CABG, HTN, severe LV dysfunction, (recent ECHO in September 2018, and last cath 2016), PAD s/p RLE bypass grafting, s/p LLE CFA to below knee popliteal arterial bypass with PTFE on 9/18. Patient now returning with SSI.  CTA reveals that the graft is patent.   Vascular surgeon,Dr. Alvarado, would like cardiac clearance and optimization, he plans to take Mr. Gonzalez to the OR Monday for groin incision I&D of perigraft fluid collection with vac placement.    NO KNOWN ALLERGIES    MEDICATIONS:  aspirin enteric coated 81 milliGRAM(s) Oral daily  carvedilol 25 milliGRAM(s) Oral every 12 hours  furosemide    Tablet 40 milliGRAM(s) Oral two times a day  heparin  Infusion 1100 Unit(s)/Hr IV Continuous <Continuous>  sacubitril 24 mG/valsartan 26 mG 1 Tablet(s) Oral two times a day  piperacillin/tazobactam IVPB. 3.375 Gram(s) IV Intermittent every 8 hours  vancomycin  IVPB 1000 milliGRAM(s) IV Intermittent every 12 hours  acetaminophen   Tablet .. 650 milliGRAM(s) Oral every 6 hours PRN  oxyCODONE    IR 5 milliGRAM(s) Oral every 6 hours PRN    docusate sodium 100 milliGRAM(s) Oral three times a day  senna 2 Tablet(s) Oral at bedtime PRN    atorvastatin 40 milliGRAM(s) Oral at bedtime  dextrose 40% Gel 15 Gram(s) Oral once PRN  dextrose 50% Injectable 12.5 Gram(s) IV Push once  dextrose 50% Injectable 25 Gram(s) IV Push once  dextrose 50% Injectable 25 Gram(s) IV Push once  glucagon  Injectable 1 milliGRAM(s) IntraMuscular once PRN  insulin glargine Injectable (LANTUS) 10 Unit(s) SubCutaneous at bedtime  insulin lispro (HumaLOG) corrective regimen sliding scale   SubCutaneous three times a day before meals  insulin lispro (HumaLOG) corrective regimen sliding scale   SubCutaneous at bedtime  insulin lispro Injectable (HumaLOG) 2 Unit(s) SubCutaneous three times a day before meals    influenza   Vaccine 0.5 milliLiter(s) IntraMuscular once      PAST MEDICAL & SURGICAL HISTORY:  Chronic congestive heart failure, unspecified congestive heart failure type  PAD (peripheral artery disease)  Stented coronary artery  Hyperlipidemia  Diabetes  Hypertension  CAD (coronary artery disease)  S/P amputation: right great toe and 4th and 5th digit  S/P bypass graft of extremity: RLE  S/P angioplasty with stent: about 5 years ago  S/P CABG x 3      FAMILY HISTORY:  Family history of diabetes mellitus (DM)      SOCIAL HISTORY:    [ ] Non-smoker  [ ] Smoker  [ ] Alcohol      REVIEW OF SYSTEMS:  See HPI. Otherwise, 10 point ROS done and otherwise negative.  NAD, resting comfortably in a chair  PERRL, EOMI  Normal oral mus[place]a NC/AT  L iliac stent site c/d/i, S1/S2 appreciated, RRR, 1/6 GREYSON, b/l LE cool to touch and 1+ pitting edema, no elevation in JVP  Clear to auscultation bilaterally, no wheezing, no crackles  Soft, Non-tender, Non-distended, BS+  No clubbing, No joint deformity, R foot toe amputations noted   Non-focal  No lymphadenopathy  AAOx3, Mood & affect appropriate  No rashes, No ecchymoses, No cyanosis  Procedural Access Site: No hematoma, Non-tender to palpation, 2+ pulse , No bruit, No Ecchymosis    T(C): 36.7 (10-13-18 @ 13:31), Max: 37.1 (10-13-18 @ 10:12)  HR: 68 (10-13-18 @ 13:31) (64 - 81)  BP: 128/67 (10-13-18 @ 13:31) (99/51 - 138/86)  RR: 16 (10-13-18 @ 13:31) (16 - 18)  SpO2: 99% (10-13-18 @ 13:31) (97% - 100%)  Wt(kg): --  I&O's Summary    12 Oct 2018 07:01  -  13 Oct 2018 07:00  --------------------------------------------------------  IN: 416 mL / OUT: 1075 mL / NET: -659 mL    13 Oct 2018 07:01  -  13 Oct 2018 14:26  --------------------------------------------------------  IN: 133 mL / OUT: 300 mL / NET: -167 mL        Physical Exam:    LABS:	   	    CBC Full  -  ( 13 Oct 2018 05:20 )  WBC Count : 5.34 K/uL  Hemoglobin : 9.5 g/dL  Hematocrit : 30.3 %  Platelet Count - Automated : 181 K/uL  Mean Cell Volume : 87.6 fL  Mean Cell Hemoglobin : 27.5 pg  Mean Cell Hemoglobin Concentration : 31.4 %  Auto Neutrophil # : x  Auto Lymphocyte # : x  Auto Monocyte # : x  Auto Eosinophil # : x  Auto Basophil # : x  Auto Neutrophil % : x  Auto Lymphocyte % : x  Auto Monocyte % : x  Auto Eosinophil % : x  Auto Basophil % : x    10-13    135  |  99  |  12  ----------------------------<  157<H>  4.2   |  22  |  1.07  10-12    135  |  98  |  13  ----------------------------<  197<H>  4.2   |  23  |  0.99    Ca    8.1<L>      13 Oct 2018 05:20  Ca    8.9      12 Oct 2018 06:18  Phos  2.8     10-13  Phos  2.5     10-12  Mg     2.0     10-13    TELEMETRY: 	  normal sinus rhtyhm  ECG:  	  RADIOLOGY:  OTHER: 	    PREVIOUS DIAGNOSTIC TESTING:     Transthoracic Echocardiogram (09.11.18 @ 14:54) >  EF (Visual Estimate): 15-20 %  Conclusions:  1. Tethered mitral valve leaflets with normal opening.  Moderate mitral regurgitation.  2. Severely dilated left atrium.  LA volume index = 58  cc/m2.  3. Severe global left ventricular systolic dysfunction with  increased apical and patricia-apical contractility relative to  other segments.  Septal motion is consistent with  conduction defect.  4. Severe diastolic dysfunction (Stage III).  5. Normal right ventricular size with decreased right  ventricular systolic function.  6. Estimated pulmonary artery systolic pressure equals 49  mm Hg, assuming right atrial pressure equals 10 - 15 mm Hg,  consistent with mild pulmonary pressures.  *** Compared with echocardiogram of 4/26/2016, no  significant changes noted.     2016 MOST RECENT CARDIAC Catheterization:   from: Cardiac Cath Lab - Adult (04.26.16 @ 17:43)   EF15%  CORONARY VESSELS: The coronary circulation is right dominant.  LM:   --  Distal left main: There was a 100 % stenosis.  RCA:   --  Mid RCA: There was a 100 % stenosis.  GRAFTS:   --  Graft to the mid LAD: The graft was a LIMA. Graft angiography  showed minor luminal irregularities.  --  Graft to the 1st diagonal: The graft was a saphenous vein graft from  the aorta. It was occluded.  --  Graft to the 1st obtuse marginal: The graft was a saphenous vein graft  from the aorta. There was a 90 % stenosis in the proximal third of the  graft.  COMPLICATIONS: There were no complications.  DIAGNOSTIC RECOMMENDATIONS: The patient should continue with the present  medications.  Prepared and signed by  Ankit Alexander M.D.  Signed 04/29/2016 10:11:06    < end of copied text > Date of Admission:  10/7/18  CHIEF COMPLAINT:  left leg pain and drainage  HISTORY OF PRESENT ILLNESS:  Patient is a 69 yo M with PMH of DM2 c/b prior amputations of toes, CAD w/ stent (last placed in 2016 per patient) & CABG, HTN, severe LV dysfunction, (recent ECHO in September 2018, and last cath 2016), PAD s/p RLE bypass grafting, s/p LLE CFA to below knee popliteal arterial bypass with PTFE on 9/18. Patient now returning with SSI.  CTA reveals that the graft is patent.   Vascular surgeon,Dr. Alvarado, would like cardiac clearance and optimization, he plans to take Mr. Gonzalez to the OR Monday for groin incision I&D of perigraft fluid collection with vac placement.    NO KNOWN ALLERGIES    MEDICATIONS:  aspirin enteric coated 81 milliGRAM(s) Oral daily  carvedilol 25 milliGRAM(s) Oral every 12 hours  furosemide    Tablet 40 milliGRAM(s) Oral two times a day  heparin  Infusion 1100 Unit(s)/Hr IV Continuous <Continuous>  sacubitril 24 mG/valsartan 26 mG 1 Tablet(s) Oral two times a day  piperacillin/tazobactam IVPB. 3.375 Gram(s) IV Intermittent every 8 hours  vancomycin  IVPB 1000 milliGRAM(s) IV Intermittent every 12 hours  acetaminophen   Tablet .. 650 milliGRAM(s) Oral every 6 hours PRN  oxyCODONE    IR 5 milliGRAM(s) Oral every 6 hours PRN    docusate sodium 100 milliGRAM(s) Oral three times a day  senna 2 Tablet(s) Oral at bedtime PRN    atorvastatin 40 milliGRAM(s) Oral at bedtime  dextrose 40% Gel 15 Gram(s) Oral once PRN  dextrose 50% Injectable 12.5 Gram(s) IV Push once  dextrose 50% Injectable 25 Gram(s) IV Push once  dextrose 50% Injectable 25 Gram(s) IV Push once  glucagon  Injectable 1 milliGRAM(s) IntraMuscular once PRN  insulin glargine Injectable (LANTUS) 10 Unit(s) SubCutaneous at bedtime  insulin lispro (HumaLOG) corrective regimen sliding scale   SubCutaneous three times a day before meals  insulin lispro (HumaLOG) corrective regimen sliding scale   SubCutaneous at bedtime  insulin lispro Injectable (HumaLOG) 2 Unit(s) SubCutaneous three times a day before meals    influenza   Vaccine 0.5 milliLiter(s) IntraMuscular once      PAST MEDICAL & SURGICAL HISTORY:  Chronic congestive heart failure, unspecified congestive heart failure type  PAD (peripheral artery disease)  Stented coronary artery  Hyperlipidemia  Diabetes  Hypertension  CAD (coronary artery disease)  S/P amputation: right great toe and 4th and 5th digit  S/P bypass graft of extremity: RLE  S/P angioplasty with stent: about 5 years ago  S/P CABG x 3      FAMILY HISTORY:  Family history of diabetes mellitus (DM)      SOCIAL HISTORY:    [ ] Non-smoker  [ ] Smoker  [ ] Alcohol    REVIEW OF SYSTEMS:  See HPI. Otherwise, 10 point ROS done and otherwise negative.  PHYSICAL EXAM:    General: NAD, resting comfortably in a chair  Resp: Clear to auscultation bilaterally, no wheezing, no crackles  Cardiac: regular rate and rhythm +S1+S2 no MRG  Abdomen: Soft, Non-tender, Non-distended, BS+  Ext: No edema, some discolaration of the toes bilaterally, bilateral toe amputations      T(C): 36.7 (10-13-18 @ 13:31), Max: 37.1 (10-13-18 @ 10:12)  HR: 68 (10-13-18 @ 13:31) (64 - 81)  BP: 128/67 (10-13-18 @ 13:31) (99/51 - 138/86)  RR: 16 (10-13-18 @ 13:31) (16 - 18)  SpO2: 99% (10-13-18 @ 13:31) (97% - 100%)  Wt(kg): --  I&O's Summary    12 Oct 2018 07:01  -  13 Oct 2018 07:00  --------------------------------------------------------  IN: 416 mL / OUT: 1075 mL / NET: -659 mL    13 Oct 2018 07:01  -  13 Oct 2018 14:26  --------------------------------------------------------  IN: 133 mL / OUT: 300 mL / NET: -167 mL      LABS:	   	    CBC Full  -  ( 13 Oct 2018 05:20 )  WBC Count : 5.34 K/uL  Hemoglobin : 9.5 g/dL  Hematocrit : 30.3 %  Platelet Count - Automated : 181 K/uL  Mean Cell Volume : 87.6 fL  Mean Cell Hemoglobin : 27.5 pg  Mean Cell Hemoglobin Concentration : 31.4 %  Auto Neutrophil # : x  Auto Lymphocyte # : x  Auto Monocyte # : x  Auto Eosinophil # : x  Auto Basophil # : x  Auto Neutrophil % : x  Auto Lymphocyte % : x  Auto Monocyte % : x  Auto Eosinophil % : x  Auto Basophil % : x    10-13    135  |  99  |  12  ----------------------------<  157<H>  4.2   |  22  |  1.07  10-12    135  |  98  |  13  ----------------------------<  197<H>  4.2   |  23  |  0.99    Ca    8.1<L>      13 Oct 2018 05:20  Ca    8.9      12 Oct 2018 06:18  Phos  2.8     10-13  Phos  2.5     10-12  Mg     2.0     10-13    TELEMETRY: 	  normal sinus rhtyhm  ECG:  	  RADIOLOGY:  OTHER: 	    PREVIOUS DIAGNOSTIC TESTING:     Transthoracic Echocardiogram (09.11.18 @ 14:54) >  EF (Visual Estimate): 15-20 %  Conclusions:  1. Tethered mitral valve leaflets with normal opening.  Moderate mitral regurgitation.  2. Severely dilated left atrium.  LA volume index = 58  cc/m2.  3. Severe global left ventricular systolic dysfunction with  increased apical and patricia-apical contractility relative to  other segments.  Septal motion is consistent with  conduction defect.  4. Severe diastolic dysfunction (Stage III).  5. Normal right ventricular size with decreased right  ventricular systolic function.  6. Estimated pulmonary artery systolic pressure equals 49  mm Hg, assuming right atrial pressure equals 10 - 15 mm Hg,  consistent with mild pulmonary pressures.  *** Compared with echocardiogram of 4/26/2016, no  significant changes noted.     2016 MOST RECENT CARDIAC Catheterization:   from: Cardiac Cath Lab - Adult (04.26.16 @ 17:43)   EF15%  CORONARY VESSELS: The coronary circulation is right dominant.  LM:   --  Distal left main: There was a 100 % stenosis.  RCA:   --  Mid RCA: There was a 100 % stenosis.  GRAFTS:   --  Graft to the mid LAD: The graft was a LIMA. Graft angiography  showed minor luminal irregularities.  --  Graft to the 1st diagonal: The graft was a saphenous vein graft from  the aorta. It was occluded.  --  Graft to the 1st obtuse marginal: The graft was a saphenous vein graft  from the aorta. There was a 90 % stenosis in the proximal third of the  graft.  COMPLICATIONS: There were no complications.  DIAGNOSTIC RECOMMENDATIONS: The patient should continue with the present  medications.  Prepared and signed by  Ankit Alexander M.D.  Signed 04/29/2016 10:11:06    < end of copied text >

## 2018-10-13 NOTE — PROGRESS NOTE ADULT - SUBJECTIVE AND OBJECTIVE BOX
Vascular Surgery Progress Note    Subjective:   Pt seen & examined at bedside. Pain is well controlled. No new complaints. No F/C, N/V, CP/SOB.  Pt's son at bedside, surgical vs nonsurgical options discussed.    Medications:  piperacillin/tazobactam IVPB. 3.375  vancomycin  IVPB 1000  aspirin enteric coated 81  carvedilol 25  furosemide    Tablet 40  heparin  Infusion 900  piperacillin/tazobactam IVPB. 3.375  sacubitril 24 mG/valsartan 26 mG 1  vancomycin  IVPB 1000      Vital Signs Last 24 Hrs  T(C): 37.1 (13 Oct 2018 10:12), Max: 37.1 (13 Oct 2018 10:12)  T(F): 98.7 (13 Oct 2018 10:12), Max: 98.7 (13 Oct 2018 10:12)  HR: 68 (13 Oct 2018 10:12) (64 - 81)  BP: 99/51 (13 Oct 2018 10:12) (99/51 - 138/86)  BP(mean): --  RR: 17 (13 Oct 2018 10:12) (17 - 18)  SpO2: 99% (13 Oct 2018 10:12) (97% - 100%)    I&O's Summary    12 Oct 2018 07:01  -  13 Oct 2018 07:00  --------------------------------------------------------  IN: 416 mL / OUT: 1075 mL / NET: -659 mL    13 Oct 2018 07:01  -  13 Oct 2018 12:34  --------------------------------------------------------  IN: 133 mL / OUT: 300 mL / NET: -167 mL        Physical Exam:  Gen: NAD, resting comfortably in bed  CV: No increased WOB  Extr: moderate serosanguinous/purulent discharge from below-knee incision, mild erythema and tenderness to palpation.    Pulses: +PT/DP signals LLE    LABS:                        9.5    5.34  )-----------( 181      ( 13 Oct 2018 05:20 )             30.3     10-13    135  |  99  |  12  ----------------------------<  157<H>  4.2   |  22  |  1.07    Ca    8.1<L>      13 Oct 2018 05:20  Phos  2.8     10-13  Mg     2.0     10-13      PT/INR - ( 13 Oct 2018 05:20 )   PT: 17.1 SEC;   INR: 1.53          PTT - ( 13 Oct 2018 05:20 )  PTT:93.8 SEC

## 2018-10-13 NOTE — CONSULT NOTE ADULT - ASSESSMENT
Patient is a 68y male , s/p LLE CFA to below knee popliteal arterial bypass with PTFE on 9/18, returning with SSI.  CTA reveals that the graft is patent. Dr. Alvarado, vascular surgeon would plans to take Mr. Gonzalez to the OR Monday for groin incision I&D of perigraft fluid collection with vac placement. and I&D distal incision SSI and leave open after recovered from initial surgery.  Cardiology called for clearance/cardiac optimization prior to surgery.  PLAN: Patient is a 68y male , s/p LLE CFA to below knee popliteal arterial bypass with PTFE on 9/18, returning with SSI.  CTA reveals that the graft is patent. Dr. Alvarado, vascular surgeon would plans to take Mr. Gonzalez to the OR Monday for groin incision I&D of perigraft fluid collection with vac placement. and I&D distal incision SSI and leave open after recovered from initial surgery.  Cardiology called for clearance/cardiac optimization prior to surgery.  PLAN: Patient is clinically stable with no chest pain or palpitations, He has no shortness of breath. Patient is optimized with entresto, coreg, and lasix. He does not appear volume overloaded and HTN is well-controlled. BUN and creatinine are baseline. Patient has an RCRI score of 3, which equals a 11% risk for major cardiac event. Prior to the LLE bypass last month, he had a recent MPI report which revealed no further cardiac work up needed. There is no active ischemia, no arrythmia. Please obtain  a 12-lead EKG prior to surgery for review.  Patient is medically optimized for I&D. Plan discussed with Dr. Bishop.

## 2018-10-13 NOTE — PROGRESS NOTE ADULT - SUBJECTIVE AND OBJECTIVE BOX
no fevers/chills.  continues to have serosanguinous drainage from the lower leg incision.  no pain.    Vital Signs Last 24 Hrs  T(C): 37.1 (13 Oct 2018 10:12), Max: 37.1 (13 Oct 2018 10:12)  T(F): 98.7 (13 Oct 2018 10:12), Max: 98.7 (13 Oct 2018 10:12)  HR: 68 (13 Oct 2018 10:12) (64 - 81)  BP: 99/51 (13 Oct 2018 10:12) (99/51 - 138/86)  BP(mean): --  RR: 17 (13 Oct 2018 10:12) (17 - 18)  SpO2: 99% (13 Oct 2018 10:12) (97% - 100%)                          9.5    5.34  )-----------( 181      ( 13 Oct 2018 05:20 )             30.3   awake, alert  improved periincisional erythema  serosanguinous drainage  the groin incision has no associated cellulitis

## 2018-10-13 NOTE — PROGRESS NOTE ADULT - ASSESSMENT
68y male, s/p LLE CFA to below knee popliteal arterial bypass with PTFE on 9/18, returning with SSI.  CTA reveals that the graft is patent,  Vital signs stable, afebrile. Will continue abx, patient is now more amenable to surgery after discussing options with son at bedside.      Plan:  - plan for OR Monday for groin incision I&D of perigraft fluid collection with vac placement.  Will then I&D distal incision SSI and leave open after recovered from initial surgery.    - IV vancomycin and zosyn, FU vanco trough tomorrow AM  - heparin drip, currently therapeutic  - consistent carb diet  - continue to monitor vital signs, labs

## 2018-10-14 ENCOUNTER — TRANSCRIPTION ENCOUNTER (OUTPATIENT)
Age: 68
End: 2018-10-14

## 2018-10-14 LAB
APTT BLD: 77.9 SEC — HIGH (ref 27.5–37.4)
BLD GP AB SCN SERPL QL: NEGATIVE — SIGNIFICANT CHANGE UP
BUN SERPL-MCNC: 17 MG/DL — SIGNIFICANT CHANGE UP (ref 7–23)
CALCIUM SERPL-MCNC: 8.1 MG/DL — LOW (ref 8.4–10.5)
CHLORIDE SERPL-SCNC: 99 MMOL/L — SIGNIFICANT CHANGE UP (ref 98–107)
CO2 SERPL-SCNC: 24 MMOL/L — SIGNIFICANT CHANGE UP (ref 22–31)
CREAT SERPL-MCNC: 1.17 MG/DL — SIGNIFICANT CHANGE UP (ref 0.5–1.3)
GLUCOSE SERPL-MCNC: 200 MG/DL — HIGH (ref 70–99)
HCT VFR BLD CALC: 31.7 % — LOW (ref 39–50)
HGB BLD-MCNC: 9.9 G/DL — LOW (ref 13–17)
MAGNESIUM SERPL-MCNC: 2.1 MG/DL — SIGNIFICANT CHANGE UP (ref 1.6–2.6)
MCHC RBC-ENTMCNC: 27 PG — SIGNIFICANT CHANGE UP (ref 27–34)
MCHC RBC-ENTMCNC: 31.2 % — LOW (ref 32–36)
MCV RBC AUTO: 86.4 FL — SIGNIFICANT CHANGE UP (ref 80–100)
NRBC # FLD: 0 — SIGNIFICANT CHANGE UP
PHOSPHATE SERPL-MCNC: 2.7 MG/DL — SIGNIFICANT CHANGE UP (ref 2.5–4.5)
PLATELET # BLD AUTO: 181 K/UL — SIGNIFICANT CHANGE UP (ref 150–400)
PMV BLD: 11.4 FL — SIGNIFICANT CHANGE UP (ref 7–13)
POTASSIUM SERPL-MCNC: 3.8 MMOL/L — SIGNIFICANT CHANGE UP (ref 3.5–5.3)
POTASSIUM SERPL-SCNC: 3.8 MMOL/L — SIGNIFICANT CHANGE UP (ref 3.5–5.3)
RBC # BLD: 3.67 M/UL — LOW (ref 4.2–5.8)
RBC # FLD: 19.1 % — HIGH (ref 10.3–14.5)
RH IG SCN BLD-IMP: NEGATIVE — SIGNIFICANT CHANGE UP
SODIUM SERPL-SCNC: 135 MMOL/L — SIGNIFICANT CHANGE UP (ref 135–145)
VANCOMYCIN TROUGH SERPL-MCNC: 18.5 UG/ML — SIGNIFICANT CHANGE UP (ref 10–20)
WBC # BLD: 5.07 K/UL — SIGNIFICANT CHANGE UP (ref 3.8–10.5)
WBC # FLD AUTO: 5.07 K/UL — SIGNIFICANT CHANGE UP (ref 3.8–10.5)

## 2018-10-14 RX ORDER — SODIUM CHLORIDE 9 MG/ML
1000 INJECTION INTRAMUSCULAR; INTRAVENOUS; SUBCUTANEOUS
Qty: 0 | Refills: 0 | Status: DISCONTINUED | OUTPATIENT
Start: 2018-10-15 | End: 2018-10-15

## 2018-10-14 RX ADMIN — OXYCODONE HYDROCHLORIDE 5 MILLIGRAM(S): 5 TABLET ORAL at 08:47

## 2018-10-14 RX ADMIN — Medication 100 MILLIGRAM(S): at 14:34

## 2018-10-14 RX ADMIN — Medication 100 MILLIGRAM(S): at 21:06

## 2018-10-14 RX ADMIN — SACUBITRIL AND VALSARTAN 1 TABLET(S): 24; 26 TABLET, FILM COATED ORAL at 05:54

## 2018-10-14 RX ADMIN — Medication 40 MILLIGRAM(S): at 17:39

## 2018-10-14 RX ADMIN — Medication 2 UNIT(S): at 08:37

## 2018-10-14 RX ADMIN — OXYCODONE HYDROCHLORIDE 5 MILLIGRAM(S): 5 TABLET ORAL at 08:17

## 2018-10-14 RX ADMIN — PIPERACILLIN AND TAZOBACTAM 25 GRAM(S): 4; .5 INJECTION, POWDER, LYOPHILIZED, FOR SOLUTION INTRAVENOUS at 19:30

## 2018-10-14 RX ADMIN — Medication 3: at 17:35

## 2018-10-14 RX ADMIN — Medication 2 UNIT(S): at 17:35

## 2018-10-14 RX ADMIN — INSULIN GLARGINE 10 UNIT(S): 100 INJECTION, SOLUTION SUBCUTANEOUS at 22:46

## 2018-10-14 RX ADMIN — SACUBITRIL AND VALSARTAN 1 TABLET(S): 24; 26 TABLET, FILM COATED ORAL at 17:39

## 2018-10-14 RX ADMIN — ATORVASTATIN CALCIUM 40 MILLIGRAM(S): 80 TABLET, FILM COATED ORAL at 21:06

## 2018-10-14 RX ADMIN — Medication 81 MILLIGRAM(S): at 11:38

## 2018-10-14 RX ADMIN — OXYCODONE HYDROCHLORIDE 5 MILLIGRAM(S): 5 TABLET ORAL at 15:04

## 2018-10-14 RX ADMIN — Medication 250 MILLIGRAM(S): at 16:15

## 2018-10-14 RX ADMIN — OXYCODONE HYDROCHLORIDE 5 MILLIGRAM(S): 5 TABLET ORAL at 00:00

## 2018-10-14 RX ADMIN — Medication 2 UNIT(S): at 12:21

## 2018-10-14 RX ADMIN — Medication 2: at 12:21

## 2018-10-14 RX ADMIN — Medication 2: at 08:36

## 2018-10-14 RX ADMIN — PIPERACILLIN AND TAZOBACTAM 25 GRAM(S): 4; .5 INJECTION, POWDER, LYOPHILIZED, FOR SOLUTION INTRAVENOUS at 04:16

## 2018-10-14 RX ADMIN — Medication 250 MILLIGRAM(S): at 04:16

## 2018-10-14 RX ADMIN — PIPERACILLIN AND TAZOBACTAM 25 GRAM(S): 4; .5 INJECTION, POWDER, LYOPHILIZED, FOR SOLUTION INTRAVENOUS at 11:38

## 2018-10-14 RX ADMIN — CARVEDILOL PHOSPHATE 25 MILLIGRAM(S): 80 CAPSULE, EXTENDED RELEASE ORAL at 17:39

## 2018-10-14 RX ADMIN — Medication 40 MILLIGRAM(S): at 05:54

## 2018-10-14 RX ADMIN — OXYCODONE HYDROCHLORIDE 5 MILLIGRAM(S): 5 TABLET ORAL at 14:34

## 2018-10-14 NOTE — PROGRESS NOTE ADULT - ASSESSMENT
68y male, s/p LLE CFA to below knee popliteal arterial bypass with PTFE on 9/18, returning with SSI.  CTA reveals that the graft is patent,  Vital signs stable, afebrile. Will continue abx, patient is now more amenable to surgery after discussing options with son at bedside.      Plan:  - plan for OR Monday for groin incision I&D of perigraft fluid collection with vac placement.  Will then I&D distal incision SSI and leave open after recovered from initial surgery.    - cleared by cardiology  - IV vancomycin and zosyn, FU vanco trough tomorrow AM  - heparin drip, currently therapeutic  - consistent carb diet  - continue to monitor vital signs, labs

## 2018-10-14 NOTE — CHART NOTE - NSCHARTNOTEFT_GEN_A_CORE
Pre-operative Note    - Pre-operative Diagnosis: infection over graft site    - Procedure: Left groin washout, possible muscle flap    - Labs:                        9.9    5.07  )-----------( 181      ( 14 Oct 2018 06:06 )             31.7     10-14    135  |  99  |  17  ----------------------------<  200<H>  3.8   |  24  |  1.17    Ca    8.1<L>      14 Oct 2018 06:06  Phos  2.7     10-14  Mg     2.1     10-14      PT/INR - ( 13 Oct 2018 05:20 )   PT: 17.1 SEC;   INR: 1.53          PTT - ( 14 Oct 2018 06:06 )  PTT:77.9 SEC  Type & Screen #1: ordered 10/14  Type & Screen #2: ordered 10/15    - CXR: 10/6, clear lungs    - EKG: in chart    - Blood: 2 units on hold    - Orders:  > NPO at midnight  > IVF at midnight  > Perioperative antibiotics not needed.  > Morning Labs: CBC, BMP, coags, type & screen  > Diabetic orders adjusted for NPO period.    - Permits:  > Consent in chart.  > Case scheduled with OR.

## 2018-10-14 NOTE — PROGRESS NOTE ADULT - SUBJECTIVE AND OBJECTIVE BOX
GENERAL SURGERY DAILY PROGRESS NOTE:     Subjective:  Pt seen and examined during am rounds. No acute events overnight. Dressing changed. Denies n/v/f/c. Cleared by cardiology.       Objective:  Physical Exam:  Gen: NAD, resting comfortably in bed  CV: No increased WOB  Extr: moderate serosanguinous/purulent discharge from below-knee incision, mild erythema and tenderness to palpation.    Pulses: +PT/DP signals LLE      MEDICATIONS  (STANDING):  aspirin enteric coated 81 milliGRAM(s) Oral daily  atorvastatin 40 milliGRAM(s) Oral at bedtime  carvedilol 25 milliGRAM(s) Oral every 12 hours  dextrose 50% Injectable 12.5 Gram(s) IV Push once  dextrose 50% Injectable 25 Gram(s) IV Push once  dextrose 50% Injectable 25 Gram(s) IV Push once  docusate sodium 100 milliGRAM(s) Oral three times a day  furosemide    Tablet 40 milliGRAM(s) Oral two times a day  heparin  Infusion 1100 Unit(s)/Hr (11 mL/Hr) IV Continuous <Continuous>  influenza   Vaccine 0.5 milliLiter(s) IntraMuscular once  insulin glargine Injectable (LANTUS) 10 Unit(s) SubCutaneous at bedtime  insulin lispro (HumaLOG) corrective regimen sliding scale   SubCutaneous three times a day before meals  insulin lispro (HumaLOG) corrective regimen sliding scale   SubCutaneous at bedtime  insulin lispro Injectable (HumaLOG) 2 Unit(s) SubCutaneous three times a day before meals  piperacillin/tazobactam IVPB. 3.375 Gram(s) IV Intermittent every 8 hours  sacubitril 24 mG/valsartan 26 mG 1 Tablet(s) Oral two times a day  vancomycin  IVPB 1000 milliGRAM(s) IV Intermittent every 12 hours    MEDICATIONS  (PRN):  acetaminophen   Tablet .. 650 milliGRAM(s) Oral every 6 hours PRN Temp greater or equal to 38C (100.4F), Mild Pain (1 - 3)  dextrose 40% Gel 15 Gram(s) Oral once PRN Blood Glucose LESS THAN 70 milliGRAM(s)/deciliter  glucagon  Injectable 1 milliGRAM(s) IntraMuscular once PRN Glucose LESS THAN 70 milligrams/deciliter  oxyCODONE    IR 5 milliGRAM(s) Oral every 6 hours PRN moderate to severe pain  senna 2 Tablet(s) Oral at bedtime PRN Constipation      Vital Signs Last 24 Hrs  T(C): 36.7 (14 Oct 2018 05:50), Max: 37.1 (13 Oct 2018 10:12)  T(F): 98.1 (14 Oct 2018 05:50), Max: 98.7 (13 Oct 2018 10:12)  HR: 72 (14 Oct 2018 05:50) (68 - 76)  BP: 110/63 (14 Oct 2018 05:50) (99/51 - 133/76)  BP(mean): --  RR: 17 (14 Oct 2018 05:50) (16 - 17)  SpO2: 98% (14 Oct 2018 05:50) (98% - 100%)    I&O's Detail    13 Oct 2018 07:01  -  14 Oct 2018 07:00  --------------------------------------------------------  IN:    heparin Infusion: 44 mL    heparin Infusion: 198 mL    IV PiggyBack: 700 mL  Total IN: 942 mL    OUT:    Voided: 1345 mL  Total OUT: 1345 mL    Total NET: -403 mL          Daily     Daily     LABS:                        9.9    5.07  )-----------( 181      ( 14 Oct 2018 06:06 )             31.7     10-14    135  |  99  |  17  ----------------------------<  200<H>  3.8   |  24  |  1.17    Ca    8.1<L>      14 Oct 2018 06:06  Phos  2.7     10-14  Mg     2.1     10-14      PT/INR - ( 13 Oct 2018 05:20 )   PT: 17.1 SEC;   INR: 1.53          PTT - ( 14 Oct 2018 06:06 )  PTT:77.9 SEC      RADIOLOGY & ADDITIONAL STUDIES:

## 2018-10-15 LAB
APTT BLD: 34.6 SEC — SIGNIFICANT CHANGE UP (ref 27.5–37.4)
APTT BLD: 36 SEC — SIGNIFICANT CHANGE UP (ref 27.5–37.4)
APTT BLD: 64.3 SEC — HIGH (ref 27.5–37.4)
BASE EXCESS BLDA CALC-SCNC: -0.9 MMOL/L — SIGNIFICANT CHANGE UP
BASE EXCESS BLDA CALC-SCNC: 0.8 MMOL/L — SIGNIFICANT CHANGE UP
BLD GP AB SCN SERPL QL: NEGATIVE — SIGNIFICANT CHANGE UP
BUN SERPL-MCNC: 15 MG/DL — SIGNIFICANT CHANGE UP (ref 7–23)
BUN SERPL-MCNC: 18 MG/DL — SIGNIFICANT CHANGE UP (ref 7–23)
CA-I BLDA-SCNC: 1.1 MMOL/L — LOW (ref 1.15–1.29)
CA-I BLDA-SCNC: 1.14 MMOL/L — LOW (ref 1.15–1.29)
CALCIUM SERPL-MCNC: 8.2 MG/DL — LOW (ref 8.4–10.5)
CALCIUM SERPL-MCNC: 8.7 MG/DL — SIGNIFICANT CHANGE UP (ref 8.4–10.5)
CHLORIDE SERPL-SCNC: 101 MMOL/L — SIGNIFICANT CHANGE UP (ref 98–107)
CHLORIDE SERPL-SCNC: 102 MMOL/L — SIGNIFICANT CHANGE UP (ref 98–107)
CK MB BLD-MCNC: 3.69 NG/ML — SIGNIFICANT CHANGE UP (ref 1–6.6)
CK SERPL-CCNC: 76 U/L — SIGNIFICANT CHANGE UP (ref 30–200)
CO2 SERPL-SCNC: 22 MMOL/L — SIGNIFICANT CHANGE UP (ref 22–31)
CO2 SERPL-SCNC: 24 MMOL/L — SIGNIFICANT CHANGE UP (ref 22–31)
CREAT SERPL-MCNC: 1.08 MG/DL — SIGNIFICANT CHANGE UP (ref 0.5–1.3)
CREAT SERPL-MCNC: 1.21 MG/DL — SIGNIFICANT CHANGE UP (ref 0.5–1.3)
GLUCOSE BLDA-MCNC: 139 MG/DL — HIGH (ref 70–99)
GLUCOSE BLDA-MCNC: 143 MG/DL — HIGH (ref 70–99)
GLUCOSE SERPL-MCNC: 141 MG/DL — HIGH (ref 70–99)
GLUCOSE SERPL-MCNC: 147 MG/DL — HIGH (ref 70–99)
GRAM STN WND: SIGNIFICANT CHANGE UP
HCO3 BLDA-SCNC: 24 MMOL/L — SIGNIFICANT CHANGE UP (ref 22–26)
HCO3 BLDA-SCNC: 25 MMOL/L — SIGNIFICANT CHANGE UP (ref 22–26)
HCT VFR BLD CALC: 28.1 % — LOW (ref 39–50)
HCT VFR BLD CALC: 28.2 % — LOW (ref 39–50)
HCT VFR BLD CALC: 31.6 % — LOW (ref 39–50)
HCT VFR BLDA CALC: 28.1 % — LOW (ref 39–51)
HCT VFR BLDA CALC: 30 % — LOW (ref 39–51)
HGB BLD-MCNC: 8.8 G/DL — LOW (ref 13–17)
HGB BLD-MCNC: 8.9 G/DL — LOW (ref 13–17)
HGB BLD-MCNC: 9.8 G/DL — LOW (ref 13–17)
HGB BLDA-MCNC: 9.1 G/DL — LOW (ref 13–17)
HGB BLDA-MCNC: 9.7 G/DL — LOW (ref 13–17)
INR BLD: 1.38 — HIGH (ref 0.88–1.17)
INR BLD: 1.4 — HIGH (ref 0.88–1.17)
LACTATE BLDA-SCNC: 0.9 MMOL/L — SIGNIFICANT CHANGE UP (ref 0.5–2)
MAGNESIUM SERPL-MCNC: 2 MG/DL — SIGNIFICANT CHANGE UP (ref 1.6–2.6)
MAGNESIUM SERPL-MCNC: 2.1 MG/DL — SIGNIFICANT CHANGE UP (ref 1.6–2.6)
MCHC RBC-ENTMCNC: 26.9 PG — LOW (ref 27–34)
MCHC RBC-ENTMCNC: 27.3 PG — SIGNIFICANT CHANGE UP (ref 27–34)
MCHC RBC-ENTMCNC: 27.4 PG — SIGNIFICANT CHANGE UP (ref 27–34)
MCHC RBC-ENTMCNC: 31 % — LOW (ref 32–36)
MCHC RBC-ENTMCNC: 31.3 % — LOW (ref 32–36)
MCHC RBC-ENTMCNC: 31.6 % — LOW (ref 32–36)
MCV RBC AUTO: 85.9 FL — SIGNIFICANT CHANGE UP (ref 80–100)
MCV RBC AUTO: 86.8 FL — SIGNIFICANT CHANGE UP (ref 80–100)
MCV RBC AUTO: 88 FL — SIGNIFICANT CHANGE UP (ref 80–100)
NRBC # FLD: 0 — SIGNIFICANT CHANGE UP
PCO2 BLDA: 36 MMHG — SIGNIFICANT CHANGE UP (ref 35–48)
PCO2 BLDA: 39 MMHG — SIGNIFICANT CHANGE UP (ref 35–48)
PH BLDA: 7.42 PH — SIGNIFICANT CHANGE UP (ref 7.35–7.45)
PH BLDA: 7.42 PH — SIGNIFICANT CHANGE UP (ref 7.35–7.45)
PHOSPHATE SERPL-MCNC: 2.8 MG/DL — SIGNIFICANT CHANGE UP (ref 2.5–4.5)
PHOSPHATE SERPL-MCNC: 2.9 MG/DL — SIGNIFICANT CHANGE UP (ref 2.5–4.5)
PLATELET # BLD AUTO: 166 K/UL — SIGNIFICANT CHANGE UP (ref 150–400)
PLATELET # BLD AUTO: 179 K/UL — SIGNIFICANT CHANGE UP (ref 150–400)
PLATELET # BLD AUTO: 179 K/UL — SIGNIFICANT CHANGE UP (ref 150–400)
PMV BLD: 10.8 FL — SIGNIFICANT CHANGE UP (ref 7–13)
PMV BLD: 11.3 FL — SIGNIFICANT CHANGE UP (ref 7–13)
PMV BLD: 11.5 FL — SIGNIFICANT CHANGE UP (ref 7–13)
PO2 BLDA: 102 MMHG — SIGNIFICANT CHANGE UP (ref 83–108)
PO2 BLDA: 190 MMHG — HIGH (ref 83–108)
POTASSIUM BLDA-SCNC: 3.4 MMOL/L — SIGNIFICANT CHANGE UP (ref 3.4–4.5)
POTASSIUM BLDA-SCNC: 3.6 MMOL/L — SIGNIFICANT CHANGE UP (ref 3.4–4.5)
POTASSIUM SERPL-MCNC: 3.8 MMOL/L — SIGNIFICANT CHANGE UP (ref 3.5–5.3)
POTASSIUM SERPL-MCNC: 4.1 MMOL/L — SIGNIFICANT CHANGE UP (ref 3.5–5.3)
POTASSIUM SERPL-SCNC: 3.8 MMOL/L — SIGNIFICANT CHANGE UP (ref 3.5–5.3)
POTASSIUM SERPL-SCNC: 4.1 MMOL/L — SIGNIFICANT CHANGE UP (ref 3.5–5.3)
PROTHROM AB SERPL-ACNC: 15.6 SEC — HIGH (ref 9.8–13.1)
PROTHROM AB SERPL-ACNC: 16 SEC — HIGH (ref 9.8–13.1)
RBC # BLD: 3.25 M/UL — LOW (ref 4.2–5.8)
RBC # BLD: 3.27 M/UL — LOW (ref 4.2–5.8)
RBC # BLD: 3.59 M/UL — LOW (ref 4.2–5.8)
RBC # FLD: 18.9 % — HIGH (ref 10.3–14.5)
RBC # FLD: 19 % — HIGH (ref 10.3–14.5)
RBC # FLD: 19.2 % — HIGH (ref 10.3–14.5)
RH IG SCN BLD-IMP: NEGATIVE — SIGNIFICANT CHANGE UP
SAO2 % BLDA: 97.9 % — SIGNIFICANT CHANGE UP (ref 95–99)
SAO2 % BLDA: 99.7 % — HIGH (ref 95–99)
SODIUM BLDA-SCNC: 133 MMOL/L — LOW (ref 136–146)
SODIUM BLDA-SCNC: 137 MMOL/L — SIGNIFICANT CHANGE UP (ref 136–146)
SODIUM SERPL-SCNC: 138 MMOL/L — SIGNIFICANT CHANGE UP (ref 135–145)
SODIUM SERPL-SCNC: 139 MMOL/L — SIGNIFICANT CHANGE UP (ref 135–145)
SPECIMEN SOURCE: SIGNIFICANT CHANGE UP
TROPONIN T, HIGH SENSITIVITY: 44 NG/L — SIGNIFICANT CHANGE UP (ref ?–14)
TROPONIN T, HIGH SENSITIVITY: 47 NG/L — SIGNIFICANT CHANGE UP (ref ?–14)
WBC # BLD: 5.17 K/UL — SIGNIFICANT CHANGE UP (ref 3.8–10.5)
WBC # BLD: 6.39 K/UL — SIGNIFICANT CHANGE UP (ref 3.8–10.5)
WBC # BLD: 8 K/UL — SIGNIFICANT CHANGE UP (ref 3.8–10.5)
WBC # FLD AUTO: 5.17 K/UL — SIGNIFICANT CHANGE UP (ref 3.8–10.5)
WBC # FLD AUTO: 6.39 K/UL — SIGNIFICANT CHANGE UP (ref 3.8–10.5)
WBC # FLD AUTO: 8 K/UL — SIGNIFICANT CHANGE UP (ref 3.8–10.5)

## 2018-10-15 PROCEDURE — 93010 ELECTROCARDIOGRAM REPORT: CPT

## 2018-10-15 PROCEDURE — 71045 X-RAY EXAM CHEST 1 VIEW: CPT | Mod: 26

## 2018-10-15 PROCEDURE — 15738 MUSCLE-SKIN GRAFT LEG: CPT | Mod: 78

## 2018-10-15 PROCEDURE — 35741: CPT | Mod: 78

## 2018-10-15 PROCEDURE — 35721: CPT | Mod: 78

## 2018-10-15 PROCEDURE — 99233 SBSQ HOSP IP/OBS HIGH 50: CPT

## 2018-10-15 RX ORDER — CARVEDILOL PHOSPHATE 80 MG/1
25 CAPSULE, EXTENDED RELEASE ORAL EVERY 12 HOURS
Qty: 0 | Refills: 0 | Status: DISCONTINUED | OUTPATIENT
Start: 2018-10-15 | End: 2018-10-30

## 2018-10-15 RX ORDER — OXYCODONE HYDROCHLORIDE 5 MG/1
5 TABLET ORAL EVERY 4 HOURS
Qty: 0 | Refills: 0 | Status: DISCONTINUED | OUTPATIENT
Start: 2018-10-15 | End: 2018-10-22

## 2018-10-15 RX ORDER — ASPIRIN/CALCIUM CARB/MAGNESIUM 324 MG
81 TABLET ORAL DAILY
Qty: 0 | Refills: 0 | Status: DISCONTINUED | OUTPATIENT
Start: 2018-10-15 | End: 2018-10-15

## 2018-10-15 RX ORDER — VANCOMYCIN HCL 1 G
1000 VIAL (EA) INTRAVENOUS EVERY 12 HOURS
Qty: 0 | Refills: 0 | Status: DISCONTINUED | OUTPATIENT
Start: 2018-10-15 | End: 2018-10-16

## 2018-10-15 RX ORDER — INSULIN LISPRO 100/ML
VIAL (ML) SUBCUTANEOUS EVERY 6 HOURS
Qty: 0 | Refills: 0 | Status: DISCONTINUED | OUTPATIENT
Start: 2018-10-15 | End: 2018-10-15

## 2018-10-15 RX ORDER — HYDROMORPHONE HYDROCHLORIDE 2 MG/ML
0.5 INJECTION INTRAMUSCULAR; INTRAVENOUS; SUBCUTANEOUS ONCE
Qty: 0 | Refills: 0 | Status: DISCONTINUED | OUTPATIENT
Start: 2018-10-15 | End: 2018-10-15

## 2018-10-15 RX ORDER — ACETAMINOPHEN 500 MG
650 TABLET ORAL EVERY 6 HOURS
Qty: 0 | Refills: 0 | Status: DISCONTINUED | OUTPATIENT
Start: 2018-10-15 | End: 2018-10-30

## 2018-10-15 RX ORDER — ASPIRIN/CALCIUM CARB/MAGNESIUM 324 MG
81 TABLET ORAL DAILY
Qty: 0 | Refills: 0 | Status: DISCONTINUED | OUTPATIENT
Start: 2018-10-15 | End: 2018-10-30

## 2018-10-15 RX ORDER — ACETAMINOPHEN 500 MG
1000 TABLET ORAL ONCE
Qty: 0 | Refills: 0 | Status: COMPLETED | OUTPATIENT
Start: 2018-10-15 | End: 2018-10-15

## 2018-10-15 RX ORDER — NOREPINEPHRINE BITARTRATE/D5W 8 MG/250ML
0.05 PLASTIC BAG, INJECTION (ML) INTRAVENOUS
Qty: 16 | Refills: 0 | Status: DISCONTINUED | OUTPATIENT
Start: 2018-10-15 | End: 2018-10-15

## 2018-10-15 RX ORDER — ATORVASTATIN CALCIUM 80 MG/1
40 TABLET, FILM COATED ORAL AT BEDTIME
Qty: 0 | Refills: 0 | Status: DISCONTINUED | OUTPATIENT
Start: 2018-10-15 | End: 2018-10-30

## 2018-10-15 RX ORDER — INSULIN GLARGINE 100 [IU]/ML
10 INJECTION, SOLUTION SUBCUTANEOUS AT BEDTIME
Qty: 0 | Refills: 0 | Status: DISCONTINUED | OUTPATIENT
Start: 2018-10-15 | End: 2018-10-16

## 2018-10-15 RX ORDER — OXYCODONE HYDROCHLORIDE 5 MG/1
10 TABLET ORAL EVERY 4 HOURS
Qty: 0 | Refills: 0 | Status: DISCONTINUED | OUTPATIENT
Start: 2018-10-15 | End: 2018-10-22

## 2018-10-15 RX ORDER — PIPERACILLIN AND TAZOBACTAM 4; .5 G/20ML; G/20ML
3.38 INJECTION, POWDER, LYOPHILIZED, FOR SOLUTION INTRAVENOUS EVERY 8 HOURS
Qty: 0 | Refills: 0 | Status: DISCONTINUED | OUTPATIENT
Start: 2018-10-15 | End: 2018-10-17

## 2018-10-15 RX ORDER — INSULIN LISPRO 100/ML
VIAL (ML) SUBCUTANEOUS
Qty: 0 | Refills: 0 | Status: DISCONTINUED | OUTPATIENT
Start: 2018-10-15 | End: 2018-10-19

## 2018-10-15 RX ORDER — INSULIN LISPRO 100/ML
2 VIAL (ML) SUBCUTANEOUS
Qty: 0 | Refills: 0 | Status: DISCONTINUED | OUTPATIENT
Start: 2018-10-15 | End: 2018-10-30

## 2018-10-15 RX ORDER — HEPARIN SODIUM 5000 [USP'U]/ML
1100 INJECTION INTRAVENOUS; SUBCUTANEOUS
Qty: 25000 | Refills: 0 | Status: DISCONTINUED | OUTPATIENT
Start: 2018-10-15 | End: 2018-10-17

## 2018-10-15 RX ORDER — OXYCODONE HYDROCHLORIDE 5 MG/1
10 TABLET ORAL EVERY 6 HOURS
Qty: 0 | Refills: 0 | Status: DISCONTINUED | OUTPATIENT
Start: 2018-10-15 | End: 2018-10-15

## 2018-10-15 RX ORDER — OXYCODONE HYDROCHLORIDE 5 MG/1
5 TABLET ORAL EVERY 6 HOURS
Qty: 0 | Refills: 0 | Status: DISCONTINUED | OUTPATIENT
Start: 2018-10-15 | End: 2018-10-15

## 2018-10-15 RX ORDER — SODIUM CHLORIDE 9 MG/ML
1000 INJECTION, SOLUTION INTRAVENOUS
Qty: 0 | Refills: 0 | Status: DISCONTINUED | OUTPATIENT
Start: 2018-10-15 | End: 2018-10-16

## 2018-10-15 RX ORDER — POTASSIUM PHOSPHATE, MONOBASIC POTASSIUM PHOSPHATE, DIBASIC 236; 224 MG/ML; MG/ML
15 INJECTION, SOLUTION INTRAVENOUS ONCE
Qty: 0 | Refills: 0 | Status: COMPLETED | OUTPATIENT
Start: 2018-10-15 | End: 2018-10-15

## 2018-10-15 RX ADMIN — HYDROMORPHONE HYDROCHLORIDE 0.5 MILLIGRAM(S): 2 INJECTION INTRAMUSCULAR; INTRAVENOUS; SUBCUTANEOUS at 13:15

## 2018-10-15 RX ADMIN — OXYCODONE HYDROCHLORIDE 10 MILLIGRAM(S): 5 TABLET ORAL at 19:56

## 2018-10-15 RX ADMIN — OXYCODONE HYDROCHLORIDE 10 MILLIGRAM(S): 5 TABLET ORAL at 11:30

## 2018-10-15 RX ADMIN — HYDROMORPHONE HYDROCHLORIDE 0.5 MILLIGRAM(S): 2 INJECTION INTRAMUSCULAR; INTRAVENOUS; SUBCUTANEOUS at 13:25

## 2018-10-15 RX ADMIN — POTASSIUM PHOSPHATE, MONOBASIC POTASSIUM PHOSPHATE, DIBASIC 62.5 MILLIMOLE(S): 236; 224 INJECTION, SOLUTION INTRAVENOUS at 15:32

## 2018-10-15 RX ADMIN — Medication 81 MILLIGRAM(S): at 18:32

## 2018-10-15 RX ADMIN — Medication 40 MILLIGRAM(S): at 05:10

## 2018-10-15 RX ADMIN — Medication 400 MILLIGRAM(S): at 12:00

## 2018-10-15 RX ADMIN — SODIUM CHLORIDE 75 MILLILITER(S): 9 INJECTION INTRAMUSCULAR; INTRAVENOUS; SUBCUTANEOUS at 00:31

## 2018-10-15 RX ADMIN — Medication 250 MILLIGRAM(S): at 02:13

## 2018-10-15 RX ADMIN — Medication 1000 MILLIGRAM(S): at 12:30

## 2018-10-15 RX ADMIN — Medication 2: at 18:29

## 2018-10-15 RX ADMIN — CARVEDILOL PHOSPHATE 25 MILLIGRAM(S): 80 CAPSULE, EXTENDED RELEASE ORAL at 05:10

## 2018-10-15 RX ADMIN — ATORVASTATIN CALCIUM 40 MILLIGRAM(S): 80 TABLET, FILM COATED ORAL at 21:55

## 2018-10-15 RX ADMIN — OXYCODONE HYDROCHLORIDE 10 MILLIGRAM(S): 5 TABLET ORAL at 11:46

## 2018-10-15 RX ADMIN — INSULIN GLARGINE 10 UNIT(S): 100 INJECTION, SOLUTION SUBCUTANEOUS at 21:54

## 2018-10-15 RX ADMIN — PIPERACILLIN AND TAZOBACTAM 25 GRAM(S): 4; .5 INJECTION, POWDER, LYOPHILIZED, FOR SOLUTION INTRAVENOUS at 03:23

## 2018-10-15 RX ADMIN — Medication 2 UNIT(S): at 18:29

## 2018-10-15 RX ADMIN — PIPERACILLIN AND TAZOBACTAM 25 GRAM(S): 4; .5 INJECTION, POWDER, LYOPHILIZED, FOR SOLUTION INTRAVENOUS at 14:16

## 2018-10-15 RX ADMIN — PIPERACILLIN AND TAZOBACTAM 25 GRAM(S): 4; .5 INJECTION, POWDER, LYOPHILIZED, FOR SOLUTION INTRAVENOUS at 21:55

## 2018-10-15 RX ADMIN — OXYCODONE HYDROCHLORIDE 10 MILLIGRAM(S): 5 TABLET ORAL at 19:26

## 2018-10-15 RX ADMIN — Medication 250 MILLIGRAM(S): at 18:32

## 2018-10-15 RX ADMIN — SACUBITRIL AND VALSARTAN 1 TABLET(S): 24; 26 TABLET, FILM COATED ORAL at 05:10

## 2018-10-15 NOTE — CONSULT NOTE ADULT - SUBJECTIVE AND OBJECTIVE BOX
HISTORY OF PRESENT ILLNESS:  ERICKA GUILLERMO is a 68y Male h/o DM, CAD (s/p stent in 2016), CABG, HTN, CHF (EF 20% by last TTE on 9/11/18), PAD – s/p L KEI stenting on 9/11, LLE CFA to below-knee bypass with PTFE for long-segment SFA occlusion on 9/18, and L 2nd toe amputation on 9/19. Patient complaining of two days of LLE pain, swelling, and redness. Earlier on the day of admission, he reports discharge of "pus" from the incisions above and below the level of his knee (nothing noted from his groin incision), followed thereafter by a "large" amount of bloody discharge. There has been no further bleeding since. He denies any fevers/chills, and denies any pain in the L foot during this time. He has been ambulating at home fairly regularly. Of note, he is also reporting swelling of the RLE over the past two weeks, without any associated pain or redness. He was started on anticoagulation – heparin gtt early post-op, continued as Xarelto for discharge – due to concern for sluggish flow through the graft intra-operatively and as prophylaxis against graft thrombosis.        PAST MEDICAL HISTORY: Chronic congestive heart failure, unspecified congestive heart failure type  PAD (peripheral artery disease)  Stented coronary artery  Hyperlipidemia  Diabetes  Hypertension  CAD (coronary artery disease)      PAST SURGICAL HISTORY: S/P amputation  S/P bypass graft of extremity  S/P angioplasty with stent  S/P CABG x 3      FAMILY HISTORY: Family history of diabetes mellitus (DM)  No pertinent family history in first degree relatives      SOCIAL HISTORY: Former smoker, no tobacco use in > 10 years, no ETOH use, no illicit drug use    CODE STATUS: Full code     HOME MEDICATIONS:  · 	rivaroxaban 15 mg oral tablet: 1 tab(s) orally 2 times a day MDD:2  · 	oxyCODONE 5 mg oral tablet: 1-2 tab(s) orally every 6-8 hours, As Needed -Moderate to Severe Pain  MDD:4  · 	amoxicillin-clavulanate 875 mg-125 mg oral tablet: 1 tab(s) orally 2 times a day MDD:2  · 	Silvadene 1% topical cream: Apply topically to affected area 3 times a day MDD:3  · 	acetaminophen 325 mg oral tablet: 2 tab(s) orally every 6 hours, As needed, Mild Pain (1 - 3)  · 	atorvastatin 40 mg oral tablet: 1 tab(s) orally once a day (at bedtime)  · 	aspirin 81 mg oral delayed release tablet: 1 tab(s) orally once a day  · 	Lantus Solostar Pen 100 units/mL subcutaneous solution: 30 unit(s) subcutaneous once a day (at bedtime)  · 	Entresto 24 mg-26 mg oral tablet: 1 tab(s) orally 2 times a day  · 	furosemide 40 mg oral tablet: 1 tab(s) orally 2 times a day  · 	carvedilol 25 mg oral tablet: 1 tab(s) orally 2 times a day    ALLERGIES: No Known Allergies      VITAL SIGNS:  ICU Vital Signs Last 24 Hrs  T(C): 36.6 (15 Oct 2018 10:55), Max: 36.7 (14 Oct 2018 14:05)  T(F): 97.9 (15 Oct 2018 10:55), Max: 98.1 (15 Oct 2018 06:55)  HR: 73 (15 Oct 2018 11:00) (64 - 76)  BP: 117/47 (15 Oct 2018 10:55) (107/63 - 128/72)  BP(mean): 70 (15 Oct 2018 10:55) (70 - 70)  ABP: 115/50 (15 Oct 2018 11:00) (115/50 - 115/50)  ABP(mean): 71 (15 Oct 2018 11:00) (71 - 71)  RR: 11 (15 Oct 2018 11:00) (11 - 18)  SpO2: 99% (15 Oct 2018 11:00) (98% - 100%)      NEURO  Exam: AOx3, minimal pain at incision  acetaminophen   Tablet .. 650 milliGRAM(s) Oral every 6 hours PRN Mild Pain (1 - 3)  oxyCODONE    IR 5 milliGRAM(s) Oral every 6 hours PRN Moderate Pain (4 - 6)  oxyCODONE    IR 10 milliGRAM(s) Oral every 6 hours PRN Severe Pain (7 - 10)      RESPIRATORY  ABG - ( 15 Oct 2018 08:32 )  pH: 7.42  /  pCO2: 36    /  pO2: 190   / HCO3: 24    / Base Excess: -0.9  /  SaO2: 99.7    Lactate: x      Exam: unlabored breathing      CARDIOVASCULAR  Exam: RRR, normotensive, intraop ASHLEY significant for EF of 10% globally, dopplerable PT/DP bilaterally  Cardiac Rhythm: Sinus       GI/NUTRITION  Exam: soft, NT, ND  Diet: NPO, advance as tolerated       GENITOURINARY/RENAL  lactated ringers. 1000 milliLiter(s) IV Continuous <Continuous>      10-14 @ 07:01  -  10-15 @ 07:00  --------------------------------------------------------  IN:    heparin Infusion: 33 mL    IV PiggyBack: 350 mL    Oral Fluid: 200 mL    sodium chloride 0.9%: 600 mL  Total IN: 1183 mL    OUT:    Voided: 2250 mL  Total OUT: 2250 mL    Total NET: -1067 mL      10-15 @ 07:01  -  10-15 @ 11:51  --------------------------------------------------------  IN:  Total IN: 0 mL    OUT:    Indwelling Catheter - Urethral: 250 mL    Voided: 300 mL  Total OUT: 550 mL    Total NET: -550 mL        Weight (kg): 70.8 (10-15 @ 06:42)  10-15    139  |  102  |  18  ----------------------------<  141<H>  4.1   |  24  |  1.21    Ca    8.7      15 Oct 2018 03:54  Phos  2.9     10-15  Mg     2.1     10-15      [x ] Palacios catheter, indication: urine output monitoring in critically ill patient    HEMATOLOGIC  [ ] VTE Prophylaxis: will restart hep gtt if patient remains stable post op                          9.8    5.17  )-----------( 179      ( 15 Oct 2018 03:54 )             31.6     PT/INR - ( 15 Oct 2018 03:54 )   PT: 15.6 SEC;   INR: 1.40          PTT - ( 15 Oct 2018 03:54 )  PTT:34.6 SEC  Transfusion: [ ] PRBC	[ ] Platelets	[ ] FFP	[ ] Cryoprecipitate      INFECTIOUS DISEASES  influenza   Vaccine 0.5 milliLiter(s) IntraMuscular once  piperacillin/tazobactam IVPB. 3.375 Gram(s) IV Intermittent every 8 hours  vancomycin  IVPB 1000 milliGRAM(s) IV Intermittent every 12 hours    RECENT CULTURES: f/u intraop wound cxs      ENDOCRINE  atorvastatin 40 milliGRAM(s) Oral at bedtime    CAPILLARY BLOOD GLUCOSE  POCT Blood Glucose.: 136 mg/dL (15 Oct 2018 06:31)  POCT Blood Glucose.: 132 mg/dL (14 Oct 2018 22:31)  POCT Blood Glucose.: 206 mg/dL (14 Oct 2018 17:23)  POCT Blood Glucose.: 181 mg/dL (14 Oct 2018 12:17)      PATIENT CARE ACCESS DEVICES:  [x ] Peripheral IV  [ x] Central Venous Line	[ ] R	[x ] L	[x ] IJ	[ ] Fem	[ ] SC	Placed:   [x ] Arterial Line		[ x] R	[ ] L	[ ] Fem	[ x] Rad	[ ] Ax	Placed:   [ ] PICC:					[ ] Mediport  [ ] Urinary Catheter, Date Placed:   [x] Necessity of urinary, arterial, and venous catheters discussed    OTHER MEDICATIONS:

## 2018-10-15 NOTE — PROGRESS NOTE ADULT - SUBJECTIVE AND OBJECTIVE BOX
C team surgery progress note/Post-op check    SUBJECTIVE: Patient seen and examined at bedside, patient without complaints. Patient s/p left groin wash out, wound VAC placement. Wash out of above and below knee incision, packed with wet to dry. Patient denies CP, sob, and palpitations. Patient denies N/V.     Vital Signs Last 24 Hrs  T(C): 36.1 (15 Oct 2018 15:00), Max: 36.7 (14 Oct 2018 20:43)  T(F): 97 (15 Oct 2018 15:00), Max: 98.1 (15 Oct 2018 06:55)  HR: 65 (15 Oct 2018 15:00) (61 - 76)  BP: 117/47 (15 Oct 2018 10:55) (111/56 - 128/72)  BP(mean): 70 (15 Oct 2018 10:55) (70 - 70)  RR: 11 (15 Oct 2018 15:00) (11 - 24)  SpO2: 100% (15 Oct 2018 15:00) (98% - 100%)  I&O's Detail    14 Oct 2018 07:01  -  15 Oct 2018 07:00  --------------------------------------------------------  IN:    heparin Infusion: 33 mL    IV PiggyBack: 350 mL    Oral Fluid: 200 mL    sodium chloride 0.9%: 600 mL  Total IN: 1183 mL    OUT:    Voided: 2250 mL  Total OUT: 2250 mL    Total NET: -1067 mL      15 Oct 2018 07:01  -  15 Oct 2018 16:12  --------------------------------------------------------  IN:    lactated ringers.: 180 mL    Oral Fluid: 50 mL  Total IN: 230 mL    OUT:    Indwelling Catheter - Urethral: 1125 mL    Voided: 300 mL  Total OUT: 1425 mL    Total NET: -1195 mL        MEDICATIONS  (STANDING):  atorvastatin 40 milliGRAM(s) Oral at bedtime  carvedilol 25 milliGRAM(s) Oral every 12 hours  heparin  Infusion 1100 Unit(s)/Hr (11 mL/Hr) IV Continuous <Continuous>  influenza   Vaccine 0.5 milliLiter(s) IntraMuscular once  insulin lispro (HumaLOG) corrective regimen sliding scale   SubCutaneous every 6 hours  lactated ringers. 1000 milliLiter(s) (30 mL/Hr) IV Continuous <Continuous>  piperacillin/tazobactam IVPB. 3.375 Gram(s) IV Intermittent every 8 hours  vancomycin  IVPB 1000 milliGRAM(s) IV Intermittent every 12 hours    MEDICATIONS  (PRN):  acetaminophen   Tablet .. 650 milliGRAM(s) Oral every 6 hours PRN Mild Pain (1 - 3)  oxyCODONE    IR 5 milliGRAM(s) Oral every 6 hours PRN Moderate Pain (4 - 6)  oxyCODONE    IR 10 milliGRAM(s) Oral every 6 hours PRN Severe Pain (7 - 10)      Physical Exam  General: A&Ox3, NAD  Abdominal: soft, NT, ND  Extremities: LLE groin wound VAC in tact with serosanguinous output. LLE DP/PT signal.      LABS:                        8.9    6.39  )-----------( 166      ( 15 Oct 2018 12:14 )             28.2     10-15    138  |  101  |  15  ----------------------------<  147<H>  3.8   |  22  |  1.08    Ca    8.2<L>      15 Oct 2018 12:14  Phos  2.8     10-15  Mg     2.0     10-15      PT/INR - ( 15 Oct 2018 12:14 )   PT: 16.0 SEC;   INR: 1.38          PTT - ( 15 Oct 2018 12:14 )  PTT:36.0 SEC    ABO Interpretation: O (10-15-18 @ 06:00)

## 2018-10-15 NOTE — PRE-OP CHECKLIST - 1.
echymotic & swollen left arm,multiple eschars noted left leg,filomena dsg in place below left knee,skint ear beneath per floor rn yeimi echymotic & swollen left arm,right arm w/multiple echymotic areas,multiple eschars & swelling noted left leg,filomena dsg in place below left knee,skin tear beneath per floor rn yeimi,dsg not removed in holding area,pt denies any pain @ present

## 2018-10-15 NOTE — PROGRESS NOTE ADULT - ATTENDING COMMENTS
I have personally interviewed and examined this patient, reviewed pertinent labs and imaging, and discussed the case with colleagues, residents, physician assistants, and nurses on SICU rounds.    30     minutes in total were spent in providing direct critical care for the diagnoses, assessment and plan outlined below.  These diagnoses are unrelated to the surgical procedure.  Additionally, time spent in the performance of separately billable procedures was not counted toward the critical care time.  There is no overlap.    The active critical care issues are:  1.    Plan  neuro: pain control with oxycodone  cards: EKG, trend lactate, base deficit, check troponin, will clarify restarting asa with primary team, restart statin, holding beta blocker for now   pulm: extubated, monitor respiratory status  gi: if labs ok, plan to restart diet later today  gu: monitor urine output, cr, holding lasix for now   heme: full set of labs, trend cbc, likely restart heparin gtt later if hct stable  ID: [email or web address], monitor vanc level  endo: restart lantus when eating, sliding scale for now  proph: SQH I have personally interviewed and examined this patient, reviewed pertinent labs and imaging, and discussed the case with colleagues, residents, physician assistants, and nurses on SICU rounds.    30     minutes in total were spent in providing direct critical care for the diagnoses, assessment and plan outlined below.  These diagnoses are unrelated to the surgical procedure.  Additionally, time spent in the performance of separately billable procedures was not counted toward the critical care time.  There is no overlap.    The active critical care issues are:  I73.9 PAD (peripheral artery disease)   I50.9 Congestive heart failure, unspecified HF chronicity, unspecified heart failure type   E11.8 Type 2 diabetes mellitus with complication, unspecified whether long term insulin use   D62 Acute blood loss anemia       Plan  neuro: pain control with oxycodone  cards: EKG, trend lactate, base deficit, check troponin, will clarify restarting asa with primary team, restart statin, holding beta blocker for now   pulm: extubated, monitor respiratory status  gi: if labs ok, plan to restart diet later today  gu: monitor urine output, cr, holding lasix for now   heme: full set of labs, trend cbc, likely restart heparin gtt later if hct stable  ID: [email or web address], monitor vanc level  endo: restart lantus when eating, sliding scale for now  proph: SQH

## 2018-10-15 NOTE — BRIEF OPERATIVE NOTE - OPERATION/FINDINGS
Exploration and washout of Left groin.  Sartorius muscle flap.  Woundvac application.  Re-exploration and wash-out od above and below the knee incisions

## 2018-10-15 NOTE — BRIEF OPERATIVE NOTE - PROCEDURE
<<-----Click on this checkbox to enter Procedure Sartorius muscle flap  10/15/2018    Active  [id number]

## 2018-10-15 NOTE — PROGRESS NOTE ADULT - ASSESSMENT
Patient is a 68y Male s/p left groin wash out, wound VAC placement. Wash out of above and below knee incision, packed with wet to dry    -Wet to dry dressing BID starting tomorrow on above and below knee incision  -Re-start hep gtt with no bolus 4 hrs pst procedure if wound VAC output minimal  -cont IV abx  -cont carvedilol, atorvastatin  -ISS  -oxycodone PRN

## 2018-10-16 LAB
APTT BLD: 75.7 SEC — HIGH (ref 27.5–37.4)
BUN SERPL-MCNC: 12 MG/DL — SIGNIFICANT CHANGE UP (ref 7–23)
CALCIUM SERPL-MCNC: 8.5 MG/DL — SIGNIFICANT CHANGE UP (ref 8.4–10.5)
CHLORIDE SERPL-SCNC: 100 MMOL/L — SIGNIFICANT CHANGE UP (ref 98–107)
CO2 SERPL-SCNC: 25 MMOL/L — SIGNIFICANT CHANGE UP (ref 22–31)
CREAT SERPL-MCNC: 1.02 MG/DL — SIGNIFICANT CHANGE UP (ref 0.5–1.3)
GLUCOSE SERPL-MCNC: 186 MG/DL — HIGH (ref 70–99)
HBA1C BLD-MCNC: 6.9 % — HIGH (ref 4–5.6)
HCT VFR BLD CALC: 27.3 % — LOW (ref 39–50)
HCT VFR BLD CALC: 27.7 % — LOW (ref 39–50)
HCT VFR BLD CALC: 28.2 % — LOW (ref 39–50)
HGB BLD-MCNC: 8.6 G/DL — LOW (ref 13–17)
HGB BLD-MCNC: 8.8 G/DL — LOW (ref 13–17)
HGB BLD-MCNC: 8.9 G/DL — LOW (ref 13–17)
MAGNESIUM SERPL-MCNC: 2 MG/DL — SIGNIFICANT CHANGE UP (ref 1.6–2.6)
MCHC RBC-ENTMCNC: 26.9 PG — LOW (ref 27–34)
MCHC RBC-ENTMCNC: 27 PG — SIGNIFICANT CHANGE UP (ref 27–34)
MCHC RBC-ENTMCNC: 27.4 PG — SIGNIFICANT CHANGE UP (ref 27–34)
MCHC RBC-ENTMCNC: 31.2 % — LOW (ref 32–36)
MCHC RBC-ENTMCNC: 31.5 % — LOW (ref 32–36)
MCHC RBC-ENTMCNC: 32.1 % — SIGNIFICANT CHANGE UP (ref 32–36)
MCV RBC AUTO: 85.2 FL — SIGNIFICANT CHANGE UP (ref 80–100)
MCV RBC AUTO: 85.8 FL — SIGNIFICANT CHANGE UP (ref 80–100)
MCV RBC AUTO: 86.2 FL — SIGNIFICANT CHANGE UP (ref 80–100)
NRBC # FLD: 0 — SIGNIFICANT CHANGE UP
PHOSPHATE SERPL-MCNC: 3.7 MG/DL — SIGNIFICANT CHANGE UP (ref 2.5–4.5)
PLATELET # BLD AUTO: 171 K/UL — SIGNIFICANT CHANGE UP (ref 150–400)
PLATELET # BLD AUTO: 207 K/UL — SIGNIFICANT CHANGE UP (ref 150–400)
PLATELET # BLD AUTO: 207 K/UL — SIGNIFICANT CHANGE UP (ref 150–400)
PMV BLD: 11.1 FL — SIGNIFICANT CHANGE UP (ref 7–13)
PMV BLD: 11.2 FL — SIGNIFICANT CHANGE UP (ref 7–13)
PMV BLD: 11.9 FL — SIGNIFICANT CHANGE UP (ref 7–13)
POTASSIUM SERPL-MCNC: 3.8 MMOL/L — SIGNIFICANT CHANGE UP (ref 3.5–5.3)
POTASSIUM SERPL-SCNC: 3.8 MMOL/L — SIGNIFICANT CHANGE UP (ref 3.5–5.3)
RBC # BLD: 3.18 M/UL — LOW (ref 4.2–5.8)
RBC # BLD: 3.25 M/UL — LOW (ref 4.2–5.8)
RBC # BLD: 3.27 M/UL — LOW (ref 4.2–5.8)
RBC # FLD: 18.8 % — HIGH (ref 10.3–14.5)
RBC # FLD: 18.9 % — HIGH (ref 10.3–14.5)
RBC # FLD: 18.9 % — HIGH (ref 10.3–14.5)
SODIUM SERPL-SCNC: 137 MMOL/L — SIGNIFICANT CHANGE UP (ref 135–145)
VANCOMYCIN TROUGH SERPL-MCNC: 26.6 UG/ML — CRITICAL HIGH (ref 10–20)
WBC # BLD: 10.44 K/UL — SIGNIFICANT CHANGE UP (ref 3.8–10.5)
WBC # BLD: 10.46 K/UL — SIGNIFICANT CHANGE UP (ref 3.8–10.5)
WBC # BLD: 8.08 K/UL — SIGNIFICANT CHANGE UP (ref 3.8–10.5)
WBC # FLD AUTO: 10.44 K/UL — SIGNIFICANT CHANGE UP (ref 3.8–10.5)
WBC # FLD AUTO: 10.46 K/UL — SIGNIFICANT CHANGE UP (ref 3.8–10.5)
WBC # FLD AUTO: 8.08 K/UL — SIGNIFICANT CHANGE UP (ref 3.8–10.5)

## 2018-10-16 PROCEDURE — 99232 SBSQ HOSP IP/OBS MODERATE 35: CPT | Mod: GC

## 2018-10-16 PROCEDURE — 93971 EXTREMITY STUDY: CPT | Mod: 26,LT

## 2018-10-16 PROCEDURE — 99231 SBSQ HOSP IP/OBS SF/LOW 25: CPT

## 2018-10-16 PROCEDURE — 93306 TTE W/DOPPLER COMPLETE: CPT | Mod: 26

## 2018-10-16 RX ORDER — FUROSEMIDE 40 MG
40 TABLET ORAL DAILY
Qty: 0 | Refills: 0 | Status: DISCONTINUED | OUTPATIENT
Start: 2018-10-16 | End: 2018-10-16

## 2018-10-16 RX ORDER — FUROSEMIDE 40 MG
40 TABLET ORAL
Qty: 0 | Refills: 0 | Status: DISCONTINUED | OUTPATIENT
Start: 2018-10-16 | End: 2018-10-17

## 2018-10-16 RX ORDER — HYDROMORPHONE HYDROCHLORIDE 2 MG/ML
1 INJECTION INTRAMUSCULAR; INTRAVENOUS; SUBCUTANEOUS ONCE
Qty: 0 | Refills: 0 | Status: DISCONTINUED | OUTPATIENT
Start: 2018-10-16 | End: 2018-10-16

## 2018-10-16 RX ORDER — INSULIN GLARGINE 100 [IU]/ML
30 INJECTION, SOLUTION SUBCUTANEOUS AT BEDTIME
Qty: 0 | Refills: 0 | Status: DISCONTINUED | OUTPATIENT
Start: 2018-10-16 | End: 2018-10-16

## 2018-10-16 RX ORDER — INSULIN GLARGINE 100 [IU]/ML
10 INJECTION, SOLUTION SUBCUTANEOUS AT BEDTIME
Qty: 0 | Refills: 0 | Status: DISCONTINUED | OUTPATIENT
Start: 2018-10-16 | End: 2018-10-30

## 2018-10-16 RX ORDER — HYDROMORPHONE HYDROCHLORIDE 2 MG/ML
0.5 INJECTION INTRAMUSCULAR; INTRAVENOUS; SUBCUTANEOUS ONCE
Qty: 0 | Refills: 0 | Status: DISCONTINUED | OUTPATIENT
Start: 2018-10-16 | End: 2018-10-16

## 2018-10-16 RX ADMIN — HYDROMORPHONE HYDROCHLORIDE 0.5 MILLIGRAM(S): 2 INJECTION INTRAMUSCULAR; INTRAVENOUS; SUBCUTANEOUS at 05:00

## 2018-10-16 RX ADMIN — Medication 1 APPLICATION(S): at 12:30

## 2018-10-16 RX ADMIN — HYDROMORPHONE HYDROCHLORIDE 0.5 MILLIGRAM(S): 2 INJECTION INTRAMUSCULAR; INTRAVENOUS; SUBCUTANEOUS at 04:45

## 2018-10-16 RX ADMIN — HYDROMORPHONE HYDROCHLORIDE 1 MILLIGRAM(S): 2 INJECTION INTRAMUSCULAR; INTRAVENOUS; SUBCUTANEOUS at 12:35

## 2018-10-16 RX ADMIN — PIPERACILLIN AND TAZOBACTAM 25 GRAM(S): 4; .5 INJECTION, POWDER, LYOPHILIZED, FOR SOLUTION INTRAVENOUS at 23:07

## 2018-10-16 RX ADMIN — OXYCODONE HYDROCHLORIDE 10 MILLIGRAM(S): 5 TABLET ORAL at 19:58

## 2018-10-16 RX ADMIN — INSULIN GLARGINE 10 UNIT(S): 100 INJECTION, SOLUTION SUBCUTANEOUS at 23:17

## 2018-10-16 RX ADMIN — PIPERACILLIN AND TAZOBACTAM 25 GRAM(S): 4; .5 INJECTION, POWDER, LYOPHILIZED, FOR SOLUTION INTRAVENOUS at 06:25

## 2018-10-16 RX ADMIN — ATORVASTATIN CALCIUM 40 MILLIGRAM(S): 80 TABLET, FILM COATED ORAL at 23:07

## 2018-10-16 RX ADMIN — OXYCODONE HYDROCHLORIDE 10 MILLIGRAM(S): 5 TABLET ORAL at 04:43

## 2018-10-16 RX ADMIN — Medication 2 UNIT(S): at 08:12

## 2018-10-16 RX ADMIN — OXYCODONE HYDROCHLORIDE 10 MILLIGRAM(S): 5 TABLET ORAL at 00:19

## 2018-10-16 RX ADMIN — Medication 2 UNIT(S): at 11:58

## 2018-10-16 RX ADMIN — HYDROMORPHONE HYDROCHLORIDE 1 MILLIGRAM(S): 2 INJECTION INTRAMUSCULAR; INTRAVENOUS; SUBCUTANEOUS at 12:19

## 2018-10-16 RX ADMIN — Medication 40 MILLIGRAM(S): at 23:16

## 2018-10-16 RX ADMIN — Medication 2: at 08:11

## 2018-10-16 RX ADMIN — Medication 250 MILLIGRAM(S): at 06:25

## 2018-10-16 RX ADMIN — OXYCODONE HYDROCHLORIDE 10 MILLIGRAM(S): 5 TABLET ORAL at 04:25

## 2018-10-16 RX ADMIN — Medication 40 MILLIGRAM(S): at 11:55

## 2018-10-16 RX ADMIN — PIPERACILLIN AND TAZOBACTAM 25 GRAM(S): 4; .5 INJECTION, POWDER, LYOPHILIZED, FOR SOLUTION INTRAVENOUS at 15:07

## 2018-10-16 RX ADMIN — Medication 81 MILLIGRAM(S): at 11:55

## 2018-10-16 RX ADMIN — OXYCODONE HYDROCHLORIDE 10 MILLIGRAM(S): 5 TABLET ORAL at 00:49

## 2018-10-16 RX ADMIN — OXYCODONE HYDROCHLORIDE 10 MILLIGRAM(S): 5 TABLET ORAL at 20:12

## 2018-10-16 RX ADMIN — HEPARIN SODIUM 11 UNIT(S)/HR: 5000 INJECTION INTRAVENOUS; SUBCUTANEOUS at 11:57

## 2018-10-16 NOTE — PROGRESS NOTE ADULT - ASSESSMENT
68M s/p LLE CFA to below knee popliteal arterial bypass with PTFE on 9/18, returning with SSI.    S/P groin incision I&D of perigraft fluid collection with vac placement and I&D distal incision SSI and leave open after recovered from initial surgery.  Cardiology called for clearance/cardiac optimization prior to surgery.    - Continue asa, atorvastatin, coreg  - when ok from surgery perspective resume mildred Helm MD

## 2018-10-16 NOTE — PROGRESS NOTE ADULT - SUBJECTIVE AND OBJECTIVE BOX
POST ANESTHESIA EVALUATION    68y Male POSTOP DAY 1 S/P Left Femoral Exploration    MENTAL STATUS: Patient participation [x  ] Awake     [  ] Arousable     [  ] Sedated    AIRWAY PATENCY: [ x ] Satisfactory  [  ] Other:     Vital Signs Last 24 Hrs  T(C): 37.1 (16 Oct 2018 08:00), Max: 37.1 (16 Oct 2018 08:00)  T(F): 98.7 (16 Oct 2018 08:00), Max: 98.7 (16 Oct 2018 08:00)  HR: 74 (16 Oct 2018 10:00) (61 - 82)  BP: 117/47 (15 Oct 2018 10:55) (117/47 - 117/47)  BP(mean): 70 (15 Oct 2018 10:55) (70 - 70)  RR: 19 (16 Oct 2018 10:00) (10 - 24)  SpO2: 99% (16 Oct 2018 10:00) (94% - 100%)  I&O's Summary    15 Oct 2018 07:01  -  16 Oct 2018 07:00  --------------------------------------------------------  IN: 1495 mL / OUT: 2790 mL / NET: -1295 mL    16 Oct 2018 07:01  -  16 Oct 2018 10:30  --------------------------------------------------------  IN: 33 mL / OUT: 35 mL / NET: -2 mL          NAUSEA/ VOMITTING:  [x  ] NONE  [  ] CONTROLLED [  ] OTHER     PAIN: [ x ] CONTROLLED WITH CURRENT REGIMEN  [  ] OTHER    [x  ] NO APPARENT ANESTHESIA COMPLICATIONS      Comments:

## 2018-10-16 NOTE — PROGRESS NOTE ADULT - ATTENDING COMMENTS
I have personally interviewed and examined this patient, reviewed pertinent labs and imaging, and discussed the case with colleagues, residents, physician assistants, and nurses on SICU rounds.    30     minutes in total were spent in providing direct critical care for the diagnoses, assessment and plan outlined below.  These diagnoses are unrelated to the surgical procedure.  Additionally, time spent in the performance of separately billable procedures was not counted toward the critical care time.  There is no overlap.    The active critical care issues are:  I73.9 PAD (peripheral artery disease)   I50.9 Congestive heart failure, unspecified HF chronicity, unspecified heart failure type   E11.8 Type 2 diabetes mellitus with complication, unspecified whether long term insulin use   D62 Acute blood loss anemia       Plan  neuro: pain control with oxycodone  cards: EKG, trend lactate, base deficit, check troponin,  asa, statin, carvedilol   pulm: extubated, monitor respiratory status  gi: regular diet   gu: monitor urine output, cr, will give lasix 40mg po (half home dose)  heme: bleeding from surgical site trend cbc,  heparin gtt therapeutic  ID: vanc/zosyn, monitor vanc level  endo:  lantus and sliding scale  proph: hep gtt

## 2018-10-16 NOTE — PROGRESS NOTE ADULT - ASSESSMENT
Patient is a 62M with extensive cardiac history, HTN, DM, s/p fem pop bypass with graft 1 month ago presenting with drainage from incisions, found to have collections surrounding graft, s/p washout and sartorius flap placement.  Hemodynamically stable and extubated in the OR.    Neuro:  - pain control with prn tylenol and oxy    Resp:  - no active issues  - prn supplemental O2 via NC to maintain sat >94%    Cardiac:  - trops negative  - continue carvedilol, will hold other antihypertensives  - ASA and hep gtt    GI:  - cont regular diet,  - PO meds    :  - difficult degroot placement, will keep for monitoring  - holding home lasix at this time    Heme:  - continue hep gtt to prevent graft thombosis  - trend H/H  - monitor VAC output    ID:  - continue vanc/zosyn for infected graft  - VAC change Thurs to be done by Vasc team    Endo:  - insulin sliding scale  - lantus at bedtime     Dispo: SICU

## 2018-10-16 NOTE — PROGRESS NOTE ADULT - SUBJECTIVE AND OBJECTIVE BOX
GENERAL SURGERY DAILY PROGRESS NOTE:     Subjective:  Pt seen and examined during rounds. No acute events overnight. Pain well controlled. denies n/v/f/c. Dressing was noted to be saturated and bleeding from incision site was observed and fixed with silver nitrate.     Objective:  General: A&Ox3, NAD  Abdominal: soft, NT, ND  Extremities: LLE groin wound VAC in tact with serosanguinous output. dressing w/ sanguinous staining, LLE DP/PT signal.      MEDICATIONS  (STANDING):  aspirin enteric coated 81 milliGRAM(s) Oral daily  atorvastatin 40 milliGRAM(s) Oral at bedtime  carvedilol 25 milliGRAM(s) Oral every 12 hours  furosemide    Tablet 40 milliGRAM(s) Oral daily  heparin  Infusion 1100 Unit(s)/Hr (11 mL/Hr) IV Continuous <Continuous>  influenza   Vaccine 0.5 milliLiter(s) IntraMuscular once  insulin glargine Injectable (LANTUS) 30 Unit(s) SubCutaneous at bedtime  insulin lispro (HumaLOG) corrective regimen sliding scale   SubCutaneous three times a day before meals  insulin lispro Injectable (HumaLOG) 2 Unit(s) SubCutaneous three times a day before meals  piperacillin/tazobactam IVPB. 3.375 Gram(s) IV Intermittent every 8 hours  silver nitrate Applicator 1 Application(s) Topical once  vancomycin  IVPB 1000 milliGRAM(s) IV Intermittent every 12 hours    MEDICATIONS  (PRN):  acetaminophen   Tablet .. 650 milliGRAM(s) Oral every 6 hours PRN Mild Pain (1 - 3)  oxyCODONE    IR 5 milliGRAM(s) Oral every 4 hours PRN Moderate Pain (4 - 6)  oxyCODONE    IR 10 milliGRAM(s) Oral every 4 hours PRN Severe Pain (7 - 10)      Vital Signs Last 24 Hrs  T(C): 37.2 (16 Oct 2018 12:00), Max: 37.2 (16 Oct 2018 12:00)  T(F): 98.9 (16 Oct 2018 12:00), Max: 98.9 (16 Oct 2018 12:00)  HR: 70 (16 Oct 2018 12:00) (61 - 82)  BP: --  BP(mean): --  RR: 10 (16 Oct 2018 12:00) (10 - 19)  SpO2: 100% (16 Oct 2018 12:00) (94% - 100%)    I&O's Detail    15 Oct 2018 07:01  -  16 Oct 2018 07:00  --------------------------------------------------------  IN:    heparin Infusion: 165 mL    IV PiggyBack: 450 mL    lactated ringers.: 630 mL    Oral Fluid: 250 mL  Total IN: 1495 mL    OUT:    Indwelling Catheter - Urethral: 2140 mL    VAC (Vacuum Assisted Closure) System: 350 mL    Voided: 300 mL  Total OUT: 2790 mL    Total NET: -1295 mL      16 Oct 2018 07:  -  16 Oct 2018 14:26  --------------------------------------------------------  IN:    heparin Infusion: 66 mL  Total IN: 66 mL    OUT:    Indwelling Catheter - Urethral: 135 mL  Total OUT: 135 mL    Total NET: -69 mL          Daily     Daily Weight in k.5 (16 Oct 2018 09:30)    LABS:                        8.9    10.46 )-----------( 207      ( 16 Oct 2018 11:45 )             27.7     10-16    137  |  100  |  12  ----------------------------<  186<H>  3.8   |  25  |  1.02    Ca    8.5      16 Oct 2018 02:15  Phos  3.7     10-16  Mg     2.0     10-16      PT/INR - ( 15 Oct 2018 12:14 )   PT: 16.0 SEC;   INR: 1.38          PTT - ( 16 Oct 2018 02:15 )  PTT:75.7 SEC      RADIOLOGY & ADDITIONAL STUDIES:

## 2018-10-16 NOTE — PROGRESS NOTE ADULT - SUBJECTIVE AND OBJECTIVE BOX
HISTORY  ERICKA GUILLERMO is a 68y Male h/o DM, CAD (s/p stent in 2016), CABG, HTN, CHF (EF 20% by last TTE on 9/11/18), PAD – s/p L KEI stenting on 9/11, LLE CFA to below-knee bypass with PTFE for long-segment SFA occlusion on 9/18, and L 2nd toe amputation on 9/19. Patient complaining of two days of LLE pain, swelling, and redness. Earlier on the day of admission, he reports discharge of "pus" from the incisions above and below the level of his knee (nothing noted from his groin incision), followed thereafter by a "large" amount of bloody discharge. There has been no further bleeding since. He denies any fevers/chills, and denies any pain in the L foot during this time. He has been ambulating at home fairly regularly. Of note, he is also reporting swelling of the RLE over the past two weeks, without any associated pain or redness. He was started on anticoagulation – heparin gtt early post-op, continued as Xarelto for discharge – due to concern for sluggish flow through the graft intra-operatively and as prophylaxis against graft thrombosis.      24 HOUR EVENTS: Patient went to the OR for washout of wounds and sartorius flap to cover graft.  He tolerated the procedure well and was extubated in the OR. Patient's H/H remained stable so he was restarted on his heparin gtt and ASA.  Diet was advanced to regular.  Pain controlled with PO meds.     SUBJECTIVE/ROS:  [x ] A ten-point review of systems was otherwise negative except as noted.  [ ] Due to altered mental status/intubation, subjective information were not able to be obtained from the patient. History was obtained, to the extent possible, from review of the chart and collateral sources of information.      NEURO  RASS:     GCS:     CAM ICU:  Exam: awake, alert, oriented  Meds: acetaminophen   Tablet .. 650 milliGRAM(s) Oral every 6 hours PRN Mild Pain (1 - 3)  oxyCODONE    IR 5 milliGRAM(s) Oral every 4 hours PRN Moderate Pain (4 - 6)  oxyCODONE    IR 10 milliGRAM(s) Oral every 4 hours PRN Severe Pain (7 - 10)    [x] Adequacy of sedation and pain control has been assessed and adjusted      RESPIRATORY  RR: 15 (10-16-18 @ 02:00) (10 - 24)  SpO2: 95% (10-16-18 @ 02:00) (94% - 100%)  Wt(kg): --  Exam: unlabored, clear to auscultation bilaterally  Mechanical Ventilation:   ABG - ( 15 Oct 2018 12:14 )  pH: 7.42  /  pCO2: 39    /  pO2: 102   / HCO3: 25    / Base Excess: 0.8   /  SaO2: 97.9    Lactate: 0.9          CARDIOVASCULAR  HR: 65 (10-16-18 @ 02:00) (61 - 76)  BP: 117/47 (10-15-18 @ 10:55) (117/47 - 128/72)  BP(mean): 70 (10-15-18 @ 10:55) (70 - 70)  ABP: 114/51 (10-16-18 @ 02:00) (113/48 - 144/66)  ABP(mean): 71 (10-16-18 @ 02:00) (69 - 93)  Wt(kg): --  CVP(cm H2O): --    Exam: regular rate and rhythm  Cardiac Rhythm: sinus  Perfusion     [x]Adequate   [ ]Inadequate  Mentation   [x]Normal       [ ]Reduced  Extremities  [x]Warm         [ ]Cool  Volume Status [ ]Hypervolemic [x]Euvolemic [ ]Hypovolemic  Meds: carvedilol 25 milliGRAM(s) Oral every 12 hours        GI/NUTRITION  Exam: soft, nontender, nondistended  Diet: Reg      GENITOURINARY  I&O's Detail    10-14 @ 07:01  -  10-15 @ 07:00  --------------------------------------------------------  IN:    heparin Infusion: 33 mL    IV PiggyBack: 350 mL    Oral Fluid: 200 mL    sodium chloride 0.9%: 600 mL  Total IN: 1183 mL    OUT:    Voided: 2250 mL  Total OUT: 2250 mL    Total NET: -1067 mL      10-15 @ 07:01  -  10-16 @ 03:17  --------------------------------------------------------  IN:    heparin Infusion: 110 mL    IV PiggyBack: 100 mL    lactated ringers.: 480 mL    Oral Fluid: 200 mL  Total IN: 890 mL    OUT:    Indwelling Catheter - Urethral: 1975 mL    Voided: 300 mL  Total OUT: 2275 mL    Total NET: -1385 mL        Weight (kg): 70.8 (10-15 @ 06:42)  10-16    137  |  100  |  12  ----------------------------<  186<H>  3.8   |  25  |  1.02    Ca    8.5      16 Oct 2018 02:15  Phos  3.7     10-16  Mg     2.0     10-16      [ x] Palacios catheter, indication: post op  Meds: lactated ringers. 1000 milliLiter(s) IV Continuous <Continuous>        HEMATOLOGIC  Meds: aspirin enteric coated 81 milliGRAM(s) Oral daily  heparin  Infusion 1100 Unit(s)/Hr IV Continuous <Continuous>    [x] VTE Prophylaxis                        8.8    8.08  )-----------( 171      ( 16 Oct 2018 02:15 )             28.2     PT/INR - ( 15 Oct 2018 12:14 )   PT: 16.0 SEC;   INR: 1.38          PTT - ( 15 Oct 2018 19:50 )  PTT:64.3 SEC  Transfusion     [ ] PRBC   [ ] Platelets   [ ] FFP   [ ] Cryoprecipitate      INFECTIOUS DISEASES  WBC Count: 8.08 K/uL (10-16 @ 02:15)  WBC Count: 8.00 K/uL (10-15 @ 19:50)  WBC Count: 6.39 K/uL (10-15 @ 12:14)  WBC Count: 5.17 K/uL (10-15 @ 03:54)    RECENT CULTURES:  Specimen Source: LEG  Date/Time: 10-15 @ 11:19  Culture Results: --  Gram Stain: --  Organism: --  Specimen Source: LEG  Date/Time: 10-15 @ 11:15  Culture Results: --  Gram Stain: --  Organism: --  Specimen Source: LEG  Date/Time: 10-15 @ 11:09  Culture Results: --  Gram Stain: --  Organism: --  Specimen Source: LEG  Date/Time: 10-15 @ 11:00  Culture Results: --  Gram Stain: --  Organism: --    Meds: influenza   Vaccine 0.5 milliLiter(s) IntraMuscular once  piperacillin/tazobactam IVPB. 3.375 Gram(s) IV Intermittent every 8 hours  vancomycin  IVPB 1000 milliGRAM(s) IV Intermittent every 12 hours        ENDOCRINE  CAPILLARY BLOOD GLUCOSE      POCT Blood Glucose.: 166 mg/dL (15 Oct 2018 21:53)  POCT Blood Glucose.: 163 mg/dL (15 Oct 2018 18:27)  POCT Blood Glucose.: 136 mg/dL (15 Oct 2018 06:31)    Meds: atorvastatin 40 milliGRAM(s) Oral at bedtime  insulin glargine Injectable (LANTUS) 10 Unit(s) SubCutaneous at bedtime  insulin lispro (HumaLOG) corrective regimen sliding scale   SubCutaneous three times a day before meals  insulin lispro Injectable (HumaLOG) 2 Unit(s) SubCutaneous three times a day before meals        ACCESS DEVICES:  [ x] Peripheral IV  [ ] Central Venous Line	[ ] R	[ ] L	[ ] IJ	[ ] Fem	[ ] SC	Placed:   [ ] Arterial Line		[ ] R	[ ] L	[ ] Fem	[ ] Rad	[ ] Ax	Placed:   [ ] PICC:					[ ] Mediport  [ ] Urinary Catheter, Date Placed:   [x] Necessity of urinary, arterial, and venous catheters discussed    OTHER MEDICATIONS:      CODE STATUS: Full code

## 2018-10-16 NOTE — DIETITIAN INITIAL EVALUATION ADULT. - OTHER INFO
Pt seen for critical care nutrition LOS.  At this time, pt is s/p OR for Exploration and washout of L groin, sartorius muscle flap, wound vac application and re-exploration and washout of above and below knee incisions.  Pt ate almost 100% of breakfast tray this morning.  He reports his appetite is good, no food allergies, no difficulties chewing/swallowing.  He performs FS at home 1-2x/d.  He states he is doing better with Consistent Carbohydrate diet. Pt w/recent [facility] admission  - RDN note reviewed.  Wt stable since that admission, consistent carbohydrate diet reviewed with emphasis on avoidance of fruit juices.  A1c at that time was 8.8.  Pt states now that he has decreased fruit juice intake.  His A1c has declined considerably and now WAL.

## 2018-10-16 NOTE — PROGRESS NOTE ADULT - ASSESSMENT
68y male, s/p LLE CFA to below knee popliteal arterial bypass with PTFE on 9/18, returning with SSI.  CTA reveals that the graft is patent, now s/p left groin wash out, wound VAC placement, stable in SICU.     Plan:  - monitor for bleeding  - cleared by cardiology  - IV vancomycin and zosyn, FU vanco trough   - heparin drip, currently therapeutic  - consistent carb diet  - continue to monitor vital signs, labs    C team, [id number]

## 2018-10-16 NOTE — PROGRESS NOTE ADULT - SUBJECTIVE AND OBJECTIVE BOX
Cardiology Progress Note    Interval events: S/P OR yesterday. Not requiring pressors. On heparin gtt. Patient with no complaints.    Tele: SR 60s, PVCs    Medications:  acetaminophen   Tablet .. 650 milliGRAM(s) Oral every 6 hours PRN  aspirin enteric coated 81 milliGRAM(s) Oral daily  atorvastatin 40 milliGRAM(s) Oral at bedtime  carvedilol 25 milliGRAM(s) Oral every 12 hours  heparin  Infusion 1100 Unit(s)/Hr IV Continuous <Continuous>  influenza   Vaccine 0.5 milliLiter(s) IntraMuscular once  insulin glargine Injectable (LANTUS) 10 Unit(s) SubCutaneous at bedtime  insulin lispro (HumaLOG) corrective regimen sliding scale   SubCutaneous three times a day before meals  insulin lispro Injectable (HumaLOG) 2 Unit(s) SubCutaneous three times a day before meals  lactated ringers. 1000 milliLiter(s) IV Continuous <Continuous>  oxyCODONE    IR 5 milliGRAM(s) Oral every 4 hours PRN  oxyCODONE    IR 10 milliGRAM(s) Oral every 4 hours PRN  piperacillin/tazobactam IVPB. 3.375 Gram(s) IV Intermittent every 8 hours  vancomycin  IVPB 1000 milliGRAM(s) IV Intermittent every 12 hours      Review of Systems:  Constitutional: [ ] Fever [ ] Chills [ ] Fatigue [ ] Weight change   HEENT: [ ] Blurred vision [ ] Eye Pain [ ] Headache [ ] Runny nose [ ] Sore Throat   Respiratory: [ ] Cough [ ] Wheezing [ ] Shortness of breath  Cardiovascular: [ ] Chest Pain [ ] Palpitations [ ] ORTA [ ] PND [ ] Orthopnea  Gastrointestinal: [ ] Abdominal Pain [ ] Diarrhea [ ] Constipation [ ] Hemorrhoids [ ] Nausea [ ] Vomiting  Genitourinary: [ ] Nocturia [ ] Dysuria [ ] Incontinence  Extremities: [ ] Swelling [ ] Joint Pain  Neurologic: [ ] Focal deficit [ ] Paresthesias [ ] Syncope  Lymphatic: [ ] Swelling [ ] Lymphadenopathy   Skin: [ ] Rash [ ] Ecchymoses [ ] Wounds [ ] Lesions  Psychiatry: [ ] Depression [ ] Suicidal/Homicidal Ideation [ ] Anxiety [ ] Sleep Disturbances  [x] 10 point review of systems is otherwise negative except as mentioned above            [ ]Unable to obtain    Vitals:  T(C): 36.3 (10-16-18 @ 04:00), Max: 36.7 (10-15-18 @ 12:00)  HR: 70 (10-16-18 @ 06:00) (61 - 78)  BP: 117/47 (10-15-18 @ 10:55) (117/47 - 117/47)  BP(mean): 70 (10-15-18 @ 10:55) (70 - 70)  RR: 15 (10-16-18 @ 06:00) (10 - 24)  SpO2: 97% (10-16-18 @ 06:00) (94% - 100%)  Wt(kg): --  Daily     Daily Weight in k (16 Oct 2018 05:00)  I&O's Summary    14 Oct 2018 07:01  -  15 Oct 2018 07:00  --------------------------------------------------------  IN: 1183 mL / OUT: 2250 mL / NET: -1067 mL    15 Oct 2018 07:01  -  16 Oct 2018 06:58  --------------------------------------------------------  IN: 1454 mL / OUT: 2725 mL / NET: -1271 mL        Physical Exam:  General: NAD, resting comfortably in a chair  Resp: Clear to auscultation bilaterally, no wheezing, no crackles  Cardiac: regular rate and rhythm +S1+S2 no MRG  Abdomen: Soft, Non-tender, Non-distended, BS+  Ext: No edema, some discolaration of the toes bilaterally, bilateral toe amputations    Labs:                        8.8    8.08  )-----------( 171      ( 16 Oct 2018 02:15 )             28.2     10-16    137  |  100  |  12  ----------------------------<  186<H>  3.8   |  25  |  1.02    Ca    8.5      16 Oct 2018 02:15  Phos  3.7     10-16  Mg     2.0     10-16      PT/INR - ( 15 Oct 2018 12:14 )   PT: 16.0 SEC;   INR: 1.38          PTT - ( 16 Oct 2018 02:15 )  PTT:75.7 SEC  CARDIAC MARKERS ( 15 Oct 2018 12:14 )  x     / x     / 76 u/L / 3.69 ng/mL / x          Hemoglobin A1C, Whole Blood: 6.9 % (10-16 @ 02:15)      New results/imaging:

## 2018-10-17 LAB
-  AMIKACIN: SIGNIFICANT CHANGE UP
-  AMPICILLIN/SULBACTAM: SIGNIFICANT CHANGE UP
-  AMPICILLIN: SIGNIFICANT CHANGE UP
-  AZTREONAM: SIGNIFICANT CHANGE UP
-  CEFAZOLIN: SIGNIFICANT CHANGE UP
-  CEFEPIME: SIGNIFICANT CHANGE UP
-  CEFOXITIN: SIGNIFICANT CHANGE UP
-  CEFTAZIDIME: SIGNIFICANT CHANGE UP
-  CEFTRIAXONE: SIGNIFICANT CHANGE UP
-  CIPROFLOXACIN: SIGNIFICANT CHANGE UP
-  ERTAPENEM: SIGNIFICANT CHANGE UP
-  GENTAMICIN: SIGNIFICANT CHANGE UP
-  IMIPENEM: SIGNIFICANT CHANGE UP
-  LEVOFLOXACIN: SIGNIFICANT CHANGE UP
-  MEROPENEM: SIGNIFICANT CHANGE UP
-  PIPERACILLIN/TAZOBACTAM: SIGNIFICANT CHANGE UP
-  TIGECYCLINE: SIGNIFICANT CHANGE UP
-  TOBRAMYCIN: SIGNIFICANT CHANGE UP
-  TRIMETHOPRIM/SULFAMETHOXAZOLE: SIGNIFICANT CHANGE UP
APTT BLD: 115.8 SEC — HIGH (ref 27.5–37.4)
APTT BLD: 72 SEC — HIGH (ref 27.5–37.4)
APTT BLD: 76.9 SEC — HIGH (ref 27.5–37.4)
BUN SERPL-MCNC: 18 MG/DL — SIGNIFICANT CHANGE UP (ref 7–23)
CALCIUM SERPL-MCNC: 8.3 MG/DL — LOW (ref 8.4–10.5)
CHLORIDE SERPL-SCNC: 99 MMOL/L — SIGNIFICANT CHANGE UP (ref 98–107)
CO2 SERPL-SCNC: 23 MMOL/L — SIGNIFICANT CHANGE UP (ref 22–31)
CREAT SERPL-MCNC: 1.97 MG/DL — HIGH (ref 0.5–1.3)
CULTURE - SURGICAL SITE: SIGNIFICANT CHANGE UP
CULTURE - SURGICAL SITE: SIGNIFICANT CHANGE UP
GLUCOSE SERPL-MCNC: 127 MG/DL — HIGH (ref 70–99)
GRAM STN WND: SIGNIFICANT CHANGE UP
HCT VFR BLD CALC: 26.5 % — LOW (ref 39–50)
HGB BLD-MCNC: 8.3 G/DL — LOW (ref 13–17)
INR BLD: 1.51 — HIGH (ref 0.88–1.17)
MAGNESIUM SERPL-MCNC: 1.9 MG/DL — SIGNIFICANT CHANGE UP (ref 1.6–2.6)
MCHC RBC-ENTMCNC: 26.7 PG — LOW (ref 27–34)
MCHC RBC-ENTMCNC: 31.3 % — LOW (ref 32–36)
MCV RBC AUTO: 85.2 FL — SIGNIFICANT CHANGE UP (ref 80–100)
METHOD TYPE: SIGNIFICANT CHANGE UP
NRBC # FLD: 0 — SIGNIFICANT CHANGE UP
ORGANISM # SPEC MICROSCOPIC CNT: SIGNIFICANT CHANGE UP
ORGANISM # SPEC MICROSCOPIC CNT: SIGNIFICANT CHANGE UP
PHOSPHATE SERPL-MCNC: 5 MG/DL — HIGH (ref 2.5–4.5)
PLATELET # BLD AUTO: 178 K/UL — SIGNIFICANT CHANGE UP (ref 150–400)
PMV BLD: 11.6 FL — SIGNIFICANT CHANGE UP (ref 7–13)
POTASSIUM SERPL-MCNC: 3.8 MMOL/L — SIGNIFICANT CHANGE UP (ref 3.5–5.3)
POTASSIUM SERPL-SCNC: 3.8 MMOL/L — SIGNIFICANT CHANGE UP (ref 3.5–5.3)
PROTHROM AB SERPL-ACNC: 16.9 SEC — HIGH (ref 9.8–13.1)
RBC # BLD: 3.11 M/UL — LOW (ref 4.2–5.8)
RBC # FLD: 18.8 % — HIGH (ref 10.3–14.5)
SODIUM SERPL-SCNC: 137 MMOL/L — SIGNIFICANT CHANGE UP (ref 135–145)
VANCOMYCIN FLD-MCNC: 23.8 UG/ML — SIGNIFICANT CHANGE UP
WBC # BLD: 8.11 K/UL — SIGNIFICANT CHANGE UP (ref 3.8–10.5)
WBC # FLD AUTO: 8.11 K/UL — SIGNIFICANT CHANGE UP (ref 3.8–10.5)

## 2018-10-17 RX ORDER — HEPARIN SODIUM 5000 [USP'U]/ML
900 INJECTION INTRAVENOUS; SUBCUTANEOUS
Qty: 25000 | Refills: 0 | Status: DISCONTINUED | OUTPATIENT
Start: 2018-10-17 | End: 2018-10-20

## 2018-10-17 RX ORDER — CIPROFLOXACIN LACTATE 400MG/40ML
VIAL (ML) INTRAVENOUS
Qty: 0 | Refills: 0 | Status: DISCONTINUED | OUTPATIENT
Start: 2018-10-17 | End: 2018-10-24

## 2018-10-17 RX ORDER — CIPROFLOXACIN LACTATE 400MG/40ML
200 VIAL (ML) INTRAVENOUS ONCE
Qty: 0 | Refills: 0 | Status: COMPLETED | OUTPATIENT
Start: 2018-10-17 | End: 2018-10-17

## 2018-10-17 RX ORDER — CIPROFLOXACIN LACTATE 400MG/40ML
200 VIAL (ML) INTRAVENOUS EVERY 12 HOURS
Qty: 0 | Refills: 0 | Status: DISCONTINUED | OUTPATIENT
Start: 2018-10-17 | End: 2018-10-24

## 2018-10-17 RX ADMIN — Medication 40 MILLIGRAM(S): at 12:16

## 2018-10-17 RX ADMIN — Medication 2: at 17:32

## 2018-10-17 RX ADMIN — OXYCODONE HYDROCHLORIDE 10 MILLIGRAM(S): 5 TABLET ORAL at 05:55

## 2018-10-17 RX ADMIN — Medication 2 UNIT(S): at 12:32

## 2018-10-17 RX ADMIN — Medication 2 UNIT(S): at 08:26

## 2018-10-17 RX ADMIN — Medication 100 MILLIGRAM(S): at 12:16

## 2018-10-17 RX ADMIN — Medication 100 MILLIGRAM(S): at 23:31

## 2018-10-17 RX ADMIN — OXYCODONE HYDROCHLORIDE 10 MILLIGRAM(S): 5 TABLET ORAL at 10:58

## 2018-10-17 RX ADMIN — PIPERACILLIN AND TAZOBACTAM 25 GRAM(S): 4; .5 INJECTION, POWDER, LYOPHILIZED, FOR SOLUTION INTRAVENOUS at 05:20

## 2018-10-17 RX ADMIN — Medication 2: at 08:26

## 2018-10-17 RX ADMIN — ATORVASTATIN CALCIUM 40 MILLIGRAM(S): 80 TABLET, FILM COATED ORAL at 21:32

## 2018-10-17 RX ADMIN — OXYCODONE HYDROCHLORIDE 10 MILLIGRAM(S): 5 TABLET ORAL at 05:20

## 2018-10-17 RX ADMIN — Medication 81 MILLIGRAM(S): at 12:16

## 2018-10-17 RX ADMIN — Medication 2: at 12:32

## 2018-10-17 RX ADMIN — INSULIN GLARGINE 10 UNIT(S): 100 INJECTION, SOLUTION SUBCUTANEOUS at 21:34

## 2018-10-17 RX ADMIN — OXYCODONE HYDROCHLORIDE 10 MILLIGRAM(S): 5 TABLET ORAL at 11:40

## 2018-10-17 RX ADMIN — HEPARIN SODIUM 9 UNIT(S)/HR: 5000 INJECTION INTRAVENOUS; SUBCUTANEOUS at 08:29

## 2018-10-17 RX ADMIN — HEPARIN SODIUM 9 UNIT(S)/HR: 5000 INJECTION INTRAVENOUS; SUBCUTANEOUS at 21:36

## 2018-10-17 RX ADMIN — HEPARIN SODIUM 9 UNIT(S)/HR: 5000 INJECTION INTRAVENOUS; SUBCUTANEOUS at 15:30

## 2018-10-17 RX ADMIN — Medication 2 UNIT(S): at 17:33

## 2018-10-17 NOTE — PROGRESS NOTE ADULT - ASSESSMENT
68y male, s/p LLE CFA to below knee popliteal arterial bypass with PTFE on 9/18, returning with SSI.  CTA reveals that the graft is patent, now s/p left groin wash out, wound VAC placement, stable in SICU.     Plan:  - dressing changes daily, lower incisions wet to dry  - change wound vac tomorrow  - monitor for bleeding, FU H/H  - wound cultures sensitive to cipro, adjusted ABx  - IV vancomycin and zosyn, FU vanco trough   - heparin drip, currently therapeutic  - consistent carb diet  - continue to monitor vital signs, labs

## 2018-10-17 NOTE — PROGRESS NOTE ADULT - SUBJECTIVE AND OBJECTIVE BOX
Vascular Surgery Progress Note    Subjective:   Pt seen & examined at bedside. Complaining of LLE pain, controlled with medications, no new complaints. No F/C, N/V, CP/SOB.    Medications:  ciprofloxacin   IVPB   ciprofloxacin   IVPB 200  aspirin enteric coated 81  carvedilol 25  ciprofloxacin   IVPB   ciprofloxacin   IVPB 200  furosemide    Tablet 40  heparin  Infusion 900      Vital Signs Last 24 Hrs  T(C): 36.6 (17 Oct 2018 10:49), Max: 37.3 (16 Oct 2018 20:00)  T(F): 97.8 (17 Oct 2018 10:49), Max: 99.1 (16 Oct 2018 20:00)  HR: 79 (17 Oct 2018 10:49) (72 - 86)  BP: 112/49 (17 Oct 2018 10:49) (101/52 - 113/59)  BP(mean): 69 (16 Oct 2018 20:30) (69 - 72)  RR: 16 (17 Oct 2018 10:49) (11 - 19)  SpO2: 96% (17 Oct 2018 10:49) (95% - 99%)    I&O's Summary    16 Oct 2018 07:01  -  17 Oct 2018 07:00  --------------------------------------------------------  IN: 664 mL / OUT: 1255 mL / NET: -591 mL    17 Oct 2018 07:01  -  17 Oct 2018 13:42  --------------------------------------------------------  IN: 136 mL / OUT: 200 mL / NET: -64 mL        Physical Exam:  General: A&Ox3, NAD  Abdominal: soft, NT, ND  Extremities: LLE groin wound VAC intact with serosanguinous output. Middle and lower incisional dressing w/ sanguinous staining,   Pulses: LLE DP signal.    LABS:                        8.3    8.11  )-----------( 178      ( 17 Oct 2018 05:00 )             26.5     10-17    137  |  99  |  18  ----------------------------<  127<H>  3.8   |  23  |  1.97<H>    Ca    8.3<L>      17 Oct 2018 05:00  Phos  5.0     10-17  Mg     1.9     10-17      PT/INR - ( 17 Oct 2018 05:00 )   PT: 16.9 SEC;   INR: 1.51          PTT - ( 17 Oct 2018 05:00 )  PTT:115.8 SEC

## 2018-10-17 NOTE — CHART NOTE - NSCHARTNOTEFT_GEN_A_CORE
GENERAL SURGERY FLOOR TRANSFER NOTE    68y Male transferred to floor from SICU    SUBJECTIVE:  Transferred from floor in stable condition. Signout was provided by SICU residents. Vanc level 26.6 today    OBJECTIVE:  General: A&Ox3, NAD  Abdominal: soft, NT, ND  Extremities: LLE groin wound VAC in tact with serosanguinous output. dressing w/ sanguinous staining, LLE DP/PT signal.    T(C): 37.3 (10-17-18 @ 01:11), Max: 37.3 (10-16-18 @ 20:00)  HR: 84 (10-17-18 @ 01:11) (65 - 86)  BP: 102/57 (10-17-18 @ 01:11) (102/57 - 113/59)  RR: 16 (10-17-18 @ 01:11) (10 - 19)  SpO2: 96% (10-17-18 @ 01:11) (95% - 100%)  Wt(kg): --      I&O's Summary    15 Oct 2018 07:01  -  16 Oct 2018 07:00  --------------------------------------------------------  IN: 1495 mL / OUT: 2790 mL / NET: -1295 mL    16 Oct 2018 07:01  -  17 Oct 2018 02:02  --------------------------------------------------------  IN: 509 mL / OUT: 1065 mL / NET: -556 mL                              8.6    10.44 )-----------( 207      ( 16 Oct 2018 17:57 )             27.3       10-16    137  |  100  |  12  ----------------------------<  186<H>  3.8   |  25  |  1.02    Ca    8.5      16 Oct 2018 02:15  Phos  3.7     10-16  Mg     2.0     10-16        MEDICATIONS  (STANDING):  aspirin enteric coated 81 milliGRAM(s) Oral daily  atorvastatin 40 milliGRAM(s) Oral at bedtime  carvedilol 25 milliGRAM(s) Oral every 12 hours  furosemide    Tablet 40 milliGRAM(s) Oral two times a day  heparin  Infusion 1100 Unit(s)/Hr (11 mL/Hr) IV Continuous <Continuous>  influenza   Vaccine 0.5 milliLiter(s) IntraMuscular once  insulin glargine Injectable (LANTUS) 10 Unit(s) SubCutaneous at bedtime  insulin lispro (HumaLOG) corrective regimen sliding scale   SubCutaneous three times a day before meals  insulin lispro Injectable (HumaLOG) 2 Unit(s) SubCutaneous three times a day before meals  piperacillin/tazobactam IVPB. 3.375 Gram(s) IV Intermittent every 8 hours    MEDICATIONS  (PRN):  acetaminophen   Tablet .. 650 milliGRAM(s) Oral every 6 hours PRN Mild Pain (1 - 3)  oxyCODONE    IR 5 milliGRAM(s) Oral every 4 hours PRN Moderate Pain (4 - 6)  oxyCODONE    IR 10 milliGRAM(s) Oral every 4 hours PRN Severe Pain (7 - 10)        PHYSICAL EXAM:

## 2018-10-18 LAB
APTT BLD: 71 SEC — HIGH (ref 27.5–37.4)
BUN SERPL-MCNC: 22 MG/DL — SIGNIFICANT CHANGE UP (ref 7–23)
CALCIUM SERPL-MCNC: 8.2 MG/DL — LOW (ref 8.4–10.5)
CHLORIDE SERPL-SCNC: 98 MMOL/L — SIGNIFICANT CHANGE UP (ref 98–107)
CO2 SERPL-SCNC: 25 MMOL/L — SIGNIFICANT CHANGE UP (ref 22–31)
CREAT SERPL-MCNC: 2.13 MG/DL — HIGH (ref 0.5–1.3)
GLUCOSE SERPL-MCNC: 155 MG/DL — HIGH (ref 70–99)
HCT VFR BLD CALC: 24.4 % — LOW (ref 39–50)
HGB BLD-MCNC: 7.9 G/DL — LOW (ref 13–17)
INR BLD: 1.38 — HIGH (ref 0.88–1.17)
MAGNESIUM SERPL-MCNC: 2.1 MG/DL — SIGNIFICANT CHANGE UP (ref 1.6–2.6)
MCHC RBC-ENTMCNC: 27.4 PG — SIGNIFICANT CHANGE UP (ref 27–34)
MCHC RBC-ENTMCNC: 32.4 % — SIGNIFICANT CHANGE UP (ref 32–36)
MCV RBC AUTO: 84.7 FL — SIGNIFICANT CHANGE UP (ref 80–100)
NRBC # FLD: 0 — SIGNIFICANT CHANGE UP
PHOSPHATE SERPL-MCNC: 4.3 MG/DL — SIGNIFICANT CHANGE UP (ref 2.5–4.5)
PLATELET # BLD AUTO: 160 K/UL — SIGNIFICANT CHANGE UP (ref 150–400)
PMV BLD: 12.3 FL — SIGNIFICANT CHANGE UP (ref 7–13)
POTASSIUM SERPL-MCNC: 3.8 MMOL/L — SIGNIFICANT CHANGE UP (ref 3.5–5.3)
POTASSIUM SERPL-SCNC: 3.8 MMOL/L — SIGNIFICANT CHANGE UP (ref 3.5–5.3)
PROTHROM AB SERPL-ACNC: 16 SEC — HIGH (ref 9.8–13.1)
RBC # BLD: 2.88 M/UL — LOW (ref 4.2–5.8)
RBC # FLD: 18.9 % — HIGH (ref 10.3–14.5)
SODIUM SERPL-SCNC: 135 MMOL/L — SIGNIFICANT CHANGE UP (ref 135–145)
SPECIMEN SOURCE: SIGNIFICANT CHANGE UP
VANCOMYCIN FLD-MCNC: 17.7 UG/ML — SIGNIFICANT CHANGE UP
WBC # BLD: 5.46 K/UL — SIGNIFICANT CHANGE UP (ref 3.8–10.5)
WBC # FLD AUTO: 5.46 K/UL — SIGNIFICANT CHANGE UP (ref 3.8–10.5)

## 2018-10-18 RX ORDER — LIDOCAINE HCL 20 MG/ML
10 VIAL (ML) INJECTION ONCE
Qty: 0 | Refills: 0 | Status: DISCONTINUED | OUTPATIENT
Start: 2018-10-18 | End: 2018-10-30

## 2018-10-18 RX ORDER — HYDROMORPHONE HYDROCHLORIDE 2 MG/ML
1 INJECTION INTRAMUSCULAR; INTRAVENOUS; SUBCUTANEOUS ONCE
Qty: 0 | Refills: 0 | Status: DISCONTINUED | OUTPATIENT
Start: 2018-10-18 | End: 2018-10-18

## 2018-10-18 RX ORDER — HYDROMORPHONE HYDROCHLORIDE 2 MG/ML
0.5 INJECTION INTRAMUSCULAR; INTRAVENOUS; SUBCUTANEOUS ONCE
Qty: 0 | Refills: 0 | Status: DISCONTINUED | OUTPATIENT
Start: 2018-10-18 | End: 2018-10-18

## 2018-10-18 RX ADMIN — Medication 2 UNIT(S): at 17:21

## 2018-10-18 RX ADMIN — Medication 2 UNIT(S): at 10:09

## 2018-10-18 RX ADMIN — HYDROMORPHONE HYDROCHLORIDE 1 MILLIGRAM(S): 2 INJECTION INTRAMUSCULAR; INTRAVENOUS; SUBCUTANEOUS at 07:45

## 2018-10-18 RX ADMIN — Medication 81 MILLIGRAM(S): at 12:38

## 2018-10-18 RX ADMIN — Medication 2: at 10:08

## 2018-10-18 RX ADMIN — ATORVASTATIN CALCIUM 40 MILLIGRAM(S): 80 TABLET, FILM COATED ORAL at 21:35

## 2018-10-18 RX ADMIN — Medication 2 UNIT(S): at 13:04

## 2018-10-18 RX ADMIN — HYDROMORPHONE HYDROCHLORIDE 1 MILLIGRAM(S): 2 INJECTION INTRAMUSCULAR; INTRAVENOUS; SUBCUTANEOUS at 08:13

## 2018-10-18 RX ADMIN — CARVEDILOL PHOSPHATE 25 MILLIGRAM(S): 80 CAPSULE, EXTENDED RELEASE ORAL at 21:36

## 2018-10-18 RX ADMIN — Medication 100 MILLIGRAM(S): at 12:39

## 2018-10-18 RX ADMIN — HYDROMORPHONE HYDROCHLORIDE 1 MILLIGRAM(S): 2 INJECTION INTRAMUSCULAR; INTRAVENOUS; SUBCUTANEOUS at 07:58

## 2018-10-18 RX ADMIN — Medication 2: at 13:04

## 2018-10-18 RX ADMIN — INSULIN GLARGINE 10 UNIT(S): 100 INJECTION, SOLUTION SUBCUTANEOUS at 21:36

## 2018-10-18 RX ADMIN — Medication 100 MILLIGRAM(S): at 23:25

## 2018-10-18 RX ADMIN — HYDROMORPHONE HYDROCHLORIDE 1 MILLIGRAM(S): 2 INJECTION INTRAMUSCULAR; INTRAVENOUS; SUBCUTANEOUS at 07:30

## 2018-10-18 NOTE — PROGRESS NOTE ADULT - ASSESSMENT
68y male, s/p LLE CFA to below knee popliteal arterial bypass with PTFE on 9/18, returning with SSI.  CTA reveals that the graft is patent, now s/p left groin wash out, wound VAC placement, sent to SICU postoperatively, now back on the floor.     Plan:  - dressing changes daily, lower incisions wet to dry  - changed wound vac today, wound appears well healing  - monitor for bleeding, FU H/H in AM  - continue IV cipro  - heparin drip, currently therapeutic  - consistent carb diet  - continue to monitor vital signs

## 2018-10-18 NOTE — PROGRESS NOTE ADULT - SUBJECTIVE AND OBJECTIVE BOX
Vascular Surgery Progress Note    Subjective:   Pt seen & examined at bedside. Pain is well controlled. No new complaints. No F/C, N/V, CP/SOB.    Medications:  ciprofloxacin   IVPB   ciprofloxacin   IVPB 200  aspirin enteric coated 81  carvedilol 25  ciprofloxacin   IVPB   ciprofloxacin   IVPB 200  heparin  Infusion 900      Vital Signs Last 24 Hrs  T(C): 37 (18 Oct 2018 20:57), Max: 37 (18 Oct 2018 04:52)  T(F): 98.6 (18 Oct 2018 20:57), Max: 98.6 (18 Oct 2018 04:52)  HR: 84 (18 Oct 2018 20:57) (70 - 84)  BP: 119/65 (18 Oct 2018 20:57) (99/54 - 122/63)  BP(mean): --  RR: 16 (18 Oct 2018 20:57) (16 - 18)  SpO2: 100% (18 Oct 2018 20:57) (95% - 100%)    I&O's Summary    17 Oct 2018 07:01  -  18 Oct 2018 07:00  --------------------------------------------------------  IN: 307 mL / OUT: 1400 mL / NET: -1093 mL    18 Oct 2018 07:01  -  18 Oct 2018 21:18  --------------------------------------------------------  IN: 208 mL / OUT: 600 mL / NET: -392 mL        Physical Exam:  General: A&Ox3, NAD  Abdominal: soft, NT, ND  Extremities: LLE groin wound VAC intact with serosanguinous output. Middle and lower incisional dressing w/ sanguinous staining,   Pulses: LLE DP signal.    LABS:                        7.9    5.46  )-----------( 160      ( 18 Oct 2018 02:53 )             24.4     10-18    135  |  98  |  22  ----------------------------<  155<H>  3.8   |  25  |  2.13<H>    Ca    8.2<L>      18 Oct 2018 02:53  Phos  4.3     10-18  Mg     2.1     10-18      PT/INR - ( 18 Oct 2018 02:53 )   PT: 16.0 SEC;   INR: 1.38          PTT - ( 18 Oct 2018 02:53 )  PTT:71.0 SEC

## 2018-10-19 LAB
APTT BLD: 48.5 SEC — HIGH (ref 27.5–37.4)
APTT BLD: 54.7 SEC — HIGH (ref 27.5–37.4)
BUN SERPL-MCNC: 23 MG/DL — SIGNIFICANT CHANGE UP (ref 7–23)
CALCIUM SERPL-MCNC: 8.5 MG/DL — SIGNIFICANT CHANGE UP (ref 8.4–10.5)
CHLORIDE SERPL-SCNC: 97 MMOL/L — LOW (ref 98–107)
CO2 SERPL-SCNC: 24 MMOL/L — SIGNIFICANT CHANGE UP (ref 22–31)
CREAT SERPL-MCNC: 1.73 MG/DL — HIGH (ref 0.5–1.3)
GLUCOSE SERPL-MCNC: 160 MG/DL — HIGH (ref 70–99)
HCT VFR BLD CALC: 26.6 % — LOW (ref 39–50)
HGB BLD-MCNC: 8.2 G/DL — LOW (ref 13–17)
INR BLD: 1.27 — HIGH (ref 0.88–1.17)
INR BLD: 1.34 — HIGH (ref 0.88–1.17)
MAGNESIUM SERPL-MCNC: 2.1 MG/DL — SIGNIFICANT CHANGE UP (ref 1.6–2.6)
MCHC RBC-ENTMCNC: 26.4 PG — LOW (ref 27–34)
MCHC RBC-ENTMCNC: 30.8 % — LOW (ref 32–36)
MCV RBC AUTO: 85.5 FL — SIGNIFICANT CHANGE UP (ref 80–100)
NRBC # FLD: 0 — SIGNIFICANT CHANGE UP
PHOSPHATE SERPL-MCNC: 3.5 MG/DL — SIGNIFICANT CHANGE UP (ref 2.5–4.5)
PLATELET # BLD AUTO: 199 K/UL — SIGNIFICANT CHANGE UP (ref 150–400)
PMV BLD: 12.3 FL — SIGNIFICANT CHANGE UP (ref 7–13)
POTASSIUM SERPL-MCNC: 4.1 MMOL/L — SIGNIFICANT CHANGE UP (ref 3.5–5.3)
POTASSIUM SERPL-SCNC: 4.1 MMOL/L — SIGNIFICANT CHANGE UP (ref 3.5–5.3)
PROTHROM AB SERPL-ACNC: 14.2 SEC — HIGH (ref 9.8–13.1)
PROTHROM AB SERPL-ACNC: 15 SEC — HIGH (ref 9.8–13.1)
RBC # BLD: 3.11 M/UL — LOW (ref 4.2–5.8)
RBC # FLD: 18.6 % — HIGH (ref 10.3–14.5)
SODIUM SERPL-SCNC: 134 MMOL/L — LOW (ref 135–145)
WBC # BLD: 5.46 K/UL — SIGNIFICANT CHANGE UP (ref 3.8–10.5)
WBC # FLD AUTO: 5.46 K/UL — SIGNIFICANT CHANGE UP (ref 3.8–10.5)

## 2018-10-19 PROCEDURE — 93926 LOWER EXTREMITY STUDY: CPT | Mod: 26

## 2018-10-19 RX ORDER — DEXTROSE 50 % IN WATER 50 %
12.5 SYRINGE (ML) INTRAVENOUS ONCE
Qty: 0 | Refills: 0 | Status: DISCONTINUED | OUTPATIENT
Start: 2018-10-19 | End: 2018-10-30

## 2018-10-19 RX ORDER — DEXTROSE 50 % IN WATER 50 %
15 SYRINGE (ML) INTRAVENOUS ONCE
Qty: 0 | Refills: 0 | Status: DISCONTINUED | OUTPATIENT
Start: 2018-10-19 | End: 2018-10-30

## 2018-10-19 RX ORDER — DEXTROSE 50 % IN WATER 50 %
25 SYRINGE (ML) INTRAVENOUS ONCE
Qty: 0 | Refills: 0 | Status: DISCONTINUED | OUTPATIENT
Start: 2018-10-19 | End: 2018-10-30

## 2018-10-19 RX ORDER — GLUCAGON INJECTION, SOLUTION 0.5 MG/.1ML
1 INJECTION, SOLUTION SUBCUTANEOUS ONCE
Qty: 0 | Refills: 0 | Status: DISCONTINUED | OUTPATIENT
Start: 2018-10-19 | End: 2018-10-30

## 2018-10-19 RX ORDER — SODIUM CHLORIDE 9 MG/ML
1000 INJECTION, SOLUTION INTRAVENOUS
Qty: 0 | Refills: 0 | Status: DISCONTINUED | OUTPATIENT
Start: 2018-10-19 | End: 2018-10-30

## 2018-10-19 RX ORDER — INSULIN LISPRO 100/ML
VIAL (ML) SUBCUTANEOUS
Qty: 0 | Refills: 0 | Status: DISCONTINUED | OUTPATIENT
Start: 2018-10-19 | End: 2018-10-30

## 2018-10-19 RX ADMIN — Medication 4: at 17:21

## 2018-10-19 RX ADMIN — Medication 3: at 08:33

## 2018-10-19 RX ADMIN — Medication 2 UNIT(S): at 12:23

## 2018-10-19 RX ADMIN — Medication 2 UNIT(S): at 17:21

## 2018-10-19 RX ADMIN — HEPARIN SODIUM 11 UNIT(S)/HR: 5000 INJECTION INTRAVENOUS; SUBCUTANEOUS at 22:47

## 2018-10-19 RX ADMIN — Medication 4: at 12:23

## 2018-10-19 RX ADMIN — Medication 2 UNIT(S): at 08:33

## 2018-10-19 RX ADMIN — Medication 6: at 22:46

## 2018-10-19 RX ADMIN — ATORVASTATIN CALCIUM 40 MILLIGRAM(S): 80 TABLET, FILM COATED ORAL at 22:47

## 2018-10-19 RX ADMIN — Medication 100 MILLIGRAM(S): at 11:48

## 2018-10-19 RX ADMIN — INSULIN GLARGINE 10 UNIT(S): 100 INJECTION, SOLUTION SUBCUTANEOUS at 22:48

## 2018-10-19 RX ADMIN — Medication 81 MILLIGRAM(S): at 11:47

## 2018-10-19 RX ADMIN — HEPARIN SODIUM 9.5 UNIT(S)/HR: 5000 INJECTION INTRAVENOUS; SUBCUTANEOUS at 13:19

## 2018-10-19 NOTE — PROGRESS NOTE ADULT - SUBJECTIVE AND OBJECTIVE BOX
GENERAL SURGERY DAILY PROGRESS NOTE:     Subjective:  Pt seen and examined during am rounds. No acute events overnight. Vac changed yesterday. pain well controlled. denies n/v/f/c.     Objective:  Abdominal: soft, NT, ND  Extremities: LLE groin wound VAC intact with serosanguinous output. Middle and lower incisional dressing w/ sanguinous staining,   Pulses: LLE DP signal.    MEDICATIONS  (STANDING):  aspirin enteric coated 81 milliGRAM(s) Oral daily  atorvastatin 40 milliGRAM(s) Oral at bedtime  carvedilol 25 milliGRAM(s) Oral every 12 hours  ciprofloxacin   IVPB      ciprofloxacin   IVPB 200 milliGRAM(s) IV Intermittent every 12 hours  heparin  Infusion 900 Unit(s)/Hr (9.5 mL/Hr) IV Continuous <Continuous>  influenza   Vaccine 0.5 milliLiter(s) IntraMuscular once  insulin glargine Injectable (LANTUS) 10 Unit(s) SubCutaneous at bedtime  insulin lispro (HumaLOG) corrective regimen sliding scale   SubCutaneous three times a day before meals  insulin lispro Injectable (HumaLOG) 2 Unit(s) SubCutaneous three times a day before meals  lidocaine 1% Injectable 10 milliLiter(s) Local Injection once    MEDICATIONS  (PRN):  acetaminophen   Tablet .. 650 milliGRAM(s) Oral every 6 hours PRN Mild Pain (1 - 3)  oxyCODONE    IR 5 milliGRAM(s) Oral every 4 hours PRN Moderate Pain (4 - 6)  oxyCODONE    IR 10 milliGRAM(s) Oral every 4 hours PRN Severe Pain (7 - 10)      Vital Signs Last 24 Hrs  T(C): 36.4 (19 Oct 2018 13:41), Max: 37.3 (19 Oct 2018 09:00)  T(F): 97.6 (19 Oct 2018 13:41), Max: 99.2 (19 Oct 2018 09:00)  HR: 70 (19 Oct 2018 13:41) (70 - 84)  BP: 100/52 (19 Oct 2018 13:41) (100/52 - 119/65)  BP(mean): --  RR: 18 (19 Oct 2018 13:41) (16 - 18)  SpO2: 100% (19 Oct 2018 13:41) (97% - 100%)    I&O's Detail    18 Oct 2018 07:01  -  19 Oct 2018 07:00  --------------------------------------------------------  IN:    heparin Infusion: 189 mL    IV PiggyBack: 100 mL  Total IN: 289 mL    OUT:    Indwelling Catheter - Urethral: 1050 mL    VAC (Vacuum Assisted Closure) System: 100 mL  Total OUT: 1150 mL    Total NET: -861 mL      19 Oct 2018 07:  -  19 Oct 2018 16:11  --------------------------------------------------------  IN:    heparin Infusion: 73 mL  Total IN: 73 mL    OUT:    Indwelling Catheter - Urethral: 100 mL    Voided: 200 mL  Total OUT: 300 mL    Total NET: -227 mL          Daily     Daily Weight in k.8 (19 Oct 2018 01:18)    LABS:                        8.2    5.46  )-----------( 199      ( 19 Oct 2018 05:15 )             26.6     10-19    134<L>  |  97<L>  |  23  ----------------------------<  160<H>  4.1   |  24  |  1.73<H>    Ca    8.5      19 Oct 2018 05:15  Phos  3.5     10-  Mg     2.1     10-      PT/INR - ( 19 Oct 2018 09:38 )   PT: 14.2 SEC;   INR: 1.27          PTT - ( 19 Oct 2018 09:38 )  PTT:54.7 SEC      RADIOLOGY & ADDITIONAL STUDIES:

## 2018-10-19 NOTE — PROGRESS NOTE ADULT - ASSESSMENT
68y male, s/p LLE CFA to below knee popliteal arterial bypass with PTFE on 9/18, returning with SSI.  CTA reveals that the graft is patent, now s/p left groin wash out, wound VAC placement, sent to SICU postoperatively, now back on the floor stable.     Plan:  - dressing changes daily, lower incisions wet to dry  - changed wound vac yesterday, wound appears well healing  - monitor for bleeding, FU H/H in AM  - continue IV cipro  - heparin drip, currently therapeutic  - consistent carb diet  - continue to monitor vital signs

## 2018-10-20 LAB
APTT BLD: 109.3 SEC — HIGH (ref 27.5–37.4)
APTT BLD: 54 SEC — HIGH (ref 27.5–37.4)
APTT BLD: 57.2 SEC — HIGH (ref 27.5–37.4)
BUN SERPL-MCNC: 22 MG/DL — SIGNIFICANT CHANGE UP (ref 7–23)
CALCIUM SERPL-MCNC: 8.3 MG/DL — LOW (ref 8.4–10.5)
CHLORIDE SERPL-SCNC: 99 MMOL/L — SIGNIFICANT CHANGE UP (ref 98–107)
CO2 SERPL-SCNC: 25 MMOL/L — SIGNIFICANT CHANGE UP (ref 22–31)
CREAT SERPL-MCNC: 1.45 MG/DL — HIGH (ref 0.5–1.3)
CULTURE - SURGICAL SITE: SIGNIFICANT CHANGE UP
CULTURE - SURGICAL SITE: SIGNIFICANT CHANGE UP
GLUCOSE SERPL-MCNC: 138 MG/DL — HIGH (ref 70–99)
HCT VFR BLD CALC: 26.7 % — LOW (ref 39–50)
HGB BLD-MCNC: 8.4 G/DL — LOW (ref 13–17)
INR BLD: 1.43 — HIGH (ref 0.88–1.17)
MAGNESIUM SERPL-MCNC: 1.9 MG/DL — SIGNIFICANT CHANGE UP (ref 1.6–2.6)
MCHC RBC-ENTMCNC: 26.7 PG — LOW (ref 27–34)
MCHC RBC-ENTMCNC: 31.5 % — LOW (ref 32–36)
MCV RBC AUTO: 84.8 FL — SIGNIFICANT CHANGE UP (ref 80–100)
NRBC # FLD: 0 — SIGNIFICANT CHANGE UP
PHOSPHATE SERPL-MCNC: 2.8 MG/DL — SIGNIFICANT CHANGE UP (ref 2.5–4.5)
PLATELET # BLD AUTO: 213 K/UL — SIGNIFICANT CHANGE UP (ref 150–400)
PMV BLD: 12 FL — SIGNIFICANT CHANGE UP (ref 7–13)
POTASSIUM SERPL-MCNC: 4.1 MMOL/L — SIGNIFICANT CHANGE UP (ref 3.5–5.3)
POTASSIUM SERPL-SCNC: 4.1 MMOL/L — SIGNIFICANT CHANGE UP (ref 3.5–5.3)
PROTHROM AB SERPL-ACNC: 16 SEC — HIGH (ref 9.8–13.1)
RBC # BLD: 3.15 M/UL — LOW (ref 4.2–5.8)
RBC # FLD: 18.7 % — HIGH (ref 10.3–14.5)
SODIUM SERPL-SCNC: 135 MMOL/L — SIGNIFICANT CHANGE UP (ref 135–145)
WBC # BLD: 5.79 K/UL — SIGNIFICANT CHANGE UP (ref 3.8–10.5)
WBC # FLD AUTO: 5.79 K/UL — SIGNIFICANT CHANGE UP (ref 3.8–10.5)

## 2018-10-20 RX ORDER — HEPARIN SODIUM 5000 [USP'U]/ML
1200 INJECTION INTRAVENOUS; SUBCUTANEOUS
Qty: 25000 | Refills: 0 | Status: DISCONTINUED | OUTPATIENT
Start: 2018-10-20 | End: 2018-10-23

## 2018-10-20 RX ADMIN — INSULIN GLARGINE 10 UNIT(S): 100 INJECTION, SOLUTION SUBCUTANEOUS at 23:23

## 2018-10-20 RX ADMIN — OXYCODONE HYDROCHLORIDE 5 MILLIGRAM(S): 5 TABLET ORAL at 07:55

## 2018-10-20 RX ADMIN — Medication 100 MILLIGRAM(S): at 01:00

## 2018-10-20 RX ADMIN — HEPARIN SODIUM 12 UNIT(S)/HR: 5000 INJECTION INTRAVENOUS; SUBCUTANEOUS at 13:43

## 2018-10-20 RX ADMIN — ATORVASTATIN CALCIUM 40 MILLIGRAM(S): 80 TABLET, FILM COATED ORAL at 21:21

## 2018-10-20 RX ADMIN — CARVEDILOL PHOSPHATE 25 MILLIGRAM(S): 80 CAPSULE, EXTENDED RELEASE ORAL at 10:36

## 2018-10-20 RX ADMIN — OXYCODONE HYDROCHLORIDE 5 MILLIGRAM(S): 5 TABLET ORAL at 08:47

## 2018-10-20 RX ADMIN — Medication 4: at 12:36

## 2018-10-20 RX ADMIN — Medication 81 MILLIGRAM(S): at 12:36

## 2018-10-20 RX ADMIN — Medication 4: at 08:29

## 2018-10-20 RX ADMIN — Medication 2 UNIT(S): at 08:29

## 2018-10-20 RX ADMIN — Medication 2 UNIT(S): at 17:12

## 2018-10-20 RX ADMIN — Medication 6: at 17:12

## 2018-10-20 RX ADMIN — CARVEDILOL PHOSPHATE 25 MILLIGRAM(S): 80 CAPSULE, EXTENDED RELEASE ORAL at 21:21

## 2018-10-20 RX ADMIN — Medication 2 UNIT(S): at 12:36

## 2018-10-20 RX ADMIN — Medication 100 MILLIGRAM(S): at 12:37

## 2018-10-20 NOTE — PROGRESS NOTE ADULT - ASSESSMENT
68y male, s/p LLE CFA to below knee popliteal arterial bypass with PTFE on 9/18, returning with SSI.  CTA reveals that the graft is patent, now s/p left groin wash out, wound VAC placement, sent to SICU postoperatively, now back on the floor stable. Pt will need to have vascular study done - need to call at noon 10/21.     Plan:  - vascular duplex - call at noon tomorrow to get done  - dressing changes daily, lower incisions wet to dry  - changed wound vac yesterday, wound appears well healing  - monitor for bleeding, FU H/H in AM  - continue IV cipro  - heparin drip, currently therapeutic  - consistent carb diet  - continue to monitor vital signs

## 2018-10-20 NOTE — PROGRESS NOTE ADULT - SUBJECTIVE AND OBJECTIVE BOX
GENERAL SURGERY DAILY PROGRESS NOTE:     Subjective:  Pt seen and examined during am rounds. No acute events overnight. Vascular arterial study not done - need to call tomorrow at noon. Denies n/v/f/c.     Objective:  Abdominal: soft, NT, ND  Extremities: LLE groin wound VAC intact with serosanguinous output. Middle and lower incisional dressing w/ sanguinous staining,   Pulses: LLE DP signal.    MEDICATIONS  (STANDING):  aspirin enteric coated 81 milliGRAM(s) Oral daily  atorvastatin 40 milliGRAM(s) Oral at bedtime  carvedilol 25 milliGRAM(s) Oral every 12 hours  ciprofloxacin   IVPB      ciprofloxacin   IVPB 200 milliGRAM(s) IV Intermittent every 12 hours  dextrose 5%. 1000 milliLiter(s) (50 mL/Hr) IV Continuous <Continuous>  dextrose 50% Injectable 12.5 Gram(s) IV Push once  dextrose 50% Injectable 25 Gram(s) IV Push once  dextrose 50% Injectable 25 Gram(s) IV Push once  heparin  Infusion 1200 Unit(s)/Hr (12 mL/Hr) IV Continuous <Continuous>  influenza   Vaccine 0.5 milliLiter(s) IntraMuscular once  insulin glargine Injectable (LANTUS) 10 Unit(s) SubCutaneous at bedtime  insulin lispro (HumaLOG) corrective regimen sliding scale   SubCutaneous three times a day before meals  insulin lispro Injectable (HumaLOG) 2 Unit(s) SubCutaneous three times a day before meals  lidocaine 1% Injectable 10 milliLiter(s) Local Injection once    MEDICATIONS  (PRN):  acetaminophen   Tablet .. 650 milliGRAM(s) Oral every 6 hours PRN Mild Pain (1 - 3)  dextrose 40% Gel 15 Gram(s) Oral once PRN Blood Glucose LESS THAN 70 milliGRAM(s)/deciliter  glucagon  Injectable 1 milliGRAM(s) IntraMuscular once PRN Glucose LESS THAN 70 milligrams/deciliter  oxyCODONE    IR 5 milliGRAM(s) Oral every 4 hours PRN Moderate Pain (4 - 6)  oxyCODONE    IR 10 milliGRAM(s) Oral every 4 hours PRN Severe Pain (7 - 10)      Vital Signs Last 24 Hrs  T(C): 36.8 (20 Oct 2018 13:53), Max: 36.9 (19 Oct 2018 21:11)  T(F): 98.2 (20 Oct 2018 13:53), Max: 98.4 (19 Oct 2018 21:11)  HR: 76 (20 Oct 2018 13:53) (76 - 85)  BP: 116/68 (20 Oct 2018 13:53) (108/90 - 126/66)  BP(mean): --  RR: 17 (20 Oct 2018 13:53) (16 - 18)  SpO2: 99% (20 Oct 2018 13:53) (99% - 100%)    I&O's Detail    19 Oct 2018 07:01  -  20 Oct 2018 07:00  --------------------------------------------------------  IN:    heparin Infusion: 248 mL    IV PiggyBack: 200 mL  Total IN: 448 mL    OUT:    Indwelling Catheter - Urethral: 100 mL    VAC (Vacuum Assisted Closure) System: 175 mL    Voided: 1200 mL  Total OUT: 1475 mL    Total NET: -1027 mL      20 Oct 2018 07:01  -  20 Oct 2018 15:51  --------------------------------------------------------  IN:    heparin Infusion: 44 mL    heparin Infusion: 24 mL  Total IN: 68 mL    OUT:    VAC (Vacuum Assisted Closure) System: 50 mL    Voided: 750 mL  Total OUT: 800 mL    Total NET: -732 mL          Daily     Daily     LABS:                        8.4    5.79  )-----------( 213      ( 20 Oct 2018 05:00 )             26.7     10-20    135  |  99  |  22  ----------------------------<  138<H>  4.1   |  25  |  1.45<H>    Ca    8.3<L>      20 Oct 2018 05:00  Phos  2.8     10-20  Mg     1.9     10-20      PT/INR - ( 20 Oct 2018 11:10 )   PT: 16.0 SEC;   INR: 1.43          PTT - ( 20 Oct 2018 11:10 )  PTT:57.2 SEC      RADIOLOGY & ADDITIONAL STUDIES:

## 2018-10-21 LAB
APTT BLD: 80.3 SEC — HIGH (ref 27.5–37.4)
APTT BLD: 85.1 SEC — HIGH (ref 27.5–37.4)
BUN SERPL-MCNC: 17 MG/DL — SIGNIFICANT CHANGE UP (ref 7–23)
CALCIUM SERPL-MCNC: 8.4 MG/DL — SIGNIFICANT CHANGE UP (ref 8.4–10.5)
CHLORIDE SERPL-SCNC: 99 MMOL/L — SIGNIFICANT CHANGE UP (ref 98–107)
CO2 SERPL-SCNC: 24 MMOL/L — SIGNIFICANT CHANGE UP (ref 22–31)
CREAT SERPL-MCNC: 1.29 MG/DL — SIGNIFICANT CHANGE UP (ref 0.5–1.3)
GLUCOSE SERPL-MCNC: 172 MG/DL — HIGH (ref 70–99)
HCT VFR BLD CALC: 26.1 % — LOW (ref 39–50)
HGB BLD-MCNC: 8.2 G/DL — LOW (ref 13–17)
MAGNESIUM SERPL-MCNC: 2 MG/DL — SIGNIFICANT CHANGE UP (ref 1.6–2.6)
MCHC RBC-ENTMCNC: 26.5 PG — LOW (ref 27–34)
MCHC RBC-ENTMCNC: 31.4 % — LOW (ref 32–36)
MCV RBC AUTO: 84.2 FL — SIGNIFICANT CHANGE UP (ref 80–100)
NRBC # FLD: 0 — SIGNIFICANT CHANGE UP
PHOSPHATE SERPL-MCNC: 2.7 MG/DL — SIGNIFICANT CHANGE UP (ref 2.5–4.5)
PLATELET # BLD AUTO: 218 K/UL — SIGNIFICANT CHANGE UP (ref 150–400)
PMV BLD: 12 FL — SIGNIFICANT CHANGE UP (ref 7–13)
POTASSIUM SERPL-MCNC: 4.5 MMOL/L — SIGNIFICANT CHANGE UP (ref 3.5–5.3)
POTASSIUM SERPL-SCNC: 4.5 MMOL/L — SIGNIFICANT CHANGE UP (ref 3.5–5.3)
RBC # BLD: 3.1 M/UL — LOW (ref 4.2–5.8)
RBC # FLD: 18.6 % — HIGH (ref 10.3–14.5)
SODIUM SERPL-SCNC: 135 MMOL/L — SIGNIFICANT CHANGE UP (ref 135–145)
WBC # BLD: 5.39 K/UL — SIGNIFICANT CHANGE UP (ref 3.8–10.5)
WBC # FLD AUTO: 5.39 K/UL — SIGNIFICANT CHANGE UP (ref 3.8–10.5)

## 2018-10-21 RX ORDER — LIDOCAINE HCL 20 MG/ML
30 VIAL (ML) INJECTION ONCE
Qty: 0 | Refills: 0 | Status: DISCONTINUED | OUTPATIENT
Start: 2018-10-21 | End: 2018-10-30

## 2018-10-21 RX ADMIN — Medication 2 UNIT(S): at 08:17

## 2018-10-21 RX ADMIN — Medication 4: at 17:25

## 2018-10-21 RX ADMIN — CARVEDILOL PHOSPHATE 25 MILLIGRAM(S): 80 CAPSULE, EXTENDED RELEASE ORAL at 21:45

## 2018-10-21 RX ADMIN — ATORVASTATIN CALCIUM 40 MILLIGRAM(S): 80 TABLET, FILM COATED ORAL at 21:45

## 2018-10-21 RX ADMIN — HEPARIN SODIUM 10 UNIT(S)/HR: 5000 INJECTION INTRAVENOUS; SUBCUTANEOUS at 12:08

## 2018-10-21 RX ADMIN — HEPARIN SODIUM 10 UNIT(S)/HR: 5000 INJECTION INTRAVENOUS; SUBCUTANEOUS at 21:48

## 2018-10-21 RX ADMIN — HEPARIN SODIUM 10 UNIT(S)/HR: 5000 INJECTION INTRAVENOUS; SUBCUTANEOUS at 00:37

## 2018-10-21 RX ADMIN — Medication 2 UNIT(S): at 17:25

## 2018-10-21 RX ADMIN — Medication 4: at 08:17

## 2018-10-21 RX ADMIN — Medication 6: at 12:24

## 2018-10-21 RX ADMIN — INSULIN GLARGINE 10 UNIT(S): 100 INJECTION, SOLUTION SUBCUTANEOUS at 21:45

## 2018-10-21 RX ADMIN — OXYCODONE HYDROCHLORIDE 10 MILLIGRAM(S): 5 TABLET ORAL at 09:38

## 2018-10-21 RX ADMIN — Medication 100 MILLIGRAM(S): at 12:08

## 2018-10-21 RX ADMIN — OXYCODONE HYDROCHLORIDE 10 MILLIGRAM(S): 5 TABLET ORAL at 08:48

## 2018-10-21 RX ADMIN — Medication 81 MILLIGRAM(S): at 12:08

## 2018-10-21 RX ADMIN — Medication 100 MILLIGRAM(S): at 00:38

## 2018-10-21 RX ADMIN — Medication 2 UNIT(S): at 12:24

## 2018-10-21 NOTE — PROGRESS NOTE ADULT - ASSESSMENT
68y male, s/p LLE CFA to below knee popliteal arterial bypass with PTFE on 9/18, returning with SSI.  CTA reveals that the graft is patent, now s/p left groin wash out, wound VAC placement, sent to SICU postoperatively, now back on the floor stable. Awaiting vascular study to evaluate patency of graft.     Plan:  - vascular duplex   - dressing changes daily, lower incisions wet to dry  - changed wound vac yesterday, wound appears well healing  - monitor for bleeding, FU H/H in AM  - continue IV cipro  - heparin drip, currently therapeutic  - consistent carb diet  - continue to monitor vital signs

## 2018-10-21 NOTE — PROGRESS NOTE ADULT - SUBJECTIVE AND OBJECTIVE BOX
GENERAL SURGERY DAILY PROGRESS NOTE:     Subjective:  Pt seen and examined at bedside. No acute events overnight. Denies n/v/f/c. Vascular lab study today - called multiple times and will try and get study done today to evaluate patency of graft. Dressing changed.     Objective:  Abdominal: soft, NT, ND  Extremities: LLE groin wound VAC intact with serosanguinous output. Middle and lower incisional dressing w/ sanguinous staining,   Pulses: LLE DP signal.    MEDICATIONS  (STANDING):  aspirin enteric coated 81 milliGRAM(s) Oral daily  atorvastatin 40 milliGRAM(s) Oral at bedtime  carvedilol 25 milliGRAM(s) Oral every 12 hours  ciprofloxacin   IVPB      ciprofloxacin   IVPB 200 milliGRAM(s) IV Intermittent every 12 hours  dextrose 5%. 1000 milliLiter(s) (50 mL/Hr) IV Continuous <Continuous>  dextrose 50% Injectable 12.5 Gram(s) IV Push once  dextrose 50% Injectable 25 Gram(s) IV Push once  dextrose 50% Injectable 25 Gram(s) IV Push once  heparin  Infusion 1200 Unit(s)/Hr (10 mL/Hr) IV Continuous <Continuous>  influenza   Vaccine 0.5 milliLiter(s) IntraMuscular once  insulin glargine Injectable (LANTUS) 10 Unit(s) SubCutaneous at bedtime  insulin lispro (HumaLOG) corrective regimen sliding scale   SubCutaneous three times a day before meals  insulin lispro Injectable (HumaLOG) 2 Unit(s) SubCutaneous three times a day before meals  lidocaine 1% Injectable 10 milliLiter(s) Local Injection once    MEDICATIONS  (PRN):  acetaminophen   Tablet .. 650 milliGRAM(s) Oral every 6 hours PRN Mild Pain (1 - 3)  dextrose 40% Gel 15 Gram(s) Oral once PRN Blood Glucose LESS THAN 70 milliGRAM(s)/deciliter  glucagon  Injectable 1 milliGRAM(s) IntraMuscular once PRN Glucose LESS THAN 70 milligrams/deciliter  oxyCODONE    IR 5 milliGRAM(s) Oral every 4 hours PRN Moderate Pain (4 - 6)  oxyCODONE    IR 10 milliGRAM(s) Oral every 4 hours PRN Severe Pain (7 - 10)      Vital Signs Last 24 Hrs  T(C): 36.4 (21 Oct 2018 09:46), Max: 36.8 (20 Oct 2018 13:53)  T(F): 97.5 (21 Oct 2018 09:46), Max: 98.3 (20 Oct 2018 21:22)  HR: 73 (21 Oct 2018 09:46) (71 - 76)  BP: 109/62 (21 Oct 2018 09:46) (98/51 - 116/68)  BP(mean): --  RR: 18 (21 Oct 2018 09:46) (17 - 18)  SpO2: 100% (21 Oct 2018 09:46) (98% - 100%)    I&O's Detail    20 Oct 2018 07:01  -  21 Oct 2018 07:00  --------------------------------------------------------  IN:    heparin Infusion: 92 mL    heparin Infusion: 152 mL  Total IN: 244 mL    OUT:    VAC (Vacuum Assisted Closure) System: 75 mL    Voided: 1550 mL  Total OUT: 1625 mL    Total NET: -1381 mL      21 Oct 2018 07:01  -  21 Oct 2018 13:05  --------------------------------------------------------  IN:  Total IN: 0 mL    OUT:    Voided: 150 mL  Total OUT: 150 mL    Total NET: -150 mL          Daily     Daily Weight in k.3 (21 Oct 2018 01:06)    LABS:                        8.2    5.39  )-----------( 218      ( 21 Oct 2018 04:40 )             26.1     10-21    135  |  99  |  17  ----------------------------<  172<H>  4.5   |  24  |  1.29    Ca    8.4      21 Oct 2018 11:16  Phos  2.7     10-21  Mg     2.0     10-21      PT/INR - ( 20 Oct 2018 11:10 )   PT: 16.0 SEC;   INR: 1.43          PTT - ( 21 Oct 2018 11:16 )  PTT:85.1 SEC      RADIOLOGY & ADDITIONAL STUDIES:

## 2018-10-22 LAB
APTT BLD: 82.3 SEC — HIGH (ref 27.5–37.4)
BUN SERPL-MCNC: 17 MG/DL — SIGNIFICANT CHANGE UP (ref 7–23)
CALCIUM SERPL-MCNC: 8.3 MG/DL — LOW (ref 8.4–10.5)
CHLORIDE SERPL-SCNC: 99 MMOL/L — SIGNIFICANT CHANGE UP (ref 98–107)
CO2 SERPL-SCNC: 24 MMOL/L — SIGNIFICANT CHANGE UP (ref 22–31)
CREAT SERPL-MCNC: 1.36 MG/DL — HIGH (ref 0.5–1.3)
GLUCOSE SERPL-MCNC: 146 MG/DL — HIGH (ref 70–99)
HCT VFR BLD CALC: 26.6 % — LOW (ref 39–50)
HGB BLD-MCNC: 8.2 G/DL — LOW (ref 13–17)
MAGNESIUM SERPL-MCNC: 1.9 MG/DL — SIGNIFICANT CHANGE UP (ref 1.6–2.6)
MCHC RBC-ENTMCNC: 26 PG — LOW (ref 27–34)
MCHC RBC-ENTMCNC: 30.8 % — LOW (ref 32–36)
MCV RBC AUTO: 84.4 FL — SIGNIFICANT CHANGE UP (ref 80–100)
NRBC # FLD: 0 — SIGNIFICANT CHANGE UP
PHOSPHATE SERPL-MCNC: 2.8 MG/DL — SIGNIFICANT CHANGE UP (ref 2.5–4.5)
PLATELET # BLD AUTO: 238 K/UL — SIGNIFICANT CHANGE UP (ref 150–400)
PMV BLD: 11.3 FL — SIGNIFICANT CHANGE UP (ref 7–13)
POTASSIUM SERPL-MCNC: 4.2 MMOL/L — SIGNIFICANT CHANGE UP (ref 3.5–5.3)
POTASSIUM SERPL-SCNC: 4.2 MMOL/L — SIGNIFICANT CHANGE UP (ref 3.5–5.3)
RBC # BLD: 3.15 M/UL — LOW (ref 4.2–5.8)
RBC # FLD: 18.6 % — HIGH (ref 10.3–14.5)
SODIUM SERPL-SCNC: 134 MMOL/L — LOW (ref 135–145)
WBC # BLD: 5.85 K/UL — SIGNIFICANT CHANGE UP (ref 3.8–10.5)
WBC # FLD AUTO: 5.85 K/UL — SIGNIFICANT CHANGE UP (ref 3.8–10.5)

## 2018-10-22 RX ORDER — OXYCODONE HYDROCHLORIDE 5 MG/1
10 TABLET ORAL EVERY 4 HOURS
Qty: 0 | Refills: 0 | Status: DISCONTINUED | OUTPATIENT
Start: 2018-10-22 | End: 2018-10-28

## 2018-10-22 RX ORDER — LIDOCAINE HCL 20 MG/ML
30 VIAL (ML) INJECTION ONCE
Qty: 0 | Refills: 0 | Status: DISCONTINUED | OUTPATIENT
Start: 2018-10-22 | End: 2018-10-30

## 2018-10-22 RX ORDER — OXYCODONE HYDROCHLORIDE 5 MG/1
5 TABLET ORAL EVERY 4 HOURS
Qty: 0 | Refills: 0 | Status: DISCONTINUED | OUTPATIENT
Start: 2018-10-22 | End: 2018-10-29

## 2018-10-22 RX ORDER — HYDROMORPHONE HYDROCHLORIDE 2 MG/ML
1 INJECTION INTRAMUSCULAR; INTRAVENOUS; SUBCUTANEOUS
Qty: 0 | Refills: 0 | Status: DISCONTINUED | OUTPATIENT
Start: 2018-10-22 | End: 2018-10-29

## 2018-10-22 RX ADMIN — CARVEDILOL PHOSPHATE 25 MILLIGRAM(S): 80 CAPSULE, EXTENDED RELEASE ORAL at 09:26

## 2018-10-22 RX ADMIN — HYDROMORPHONE HYDROCHLORIDE 1 MILLIGRAM(S): 2 INJECTION INTRAMUSCULAR; INTRAVENOUS; SUBCUTANEOUS at 10:45

## 2018-10-22 RX ADMIN — HYDROMORPHONE HYDROCHLORIDE 1 MILLIGRAM(S): 2 INJECTION INTRAMUSCULAR; INTRAVENOUS; SUBCUTANEOUS at 09:26

## 2018-10-22 RX ADMIN — Medication 81 MILLIGRAM(S): at 11:35

## 2018-10-22 RX ADMIN — Medication 2 UNIT(S): at 17:32

## 2018-10-22 RX ADMIN — Medication 100 MILLIGRAM(S): at 01:26

## 2018-10-22 RX ADMIN — ATORVASTATIN CALCIUM 40 MILLIGRAM(S): 80 TABLET, FILM COATED ORAL at 21:18

## 2018-10-22 RX ADMIN — INSULIN GLARGINE 10 UNIT(S): 100 INJECTION, SOLUTION SUBCUTANEOUS at 21:56

## 2018-10-22 RX ADMIN — Medication 2 UNIT(S): at 08:25

## 2018-10-22 RX ADMIN — CARVEDILOL PHOSPHATE 25 MILLIGRAM(S): 80 CAPSULE, EXTENDED RELEASE ORAL at 21:18

## 2018-10-22 RX ADMIN — Medication 2 UNIT(S): at 12:32

## 2018-10-22 RX ADMIN — Medication 100 MILLIGRAM(S): at 11:33

## 2018-10-22 RX ADMIN — Medication 100 MILLIGRAM(S): at 23:08

## 2018-10-22 RX ADMIN — OXYCODONE HYDROCHLORIDE 5 MILLIGRAM(S): 5 TABLET ORAL at 06:45

## 2018-10-22 RX ADMIN — Medication 4: at 12:32

## 2018-10-22 RX ADMIN — OXYCODONE HYDROCHLORIDE 5 MILLIGRAM(S): 5 TABLET ORAL at 19:40

## 2018-10-22 RX ADMIN — HEPARIN SODIUM 10 UNIT(S)/HR: 5000 INJECTION INTRAVENOUS; SUBCUTANEOUS at 11:35

## 2018-10-22 RX ADMIN — OXYCODONE HYDROCHLORIDE 5 MILLIGRAM(S): 5 TABLET ORAL at 07:00

## 2018-10-22 RX ADMIN — OXYCODONE HYDROCHLORIDE 5 MILLIGRAM(S): 5 TABLET ORAL at 20:25

## 2018-10-22 NOTE — PROGRESS NOTE ADULT - SUBJECTIVE AND OBJECTIVE BOX
Vascular Surgery Progress Note     Subjective: Pt seen and examined at bedside. No acute events overnight. Denies n/v/f/c. Vascular lab study today to evaluate patency of graft. Wound vac change today with vascular surgery team. Dressing changed.     Objective:  Abdominal: soft, NT, ND  Extremities: LLE groin wound VAC intact with serosanguinous output. Middle and lower incisional dressing w/ sanguinous staining,   Pulses: LLE DP signal.      MEDICATIONS  (STANDING):  aspirin enteric coated 81 milliGRAM(s) Oral daily  atorvastatin 40 milliGRAM(s) Oral at bedtime  carvedilol 25 milliGRAM(s) Oral every 12 hours  ciprofloxacin   IVPB      ciprofloxacin   IVPB 200 milliGRAM(s) IV Intermittent every 12 hours  dextrose 5%. 1000 milliLiter(s) (50 mL/Hr) IV Continuous <Continuous>  dextrose 50% Injectable 12.5 Gram(s) IV Push once  dextrose 50% Injectable 25 Gram(s) IV Push once  dextrose 50% Injectable 25 Gram(s) IV Push once  heparin  Infusion 1200 Unit(s)/Hr (10 mL/Hr) IV Continuous <Continuous>  HYDROmorphone  Injectable 1 milliGRAM(s) IV Push two times a day  influenza   Vaccine 0.5 milliLiter(s) IntraMuscular once  insulin glargine Injectable (LANTUS) 10 Unit(s) SubCutaneous at bedtime  insulin lispro (HumaLOG) corrective regimen sliding scale   SubCutaneous three times a day before meals  insulin lispro Injectable (HumaLOG) 2 Unit(s) SubCutaneous three times a day before meals  lidocaine 1% (Preservative-free) Injectable 30 milliLiter(s) Local Injection once  lidocaine 1% Injectable 10 milliLiter(s) Local Injection once  lidocaine 2% Injectable 30 milliLiter(s) Local Injection once    MEDICATIONS  (PRN):  acetaminophen   Tablet .. 650 milliGRAM(s) Oral every 6 hours PRN Mild Pain (1 - 3)  dextrose 40% Gel 15 Gram(s) Oral once PRN Blood Glucose LESS THAN 70 milliGRAM(s)/deciliter  glucagon  Injectable 1 milliGRAM(s) IntraMuscular once PRN Glucose LESS THAN 70 milligrams/deciliter  oxyCODONE    IR 5 milliGRAM(s) Oral every 4 hours PRN Moderate Pain (4 - 6)  oxyCODONE    IR 10 milliGRAM(s) Oral every 4 hours PRN Severe Pain (7 - 10)      Vital Signs Last 24 Hrs  T(C): 36.6 (23 Oct 2018 01:13), Max: 36.8 (22 Oct 2018 17:21)  T(F): 97.8 (23 Oct 2018 01:13), Max: 98.2 (22 Oct 2018 17:21)  HR: 68 (23 Oct 2018 01:13) (65 - 77)  BP: 114/67 (23 Oct 2018 01:13) (97/53 - 126/69)  BP(mean): --  RR: 16 (23 Oct 2018 01:13) (16 - 18)  SpO2: 100% (23 Oct 2018 01:13) (96% - 100%)    10-21-18 @ 07:01  -  10-22-18 @ 07:00  --------------------------------------------------------  IN: 450 mL / OUT: 1300 mL / NET: -850 mL    10-22-18 @ 07:01  -  10-23-18 @ 03:38  --------------------------------------------------------  IN: 120 mL / OUT: 630 mL / NET: -510 mL      LABS:                        8.2    5.85  )-----------( 238      ( 22 Oct 2018 04:41 )             26.6     10-22    134<L>  |  99  |  17  ----------------------------<  146<H>  4.2   |  24  |  1.36<H>    Ca    8.3<L>      22 Oct 2018 04:41  Phos  2.8     10-22  Mg     1.9     10-22

## 2018-10-22 NOTE — PROGRESS NOTE ADULT - ASSESSMENT
68y male, s/p LLE CFA to below knee popliteal arterial bypass with PTFE on 9/18, returning with SSI.  CTA reveals that the graft is patent, now s/p left groin wash out, wound VAC placement, sent to SICU postoperatively, now back on the floor stable. Awaiting vascular study to evaluate patency of graft.     Plan:  - vascular duplex - able to evaluate medial and proximal, but not distal to graft  - dressing changes daily, lower incisions wet to dry  - changed wound vac today, wound appears well healing  - monitor for bleeding, FU H/H in AM  - continue IV cipro  - heparin drip, currently therapeutic  - consistent carb diet  - continue to monitor vital signs

## 2018-10-23 LAB
APTT BLD: 89.7 SEC — HIGH (ref 27.5–37.4)
BUN SERPL-MCNC: 20 MG/DL — SIGNIFICANT CHANGE UP (ref 7–23)
CALCIUM SERPL-MCNC: 8.6 MG/DL — SIGNIFICANT CHANGE UP (ref 8.4–10.5)
CHLORIDE SERPL-SCNC: 99 MMOL/L — SIGNIFICANT CHANGE UP (ref 98–107)
CO2 SERPL-SCNC: 21 MMOL/L — LOW (ref 22–31)
CREAT SERPL-MCNC: 1.4 MG/DL — HIGH (ref 0.5–1.3)
GLUCOSE SERPL-MCNC: 125 MG/DL — HIGH (ref 70–99)
HCT VFR BLD CALC: 26.8 % — LOW (ref 39–50)
HGB BLD-MCNC: 8.3 G/DL — LOW (ref 13–17)
INR BLD: 1.55 — HIGH (ref 0.88–1.17)
MAGNESIUM SERPL-MCNC: 2 MG/DL — SIGNIFICANT CHANGE UP (ref 1.6–2.6)
MCHC RBC-ENTMCNC: 25.9 PG — LOW (ref 27–34)
MCHC RBC-ENTMCNC: 31 % — LOW (ref 32–36)
MCV RBC AUTO: 83.8 FL — SIGNIFICANT CHANGE UP (ref 80–100)
NRBC # FLD: 0 — SIGNIFICANT CHANGE UP
PHOSPHATE SERPL-MCNC: 3.3 MG/DL — SIGNIFICANT CHANGE UP (ref 2.5–4.5)
PLATELET # BLD AUTO: 256 K/UL — SIGNIFICANT CHANGE UP (ref 150–400)
PMV BLD: 11.7 FL — SIGNIFICANT CHANGE UP (ref 7–13)
POTASSIUM SERPL-MCNC: 4.7 MMOL/L — SIGNIFICANT CHANGE UP (ref 3.5–5.3)
POTASSIUM SERPL-SCNC: 4.7 MMOL/L — SIGNIFICANT CHANGE UP (ref 3.5–5.3)
PROTHROM AB SERPL-ACNC: 17.4 SEC — HIGH (ref 9.8–13.1)
RBC # BLD: 3.2 M/UL — LOW (ref 4.2–5.8)
RBC # FLD: 18.6 % — HIGH (ref 10.3–14.5)
SODIUM SERPL-SCNC: 133 MMOL/L — LOW (ref 135–145)
WBC # BLD: 5.93 K/UL — SIGNIFICANT CHANGE UP (ref 3.8–10.5)
WBC # FLD AUTO: 5.93 K/UL — SIGNIFICANT CHANGE UP (ref 3.8–10.5)

## 2018-10-23 PROCEDURE — 99222 1ST HOSP IP/OBS MODERATE 55: CPT | Mod: GC

## 2018-10-23 RX ORDER — OXYCODONE HYDROCHLORIDE 5 MG/1
5 TABLET ORAL DAILY
Qty: 0 | Refills: 0 | Status: DISCONTINUED | OUTPATIENT
Start: 2018-10-24 | End: 2018-10-30

## 2018-10-23 RX ORDER — RIVAROXABAN 15 MG-20MG
15 KIT ORAL DAILY
Qty: 0 | Refills: 0 | Status: DISCONTINUED | OUTPATIENT
Start: 2018-10-23 | End: 2018-10-30

## 2018-10-23 RX ORDER — RIVAROXABAN 15 MG-20MG
15 KIT ORAL EVERY 24 HOURS
Qty: 0 | Refills: 0 | Status: DISCONTINUED | OUTPATIENT
Start: 2018-10-23 | End: 2018-10-23

## 2018-10-23 RX ORDER — HEPARIN SODIUM 5000 [USP'U]/ML
1000 INJECTION INTRAVENOUS; SUBCUTANEOUS
Qty: 25000 | Refills: 0 | Status: DISCONTINUED | OUTPATIENT
Start: 2018-10-23 | End: 2018-10-23

## 2018-10-23 RX ORDER — RIVAROXABAN 15 MG-20MG
20 KIT ORAL EVERY 24 HOURS
Qty: 0 | Refills: 0 | Status: DISCONTINUED | OUTPATIENT
Start: 2018-10-23 | End: 2018-10-23

## 2018-10-23 RX ADMIN — OXYCODONE HYDROCHLORIDE 5 MILLIGRAM(S): 5 TABLET ORAL at 08:40

## 2018-10-23 RX ADMIN — Medication 2 UNIT(S): at 08:24

## 2018-10-23 RX ADMIN — Medication 6: at 17:12

## 2018-10-23 RX ADMIN — RIVAROXABAN 15 MILLIGRAM(S): KIT at 22:39

## 2018-10-23 RX ADMIN — Medication 100 MILLIGRAM(S): at 11:18

## 2018-10-23 RX ADMIN — ATORVASTATIN CALCIUM 40 MILLIGRAM(S): 80 TABLET, FILM COATED ORAL at 22:09

## 2018-10-23 RX ADMIN — INSULIN GLARGINE 10 UNIT(S): 100 INJECTION, SOLUTION SUBCUTANEOUS at 22:09

## 2018-10-23 RX ADMIN — OXYCODONE HYDROCHLORIDE 5 MILLIGRAM(S): 5 TABLET ORAL at 07:50

## 2018-10-23 RX ADMIN — Medication 6: at 12:27

## 2018-10-23 RX ADMIN — CARVEDILOL PHOSPHATE 25 MILLIGRAM(S): 80 CAPSULE, EXTENDED RELEASE ORAL at 22:09

## 2018-10-23 RX ADMIN — Medication 100 MILLIGRAM(S): at 23:21

## 2018-10-23 RX ADMIN — Medication 81 MILLIGRAM(S): at 11:18

## 2018-10-23 RX ADMIN — HEPARIN SODIUM 10 UNIT(S)/HR: 5000 INJECTION INTRAVENOUS; SUBCUTANEOUS at 07:27

## 2018-10-23 RX ADMIN — Medication 2 UNIT(S): at 12:27

## 2018-10-23 RX ADMIN — HEPARIN SODIUM 10 UNIT(S)/HR: 5000 INJECTION INTRAVENOUS; SUBCUTANEOUS at 10:38

## 2018-10-23 RX ADMIN — Medication 2 UNIT(S): at 17:12

## 2018-10-23 NOTE — CONSULT NOTE ADULT - SUBJECTIVE AND OBJECTIVE BOX
Patient is a 68y old  Male who presents with a chief complaint of Left leg pain and drainage (23 Oct 2018 10:31)      HPI:  68 M – h/o DM, CAD (s/p stent in 2016), CABG, HTN, CHF (EF 20% by last TTE on 9/11/18), PAD – s/p L KEI stenting on 9/11, LLE CFA to below-knee bypass with PTFE for long-segment SFA occlusion on 9/18, and L 2nd toe amputation on 9/19. Patient complaining of two days of LLE pain, swelling, and redness. Earlier on the day of admission, he reports discharge of "pus" from the incisions above and below the level of his knee (nothing noted from his groin incision), followed thereafter by a "large" amount of bloody discharge. There has been no further bleeding since. He denies any fevers/chills, and denies any pain in the L foot during this time. He has been ambulating at home fairly regularly. Of note, he is also reporting swelling of the RLE over the past two weeks, without any associated pain or redness.  He has not yet seen Dr. Jung (his operating surgeon) for a post-op appointment. He was begun on anticoagulation – heparin gtt early post-op, continued as Xarelto for discharge – due to concern for sluggish flow through the graft intra-operatively and as prophylaxis against graft thrombosis. (07 Oct 2018 02:12)    Above reviewed. Pt came in with infected graft and surgical wound infection. Went to the OR on 10/15 for washout and debridement. Cultures are growing Serratia.  ID consulted for abx management.       PAST MEDICAL & SURGICAL HISTORY:  Chronic congestive heart failure, unspecified congestive heart failure type  PAD (peripheral artery disease)  Stented coronary artery  Hyperlipidemia  Diabetes  Hypertension  CAD (coronary artery disease)  S/P amputation: right great toe and 4th and 5th digit  S/P bypass graft of extremity: RLE  S/P angioplasty with stent: about 5 years ago  S/P CABG x 3      Allergies  No Known Allergies        ANTIMICROBIALS:  ciprofloxacin   IVPB    ciprofloxacin   IVPB 200 every 12 hours      OTHER MEDS: MEDICATIONS  (STANDING):  acetaminophen   Tablet .. 650 every 6 hours PRN  aspirin enteric coated 81 daily  atorvastatin 40 at bedtime  carvedilol 25 every 12 hours  dextrose 40% Gel 15 once PRN  dextrose 50% Injectable 12.5 once  dextrose 50% Injectable 25 once  dextrose 50% Injectable 25 once  glucagon  Injectable 1 once PRN  heparin  Infusion 1000 <Continuous>  HYDROmorphone  Injectable 1 two times a day  influenza   Vaccine 0.5 once  insulin glargine Injectable (LANTUS) 10 at bedtime  insulin lispro (HumaLOG) corrective regimen sliding scale  three times a day before meals  insulin lispro Injectable (HumaLOG) 2 three times a day before meals  oxyCODONE    IR 5 every 4 hours PRN  oxyCODONE    IR 10 every 4 hours PRN      SOCIAL HISTORY:     Former smoker quit many years ago   Denies ETOH or drug use     FAMILY HISTORY:  Family history of diabetes mellitus (DM)  Mother had CHF      REVIEW OF SYSTEMS  [X ] All other systems negative except as noted below:	    Constitutional:  [ ] fever [ ] chills  [ ] weight loss  [ ] weakness  Skin:  [ ] rash [ ] phlebitis	  Eyes: [ ] icterus [ ] pain  [ ] discharge	  ENMT: [ ] sore throat  [ ] thrush [ ] ulcers [ ] exudates  Respiratory: [ ] dyspnea [ ] hemoptysis [ ] cough [ ] sputum	  Cardiovascular:  [ ] chest pain [ ] palpitations [ ] edema	  Gastrointestinal:  [ ] nausea [ ] vomiting [ ] diarrhea [ ] constipation [ ] pain	  Genitourinary:  [ ] dysuria [ ] frequency [ ] hematuria [ ] discharge [ ] flank pain  [ ] incontinence  Musculoskeletal:  [ ] myalgias [ ] arthralgias [ ] arthritis  [X ] pain [X] LE Edema  Neurological:  [ ] headache [ ] seizures  [ ] confusion/altered mental status  Psychiatric:  [ ] anxiety [ ] depression	  Hematology/Lymphatics:  [ ] lymphadenopathy  Endocrine:  [ ] adrenal [ ] thyroid  Allergic/Immunologic:	 [ ] transplant [ ] seasonal    Vital Signs Last 24 Hrs  T(F): 98.2 (10-23-18 @ 09:36), Max: 99.2 (10-19-18 @ 09:00)    Vital Signs Last 24 Hrs  HR: 77 (10-23-18 @ 09:36) (63 - 77)  BP: 101/77 (10-23-18 @ 09:36) (97/53 - 120/64)  RR: 16 (10-23-18 @ 09:36)  SpO2: 97% (10-23-18 @ 09:36) (96% - 100%)  Wt(kg): --    PHYSICAL EXAM:  General: non-toxic, NAD  HEAD/EYES: anicteric, PERRL  ENT:  supple  Cardiovascular:   S1, S2, systolic murmur   Respiratory:  faitn crackles at the bases bilaterally  GI:  soft, non-tender, normal bowel sounds  :  no CVA tenderness   Musculoskeletal:  no synovitis, 2+ pitting edema in RLE and 1+ pitting edema in LLE, open wound with packing along the medial aspect of the thigh and calf, + wound vac in left groin    Neurologic:  grossly non-focal, alert and oriented x3, strength 4/5 in LE   Skin:  no rash  Lymph: no lymphadenopathy  Psychiatric:  appropriate affect  Vascular:  no phlebitis                        8.3    5.93  )-----------( 256      ( 23 Oct 2018 06:00 )             26.8       10-23    133<L>  |  99  |  20  ----------------------------<  125<H>  4.7   |  21<L>  |  1.40<H>    Ca    8.6      23 Oct 2018 06:00  Phos  3.3     10-23  Mg     2.0     10-23            MICROBIOLOGY:  Culture - Surg Site Aerob/Anaer w/Gm St (10.15.18 @ 11:19)    Culture - Surgical Site:   MODERATE    Gram Stain Wound:   NOS No Organisms Seen  WBC^White Blood Cells  QNTY CELLS IN GRAM STAIN: FEW (2+)    -  Amikacin: S <=8 CLINT    -  Ampicillin: R >16 CLINT    -  Ampicillin/Sulbactam: R >16/8 CLINT    -  Aztreonam: I 16 CLINT    -  Cefazolin: R >16 CLINT    -  Cefepime: S 4 CLINT    -  Cefoxitin: R >16 CLINT    -  Ceftazidime: R >16 CLINT    -  Ceftriaxone: R >32 CLINT    -  Ciprofloxacin: S <=0.5 CLINT    -  Ertapenem: S <=0.5 CLINT    -  Gentamicin: S 2 CLINT    -  Imipenem: S <=1 CLINT    -  Levofloxacin: S <=1 CLINT    -  Meropenem: S <=1 CLINT    -  Piperacillin/Tazobactam: I 64 CLINT    -  Tigecycline: S <=1 CLINT    -  Tobramycin: S <=2 CLINT    -  Trimethoprim/Sulfamethoxazole: S <=0.5/9.5 CLINT    Specimen Source: LEG    Organism Identification: Serratia marcescens    Organism: Serratia marcescens    Method Type: NEGATIVE CLINT 43      MEDICATIONS  (STANDING):    ciprofloxacin   IVPB   100 mL/Hr IV Intermittent (10-17-18 @ 12:16)    ciprofloxacin   IVPB   100 mL/Hr IV Intermittent (10-23-18 @ 11:18)   100 mL/Hr IV Intermittent (10-22-18 @ 23:08)   100 mL/Hr IV Intermittent (10-22-18 @ 11:33)   100 mL/Hr IV Intermittent (10-22-18 @ 01:26)   100 mL/Hr IV Intermittent (10-21-18 @ 12:08)   100 mL/Hr IV Intermittent (10-21-18 @ 00:38)   100 mL/Hr IV Intermittent (10-20-18 @ 12:37)   100 mL/Hr IV Intermittent (10-20-18 @ 01:00)   100 mL/Hr IV Intermittent (10-19-18 @ 11:48)   100 mL/Hr IV Intermittent (10-18-18 @ 23:25)   100 mL/Hr IV Intermittent (10-18-18 @ 12:39)   100 mL/Hr IV Intermittent (10-17-18 @ 23:31)    piperacillin/tazobactam IVPB.   25 mL/Hr IV Intermittent (10-17-18 @ 05:20)   25 mL/Hr IV Intermittent (10-16-18 @ 23:07)   25 mL/Hr IV Intermittent (10-16-18 @ 15:07)   25 mL/Hr IV Intermittent (10-16-18 @ 06:25)   25 mL/Hr IV Intermittent (10-15-18 @ 21:55)   25 mL/Hr IV Intermittent (10-15-18 @ 14:16)    piperacillin/tazobactam IVPB.   25 mL/Hr IV Intermittent (10-15-18 @ 03:23)   25 mL/Hr IV Intermittent (10-14-18 @ 19:30)   25 mL/Hr IV Intermittent (10-14-18 @ 11:38)   25 mL/Hr IV Intermittent (10-14-18 @ 04:16)   25 mL/Hr IV Intermittent (10-13-18 @ 19:02)   25 mL/Hr IV Intermittent (10-13-18 @ 11:11)   25 mL/Hr IV Intermittent (10-13-18 @ 03:24)   25 mL/Hr IV Intermittent (10-12-18 @ 18:50)   25 mL/Hr IV Intermittent (10-12-18 @ 07:00)   25 mL/Hr IV Intermittent (10-11-18 @ 23:10)   25 mL/Hr IV Intermittent (10-11-18 @ 15:33)   25 mL/Hr IV Intermittent (10-11-18 @ 07:29)   25 mL/Hr IV Intermittent (10-11-18 @ 00:27)   25 mL/Hr IV Intermittent (10-10-18 @ 16:52)   25 mL/Hr IV Intermittent (10-10-18 @ 08:33)   25 mL/Hr IV Intermittent (10-10-18 @ 00:19)   25 mL/Hr IV Intermittent (10-09-18 @ 17:10)   25 mL/Hr IV Intermittent (10-09-18 @ 08:05)   25 mL/Hr IV Intermittent (10-09-18 @ 01:04)   25 mL/Hr IV Intermittent (10-08-18 @ 17:03)   25 mL/Hr IV Intermittent (10-08-18 @ 07:25)   25 mL/Hr IV Intermittent (10-08-18 @ 00:21)   25 mL/Hr IV Intermittent (10-07-18 @ 17:28)   25 mL/Hr IV Intermittent (10-07-18 @ 07:56)    piperacillin/tazobactam IVPB.   200 mL/Hr IV Intermittent (10-07-18 @ 00:16)    vancomycin  IVPB   250 mL/Hr IV Intermittent (10-15-18 @ 02:13)   250 mL/Hr IV Intermittent (10-14-18 @ 16:15)   250 mL/Hr IV Intermittent (10-14-18 @ 04:16)   250 mL/Hr IV Intermittent (10-13-18 @ 16:35)   250 mL/Hr IV Intermittent (10-13-18 @ 03:24)   250 mL/Hr IV Intermittent (10-12-18 @ 16:30)   250 mL/Hr IV Intermittent (10-12-18 @ 03:11)   250 mL/Hr IV Intermittent (10-11-18 @ 14:09)   250 mL/Hr IV Intermittent (10-11-18 @ 02:50)   250 mL/Hr IV Intermittent (10-10-18 @ 14:56)   250 mL/Hr IV Intermittent (10-10-18 @ 02:56)   250 mL/Hr IV Intermittent (10-09-18 @ 15:33)    vancomycin  IVPB   250 mL/Hr IV Intermittent (10-16-18 @ 06:25)   250 mL/Hr IV Intermittent (10-15-18 @ 18:32)    vancomycin  IVPB   250 mL/Hr IV Intermittent (10-07-18 @ 01:19)    vancomycin  IVPB   250 mL/Hr IV Intermittent (10-08-18 @ 13:16)   250 mL/Hr IV Intermittent (10-07-18 @ 13:06)        RADIOLOGY:  < from: US Duplex Venous Lower Ext Ltd, Right (10.16.18 @ 20:08) >  IMPRESSION:     No evidence of deep venous thrombosis in the visualized right lower   extremity.    < from: CT Lower Extremity w/ IV Cont, Left (10.07.18 @ 00:41) >  IMPRESSION:    1.  Status post common femoral artery to popliteal artery bypass graft   which is patent.  2.  Small peripheral enhancing fluid collection within the proximal   sartorius muscle at the level of surgery in the inguinal region.   Differential includes postsurgical change such as seroma or hematoma.   Superimposed infection is not excluded.  3.  Fluid collection in the left inguinal region may represent seroma.   Superimposed infection is not excluded.  4.  Rim-enhancing fluid surrounding the bypass graft at the level of the   distal femur. Although these findings may be postoperative, superimposed   infection is not excluded.  5.  Moderate sized heterogeneous fluid collection resulting in mass   effect upon the medial gastrocnemius muscle within the calf. Differential   includes hematoma. Superimposed infection is not excluded.  6.  Heterogeneous fluid collection within the medial subcutaneous tissues   at the level of the distal graft at the medial distal thigh likely a   hematoma. Superimposed infection is not excluded.  7.  Extensive soft tissue swelling some of which may be infectious in   etiology. Given bilaterality of findings, superimposed anasarca is   suspected.  8.  Small right hip joint effusion and small knee joint effusion of   infectious or inflammatory etiology    < end of copied text >      imaging below personally reviewed

## 2018-10-23 NOTE — CONSULT NOTE ADULT - ATTENDING COMMENTS
68 year old with PVD s/p left lower exdtremity bypass with graft presented with discharge from the left groin.     Imaging demonstrated multiple collections including one that appeared to surround the graft. Cultures are growing serratia.    Can continue cipro for now. Cipro po has good bioavailability- doubt there is additional beneift to IV treatment.     I am concerned that the graft may be involved based on the imaging - will discuss the case with the vascular attending.
Jesse Gonzalez is a 68 year old man with history of DM2 complicated by PVC requiring amputations of toes, CAD, s/p  CABG and PCI with stent most recently 2016, HTN, severe LV dysfunction, PAD s/p RLE bypass grafting, s/p LLE common femoral artery to below knee popliteal arterial bypass. Patient now returning with surgical site infection.  CTA revealed that the graft is patent.  There is now plan for groin incision I&D of perigraft fluid collection with vac placement. Standing regimen: EC aspirin 81 mg daily, carvedilol 25 mg every 12 hours, furosemide  (Lasix)  40 mg by mouth twice daily, heparin 1100 Units/Hr IV, sacubitril 24 mG/valsartan 26 mG (Entresto) 1 tablet by mouth twice daily, piperacillin/tazobactam  (Zosyn) 3.375 grams IV every 8 hours, vancomycin  1000 mg IV every 12 hours, oxycodone  IR 5 mg by mouth every 6 hours as needed for pain,  docusate sodium 100 mg by mouth three times daily, senna 2 tabs daily bedtime as needed,   atorvastatin (Lipitor) 40 mg daily at bedtime and insulin lispro Injectable (HumaLOG) 2 Unit(s) subcutaneously three times a day before meals. As noted above, revised cardiac risk index for pre-operative risk is 3 points (class IV risk) with 11% risk of major cardiac event. Aspirin may be held if necessary to prevent excessive bleeding. Maintain the rest of his regimen as outlined above, holding heparin pre-operatively and resuming when deemed safe to avoid post-operative bleeding complication. The patient does not require additional SBE prophylaxis.

## 2018-10-23 NOTE — CONSULT NOTE ADULT - ASSESSMENT
68y male, s/p LLE CFA to below knee popliteal arterial bypass with PTFE on 9/18, returning with SSI.  CTA reveals that the graft is patent, now s/p left groin wash out, wound VAC placement, sent to SICU postoperatively, now back on the floor stable. Wound cultures are growing serratia resistant to beta lactams and sensitive to quinolones and carbapenems.   Remains afebrile and normal WBC  continues to complain of pain in the leg but admits swelling is improved   Had rise in creatinine while on Vanco/Zosyn and has since been switched to cipro based on sensitivities       Suggest: 68y male, s/p LLE CFA to below knee popliteal arterial bypass with PTFE on 9/18, returning with SSI.  CTA reveals that the graft is patent, now s/p left groin wash out, wound VAC placement, sent to SICU postoperatively, now back on the floor stable. Wound cultures are growing serratia resistant to beta lactams and sensitive to quinolones and carbapenems.   Remains afebrile and normal WBC  continues to complain of pain in the leg but admits swelling is improved   Had rise in creatinine while on Vanco/Zosyn and has since been switched to cipro based on sensitivities       Suggest:   -No need for picc line as bioavailability between PO and IV are similar   -Can place patient on PO Cipro 500mg BID   -Concern that graft is infected which makes treating with abx quite challenging   -will discuss further with vascular team regarding further surgical interventions

## 2018-10-23 NOTE — PHYSICAL THERAPY INITIAL EVALUATION ADULT - GENERAL OBSERVATIONS, REHAB EVAL
Consult received, chart reviewed. Patient received sitting in chair, NAD, + wound vac, + IV. Patient agreed to EVALUATION from Physical Therapist.

## 2018-10-23 NOTE — CHART NOTE - NSCHARTNOTEFT_GEN_A_CORE
Pre-Interventional Radiology Procedure Note    68y    Male    Procedure: PICC    Diagnosis/Indication: Patient is a 68y old  Male who presents with a chief complaint of Left leg pain and drainage (22 Oct 2018 14:27)      Interventional Radiology Attending Physician:  PICC PA    Ordering Attending Physician: Dr. Nagi Jung    PAST MEDICAL & SURGICAL HISTORY:  Chronic congestive heart failure, unspecified congestive heart failure type  PAD (peripheral artery disease)  Stented coronary artery  Hyperlipidemia  Diabetes  Hypertension  CAD (coronary artery disease)  S/P amputation: right great toe and 4th and 5th digit  S/P bypass graft of extremity: RLE  S/P angioplasty with stent: about 5 years ago  S/P CABG x 3       CBC Full  -  ( 23 Oct 2018 06:00 )  WBC Count : 5.93 K/uL  Hemoglobin : 8.3 g/dL  Hematocrit : 26.8 %  Platelet Count - Automated : 256 K/uL  Mean Cell Volume : 83.8 fL  Mean Cell Hemoglobin : 25.9 pg  Mean Cell Hemoglobin Concentration : 31.0 %  Auto Neutrophil # : x  Auto Lymphocyte # : x  Auto Monocyte # : x  Auto Eosinophil # : x  Auto Basophil # : x  Auto Neutrophil % : x  Auto Lymphocyte % : x  Auto Monocyte % : x  Auto Eosinophil % : x  Auto Basophil % : x    10-23    133<L>  |  99  |  20  ----------------------------<  125<H>  4.7   |  21<L>  |  1.40<H>    Ca    8.6      23 Oct 2018 06:00  Phos  3.3     10-23  Mg     2.0     10-23      PT/INR - ( 23 Oct 2018 06:00 )   PT: 17.4 SEC;   INR: 1.55          PTT - ( 23 Oct 2018 06:00 )  PTT:89.7 SEC

## 2018-10-23 NOTE — PROGRESS NOTE ADULT - ASSESSMENT
68y male, s/p LLE CFA to below knee popliteal arterial bypass with PTFE on 9/18, returning with SSI.  CTA reveals that the graft is patent, now s/p left groin wash out, wound VAC placement, sent to SICU postoperatively, now back on the floor stable.     Plan:  - vascular duplex - able to evaluate medial and proximal, but not distal to graft; No need for repeat duplex  - Start Xarelto 20 QD  - Patient will require PICC for long term IV abx  - continue IV cipro  - ID consult for recs regarding abx duration  - dressing changes daily, lower incisions wet to dry  - changed wound vac yesterday, wound appears well healing.   - heparin drip, currently therapeutic  - consistent carb diet  - continue to monitor vital signs  - Dispo planning:  Will change vac on thursday with PO analgesics premedication.  If patient tolerates, home with VNS vs. unable to tolerate- rehab.

## 2018-10-23 NOTE — PHYSICAL THERAPY INITIAL EVALUATION ADULT - PLANNED THERAPY INTERVENTIONS, PT EVAL
stair training/balance training/gait training/transfer training/postural re-education/bed mobility training/strengthening

## 2018-10-23 NOTE — PROGRESS NOTE ADULT - SUBJECTIVE AND OBJECTIVE BOX
S: Patient seen and examined.  No acute events overnight.  Pain controlled.     O: Vital Signs  T(C): 36.8 (10-23 @ 09:36), Max: 36.8 (10-22 @ 17:21)  HR: 77 (10-23 @ 09:36) (63 - 77)  BP: 101/77 (10-23 @ 09:36) (97/53 - 120/64)  RR: 16 (10-23 @ 09:36) (16 - 18)  SpO2: 97% (10-23 @ 09:36) (96% - 100%)  10-22-18 @ 07:01  -  10-23-18 @ 07:00  --------------------------------------------------------  IN: 300 mL / OUT: 1130 mL / NET: -830 mL    10-23-18 @ 07:01  -  10-23-18 @ 10:32  --------------------------------------------------------  IN: 0 mL / OUT: 300 mL / NET: -300 mL    PE:   Abdominal: soft, NT, ND  Extremities: LLE groin wound VAC intact. Middle and lower incisional dressing w/ sanguinous strikethrough, replaced.  Pulses: LLE DP, PT signal                           8.3    5.93  )-----------( 256      ( 23 Oct 2018 06:00 )             26.8   10-23    133<L>  |  99  |  20  ----------------------------<  125<H>  4.7   |  21<L>  |  1.40<H>    Ca    8.6      23 Oct 2018 06:00  Phos  3.3     10-23  Mg     2.0     10-23

## 2018-10-23 NOTE — PHYSICAL THERAPY INITIAL EVALUATION ADULT - GAIT DEVIATIONS NOTED, PT EVAL
decreased stride length/decreased step length/increased time in double stance/decreased velocity of limb motion/decreased martin

## 2018-10-23 NOTE — PHYSICAL THERAPY INITIAL EVALUATION ADULT - CRITERIA FOR SKILLED THERAPEUTIC INTERVENTIONS
predicted duration of therapy intervention/impairments found/rehab potential/therapy frequency/anticipated equipment needs at discharge/anticipated discharge recommendation

## 2018-10-23 NOTE — PHYSICAL THERAPY INITIAL EVALUATION ADULT - PERTINENT HX OF CURRENT PROBLEM, REHAB EVAL
68y male, s/p LLE CFA to below knee popliteal arterial bypass with PTFE on 9/18, returning with SSI.  CTA reveals that the graft is patent, now s/p left groin wash out, wound VAC placement, sent to SICU postoperatively, now back on the floor stable.

## 2018-10-24 LAB
APTT BLD: 53.3 SEC — HIGH (ref 27.5–37.4)
BUN SERPL-MCNC: 23 MG/DL — SIGNIFICANT CHANGE UP (ref 7–23)
CALCIUM SERPL-MCNC: 8.4 MG/DL — SIGNIFICANT CHANGE UP (ref 8.4–10.5)
CHLORIDE SERPL-SCNC: 98 MMOL/L — SIGNIFICANT CHANGE UP (ref 98–107)
CO2 SERPL-SCNC: 23 MMOL/L — SIGNIFICANT CHANGE UP (ref 22–31)
CREAT SERPL-MCNC: 1.45 MG/DL — HIGH (ref 0.5–1.3)
GLUCOSE SERPL-MCNC: 162 MG/DL — HIGH (ref 70–99)
HCT VFR BLD CALC: 27.8 % — LOW (ref 39–50)
HGB BLD-MCNC: 8.7 G/DL — LOW (ref 13–17)
INR BLD: 4.54 — HIGH (ref 0.88–1.17)
MAGNESIUM SERPL-MCNC: 1.9 MG/DL — SIGNIFICANT CHANGE UP (ref 1.6–2.6)
MCHC RBC-ENTMCNC: 26 PG — LOW (ref 27–34)
MCHC RBC-ENTMCNC: 31.3 % — LOW (ref 32–36)
MCV RBC AUTO: 83.2 FL — SIGNIFICANT CHANGE UP (ref 80–100)
NRBC # FLD: 0.02 — SIGNIFICANT CHANGE UP
PHOSPHATE SERPL-MCNC: 3.1 MG/DL — SIGNIFICANT CHANGE UP (ref 2.5–4.5)
PLATELET # BLD AUTO: 276 K/UL — SIGNIFICANT CHANGE UP (ref 150–400)
PMV BLD: 11.3 FL — SIGNIFICANT CHANGE UP (ref 7–13)
POTASSIUM SERPL-MCNC: 4.7 MMOL/L — SIGNIFICANT CHANGE UP (ref 3.5–5.3)
POTASSIUM SERPL-SCNC: 4.7 MMOL/L — SIGNIFICANT CHANGE UP (ref 3.5–5.3)
PROTHROM AB SERPL-ACNC: 53.9 SEC — HIGH (ref 9.8–13.1)
RBC # BLD: 3.34 M/UL — LOW (ref 4.2–5.8)
RBC # FLD: 18.7 % — HIGH (ref 10.3–14.5)
SODIUM SERPL-SCNC: 133 MMOL/L — LOW (ref 135–145)
WBC # BLD: 6.29 K/UL — SIGNIFICANT CHANGE UP (ref 3.8–10.5)
WBC # FLD AUTO: 6.29 K/UL — SIGNIFICANT CHANGE UP (ref 3.8–10.5)

## 2018-10-24 PROCEDURE — 99233 SBSQ HOSP IP/OBS HIGH 50: CPT

## 2018-10-24 RX ORDER — RIVAROXABAN 15 MG-20MG
1 KIT ORAL
Qty: 42 | Refills: 0 | OUTPATIENT
Start: 2018-10-24 | End: 2018-11-13

## 2018-10-24 RX ORDER — CIPROFLOXACIN LACTATE 400MG/40ML
400 VIAL (ML) INTRAVENOUS EVERY 12 HOURS
Qty: 0 | Refills: 0 | Status: DISCONTINUED | OUTPATIENT
Start: 2018-10-24 | End: 2018-10-30

## 2018-10-24 RX ORDER — OXYCODONE HYDROCHLORIDE 5 MG/1
10 TABLET ORAL ONCE
Qty: 0 | Refills: 0 | Status: DISCONTINUED | OUTPATIENT
Start: 2018-10-25 | End: 2018-10-25

## 2018-10-24 RX ADMIN — CARVEDILOL PHOSPHATE 25 MILLIGRAM(S): 80 CAPSULE, EXTENDED RELEASE ORAL at 21:52

## 2018-10-24 RX ADMIN — INSULIN GLARGINE 10 UNIT(S): 100 INJECTION, SOLUTION SUBCUTANEOUS at 21:52

## 2018-10-24 RX ADMIN — HYDROMORPHONE HYDROCHLORIDE 1 MILLIGRAM(S): 2 INJECTION INTRAMUSCULAR; INTRAVENOUS; SUBCUTANEOUS at 05:30

## 2018-10-24 RX ADMIN — RIVAROXABAN 15 MILLIGRAM(S): KIT at 11:32

## 2018-10-24 RX ADMIN — Medication 4: at 17:44

## 2018-10-24 RX ADMIN — OXYCODONE HYDROCHLORIDE 10 MILLIGRAM(S): 5 TABLET ORAL at 19:55

## 2018-10-24 RX ADMIN — Medication 81 MILLIGRAM(S): at 11:32

## 2018-10-24 RX ADMIN — Medication 2 UNIT(S): at 17:45

## 2018-10-24 RX ADMIN — HYDROMORPHONE HYDROCHLORIDE 1 MILLIGRAM(S): 2 INJECTION INTRAMUSCULAR; INTRAVENOUS; SUBCUTANEOUS at 05:55

## 2018-10-24 RX ADMIN — Medication 100 MILLIGRAM(S): at 11:32

## 2018-10-24 RX ADMIN — Medication 200 MILLIGRAM(S): at 21:52

## 2018-10-24 RX ADMIN — Medication 2 UNIT(S): at 09:00

## 2018-10-24 RX ADMIN — Medication 2 UNIT(S): at 12:51

## 2018-10-24 RX ADMIN — ATORVASTATIN CALCIUM 40 MILLIGRAM(S): 80 TABLET, FILM COATED ORAL at 21:52

## 2018-10-24 RX ADMIN — Medication 6: at 12:51

## 2018-10-24 RX ADMIN — OXYCODONE HYDROCHLORIDE 10 MILLIGRAM(S): 5 TABLET ORAL at 19:03

## 2018-10-24 NOTE — CHART NOTE - NSCHARTNOTEFT_GEN_A_CORE
Source: Patient [x] Medical record reviewed. Patient seen for length of stay nutrition follow-up. Per chart review patient s/p femoral and popliteal exploration, groin washout, and sartorious flap. Patient reports good PO intake. Consuming at least 75% of meals at this time. No GI distress (nausea/vomiting/diarrhea/constipation) noted at this time. Encouraged PO intake.    Diet : Regular, Consistent Carbohydrate w/ evening snack  PO intake:  < 50% [ ] 50-75% [ ]   % [x]  other :    Current Weight: 80.5kg (10/24), 80kg (10/16), 73.5kg (10/07)  % Weight Change: noted weight increase, likely fluid shift versus bed-scale discrepancy. Patient with 1+ right leg edema and 2+ left leg edema per flowsheets documentation.    Pertinent Medications: MEDICATIONS  (STANDING):  aspirin enteric coated 81 milliGRAM(s) Oral daily  atorvastatin 40 milliGRAM(s) Oral at bedtime  carvedilol 25 milliGRAM(s) Oral every 12 hours  ciprofloxacin   IVPB      ciprofloxacin   IVPB 200 milliGRAM(s) IV Intermittent every 12 hours  dextrose 5%. 1000 milliLiter(s) (50 mL/Hr) IV Continuous <Continuous>  dextrose 50% Injectable 12.5 Gram(s) IV Push once  dextrose 50% Injectable 25 Gram(s) IV Push once  dextrose 50% Injectable 25 Gram(s) IV Push once  HYDROmorphone  Injectable 1 milliGRAM(s) IV Push two times a day  influenza   Vaccine 0.5 milliLiter(s) IntraMuscular once  insulin glargine Injectable (LANTUS) 10 Unit(s) SubCutaneous at bedtime  insulin lispro (HumaLOG) corrective regimen sliding scale   SubCutaneous three times a day before meals  insulin lispro Injectable (HumaLOG) 2 Unit(s) SubCutaneous three times a day before meals  lidocaine 1% (Preservative-free) Injectable 30 milliLiter(s) Local Injection once  lidocaine 1% Injectable 10 milliLiter(s) Local Injection once  lidocaine 2% Injectable 30 milliLiter(s) Local Injection once  oxyCODONE    IR 5 milliGRAM(s) Oral daily  rivaroxaban 15 milliGRAM(s) Oral daily    MEDICATIONS  (PRN):  acetaminophen   Tablet .. 650 milliGRAM(s) Oral every 6 hours PRN Mild Pain (1 - 3)  dextrose 40% Gel 15 Gram(s) Oral once PRN Blood Glucose LESS THAN 70 milliGRAM(s)/deciliter  glucagon  Injectable 1 milliGRAM(s) IntraMuscular once PRN Glucose LESS THAN 70 milligrams/deciliter  oxyCODONE    IR 5 milliGRAM(s) Oral every 4 hours PRN Moderate Pain (4 - 6)  oxyCODONE    IR 10 milliGRAM(s) Oral every 4 hours PRN Severe Pain (7 - 10)    Pertinent Labs:  10-24 Na133 mmol/L<L> Glu 162 mg/dL<H> K+ 4.7 mmol/L Cr  1.45 mg/dL<H> BUN 23 mg/dL 10-24 Phos 3.1 mg/dL 10-16 [id number] 6.9 %<H>  CAPILLARY BLOOD GLUCOSE  POCT Blood Glucose.: 144 mg/dL (24 Oct 2018 08:39)  POCT Blood Glucose.: 159 mg/dL (23 Oct 2018 21:38)  POCT Blood Glucose.: 202 mg/dL (23 Oct 2018 17:06)  POCT Blood Glucose.: 233 mg/dL (23 Oct 2018 12:09)    Skin: multiple surgical incisions: left thigh, left upper calf, left groin, left 2nd toe sutures. No pressure ulcers/DTI noted per flowsheets.     Estimated Needs:   [x] no change since previous assessment  [ ] recalculated:     No active nutrition diagnosis at this time.    Interventions:   1. Continue Consistent Carbohydrate diet.   2. Please Encourage po intake, assist with meals and menu selections, provide alternatives PRN.   3. Suggest outpatient follow up with an endocrinologist to ensure long-term DM diet comprehension and compliance.    Monitoring and Evaluation:   1. Monitor weights, labs, BM's, skin integrity, p.o. intake.   2. Patient to meet > 75% estimated pro/kcal needs.   3. Maintain blood glucose control.  RD to remain available, Sena Riggs RD, CDN Pager #[phone]

## 2018-10-24 NOTE — PROGRESS NOTE ADULT - SUBJECTIVE AND OBJECTIVE BOX
pt doing well.  No fevers chills.  States pain is continuing to improve.      Vital Signs Last 24 Hrs  T(C): 36.6 (24 Oct 2018 10:25), Max: 36.9 (23 Oct 2018 21:20)  T(F): 97.8 (24 Oct 2018 10:25), Max: 98.4 (23 Oct 2018 21:20)  HR: 69 (24 Oct 2018 10:25) (65 - 76)  BP: 104/77 (24 Oct 2018 10:25) (98/45 - 121/58)  BP(mean): --  RR: 16 (24 Oct 2018 10:25) (16 - 18)  SpO2: 97% (24 Oct 2018 10:25) (97% - 100%)                          8.7    6.29  )-----------( 276      ( 24 Oct 2018 05:30 )             27.8   10-24    133<L>  |  98  |  23  ----------------------------<  162<H>  4.7   |  23  |  1.45<H>    Ca    8.4      24 Oct 2018 05:30  Phos  3.1     10-24  Mg     1.9     10-24

## 2018-10-24 NOTE — PROGRESS NOTE ADULT - SUBJECTIVE AND OBJECTIVE BOX
D Team Surgery Progress Note    SUBJECTIVE: Patient seen and examined at the bedside. Feeling well this morning. No acute events overnight. Tolerating a diet. Voiding appropriately. Pain is well controlled. Has been afebrile and HD stable.     VITALS  T(C): 36.6 (10-24-18 @ 10:25), Max: 36.9 (10-23-18 @ 21:20)  HR: 69 (10-24-18 @ 10:25) (65 - 74)  BP: 104/77 (10-24-18 @ 10:25) (103/57 - 121/58)  RR: 16 (10-24-18 @ 10:25) (16 - 18)  SpO2: 97% (10-24-18 @ 10:25) (97% - 100%)  CAPILLARY BLOOD GLUCOSE      POCT Blood Glucose.: 203 mg/dL (24 Oct 2018 12:16)  POCT Blood Glucose.: 144 mg/dL (24 Oct 2018 08:39)  POCT Blood Glucose.: 159 mg/dL (23 Oct 2018 21:38)  POCT Blood Glucose.: 202 mg/dL (23 Oct 2018 17:06)    Is/Os    10-23 @ 07:01  -  10-24 @ 07:00  --------------------------------------------------------  IN:    heparin Infusion: 20 mL    heparin Infusion: 100 mL    IV PiggyBack: 100 mL  Total IN: 220 mL    OUT:    VAC (Vacuum Assisted Closure) System: 200 mL    Voided: 875 mL  Total OUT: 1075 mL    Total NET: -855 mL      10-24 @ 07:01  -  10-24 @ 13:28  --------------------------------------------------------  IN:    IV PiggyBack: 100 mL  Total IN: 100 mL    OUT:    VAC (Vacuum Assisted Closure) System: 150 mL    Voided: 600 mL  Total OUT: 750 mL    Total NET: -650 mL      PHYSICAL EXAM:  Abdominal: soft, NT, ND  Extremities: LLE groin wound VAC intact. Middle and lower incisional dressing w/ sanguinous strikethrough, packing and overlying dressing replaced.  Pulses: LLE DP, PT signal    MEDICATIONS (STANDING): aspirin enteric coated 81 milliGRAM(s) Oral daily  atorvastatin 40 milliGRAM(s) Oral at bedtime  carvedilol 25 milliGRAM(s) Oral every 12 hours  ciprofloxacin   IVPB      ciprofloxacin   IVPB 200 milliGRAM(s) IV Intermittent every 12 hours  dextrose 5%. 1000 milliLiter(s) IV Continuous <Continuous>  dextrose 50% Injectable 12.5 Gram(s) IV Push once  dextrose 50% Injectable 25 Gram(s) IV Push once  dextrose 50% Injectable 25 Gram(s) IV Push once  HYDROmorphone  Injectable 1 milliGRAM(s) IV Push two times a day  influenza   Vaccine 0.5 milliLiter(s) IntraMuscular once  insulin glargine Injectable (LANTUS) 10 Unit(s) SubCutaneous at bedtime  insulin lispro (HumaLOG) corrective regimen sliding scale   SubCutaneous three times a day before meals  insulin lispro Injectable (HumaLOG) 2 Unit(s) SubCutaneous three times a day before meals  lidocaine 1% (Preservative-free) Injectable 30 milliLiter(s) Local Injection once  lidocaine 1% Injectable 10 milliLiter(s) Local Injection once  lidocaine 2% Injectable 30 milliLiter(s) Local Injection once  oxyCODONE    IR 5 milliGRAM(s) Oral daily  rivaroxaban 15 milliGRAM(s) Oral daily    MEDICATIONS (PRN):acetaminophen   Tablet .. 650 milliGRAM(s) Oral every 6 hours PRN Mild Pain (1 - 3)  dextrose 40% Gel 15 Gram(s) Oral once PRN Blood Glucose LESS THAN 70 milliGRAM(s)/deciliter  glucagon  Injectable 1 milliGRAM(s) IntraMuscular once PRN Glucose LESS THAN 70 milligrams/deciliter  oxyCODONE    IR 5 milliGRAM(s) Oral every 4 hours PRN Moderate Pain (4 - 6)  oxyCODONE    IR 10 milliGRAM(s) Oral every 4 hours PRN Severe Pain (7 - 10)    LABS  CBC (10-24 @ 05:30)                              8.7<L>                         6.29    )----------------(  276        --    % Neutrophils, --    % Lymphocytes, ANC: --                                  27.8<L>  CBC (10-23 @ 06:00)                              8.3<L>                         5.93    )----------------(  256        --    % Neutrophils, --    % Lymphocytes, ANC: --                                  26.8<L>    BMP (10-24 @ 05:30)             133<L>  |  98      |  23    		Ca++ --      Ca 8.4                ---------------------------------( 162<H>		Mg 1.9                4.7     |  23      |  1.45<H>			Ph 3.1     BMP (10-23 @ 06:00)             133<L>  |  99      |  20    		Ca++ --      Ca 8.6                ---------------------------------( 125<H>		Mg 2.0                4.7     |  21<L>   |  1.40<H>			Ph 3.3         Coags (10-24 @ 05:30)  aPTT 53.3<H> / INR 4.54<H> / PT 53.9<H>  Coags (10-23 @ 06:00)  aPTT 89.7<H> / INR 1.55<H> / PT 17.4<H>

## 2018-10-24 NOTE — PROGRESS NOTE ADULT - ASSESSMENT
s/p femoral and popliteal exploration, groin washout, and sartorious flap  -cont cipro, after discussion w ID 4-6 weeks PO seems appropriate  - cont NPWT to groin, WTD to popliteal incisions  - cont AC  - dispo planning early next week

## 2018-10-24 NOTE — PROGRESS NOTE ADULT - ASSESSMENT
68 year old with PVD s/p left lower extremity bypass with graft presented with discharge from the left groin.     Imaging demonstrated multiple collections including one that appeared to surround the graft. Cultures are growing serratia.      I discussed the case with the vascular attending,  the fluid around the graft did not appear infected in the OR.  Serratia grew from a superifcal collection form the leg.    Given the surgery and the proximity to the graft, I still favor a long course of abx.    Change Cipro to 400 mg iv q12    At discharge, can change to cipro 500 mg po q12 through 11/26.    Plan a six week course of abx.    Follow up with me as outpt in 3-4 weeks [phone]

## 2018-10-24 NOTE — PROGRESS NOTE ADULT - ASSESSMENT
68y male, s/p LLE CFA to below knee popliteal arterial bypass with PTFE on 9/18, returning with SSI.  CTA showed patent graft, now POD 9 s/p left groin wash out and sartorius flap, wound VAC placement, now recovering well on the floor    Plan:  - Continue vac to groin and WTD dressing on medial thigh and calf  - Cont Xarelto 20 QD  - Per ID ok to continue Oral Cipro for 4-6 weeks.  - Planning for vac change tomorrow AM  - dispo planning early next week

## 2018-10-24 NOTE — PROGRESS NOTE ADULT - SUBJECTIVE AND OBJECTIVE BOX
Follow Up:      Inverval History/ROS: Patient is a 68y old  Male who presents with a chief complaint of Left leg pain and drainage (24 Oct 2018 11:16)    Denies fever .  No pain.  no events    Allergies    No Known Allergies    Intolerances        ANTIMICROBIALS:  ciprofloxacin   IVPB    ciprofloxacin   IVPB 200 every 12 hours      OTHER MEDS:  acetaminophen   Tablet .. 650 milliGRAM(s) Oral every 6 hours PRN  aspirin enteric coated 81 milliGRAM(s) Oral daily  atorvastatin 40 milliGRAM(s) Oral at bedtime  carvedilol 25 milliGRAM(s) Oral every 12 hours  dextrose 40% Gel 15 Gram(s) Oral once PRN  dextrose 5%. 1000 milliLiter(s) IV Continuous <Continuous>  dextrose 50% Injectable 12.5 Gram(s) IV Push once  dextrose 50% Injectable 25 Gram(s) IV Push once  dextrose 50% Injectable 25 Gram(s) IV Push once  glucagon  Injectable 1 milliGRAM(s) IntraMuscular once PRN  HYDROmorphone  Injectable 1 milliGRAM(s) IV Push two times a day  influenza   Vaccine 0.5 milliLiter(s) IntraMuscular once  insulin glargine Injectable (LANTUS) 10 Unit(s) SubCutaneous at bedtime  insulin lispro (HumaLOG) corrective regimen sliding scale   SubCutaneous three times a day before meals  insulin lispro Injectable (HumaLOG) 2 Unit(s) SubCutaneous three times a day before meals  lidocaine 1% (Preservative-free) Injectable 30 milliLiter(s) Local Injection once  lidocaine 1% Injectable 10 milliLiter(s) Local Injection once  lidocaine 2% Injectable 30 milliLiter(s) Local Injection once  oxyCODONE    IR 5 milliGRAM(s) Oral every 4 hours PRN  oxyCODONE    IR 10 milliGRAM(s) Oral every 4 hours PRN  oxyCODONE    IR 5 milliGRAM(s) Oral daily  rivaroxaban 15 milliGRAM(s) Oral daily      Vital Signs Last 24 Hrs  T(C): 36.6 (24 Oct 2018 10:25), Max: 36.9 (23 Oct 2018 21:20)  T(F): 97.8 (24 Oct 2018 10:25), Max: 98.4 (23 Oct 2018 21:20)  HR: 69 (24 Oct 2018 10:25) (65 - 76)  BP: 104/77 (24 Oct 2018 10:25) (98/45 - 121/58)  BP(mean): --  RR: 16 (24 Oct 2018 10:25) (16 - 18)  SpO2: 97% (24 Oct 2018 10:25) (97% - 100%)    PHYSICAL EXAM:  General: [x ] non-toxic  HEAD/EYES: [ ] PERRL [x ] white sclera [ ] icterus  ENT:  [ ] normal [x ] supple [ ] thrush [ ] pharyngeal exudate  Cardiovascular:   [ ] murmur [x ] normal [ ] PPM/AICD  Respiratory:  [ x] clear to ausculation bilaterally  GI:  [x ] soft, non-tender, normal bowel sounds  :  [ ] degroot [ ] no CVA tenderness   Musculoskeletal:  [ ] no synovitis  Neurologic:  [ x] non-focal exam   Skin:  x[ ] vac in place, packing in place  Lymph: [ ] no lymphadenopathy  Psychiatric:  [ x] appropriate affect [ ] alert & oriented  Lines:  [x ] no phlebitis [ ] central line                                8.7    6.29  )-----------( 276      ( 24 Oct 2018 05:30 )             27.8       10-24    133<L>  |  98  |  23  ----------------------------<  162<H>  4.7   |  23  |  1.45<H>    Ca    8.4      24 Oct 2018 05:30  Phos  3.1     10-24  Mg     1.9     10-24            MICROBIOLOGY:    RADIOLOGY:

## 2018-10-25 ENCOUNTER — TRANSCRIPTION ENCOUNTER (OUTPATIENT)
Age: 68
End: 2018-10-25

## 2018-10-25 LAB
BUN SERPL-MCNC: 24 MG/DL — HIGH (ref 7–23)
CALCIUM SERPL-MCNC: 8.7 MG/DL — SIGNIFICANT CHANGE UP (ref 8.4–10.5)
CHLORIDE SERPL-SCNC: 102 MMOL/L — SIGNIFICANT CHANGE UP (ref 98–107)
CO2 SERPL-SCNC: 22 MMOL/L — SIGNIFICANT CHANGE UP (ref 22–31)
CREAT SERPL-MCNC: 1.35 MG/DL — HIGH (ref 0.5–1.3)
GLUCOSE SERPL-MCNC: 121 MG/DL — HIGH (ref 70–99)
HCT VFR BLD CALC: 28.8 % — LOW (ref 39–50)
HGB BLD-MCNC: 8.9 G/DL — LOW (ref 13–17)
MAGNESIUM SERPL-MCNC: 1.9 MG/DL — SIGNIFICANT CHANGE UP (ref 1.6–2.6)
MCHC RBC-ENTMCNC: 25.6 PG — LOW (ref 27–34)
MCHC RBC-ENTMCNC: 30.9 % — LOW (ref 32–36)
MCV RBC AUTO: 83 FL — SIGNIFICANT CHANGE UP (ref 80–100)
NRBC # FLD: 0 — SIGNIFICANT CHANGE UP
PHOSPHATE SERPL-MCNC: 3.3 MG/DL — SIGNIFICANT CHANGE UP (ref 2.5–4.5)
PLATELET # BLD AUTO: 324 K/UL — SIGNIFICANT CHANGE UP (ref 150–400)
PMV BLD: 11.3 FL — SIGNIFICANT CHANGE UP (ref 7–13)
POTASSIUM SERPL-MCNC: 4.6 MMOL/L — SIGNIFICANT CHANGE UP (ref 3.5–5.3)
POTASSIUM SERPL-SCNC: 4.6 MMOL/L — SIGNIFICANT CHANGE UP (ref 3.5–5.3)
RBC # BLD: 3.47 M/UL — LOW (ref 4.2–5.8)
RBC # FLD: 18.8 % — HIGH (ref 10.3–14.5)
SODIUM SERPL-SCNC: 135 MMOL/L — SIGNIFICANT CHANGE UP (ref 135–145)
WBC # BLD: 7.63 K/UL — SIGNIFICANT CHANGE UP (ref 3.8–10.5)
WBC # FLD AUTO: 7.63 K/UL — SIGNIFICANT CHANGE UP (ref 3.8–10.5)

## 2018-10-25 RX ADMIN — Medication 81 MILLIGRAM(S): at 11:17

## 2018-10-25 RX ADMIN — OXYCODONE HYDROCHLORIDE 10 MILLIGRAM(S): 5 TABLET ORAL at 16:10

## 2018-10-25 RX ADMIN — Medication 200 MILLIGRAM(S): at 21:52

## 2018-10-25 RX ADMIN — OXYCODONE HYDROCHLORIDE 10 MILLIGRAM(S): 5 TABLET ORAL at 06:14

## 2018-10-25 RX ADMIN — OXYCODONE HYDROCHLORIDE 5 MILLIGRAM(S): 5 TABLET ORAL at 22:28

## 2018-10-25 RX ADMIN — Medication 200 MILLIGRAM(S): at 09:09

## 2018-10-25 RX ADMIN — Medication 4: at 17:15

## 2018-10-25 RX ADMIN — CARVEDILOL PHOSPHATE 25 MILLIGRAM(S): 80 CAPSULE, EXTENDED RELEASE ORAL at 10:12

## 2018-10-25 RX ADMIN — INSULIN GLARGINE 10 UNIT(S): 100 INJECTION, SOLUTION SUBCUTANEOUS at 21:53

## 2018-10-25 RX ADMIN — RIVAROXABAN 15 MILLIGRAM(S): KIT at 11:17

## 2018-10-25 RX ADMIN — Medication 4: at 12:22

## 2018-10-25 RX ADMIN — OXYCODONE HYDROCHLORIDE 10 MILLIGRAM(S): 5 TABLET ORAL at 15:30

## 2018-10-25 RX ADMIN — CARVEDILOL PHOSPHATE 25 MILLIGRAM(S): 80 CAPSULE, EXTENDED RELEASE ORAL at 22:04

## 2018-10-25 RX ADMIN — OXYCODONE HYDROCHLORIDE 10 MILLIGRAM(S): 5 TABLET ORAL at 05:31

## 2018-10-25 RX ADMIN — Medication 2 UNIT(S): at 12:23

## 2018-10-25 RX ADMIN — Medication 2 UNIT(S): at 17:16

## 2018-10-25 RX ADMIN — ATORVASTATIN CALCIUM 40 MILLIGRAM(S): 80 TABLET, FILM COATED ORAL at 21:51

## 2018-10-25 RX ADMIN — OXYCODONE HYDROCHLORIDE 5 MILLIGRAM(S): 5 TABLET ORAL at 21:58

## 2018-10-25 RX ADMIN — Medication 2 UNIT(S): at 08:37

## 2018-10-25 NOTE — DISCHARGE NOTE ADULT - HOSPITAL COURSE
68 M – h/o DM, CAD (s/p stent in 2016), CABG, HTN, CHF (EF 20% by last TTE on 9/11/18), PAD – s/p L KEI stenting on 9/11, LLE CFA to below-knee bypass with PTFE for long-segment SFA occlusion on 9/18, and L 2nd toe amputation on 9/19. Patient complaining of two days of LLE pain, swelling, and redness. Earlier on the day of admission, he reports discharge of "pus" from the incisions above and below the level of his knee (nothing noted from his groin incision), followed thereafter by a "large" amount of bloody discharge. There has been no further bleeding since. He denies any fevers/chills, and denies any pain in the L foot during this time. He has been ambulating at home fairly regularly. Of note, he is also reporting swelling of the RLE over the past two weeks, without any associated pain or redness.  He has not yet seen Dr. Jung (his operating surgeon) for a post-op appointment. He was begun on anticoagulation – heparin gtt early post-op, continued as Xarelto for discharge – due to concern for sluggish flow through the graft intra-operatively and as prophylaxis against graft thrombosis.    Patient admitted to vascular surgery. Started on IV antibiotics and heparin gtt. CTA showing IMPRESSION:    1.  Status post common femoral artery to popliteal artery bypass graft   which is patent.  2.  Small peripheral enhancing fluid collection within the proximal   sartorius muscle at the level of surgery in the inguinal region.   Differential includes postsurgical change such as seroma or hematoma.   Superimposed infection is not excluded.  3.  Fluid collection in the left inguinal region may represent seroma.   Superimposed infection is not excluded.  4.  Rim-enhancing fluid surrounding the bypass graft at the level of the   distal femur. Although these findings may be postoperative, superimposed   infection is not excluded.  5.  Moderate sized heterogeneous fluid collection resulting in mass   effect upon the medial gastrocnemius muscle within the calf. Differential   includes hematoma. Superimposed infection is not excluded.  6.  Heterogeneous fluid collection within the medial subcutaneous tissues   at the level of the distal graft at the medial distal thigh likely a   hematoma. Superimposed infection is not excluded.  7.  Extensive soft tissue swelling some of which may be infectious in   etiology. Given bilaterality of findings, superimposed anasarca is   suspected.  8.  Small right hip joint effusion and small knee joint effusion of   infectious or inflammatory etiology  10/13/18 cardiology consulted for pre-op optimization.    Patient taken to OR 10/15/18 for Exploration and washout of Left groin. Sartorius muscle flap. Wound vac application. Re-exploration and wash-out od above and below the knee incisions     Post-op patient transferred to SICU for monitoring, as patient has significant heart history. Patient started on heparin gtt once and H/H stabilized. Wound cultures sensitive to ciprofloxacin.   10/16/18: TTE CONCLUSIONS:  1. Severe segmental left ventricular systolic dysfunction.  Hypokinesis of anterolateral wall and basal to mid  anteroseptum. akinesis of anterior wall  2.  Right ventricular enlargement with decreased right  ventricular systolic function.  3.  teathered posterior lealet of mitral valve  Moderate-severe eccentric posteriorly directed mitral  regurgitation.  4.  Severe diastolic dysfunction (Stage III) with elevated  filling pressures and a restrictive filling pattern  5. Estimated pulmonary artery systolic pressure equals 58  mm Hg, assuming right atrial pressure equals 10  mm Hg,  consistent with moderate pulmonary hypertension      10/19: Arterial duplex to look at graft patency showing:Very limited study (overlying dressings and wound-VACdevice).The mid segment of the right femoral artery to below-knepopliteal artery bypass graft appears patent.  The remaining segments of the bypass graft was not visualized.    Antibiotics adjusted. Infectious diease consulted and recommending  cipro 500 mg po q12 through 11/26. Xaralto changed to 15mg once a day for creatinine clearance.     LLE duplex venous showing No evidence of deep venous thrombosis in the visualized right lower extremity.    During hospital course patients diet was slowly advanced as tolerated.  At this time, pt is tolerating a regular diet, ambulating and voiding.  Pt is stable for discharge as per the attending at this time. Pt recommending home with home PT. 68 M – h/o DM, CAD (s/p stent in 2016), CABG, HTN, CHF (EF 20% by last TTE on 9/11/18), PAD – s/p L KEI stenting on 9/11, LLE CFA to below-knee bypass with PTFE for long-segment SFA occlusion on 9/18, and L 2nd toe amputation on 9/19. Patient complaining of two days of LLE pain, swelling, and redness. Earlier on the day of admission, he reports discharge of "pus" from the incisions above and below the level of his knee (nothing noted from his groin incision), followed thereafter by a "large" amount of bloody discharge. There has been no further bleeding since. He denies any fevers/chills, and denies any pain in the L foot during this time. He has been ambulating at home fairly regularly. Of note, he is also reporting swelling of the RLE over the past two weeks, without any associated pain or redness.  He has not yet seen Dr. Jung (his operating surgeon) for a post-op appointment. He was begun on anticoagulation – heparin gtt early post-op, continued as Xarelto for discharge – due to concern for sluggish flow through the graft intra-operatively and as prophylaxis against graft thrombosis.    Patient admitted to vascular surgery. Started on IV antibiotics and heparin gtt. CTA showing IMPRESSION:    1.  Status post common femoral artery to popliteal artery bypass graft   which is patent.  2.  Small peripheral enhancing fluid collection within the proximal   sartorius muscle at the level of surgery in the inguinal region.   Differential includes postsurgical change such as seroma or hematoma.   Superimposed infection is not excluded.  3.  Fluid collection in the left inguinal region may represent seroma.   Superimposed infection is not excluded.  4.  Rim-enhancing fluid surrounding the bypass graft at the level of the   distal femur. Although these findings may be postoperative, superimposed   infection is not excluded.  5.  Moderate sized heterogeneous fluid collection resulting in mass   effect upon the medial gastrocnemius muscle within the calf. Differential   includes hematoma. Superimposed infection is not excluded.  6.  Heterogeneous fluid collection within the medial subcutaneous tissues   at the level of the distal graft at the medial distal thigh likely a   hematoma. Superimposed infection is not excluded.  7.  Extensive soft tissue swelling some of which may be infectious in   etiology. Given bilaterality of findings, superimposed anasarca is   suspected.  8.  Small right hip joint effusion and small knee joint effusion of   infectious or inflammatory etiology  10/13/18 cardiology consulted for pre-op optimization.    Patient taken to OR 10/15/18 for Exploration and washout of Left groin. Sartorius muscle flap. Wound vac application. Re-exploration and wash-out od above and below the knee incisions     Post-op patient transferred to SICU for monitoring, as patient has significant heart history. Patient started on heparin gtt once and H/H stabilized. Wound cultures sensitive to ciprofloxacin.   10/16/18: TTE CONCLUSIONS:  1. Severe segmental left ventricular systolic dysfunction.  Hypokinesis of anterolateral wall and basal to mid  anteroseptum. akinesis of anterior wall  2.  Right ventricular enlargement with decreased right  ventricular systolic function.  3.  teathered posterior lealet of mitral valve  Moderate-severe eccentric posteriorly directed mitral  regurgitation.  4.  Severe diastolic dysfunction (Stage III) with elevated  filling pressures and a restrictive filling pattern  5. Estimated pulmonary artery systolic pressure equals 58  mm Hg, assuming right atrial pressure equals 10  mm Hg,  consistent with moderate pulmonary hypertension      10/19: Arterial duplex to look at graft patency showing:Very limited study (overlying dressings and wound-VACdevice).The mid segment of the right femoral artery to below-knepopliteal artery bypass graft appears patent.  The remaining segments of the bypass graft was not visualized.    Antibiotics adjusted. Infectious diease consulted and recommending  cipro 500 mg po q12 through 11/26. Xarelto changed to 15mg once a day for creatinine clearance.     LLE duplex venous showing No evidence of deep venous thrombosis in the visualized right lower extremity.    During hospital course patients diet was slowly advanced as tolerated.  At this time, pt is tolerating a regular diet, ambulating and voiding.  Pt is stable for discharge as per the attending at this time. Pt recommending home with home PT.

## 2018-10-25 NOTE — DISCHARGE NOTE ADULT - MEDICATION SUMMARY - MEDICATIONS TO STOP TAKING
I will STOP taking the medications listed below when I get home from the hospital:    amoxicillin-clavulanate 875 mg-125 mg oral tablet  -- 1 tab(s) by mouth 2 times a day MDD:2

## 2018-10-25 NOTE — DISCHARGE NOTE ADULT - MEDICATION SUMMARY - MEDICATIONS TO TAKE
I will START or STAY ON the medications listed below when I get home from the hospital:    Rolling Walker  -- 1 unit(s)   -- Indication: For home PT    aspirin 81 mg oral delayed release tablet  -- 1 tab(s) by mouth once a day  -- Indication: For Antiplatelet    acetaminophen 325 mg oral tablet  -- 2 tab(s) by mouth every 6 hours, As needed, Mild Pain (1 - 3)  -- Indication: For mild pain    oxyCODONE 5 mg oral tablet  -- 1 tab(s) by mouth every 4 hours, As needed, Moderate to Severe Pain (4 - 6) MDD:4 tabs  -- Indication: For moderate to severe pain    Entresto 24 mg-26 mg oral tablet  -- 1 tab(s) by mouth 2 times a day  -- Indication: For HTN    rivaroxaban 15 mg oral tablet  -- 1 tab(s) by mouth once a day  -- Indication: For Blood thinner    Lantus Solostar Pen 100 units/mL subcutaneous solution  -- 30 unit(s) subcutaneous once a day (at bedtime)  -- Indication: For DM    atorvastatin 40 mg oral tablet  -- 1 tab(s) by mouth once a day (at bedtime)  -- Indication: For HLD    carvedilol 25 mg oral tablet  -- 1 tab(s) by mouth 2 times a day  -- Indication: For HTN    furosemide 40 mg oral tablet  -- 1 tab(s) by mouth 2 times a day  -- Indication: For Diuretic    docusate sodium 100 mg oral capsule  -- 1 cap(s) by mouth once a day  -- Indication: For laxative    senna oral tablet  -- 2 tab(s) by mouth once a day (at bedtime)  -- Indication: For laxative    Cipro 500 mg oral tablet  -- 1 tab(s) by mouth every 12 hours until 11/26/18 MDD:2 tabs  -- Avoid prolonged or excessive exposure to direct and/or artificial sunlight while taking this medication.  Check with your doctor before becoming pregnant.  Do not take dairy products, antacids, or iron preparations within one hour of this medication.  Finish all this medication unless otherwise directed by prescriber.  Medication should be taken with plenty of water.    -- Indication: For Antibiotic

## 2018-10-25 NOTE — DISCHARGE NOTE ADULT - PLAN OF CARE
At discharge, can change to cipro 500 mg po q12 through 11/26. Plan a six week course of abx.  Follow up  with ID outpt in 3-4 weeks [phone] for appointment Recovery from surgery At discharge, can change to cipro 500 mg po q12 through 11/26. Plan a six week course of abx.  Follow up  with ID outaziza in 3-4 weeks [phone] for appointment  WOUND CARE:  VAC changes three times per week. Wet to dry kerlix packed in inferior incisions x 2. Wrap with dry kerlix. Change daily   BATHING: Please do not submerge wound underwater. You may shower and/or sponge bathe.  ACTIVITY: No heavy lifting or straining. Otherwise, you may return to your usual level of physical activity. If you are taking narcotic pain medication (such as Percocet) DO NOT drive a car, operate machinery or make important decisions.  DIET: Return to your usual diet.  NOTIFY YOUR SURGEON IF: You have any bleeding that does not stop, any pus draining from your wound(s), any fever (over 100.4 F) or chills, persistent nausea/vomiting, persistent diarrhea, or if your pain is not controlled on your discharge pain medications.  FOLLOW-UP: Please follow up with your primary care physician in one week regarding your hospitalization  continue Xarelto once a day 15mg. During hospital course creatinine clearance was rising. Please follow up with outpatient cardiologist within one week of discharge Please follow up with primary medical doctor within one week of discharge. You are being treated with antibiotics through 11/26 for your infection, take antibiotics to completion as prescribed. Follow up with Dr. Fung from Infectious disease in 1-2 weeks, call for an appointment, [phone]  WOUND CARE:  VAC changes three times per week. Wet to dry Kerlix packed in inferior incisions x 2. Wrap with dry kerlix. Change daily   BATHING: Please do not submerge wound underwater. You may shower and/or sponge bathe.  ACTIVITY: No heavy lifting or straining. Otherwise, you may return to your usual level of physical activity. If you are taking narcotic pain medication (such as Percocet) DO NOT drive a car, operate machinery or make important decisions.  DIET: Return to your usual diet.  NOTIFY YOUR SURGEON IF: You have any bleeding that does not stop, any pus draining from your wound(s), any fever (over 100.4 F) or chills, persistent nausea/vomiting, persistent diarrhea, or if your pain is not controlled on your discharge pain medications.  FOLLOW-UP: Please follow up with your primary care physician in one week regarding your hospitalization  continue Xarelto once a day 15mg. During hospital course creatinine clearance was rising. Please follow up with your primary care doctor.

## 2018-10-25 NOTE — DISCHARGE NOTE ADULT - SECONDARY DIAGNOSIS.
S/P CABG x 3 Type 2 diabetes mellitus with diabetic peripheral angiopathy and gangrene, with long-term current use of insulin

## 2018-10-25 NOTE — DISCHARGE NOTE ADULT - DURABLE MEDICAL EQUIPMENT AGENCY
[facility] wd vac insurance approved [phone]     to be delivered to hospital 10/30 and pt will have homecare rn provide wd vac dsg change at home- pt family to take home wd vac machine and supplies

## 2018-10-25 NOTE — PROGRESS NOTE ADULT - ASSESSMENT
68y male, s/p LLE CFA to below knee popliteal arterial bypass with PTFE on 9/18, returning with SSI.  CTA showed patent graft, now POD 10 s/p left groin wash out and sartorius flap, wound VAC placement with VAC change today    Plan:  - Continue vac to groin and WTD dressing on medial thigh and calf  - Cont Xarelto 20 QD  - Per ID ok to continue Oral Cipro for 4-6 weeks.  - Next vac change due monday  - dispo planning early next week likely with wound vac

## 2018-10-25 NOTE — PROVIDER CONTACT NOTE (OTHER) - BACKGROUND
Pt is s/p fem-pop bypass presenting with surgical site infection on 10/15.  Dsg was changed in am of 10/25

## 2018-10-25 NOTE — DISCHARGE NOTE ADULT - ADDITIONAL INSTRUCTIONS
Please follow up with surgeon, Dr. Jung within one week of discharge, call office for appointment  Please follow up with infectious disease within 2 weeks of discharge, call office for appointment [phone]

## 2018-10-25 NOTE — PROGRESS NOTE ADULT - SUBJECTIVE AND OBJECTIVE BOX
D Team Surgery Progress Note    SUBJECTIVE: Patient seen and examined at the bedside. Feeling well this morning. No acute events overnight. Tolerating a diet. Voiding appropriately. Pain is well controlled. Has been afebrile and HD stable.     VITALS  T(C): 36.6 (10-26-18 @ 01:11), Max: 36.8 (10-25-18 @ 09:59)  HR: 60 (10-26-18 @ 01:11) (60 - 96)  BP: 138/64 (10-26-18 @ 01:11) (102/61 - 138/64)  RR: 18 (10-26-18 @ 01:11) (17 - 18)  SpO2: 99% (10-26-18 @ 01:11) (99% - 100%)  CAPILLARY BLOOD GLUCOSE      POCT Blood Glucose.: 140 mg/dL (25 Oct 2018 21:43)  POCT Blood Glucose.: 151 mg/dL (25 Oct 2018 17:10)  POCT Blood Glucose.: 189 mg/dL (25 Oct 2018 12:08)  POCT Blood Glucose.: 137 mg/dL (25 Oct 2018 08:16)    Is/Os    10-24 @ 07:01  -  10-25 @ 07:00  --------------------------------------------------------  IN:    IV PiggyBack: 300 mL  Total IN: 300 mL    OUT:    VAC (Vacuum Assisted Closure) System: 400 mL    Voided: 1850 mL  Total OUT: 2250 mL    Total NET: -1950 mL      10-25 @ 07:01  -  10-26 @ 01:56  --------------------------------------------------------  IN:    IV PiggyBack: 400 mL  Total IN: 400 mL    OUT:    VAC (Vacuum Assisted Closure) System: 325 mL    Voided: 750 mL  Total OUT: 1075 mL    Total NET: -675 mL    PHYSICAL EXAM:   General: NAD, Lying in bed comfortably, alert, oriented x3  Abdominal: soft, NT, ND  Extremities: LLE groin wound VAC intact. Middle and lower incisional dressing w/ sanguinous strikethrough, packing and overlying dressing replaced.  Pulses: LLE DP, PT signal    MEDICATIONS (STANDING): aspirin enteric coated 81 milliGRAM(s) Oral daily  atorvastatin 40 milliGRAM(s) Oral at bedtime  carvedilol 25 milliGRAM(s) Oral every 12 hours  ciprofloxacin   IVPB 400 milliGRAM(s) IV Intermittent every 12 hours  dextrose 5%. 1000 milliLiter(s) IV Continuous <Continuous>  dextrose 50% Injectable 12.5 Gram(s) IV Push once  dextrose 50% Injectable 25 Gram(s) IV Push once  dextrose 50% Injectable 25 Gram(s) IV Push once  HYDROmorphone  Injectable 1 milliGRAM(s) IV Push two times a day  influenza   Vaccine 0.5 milliLiter(s) IntraMuscular once  insulin glargine Injectable (LANTUS) 10 Unit(s) SubCutaneous at bedtime  insulin lispro (HumaLOG) corrective regimen sliding scale   SubCutaneous three times a day before meals  insulin lispro Injectable (HumaLOG) 2 Unit(s) SubCutaneous three times a day before meals  lidocaine 1% (Preservative-free) Injectable 30 milliLiter(s) Local Injection once  lidocaine 1% Injectable 10 milliLiter(s) Local Injection once  lidocaine 2% Injectable 30 milliLiter(s) Local Injection once  oxyCODONE    IR 5 milliGRAM(s) Oral daily  rivaroxaban 15 milliGRAM(s) Oral daily    MEDICATIONS (PRN):acetaminophen   Tablet .. 650 milliGRAM(s) Oral every 6 hours PRN Mild Pain (1 - 3)  dextrose 40% Gel 15 Gram(s) Oral once PRN Blood Glucose LESS THAN 70 milliGRAM(s)/deciliter  glucagon  Injectable 1 milliGRAM(s) IntraMuscular once PRN Glucose LESS THAN 70 milligrams/deciliter  oxyCODONE    IR 5 milliGRAM(s) Oral every 4 hours PRN Moderate Pain (4 - 6)  oxyCODONE    IR 10 milliGRAM(s) Oral every 4 hours PRN Severe Pain (7 - 10)    LABS  CBC (10-25 @ 05:10)                              8.9<L>                         7.63    )----------------(  324        --    % Neutrophils, --    % Lymphocytes, ANC: --                                  28.8<L>    BMP (10-25 @ 05:10)             135     |  102     |  24<H> 		Ca++ --      Ca 8.7                ---------------------------------( 121<H>		Mg 1.9                4.6     |  22      |  1.35<H>			Ph 3.3

## 2018-10-25 NOTE — DISCHARGE NOTE ADULT - CARE PROVIDER_API CALL
Nagi Jung), Surgery  Vascular  [address] [address]  Phone: [phone]  Fax: [phone]    Phuc Fung), Infectious Disease  [address] [place] [address]  Phone: [phone]  Fax: [phone]

## 2018-10-25 NOTE — DISCHARGE NOTE ADULT - MEDICATION SUMMARY - MEDICATIONS TO CHANGE
I will SWITCH the dose or number of times a day I take the medications listed below when I get home from the hospital:    rivaroxaban 15 mg oral tablet  -- 1 tab(s) by mouth 2 times a day MDD:2

## 2018-10-25 NOTE — DISCHARGE NOTE ADULT - CARE PLAN
Principal Discharge DX:	Cellulitis of left lower extremity  Assessment and plan of treatment:	At discharge, can change to cipro 500 mg po q12 through 11/26. Plan a six week course of abx.  Follow up  with ID outpt in 3-4 weeks [phone] for appointment Principal Discharge DX:	Cellulitis of left lower extremity  Goal:	Recovery from surgery  Assessment and plan of treatment:	At discharge, can change to cipro 500 mg po q12 through 11/26. Plan a six week course of abx.  Follow up  with LOGAN michel in 3-4 weeks [phone] for appointment  WOUND CARE:  VAC changes three times per week. Wet to dry kerlix packed in inferior incisions x 2. Wrap with dry kerlix. Change daily   BATHING: Please do not submerge wound underwater. You may shower and/or sponge bathe.  ACTIVITY: No heavy lifting or straining. Otherwise, you may return to your usual level of physical activity. If you are taking narcotic pain medication (such as Percocet) DO NOT drive a car, operate machinery or make important decisions.  DIET: Return to your usual diet.  NOTIFY YOUR SURGEON IF: You have any bleeding that does not stop, any pus draining from your wound(s), any fever (over 100.4 F) or chills, persistent nausea/vomiting, persistent diarrhea, or if your pain is not controlled on your discharge pain medications.  FOLLOW-UP: Please follow up with your primary care physician in one week regarding your hospitalization  continue Xarelto once a day 15mg. During hospital course creatinine clearance was rising.  Secondary Diagnosis:	S/P CABG x 3  Goal:	Please follow up with outpatient cardiologist within one week of discharge  Secondary Diagnosis:	Type 2 diabetes mellitus with diabetic peripheral angiopathy and gangrene, with long-term current use of insulin  Goal:	Please follow up with primary medical doctor within one week of discharge. Principal Discharge DX:	Cellulitis of left lower extremity  Goal:	Recovery from surgery  Assessment and plan of treatment:	You are being treated with antibiotics through 11/26 for your infection, take antibiotics to completion as prescribed. Follow up with Dr. Fung from Infectious disease in 1-2 weeks, call for an appointment, [phone]  WOUND CARE:  VAC changes three times per week. Wet to dry Kerlix packed in inferior incisions x 2. Wrap with dry kerlix. Change daily   BATHING: Please do not submerge wound underwater. You may shower and/or sponge bathe.  ACTIVITY: No heavy lifting or straining. Otherwise, you may return to your usual level of physical activity. If you are taking narcotic pain medication (such as Percocet) DO NOT drive a car, operate machinery or make important decisions.  DIET: Return to your usual diet.  NOTIFY YOUR SURGEON IF: You have any bleeding that does not stop, any pus draining from your wound(s), any fever (over 100.4 F) or chills, persistent nausea/vomiting, persistent diarrhea, or if your pain is not controlled on your discharge pain medications.  FOLLOW-UP: Please follow up with your primary care physician in one week regarding your hospitalization  continue Xarelto once a day 15mg. During hospital course creatinine clearance was rising. Please follow up with your primary care doctor.  Secondary Diagnosis:	S/P CABG x 3  Goal:	Please follow up with outpatient cardiologist within one week of discharge  Secondary Diagnosis:	Type 2 diabetes mellitus with diabetic peripheral angiopathy and gangrene, with long-term current use of insulin  Goal:	Please follow up with primary medical doctor within one week of discharge.

## 2018-10-25 NOTE — DISCHARGE NOTE ADULT - HOME CARE AGENCY
[facility] [phone]       Rn to call to schedule home visit within 24 hr after hospital d/c

## 2018-10-26 LAB
BUN SERPL-MCNC: 23 MG/DL — SIGNIFICANT CHANGE UP (ref 7–23)
CALCIUM SERPL-MCNC: 8.5 MG/DL — SIGNIFICANT CHANGE UP (ref 8.4–10.5)
CHLORIDE SERPL-SCNC: 100 MMOL/L — SIGNIFICANT CHANGE UP (ref 98–107)
CO2 SERPL-SCNC: 23 MMOL/L — SIGNIFICANT CHANGE UP (ref 22–31)
CREAT SERPL-MCNC: 1.47 MG/DL — HIGH (ref 0.5–1.3)
GLUCOSE SERPL-MCNC: 123 MG/DL — HIGH (ref 70–99)
HCT VFR BLD CALC: 28.7 % — LOW (ref 39–50)
HGB BLD-MCNC: 9 G/DL — LOW (ref 13–17)
MAGNESIUM SERPL-MCNC: 2 MG/DL — SIGNIFICANT CHANGE UP (ref 1.6–2.6)
MCHC RBC-ENTMCNC: 25.8 PG — LOW (ref 27–34)
MCHC RBC-ENTMCNC: 31.4 % — LOW (ref 32–36)
MCV RBC AUTO: 82.2 FL — SIGNIFICANT CHANGE UP (ref 80–100)
NRBC # FLD: 0.02 — SIGNIFICANT CHANGE UP
PHOSPHATE SERPL-MCNC: 3.4 MG/DL — SIGNIFICANT CHANGE UP (ref 2.5–4.5)
PLATELET # BLD AUTO: 339 K/UL — SIGNIFICANT CHANGE UP (ref 150–400)
PMV BLD: 11.4 FL — SIGNIFICANT CHANGE UP (ref 7–13)
POTASSIUM SERPL-MCNC: 4.9 MMOL/L — SIGNIFICANT CHANGE UP (ref 3.5–5.3)
POTASSIUM SERPL-SCNC: 4.9 MMOL/L — SIGNIFICANT CHANGE UP (ref 3.5–5.3)
RBC # BLD: 3.49 M/UL — LOW (ref 4.2–5.8)
RBC # FLD: 18.9 % — HIGH (ref 10.3–14.5)
SODIUM SERPL-SCNC: 134 MMOL/L — LOW (ref 135–145)
WBC # BLD: 7.97 K/UL — SIGNIFICANT CHANGE UP (ref 3.8–10.5)
WBC # FLD AUTO: 7.97 K/UL — SIGNIFICANT CHANGE UP (ref 3.8–10.5)

## 2018-10-26 RX ORDER — SENNA PLUS 8.6 MG/1
2 TABLET ORAL AT BEDTIME
Qty: 0 | Refills: 0 | Status: DISCONTINUED | OUTPATIENT
Start: 2018-10-26 | End: 2018-10-30

## 2018-10-26 RX ORDER — SENNA PLUS 8.6 MG/1
1 TABLET ORAL ONCE
Qty: 0 | Refills: 0 | Status: COMPLETED | OUTPATIENT
Start: 2018-10-26 | End: 2018-10-26

## 2018-10-26 RX ORDER — DOCUSATE SODIUM 100 MG
100 CAPSULE ORAL DAILY
Qty: 0 | Refills: 0 | Status: DISCONTINUED | OUTPATIENT
Start: 2018-10-26 | End: 2018-10-30

## 2018-10-26 RX ADMIN — ATORVASTATIN CALCIUM 40 MILLIGRAM(S): 80 TABLET, FILM COATED ORAL at 22:30

## 2018-10-26 RX ADMIN — Medication 200 MILLIGRAM(S): at 22:30

## 2018-10-26 RX ADMIN — OXYCODONE HYDROCHLORIDE 10 MILLIGRAM(S): 5 TABLET ORAL at 20:32

## 2018-10-26 RX ADMIN — Medication 2 UNIT(S): at 17:06

## 2018-10-26 RX ADMIN — Medication 100 MILLIGRAM(S): at 11:31

## 2018-10-26 RX ADMIN — Medication 2 UNIT(S): at 08:19

## 2018-10-26 RX ADMIN — CARVEDILOL PHOSPHATE 25 MILLIGRAM(S): 80 CAPSULE, EXTENDED RELEASE ORAL at 10:28

## 2018-10-26 RX ADMIN — Medication 2 UNIT(S): at 12:10

## 2018-10-26 RX ADMIN — Medication 200 MILLIGRAM(S): at 09:09

## 2018-10-26 RX ADMIN — OXYCODONE HYDROCHLORIDE 10 MILLIGRAM(S): 5 TABLET ORAL at 07:35

## 2018-10-26 RX ADMIN — SENNA PLUS 2 TABLET(S): 8.6 TABLET ORAL at 22:30

## 2018-10-26 RX ADMIN — RIVAROXABAN 15 MILLIGRAM(S): KIT at 11:31

## 2018-10-26 RX ADMIN — SENNA PLUS 1 TABLET(S): 8.6 TABLET ORAL at 09:08

## 2018-10-26 RX ADMIN — OXYCODONE HYDROCHLORIDE 10 MILLIGRAM(S): 5 TABLET ORAL at 21:02

## 2018-10-26 RX ADMIN — OXYCODONE HYDROCHLORIDE 10 MILLIGRAM(S): 5 TABLET ORAL at 08:20

## 2018-10-26 RX ADMIN — Medication 4: at 17:06

## 2018-10-26 RX ADMIN — Medication 81 MILLIGRAM(S): at 11:31

## 2018-10-26 RX ADMIN — INSULIN GLARGINE 10 UNIT(S): 100 INJECTION, SOLUTION SUBCUTANEOUS at 22:31

## 2018-10-26 NOTE — PROVIDER CONTACT NOTE (OTHER) - BACKGROUND
Pt is s/p fem-pop bypass presenting with surgical site infection s/p exploration of wound on thigh and groin on 10/15.

## 2018-10-26 NOTE — PROVIDER CONTACT NOTE (OTHER) - RECOMMENDATIONS
hold vanco dose
Come check out the patient
IVF? PO fluids encouraged.
MD to be made aware. RN to continue to monitor.

## 2018-10-26 NOTE — PROGRESS NOTE ADULT - SUBJECTIVE AND OBJECTIVE BOX
S: Patient seen and examined.  No acute events overnight.  Pain controlled.     O: Vital Signs  T(C): 36.4 (10-26 @ 14:00), Max: 36.7 (10-26 @ 06:28)  HR: 66 (10-26 @ 14:00) (60 - 96)  BP: 110/65 (10-26 @ 14:00) (108/56 - 138/64)  RR: 16 (10-26 @ 14:00) (16 - 18)  SpO2: 100% (10-26 @ 14:00) (99% - 100%)  10-25-18 @ 07:01  -  10-26-18 @ 07:00  --------------------------------------------------------  IN: 400 mL / OUT: 1500 mL / NET: -1100 mL    10-26-18 @ 07:01  -  10-26-18 @ 14:04  --------------------------------------------------------  IN: 0 mL / OUT: 625 mL / NET: -625 mL      PHYSICAL EXAM:   General: NAD, Lying in bed comfortably, alert, oriented x3  Abdominal: soft, NT, ND  Extremities: LLE groin wound VAC intact. Middle and lower incisional dressing w/ sanguinous strikethrough, packing and overlying dressing replaced.  Pulses: LLE DP, PT signal                          9.0    7.97  )-----------( 339      ( 26 Oct 2018 05:10 )             28.7   10-26    134<L>  |  100  |  23  ----------------------------<  123<H>  4.9   |  23  |  1.47<H>    Ca    8.5      26 Oct 2018 05:10  Phos  3.4     10-26  Mg     2.0     10-26

## 2018-10-26 NOTE — PROGRESS NOTE ADULT - ASSESSMENT
68y male, s/p LLE CFA to below knee popliteal arterial bypass with PTFE on 9/18, returning with SSI.  CTA showed patent graft, now POD 11 s/p left groin wash out and sartorius flap, wound VAC placement with VAC change yesterday    Plan:  - Continue vac to groin and WTD dressing on medial thigh and calf  - Cont Xarelto 15mg QD  - Per ID ok to continue Oral Cipro for 6 weeks.  - Next vac change due monday  - dispo planning for monday following wound vac change

## 2018-10-27 LAB
BUN SERPL-MCNC: 22 MG/DL — SIGNIFICANT CHANGE UP (ref 7–23)
CALCIUM SERPL-MCNC: 8.7 MG/DL — SIGNIFICANT CHANGE UP (ref 8.4–10.5)
CHLORIDE SERPL-SCNC: 99 MMOL/L — SIGNIFICANT CHANGE UP (ref 98–107)
CO2 SERPL-SCNC: 20 MMOL/L — LOW (ref 22–31)
CREAT SERPL-MCNC: 1.4 MG/DL — HIGH (ref 0.5–1.3)
GLUCOSE SERPL-MCNC: 138 MG/DL — HIGH (ref 70–99)
HCT VFR BLD CALC: 29.3 % — LOW (ref 39–50)
HGB BLD-MCNC: 9 G/DL — LOW (ref 13–17)
MAGNESIUM SERPL-MCNC: 2 MG/DL — SIGNIFICANT CHANGE UP (ref 1.6–2.6)
MCHC RBC-ENTMCNC: 25.1 PG — LOW (ref 27–34)
MCHC RBC-ENTMCNC: 30.7 % — LOW (ref 32–36)
MCV RBC AUTO: 81.8 FL — SIGNIFICANT CHANGE UP (ref 80–100)
NRBC # FLD: 0.03 — SIGNIFICANT CHANGE UP
PHOSPHATE SERPL-MCNC: 3.5 MG/DL — SIGNIFICANT CHANGE UP (ref 2.5–4.5)
PLATELET # BLD AUTO: 320 K/UL — SIGNIFICANT CHANGE UP (ref 150–400)
PMV BLD: 11.6 FL — SIGNIFICANT CHANGE UP (ref 7–13)
POTASSIUM SERPL-MCNC: 5.2 MMOL/L — SIGNIFICANT CHANGE UP (ref 3.5–5.3)
POTASSIUM SERPL-SCNC: 5.2 MMOL/L — SIGNIFICANT CHANGE UP (ref 3.5–5.3)
RBC # BLD: 3.58 M/UL — LOW (ref 4.2–5.8)
RBC # FLD: 19 % — HIGH (ref 10.3–14.5)
SODIUM SERPL-SCNC: 133 MMOL/L — LOW (ref 135–145)
WBC # BLD: 6.94 K/UL — SIGNIFICANT CHANGE UP (ref 3.8–10.5)
WBC # FLD AUTO: 6.94 K/UL — SIGNIFICANT CHANGE UP (ref 3.8–10.5)

## 2018-10-27 RX ADMIN — OXYCODONE HYDROCHLORIDE 10 MILLIGRAM(S): 5 TABLET ORAL at 06:20

## 2018-10-27 RX ADMIN — RIVAROXABAN 15 MILLIGRAM(S): KIT at 11:04

## 2018-10-27 RX ADMIN — ATORVASTATIN CALCIUM 40 MILLIGRAM(S): 80 TABLET, FILM COATED ORAL at 22:46

## 2018-10-27 RX ADMIN — Medication 2 UNIT(S): at 17:26

## 2018-10-27 RX ADMIN — OXYCODONE HYDROCHLORIDE 10 MILLIGRAM(S): 5 TABLET ORAL at 05:50

## 2018-10-27 RX ADMIN — Medication 2 UNIT(S): at 12:37

## 2018-10-27 RX ADMIN — SENNA PLUS 2 TABLET(S): 8.6 TABLET ORAL at 22:46

## 2018-10-27 RX ADMIN — INSULIN GLARGINE 10 UNIT(S): 100 INJECTION, SOLUTION SUBCUTANEOUS at 22:47

## 2018-10-27 RX ADMIN — CARVEDILOL PHOSPHATE 25 MILLIGRAM(S): 80 CAPSULE, EXTENDED RELEASE ORAL at 22:46

## 2018-10-27 RX ADMIN — Medication 200 MILLIGRAM(S): at 22:47

## 2018-10-27 RX ADMIN — Medication 2 UNIT(S): at 08:42

## 2018-10-27 RX ADMIN — Medication 81 MILLIGRAM(S): at 11:04

## 2018-10-27 RX ADMIN — CARVEDILOL PHOSPHATE 25 MILLIGRAM(S): 80 CAPSULE, EXTENDED RELEASE ORAL at 11:04

## 2018-10-27 RX ADMIN — Medication 100 MILLIGRAM(S): at 11:04

## 2018-10-27 RX ADMIN — Medication 200 MILLIGRAM(S): at 08:59

## 2018-10-27 RX ADMIN — Medication 4: at 12:37

## 2018-10-27 RX ADMIN — Medication 4: at 08:41

## 2018-10-27 NOTE — PROGRESS NOTE ADULT - SUBJECTIVE AND OBJECTIVE BOX
S: Patient seen and examined.  No acute events overnight.  Pain controlled.     O: Vital Signs  T(C): 36.3 (10-27 @ 13:34), Max: 36.6 (10-26 @ 17:47)  HR: 63 (10-27 @ 13:34) (63 - 76)  BP: 115/63 (10-27 @ 13:34) (105/57 - 117/80)  RR: 17 (10-27 @ 13:34) (16 - 18)  SpO2: 100% (10-27 @ 13:34) (99% - 100%)  10-26-18 @ 07:01  -  10-27-18 @ 07:00  --------------------------------------------------------  IN: 400 mL / OUT: 2075 mL / NET: -1675 mL    10-27-18 @ 07:01  -  10-27-18 @ 16:13  --------------------------------------------------------  IN: 0 mL / OUT: 450 mL / NET: -450 mL        PHYSICAL EXAM:   General: NAD, Lying in bed comfortably, alert, oriented x3  Abdominal: soft, NT, ND  Extremities: LLE groin wound VAC intact. Middle and lower incisional dressing w/ sanguinous strikethrough, packing and overlying dressing replaced.  Pulses: LLE DP, PT signal                          9.0    6.94  )-----------( 320      ( 27 Oct 2018 05:32 )             29.3   10-27    133<L>  |  99  |  22  ----------------------------<  138<H>  5.2   |  20<L>  |  1.40<H>    Ca    8.7      27 Oct 2018 05:32  Phos  3.5     10-27  Mg     2.0     10-27

## 2018-10-27 NOTE — PROGRESS NOTE ADULT - ASSESSMENT
68y male, s/p LLE CFA to below knee popliteal arterial bypass with PTFE on 9/18, returning with SSI.  CTA showed patent graft, now POD 11 s/p left groin wash out and sartorius flap, wound VAC placement with VAC change 10/25    Plan:  - Continue vac to groin and WTD dressing on medial thigh and calf  - Cont Xarelto 15mg QD  - Per ID ok to continue Oral Cipro for 6 weeks  - Next vac change due monday  - dispo planning for monday following wound vac change

## 2018-10-28 LAB
BUN SERPL-MCNC: 21 MG/DL — SIGNIFICANT CHANGE UP (ref 7–23)
CALCIUM SERPL-MCNC: 8.4 MG/DL — SIGNIFICANT CHANGE UP (ref 8.4–10.5)
CHLORIDE SERPL-SCNC: 97 MMOL/L — LOW (ref 98–107)
CO2 SERPL-SCNC: 23 MMOL/L — SIGNIFICANT CHANGE UP (ref 22–31)
CREAT SERPL-MCNC: 1.3 MG/DL — SIGNIFICANT CHANGE UP (ref 0.5–1.3)
GLUCOSE SERPL-MCNC: 130 MG/DL — HIGH (ref 70–99)
HCT VFR BLD CALC: 28.9 % — LOW (ref 39–50)
HGB BLD-MCNC: 9 G/DL — LOW (ref 13–17)
MAGNESIUM SERPL-MCNC: 2 MG/DL — SIGNIFICANT CHANGE UP (ref 1.6–2.6)
MCHC RBC-ENTMCNC: 25.4 PG — LOW (ref 27–34)
MCHC RBC-ENTMCNC: 31.1 % — LOW (ref 32–36)
MCV RBC AUTO: 81.6 FL — SIGNIFICANT CHANGE UP (ref 80–100)
NRBC # FLD: 0 — SIGNIFICANT CHANGE UP
PHOSPHATE SERPL-MCNC: 3.3 MG/DL — SIGNIFICANT CHANGE UP (ref 2.5–4.5)
PLATELET # BLD AUTO: 319 K/UL — SIGNIFICANT CHANGE UP (ref 150–400)
PMV BLD: 11.5 FL — SIGNIFICANT CHANGE UP (ref 7–13)
POTASSIUM SERPL-MCNC: 4.7 MMOL/L — SIGNIFICANT CHANGE UP (ref 3.5–5.3)
POTASSIUM SERPL-SCNC: 4.7 MMOL/L — SIGNIFICANT CHANGE UP (ref 3.5–5.3)
RBC # BLD: 3.54 M/UL — LOW (ref 4.2–5.8)
RBC # FLD: 19 % — HIGH (ref 10.3–14.5)
SODIUM SERPL-SCNC: 132 MMOL/L — LOW (ref 135–145)
WBC # BLD: 6.87 K/UL — SIGNIFICANT CHANGE UP (ref 3.8–10.5)
WBC # FLD AUTO: 6.87 K/UL — SIGNIFICANT CHANGE UP (ref 3.8–10.5)

## 2018-10-28 RX ADMIN — Medication 2 UNIT(S): at 12:34

## 2018-10-28 RX ADMIN — Medication 2 UNIT(S): at 08:27

## 2018-10-28 RX ADMIN — INSULIN GLARGINE 10 UNIT(S): 100 INJECTION, SOLUTION SUBCUTANEOUS at 22:57

## 2018-10-28 RX ADMIN — Medication 81 MILLIGRAM(S): at 12:35

## 2018-10-28 RX ADMIN — CARVEDILOL PHOSPHATE 25 MILLIGRAM(S): 80 CAPSULE, EXTENDED RELEASE ORAL at 22:57

## 2018-10-28 RX ADMIN — SENNA PLUS 2 TABLET(S): 8.6 TABLET ORAL at 22:57

## 2018-10-28 RX ADMIN — HYDROMORPHONE HYDROCHLORIDE 1 MILLIGRAM(S): 2 INJECTION INTRAMUSCULAR; INTRAVENOUS; SUBCUTANEOUS at 17:31

## 2018-10-28 RX ADMIN — RIVAROXABAN 15 MILLIGRAM(S): KIT at 12:35

## 2018-10-28 RX ADMIN — Medication 200 MILLIGRAM(S): at 22:57

## 2018-10-28 RX ADMIN — Medication 2 UNIT(S): at 17:31

## 2018-10-28 RX ADMIN — Medication 4: at 17:30

## 2018-10-28 RX ADMIN — HYDROMORPHONE HYDROCHLORIDE 1 MILLIGRAM(S): 2 INJECTION INTRAMUSCULAR; INTRAVENOUS; SUBCUTANEOUS at 17:45

## 2018-10-28 RX ADMIN — Medication 100 MILLIGRAM(S): at 12:35

## 2018-10-28 RX ADMIN — OXYCODONE HYDROCHLORIDE 10 MILLIGRAM(S): 5 TABLET ORAL at 05:48

## 2018-10-28 RX ADMIN — ATORVASTATIN CALCIUM 40 MILLIGRAM(S): 80 TABLET, FILM COATED ORAL at 22:57

## 2018-10-28 RX ADMIN — OXYCODONE HYDROCHLORIDE 10 MILLIGRAM(S): 5 TABLET ORAL at 05:18

## 2018-10-28 RX ADMIN — Medication 4: at 12:34

## 2018-10-28 RX ADMIN — Medication 200 MILLIGRAM(S): at 10:44

## 2018-10-28 NOTE — PROGRESS NOTE ADULT - SUBJECTIVE AND OBJECTIVE BOX
S: Patient seen and examined.  No acute events overnight.  Pain controlled.     Vital Signs Last 24 Hrs  T(C): 36.4 (28 Oct 2018 14:45), Max: 36.9 (27 Oct 2018 22:01)  T(F): 97.5 (28 Oct 2018 14:45), Max: 98.5 (27 Oct 2018 22:01)  HR: 58 (28 Oct 2018 14:45) (58 - 71)  BP: 104/47 (28 Oct 2018 14:45) (104/47 - 122/72)  BP(mean): --  RR: 18 (28 Oct 2018 14:45) (16 - 19)  SpO2: 100% (28 Oct 2018 14:45) (97% - 100%)    I&O's Summary    27 Oct 2018 07:01  -  28 Oct 2018 07:00  --------------------------------------------------------  IN: 200 mL / OUT: 900 mL / NET: -700 mL    28 Oct 2018 07:01  -  28 Oct 2018 16:20  --------------------------------------------------------  IN: 0 mL / OUT: 500 mL / NET: -500 mL        PHYSICAL EXAM:   General: NAD, Lying in bed comfortably, alert, oriented x3  Abdominal: soft, NT, ND  Extremities: LLE groin wound VAC intact. Middle and lower incisional dressing c/d/i  Pulses: LLE DP, PT signal                               9.0    6.87  )-----------( 319      ( 28 Oct 2018 05:53 )             28.9       10-28    132<L>  |  97<L>  |  21  ----------------------------<  130<H>  4.7   |  23  |  1.30    Ca    8.4      28 Oct 2018 05:53  Phos  3.3     10-28  Mg     2.0     10-28

## 2018-10-28 NOTE — PROGRESS NOTE ADULT - ASSESSMENT
68y male, s/p LLE CFA to below knee popliteal arterial bypass with PTFE on 9/18, returning with SSI.  CTA showed patent graft, now POD 12 s/p left groin wash out and sartorius flap, wound VAC placement.    Plan:  - Continue vac to groin and WTD dressing on medial thigh and calf  - Cont Xarelto 15mg QD  - Per ID ok to continue Oral Cipro for 6 weeks  - Next vac change due monday  - dispo planning for monday following wound vac change

## 2018-10-29 RX ORDER — INSULIN LISPRO 100/ML
VIAL (ML) SUBCUTANEOUS AT BEDTIME
Qty: 0 | Refills: 0 | Status: DISCONTINUED | OUTPATIENT
Start: 2018-10-29 | End: 2018-10-30

## 2018-10-29 RX ORDER — DOCUSATE SODIUM 100 MG
1 CAPSULE ORAL
Qty: 0 | Refills: 0 | COMMUNITY
Start: 2018-10-29

## 2018-10-29 RX ORDER — MOXIFLOXACIN HYDROCHLORIDE TABLETS, 400 MG 400 MG/1
1 TABLET, FILM COATED ORAL
Qty: 56 | Refills: 0 | OUTPATIENT
Start: 2018-10-29 | End: 2018-11-25

## 2018-10-29 RX ORDER — SENNA PLUS 8.6 MG/1
2 TABLET ORAL
Qty: 0 | Refills: 0 | COMMUNITY
Start: 2018-10-29

## 2018-10-29 RX ORDER — OXYCODONE HYDROCHLORIDE 5 MG/1
1 TABLET ORAL
Qty: 12 | Refills: 0 | OUTPATIENT
Start: 2018-10-29 | End: 2018-10-30

## 2018-10-29 RX ORDER — RIVAROXABAN 15 MG-20MG
1 KIT ORAL
Qty: 0 | Refills: 0 | COMMUNITY
Start: 2018-10-29

## 2018-10-29 RX ADMIN — CARVEDILOL PHOSPHATE 25 MILLIGRAM(S): 80 CAPSULE, EXTENDED RELEASE ORAL at 10:46

## 2018-10-29 RX ADMIN — Medication 200 MILLIGRAM(S): at 21:04

## 2018-10-29 RX ADMIN — HYDROMORPHONE HYDROCHLORIDE 1 MILLIGRAM(S): 2 INJECTION INTRAMUSCULAR; INTRAVENOUS; SUBCUTANEOUS at 06:20

## 2018-10-29 RX ADMIN — ATORVASTATIN CALCIUM 40 MILLIGRAM(S): 80 TABLET, FILM COATED ORAL at 22:05

## 2018-10-29 RX ADMIN — HYDROMORPHONE HYDROCHLORIDE 1 MILLIGRAM(S): 2 INJECTION INTRAMUSCULAR; INTRAVENOUS; SUBCUTANEOUS at 05:43

## 2018-10-29 RX ADMIN — Medication 2: at 22:06

## 2018-10-29 RX ADMIN — Medication 200 MILLIGRAM(S): at 08:27

## 2018-10-29 RX ADMIN — Medication 2 UNIT(S): at 12:23

## 2018-10-29 RX ADMIN — OXYCODONE HYDROCHLORIDE 5 MILLIGRAM(S): 5 TABLET ORAL at 18:20

## 2018-10-29 RX ADMIN — RIVAROXABAN 15 MILLIGRAM(S): KIT at 11:38

## 2018-10-29 RX ADMIN — OXYCODONE HYDROCHLORIDE 5 MILLIGRAM(S): 5 TABLET ORAL at 07:47

## 2018-10-29 RX ADMIN — Medication 81 MILLIGRAM(S): at 11:38

## 2018-10-29 RX ADMIN — Medication 2 UNIT(S): at 17:25

## 2018-10-29 RX ADMIN — Medication 100 MILLIGRAM(S): at 11:38

## 2018-10-29 RX ADMIN — INSULIN GLARGINE 10 UNIT(S): 100 INJECTION, SOLUTION SUBCUTANEOUS at 22:06

## 2018-10-29 RX ADMIN — CARVEDILOL PHOSPHATE 25 MILLIGRAM(S): 80 CAPSULE, EXTENDED RELEASE ORAL at 22:11

## 2018-10-29 RX ADMIN — SENNA PLUS 2 TABLET(S): 8.6 TABLET ORAL at 22:05

## 2018-10-29 RX ADMIN — Medication 4: at 12:23

## 2018-10-29 RX ADMIN — Medication 2 UNIT(S): at 08:25

## 2018-10-29 RX ADMIN — OXYCODONE HYDROCHLORIDE 5 MILLIGRAM(S): 5 TABLET ORAL at 08:28

## 2018-10-29 RX ADMIN — OXYCODONE HYDROCHLORIDE 5 MILLIGRAM(S): 5 TABLET ORAL at 17:26

## 2018-10-29 NOTE — PROGRESS NOTE ADULT - SUBJECTIVE AND OBJECTIVE BOX
D Team Surgery Progress Note    SUBJECTIVE: Patient seen and examined at the bedside. Feeling well this morning.   VITALS  T(C): 37.2 (10-29-18 @ 05:42), Max: 37.2 (10-29-18 @ 05:42)  HR: 64 (10-29-18 @ 05:42) (58 - 77)  BP: 111/73 (10-29-18 @ 05:42) (104/47 - 122/65)  RR: 18 (10-29-18 @ 05:42) (16 - 19)  SpO2: 99% (10-29-18 @ 05:42) (99% - 100%)  CAPILLARY BLOOD GLUCOSE      POCT Blood Glucose.: 132 mg/dL (28 Oct 2018 22:15)  POCT Blood Glucose.: 152 mg/dL (28 Oct 2018 17:28)  POCT Blood Glucose.: 164 mg/dL (28 Oct 2018 12:08)  POCT Blood Glucose.: 140 mg/dL (28 Oct 2018 08:08)    Is/Os    10-27 @ 07:01  -  10-28 @ 07:00  --------------------------------------------------------  IN:    IV PiggyBack: 200 mL  Total IN: 200 mL    OUT:    VAC (Vacuum Assisted Closure) System: 300 mL    Voided: 600 mL  Total OUT: 900 mL    Total NET: -700 mL      10-28 @ 07:01  -  10-29 @ 06:06  --------------------------------------------------------  IN:    IV PiggyBack: 200 mL    Oral Fluid: 150 mL  Total IN: 350 mL    OUT:    VAC (Vacuum Assisted Closure) System: 150 mL    Voided: 900 mL  Total OUT: 1050 mL    Total NET: -700 mL    PHYSICAL EXAM:  General: NAD, Lying in bed comfortably, alert, oriented x3  Abdominal: soft, NT, ND  Extremities: LLE groin wound VAC intact. Middle and lower incisional dressing c/d/i  Pulses: LLE DP, PT signal    MEDICATIONS (STANDING): aspirin enteric coated 81 milliGRAM(s) Oral daily  atorvastatin 40 milliGRAM(s) Oral at bedtime  carvedilol 25 milliGRAM(s) Oral every 12 hours  ciprofloxacin   IVPB 400 milliGRAM(s) IV Intermittent every 12 hours  dextrose 5%. 1000 milliLiter(s) IV Continuous <Continuous>  dextrose 50% Injectable 12.5 Gram(s) IV Push once  dextrose 50% Injectable 25 Gram(s) IV Push once  dextrose 50% Injectable 25 Gram(s) IV Push once  docusate sodium 100 milliGRAM(s) Oral daily  HYDROmorphone  Injectable 1 milliGRAM(s) IV Push two times a day  influenza   Vaccine 0.5 milliLiter(s) IntraMuscular once  insulin glargine Injectable (LANTUS) 10 Unit(s) SubCutaneous at bedtime  insulin lispro (HumaLOG) corrective regimen sliding scale   SubCutaneous three times a day before meals  insulin lispro Injectable (HumaLOG) 2 Unit(s) SubCutaneous three times a day before meals  lidocaine 1% (Preservative-free) Injectable 30 milliLiter(s) Local Injection once  lidocaine 1% Injectable 10 milliLiter(s) Local Injection once  lidocaine 2% Injectable 30 milliLiter(s) Local Injection once  oxyCODONE    IR 5 milliGRAM(s) Oral daily  rivaroxaban 15 milliGRAM(s) Oral daily  senna 2 Tablet(s) Oral at bedtime    MEDICATIONS (PRN):acetaminophen   Tablet .. 650 milliGRAM(s) Oral every 6 hours PRN Mild Pain (1 - 3)  dextrose 40% Gel 15 Gram(s) Oral once PRN Blood Glucose LESS THAN 70 milliGRAM(s)/deciliter  glucagon  Injectable 1 milliGRAM(s) IntraMuscular once PRN Glucose LESS THAN 70 milligrams/deciliter  oxyCODONE    IR 5 milliGRAM(s) Oral every 4 hours PRN Moderate Pain (4 - 6)  oxyCODONE    IR 10 milliGRAM(s) Oral every 4 hours PRN Severe Pain (7 - 10)    LABS  CBC (10-28 @ 05:53)                              9.0<L>                         6.87    )----------------(  319        --    % Neutrophils, --    % Lymphocytes, ANC: --                                  28.9<L>    BMP (10-28 @ 05:53)             132<L>  |  97<L>   |  21    		Ca++ --      Ca 8.4                ---------------------------------( 130<H>		Mg 2.0                4.7     |  23      |  1.30  			Ph 3.3                 IMAGING STUDIES    ASSESSMENT  68y male s/p  ***    PLAN  - Diet:   - Pain control with PO/IV medications.    - Continue home medications  - OOB, ambulate as tolerated  - continue chemical VTE ppx  - Will discuss with attending. D Team Surgery Progress Note    SUBJECTIVE: Patient seen and examined at the bedside. No acute events overnight. Feeling well this morning. Pain is improved.     VITALS  T(C): 37.2 (10-29-18 @ 05:42), Max: 37.2 (10-29-18 @ 05:42)  HR: 64 (10-29-18 @ 05:42) (58 - 77)  BP: 111/73 (10-29-18 @ 05:42) (104/47 - 122/65)  RR: 18 (10-29-18 @ 05:42) (16 - 19)  SpO2: 99% (10-29-18 @ 05:42) (99% - 100%)  CAPILLARY BLOOD GLUCOSE      POCT Blood Glucose.: 132 mg/dL (28 Oct 2018 22:15)  POCT Blood Glucose.: 152 mg/dL (28 Oct 2018 17:28)  POCT Blood Glucose.: 164 mg/dL (28 Oct 2018 12:08)  POCT Blood Glucose.: 140 mg/dL (28 Oct 2018 08:08)    Is/Os    10-27 @ 07:01  -  10-28 @ 07:00  --------------------------------------------------------  IN:    IV PiggyBack: 200 mL  Total IN: 200 mL    OUT:    VAC (Vacuum Assisted Closure) System: 300 mL    Voided: 600 mL  Total OUT: 900 mL    Total NET: -700 mL      10-28 @ 07:01  -  10-29 @ 06:06  --------------------------------------------------------  IN:    IV PiggyBack: 200 mL    Oral Fluid: 150 mL  Total IN: 350 mL    OUT:    VAC (Vacuum Assisted Closure) System: 150 mL    Voided: 900 mL  Total OUT: 1050 mL    Total NET: -700 mL    PHYSICAL EXAM:  General: NAD, Lying in bed comfortably, alert, oriented x3  Abdominal: soft, NT, ND  Extremities: LLE groin wound VAC intact. Middle and lower incisional dressing c/d/i  Pulses: LLE DP, PT signal    MEDICATIONS (STANDING): aspirin enteric coated 81 milliGRAM(s) Oral daily  atorvastatin 40 milliGRAM(s) Oral at bedtime  carvedilol 25 milliGRAM(s) Oral every 12 hours  ciprofloxacin   IVPB 400 milliGRAM(s) IV Intermittent every 12 hours  dextrose 5%. 1000 milliLiter(s) IV Continuous <Continuous>  dextrose 50% Injectable 12.5 Gram(s) IV Push once  dextrose 50% Injectable 25 Gram(s) IV Push once  dextrose 50% Injectable 25 Gram(s) IV Push once  docusate sodium 100 milliGRAM(s) Oral daily  HYDROmorphone  Injectable 1 milliGRAM(s) IV Push two times a day  influenza   Vaccine 0.5 milliLiter(s) IntraMuscular once  insulin glargine Injectable (LANTUS) 10 Unit(s) SubCutaneous at bedtime  insulin lispro (HumaLOG) corrective regimen sliding scale   SubCutaneous three times a day before meals  insulin lispro Injectable (HumaLOG) 2 Unit(s) SubCutaneous three times a day before meals  lidocaine 1% (Preservative-free) Injectable 30 milliLiter(s) Local Injection once  lidocaine 1% Injectable 10 milliLiter(s) Local Injection once  lidocaine 2% Injectable 30 milliLiter(s) Local Injection once  oxyCODONE    IR 5 milliGRAM(s) Oral daily  rivaroxaban 15 milliGRAM(s) Oral daily  senna 2 Tablet(s) Oral at bedtime    MEDICATIONS (PRN):acetaminophen   Tablet .. 650 milliGRAM(s) Oral every 6 hours PRN Mild Pain (1 - 3)  dextrose 40% Gel 15 Gram(s) Oral once PRN Blood Glucose LESS THAN 70 milliGRAM(s)/deciliter  glucagon  Injectable 1 milliGRAM(s) IntraMuscular once PRN Glucose LESS THAN 70 milligrams/deciliter  oxyCODONE    IR 5 milliGRAM(s) Oral every 4 hours PRN Moderate Pain (4 - 6)  oxyCODONE    IR 10 milliGRAM(s) Oral every 4 hours PRN Severe Pain (7 - 10)    LABS  CBC (10-28 @ 05:53)                              9.0<L>                         6.87    )----------------(  319        --    % Neutrophils, --    % Lymphocytes, ANC: --                                  28.9<L>    BMP (10-28 @ 05:53)             132<L>  |  97<L>   |  21    		Ca++ --      Ca 8.4                ---------------------------------( 130<H>		Mg 2.0                4.7     |  23      |  1.30  			Ph 3.3

## 2018-10-29 NOTE — PROGRESS NOTE ADULT - ASSESSMENT
68y male, s/p LLE CFA to below knee popliteal arterial bypass with PTFE on 9/18, returning with SSI.  CTA showed patent graft, now POD 12 s/p left groin wash out and sartorius flap, wound VAC placement.    Plan:  - Continue vac to groin and WTD dressing on medial thigh and calf  - Cont Xarelto 15mg QD  - Per ID ok to continue Oral Cipro for 6 weeks  - Next vac change due monday  - dispo planning for monday following wound vac change 68y male, s/p LLE CFA to below knee popliteal arterial bypass with PTFE on 9/18, returning with SSI.  CTA showed patent graft, now POD 14 s/p left groin wash out and sartorius flap, wound VAC placement.    Plan:  - Due for Vac change today  - Cont Xarelto 15mg QD  - Per ID ok to continue Oral Cipro for 6 weeks  - dispo planning for home with home pt today following wound vac change with 6 weeks of cipro

## 2018-10-30 VITALS
TEMPERATURE: 98 F | SYSTOLIC BLOOD PRESSURE: 141 MMHG | DIASTOLIC BLOOD PRESSURE: 58 MMHG | HEART RATE: 72 BPM | RESPIRATION RATE: 18 BRPM | OXYGEN SATURATION: 100 %

## 2018-10-30 RX ORDER — CIPROFLOXACIN LACTATE 400MG/40ML
500 VIAL (ML) INTRAVENOUS EVERY 12 HOURS
Qty: 0 | Refills: 0 | Status: DISCONTINUED | OUTPATIENT
Start: 2018-10-30 | End: 2018-10-30

## 2018-10-30 RX ADMIN — Medication 4: at 17:20

## 2018-10-30 RX ADMIN — Medication 6: at 12:16

## 2018-10-30 RX ADMIN — Medication 4: at 08:23

## 2018-10-30 RX ADMIN — Medication 2 UNIT(S): at 12:16

## 2018-10-30 RX ADMIN — CARVEDILOL PHOSPHATE 25 MILLIGRAM(S): 80 CAPSULE, EXTENDED RELEASE ORAL at 10:31

## 2018-10-30 RX ADMIN — Medication 100 MILLIGRAM(S): at 11:17

## 2018-10-30 RX ADMIN — Medication 81 MILLIGRAM(S): at 11:17

## 2018-10-30 RX ADMIN — RIVAROXABAN 15 MILLIGRAM(S): KIT at 11:17

## 2018-10-30 RX ADMIN — OXYCODONE HYDROCHLORIDE 5 MILLIGRAM(S): 5 TABLET ORAL at 08:15

## 2018-10-30 RX ADMIN — Medication 2 UNIT(S): at 08:23

## 2018-10-30 RX ADMIN — Medication 500 MILLIGRAM(S): at 10:31

## 2018-10-30 RX ADMIN — OXYCODONE HYDROCHLORIDE 5 MILLIGRAM(S): 5 TABLET ORAL at 07:24

## 2018-10-30 RX ADMIN — Medication 2 UNIT(S): at 17:20

## 2018-10-30 RX ADMIN — INFLUENZA VIRUS VACCINE 0.5 MILLILITER(S): 15; 15; 15; 15 SUSPENSION INTRAMUSCULAR at 17:58

## 2018-10-30 NOTE — PROGRESS NOTE ADULT - REASON FOR ADMISSION
Left leg pain and drainage

## 2018-10-30 NOTE — PROGRESS NOTE ADULT - SUBJECTIVE AND OBJECTIVE BOX
D Team Surgery Progress Note    SUBJECTIVE: Patient seen and examined at the bedside. No acute events overnight.      Vital Signs Last 24 Hrs  T(C): 36.1 (30 Oct 2018 13:26), Max: 36.9 (29 Oct 2018 17:35)  T(F): 97 (30 Oct 2018 13:26), Max: 98.4 (29 Oct 2018 17:35)  HR: 70 (30 Oct 2018 13:26) (68 - 78)  BP: 108/62 (30 Oct 2018 13:26) (108/62 - 128/71)  BP(mean): --  RR: 16 (30 Oct 2018 13:26) (16 - 18)  SpO2: 100% (30 Oct 2018 13:26) (94% - 100%)    I&O's Summary    29 Oct 2018 07:01  -  30 Oct 2018 07:00  --------------------------------------------------------  IN: 0 mL / OUT: 1750 mL / NET: -1750 mL    30 Oct 2018 07:01  -  30 Oct 2018 16:00  --------------------------------------------------------  IN: 0 mL / OUT: 650 mL / NET: -650 mL      PHYSICAL EXAM:  General: NAD, Lying in bed comfortably, alert, oriented x3  Abdominal: soft, NT, ND  Extremities: LLE groin wound VAC intact. Middle and lower incisional dressing c/d/i  Pulses: LLE DP, PT signal    MEDICATIONS (STANDING): aspirin enteric coated 81 milliGRAM(s) Oral daily  atorvastatin 40 milliGRAM(s) Oral at bedtime  carvedilol 25 milliGRAM(s) Oral every 12 hours  ciprofloxacin   IVPB 400 milliGRAM(s) IV Intermittent every 12 hours  dextrose 5%. 1000 milliLiter(s) IV Continuous <Continuous>  dextrose 50% Injectable 12.5 Gram(s) IV Push once  dextrose 50% Injectable 25 Gram(s) IV Push once  dextrose 50% Injectable 25 Gram(s) IV Push once  docusate sodium 100 milliGRAM(s) Oral daily  HYDROmorphone  Injectable 1 milliGRAM(s) IV Push two times a day  influenza   Vaccine 0.5 milliLiter(s) IntraMuscular once  insulin glargine Injectable (LANTUS) 10 Unit(s) SubCutaneous at bedtime  insulin lispro (HumaLOG) corrective regimen sliding scale   SubCutaneous three times a day before meals  insulin lispro Injectable (HumaLOG) 2 Unit(s) SubCutaneous three times a day before meals  lidocaine 1% (Preservative-free) Injectable 30 milliLiter(s) Local Injection once  lidocaine 1% Injectable 10 milliLiter(s) Local Injection once  lidocaine 2% Injectable 30 milliLiter(s) Local Injection once  oxyCODONE    IR 5 milliGRAM(s) Oral daily  rivaroxaban 15 milliGRAM(s) Oral daily  senna 2 Tablet(s) Oral at bedtime    MEDICATIONS (PRN):acetaminophen   Tablet .. 650 milliGRAM(s) Oral every 6 hours PRN Mild Pain (1 - 3)  dextrose 40% Gel 15 Gram(s) Oral once PRN Blood Glucose LESS THAN 70 milliGRAM(s)/deciliter  glucagon  Injectable 1 milliGRAM(s) IntraMuscular once PRN Glucose LESS THAN 70 milligrams/deciliter  oxyCODONE    IR 5 milliGRAM(s) Oral every 4 hours PRN Moderate Pain (4 - 6)  oxyCODONE    IR 10 milliGRAM(s) Oral every 4 hours PRN Severe Pain (7 - 10)

## 2018-10-30 NOTE — PROVIDER CONTACT NOTE (OTHER) - ACTION/TREATMENT ORDERED:
No actions taken
hold dose
MD made aware. No actions recommended at this time. Will continue to monitor.
Will change antibiotic to PO. If patient does not leave today will speak to patient about IV placement.
MD aware of low BP. Continue to monitor at this time. Lasix DC by medicine MD
MD made aware, said will come see patient later on in the day. Pts assessment seems consistent with previous assessments. Will continue to monitor.
RN to continue to monitor. MD made aware. No new orders at this time.

## 2018-10-30 NOTE — PROGRESS NOTE ADULT - ASSESSMENT
68y male, s/p LLE CFA to below knee popliteal arterial bypass with PTFE on 9/18, returning with SSI.  CTA showed patent graft, now POD 15 s/p left groin wash out and sartorius flap, wound VAC placement.    Plan:  - Vac change yesterday  - Cont Xarelto 15mg QD  - Per ID ok to continue Oral Cipro for 6 weeks  - dispo planning for home with home pt with 6 weeks of cipro

## 2018-10-30 NOTE — PROVIDER CONTACT NOTE (OTHER) - ASSESSMENT
no acute distress noted
A&Ox4. Patient states he was suppose to go home yesterday. The only reason he stayed was because the wound vac did not come.
BP 97/60. HR 68.  PT denies dizziness at this time. Other vitals stable.
Patient VSS, no s/s acute distress, B/L LE edematous +3, taut, L DP/PT +2 via Doppler; R DP +2 via Doppler, TP +1 via Doppler. L leg warm, L foot cool to touch; R leg warm; R foot cold to touch. Pt denies feeling cold in B/L feet.
Pt has small L calf dressing drainage and L ankle/foot is cool to touch. Pt resting comfortably in bed, VSS.
Pts R leg is cold and edematous. L leg dsg has small drainage. Pt resting in bed, VSS

## 2018-10-30 NOTE — PROVIDER CONTACT NOTE (OTHER) - SITUATION
Patient continues to refused to have labs drawn
BP 97/60. HR 68. MD notified.
Patient L foot cool; Patient R foot cold to touch.
Patient current IV site was symptomatic and needed to be removed. Patient is currently refusing new IV. Patient is due for IV cipro at 9am
Pt has small L calf dressing drainage and L ankle/foot is cool to touch
Pts R leg is cold and edematous. L leg dsg has small drainage.
vanco trough greater than 26

## 2018-10-30 NOTE — PROVIDER CONTACT NOTE (OTHER) - NAME OF MD/NP/PA/DO NOTIFIED:
Obie Dixon#[id number]
C Team BILLIE Hooper
Derrick Nicole [place] [id number]
MD Alvarado
MD Marvin
MD Susie [id number]
Olu,

## 2018-10-30 NOTE — PROGRESS NOTE ADULT - PROVIDER SPECIALTY LIST ADULT
Anesthesia
Cardiology
Infectious Disease
SICU
SICU
Surgery
Vascular Surgery

## 2018-10-30 NOTE — PROVIDER CONTACT NOTE (OTHER) - DATE AND TIME:
29-Oct-2018 12:00
16-Oct-2018 18:40
17-Oct-2018 14:10
21-Oct-2018 22:00
25-Oct-2018 23:20
26-Oct-2018 21:40
30-Oct-2018 08:34

## 2018-11-02 ENCOUNTER — APPOINTMENT (OUTPATIENT)
Dept: VASCULAR SURGERY | Facility: CLINIC | Age: 68
End: 2018-11-02
Payer: COMMERCIAL

## 2018-11-02 VITALS
TEMPERATURE: 98.3 F | HEIGHT: 66 IN | SYSTOLIC BLOOD PRESSURE: 126 MMHG | BODY MASS INDEX: 24.43 KG/M2 | DIASTOLIC BLOOD PRESSURE: 80 MMHG | HEART RATE: 79 BPM | WEIGHT: 152 LBS

## 2018-11-02 PROCEDURE — 99024 POSTOP FOLLOW-UP VISIT: CPT

## 2018-11-02 PROCEDURE — 93926 LOWER EXTREMITY STUDY: CPT

## 2018-11-05 ENCOUNTER — MEDICATION RENEWAL (OUTPATIENT)
Age: 68
End: 2018-11-05

## 2018-11-05 DIAGNOSIS — G89.18 OTHER ACUTE POSTPROCEDURAL PAIN: ICD-10-CM

## 2018-11-05 RX ORDER — OXYCODONE 5 MG/1
5 TABLET ORAL
Qty: 30 | Refills: 0 | Status: ACTIVE | COMMUNITY
Start: 2018-11-05 | End: 1900-01-01

## 2018-11-14 LAB
FUNGUS SPEC QL CULT: SIGNIFICANT CHANGE UP

## 2018-11-16 ENCOUNTER — APPOINTMENT (OUTPATIENT)
Dept: VASCULAR SURGERY | Facility: CLINIC | Age: 68
End: 2018-11-16
Payer: COMMERCIAL

## 2018-11-16 VITALS
TEMPERATURE: 98 F | HEART RATE: 91 BPM | HEIGHT: 66 IN | SYSTOLIC BLOOD PRESSURE: 145 MMHG | DIASTOLIC BLOOD PRESSURE: 69 MMHG

## 2018-11-16 PROCEDURE — 93971 EXTREMITY STUDY: CPT

## 2018-11-16 PROCEDURE — 99024 POSTOP FOLLOW-UP VISIT: CPT

## 2018-12-07 ENCOUNTER — APPOINTMENT (OUTPATIENT)
Dept: VASCULAR SURGERY | Facility: CLINIC | Age: 68
End: 2018-12-07
Payer: COMMERCIAL

## 2018-12-07 ENCOUNTER — INPATIENT (INPATIENT)
Facility: HOSPITAL | Age: 68
LOS: 19 days | Discharge: HOME HEALTH | End: 2018-12-27
Attending: HOSPITALIST | Admitting: HOSPITALIST
Payer: MEDICAID

## 2018-12-07 VITALS
HEART RATE: 67 BPM | DIASTOLIC BLOOD PRESSURE: 65 MMHG | SYSTOLIC BLOOD PRESSURE: 108 MMHG | BODY MASS INDEX: 27.32 KG/M2 | WEIGHT: 172 LBS | HEIGHT: 66.5 IN | TEMPERATURE: 97.7 F

## 2018-12-07 VITALS
TEMPERATURE: 98 F | DIASTOLIC BLOOD PRESSURE: 76 MMHG | RESPIRATION RATE: 18 BRPM | OXYGEN SATURATION: 100 % | HEART RATE: 70 BPM | SYSTOLIC BLOOD PRESSURE: 125 MMHG

## 2018-12-07 DIAGNOSIS — Z95.828 PRESENCE OF OTHER VASCULAR IMPLANTS AND GRAFTS: Chronic | ICD-10-CM

## 2018-12-07 DIAGNOSIS — I73.9 PERIPHERAL VASCULAR DISEASE, UNSPECIFIED: ICD-10-CM

## 2018-12-07 DIAGNOSIS — Z95.1 PRESENCE OF AORTOCORONARY BYPASS GRAFT: Chronic | ICD-10-CM

## 2018-12-07 DIAGNOSIS — Z95.9 PRESENCE OF CARDIAC AND VASCULAR IMPLANT AND GRAFT, UNSPECIFIED: Chronic | ICD-10-CM

## 2018-12-07 DIAGNOSIS — Z89.9 ACQUIRED ABSENCE OF LIMB, UNSPECIFIED: Chronic | ICD-10-CM

## 2018-12-07 LAB
ALBUMIN SERPL ELPH-MCNC: 3.2 G/DL — LOW (ref 3.3–5)
ALP SERPL-CCNC: 361 U/L — HIGH (ref 40–120)
ALT FLD-CCNC: 45 U/L — HIGH (ref 4–41)
APTT BLD: 36 SEC — SIGNIFICANT CHANGE UP (ref 27.5–36.3)
AST SERPL-CCNC: 41 U/L — HIGH (ref 4–40)
BASOPHILS # BLD AUTO: 0.06 K/UL — SIGNIFICANT CHANGE UP (ref 0–0.2)
BASOPHILS NFR BLD AUTO: 0.8 % — SIGNIFICANT CHANGE UP (ref 0–2)
BILIRUB SERPL-MCNC: 1 MG/DL — SIGNIFICANT CHANGE UP (ref 0.2–1.2)
BUN SERPL-MCNC: 40 MG/DL — HIGH (ref 7–23)
CALCIUM SERPL-MCNC: 8.6 MG/DL — SIGNIFICANT CHANGE UP (ref 8.4–10.5)
CHLORIDE SERPL-SCNC: 101 MMOL/L — SIGNIFICANT CHANGE UP (ref 98–107)
CO2 SERPL-SCNC: 26 MMOL/L — SIGNIFICANT CHANGE UP (ref 22–31)
CREAT SERPL-MCNC: 1.5 MG/DL — HIGH (ref 0.5–1.3)
EOSINOPHIL # BLD AUTO: 0.15 K/UL — SIGNIFICANT CHANGE UP (ref 0–0.5)
EOSINOPHIL NFR BLD AUTO: 2 % — SIGNIFICANT CHANGE UP (ref 0–6)
GLUCOSE SERPL-MCNC: 64 MG/DL — LOW (ref 70–99)
HCT VFR BLD CALC: 35.6 % — LOW (ref 39–50)
HGB BLD-MCNC: 10.8 G/DL — LOW (ref 13–17)
IMM GRANULOCYTES # BLD AUTO: 0.02 # — SIGNIFICANT CHANGE UP
IMM GRANULOCYTES NFR BLD AUTO: 0.3 % — SIGNIFICANT CHANGE UP (ref 0–1.5)
INR BLD: 1.55 — HIGH (ref 0.88–1.17)
LYMPHOCYTES # BLD AUTO: 1.04 K/UL — SIGNIFICANT CHANGE UP (ref 1–3.3)
LYMPHOCYTES # BLD AUTO: 14.2 % — SIGNIFICANT CHANGE UP (ref 13–44)
MCHC RBC-ENTMCNC: 22.4 PG — LOW (ref 27–34)
MCHC RBC-ENTMCNC: 30.3 % — LOW (ref 32–36)
MCV RBC AUTO: 73.7 FL — LOW (ref 80–100)
MONOCYTES # BLD AUTO: 1.03 K/UL — HIGH (ref 0–0.9)
MONOCYTES NFR BLD AUTO: 14.1 % — HIGH (ref 2–14)
NEUTROPHILS # BLD AUTO: 5.02 K/UL — SIGNIFICANT CHANGE UP (ref 1.8–7.4)
NEUTROPHILS NFR BLD AUTO: 68.6 % — SIGNIFICANT CHANGE UP (ref 43–77)
NRBC # FLD: 0 — SIGNIFICANT CHANGE UP
PLATELET # BLD AUTO: 230 K/UL — SIGNIFICANT CHANGE UP (ref 150–400)
PMV BLD: 10.8 FL — SIGNIFICANT CHANGE UP (ref 7–13)
POTASSIUM SERPL-MCNC: 4.3 MMOL/L — SIGNIFICANT CHANGE UP (ref 3.5–5.3)
POTASSIUM SERPL-SCNC: 4.3 MMOL/L — SIGNIFICANT CHANGE UP (ref 3.5–5.3)
PREALB SERPL-MCNC: 12 MG/DL — LOW (ref 20–40)
PROT SERPL-MCNC: 7.2 G/DL — SIGNIFICANT CHANGE UP (ref 6–8.3)
PROTHROM AB SERPL-ACNC: 17.4 SEC — HIGH (ref 9.8–13.1)
RBC # BLD: 4.83 M/UL — SIGNIFICANT CHANGE UP (ref 4.2–5.8)
RBC # FLD: 20.7 % — HIGH (ref 10.3–14.5)
SODIUM SERPL-SCNC: 139 MMOL/L — SIGNIFICANT CHANGE UP (ref 135–145)
WBC # BLD: 7.32 K/UL — SIGNIFICANT CHANGE UP (ref 3.8–10.5)
WBC # FLD AUTO: 7.32 K/UL — SIGNIFICANT CHANGE UP (ref 3.8–10.5)

## 2018-12-07 PROCEDURE — 71045 X-RAY EXAM CHEST 1 VIEW: CPT | Mod: 26

## 2018-12-07 PROCEDURE — 93970 EXTREMITY STUDY: CPT | Mod: 26

## 2018-12-07 PROCEDURE — 99024 POSTOP FOLLOW-UP VISIT: CPT

## 2018-12-07 RX ORDER — FUROSEMIDE 40 MG
1 TABLET ORAL
Qty: 0 | Refills: 0 | COMMUNITY

## 2018-12-07 RX ORDER — FUROSEMIDE 40 MG
60 TABLET ORAL ONCE
Qty: 0 | Refills: 0 | Status: COMPLETED | OUTPATIENT
Start: 2018-12-07 | End: 2018-12-07

## 2018-12-07 RX ADMIN — Medication 60 MILLIGRAM(S): at 20:03

## 2018-12-07 NOTE — H&P ADULT - NSHPPHYSICALEXAM_GEN_ALL_CORE
Gen: Laying in bed, in NAD  HEENT: Normocephalic, atraumatic. MMM  CV: RRR, no m/r/g  Resp: No increased WOB  Abd: Soft, distended. Nontympanic. Nontender.   Groin: Left sided groin wound with vac in place, suctioning well.   Ext:   LLE: + DP, + Graft signals. Incisions dressed, no strike through

## 2018-12-07 NOTE — ED PROVIDER NOTE - OBJECTIVE STATEMENT
69 yo M with hx CHF, CAD s/p CABG, DM, and PAD s/p recent femoral popliteal bypass, sent in by vascular surgeon for concern for pelvic abscess/infection. Patient has had significant clear serous drainage from wound vac since the surgery, not resolving. Also with lower extremity pain upon walking with rolling walker. He also notes increasing SOB over past few days as well as 20 lb weight gain over past month and worsening edema of lower extremities, L worse than R (contralateral leg to bypass). Denies CP, nausea, vomiting, diarrhea, constipation, orthopnea. Patient endorses good appetite, says he is adherent to low salt and fluid restricted diet. Per son at bedside patient "not responding to diuretic", urinating less frequently.

## 2018-12-07 NOTE — ED PROVIDER NOTE - PHYSICAL EXAMINATION
GENERAL: No acute distress, well-developed male sitting up in bed eating.  HEAD:  Atraumatic, Normocephalic  ENT: EOMI, PERRLA, conjunctiva and sclera clear, Neck supple, No JVD, mucous membranes appear moist  CHEST/LUNG: Fine crackles in lower bases b/l  HEART: Regular rate and rhythm; No murmurs, rubs, or gallops  ABDOMEN: Soft, Nontender, very edematous, Bowel sounds present, no organomegaly  EXTREMITIES:  2+ Peripheral Pulses, +anasarca  PSYCH: AAOx3  NEUROLOGY: non-focal, cranial nerves intact  SKIN: Normal color, no rashes, peeling of R. lower leg

## 2018-12-07 NOTE — ED ADULT TRIAGE NOTE - CHIEF COMPLAINT QUOTE
Pt s/p  L femoral bypass  x 1 mth ago sent by PCP  with L leg swelling , sob , and fluids oozing from the procedure site.   Denies chest pain

## 2018-12-07 NOTE — ED ADULT NURSE NOTE - NSIMPLEMENTINTERV_GEN_ALL_ED
Implemented All Fall with Harm Risk Interventions:  [employer] to call system. Call bell, personal items and telephone within reach. Instruct patient to call for assistance. Room bathroom lighting operational. Non-slip footwear when patient is off stretcher. Physically safe environment: no spills, clutter or unnecessary equipment. Stretcher in lowest position, wheels locked, appropriate side rails in place. Provide visual cue, wrist band, yellow gown, etc. Monitor gait and stability. Monitor for mental status changes and reorient to person, place, and time. Review medications for side effects contributing to fall risk. Reinforce activity limits and safety measures with patient and family. Provide visual clues: red socks.

## 2018-12-07 NOTE — H&P ADULT - ASSESSMENT
68M PMH CHF, CAD s/p CABG, s/p stent (2016), DM, PAD s/p L KEI stent (9/11/18), s/p LLE CFA to below-knee bypass with PTFE for long-segment SFA occlusion on (9/18/18), L 2nd toe amputation (9/19), POC c/b groin SSI requiring washout, sartorius flap, and vac placement, presenting with high output from VAC    - Admit to C team surgery  - CT pelvis reviewed - pt with fluid collection in pelvis which appears consistent with previous scan  - Carb consistent diet, SSI  - Continue home medications  - No plans for surgical intervention at this time - if pt to undergo procedure will transition from home Xarelto to heparin gtt  - PT for wound vac changes  - Discussed with Vascular Surgery fellow Dr. Alana Mix PGY2  Vascular Surgery  [id number]

## 2018-12-07 NOTE — ED PROVIDER NOTE - MEDICAL DECISION MAKING DETAILS
69 yo M with multiple comorbidities, appears to be in significant fluid overload 2/2 CHF exacerbation, also with pelvic wound vac s/p fem pop bypass which continues to drain. Check CT pelvis to r/o fluid collection/infection behind wound vac, check lytes, proBNP, trial of IV diuresis, and r/o DVT.

## 2018-12-07 NOTE — H&P ADULT - NSHPLABSRESULTS_GEN_ALL_CORE
CT Pelvis w/ IV Cont (12.08.18 @ 00:26)    Redemonstration of 3.0 x 2.7 cm simple fluid attenuating left inguinal   collection. Otherwise, no CT evidence for an abscess.    Complete Blood Count + Automated Diff (12.07.18 @ 20:12)    Nucleated RBC #: 0    WBC Count: 7.32 K/uL    RBC Count: 4.83 M/uL    Hemoglobin: 10.8 g/dL    Hematocrit: 35.6 %    Mean Cell Volume: 73.7 fL    Mean Cell Hemoglobin: 22.4 pg    Mean Cell Hemoglobin Conc: 30.3 %    Red Cell Distrib Width: 20.7 %    Platelet Count - Automated: 230 K/uL    Comprehensive Metabolic Panel (12.07.18 @ 19:17)    Sodium, Serum: 139 mmol/L    Potassium, Serum: 4.3 mmol/L    Chloride, Serum: 101 mmol/L    Carbon Dioxide, Serum: 26 mmol/L    Blood Urea Nitrogen, Serum: 40 mg/dL    Creatinine, Serum: 1.50 mg/dL    Glucose, Serum: 64 mg/dL    Calcium, Total Serum: 8.6 mg/dL    Protein Total, Serum: 7.2 g/dL    Albumin, Serum: 3.2 g/dL    Bilirubin Total, Serum: 1.0 mg/dL    Alkaline Phosphatase, Serum: 361 u/L    Aspartate Aminotransferase (AST/SGOT): 41 u/L    Alanine Aminotransferase (ALT/SGPT): 45 u/L

## 2018-12-07 NOTE — ED ADULT NURSE NOTE - OBJECTIVE STATEMENT
Mariama RN: Patient is a 67 y/o male a&ox4 presenting from PCP office to investigate drainage from wound vac.  Patient reports he had a left leg fem-pop bypass one month ago.  Patient saw his PCP today who advised him to seek care due to swelling in left thigh where wound vac is draining, as well as SOB and oozing from procedure site.  Patient has two incision cites on left leg, on left thigh measures approx. 7cm x 2cm and left lower leg measures approx. 5cm x 2cm.  Patient also endorsing swelling in right lower extremity.  Right lower extremity appreciably more swollen than left.  Patient denies chest pain, N/V/D, F/C, abd pain, GI/ symptoms.  Patient has multiple amputations, on right foot great toe and 4th and 5th toe have been amputated, on left foot one toe has been amputated.  Patient in nad, waiting to be seen by MD.  Endorsed to Quincy sneed RN.

## 2018-12-07 NOTE — ED PROVIDER NOTE - ATTENDING CONTRIBUTION TO CARE
69 yo M with hx CHF, CAD s/p CABG, DM, and PAD s/p recent femoral popliteal bypass, sent in by vascular surgeon for concern for pelvic abscess/infection. Patient has had significant clear serous drainage from wound vac since the surgery, not resolving. Also with lower extremity pain upon walking with rolling walker. He also notes increasing SOB over past few days as well as 20 lb weight gain over past month and worsening edema of lower extremities, L worse than R (contralateral leg to bypass). Denies CP, nausea, vomiting, diarrhea, constipation, orthopnea. Patient endorses good appetite, says he is adherent to low salt and fluid restricted diet On exam: afebrile, in no distress, + anasarca- extensive edema in both legs and on trunk. lungs few scattered  rales, heart sounds normal, abdo obese and edematous, L groin edematous, slightly red, minimal tenderness, clear fluid draining from surgical drain. IMP: severe generalized edema, local swelling and redness at Fem-Pop site. Plan: labs, CT pelvis, CXR US legs

## 2018-12-07 NOTE — H&P ADULT - HISTORY OF PRESENT ILLNESS
68M PMH CHF, CAD s/p CABG, s/p stent (2016), DM, PAD s/p L KEI stent (9/11/18), s/p LLE CFA to below-knee bypass with PTFE for long-segment SFA occlusion on (9/18/18), L 2nd toe amputation (9/19), POC c/b groin SSI requiring washout, sartorius flap, and vac placement, presenting with high output from VAC. Patient saw Dr. Jung in the office today and was noted to have high output of clear fluid coming from VAC, sent in to [facility] ED for further evaluation.    Upon arrival to [facility] ED, AVSS. WBC 7.32. Alk phos 361. AST/ALT 41/45. Prealbumin 12. CT Pelvis with IV contrast obtained which showed redemonstration of 3.0 x 2.7 cm simple fluid attenuating left inguinal collection, which was visible on previous CT abd/pelvis (10/2018).

## 2018-12-07 NOTE — ED PROVIDER NOTE - NS ED ROS FT
REVIEW OF SYSTEMS:    CONSTITUTIONAL: No weakness, fevers or chills  RESPIRATORY: No cough, wheezing, hemoptysis; +shortness of breath  CARDIOVASCULAR: No chest pain or palpitations  GASTROINTESTINAL: No abdominal or epigastric pain. No nausea, vomiting, or hematemesis; No diarrhea or constipation. No melena or hematochezia.  GENITOURINARY: No dysuria, frequency or hematuria  NEUROLOGICAL: No numbness or weakness  SKIN: +diffuse body itching burning, rashes, or lesions   All other review of systems is negative unless indicated above.

## 2018-12-08 DIAGNOSIS — T81.9XXA UNSPECIFIED COMPLICATION OF PROCEDURE, INITIAL ENCOUNTER: ICD-10-CM

## 2018-12-08 PROCEDURE — 72193 CT PELVIS W/DYE: CPT | Mod: 26

## 2018-12-08 RX ORDER — OXYCODONE HYDROCHLORIDE 5 MG/1
5 TABLET ORAL EVERY 6 HOURS
Qty: 0 | Refills: 0 | Status: DISCONTINUED | OUTPATIENT
Start: 2018-12-08 | End: 2018-12-15

## 2018-12-08 RX ORDER — SACUBITRIL AND VALSARTAN 24; 26 MG/1; MG/1
1 TABLET, FILM COATED ORAL
Qty: 0 | Refills: 0 | Status: DISCONTINUED | OUTPATIENT
Start: 2018-12-08 | End: 2018-12-11

## 2018-12-08 RX ORDER — ACETAMINOPHEN 500 MG
325 TABLET ORAL EVERY 4 HOURS
Qty: 0 | Refills: 0 | Status: DISCONTINUED | OUTPATIENT
Start: 2018-12-08 | End: 2018-12-27

## 2018-12-08 RX ORDER — DOCUSATE SODIUM 100 MG
100 CAPSULE ORAL DAILY
Qty: 0 | Refills: 0 | Status: DISCONTINUED | OUTPATIENT
Start: 2018-12-08 | End: 2018-12-27

## 2018-12-08 RX ORDER — SODIUM CHLORIDE 9 MG/ML
1000 INJECTION, SOLUTION INTRAVENOUS
Qty: 0 | Refills: 0 | Status: DISCONTINUED | OUTPATIENT
Start: 2018-12-08 | End: 2018-12-11

## 2018-12-08 RX ORDER — CARVEDILOL PHOSPHATE 80 MG/1
25 CAPSULE, EXTENDED RELEASE ORAL EVERY 12 HOURS
Qty: 0 | Refills: 0 | Status: DISCONTINUED | OUTPATIENT
Start: 2018-12-08 | End: 2018-12-13

## 2018-12-08 RX ORDER — HEPARIN SODIUM 5000 [USP'U]/ML
5000 INJECTION INTRAVENOUS; SUBCUTANEOUS EVERY 8 HOURS
Qty: 0 | Refills: 0 | Status: DISCONTINUED | OUTPATIENT
Start: 2018-12-08 | End: 2018-12-27

## 2018-12-08 RX ORDER — ATORVASTATIN CALCIUM 80 MG/1
40 TABLET, FILM COATED ORAL AT BEDTIME
Qty: 0 | Refills: 0 | Status: DISCONTINUED | OUTPATIENT
Start: 2018-12-08 | End: 2018-12-27

## 2018-12-08 RX ORDER — OXYCODONE HYDROCHLORIDE 5 MG/1
10 TABLET ORAL EVERY 6 HOURS
Qty: 0 | Refills: 0 | Status: DISCONTINUED | OUTPATIENT
Start: 2018-12-08 | End: 2018-12-15

## 2018-12-08 RX ORDER — INSULIN LISPRO 100/ML
VIAL (ML) SUBCUTANEOUS
Qty: 0 | Refills: 0 | Status: DISCONTINUED | OUTPATIENT
Start: 2018-12-08 | End: 2018-12-27

## 2018-12-08 RX ORDER — DEXTROSE 50 % IN WATER 50 %
25 SYRINGE (ML) INTRAVENOUS ONCE
Qty: 0 | Refills: 0 | Status: DISCONTINUED | OUTPATIENT
Start: 2018-12-08 | End: 2018-12-27

## 2018-12-08 RX ORDER — INSULIN LISPRO 100/ML
VIAL (ML) SUBCUTANEOUS AT BEDTIME
Qty: 0 | Refills: 0 | Status: DISCONTINUED | OUTPATIENT
Start: 2018-12-08 | End: 2018-12-27

## 2018-12-08 RX ORDER — GLUCAGON INJECTION, SOLUTION 0.5 MG/.1ML
1 INJECTION, SOLUTION SUBCUTANEOUS ONCE
Qty: 0 | Refills: 0 | Status: DISCONTINUED | OUTPATIENT
Start: 2018-12-08 | End: 2018-12-27

## 2018-12-08 RX ORDER — HEPARIN SODIUM 5000 [USP'U]/ML
1400 INJECTION INTRAVENOUS; SUBCUTANEOUS
Qty: 25000 | Refills: 0 | Status: DISCONTINUED | OUTPATIENT
Start: 2018-12-08 | End: 2018-12-08

## 2018-12-08 RX ORDER — DEXTROSE 50 % IN WATER 50 %
15 SYRINGE (ML) INTRAVENOUS ONCE
Qty: 0 | Refills: 0 | Status: DISCONTINUED | OUTPATIENT
Start: 2018-12-08 | End: 2018-12-27

## 2018-12-08 RX ORDER — RIVAROXABAN 15 MG-20MG
15 KIT ORAL
Qty: 0 | Refills: 0 | Status: DISCONTINUED | OUTPATIENT
Start: 2018-12-08 | End: 2018-12-08

## 2018-12-08 RX ORDER — SENNA PLUS 8.6 MG/1
2 TABLET ORAL AT BEDTIME
Qty: 0 | Refills: 0 | Status: DISCONTINUED | OUTPATIENT
Start: 2018-12-08 | End: 2018-12-27

## 2018-12-08 RX ORDER — DEXTROSE 50 % IN WATER 50 %
12.5 SYRINGE (ML) INTRAVENOUS ONCE
Qty: 0 | Refills: 0 | Status: DISCONTINUED | OUTPATIENT
Start: 2018-12-08 | End: 2018-12-27

## 2018-12-08 RX ORDER — HEPARIN SODIUM 5000 [USP'U]/ML
5000 INJECTION INTRAVENOUS; SUBCUTANEOUS EVERY 8 HOURS
Qty: 0 | Refills: 0 | Status: DISCONTINUED | OUTPATIENT
Start: 2018-12-08 | End: 2018-12-08

## 2018-12-08 RX ADMIN — Medication 40 MILLIGRAM(S): at 09:18

## 2018-12-08 RX ADMIN — OXYCODONE HYDROCHLORIDE 5 MILLIGRAM(S): 5 TABLET ORAL at 16:16

## 2018-12-08 RX ADMIN — RIVAROXABAN 15 MILLIGRAM(S): KIT at 09:18

## 2018-12-08 RX ADMIN — CARVEDILOL PHOSPHATE 25 MILLIGRAM(S): 80 CAPSULE, EXTENDED RELEASE ORAL at 06:06

## 2018-12-08 RX ADMIN — OXYCODONE HYDROCHLORIDE 10 MILLIGRAM(S): 5 TABLET ORAL at 07:41

## 2018-12-08 RX ADMIN — Medication 100 MILLIGRAM(S): at 12:07

## 2018-12-08 RX ADMIN — SACUBITRIL AND VALSARTAN 1 TABLET(S): 24; 26 TABLET, FILM COATED ORAL at 09:18

## 2018-12-08 RX ADMIN — SACUBITRIL AND VALSARTAN 1 TABLET(S): 24; 26 TABLET, FILM COATED ORAL at 21:31

## 2018-12-08 RX ADMIN — ATORVASTATIN CALCIUM 40 MILLIGRAM(S): 80 TABLET, FILM COATED ORAL at 21:31

## 2018-12-08 RX ADMIN — Medication 2: at 13:46

## 2018-12-08 RX ADMIN — HEPARIN SODIUM 5000 UNIT(S): 5000 INJECTION INTRAVENOUS; SUBCUTANEOUS at 13:47

## 2018-12-08 RX ADMIN — OXYCODONE HYDROCHLORIDE 5 MILLIGRAM(S): 5 TABLET ORAL at 16:45

## 2018-12-08 RX ADMIN — Medication 2: at 10:17

## 2018-12-08 RX ADMIN — OXYCODONE HYDROCHLORIDE 10 MILLIGRAM(S): 5 TABLET ORAL at 09:25

## 2018-12-08 RX ADMIN — Medication 2: at 17:59

## 2018-12-08 NOTE — PROGRESS NOTE ADULT - SUBJECTIVE AND OBJECTIVE BOX
VASCULAR SURGERY DAILY PROGRESS NOTE:       Subjective: Patient examined at bedside in ED. JENIFFER.      Objective:        Vital Signs Last 24 Hrs  T(C): 36.3 (08 Dec 2018 09:21), Max: 36.8 (08 Dec 2018 03:43)  T(F): 97.4 (08 Dec 2018 09:21), Max: 98.2 (08 Dec 2018 03:43)  HR: 73 (08 Dec 2018 09:21) (69 - 84)  BP: 108/68 (08 Dec 2018 09:21) (108/68 - 131/85)  BP(mean): --  RR: 17 (08 Dec 2018 09:21) (17 - 18)  SpO2: 100% (08 Dec 2018 09:21) (98% - 100%)    I&O's Detail        	HEENT: Normocephalic, atraumatic. MMM  	CV: RRR, no m/r/g  	Resp: No increased WOB  	Abd: Soft, distended. Nontympanic. Nontender.   	Groin: Left sided groin wound, vac removed  	Ext:   LLE: + DP, + Graft signals. Incisions dressed, no strike through      LABS:                        10.8   7.32  )-----------( 230      ( 07 Dec 2018 20:12 )             35.6     12-07    139  |  101  |  40<H>  ----------------------------<  64<L>  4.3   |  26  |  1.50<H>    Ca    8.6      07 Dec 2018 19:17    TPro  7.2  /  Alb  3.2<L>  /  TBili  1.0  /  DBili  x   /  AST  41<H>  /  ALT  45<H>  /  AlkPhos  361<H>  12-07    PT/INR - ( 07 Dec 2018 19:17 )   PT: 17.4 SEC;   INR: 1.55          PTT - ( 07 Dec 2018 19:17 )  PTT:36.0 SEC      RADIOLOGY & ADDITIONAL STUDIES:    MEDICATIONS  (STANDING):  atorvastatin 40 milliGRAM(s) Oral at bedtime  carvedilol 25 milliGRAM(s) Oral every 12 hours  docusate sodium 100 milliGRAM(s) Oral daily  insulin lispro (HumaLOG) corrective regimen sliding scale   SubCutaneous three times a day before meals  rivaroxaban 15 milliGRAM(s) Oral two times a day  sacubitril 24 mG/valsartan 26 mG 1 Tablet(s) Oral two times a day  senna 2 Tablet(s) Oral at bedtime  torsemide 40 milliGRAM(s) Oral daily    MEDICATIONS  (PRN):  acetaminophen   Tablet .. 325 milliGRAM(s) Oral every 4 hours PRN Mild Pain (1 - 3)  oxyCODONE    IR 5 milliGRAM(s) Oral every 6 hours PRN Moderate Pain (4 - 6)  oxyCODONE    IR 10 milliGRAM(s) Oral every 6 hours PRN Severe Pain (7 - 10)

## 2018-12-08 NOTE — PROGRESS NOTE ADULT - ASSESSMENT
68M PMH CHF, CAD s/p CABG, s/p stent (2016), DM, PAD s/p L KEI stent (9/11/18), s/p LLE CFA to below-knee bypass with PTFE for long-segment SFA occlusion on (9/18/18), L 2nd toe amputation (9/19), POC c/b groin SSI requiring washout, sartorius flap, and vac placement, presenting with high output from VAC    - CT pelvis reviewed - pt with fluid collection in pelvis which appears consistent with previous scan  - Carb consistent diet, SSI  - Continue home medications  - No plans for surgical intervention at this time - if pt to undergo procedure will transition from home Xarelto to heparin gtt  - Wound care for wound vac changes  - Discussed with Vascular Surgery fellow Dr. Warner      Vascular Surgery  [id number]

## 2018-12-09 LAB
ALBUMIN SERPL ELPH-MCNC: 3 G/DL — LOW (ref 3.3–5)
ALBUMIN SERPL ELPH-MCNC: 3 G/DL — LOW (ref 3.3–5)
ALP SERPL-CCNC: 314 U/L — HIGH (ref 40–120)
ALP SERPL-CCNC: 314 U/L — HIGH (ref 40–120)
ALT FLD-CCNC: 32 U/L — SIGNIFICANT CHANGE UP (ref 4–41)
ALT FLD-CCNC: 32 U/L — SIGNIFICANT CHANGE UP (ref 4–41)
AST SERPL-CCNC: 27 U/L — SIGNIFICANT CHANGE UP (ref 4–40)
AST SERPL-CCNC: 27 U/L — SIGNIFICANT CHANGE UP (ref 4–40)
BILIRUB DIRECT SERPL-MCNC: 0.5 MG/DL — HIGH (ref 0.1–0.2)
BILIRUB SERPL-MCNC: 0.8 MG/DL — SIGNIFICANT CHANGE UP (ref 0.2–1.2)
BILIRUB SERPL-MCNC: 0.8 MG/DL — SIGNIFICANT CHANGE UP (ref 0.2–1.2)
BUN SERPL-MCNC: 47 MG/DL — HIGH (ref 7–23)
CALCIUM SERPL-MCNC: 8.2 MG/DL — LOW (ref 8.4–10.5)
CHLORIDE SERPL-SCNC: 98 MMOL/L — SIGNIFICANT CHANGE UP (ref 98–107)
CO2 SERPL-SCNC: 25 MMOL/L — SIGNIFICANT CHANGE UP (ref 22–31)
CREAT SERPL-MCNC: 1.69 MG/DL — HIGH (ref 0.5–1.3)
GLUCOSE SERPL-MCNC: 201 MG/DL — HIGH (ref 70–99)
HCT VFR BLD CALC: 32.4 % — LOW (ref 39–50)
HGB BLD-MCNC: 9.6 G/DL — LOW (ref 13–17)
INR BLD: 2.3 — HIGH (ref 0.88–1.17)
MAGNESIUM SERPL-MCNC: 2 MG/DL — SIGNIFICANT CHANGE UP (ref 1.6–2.6)
MCHC RBC-ENTMCNC: 21.8 PG — LOW (ref 27–34)
MCHC RBC-ENTMCNC: 29.6 % — LOW (ref 32–36)
MCV RBC AUTO: 73.5 FL — LOW (ref 80–100)
NRBC # FLD: 0 — SIGNIFICANT CHANGE UP
PHOSPHATE SERPL-MCNC: 3.9 MG/DL — SIGNIFICANT CHANGE UP (ref 2.5–4.5)
PLATELET # BLD AUTO: 192 K/UL — SIGNIFICANT CHANGE UP (ref 150–400)
PMV BLD: 11.1 FL — SIGNIFICANT CHANGE UP (ref 7–13)
POTASSIUM SERPL-MCNC: 4.1 MMOL/L — SIGNIFICANT CHANGE UP (ref 3.5–5.3)
POTASSIUM SERPL-SCNC: 4.1 MMOL/L — SIGNIFICANT CHANGE UP (ref 3.5–5.3)
PROT SERPL-MCNC: 6.6 G/DL — SIGNIFICANT CHANGE UP (ref 6–8.3)
PROT SERPL-MCNC: 6.6 G/DL — SIGNIFICANT CHANGE UP (ref 6–8.3)
PROTHROM AB SERPL-ACNC: 26.2 SEC — HIGH (ref 9.8–13.1)
RBC # BLD: 4.41 M/UL — SIGNIFICANT CHANGE UP (ref 4.2–5.8)
RBC # FLD: 20.8 % — HIGH (ref 10.3–14.5)
SODIUM SERPL-SCNC: 137 MMOL/L — SIGNIFICANT CHANGE UP (ref 135–145)
WBC # BLD: 7.39 K/UL — SIGNIFICANT CHANGE UP (ref 3.8–10.5)
WBC # FLD AUTO: 7.39 K/UL — SIGNIFICANT CHANGE UP (ref 3.8–10.5)

## 2018-12-09 RX ORDER — INSULIN GLARGINE 100 [IU]/ML
30 INJECTION, SOLUTION SUBCUTANEOUS AT BEDTIME
Qty: 0 | Refills: 0 | Status: DISCONTINUED | OUTPATIENT
Start: 2018-12-09 | End: 2018-12-27

## 2018-12-09 RX ORDER — DIPHENHYDRAMINE HCL 50 MG
25 CAPSULE ORAL ONCE
Qty: 0 | Refills: 0 | Status: COMPLETED | OUTPATIENT
Start: 2018-12-09 | End: 2018-12-09

## 2018-12-09 RX ADMIN — Medication 1: at 09:02

## 2018-12-09 RX ADMIN — Medication 100 MILLIGRAM(S): at 12:11

## 2018-12-09 RX ADMIN — INSULIN GLARGINE 30 UNIT(S): 100 INJECTION, SOLUTION SUBCUTANEOUS at 22:09

## 2018-12-09 RX ADMIN — SENNA PLUS 2 TABLET(S): 8.6 TABLET ORAL at 22:09

## 2018-12-09 RX ADMIN — SACUBITRIL AND VALSARTAN 1 TABLET(S): 24; 26 TABLET, FILM COATED ORAL at 17:57

## 2018-12-09 RX ADMIN — Medication 2: at 17:56

## 2018-12-09 RX ADMIN — Medication 40 MILLIGRAM(S): at 05:11

## 2018-12-09 RX ADMIN — Medication 2: at 12:11

## 2018-12-09 RX ADMIN — OXYCODONE HYDROCHLORIDE 10 MILLIGRAM(S): 5 TABLET ORAL at 18:02

## 2018-12-09 RX ADMIN — CARVEDILOL PHOSPHATE 25 MILLIGRAM(S): 80 CAPSULE, EXTENDED RELEASE ORAL at 05:10

## 2018-12-09 RX ADMIN — ATORVASTATIN CALCIUM 40 MILLIGRAM(S): 80 TABLET, FILM COATED ORAL at 22:09

## 2018-12-09 RX ADMIN — Medication 25 MILLIGRAM(S): at 17:57

## 2018-12-09 RX ADMIN — SACUBITRIL AND VALSARTAN 1 TABLET(S): 24; 26 TABLET, FILM COATED ORAL at 05:11

## 2018-12-09 RX ADMIN — OXYCODONE HYDROCHLORIDE 5 MILLIGRAM(S): 5 TABLET ORAL at 05:40

## 2018-12-09 RX ADMIN — OXYCODONE HYDROCHLORIDE 5 MILLIGRAM(S): 5 TABLET ORAL at 05:10

## 2018-12-09 RX ADMIN — OXYCODONE HYDROCHLORIDE 10 MILLIGRAM(S): 5 TABLET ORAL at 18:32

## 2018-12-09 NOTE — PROGRESS NOTE ADULT - ASSESSMENT
68M PMH CHF, CAD s/p CABG, s/p stent (2016), DM, PAD s/p L KEI stent (9/11/18), s/p LLE CFA to below-knee bypass with PTFE for long-segment SFA occlusion on (9/18/18), L 2nd toe amputation (9/19), POC c/b groin SSI requiring washout, sartorius flap, and vac placement, presenting with high output from VAC      - Carb consistent diet, SSI  - Continue home medications  - Xarelto discontinued yesterday  - Wound care for wound vac changes  - Daily wet to dry dressing changes  - Will consider possible sclerosis of lymphatic duct to seal drainage  - DVT ppx      Vascular Surgery  [id number]

## 2018-12-09 NOTE — PROGRESS NOTE ADULT - SUBJECTIVE AND OBJECTIVE BOX
VASCULAR SURGERY DAILY PROGRESS NOTE:       Subjective: Patient examined at bedside. JENIFFER. Reports discomfort associated with leakage from groin wound.      Objective:        Vital Signs Last 24 Hrs  T(C): 36.6 (08 Dec 2018 22:00), Max: 36.8 (08 Dec 2018 03:43)  T(F): 97.8 (08 Dec 2018 22:00), Max: 98.2 (08 Dec 2018 03:43)  HR: 70 (08 Dec 2018 22:00) (70 - 84)  BP: 114/63 (08 Dec 2018 22:00) (100/64 - 121/66)  BP(mean): --  RR: 18 (08 Dec 2018 22:00) (17 - 18)  SpO2: 100% (08 Dec 2018 22:00) (100% - 100%)    I&O's Detail    08 Dec 2018 07:01  -  09 Dec 2018 00:26  --------------------------------------------------------  IN:    Oral Fluid: 100 mL  Total IN: 100 mL    OUT:    Voided: 400 mL  Total OUT: 400 mL    Total NET: -300 mL            General: NAD, well-nourished  HEENT: Atraumatic, EOMI  Resp: Breathing comfortably on RA  CV: Normal sinus rhythm  Ext: LLE: ground wound with surrounding induration and copious clear discharge, no erythema, purulent drainage or signs of infection, wound vac placed, 2  wound in more distal leg with wet-dry dressing      LABS:                        10.8   7.32  )-----------( 230      ( 07 Dec 2018 20:12 )             35.6     12-07    139  |  101  |  40<H>  ----------------------------<  64<L>  4.3   |  26  |  1.50<H>    Ca    8.6      07 Dec 2018 19:17    TPro  7.2  /  Alb  3.2<L>  /  TBili  1.0  /  DBili  x   /  AST  41<H>  /  ALT  45<H>  /  AlkPhos  361<H>  12-07    PT/INR - ( 07 Dec 2018 19:17 )   PT: 17.4 SEC;   INR: 1.55          PTT - ( 07 Dec 2018 19:17 )  PTT:36.0 SEC      RADIOLOGY & ADDITIONAL STUDIES:    MEDICATIONS  (STANDING):  atorvastatin 40 milliGRAM(s) Oral at bedtime  carvedilol 25 milliGRAM(s) Oral every 12 hours  dextrose 5%. 1000 milliLiter(s) (50 mL/Hr) IV Continuous <Continuous>  dextrose 50% Injectable 12.5 Gram(s) IV Push once  dextrose 50% Injectable 25 Gram(s) IV Push once  dextrose 50% Injectable 25 Gram(s) IV Push once  docusate sodium 100 milliGRAM(s) Oral daily  heparin  Injectable 5000 Unit(s) SubCutaneous every 8 hours  insulin lispro (HumaLOG) corrective regimen sliding scale   SubCutaneous at bedtime  insulin lispro (HumaLOG) corrective regimen sliding scale   SubCutaneous three times a day before meals  sacubitril 24 mG/valsartan 26 mG 1 Tablet(s) Oral two times a day  senna 2 Tablet(s) Oral at bedtime  torsemide 40 milliGRAM(s) Oral daily    MEDICATIONS  (PRN):  acetaminophen   Tablet .. 325 milliGRAM(s) Oral every 4 hours PRN Mild Pain (1 - 3)  dextrose 40% Gel 15 Gram(s) Oral once PRN Blood Glucose LESS THAN 70 milliGRAM(s)/deciliter  glucagon  Injectable 1 milliGRAM(s) IntraMuscular once PRN Glucose LESS THAN 70 milligrams/deciliter  oxyCODONE    IR 5 milliGRAM(s) Oral every 6 hours PRN Moderate Pain (4 - 6)  oxyCODONE    IR 10 milliGRAM(s) Oral every 6 hours PRN Severe Pain (7 - 10)

## 2018-12-10 DIAGNOSIS — N17.9 ACUTE KIDNEY FAILURE, UNSPECIFIED: ICD-10-CM

## 2018-12-10 LAB
BUN SERPL-MCNC: 63 MG/DL — HIGH (ref 7–23)
CALCIUM SERPL-MCNC: 8.6 MG/DL — SIGNIFICANT CHANGE UP (ref 8.4–10.5)
CHLORIDE SERPL-SCNC: 96 MMOL/L — LOW (ref 98–107)
CO2 SERPL-SCNC: 24 MMOL/L — SIGNIFICANT CHANGE UP (ref 22–31)
CREAT ?TM UR-MCNC: 92.4 MG/DL — SIGNIFICANT CHANGE UP
CREAT SERPL-MCNC: 2.61 MG/DL — HIGH (ref 0.5–1.3)
GLUCOSE SERPL-MCNC: 192 MG/DL — HIGH (ref 70–99)
HCT VFR BLD CALC: 33.7 % — LOW (ref 39–50)
HGB BLD-MCNC: 10 G/DL — LOW (ref 13–17)
MAGNESIUM SERPL-MCNC: 2.2 MG/DL — SIGNIFICANT CHANGE UP (ref 1.6–2.6)
MCHC RBC-ENTMCNC: 22.1 PG — LOW (ref 27–34)
MCHC RBC-ENTMCNC: 29.7 % — LOW (ref 32–36)
MCV RBC AUTO: 74.4 FL — LOW (ref 80–100)
NRBC # FLD: 0 — SIGNIFICANT CHANGE UP
PHOSPHATE SERPL-MCNC: 5 MG/DL — HIGH (ref 2.5–4.5)
PLATELET # BLD AUTO: 204 K/UL — SIGNIFICANT CHANGE UP (ref 150–400)
PMV BLD: 10.9 FL — SIGNIFICANT CHANGE UP (ref 7–13)
POTASSIUM SERPL-MCNC: 4.8 MMOL/L — SIGNIFICANT CHANGE UP (ref 3.5–5.3)
POTASSIUM SERPL-SCNC: 4.8 MMOL/L — SIGNIFICANT CHANGE UP (ref 3.5–5.3)
RBC # BLD: 4.53 M/UL — SIGNIFICANT CHANGE UP (ref 4.2–5.8)
RBC # FLD: 20.8 % — HIGH (ref 10.3–14.5)
SODIUM SERPL-SCNC: 134 MMOL/L — LOW (ref 135–145)
UUN UR-MCNC: 452.6 MG/DL — SIGNIFICANT CHANGE UP
WBC # BLD: 7.79 K/UL — SIGNIFICANT CHANGE UP (ref 3.8–10.5)
WBC # FLD AUTO: 7.79 K/UL — SIGNIFICANT CHANGE UP (ref 3.8–10.5)

## 2018-12-10 PROCEDURE — 76775 US EXAM ABDO BACK WALL LIM: CPT | Mod: 26

## 2018-12-10 PROCEDURE — 99254 IP/OBS CNSLTJ NEW/EST MOD 60: CPT | Mod: GC

## 2018-12-10 RX ADMIN — OXYCODONE HYDROCHLORIDE 5 MILLIGRAM(S): 5 TABLET ORAL at 06:18

## 2018-12-10 RX ADMIN — OXYCODONE HYDROCHLORIDE 5 MILLIGRAM(S): 5 TABLET ORAL at 19:04

## 2018-12-10 RX ADMIN — Medication 2: at 12:53

## 2018-12-10 RX ADMIN — Medication 40 MILLIGRAM(S): at 06:12

## 2018-12-10 RX ADMIN — Medication 1: at 09:12

## 2018-12-10 RX ADMIN — Medication 100 MILLIGRAM(S): at 12:53

## 2018-12-10 RX ADMIN — SENNA PLUS 2 TABLET(S): 8.6 TABLET ORAL at 21:37

## 2018-12-10 RX ADMIN — ATORVASTATIN CALCIUM 40 MILLIGRAM(S): 80 TABLET, FILM COATED ORAL at 21:37

## 2018-12-10 RX ADMIN — OXYCODONE HYDROCHLORIDE 5 MILLIGRAM(S): 5 TABLET ORAL at 06:48

## 2018-12-10 RX ADMIN — CARVEDILOL PHOSPHATE 25 MILLIGRAM(S): 80 CAPSULE, EXTENDED RELEASE ORAL at 06:12

## 2018-12-10 RX ADMIN — Medication 1: at 21:36

## 2018-12-10 RX ADMIN — INSULIN GLARGINE 30 UNIT(S): 100 INJECTION, SOLUTION SUBCUTANEOUS at 21:37

## 2018-12-10 RX ADMIN — OXYCODONE HYDROCHLORIDE 5 MILLIGRAM(S): 5 TABLET ORAL at 19:34

## 2018-12-10 RX ADMIN — Medication 3: at 18:13

## 2018-12-10 RX ADMIN — CARVEDILOL PHOSPHATE 25 MILLIGRAM(S): 80 CAPSULE, EXTENDED RELEASE ORAL at 18:12

## 2018-12-10 RX ADMIN — SACUBITRIL AND VALSARTAN 1 TABLET(S): 24; 26 TABLET, FILM COATED ORAL at 06:12

## 2018-12-10 RX ADMIN — SACUBITRIL AND VALSARTAN 1 TABLET(S): 24; 26 TABLET, FILM COATED ORAL at 20:28

## 2018-12-10 NOTE — PROGRESS NOTE ADULT - SUBJECTIVE AND OBJECTIVE BOX
VASCULAR SURGERY DAILY PROGRESS NOTE:       Subjective: Patient examined at bedside. JENIFFER.      Objective:        Vital Signs Last 24 Hrs  T(C): 36.6 (10 Dec 2018 05:57), Max: 36.9 (09 Dec 2018 18:00)  T(F): 97.8 (10 Dec 2018 05:57), Max: 98.4 (09 Dec 2018 18:00)  HR: 82 (10 Dec 2018 05:57) (73 - 82)  BP: 118/70 (10 Dec 2018 05:57) (99/54 - 125/84)  BP(mean): --  RR: 18 (10 Dec 2018 05:57) (17 - 18)  SpO2: 97% (10 Dec 2018 05:57) (97% - 100%)    I&O's Detail    09 Dec 2018 07:01  -  10 Dec 2018 07:00  --------------------------------------------------------  IN:    Oral Fluid: 320 mL  Total IN: 320 mL    OUT:    VAC (Vacuum Assisted Closure) System: 200 mL    Voided: 1580 mL  Total OUT: 1780 mL    Total NET: -1460 mL      10 Dec 2018 07:01  -  10 Dec 2018 09:07  --------------------------------------------------------  IN:  Total IN: 0 mL    OUT:    Voided: 200 mL  Total OUT: 200 mL    Total NET: -200 mL          General: NAD, well-nourished  HEENT: Atraumatic, EOMI  Resp: Breathing comfortably on RA  CV: Normal sinus rhythm  Ext: LLE: ground wound with surrounding induration and copious clear discharge, no erythema, purulent drainage or signs of infection, wound in place, 2 wounds in more distal leg with wet-dry dressing      LABS:                        10.0   7.79  )-----------( 204      ( 10 Dec 2018 06:30 )             33.7     12-10    134<L>  |  96<L>  |  63<H>  ----------------------------<  192<H>  4.8   |  24  |  2.61<H>    Ca    8.6      10 Dec 2018 06:30  Phos  5.0     12-10  Mg     2.2     12-10    TPro  6.6  /  Alb  3.0<L>  /  TBili  0.8  /  DBili  0.5<H>  /  AST  27  /  ALT  32  /  AlkPhos  314<H>  12-09    PT/INR - ( 09 Dec 2018 06:20 )   PT: 26.2 SEC;   INR: 2.30                RADIOLOGY & ADDITIONAL STUDIES:    MEDICATIONS  (STANDING):  atorvastatin 40 milliGRAM(s) Oral at bedtime  carvedilol 25 milliGRAM(s) Oral every 12 hours  dextrose 5%. 1000 milliLiter(s) (50 mL/Hr) IV Continuous <Continuous>  dextrose 50% Injectable 12.5 Gram(s) IV Push once  dextrose 50% Injectable 25 Gram(s) IV Push once  dextrose 50% Injectable 25 Gram(s) IV Push once  docusate sodium 100 milliGRAM(s) Oral daily  heparin  Injectable 5000 Unit(s) SubCutaneous every 8 hours  insulin glargine Injectable (LANTUS) 30 Unit(s) SubCutaneous at bedtime  insulin lispro (HumaLOG) corrective regimen sliding scale   SubCutaneous at bedtime  insulin lispro (HumaLOG) corrective regimen sliding scale   SubCutaneous three times a day before meals  sacubitril 24 mG/valsartan 26 mG 1 Tablet(s) Oral two times a day  senna 2 Tablet(s) Oral at bedtime  torsemide 40 milliGRAM(s) Oral daily    MEDICATIONS  (PRN):  acetaminophen   Tablet .. 325 milliGRAM(s) Oral every 4 hours PRN Mild Pain (1 - 3)  dextrose 40% Gel 15 Gram(s) Oral once PRN Blood Glucose LESS THAN 70 milliGRAM(s)/deciliter  glucagon  Injectable 1 milliGRAM(s) IntraMuscular once PRN Glucose LESS THAN 70 milligrams/deciliter  oxyCODONE    IR 5 milliGRAM(s) Oral every 6 hours PRN Moderate Pain (4 - 6)  oxyCODONE    IR 10 milliGRAM(s) Oral every 6 hours PRN Severe Pain (7 - 10)

## 2018-12-10 NOTE — PROGRESS NOTE ADULT - ASSESSMENT
68M PMH CHF, CAD s/p CABG, s/p stent (2016), DM, PAD s/p L KEI stent (9/11/18), s/p LLE CFA to below-knee bypass with PTFE for long-segment SFA occlusion on (9/18/18), L 2nd toe amputation (9/19), POC c/b groin SSI requiring washout, sartorius flap, and vac placement, presenting with high output from VAC      - Carb consistent diet, SSI  - Continue home medications  - Wound care for wound vac changes  - Daily wet to dry dressing changes  - Will consider possible sclerosis of lymphatic duct to seal drainage - f/u with plastic surgery  - DVT ppx      Vascular Surgery  [id number]

## 2018-12-10 NOTE — CONSULT NOTE ADULT - SUBJECTIVE AND OBJECTIVE BOX
[facility] Division of Kidney Diseases & Hypertension  INITIAL CONSULT NOTE  [phone]--------------------------------------------------------------------------------    HPI: 68 year old male with h/o CHFrEF, PVD, CAD, DM, HTN was admitted for increased drainage from wound vac. Nephrology was consulted for elevated creatinine. On review of previous records in [facility]/[place], patient had normal Scr. from 4/26/16 to 9/24/18. However patient was hospitalized in [facility] from 10/7/18 to 10/30/18 for surgical site infection. During hospital course, Scr. was noted to be elevated at 1.97 on 10/17/18, peaked at 2.13 on 10/18/18, and improved to 1.40 on 10/27/18. During this admission, Scr. was elevated at 1.5 on 12/7/18 and then worsened to 2.61 on 12/10/18.    Patient seen and examined. He denies having history of any kidney disease in the past and has never seen a nephrologist in the past. Patient denies regular intake of NSAID. However patient has history of heart failure and is on entresto and torsemide 40 mg daily. Of note, patient also received IV contrast on 12/8/18 as well. Currently patient has complains of increased drainage from wound vac and SOB on exertion. But he denies c/o CP, abdominal pain, nausea, vomiting, or diarrhea.       PAST HISTORY  --------------------------------------------------------------------------------  PAST MEDICAL & SURGICAL HISTORY:  Chronic congestive heart failure, unspecified congestive heart failure type  PAD (peripheral artery disease)  Stented coronary artery  Hyperlipidemia  Diabetes  Hypertension  CAD (coronary artery disease)  S/P amputation: right great toe and 4th and 5th digit  S/P bypass graft of extremity: RLE  S/P angioplasty with stent: about 5 years ago  S/P CABG x 3    FAMILY HISTORY:    PAST SOCIAL HISTORY:    ALLERGIES & MEDICATIONS  --------------------------------------------------------------------------------  Allergies    No Known Allergies    Intolerances      Standing Inpatient Medications  atorvastatin 40 milliGRAM(s) Oral at bedtime  carvedilol 25 milliGRAM(s) Oral every 12 hours  dextrose 5%. 1000 milliLiter(s) IV Continuous <Continuous>  dextrose 50% Injectable 12.5 Gram(s) IV Push once  dextrose 50% Injectable 25 Gram(s) IV Push once  dextrose 50% Injectable 25 Gram(s) IV Push once  docusate sodium 100 milliGRAM(s) Oral daily  heparin  Injectable 5000 Unit(s) SubCutaneous every 8 hours  insulin glargine Injectable (LANTUS) 30 Unit(s) SubCutaneous at bedtime  insulin lispro (HumaLOG) corrective regimen sliding scale   SubCutaneous at bedtime  insulin lispro (HumaLOG) corrective regimen sliding scale   SubCutaneous three times a day before meals  sacubitril 24 mG/valsartan 26 mG 1 Tablet(s) Oral two times a day  senna 2 Tablet(s) Oral at bedtime  torsemide 40 milliGRAM(s) Oral daily    PRN Inpatient Medications  acetaminophen   Tablet .. 325 milliGRAM(s) Oral every 4 hours PRN  dextrose 40% Gel 15 Gram(s) Oral once PRN  glucagon  Injectable 1 milliGRAM(s) IntraMuscular once PRN  oxyCODONE    IR 5 milliGRAM(s) Oral every 6 hours PRN  oxyCODONE    IR 10 milliGRAM(s) Oral every 6 hours PRN      REVIEW OF SYSTEMS  --------------------------------------------------------------------------------  Gen: No  fevers/chills, weakness  Skin: No rashes  Head/Eyes/Ears/Mouth: No headache  Respiratory: SOB on exertion +  CV: No chest pain,   GI: No abdominal pain, diarrhea, nausea, vomiting  : No increased frequency, dysuria, hematuria, nocturia  MSK: Edema +  Neuro: No dizziness/lightheadedness    All other systems were reviewed and are negative, except as noted.    VITALS/PHYSICAL EXAM  --------------------------------------------------------------------------------  T(C): 36.8 (12-10-18 @ 17:56), Max: 36.8 (12-10-18 @ 17:56)  HR: 78 (12-10-18 @ 17:56) (69 - 82)  BP: 127/75 (12-10-18 @ 17:56) (90/48 - 127/75)  RR: 18 (12-10-18 @ 17:56) (17 - 18)  SpO2: 99% (12-10-18 @ 17:56) (97% - 100%)  Wt(kg): --        12-09-18 @ 07:01  -  12-10-18 @ 07:00  --------------------------------------------------------  IN: 320 mL / OUT: 1780 mL / NET: -1460 mL    12-10-18 @ 07:01  -  12-10-18 @ 18:04  --------------------------------------------------------  IN: 340 mL / OUT: 275 mL / NET: 65 mL      Physical Exam:  	Gen: NAD  	HEENT:no JVD  	Pulm: CTA B/L  	CV:  S1S2  	Abd: +BS, soft   	Ext:  B/L Lower ext edema +; wound vac present over left thigh.   	Neuro: No focal deficits  	Skin: Warm and dry     LABS/STUDIES  --------------------------------------------------------------------------------              10.0   7.79  >-----------<  204      [12-10-18 @ 06:30]              33.7     134  |  96  |  63  ----------------------------<  192      [12-10-18 @ 06:30]  4.8   |  24  |  2.61        Ca     8.6     [12-10-18 @ 06:30]      Mg     2.2     [12-10-18 @ 06:30]      Phos  5.0     [12-10-18 @ 06:30]    TPro  6.6  /  Alb  3.0  /  TBili  0.8  /  DBili  0.5  /  AST  27  /  ALT  32  /  AlkPhos  314  [12-09-18 @ 06:20]    PT/INR: PT 26.2 , INR 2.30       [12-09-18 @ 06:20]      Creatinine Trend:  SCr 2.61 [12-10 @ 06:30]  SCr 1.69 [12-09 @ 06:20]  SCr 1.50 [12-07 @ 19:17] [facility] Division of Kidney Diseases & Hypertension  INITIAL CONSULT NOTE  [phone]--------------------------------------------------------------------------------    HPI: 68 year old male with h/o CHFrEF, PVD, CAD, DM, HTN was admitted for increased drainage from wound vac. Nephrology was consulted for elevated creatinine. On review of previous records in [facility], patient had normal Scr. from 4/26/16 to 9/24/18. However patient was hospitalized in [facility] from 10/7/18 to 10/30/18 for surgical site infection. During hospital course, Scr. was noted to be elevated at 1.97 on 10/17/18, peaked at 2.13 on 10/18/18, and improved to 1.40 on 10/27/18. During this admission, Scr. was elevated at 1.5 on 12/7/18 and then worsened to 2.61 on 12/10/18.    Patient seen and examined. He denies having history of any kidney disease in the past and has never seen a nephrologist in the past. Patient denies regular intake of NSAID. However patient has history of heart failure and is on entresto and torsemide 40 mg daily. Of note, patient also received IV contrast on 12/8/18 as well. Currently patient has complains of increased drainage from wound vac and SOB on exertion. But he denies c/o CP, abdominal pain, nausea, vomiting, or diarrhea.       PAST HISTORY  --------------------------------------------------------------------------------  PAST MEDICAL & SURGICAL HISTORY:  Chronic congestive heart failure, unspecified congestive heart failure type  PAD (peripheral artery disease)  Stented coronary artery  Hyperlipidemia  Diabetes  Hypertension  CAD (coronary artery disease)  S/P amputation: right great toe and 4th and 5th digit  S/P bypass graft of extremity: RLE  S/P angioplasty with stent: about 5 years ago  S/P CABG x 3    FAMILY HISTORY: Reviewed and non contributory    PAST SOCIAL HISTORY: No cigarette smoking, drugs or alcohol use    ALLERGIES & MEDICATIONS  --------------------------------------------------------------------------------  Allergies    No Known Allergies    Intolerances      Standing Inpatient Medications  atorvastatin 40 milliGRAM(s) Oral at bedtime  carvedilol 25 milliGRAM(s) Oral every 12 hours  dextrose 5%. 1000 milliLiter(s) IV Continuous <Continuous>  dextrose 50% Injectable 12.5 Gram(s) IV Push once  dextrose 50% Injectable 25 Gram(s) IV Push once  dextrose 50% Injectable 25 Gram(s) IV Push once  docusate sodium 100 milliGRAM(s) Oral daily  heparin  Injectable 5000 Unit(s) SubCutaneous every 8 hours  insulin glargine Injectable (LANTUS) 30 Unit(s) SubCutaneous at bedtime  insulin lispro (HumaLOG) corrective regimen sliding scale   SubCutaneous at bedtime  insulin lispro (HumaLOG) corrective regimen sliding scale   SubCutaneous three times a day before meals  sacubitril 24 mG/valsartan 26 mG 1 Tablet(s) Oral two times a day  senna 2 Tablet(s) Oral at bedtime  torsemide 40 milliGRAM(s) Oral daily    PRN Inpatient Medications  acetaminophen   Tablet .. 325 milliGRAM(s) Oral every 4 hours PRN  dextrose 40% Gel 15 Gram(s) Oral once PRN  glucagon  Injectable 1 milliGRAM(s) IntraMuscular once PRN  oxyCODONE    IR 5 milliGRAM(s) Oral every 6 hours PRN  oxyCODONE    IR 10 milliGRAM(s) Oral every 6 hours PRN      REVIEW OF SYSTEMS  --------------------------------------------------------------------------------  Gen: No  fevers/chills, weakness  Skin: No rashes  Head/Eyes/Ears/Mouth: No headache  Respiratory: SOB on exertion +  CV: No chest pain,   GI: No abdominal pain, diarrhea, nausea, vomiting  : No increased frequency, dysuria, hematuria, nocturia  MSK: Edema +  Neuro: No dizziness/lightheadedness    All other systems were reviewed and are negative, except as noted.    VITALS/PHYSICAL EXAM  --------------------------------------------------------------------------------  T(C): 36.8 (12-10-18 @ 17:56), Max: 36.8 (12-10-18 @ 17:56)  HR: 78 (12-10-18 @ 17:56) (69 - 82)  BP: 127/75 (12-10-18 @ 17:56) (90/48 - 127/75)  RR: 18 (12-10-18 @ 17:56) (17 - 18)  SpO2: 99% (12-10-18 @ 17:56) (97% - 100%)  Wt(kg): --        12-09-18 @ 07:01  -  12-10-18 @ 07:00  --------------------------------------------------------  IN: 320 mL / OUT: 1780 mL / NET: -1460 mL    12-10-18 @ 07:01  -  12-10-18 @ 18:04  --------------------------------------------------------  IN: 340 mL / OUT: 275 mL / NET: 65 mL      Physical Exam:  	Gen: NAD  	HEENT:no JVD  	Pulm: CTA B/L  	CV:  S1S2  	Abd: +BS, soft   	Ext:  B/L Lower ext edema +; wound vac present over left thigh.   	Neuro: No focal deficits  	Skin: Warm and dry     LABS/STUDIES  --------------------------------------------------------------------------------              10.0   7.79  >-----------<  204      [12-10-18 @ 06:30]              33.7     134  |  96  |  63  ----------------------------<  192      [12-10-18 @ 06:30]  4.8   |  24  |  2.61        Ca     8.6     [12-10-18 @ 06:30]      Mg     2.2     [12-10-18 @ 06:30]      Phos  5.0     [12-10-18 @ 06:30]    TPro  6.6  /  Alb  3.0  /  TBili  0.8  /  DBili  0.5  /  AST  27  /  ALT  32  /  AlkPhos  314  [12-09-18 @ 06:20]    PT/INR: PT 26.2 , INR 2.30       [12-09-18 @ 06:20]      Creatinine Trend:  SCr 2.61 [12-10 @ 06:30]  SCr 1.69 [12-09 @ 06:20]  SCr 1.50 [12-07 @ 19:17]

## 2018-12-10 NOTE — CONSULT NOTE ADULT - ASSESSMENT
68 year old male with h/o CHFrEF, PVD, DM, HTN found to have KESHAV in setting of ARB use, diuretic use, and IV contrast.

## 2018-12-11 LAB
BUN SERPL-MCNC: 77 MG/DL — HIGH (ref 7–23)
CALCIUM SERPL-MCNC: 8.1 MG/DL — LOW (ref 8.4–10.5)
CHLORIDE SERPL-SCNC: 96 MMOL/L — LOW (ref 98–107)
CO2 SERPL-SCNC: 22 MMOL/L — SIGNIFICANT CHANGE UP (ref 22–31)
CREAT SERPL-MCNC: 3.71 MG/DL — HIGH (ref 0.5–1.3)
GLUCOSE SERPL-MCNC: 190 MG/DL — HIGH (ref 70–99)
HCT VFR BLD CALC: 30.1 % — LOW (ref 39–50)
HGB BLD-MCNC: 9.3 G/DL — LOW (ref 13–17)
MAGNESIUM SERPL-MCNC: 2.2 MG/DL — SIGNIFICANT CHANGE UP (ref 1.6–2.6)
MCHC RBC-ENTMCNC: 22.5 PG — LOW (ref 27–34)
MCHC RBC-ENTMCNC: 30.9 % — LOW (ref 32–36)
MCV RBC AUTO: 72.7 FL — LOW (ref 80–100)
NRBC # FLD: 0 — SIGNIFICANT CHANGE UP
PHOSPHATE SERPL-MCNC: 5.8 MG/DL — HIGH (ref 2.5–4.5)
PLATELET # BLD AUTO: 173 K/UL — SIGNIFICANT CHANGE UP (ref 150–400)
PMV BLD: SIGNIFICANT CHANGE UP FL (ref 7–13)
POTASSIUM SERPL-MCNC: 4.7 MMOL/L — SIGNIFICANT CHANGE UP (ref 3.5–5.3)
POTASSIUM SERPL-SCNC: 4.7 MMOL/L — SIGNIFICANT CHANGE UP (ref 3.5–5.3)
RBC # BLD: 4.14 M/UL — LOW (ref 4.2–5.8)
RBC # FLD: 20.6 % — HIGH (ref 10.3–14.5)
SODIUM SERPL-SCNC: 133 MMOL/L — LOW (ref 135–145)
WBC # BLD: 7.88 K/UL — SIGNIFICANT CHANGE UP (ref 3.8–10.5)
WBC # FLD AUTO: 7.88 K/UL — SIGNIFICANT CHANGE UP (ref 3.8–10.5)

## 2018-12-11 PROCEDURE — 99233 SBSQ HOSP IP/OBS HIGH 50: CPT | Mod: GC

## 2018-12-11 PROCEDURE — 99222 1ST HOSP IP/OBS MODERATE 55: CPT

## 2018-12-11 RX ORDER — CALAMINE AND ZINC OXIDE AND PHENOL 160; 10 MG/ML; MG/ML
1 LOTION TOPICAL DAILY
Qty: 0 | Refills: 0 | Status: DISCONTINUED | OUTPATIENT
Start: 2018-12-11 | End: 2018-12-27

## 2018-12-11 RX ADMIN — ATORVASTATIN CALCIUM 40 MILLIGRAM(S): 80 TABLET, FILM COATED ORAL at 22:52

## 2018-12-11 RX ADMIN — INSULIN GLARGINE 30 UNIT(S): 100 INJECTION, SOLUTION SUBCUTANEOUS at 22:52

## 2018-12-11 RX ADMIN — OXYCODONE HYDROCHLORIDE 10 MILLIGRAM(S): 5 TABLET ORAL at 07:43

## 2018-12-11 RX ADMIN — SENNA PLUS 2 TABLET(S): 8.6 TABLET ORAL at 22:52

## 2018-12-11 RX ADMIN — Medication 2: at 12:34

## 2018-12-11 RX ADMIN — CARVEDILOL PHOSPHATE 25 MILLIGRAM(S): 80 CAPSULE, EXTENDED RELEASE ORAL at 18:19

## 2018-12-11 RX ADMIN — Medication 2: at 08:40

## 2018-12-11 RX ADMIN — Medication 1: at 18:19

## 2018-12-11 RX ADMIN — CALAMINE AND ZINC OXIDE AND PHENOL 1 APPLICATION(S): 160; 10 LOTION TOPICAL at 05:07

## 2018-12-11 RX ADMIN — OXYCODONE HYDROCHLORIDE 10 MILLIGRAM(S): 5 TABLET ORAL at 08:38

## 2018-12-11 NOTE — CONSULT NOTE ADULT - SUBJECTIVE AND OBJECTIVE BOX
68M PMH CHF, CAD s/p CABG, s/p stent (2016), DM, PAD s/p L KEI stent (9/11/18), s/p LLE CFA to below-knee bypass with PTFE for long-segment SFA occlusion on (9/18/18), L 2nd toe amputation (9/19), POC c/b groin SSI requiring washout, sartorius flap, and vac placement 10/15, discharged 10/30, readmitted 12/7 for continued high output from VAC. Has been on VAC changes here by the wound vac team, most recently yesterday.  Pt says he has diffuse edema but walks, has sensation intact. He says VAC output has decreased substantially over the past few days. Per discussion with Wound VAC team, the wound was 100% granulating, clean, no odorous drainage.  Vascular requesting "sclerosis" for lymphatic leak.  Daily VAC outputs in reverse chronological order: 125, 200  Cr 3.7 today    Vital Signs Last 24 Hrs  T(C): 36.7 (12-11-18 @ 05:05), Max: 36.9 (12-10-18 @ 21:15)  T(F): 98 (12-11-18 @ 05:05), Max: 98.4 (12-10-18 @ 21:15)  HR: 69 (12-11-18 @ 05:05) (69 - 85)  BP: 102/60 (12-11-18 @ 05:05) (90/48 - 127/75)  BP(mean): --  RR: 18 (12-11-18 @ 05:05) (18 - 18)  SpO2: 99% (12-11-18 @ 05:05) (98% - 100%)  I&O's Detail    10 Dec 2018 07:01  -  11 Dec 2018 07:00  --------------------------------------------------------  IN:    Oral Fluid: 740 mL  Total IN: 740 mL    OUT:    VAC (Vacuum Assisted Closure) System: 125 mL    Voided: 575 mL  Total OUT: 700 mL    Total NET: 40 mL      PE:  NAD, AOx3  Abd edematous but non tympanic,  RLE also edematous  LLE edema, incision dressed. Left groin VAC intact with good seal. VAC output serous Edematous leg                          9.3    7.88  )-----------( 173      ( 11 Dec 2018 05:20 )             30.1     11 Dec 2018 05:20    133    |  96     |  77     ----------------------------<  190    4.7     |  22     |  3.71     Ca    8.1        11 Dec 2018 05:20  Phos  5.8       11 Dec 2018 05:20  Mg     2.2       11 Dec 2018 05:20          CAPILLARY BLOOD GLUCOSE      POCT Blood Glucose.: 206 mg/dL (11 Dec 2018 07:50)  POCT Blood Glucose.: 296 mg/dL (10 Dec 2018 21:23)  POCT Blood Glucose.: 291 mg/dL (10 Dec 2018 17:51)  POCT Blood Glucose.: 204 mg/dL (10 Dec 2018 12:18)

## 2018-12-11 NOTE — PROGRESS NOTE ADULT - SUBJECTIVE AND OBJECTIVE BOX
[facility] Division of Kidney Diseases & Hypertension  FOLLOW UP NOTE  [phone]--------------------------------------------------------------------------------    HPI: 68 year old male with h/o CHFrEF, PVD, CAD, DM, HTN was admitted for increased drainage from wound vac. Nephrology was consulted for elevated creatinine. On review of previous records in [facility]/[place], patient had normal Scr. from 4/26/16 to 9/24/18. However patient was hospitalized in [facility] from 10/7/18 to 10/30/18 for surgical site infection. During hospital course, Scr. was noted to be elevated at 1.97 on 10/17/18, peaked at 2.13 on 10/18/18, and improved to 1.40 on 10/27/18. During this admission, Scr. was elevated at 1.5 on 12/7/18 and then worsened to 2.61 on 12/10/18.    Patient seen and examined. Denies c/o SOB, CP, abdominal pain, nausea, or vomiting. However still has LE edema as well.     PAST HISTORY  --------------------------------------------------------------------------------  No significant changes to PMH, PSH, FHx, SHx, unless otherwise noted    ALLERGIES & MEDICATIONS  --------------------------------------------------------------------------------  Allergies    No Known Allergies    Intolerances      Standing Inpatient Medications  atorvastatin 40 milliGRAM(s) Oral at bedtime  carvedilol 25 milliGRAM(s) Oral every 12 hours  dextrose 50% Injectable 12.5 Gram(s) IV Push once  dextrose 50% Injectable 25 Gram(s) IV Push once  dextrose 50% Injectable 25 Gram(s) IV Push once  docusate sodium 100 milliGRAM(s) Oral daily  heparin  Injectable 5000 Unit(s) SubCutaneous every 8 hours  insulin glargine Injectable (LANTUS) 30 Unit(s) SubCutaneous at bedtime  insulin lispro (HumaLOG) corrective regimen sliding scale   SubCutaneous at bedtime  insulin lispro (HumaLOG) corrective regimen sliding scale   SubCutaneous three times a day before meals  senna 2 Tablet(s) Oral at bedtime    PRN Inpatient Medications  acetaminophen   Tablet .. 325 milliGRAM(s) Oral every 4 hours PRN  calamine Lotion 1 Application(s) Topical daily PRN  dextrose 40% Gel 15 Gram(s) Oral once PRN  glucagon  Injectable 1 milliGRAM(s) IntraMuscular once PRN  oxyCODONE    IR 5 milliGRAM(s) Oral every 6 hours PRN  oxyCODONE    IR 10 milliGRAM(s) Oral every 6 hours PRN      REVIEW OF SYSTEMS  --------------------------------------------------------------------------------  Gen: No  fevers/chills, weakness  Skin: No rashes  Head/Eyes/Ears/Mouth: No headache  Respiratory: SOB on exertion +  CV: No chest pain,   GI: No abdominal pain, diarrhea, nausea, vomiting  : No increased frequency, dysuria, hematuria, nocturia  MSK: Edema +  Neuro: No dizziness/lightheadedness    VITALS/PHYSICAL EXAM  --------------------------------------------------------------------------------  T(C): 36.6 (12-11-18 @ 14:09), Max: 36.9 (12-10-18 @ 21:15)  HR: 67 (12-11-18 @ 14:09) (64 - 85)  BP: 118/62 (12-11-18 @ 14:09) (102/60 - 127/75)  RR: 16 (12-11-18 @ 14:09) (16 - 18)  SpO2: 98% (12-11-18 @ 14:09) (98% - 100%)  Wt(kg): --        12-10-18 @ 07:01  -  12-11-18 @ 07:00  --------------------------------------------------------  IN: 740 mL / OUT: 700 mL / NET: 40 mL    12-11-18 @ 07:01  -  12-11-18 @ 14:25  --------------------------------------------------------  IN: 0 mL / OUT: 300 mL / NET: -300 mL      Physical Exam:  	  Gen: NAD  	HEENT:no JVD  	Pulm: CTA B/L  	CV:  S1S2  	Abd: +BS, soft   	Ext:  B/L Lower ext edema +; wound vac present over left thigh.   	Neuro: No focal deficits  	Skin: Warm and dry     LABS/STUDIES  --------------------------------------------------------------------------------              9.3    7.88  >-----------<  173      [12-11-18 @ 05:20]              30.1     133  |  96  |  77  ----------------------------<  190      [12-11-18 @ 05:20]  4.7   |  22  |  3.71        Ca     8.1     [12-11-18 @ 05:20]      Mg     2.2     [12-11-18 @ 05:20]      Phos  5.8     [12-11-18 @ 05:20]            Creatinine Trend:  SCr 3.71 [12-11 @ 05:20]  SCr 2.61 [12-10 @ 06:30]  SCr 1.69 [12-09 @ 06:20]  SCr 1.50 [12-07 @ 19:17]      Urine Creatinine 92.40      [12-10-18 @ 20:50]  Urine Urea Nitrogen 452.6      [12-10-18 @ 20:50]

## 2018-12-11 NOTE — CONSULT NOTE ADULT - SUBJECTIVE AND OBJECTIVE BOX
Patient seen and evaluated at bedside    HPI:  68M PMH of heart failure with EF of 20-25% , CAD s/p CABG, s/p PCI to SVG to OM in  (2016), DM, PAD s/p L KEI stent (9/11/18), s/p LLE CFA to below-knee bypass with PTFE for long-segment SFA occlusion on (9/18/18), L 2nd toe amputation (9/19), POC c/b groin SSI requiring washout, sartorius flap, and vac placement, presenting with high output from VAC. Patient saw Dr. Jung in the office  and was noted to have high output of clear fluid coming from VAC, sent in to [facility] ED for further evaluation.    Upon arrival to [facility] ED, AVSS. WBC 7.32. Alk phos 361. AST/ALT 41/45. Prealbumin 12. CT Pelvis with IV contrast obtained which showed redemonstration of 3.0 x 2.7 cm simple fluid attenuating left inguinal collection, which was visible on previous CT abd/pelvis.     Cardiology now c/s for possible heart failure given rising Cr.       PMH:   Chronic congestive heart failure, unspecified congestive heart failure type  PAD (peripheral artery disease)  Stented coronary artery  Hyperlipidemia  Diabetes  Hypertension  CAD (coronary artery disease)      PSH:   S/P amputation  S/P bypass graft of extremity  S/P angioplasty with stent  S/P CABG x 3      Medications:   acetaminophen   Tablet .. 325 milliGRAM(s) Oral every 4 hours PRN  atorvastatin 40 milliGRAM(s) Oral at bedtime  calamine Lotion 1 Application(s) Topical daily PRN  carvedilol 25 milliGRAM(s) Oral every 12 hours  dextrose 40% Gel 15 Gram(s) Oral once PRN  dextrose 50% Injectable 12.5 Gram(s) IV Push once  dextrose 50% Injectable 25 Gram(s) IV Push once  dextrose 50% Injectable 25 Gram(s) IV Push once  docusate sodium 100 milliGRAM(s) Oral daily  glucagon  Injectable 1 milliGRAM(s) IntraMuscular once PRN  heparin  Injectable 5000 Unit(s) SubCutaneous every 8 hours  insulin glargine Injectable (LANTUS) 30 Unit(s) SubCutaneous at bedtime  insulin lispro (HumaLOG) corrective regimen sliding scale   SubCutaneous at bedtime  insulin lispro (HumaLOG) corrective regimen sliding scale   SubCutaneous three times a day before meals  oxyCODONE    IR 5 milliGRAM(s) Oral every 6 hours PRN  oxyCODONE    IR 10 milliGRAM(s) Oral every 6 hours PRN  senna 2 Tablet(s) Oral at bedtime      Allergies:  No Known Allergies      Review of Systems:  REVIEW OF SYSTEMS:  CONSTITUTIONAL: No weakness, fevers or chills  EYES/ENT: No visual changes;  No dysphagia  NECK: No pain or stiffness  RESPIRATORY: No cough, wheezing, hemoptysis; No shortness of breath  CARDIOVASCULAR: No chest pain or palpitations; No lower extremity edema  GASTROINTESTINAL: No abdominal or epigastric pain. No nausea, vomiting, or hematemesis; No diarrhea or constipation. No melena or hematochezia.  BACK: No back pain  GENITOURINARY: No dysuria, frequency or hematuria  NEUROLOGICAL: No numbness or weakness  SKIN: No itching, burning, rashes, or lesions   All other review of systems is negative unless indicated above.    Physical Exam:  T(F): 97.7 (12-11), Max: 98.4 (12-10)  HR: 64 (12-11) (64 - 85)  BP: 106/68 (12-11) (90/48 - 127/75)  RR: 17 (12-11)  SpO2: 99% (12-11)  GENERAL: No acute distress, well-developed  HEAD:  Atraumatic, Normocephalic  ENT: EOMI, PERRLA, conjunctiva and sclera clear, Neck supple, elevated JVD ,  moist mucosa  CHEST/LUNG: Clear to auscultation bilaterally; No wheeze, equal breath sounds bilaterally   BACK: No spinal tenderness  HEART: Regular rate and rhythm; No murmurs, rubs, or gallops  ABDOMEN: anasarca of belly   EXTREMITIES: edema from lower extremities to belly, wound vac in LLE   PSYCH: Nl behavior, nl affect    Cardiovascular Diagnostic Testing:    ECG: Personally reviewed  NSR, old anterior infarct with poor R wave progression in precordial leads     Echo:  < from: Transthoracic Echocardiogram (10.16.18 @ 12:43) >  DIMENSIONS:  Dimensions:     Normal Values:  LA:     4.6 cm    2.0 - 4.0 cm  Ao:  3.2 cm    2.0 - 3.8 cm  SEPTUM: 0.4 cm    0.6 - 1.2 cm  PWT:    0.7 cm    0.6 - 1.1 cm  LVIDd:  5.8 cm    3.0 - 5.6 cm  LVIDs:  5.4 cm    1.8 - 4.0 cm  Derived Variables:  LVMI: 62 g/m2  RWT: 0.24  Fractional short: 7 %  Ejection Fraction (Visual Estimate): 20-25 %  ------------------------------------------------------------------------  OBSERVATIONS:  Mitral Valve: teathered posterior lealet of mitral valve  Moderate-severe eccentric posteriorly directed mitral  regurgitation.  Aortic Root: Normal aortic root.  Aortic Valve: Calcified trileaflet aortic valve with normal  opening.  Left Atrium: Normal left atrium.  LA volume index = 26  cc/m2.  Left Ventricle: Severe segmental left ventricular systolic  dysfunction. Hypokinesis of anterolateral wall and basal to  mid anteroseptum. akinesis of anterior wall Normal left  ventricular internal dimensions and wall thicknesses.  Severe diastolic dysfunction (Stage III) with elevated  filling pressures and a restrictive filling pattern  RightHeart: Normal right atrium. Right ventricular  enlargement with decreased right ventricular systolic  function. Normal tricuspid valve. Mild tricuspid  regurgitation. Normal pulmonic valve.  Pericardium/PleuraNormal pericardium with no pericardial  effusion.  Hemodynamic: Estimated right ventricular systolic pressure  equals 58 mm Hg, assuming right atrial pressure equals 10  mm Hg, consistent with moderate pulmonary hypertension.  ------------------------------------------------------------------------  CONCLUSIONS:  1. Severe segmental left ventricular systolic dysfunction.  Hypokinesis of anterolateral wall and basal to mid  anteroseptum. akinesis of anterior wall  2.  Right ventricular enlargement with decreased right  ventricular systolic function.  3.  teathered posterior lealet of mitral valve  Moderate-severe eccentric posteriorly directed mitral  regurgitation.  4.  Severe diastolic dysfunction (Stage III) with elevated  filling pressures and a restrictive filling pattern  5. Estimated pulmonary artery systolic pressure equals 58  mm Hg, assuming right atrial pressure equals 10  mm Hg,  consistent with moderate pulmonary hypertension  *** No previous Echo exam.    < end of copied text >      Stress Testing:  no     Cath:  < from: Cardiac Cath Lab - Adult (04.26.16 @ 17:43) >  CORONARY VESSELS: The coronary circulation is right dominant.  LM:   --  Distal left main: There was a 100 % stenosis.  RCA:   --  Mid RCA: There was a 100 % stenosis.  GRAFTS:   --  Graft to the mid LAD: The graft was a LIMA. Graft angiography  showed minor luminal irregularities.  --  Graft to the 1st diagonal: The graft was a saphenous vein graft from  the aorta. It was occluded.  --  Graft to the 1st obtuse marginal: The graft was a saphenous vein graft  from the aorta. There was a 90 % stenosis in the proximal third of the  graft.  COMPLICATIONS: There were no complications.  DIAGNOSTIC RECOMMENDATIONS: The patient should continue with the present  medications.    < end of copied text >    Imaging:    < from: CT Pelvis w/ IV Cont (12.08.18 @ 00:26) >  IMPRESSION:     Redemonstration of 3.0 x 2.7 cm simple fluid attenuating left inguinal   collection. Otherwise, no CT evidence for an abscess.      < end of copied text >      Labs: Personally reviewed                        9.3    7.88  )-----------( 173      ( 11 Dec 2018 05:20 )             30.1     12-11    133<L>  |  96<L>  |  77<H>  ----------------------------<  190<H>  4.7   |  22  |  3.71<H>    Ca    8.1<L>      11 Dec 2018 05:20  Phos  5.8     12-11  Mg     2.2     12-11

## 2018-12-11 NOTE — CONSULT NOTE ADULT - ASSESSMENT
A/P:  68M PMH of heart failure with EF of 20-25% , CAD s/p CABG, s/p PCI to SVG to OM in  (2016), DM, PAD now with concern of acute decompensated heart failure. Patient has significant LE edema from LE to abdomen, and elevated JVP. Patient's Cr is likely up from veronica-vascular congestion in the setting of profound edema .     Plan:   - would favor to start IV diuresis with Lasix 80 mg IV BID   - strict I/ O  - monitor BMP BID, maintain K > 4, Mg > 2   - continue Coreg 25 mg BID   - Entresto currently held  - would continue renal w/u suggested as well.     Juan Parikh MD A/P:  68M PMH of heart failure with EF of 20-25% , CAD s/p CABG, s/p PCI to SVG to OM in  (2016), DM, PAD now with concern of acute decompensated heart failure. Patient has significant LE edema from LE to abdomen, and elevated JVP. Patient's Cr is likely up from veronica-vascular congestion in the setting of profound edema .     Plan:   - would favor to start IV diuresis with Lasix 80 mg IV TID   - move patient to tele  - strict I/ O  - monitor BMP BID, maintain K > 4, Mg > 2   - continue Coreg 25 mg BID   - Entresto currently held    Juan Parikh MD

## 2018-12-11 NOTE — CONSULT NOTE ADULT - ASSESSMENT
68M PMH CHF, CAD s/p CABG, s/p stent (2016), DM, PAD s/p L KEI stent (9/11/18), s/p LLE CFA to below-knee bypass with PTFE for long-segment SFA occlusion on (9/18/18), L 2nd toe amputation (9/19), POC c/b groin SSI requiring washout, sartorius flap, and vac placement 10/15, discharged 10/30, readmitted 12/7 for continued high output from VAC. Has been on VAC changes here by the wound vac team, most recently yesterday. Vascular requesting sclerosis of lymphatics  -Patient currently has a wound vac over an open wound. The wound per VAC team is granulating well with no signs or symptoms of infection  -The wound vac output has decreased from 200 to 125 over the past two days.   -Sclerosis is NOT indicated for open wounds like this one  -Recommend continuing VAC therapy until output decreases and wound granulates in.  -No surgical intervention or sclerosis therapy at this time  -Will sign off, re-consult if additional questions  -D/w Dr. Gomes 68M PMH CHF, CAD s/p CABG, s/p stent (2016), DM, PAD s/p L KEI stent (9/11/18), s/p LLE CFA to below-knee bypass with PTFE for long-segment SFA occlusion on (9/18/18), L 2nd toe amputation (9/19), POC c/b groin SSI requiring washout, sartorius flap, and vac placement 10/15, discharged 10/30, readmitted 12/7 for continued high output from VAC. Has been on VAC changes here by the wound vac team, most recently yesterday. Vascular requesting sclerosis of lymphatics  -Patient currently has a wound vac over an open wound. The wound per VAC team is granulating well with no signs or symptoms of infection  -The wound vac output has decreased from 200 to 125 over the past two days.   -Sclerosis is NOT indicated for open wounds like this one. If sclerotherapy of a cavity is desired, please consult IR	.   -Recommend continuing VAC therapy until output decreases and wound granulates in.  -No surgical intervention or sclerosis therapy at this time  -Will sign off, re-consult if additional questions  -D/w Dr. Gomes

## 2018-12-11 NOTE — PROGRESS NOTE ADULT - PROBLEM SELECTOR PLAN 1
Patient with KESHAV in setting of ARB use, diuretic use, and IV contrast. On review of previous labs, Scr. has been WNL. However during previous hospitalization in October, patient had episode of KESHAV which resolved. During current admission, Scr. was elevated at 1.5 on 12/7/18 and then worsened to 2.61 on 12/10/18. Latest Scr. has increased to 3.71 today. Entresto and torsemide was stopped today. FeUrea calculated to be 23.6%. Renal ultrasound shows no hydronephrosis. Patient with hemodynamically mediated KESHAV? Continue to hold diuretics for now. Monitor Scr, I/O, and electrolytes daily. Avoid NSAIDs, RCAs, ACE-I/ARBs at this time.

## 2018-12-11 NOTE — PROGRESS NOTE ADULT - ASSESSMENT
68M PMH CHF, CAD s/p CABG, s/p stent (2016), DM, PAD s/p L KEI stent (9/11/18), s/p LLE CFA to below-knee bypass with PTFE for long-segment SFA occlusion on (9/18/18), L 2nd toe amputation (9/19), POC c/b groin SSI requiring washout, sartorius flap, and vac placement, presenting with high output from VAC    - Holding torsemide/entresto per nephrology recs  - F/u renal U/S  - Cardiology consult  - TTE   - F/u plans for possible sclerosis of groin leak   - Carb consistent diet, SSI  - Continue home medications  - Wound care for wound vac changes  - Daily wet to dry dressing changes  - Will consider possible sclerosis of lymphatic duct to seal drainage - f/u with plastic surgery  - DVT ppx      Vascular Surgery  [id number]

## 2018-12-11 NOTE — PROGRESS NOTE ADULT - SUBJECTIVE AND OBJECTIVE BOX
GENERAL SURGERY DAILY PROGRESS NOTE:       Subjective:  Pt seen and examined at bedside. No acute events overnight.         Objective:    PE:  General: NAD, well-nourished  HEENT: Atraumatic, EOMI  Resp: Breathing comfortably on RA  CV: Normal sinus rhythm  Ext: LLE: ground wound with surrounding induration and copious clear discharge, no erythema, purulent drainage or signs of infection, wound in place, 2 wounds in more distal leg with wet-dry dressing      Vital Signs Last 24 Hrs  T(C): 36.7 (11 Dec 2018 05:05), Max: 36.9 (10 Dec 2018 21:15)  T(F): 98 (11 Dec 2018 05:05), Max: 98.4 (10 Dec 2018 21:15)  HR: 69 (11 Dec 2018 05:05) (69 - 85)  BP: 102/60 (11 Dec 2018 05:05) (90/48 - 127/75)  BP(mean): --  RR: 18 (11 Dec 2018 05:05) (18 - 18)  SpO2: 99% (11 Dec 2018 05:05) (98% - 100%)    I&O's Detail    10 Dec 2018 07:01  -  11 Dec 2018 07:00  --------------------------------------------------------  IN:    Oral Fluid: 740 mL  Total IN: 740 mL    OUT:    VAC (Vacuum Assisted Closure) System: 125 mL    Voided: 575 mL  Total OUT: 700 mL    Total NET: 40 mL          Daily     Daily Weight in k.8 (11 Dec 2018 07:52)    MEDICATIONS  (STANDING):  atorvastatin 40 milliGRAM(s) Oral at bedtime  carvedilol 25 milliGRAM(s) Oral every 12 hours  dextrose 50% Injectable 12.5 Gram(s) IV Push once  dextrose 50% Injectable 25 Gram(s) IV Push once  dextrose 50% Injectable 25 Gram(s) IV Push once  docusate sodium 100 milliGRAM(s) Oral daily  heparin  Injectable 5000 Unit(s) SubCutaneous every 8 hours  insulin glargine Injectable (LANTUS) 30 Unit(s) SubCutaneous at bedtime  insulin lispro (HumaLOG) corrective regimen sliding scale   SubCutaneous at bedtime  insulin lispro (HumaLOG) corrective regimen sliding scale   SubCutaneous three times a day before meals  senna 2 Tablet(s) Oral at bedtime    MEDICATIONS  (PRN):  acetaminophen   Tablet .. 325 milliGRAM(s) Oral every 4 hours PRN Mild Pain (1 - 3)  calamine Lotion 1 Application(s) Topical daily PRN Rash and/or Itching  dextrose 40% Gel 15 Gram(s) Oral once PRN Blood Glucose LESS THAN 70 milliGRAM(s)/deciliter  glucagon  Injectable 1 milliGRAM(s) IntraMuscular once PRN Glucose LESS THAN 70 milligrams/deciliter  oxyCODONE    IR 5 milliGRAM(s) Oral every 6 hours PRN Moderate Pain (4 - 6)  oxyCODONE    IR 10 milliGRAM(s) Oral every 6 hours PRN Severe Pain (7 - 10)      LABS:                        9.3    7.88  )-----------( 173      ( 11 Dec 2018 05:20 )             30.1     12-11    133<L>  |  96<L>  |  77<H>  ----------------------------<  190<H>  4.7   |  22  |  3.71<H>    Ca    8.1<L>      11 Dec 2018 05:20  Phos  5.8     12  Mg     2.2                 RADIOLOGY & ADDITIONAL STUDIES:

## 2018-12-12 LAB
BUN SERPL-MCNC: 92 MG/DL — HIGH (ref 7–23)
CALCIUM SERPL-MCNC: 8.4 MG/DL — SIGNIFICANT CHANGE UP (ref 8.4–10.5)
CHLORIDE SERPL-SCNC: 98 MMOL/L — SIGNIFICANT CHANGE UP (ref 98–107)
CO2 SERPL-SCNC: 20 MMOL/L — LOW (ref 22–31)
CREAT SERPL-MCNC: 4.26 MG/DL — HIGH (ref 0.5–1.3)
GLUCOSE SERPL-MCNC: 95 MG/DL — SIGNIFICANT CHANGE UP (ref 70–99)
HCT VFR BLD CALC: 29.5 % — LOW (ref 39–50)
HGB BLD-MCNC: 9.2 G/DL — LOW (ref 13–17)
MAGNESIUM SERPL-MCNC: 2.3 MG/DL — SIGNIFICANT CHANGE UP (ref 1.6–2.6)
MCHC RBC-ENTMCNC: 22.3 PG — LOW (ref 27–34)
MCHC RBC-ENTMCNC: 31.2 % — LOW (ref 32–36)
MCV RBC AUTO: 71.6 FL — LOW (ref 80–100)
NRBC # FLD: 0 — SIGNIFICANT CHANGE UP
PHOSPHATE SERPL-MCNC: 6.3 MG/DL — HIGH (ref 2.5–4.5)
PLATELET # BLD AUTO: 162 K/UL — SIGNIFICANT CHANGE UP (ref 150–400)
PMV BLD: SIGNIFICANT CHANGE UP FL (ref 7–13)
POTASSIUM SERPL-MCNC: 4.9 MMOL/L — SIGNIFICANT CHANGE UP (ref 3.5–5.3)
POTASSIUM SERPL-SCNC: 4.9 MMOL/L — SIGNIFICANT CHANGE UP (ref 3.5–5.3)
RBC # BLD: 4.12 M/UL — LOW (ref 4.2–5.8)
RBC # FLD: 20.4 % — HIGH (ref 10.3–14.5)
SODIUM SERPL-SCNC: 136 MMOL/L — SIGNIFICANT CHANGE UP (ref 135–145)
WBC # BLD: 6.36 K/UL — SIGNIFICANT CHANGE UP (ref 3.8–10.5)
WBC # FLD AUTO: 6.36 K/UL — SIGNIFICANT CHANGE UP (ref 3.8–10.5)

## 2018-12-12 PROCEDURE — 99232 SBSQ HOSP IP/OBS MODERATE 35: CPT

## 2018-12-12 PROCEDURE — 99233 SBSQ HOSP IP/OBS HIGH 50: CPT | Mod: GC

## 2018-12-12 RX ORDER — FUROSEMIDE 40 MG
40 TABLET ORAL ONCE
Qty: 0 | Refills: 0 | Status: COMPLETED | OUTPATIENT
Start: 2018-12-12 | End: 2018-12-12

## 2018-12-12 RX ORDER — FUROSEMIDE 40 MG
80 TABLET ORAL ONCE
Qty: 0 | Refills: 0 | Status: COMPLETED | OUTPATIENT
Start: 2018-12-12 | End: 2018-12-12

## 2018-12-12 RX ORDER — FUROSEMIDE 40 MG
80 TABLET ORAL
Qty: 0 | Refills: 0 | Status: DISCONTINUED | OUTPATIENT
Start: 2018-12-12 | End: 2018-12-13

## 2018-12-12 RX ORDER — FUROSEMIDE 40 MG
40 TABLET ORAL ONCE
Qty: 0 | Refills: 0 | Status: DISCONTINUED | OUTPATIENT
Start: 2018-12-12 | End: 2018-12-12

## 2018-12-12 RX ADMIN — OXYCODONE HYDROCHLORIDE 5 MILLIGRAM(S): 5 TABLET ORAL at 08:56

## 2018-12-12 RX ADMIN — ATORVASTATIN CALCIUM 40 MILLIGRAM(S): 80 TABLET, FILM COATED ORAL at 22:31

## 2018-12-12 RX ADMIN — INSULIN GLARGINE 30 UNIT(S): 100 INJECTION, SOLUTION SUBCUTANEOUS at 22:30

## 2018-12-12 RX ADMIN — OXYCODONE HYDROCHLORIDE 5 MILLIGRAM(S): 5 TABLET ORAL at 10:56

## 2018-12-12 RX ADMIN — Medication 100 MILLIGRAM(S): at 12:37

## 2018-12-12 RX ADMIN — Medication 40 MILLIGRAM(S): at 17:24

## 2018-12-12 RX ADMIN — CARVEDILOL PHOSPHATE 25 MILLIGRAM(S): 80 CAPSULE, EXTENDED RELEASE ORAL at 05:56

## 2018-12-12 NOTE — PROGRESS NOTE ADULT - SUBJECTIVE AND OBJECTIVE BOX
GENERAL SURGERY DAILY PROGRESS NOTE:       Subjective:  Pt seen and examined at bedside. No acute events overnight.         Objective:    PE:  General: NAD, well-nourished  HEENT: Atraumatic, EOMI  Resp: Breathing comfortably on RA  CV: Normal sinus rhythm  Ext: LLE: ground wound with surrounding induration and copious clear discharge, no erythema, purulent drainage or signs of infection, wound in place, 2 wounds in more distal leg with wet-dry dressing    Vital Signs Last 24 Hrs  T(C): 36.7 (11 Dec 2018 19:39), Max: 36.7 (11 Dec 2018 05:05)  T(F): 98.1 (11 Dec 2018 19:39), Max: 98.1 (11 Dec 2018 19:39)  HR: 70 (11 Dec 2018 19:39) (64 - 71)  BP: 120/63 (11 Dec 2018 19:39) (102/60 - 120/63)  BP(mean): --  RR: 18 (11 Dec 2018 19:39) (16 - 18)  SpO2: 98% (11 Dec 2018 19:39) (98% - 99%)    I&O's Detail    10 Dec 2018 07:01  -  11 Dec 2018 07:00  --------------------------------------------------------  IN:    Oral Fluid: 740 mL  Total IN: 740 mL    OUT:    VAC (Vacuum Assisted Closure) System: 125 mL    Voided: 575 mL  Total OUT: 700 mL    Total NET: 40 mL      11 Dec 2018 07:01  -  12 Dec 2018 01:29  --------------------------------------------------------  IN:  Total IN: 0 mL    OUT:    VAC (Vacuum Assisted Closure) System: 75 mL    Voided: 550 mL  Total OUT: 625 mL    Total NET: -625 mL          Daily     Daily Weight in k.8 (11 Dec 2018 07:52)    MEDICATIONS  (STANDING):  atorvastatin 40 milliGRAM(s) Oral at bedtime  carvedilol 25 milliGRAM(s) Oral every 12 hours  dextrose 50% Injectable 12.5 Gram(s) IV Push once  dextrose 50% Injectable 25 Gram(s) IV Push once  dextrose 50% Injectable 25 Gram(s) IV Push once  docusate sodium 100 milliGRAM(s) Oral daily  heparin  Injectable 5000 Unit(s) SubCutaneous every 8 hours  insulin glargine Injectable (LANTUS) 30 Unit(s) SubCutaneous at bedtime  insulin lispro (HumaLOG) corrective regimen sliding scale   SubCutaneous at bedtime  insulin lispro (HumaLOG) corrective regimen sliding scale   SubCutaneous three times a day before meals  senna 2 Tablet(s) Oral at bedtime    MEDICATIONS  (PRN):  acetaminophen   Tablet .. 325 milliGRAM(s) Oral every 4 hours PRN Mild Pain (1 - 3)  calamine Lotion 1 Application(s) Topical daily PRN Rash and/or Itching  dextrose 40% Gel 15 Gram(s) Oral once PRN Blood Glucose LESS THAN 70 milliGRAM(s)/deciliter  glucagon  Injectable 1 milliGRAM(s) IntraMuscular once PRN Glucose LESS THAN 70 milligrams/deciliter  oxyCODONE    IR 5 milliGRAM(s) Oral every 6 hours PRN Moderate Pain (4 - 6)  oxyCODONE    IR 10 milliGRAM(s) Oral every 6 hours PRN Severe Pain (7 - 10)      LABS:                        9.3    7.88  )-----------( 173      ( 11 Dec 2018 05:20 )             30.1     12-11    133<L>  |  96<L>  |  77<H>  ----------------------------<  190<H>  4.7   |  22  |  3.71<H>    Ca    8.1<L>      11 Dec 2018 05:20  Phos  5.8     12-11  Mg     2.2     12-11            RADIOLOGY & ADDITIONAL STUDIES:

## 2018-12-12 NOTE — PROGRESS NOTE ADULT - ASSESSMENT
A/P:  A/P:  68M PMH of heart failure with EF of 20-25% , CAD s/p CABG, s/p PCI to SVG to OM in  (2016), DM, PAD now with concern of acute decompensated heart failure. Patient has significant LE edema from LE to abdomen, and elevated JVP. Patient's Cr is likely up from veronica-vascular congestion in the setting of profound edema from ADHF . Pt did not recieve any diuretics on 12/11 and his Cr continues to rise.     Plan:   - would favor to start IV diuresis with Lasix 80 mg IV TID   - strict I/ O  - monitor BMP BID, maintain K > 4, Mg > 2   - continue Coreg 25 mg BID   - Entresto currently held    Juan Parikh MD

## 2018-12-12 NOTE — PROGRESS NOTE ADULT - SUBJECTIVE AND OBJECTIVE BOX
[facility] Division of Kidney Diseases & Hypertension  FOLLOW UP NOTE  [phone]--------------------------------------------------------------------------------    HPI: 68 year old male with h/o CHFrEF, PVD, CAD, DM, HTN was admitted for increased drainage from wound vac. Nephrology was consulted for elevated creatinine. On review of previous records in [facility]/[place], patient had normal Scr. from 4/26/16 to 9/24/18. However patient was hospitalized in [facility] from 10/7/18 to 10/30/18 for surgical site infection. During hospital course, Scr. was noted to be elevated at 1.97 on 10/17/18, peaked at 2.13 on 10/18/18, and improved to 1.40 on 10/27/18. During this admission, Scr. was elevated at 1.5 on 12/7/18 and then worsened to 2.61 on 12/10/18.    Patient seen and examined. Denies c/o SOB, CP, abdominal pain, nausea, or vomiting. However still has LE edema.     PAST HISTORY  --------------------------------------------------------------------------------  No significant changes to PMH, PSH, FHx, SHx, unless otherwise noted    ALLERGIES & MEDICATIONS  --------------------------------------------------------------------------------  Allergies    No Known Allergies    Intolerances      Standing Inpatient Medications  atorvastatin 40 milliGRAM(s) Oral at bedtime  carvedilol 25 milliGRAM(s) Oral every 12 hours  dextrose 50% Injectable 12.5 Gram(s) IV Push once  dextrose 50% Injectable 25 Gram(s) IV Push once  dextrose 50% Injectable 25 Gram(s) IV Push once  docusate sodium 100 milliGRAM(s) Oral daily  heparin  Injectable 5000 Unit(s) SubCutaneous every 8 hours  insulin glargine Injectable (LANTUS) 30 Unit(s) SubCutaneous at bedtime  insulin lispro (HumaLOG) corrective regimen sliding scale   SubCutaneous at bedtime  insulin lispro (HumaLOG) corrective regimen sliding scale   SubCutaneous three times a day before meals  senna 2 Tablet(s) Oral at bedtime    PRN Inpatient Medications  acetaminophen   Tablet .. 325 milliGRAM(s) Oral every 4 hours PRN  calamine Lotion 1 Application(s) Topical daily PRN  dextrose 40% Gel 15 Gram(s) Oral once PRN  glucagon  Injectable 1 milliGRAM(s) IntraMuscular once PRN  oxyCODONE    IR 5 milliGRAM(s) Oral every 6 hours PRN  oxyCODONE    IR 10 milliGRAM(s) Oral every 6 hours PRN      REVIEW OF SYSTEMS  --------------------------------------------------------------------------------    Gen: No  fevers/chills, weakness  Skin: No rashes  Head/Eyes/Ears/Mouth: No headache  Respiratory: SOB on exertion +  CV: No chest pain,   GI: No abdominal pain, diarrhea, nausea, vomiting  : No increased frequency, dysuria, hematuria, nocturia  MSK: Edema +  Neuro: No dizziness/lightheadedness      VITALS/PHYSICAL EXAM  --------------------------------------------------------------------------------  T(C): 36.6 (12-12-18 @ 05:55), Max: 36.7 (12-11-18 @ 19:39)  HR: 64 (12-12-18 @ 08:34) (61 - 70)  BP: 102/72 (12-12-18 @ 08:34) (102/72 - 125/60)  RR: 18 (12-12-18 @ 08:34) (16 - 18)  SpO2: 100% (12-12-18 @ 08:34) (98% - 100%)  Wt(kg): --        12-11-18 @ 07:01  -  12-12-18 @ 07:00  --------------------------------------------------------  IN: 0 mL / OUT: 1575 mL / NET: -1575 mL      Physical Exam:  	  Gen: NAD  	HEENT:no JVD  	Pulm: CTA B/L  	CV:  S1S2  	Abd: +BS, soft   	Ext:  B/L Lower ext edema +; wound vac present over left thigh.   	Neuro: No focal deficits  	Skin: Warm and dry     LABS/STUDIES  --------------------------------------------------------------------------------              9.2    6.36  >-----------<  162      [12-12-18 @ 06:35]              29.5     136  |  98  |  92  ----------------------------<  95      [12-12-18 @ 06:35]  4.9   |  20  |  4.26        Ca     8.4     [12-12-18 @ 06:35]      Mg     2.3     [12-12-18 @ 06:35]      Phos  6.3     [12-12-18 @ 06:35]            Creatinine Trend:  SCr 4.26 [12-12 @ 06:35]  SCr 3.71 [12-11 @ 05:20]  SCr 2.61 [12-10 @ 06:30]  SCr 1.69 [12-09 @ 06:20]  SCr 1.50 [12-07 @ 19:17]      Urine Creatinine 92.40      [12-10-18 @ 20:50]  Urine Urea Nitrogen 452.6      [12-10-18 @ 20:50] [facility] Division of Kidney Diseases & Hypertension  FOLLOW UP NOTE  [phone]--------------------------------------------------------------------------------    HPI: 68 year old male with h/o CHFrEF, PVD, CAD, DM, HTN was admitted for increased drainage from wound vac. Nephrology was consulted for elevated creatinine. On review of previous records in [facility]/[place], patient had normal Scr. from 4/26/16 to 9/24/18. However patient was hospitalized in [facility] from 10/7/18 to 10/30/18 for surgical site infection. During hospital course, Scr. was noted to be elevated at 1.97 on 10/17/18, peaked at 2.13 on 10/18/18, and improved to 1.40 on 10/27/18. During this admission, Scr. was elevated at 1.5 on 12/7/18 and then worsened to 2.61 on 12/10/18.    Patient seen and examined. Denies c/o SOB, CP, abdominal pain, nausea, or vomiting. However still has LE edema.     PAST HISTORY  --------------------------------------------------------------------------------  No significant changes to PMH, PSH, FHx, SHx, unless otherwise noted    ALLERGIES & MEDICATIONS  --------------------------------------------------------------------------------  Allergies    No Known Allergies    Intolerances      Standing Inpatient Medications  atorvastatin 40 milliGRAM(s) Oral at bedtime  carvedilol 25 milliGRAM(s) Oral every 12 hours  dextrose 50% Injectable 12.5 Gram(s) IV Push once  dextrose 50% Injectable 25 Gram(s) IV Push once  dextrose 50% Injectable 25 Gram(s) IV Push once  docusate sodium 100 milliGRAM(s) Oral daily  heparin  Injectable 5000 Unit(s) SubCutaneous every 8 hours  insulin glargine Injectable (LANTUS) 30 Unit(s) SubCutaneous at bedtime  insulin lispro (HumaLOG) corrective regimen sliding scale   SubCutaneous at bedtime  insulin lispro (HumaLOG) corrective regimen sliding scale   SubCutaneous three times a day before meals  senna 2 Tablet(s) Oral at bedtime    PRN Inpatient Medications  acetaminophen   Tablet .. 325 milliGRAM(s) Oral every 4 hours PRN  calamine Lotion 1 Application(s) Topical daily PRN  dextrose 40% Gel 15 Gram(s) Oral once PRN  glucagon  Injectable 1 milliGRAM(s) IntraMuscular once PRN  oxyCODONE    IR 5 milliGRAM(s) Oral every 6 hours PRN  oxyCODONE    IR 10 milliGRAM(s) Oral every 6 hours PRN      REVIEW OF SYSTEMS  --------------------------------------------------------------------------------    Gen: No  fevers/chills, weakness  Skin: No rashes  Head/Eyes/Ears/Mouth: No headache  Respiratory: SOB on exertion +  CV: No chest pain,   GI: No abdominal pain, diarrhea, nausea, vomiting  : No increased frequency, dysuria, hematuria, nocturia  MSK: Edema +  Neuro: No dizziness/lightheadedness      VITALS/PHYSICAL EXAM  --------------------------------------------------------------------------------  T(C): 36.6 (12-12-18 @ 05:55), Max: 36.7 (12-11-18 @ 19:39)  HR: 64 (12-12-18 @ 08:34) (61 - 70)  BP: 102/72 (12-12-18 @ 08:34) (102/72 - 125/60)  RR: 18 (12-12-18 @ 08:34) (16 - 18)  SpO2: 100% (12-12-18 @ 08:34) (98% - 100%)  Wt(kg): --        12-11-18 @ 07:01  -  12-12-18 @ 07:00  --------------------------------------------------------  IN: 0 mL / OUT: 1575 mL / NET: -1575 mL      Physical Exam:  	  Gen: NAD  	HEENT:no JVD  	Pulm: Crackles present bilaterally   	CV:  S1S2  	Abd: +BS, soft   	Ext:  B/L Lower ext edema +; wound vac present over left thigh.   	Neuro: No focal deficits  	Skin: Warm and dry     LABS/STUDIES  --------------------------------------------------------------------------------              9.2    6.36  >-----------<  162      [12-12-18 @ 06:35]              29.5     136  |  98  |  92  ----------------------------<  95      [12-12-18 @ 06:35]  4.9   |  20  |  4.26        Ca     8.4     [12-12-18 @ 06:35]      Mg     2.3     [12-12-18 @ 06:35]      Phos  6.3     [12-12-18 @ 06:35]            Creatinine Trend:  SCr 4.26 [12-12 @ 06:35]  SCr 3.71 [12-11 @ 05:20]  SCr 2.61 [12-10 @ 06:30]  SCr 1.69 [12-09 @ 06:20]  SCr 1.50 [12-07 @ 19:17]      Urine Creatinine 92.40      [12-10-18 @ 20:50]  Urine Urea Nitrogen 452.6      [12-10-18 @ 20:50]

## 2018-12-12 NOTE — PROGRESS NOTE ADULT - PROBLEM SELECTOR PLAN 1
Patient with KESHAV in setting of ARB use, diuretic use, and IV contrast. On review of previous labs, Scr. has been WNL. However during previous hospitalization in October, patient had episode of KESHAV which resolved. During current admission, Scr. was elevated at 1.5 on 12/7/18 and then worsened to 2.61 on 12/10/18. Latest Scr. has increased to 4.26 today. Entresto and torsemide was stopped on 12/11/18. Renal ultrasound shows no hydronephrosis. Patient with hemodynamically mediated KESHAV? Continue to hold diuretics for now. Monitor Scr, I/O, and electrolytes daily. Avoid NSAIDs, RCAs, ACE-I/ARBs at this time. Patient with KESHAV in setting of ARB use, diuretic use, and IV contrast. On review of previous labs, Scr. has been WNL. However during previous hospitalization in October, patient had episode of KESHAV which resolved. During current admission, Scr. was elevated at 1.5 on 12/7/18 and then worsened to 2.61 on 12/10/18. Latest Scr. has increased to 4.26 today. Entresto and torsemide was stopped on 12/11/18. Renal ultrasound shows no hydronephrosis. Patient with hemodynamically mediated KESHAV? Recommend to start lasix 80 mg IV BID. Continue to hold entresto. Monitor Scr, I/O, and electrolytes daily. Avoid NSAIDs, RCAs, ACE-I/ARBs at this time.

## 2018-12-12 NOTE — PROGRESS NOTE ADULT - SUBJECTIVE AND OBJECTIVE BOX
Patient seen and examined at bedside.    Overnight Events: did not receive diuretics overnight, complains of SOB this AM       Medications:  acetaminophen   Tablet .. 325 milliGRAM(s) Oral every 4 hours PRN  atorvastatin 40 milliGRAM(s) Oral at bedtime  calamine Lotion 1 Application(s) Topical daily PRN  carvedilol 25 milliGRAM(s) Oral every 12 hours  dextrose 40% Gel 15 Gram(s) Oral once PRN  dextrose 50% Injectable 12.5 Gram(s) IV Push once  dextrose 50% Injectable 25 Gram(s) IV Push once  dextrose 50% Injectable 25 Gram(s) IV Push once  docusate sodium 100 milliGRAM(s) Oral daily  glucagon  Injectable 1 milliGRAM(s) IntraMuscular once PRN  heparin  Injectable 5000 Unit(s) SubCutaneous every 8 hours  insulin glargine Injectable (LANTUS) 30 Unit(s) SubCutaneous at bedtime  insulin lispro (HumaLOG) corrective regimen sliding scale   SubCutaneous at bedtime  insulin lispro (HumaLOG) corrective regimen sliding scale   SubCutaneous three times a day before meals  oxyCODONE    IR 5 milliGRAM(s) Oral every 6 hours PRN  oxyCODONE    IR 10 milliGRAM(s) Oral every 6 hours PRN  senna 2 Tablet(s) Oral at bedtime      PAST MEDICAL & SURGICAL HISTORY:  Chronic congestive heart failure, unspecified congestive heart failure type  PAD (peripheral artery disease)  Stented coronary artery  Hyperlipidemia  Diabetes  Hypertension  CAD (coronary artery disease)  S/P amputation: right great toe and 4th and 5th digit  S/P bypass graft of extremity: RLE  S/P angioplasty with stent: about 5 years ago  S/P CABG x 3      Vitals:  T(F): 97.9 (12-12), Max: 98.1 (12-11)  HR: 64 (12-12) (61 - 70)  BP: 102/72 (12-12) (102/72 - 125/60)  RR: 18 (12-12)  SpO2: 100% (12-12)  I&O's Summary    11 Dec 2018 07:01  -  12 Dec 2018 07:00  --------------------------------------------------------  IN: 0 mL / OUT: 1575 mL / NET: -1575 mL        Physical Exam:  eleavated JVD   RRR, nl S1 nl S2  anasarca from LE to lower-mid abdomen                                         9.2    6.36  )-----------( 162      ( 12 Dec 2018 06:35 )             29.5   12-12    136  |  98  |  92<H>  ----------------------------<  95  4.9   |  20<L>  |  4.26<H>    Ca    8.4      12 Dec 2018 06:35  Phos  6.3     12-12  Mg     2.3     12-12

## 2018-12-12 NOTE — PROGRESS NOTE ADULT - ASSESSMENT
68M PMH CHF, CAD s/p CABG, s/p stent (2016), DM, PAD s/p L KEI stent (9/11/18), s/p LLE CFA to below-knee bypass with PTFE for long-segment SFA occlusion on (9/18/18), L 2nd toe amputation (9/19), POC c/b groin SSI requiring washout, sartorius flap, and vac placement, presenting with high output from VAC    - Holding torsemide/entresto per nephrology recs  - F/u renal U/S  - Cardiology rec - tele (EF 20-25%)   - Carb consistent diet, SSI  - Continue home medications  - Wound care for wound vac changes  - Daily wet to dry dressing changes  - DVT ppx      Vascular Surgery  [id number]

## 2018-12-13 DIAGNOSIS — I73.9 PERIPHERAL VASCULAR DISEASE, UNSPECIFIED: ICD-10-CM

## 2018-12-13 DIAGNOSIS — Z29.9 ENCOUNTER FOR PROPHYLACTIC MEASURES, UNSPECIFIED: ICD-10-CM

## 2018-12-13 DIAGNOSIS — I50.21 ACUTE SYSTOLIC (CONGESTIVE) HEART FAILURE: ICD-10-CM

## 2018-12-13 DIAGNOSIS — I10 ESSENTIAL (PRIMARY) HYPERTENSION: ICD-10-CM

## 2018-12-13 DIAGNOSIS — E11.51 TYPE 2 DIABETES MELLITUS WITH DIABETIC PERIPHERAL ANGIOPATHY WITHOUT GANGRENE: ICD-10-CM

## 2018-12-13 DIAGNOSIS — I25.10 ATHEROSCLEROTIC HEART DISEASE OF NATIVE CORONARY ARTERY WITHOUT ANGINA PECTORIS: ICD-10-CM

## 2018-12-13 LAB
BASOPHILS # BLD AUTO: 0.03 K/UL — SIGNIFICANT CHANGE UP (ref 0–0.2)
BASOPHILS NFR BLD AUTO: 0.5 % — SIGNIFICANT CHANGE UP (ref 0–2)
BUN SERPL-MCNC: 105 MG/DL — HIGH (ref 7–23)
BUN SERPL-MCNC: 106 MG/DL — HIGH (ref 7–23)
CALCIUM SERPL-MCNC: 8.7 MG/DL — SIGNIFICANT CHANGE UP (ref 8.4–10.5)
CALCIUM SERPL-MCNC: 8.8 MG/DL — SIGNIFICANT CHANGE UP (ref 8.4–10.5)
CHLORIDE SERPL-SCNC: 94 MMOL/L — LOW (ref 98–107)
CHLORIDE SERPL-SCNC: 96 MMOL/L — LOW (ref 98–107)
CO2 SERPL-SCNC: 21 MMOL/L — LOW (ref 22–31)
CO2 SERPL-SCNC: 23 MMOL/L — SIGNIFICANT CHANGE UP (ref 22–31)
CREAT SERPL-MCNC: 4.38 MG/DL — HIGH (ref 0.5–1.3)
CREAT SERPL-MCNC: 4.54 MG/DL — HIGH (ref 0.5–1.3)
EOSINOPHIL # BLD AUTO: 0.24 K/UL — SIGNIFICANT CHANGE UP (ref 0–0.5)
EOSINOPHIL NFR BLD AUTO: 3.9 % — SIGNIFICANT CHANGE UP (ref 0–6)
GLUCOSE SERPL-MCNC: 137 MG/DL — HIGH (ref 70–99)
GLUCOSE SERPL-MCNC: 213 MG/DL — HIGH (ref 70–99)
HCT VFR BLD CALC: 32.4 % — LOW (ref 39–50)
HGB BLD-MCNC: 9.8 G/DL — LOW (ref 13–17)
IMM GRANULOCYTES # BLD AUTO: 0.01 # — SIGNIFICANT CHANGE UP
IMM GRANULOCYTES NFR BLD AUTO: 0.2 % — SIGNIFICANT CHANGE UP (ref 0–1.5)
LYMPHOCYTES # BLD AUTO: 0.79 K/UL — LOW (ref 1–3.3)
LYMPHOCYTES # BLD AUTO: 12.7 % — LOW (ref 13–44)
MAGNESIUM SERPL-MCNC: 2.5 MG/DL — SIGNIFICANT CHANGE UP (ref 1.6–2.6)
MCHC RBC-ENTMCNC: 22.2 PG — LOW (ref 27–34)
MCHC RBC-ENTMCNC: 30.2 % — LOW (ref 32–36)
MCV RBC AUTO: 73.5 FL — LOW (ref 80–100)
MONOCYTES # BLD AUTO: 0.86 K/UL — SIGNIFICANT CHANGE UP (ref 0–0.9)
MONOCYTES NFR BLD AUTO: 13.9 % — SIGNIFICANT CHANGE UP (ref 2–14)
NEUTROPHILS # BLD AUTO: 4.27 K/UL — SIGNIFICANT CHANGE UP (ref 1.8–7.4)
NEUTROPHILS NFR BLD AUTO: 68.8 % — SIGNIFICANT CHANGE UP (ref 43–77)
NRBC # FLD: 0 — SIGNIFICANT CHANGE UP
NT-PROBNP SERPL-SCNC: 4507 PG/ML — SIGNIFICANT CHANGE UP
PHOSPHATE SERPL-MCNC: 7.2 MG/DL — HIGH (ref 2.5–4.5)
PLATELET # BLD AUTO: 185 K/UL — SIGNIFICANT CHANGE UP (ref 150–400)
PMV BLD: SIGNIFICANT CHANGE UP FL (ref 7–13)
POTASSIUM SERPL-MCNC: 5.1 MMOL/L — SIGNIFICANT CHANGE UP (ref 3.5–5.3)
POTASSIUM SERPL-MCNC: 5.2 MMOL/L — SIGNIFICANT CHANGE UP (ref 3.5–5.3)
POTASSIUM SERPL-SCNC: 5.1 MMOL/L — SIGNIFICANT CHANGE UP (ref 3.5–5.3)
POTASSIUM SERPL-SCNC: 5.2 MMOL/L — SIGNIFICANT CHANGE UP (ref 3.5–5.3)
RBC # BLD: 4.41 M/UL — SIGNIFICANT CHANGE UP (ref 4.2–5.8)
RBC # FLD: 20.7 % — HIGH (ref 10.3–14.5)
SODIUM SERPL-SCNC: 132 MMOL/L — LOW (ref 135–145)
SODIUM SERPL-SCNC: 135 MMOL/L — SIGNIFICANT CHANGE UP (ref 135–145)
WBC # BLD: 6.2 K/UL — SIGNIFICANT CHANGE UP (ref 3.8–10.5)
WBC # FLD AUTO: 6.2 K/UL — SIGNIFICANT CHANGE UP (ref 3.8–10.5)

## 2018-12-13 PROCEDURE — 99232 SBSQ HOSP IP/OBS MODERATE 35: CPT

## 2018-12-13 PROCEDURE — 99233 SBSQ HOSP IP/OBS HIGH 50: CPT | Mod: GC

## 2018-12-13 PROCEDURE — 99223 1ST HOSP IP/OBS HIGH 75: CPT

## 2018-12-13 RX ORDER — FUROSEMIDE 40 MG
20 TABLET ORAL
Qty: 500 | Refills: 0 | Status: DISCONTINUED | OUTPATIENT
Start: 2018-12-13 | End: 2018-12-13

## 2018-12-13 RX ORDER — MORPHINE SULFATE 50 MG/1
2 CAPSULE, EXTENDED RELEASE ORAL ONCE
Qty: 0 | Refills: 0 | Status: DISCONTINUED | OUTPATIENT
Start: 2018-12-13 | End: 2018-12-13

## 2018-12-13 RX ORDER — FUROSEMIDE 40 MG
15 TABLET ORAL
Qty: 500 | Refills: 0 | Status: DISCONTINUED | OUTPATIENT
Start: 2018-12-13 | End: 2018-12-18

## 2018-12-13 RX ORDER — LIDOCAINE HCL 20 MG/ML
5 VIAL (ML) INJECTION ONCE
Qty: 0 | Refills: 0 | Status: COMPLETED | OUTPATIENT
Start: 2018-12-13 | End: 2018-12-13

## 2018-12-13 RX ADMIN — Medication 2: at 18:11

## 2018-12-13 RX ADMIN — ATORVASTATIN CALCIUM 40 MILLIGRAM(S): 80 TABLET, FILM COATED ORAL at 21:59

## 2018-12-13 RX ADMIN — INSULIN GLARGINE 30 UNIT(S): 100 INJECTION, SOLUTION SUBCUTANEOUS at 21:58

## 2018-12-13 RX ADMIN — Medication 1: at 09:57

## 2018-12-13 RX ADMIN — OXYCODONE HYDROCHLORIDE 5 MILLIGRAM(S): 5 TABLET ORAL at 09:43

## 2018-12-13 RX ADMIN — Medication 5 MILLILITER(S): at 16:10

## 2018-12-13 RX ADMIN — OXYCODONE HYDROCHLORIDE 5 MILLIGRAM(S): 5 TABLET ORAL at 08:39

## 2018-12-13 RX ADMIN — Medication 5 MG/HR: at 12:00

## 2018-12-13 RX ADMIN — OXYCODONE HYDROCHLORIDE 5 MILLIGRAM(S): 5 TABLET ORAL at 22:34

## 2018-12-13 RX ADMIN — Medication 1: at 13:18

## 2018-12-13 RX ADMIN — MORPHINE SULFATE 2 MILLIGRAM(S): 50 CAPSULE, EXTENDED RELEASE ORAL at 16:10

## 2018-12-13 RX ADMIN — Medication 1: at 22:00

## 2018-12-13 RX ADMIN — Medication 100 MILLIGRAM(S): at 13:19

## 2018-12-13 RX ADMIN — MORPHINE SULFATE 2 MILLIGRAM(S): 50 CAPSULE, EXTENDED RELEASE ORAL at 16:08

## 2018-12-13 RX ADMIN — OXYCODONE HYDROCHLORIDE 5 MILLIGRAM(S): 5 TABLET ORAL at 22:04

## 2018-12-13 NOTE — CONSULT NOTE ADULT - ASSESSMENT
67 yo male PMHX as above admitted for high output form wound vac c/b KESHAV on CKD III and acute decompensated heart failure with reduced EF

## 2018-12-13 NOTE — CONSULT NOTE ADULT - PROBLEM SELECTOR RECOMMENDATION 2
patient with EF of 20-25% in October with severe MR, reduced RV with mod pulm pressures as well as stage III diastolic.   -patient anasarcic at this time, agree with cards about hypervolemia.  -on lasix 80mg IVBID x1 day, changed to lasix gtt @10 today.  -patient on my exam with minimal output, scanned bladder with not much in bladder to my eye (did not measure and calculate volume)  -recommend degroot for strict I/O  -if no good urine output with lasix, would consider bumex gtt (low albumin at 3 may make lasix less effective)  -while on gtt, need BID labs  -D/C coreg as if concerned for low flow output state, may need inotropes, which would be counter productive with coreg on board.  -discussed with cards team at length, will discuss with attending re: fixed dose .  -if does not respond, would be a candidate for CCU for closer hemodynamic monitoring.

## 2018-12-13 NOTE — CHART NOTE - NSCHARTNOTEFT_GEN_A_CORE
Pt currently on lasix gtt with minimal UOP. Bladder scan pending. Nursing staff attempting 14F degroot placement and could not advance due to resistance. Primary team attempted placement with 18F coude and could not insert the degroot or retract the foreskin adequately to identify the meatus due to surrounding edema. Urology consulted for assistance with degroot placement.       Nickolas Sommers, PGY-1   General Surgery

## 2018-12-13 NOTE — PROGRESS NOTE ADULT - SUBJECTIVE AND OBJECTIVE BOX
Patient seen and examined at bedside.    Interval events: called by team as SBP in low 100s this AM. was started on Lasix 80 mg IV BID yesterday       Medications:  MEDICATIONS  (STANDING):  atorvastatin 40 milliGRAM(s) Oral at bedtime  carvedilol 25 milliGRAM(s) Oral every 12 hours  dextrose 50% Injectable 12.5 Gram(s) IV Push once  dextrose 50% Injectable 25 Gram(s) IV Push once  dextrose 50% Injectable 25 Gram(s) IV Push once  docusate sodium 100 milliGRAM(s) Oral daily  furosemide Infusion 10 mG/Hr (5 mL/Hr) IV Continuous <Continuous>  heparin  Injectable 5000 Unit(s) SubCutaneous every 8 hours  insulin glargine Injectable (LANTUS) 30 Unit(s) SubCutaneous at bedtime  insulin lispro (HumaLOG) corrective regimen sliding scale   SubCutaneous at bedtime  insulin lispro (HumaLOG) corrective regimen sliding scale   SubCutaneous three times a day before meals  senna 2 Tablet(s) Oral at bedtime      PAST MEDICAL & SURGICAL HISTORY:  Chronic congestive heart failure, unspecified congestive heart failure type  PAD (peripheral artery disease)  Stented coronary artery  Hyperlipidemia  Diabetes  Hypertension  CAD (coronary artery disease)  S/P amputation: right great toe and 4th and 5th digit  S/P bypass graft of extremity: RLE  S/P angioplasty with stent: about 5 years ago  S/P CABG x 3      Vitals:ICU Vital Signs Last 24 Hrs  T(C): 36.6 (13 Dec 2018 05:09), Max: 36.6 (13 Dec 2018 00:25)  T(F): 97.8 (13 Dec 2018 05:09), Max: 97.8 (13 Dec 2018 00:25)  HR: 64 (13 Dec 2018 09:38) (56 - 68)  BP: 104/54 (13 Dec 2018 09:38) (88/42 - 115/66)  BP(mean): --  ABP: --  ABP(mean): --  RR: 18 (13 Dec 2018 08:33) (16 - 18)  SpO2: 100% (13 Dec 2018 08:33) (96% - 100%)      Physical Exam:  eleavated JVD   RRR, nl S1 nl S2  Clear lungs   anasarca from LE to lower-mid abdomen                                            9.8    6.20  )-----------( 185      ( 13 Dec 2018 06:40 )             32.4   12-13    135  |  96<L>  |  105<H>  ----------------------------<  137<H>  5.1   |  23  |  4.54<H>    Ca    8.8      13 Dec 2018 06:40  Phos  7.2     12-13  Mg     2.5     12-13

## 2018-12-13 NOTE — PROGRESS NOTE ADULT - ASSESSMENT
A/P:  68M PMH of heart failure with EF of 20-25% , CAD s/p CABG, s/p PCI to SVG to OM in  (2016), DM, PAD now with concern of acute decompensated heart failure. Patient has significant LE edema from LE to abdomen, and elevated JVP. Patient's Cr is likely up from veronica-vascular congestion in the setting of profound edema from ADHF .      Plan:   - pt noted to be borderline hypotensive, would not stop Lasix, change to Lasix gtt at 10 mg / hr to continue IV diuresis.   - strict I/ O  - monitor BMP BID, maintain K > 4, Mg > 2   - continue Coreg 25 mg BID     Juan Parikh MD A/P:  68M PMH of heart failure with EF of 20-25% , CAD s/p CABG, s/p PCI to SVG to OM in  (2016), DM, PAD now with concern of acute decompensated heart failure. Patient has significant LE edema from LE to abdomen, and elevated JVP. Patient's Cr is likely up from veronica-vascular congestion in the setting of profound edema from ADHF .      Plan:   - pt noted to be borderline hypotensive, would not stop Lasix, change to Lasix gtt at 10 mg / hr to continue IV diuresis. if not making atleast 100 cc/hr would switch to Bumex gtt at 1 mg/hr   - would start Dobutamine 2.5mcg/kg/min   - strict I/ O  - monitor BMP BID, maintain K > 4, Mg > 2   - stop coreg   - if patient continues to be hypotensive to SBP < 90s , reasonable to escalate level of care to ICU  - would consult CHF team tomorrow AM  - obtain a repeat TTE    Case discussed with Dr. Lai.     Juan Parikh MD

## 2018-12-13 NOTE — CONSULT NOTE ADULT - PROBLEM SELECTOR RECOMMENDATION 3
continued management of wound vac as per vascular  -per discussion with resident, no plans for operative management at this [facility].
negative

## 2018-12-13 NOTE — PROGRESS NOTE ADULT - SUBJECTIVE AND OBJECTIVE BOX
[facility] Division of Kidney Diseases & Hypertension  FOLLOW UP NOTE  [phone]--------------------------------------------------------------------------------    68 year old male with h/o CHFrEF, PVD, CAD, DM, HTN was admitted for increased drainage from wound vac. Nephrology was consulted for elevated creatinine. On review of previous records in [facility]/[place], patient had normal Scr. from 4/26/16 to 9/24/18. However patient was hospitalized in [facility] from 10/7/18 to 10/30/18 for surgical site infection. During hospital course, Scr. was noted to be elevated at 1.97 on 10/17/18, peaked at 2.13 on 10/18/18, and improved to 1.40 on 10/27/18. During this admission, Scr. was elevated at 1.5 on 12/7/18 and then worsened to 4.54 today.    Patient seen and examined. Still has complains of SOB and dizziness when standing up. UOP has decreased from yesterday to 770 cc over past 24 hours; currently on lasix drip.         PAST HISTORY  --------------------------------------------------------------------------------  No significant changes to PMH, PSH, FHx, SHx, unless otherwise noted    ALLERGIES & MEDICATIONS  --------------------------------------------------------------------------------  Allergies    No Known Allergies    Intolerances      Standing Inpatient Medications  atorvastatin 40 milliGRAM(s) Oral at bedtime  dextrose 50% Injectable 12.5 Gram(s) IV Push once  dextrose 50% Injectable 25 Gram(s) IV Push once  dextrose 50% Injectable 25 Gram(s) IV Push once  docusate sodium 100 milliGRAM(s) Oral daily  furosemide Infusion 10 mG/Hr IV Continuous <Continuous>  heparin  Injectable 5000 Unit(s) SubCutaneous every 8 hours  insulin glargine Injectable (LANTUS) 30 Unit(s) SubCutaneous at bedtime  insulin lispro (HumaLOG) corrective regimen sliding scale   SubCutaneous at bedtime  insulin lispro (HumaLOG) corrective regimen sliding scale   SubCutaneous three times a day before meals  lidocaine 2% Jelly 5 milliLiter(s) IntraUrethral once  senna 2 Tablet(s) Oral at bedtime    PRN Inpatient Medications  acetaminophen   Tablet .. 325 milliGRAM(s) Oral every 4 hours PRN  calamine Lotion 1 Application(s) Topical daily PRN  dextrose 40% Gel 15 Gram(s) Oral once PRN  glucagon  Injectable 1 milliGRAM(s) IntraMuscular once PRN  oxyCODONE    IR 5 milliGRAM(s) Oral every 6 hours PRN  oxyCODONE    IR 10 milliGRAM(s) Oral every 6 hours PRN      REVIEW OF SYSTEMS  --------------------------------------------------------------------------------        VITALS/PHYSICAL EXAM  --------------------------------------------------------------------------------  T(C): 36.4 (12-13-18 @ 11:50), Max: 36.6 (12-13-18 @ 00:25)  HR: 75 (12-13-18 @ 11:50) (56 - 75)  BP: 122/82 (12-13-18 @ 12:40) (88/42 - 126/74)  RR: 18 (12-13-18 @ 11:50) (16 - 18)  SpO2: 100% (12-13-18 @ 11:50) (96% - 100%)  Wt(kg): --        12-12-18 @ 07:01  -  12-13-18 @ 07:00  --------------------------------------------------------  IN: 520 mL / OUT: 770 mL / NET: -250 mL    12-13-18 @ 07:01  -  12-13-18 @ 15:31  --------------------------------------------------------  IN: 380 mL / OUT: 225 mL / NET: 155 mL      Physical Exam:  	Gen: NAD, well-appearing  	HEENT: PERRL, supple neck, clear oropharynx  	Pulm: CTA B/L  	CV: RRR, S1S2;  	Back: No spinal or CVA tenderness  	Abd: +BS, soft, nontender/nondistended  	: No suprapubic tenderness                      Extremities: no bilateral LE edema noted.                       Neuro: No focal deficits, intact gait  	Skin: Warm, without rashes  	Vascular access:    LABS/STUDIES  --------------------------------------------------------------------------------              9.8    6.20  >-----------<  185      [12-13-18 @ 06:40]              32.4     135  |  96  |  105  ----------------------------<  137      [12-13-18 @ 06:40]  5.1   |  23  |  4.54        Ca     8.8     [12-13-18 @ 06:40]      Mg     2.5     [12-13-18 @ 06:40]      Phos  7.2     [12-13-18 @ 06:40]            Creatinine Trend:  SCr 4.54 [12-13 @ 06:40]  SCr 4.26 [12-12 @ 06:35]  SCr 3.71 [12-11 @ 05:20]  SCr 2.61 [12-10 @ 06:30]  SCr 1.69 [12-09 @ 06:20]      Urine Creatinine 92.40      [12-10-18 @ 20:50]  Urine Urea Nitrogen 452.6      [12-10-18 @ 20:50]

## 2018-12-13 NOTE — PROGRESS NOTE ADULT - ASSESSMENT
68M PMH CHF, CAD s/p CABG, s/p stent (2016), DM, PAD s/p L KEI stent (9/11/18), s/p LLE CFA to below-knee bypass with PTFE for long-segment SFA occlusion on (9/18/18), L 2nd toe amputation (9/19), POC c/b groin SSI requiring washout, sartorius flap, and vac placement, presenting with high output from VAC    - Holding torsemide/entresto per nephrology recs  - Cardiology rec- lasix gtt @ 10cc/h and hold for SBP < 90 (rather than lasix 80mg IV BID).   - Cardiology rec - tele (EF 20-25%)   - Carb consistent diet, SSI  - Continue home medications  - Wound care for wound vac changes  - Daily wet to dry dressing changes  - DVT ppx      Vascular Surgery  [id number]

## 2018-12-13 NOTE — CONSULT NOTE ADULT - PROBLEM SELECTOR RECOMMENDATION 9
[facility]  KESHAV on CKD III  -unclear mechanism, but hypothesis is aptietn already with low flow state from heart failure. Patient had contrast for CT pelvis which caused ABBY in low flow state and was exacerbated by it.    -Creatinine rate of rise is slowly downtrending, however patient is on lasix gtt.  -there are no current indications for emergent dialysis, but BUN is >100, K=5.1.  -hopefully incresaed forward flow from aggressive diuresis will assist, but patient now with minimal urine, may be in oliguirc phase of ATN.  -I am concerned he is destined for temporary dialysis, discussed this at length with nephrology and asked them to speak to patient about possibility of needing temprorary dialysis access.  -continue aggreesive diuresis, avoid other nephrotoxic agents.
Patient with KESHAV in setting of ARB use, diuretic use, and IV contrast. On review of previous labs, Scr. has been WNL. However during previous hospitalization in October, patient had episode of KESHAV which resolved. During current admission, Scr. was elevated at 1.5 on 12/7/18 and then worsened to 2.61 on 12/10/18. Patient with hemodynamically mediated KESHAV? Check UA and urine electrolytes(urine sodium, urine urea, urine potassium, urine chloride, and urine creatinine). Check renal ultrasound to r/o obstruction. Recommend to hold entresto and torsemide for now. Monitor Scr, I/O, and electrolytes daily. Avoid use of NSAIDs, RCAs, ACE-I/ARBs at this time. Renally adjust all medications as per eGFr.

## 2018-12-13 NOTE — CONSULT NOTE ADULT - SUBJECTIVE AND OBJECTIVE BOX
HPI:  68M PMH CHF, CAD s/p CABG, s/p stent (2016), DM, PAD s/p L KEI stent (9/11/18), s/p LLE CFA to below-knee bypass with PTFE for long-segment SFA occlusion on (9/18/18), L 2nd toe amputation (9/19), POC c/b groin SSI requiring washout, sartorius flap, and vac placement, presenting with high output from VAC. Patient saw Dr. Jung in the office today and was noted to have high output of clear fluid coming from VAC, sent in to [facility] ED for further evaluation. Upon arrival to [facility] ED, AVSS. WBC 7.32. Alk phos 361. AST/ALT 41/45. Prealbumin 12. CT Pelvis with IV contrast obtained which showed redemonstration of 3.0 x 2.7 cm simple fluid attenuating left inguinal collection, which was visible on previous CT abd/pelvis (10/2018). Patient admitted for further evaluation. Plastics saw patient for possible sclerosis of wound, no indication at this time per plastics. During the hospitalization, the patient's creatinine elevated quite quickly from 1.5 to 4.5 in 5 days.  Nephrology initially consulted, avoided nephrotoxic agents.  Cardiology then consulted and recommended high dose lasix.  [facility] consulted for continued medical care and possible transfer to medicine service. Patient seen and evaluatd.  Prior to admission patient stated 3 weeks ago was able to walk 3 blocks, now unable to walk more than 1 block 2/2 SOB.  Patient with+orthopnea, worsening LE edema to thigh and scrotum, weight gain and non-healing wound.  Denies cough, fever, chills, chest pain, abdominal pain, n/v/d/c.       PAST MEDICAL & SURGICAL HISTORY:  Chronic congestive heart failure, unspecified congestive heart failure type  PAD (peripheral artery disease)  Stented coronary artery  Hyperlipidemia  Diabetes  Hypertension  CAD (coronary artery disease)  S/P amputation: right great toe and 4th and 5th digit  S/P bypass graft of extremity: RLE  S/P angioplasty with stent: about 5 years ago  S/P CABG x 3      ROS negative except for that in above HPI    Allergies    No Known Allergies    Intolerances        Social History:  Not current smoker, not drinker.     FAMILY HISTORY: No pertinent first degree fmaily history    MEDS (Home): PAtient states in chart, unable to personally verify with pharmacy today, will attempt to do so tomorrow.      MEDICATIONS  (STANDING):  atorvastatin 40 milliGRAM(s) Oral at bedtime  dextrose 50% Injectable 12.5 Gram(s) IV Push once  dextrose 50% Injectable 25 Gram(s) IV Push once  dextrose 50% Injectable 25 Gram(s) IV Push once  docusate sodium 100 milliGRAM(s) Oral daily  furosemide Infusion 10 mG/Hr (5 mL/Hr) IV Continuous <Continuous>  heparin  Injectable 5000 Unit(s) SubCutaneous every 8 hours  insulin glargine Injectable (LANTUS) 30 Unit(s) SubCutaneous at bedtime  insulin lispro (HumaLOG) corrective regimen sliding scale   SubCutaneous at bedtime  insulin lispro (HumaLOG) corrective regimen sliding scale   SubCutaneous three times a day before meals  senna 2 Tablet(s) Oral at bedtime    MEDICATIONS  (PRN):  acetaminophen   Tablet .. 325 milliGRAM(s) Oral every 4 hours PRN Mild Pain (1 - 3)  calamine Lotion 1 Application(s) Topical daily PRN Rash and/or Itching  dextrose 40% Gel 15 Gram(s) Oral once PRN Blood Glucose LESS THAN 70 milliGRAM(s)/deciliter  glucagon  Injectable 1 milliGRAM(s) IntraMuscular once PRN Glucose LESS THAN 70 milligrams/deciliter  oxyCODONE    IR 5 milliGRAM(s) Oral every 6 hours PRN Moderate Pain (4 - 6)  oxyCODONE    IR 10 milliGRAM(s) Oral every 6 hours PRN Severe Pain (7 - 10)      T(C): 36.4 (12-13-18 @ 20:23), Max: 36.6 (12-13-18 @ 00:25)  HR: 66 (12-13-18 @ 20:23) (60 - 75)  BP: 115/67 (12-13-18 @ 20:23) (102/83 - 126/74)  RR: 18 (12-13-18 @ 20:23) (16 - 18)  SpO2: 96% (12-13-18 @ 20:23) (96% - 100%)  CAPILLARY BLOOD GLUCOSE      POCT Blood Glucose.: 292 mg/dL (13 Dec 2018 22:00)  POCT Blood Glucose.: 209 mg/dL (13 Dec 2018 17:43)  POCT Blood Glucose.: 161 mg/dL (13 Dec 2018 13:07)  POCT Blood Glucose.: 151 mg/dL (13 Dec 2018 09:23)    I&O's Summary    12 Dec 2018 07:01  -  13 Dec 2018 07:00  --------------------------------------------------------  IN: 520 mL / OUT: 770 mL / NET: -250 mL    13 Dec 2018 07:01  -  13 Dec 2018 22:09  --------------------------------------------------------  IN: 590 mL / OUT: 650 mL / NET: -60 mL        PHYSICAL EXAM:  GENERAL: NAD, well-developed  HEAD:  Atraumatic, Normocephalic  EYES: EOMI. anicteric sclera. conjunctiva and sclera clear  NECK: Supple, No JVD  CHEST/LUNG: b/l crackles.   HEART: Regular rate and rhythm; No murmurs, rubs, or gallops  ABDOMEN:NT. +BS  EXTREMITIES: assymetric swelling 3+ LEft leg, 2+ right leg.  Wound vac on left leg with 200cc output in last 24 hours.  Cool extremities, not mottled.    PSYCH: AAOx3  NEUROLOGY: non-focal  POCUS (unofficial): b-lines anteriorly b/l with trace effusions b/l.  Severely reduced EF. No apparent septal D on parasternal short.  No RV enlargement noted on apical 4.  Plump IVC on subcostal view.     LABS:                        9.8    6.20  )-----------( 185      ( 13 Dec 2018 06:40 )             32.4     12-13    135  |  96<L>  |  105<H>  ----------------------------<  137<H>  5.1   |  23  |  4.54<H>    Ca    8.8      13 Dec 2018 06:40  Phos  7.2     12-13  Mg     2.5     12-13                RADIOLOGY & ADDITIONAL TESTS:    Imaging Personally Reviewed:    Consultant(s) Notes Reviewed:      Care Discussed with Consultants/Other Providers:

## 2018-12-13 NOTE — PROGRESS NOTE ADULT - SUBJECTIVE AND OBJECTIVE BOX
GENERAL SURGERY DAILY PROGRESS NOTE:       Subjective:  Pt seen and examined at bedside. No acute events overnight. Spoke with cardiology fellow this AM. Since SBP in 100s, recommending lasix gtt @ 10cc/h and hold for SBP < 90 (rather than lasix 80mg IV BID).         Objective:    PE:  General: NAD, well-nourished  HEENT: Atraumatic, EOMI  Resp: Breathing comfortably on RA  CV: Normal sinus rhythm  Ext: LLE: ground wound with surrounding induration and copious clear discharge, no erythema, purulent drainage or signs of infection, wound in place, 2 wounds in more distal leg with wet-dry dressing      Vital Signs Last 24 Hrs  T(C): 36.6 (13 Dec 2018 05:09), Max: 36.6 (13 Dec 2018 00:25)  T(F): 97.8 (13 Dec 2018 05:09), Max: 97.8 (13 Dec 2018 00:25)  HR: 64 (13 Dec 2018 09:38) (56 - 68)  BP: 104/54 (13 Dec 2018 09:38) (88/42 - 115/66)  BP(mean): --  RR: 18 (13 Dec 2018 08:33) (16 - 18)  SpO2: 100% (13 Dec 2018 08:33) (96% - 100%)    I&O's Detail    12 Dec 2018 07:01  -  13 Dec 2018 07:00  --------------------------------------------------------  IN:    Oral Fluid: 520 mL  Total IN: 520 mL    OUT:    Voided: 770 mL  Total OUT: 770 mL    Total NET: -250 mL      13 Dec 2018 07:01  -  13 Dec 2018 09:47  --------------------------------------------------------  IN:    Oral Fluid: 240 mL  Total IN: 240 mL    OUT:  Total OUT: 0 mL    Total NET: 240 mL          Daily     Daily     MEDICATIONS  (STANDING):  atorvastatin 40 milliGRAM(s) Oral at bedtime  carvedilol 25 milliGRAM(s) Oral every 12 hours  dextrose 50% Injectable 12.5 Gram(s) IV Push once  dextrose 50% Injectable 25 Gram(s) IV Push once  dextrose 50% Injectable 25 Gram(s) IV Push once  docusate sodium 100 milliGRAM(s) Oral daily  furosemide   Injectable 80 milliGRAM(s) IV Push two times a day  heparin  Injectable 5000 Unit(s) SubCutaneous every 8 hours  insulin glargine Injectable (LANTUS) 30 Unit(s) SubCutaneous at bedtime  insulin lispro (HumaLOG) corrective regimen sliding scale   SubCutaneous at bedtime  insulin lispro (HumaLOG) corrective regimen sliding scale   SubCutaneous three times a day before meals  senna 2 Tablet(s) Oral at bedtime    MEDICATIONS  (PRN):  acetaminophen   Tablet .. 325 milliGRAM(s) Oral every 4 hours PRN Mild Pain (1 - 3)  calamine Lotion 1 Application(s) Topical daily PRN Rash and/or Itching  dextrose 40% Gel 15 Gram(s) Oral once PRN Blood Glucose LESS THAN 70 milliGRAM(s)/deciliter  glucagon  Injectable 1 milliGRAM(s) IntraMuscular once PRN Glucose LESS THAN 70 milligrams/deciliter  oxyCODONE    IR 5 milliGRAM(s) Oral every 6 hours PRN Moderate Pain (4 - 6)  oxyCODONE    IR 10 milliGRAM(s) Oral every 6 hours PRN Severe Pain (7 - 10)      LABS:                        9.8    6.20  )-----------( 185      ( 13 Dec 2018 06:40 )             32.4     12-13    135  |  96<L>  |  105<H>  ----------------------------<  137<H>  5.1   |  23  |  4.54<H>    Ca    8.8      13 Dec 2018 06:40  Phos  7.2     12-13  Mg     2.5     12-13            RADIOLOGY & ADDITIONAL STUDIES:

## 2018-12-14 DIAGNOSIS — I50.43 ACUTE ON CHRONIC COMBINED SYSTOLIC (CONGESTIVE) AND DIASTOLIC (CONGESTIVE) HEART FAILURE: ICD-10-CM

## 2018-12-14 DIAGNOSIS — I25.10 ATHEROSCLEROTIC HEART DISEASE OF NATIVE CORONARY ARTERY WITHOUT ANGINA PECTORIS: ICD-10-CM

## 2018-12-14 DIAGNOSIS — E83.39 OTHER DISORDERS OF PHOSPHORUS METABOLISM: ICD-10-CM

## 2018-12-14 LAB
BASOPHILS # BLD AUTO: 0.04 K/UL — SIGNIFICANT CHANGE UP (ref 0–0.2)
BASOPHILS NFR BLD AUTO: 0.6 % — SIGNIFICANT CHANGE UP (ref 0–2)
BUN SERPL-MCNC: 105 MG/DL — HIGH (ref 7–23)
BUN SERPL-MCNC: 106 MG/DL — HIGH (ref 7–23)
CALCIUM SERPL-MCNC: 8.7 MG/DL — SIGNIFICANT CHANGE UP (ref 8.4–10.5)
CALCIUM SERPL-MCNC: 8.7 MG/DL — SIGNIFICANT CHANGE UP (ref 8.4–10.5)
CHLORIDE SERPL-SCNC: 94 MMOL/L — LOW (ref 98–107)
CHLORIDE SERPL-SCNC: 98 MMOL/L — SIGNIFICANT CHANGE UP (ref 98–107)
CO2 SERPL-SCNC: 21 MMOL/L — LOW (ref 22–31)
CO2 SERPL-SCNC: 22 MMOL/L — SIGNIFICANT CHANGE UP (ref 22–31)
CREAT SERPL-MCNC: 3.75 MG/DL — HIGH (ref 0.5–1.3)
CREAT SERPL-MCNC: 4.01 MG/DL — HIGH (ref 0.5–1.3)
EOSINOPHIL # BLD AUTO: 0.25 K/UL — SIGNIFICANT CHANGE UP (ref 0–0.5)
EOSINOPHIL NFR BLD AUTO: 3.5 % — SIGNIFICANT CHANGE UP (ref 0–6)
GLUCOSE BLDC GLUCOMTR-MCNC: 175 MG/DL — HIGH (ref 70–99)
GLUCOSE BLDC GLUCOMTR-MCNC: 181 MG/DL — HIGH (ref 70–99)
GLUCOSE BLDC GLUCOMTR-MCNC: 195 MG/DL — HIGH (ref 70–99)
GLUCOSE SERPL-MCNC: 138 MG/DL — HIGH (ref 70–99)
GLUCOSE SERPL-MCNC: 159 MG/DL — HIGH (ref 70–99)
HCT VFR BLD CALC: 31 % — LOW (ref 39–50)
HGB BLD-MCNC: 9.6 G/DL — LOW (ref 13–17)
IMM GRANULOCYTES # BLD AUTO: 0.02 # — SIGNIFICANT CHANGE UP
IMM GRANULOCYTES NFR BLD AUTO: 0.3 % — SIGNIFICANT CHANGE UP (ref 0–1.5)
LYMPHOCYTES # BLD AUTO: 0.76 K/UL — LOW (ref 1–3.3)
LYMPHOCYTES # BLD AUTO: 10.7 % — LOW (ref 13–44)
MAGNESIUM SERPL-MCNC: 2.3 MG/DL — SIGNIFICANT CHANGE UP (ref 1.6–2.6)
MAGNESIUM SERPL-MCNC: 2.5 MG/DL — SIGNIFICANT CHANGE UP (ref 1.6–2.6)
MCHC RBC-ENTMCNC: 22.6 PG — LOW (ref 27–34)
MCHC RBC-ENTMCNC: 31 % — LOW (ref 32–36)
MCV RBC AUTO: 73.1 FL — LOW (ref 80–100)
MONOCYTES # BLD AUTO: 1.04 K/UL — HIGH (ref 0–0.9)
MONOCYTES NFR BLD AUTO: 14.6 % — HIGH (ref 2–14)
NEUTROPHILS # BLD AUTO: 5.01 K/UL — SIGNIFICANT CHANGE UP (ref 1.8–7.4)
NEUTROPHILS NFR BLD AUTO: 70.3 % — SIGNIFICANT CHANGE UP (ref 43–77)
NRBC # FLD: 0 — SIGNIFICANT CHANGE UP
PHOSPHATE SERPL-MCNC: 7.3 MG/DL — HIGH (ref 2.5–4.5)
PLATELET # BLD AUTO: 199 K/UL — SIGNIFICANT CHANGE UP (ref 150–400)
PMV BLD: SIGNIFICANT CHANGE UP FL (ref 7–13)
POTASSIUM SERPL-MCNC: 4.8 MMOL/L — SIGNIFICANT CHANGE UP (ref 3.5–5.3)
POTASSIUM SERPL-MCNC: 6.2 MMOL/L — CRITICAL HIGH (ref 3.5–5.3)
POTASSIUM SERPL-SCNC: 4.8 MMOL/L — SIGNIFICANT CHANGE UP (ref 3.5–5.3)
POTASSIUM SERPL-SCNC: 6.2 MMOL/L — CRITICAL HIGH (ref 3.5–5.3)
RBC # BLD: 4.24 M/UL — SIGNIFICANT CHANGE UP (ref 4.2–5.8)
RBC # FLD: 21.2 % — HIGH (ref 10.3–14.5)
SODIUM SERPL-SCNC: 133 MMOL/L — LOW (ref 135–145)
SODIUM SERPL-SCNC: 137 MMOL/L — SIGNIFICANT CHANGE UP (ref 135–145)
WBC # BLD: 7.12 K/UL — SIGNIFICANT CHANGE UP (ref 3.8–10.5)
WBC # FLD AUTO: 7.12 K/UL — SIGNIFICANT CHANGE UP (ref 3.8–10.5)

## 2018-12-14 PROCEDURE — ZZZZZ: CPT

## 2018-12-14 PROCEDURE — 99233 SBSQ HOSP IP/OBS HIGH 50: CPT | Mod: GC

## 2018-12-14 PROCEDURE — 93306 TTE W/DOPPLER COMPLETE: CPT | Mod: 26

## 2018-12-14 PROCEDURE — 99233 SBSQ HOSP IP/OBS HIGH 50: CPT

## 2018-12-14 RX ORDER — FUROSEMIDE 40 MG
80 TABLET ORAL ONCE
Qty: 0 | Refills: 0 | Status: DISCONTINUED | OUTPATIENT
Start: 2018-12-14 | End: 2018-12-14

## 2018-12-14 RX ORDER — DOBUTAMINE HCL 250MG/20ML
2.5 VIAL (ML) INTRAVENOUS
Qty: 500 | Refills: 0 | Status: DISCONTINUED | OUTPATIENT
Start: 2018-12-14 | End: 2018-12-14

## 2018-12-14 RX ADMIN — Medication 6 MICROGRAM(S)/KG/MIN: at 00:41

## 2018-12-14 RX ADMIN — HEPARIN SODIUM 5000 UNIT(S): 5000 INJECTION INTRAVENOUS; SUBCUTANEOUS at 16:00

## 2018-12-14 RX ADMIN — Medication 1: at 17:15

## 2018-12-14 RX ADMIN — Medication 7.5 MG/HR: at 19:38

## 2018-12-14 RX ADMIN — Medication 5 MG/HR: at 00:42

## 2018-12-14 RX ADMIN — INSULIN GLARGINE 30 UNIT(S): 100 INJECTION, SOLUTION SUBCUTANEOUS at 21:41

## 2018-12-14 RX ADMIN — Medication 1: at 11:49

## 2018-12-14 RX ADMIN — ATORVASTATIN CALCIUM 40 MILLIGRAM(S): 80 TABLET, FILM COATED ORAL at 21:41

## 2018-12-14 RX ADMIN — OXYCODONE HYDROCHLORIDE 10 MILLIGRAM(S): 5 TABLET ORAL at 20:30

## 2018-12-14 RX ADMIN — OXYCODONE HYDROCHLORIDE 10 MILLIGRAM(S): 5 TABLET ORAL at 19:59

## 2018-12-14 RX ADMIN — Medication 100 MILLIGRAM(S): at 11:45

## 2018-12-14 RX ADMIN — OXYCODONE HYDROCHLORIDE 5 MILLIGRAM(S): 5 TABLET ORAL at 12:15

## 2018-12-14 RX ADMIN — OXYCODONE HYDROCHLORIDE 5 MILLIGRAM(S): 5 TABLET ORAL at 11:45

## 2018-12-14 NOTE — PROGRESS NOTE ADULT - PROBLEM SELECTOR PLAN 2
Improving - Likely 2/2 low-flow state in setting of ADHF  - Cr 4.0 <-- 4.3  - C/w diuresis, management as above Improving - Likely 2/2 low-flow state in setting of ADHF  - Cr downtrending, Cr 4.0 <-- 4.3  - C/w diuresis, management as above  - Monitor electrolytes, trend BMP   - Strict I/O

## 2018-12-14 NOTE — PROGRESS NOTE ADULT - ATTENDING COMMENTS
Patient seen and examined  Agree with above assessment and plan  On Dobutamine for inotropic support given hypotension on floor and need for diuresis  Continue with Lasix gtt  BB on hold  Cont current meds

## 2018-12-14 NOTE — PROGRESS NOTE ADULT - PROBLEM SELECTOR PLAN 1
c/o worsening SOB, LE edema and increasing abdominal girth. Started on lasix and dobutamine drips and responding well to diuresis with net output 860cc overnight and 1350cc total  - Clinically improved and HDS but still with elevated JVD and significant LE edema  - c/w lasix 15mg/hr and dobutamine 2.5mg/hr   - f/u repeat TTE, last echo in 11/18 with EF 20-25% and severe LF systolic dysfunction  - Continue to hold Entresto  - Close monitoring of ouput, Strict I/O c/o worsening SOB, LE edema and increasing abdominal girth. Started on lasix and dobutamine drips and responding well to diuresis with net output 860cc overnight and 1350cc total over 24hrs.  - Clinically improved and HDS but still with elevated JVD and significant LE edema  - c/w lasix 15mg/hr and dobutamine 2.5mg/hr   - Repeat TTE 12/12 unchanged, EF 24% with severe systolic and diastolic dysfunction  - Continue to hold Entresto  - Close monitoring of ouput, Strict I/O

## 2018-12-14 NOTE — PROGRESS NOTE ADULT - SUBJECTIVE AND OBJECTIVE BOX
[facility] Division of Kidney Diseases & Hypertension  FOLLOW UP NOTE  [phone]--------------------------------------------------------------------------------    68 year old male with h/o CHFrEF, PVD, CAD, DM, HTN was admitted for increased drainage from wound vac. Nephrology was consulted for elevated creatinine. On review of previous records in [facility], patient had normal Scr. from 4/26/16 to 9/24/18. However patient was hospitalized in [facility] from 10/7/18 to 10/30/18 for surgical site infection. During hospital course, Scr. was noted to be elevated at 1.97 on 10/17/18, peaked at 2.13 on 10/18/18, and improved to 1.40 on 10/27/18. During this admission, Scr. was elevated at 1.5 on 12/7/18 and then worsened to 4.54 on 12/13/18. Patient was transferred to CCU for management of fluid overload.     Patient seen and examined in CCU. SOB has improved since yesterday as per patient. However still has significant bilateral LE edema. Denies c/o, CP, abdominal pain, nausea, or vomiting. Patient is non oliguric and is on lasix drip and dobutamine drip as well.     PAST HISTORY  --------------------------------------------------------------------------------  No significant changes to PMH, PSH, FHx, SHx, unless otherwise noted    ALLERGIES & MEDICATIONS  --------------------------------------------------------------------------------  Allergies    No Known Allergies    Intolerances      Standing Inpatient Medications  atorvastatin 40 milliGRAM(s) Oral at bedtime  dextrose 50% Injectable 12.5 Gram(s) IV Push once  dextrose 50% Injectable 25 Gram(s) IV Push once  dextrose 50% Injectable 25 Gram(s) IV Push once  DOBUTamine Infusion 2.5 MICROgram(s)/kG/Min IV Continuous <Continuous>  docusate sodium 100 milliGRAM(s) Oral daily  furosemide Infusion 15 mG/Hr IV Continuous <Continuous>  heparin  Injectable 5000 Unit(s) SubCutaneous every 8 hours  insulin glargine Injectable (LANTUS) 30 Unit(s) SubCutaneous at bedtime  insulin lispro (HumaLOG) corrective regimen sliding scale   SubCutaneous at bedtime  insulin lispro (HumaLOG) corrective regimen sliding scale   SubCutaneous three times a day before meals  senna 2 Tablet(s) Oral at bedtime    PRN Inpatient Medications  acetaminophen   Tablet .. 325 milliGRAM(s) Oral every 4 hours PRN  calamine Lotion 1 Application(s) Topical daily PRN  dextrose 40% Gel 15 Gram(s) Oral once PRN  glucagon  Injectable 1 milliGRAM(s) IntraMuscular once PRN  oxyCODONE    IR 5 milliGRAM(s) Oral every 6 hours PRN  oxyCODONE    IR 10 milliGRAM(s) Oral every 6 hours PRN      REVIEW OF SYSTEMS  --------------------------------------------------------------------------------    Gen: No  fevers/chills, weakness  Skin: No rashes  Head/Eyes/Ears/Mouth: No headache  Respiratory: SOB +( but improved)  CV: No chest pain,   GI: No abdominal pain, diarrhea, nausea, vomiting  : No increased frequency, dysuria, hematuria, nocturia  MSK: Edema +  Neuro: No dizziness/lightheadedness    VITALS/PHYSICAL EXAM  --------------------------------------------------------------------------------  T(C): 36.4 (12-14-18 @ 00:05), Max: 36.4 (12-13-18 @ 20:23)  HR: 72 (12-14-18 @ 10:00) (64 - 73)  BP: 128/65 (12-14-18 @ 10:00) (113/71 - 140/108)  RR: 20 (12-14-18 @ 10:00) (11 - 20)  SpO2: 100% (12-14-18 @ 10:00) (94% - 100%)  Wt(kg): --        12-13-18 @ 07:01  -  12-14-18 @ 07:00  --------------------------------------------------------  IN: 590 mL / OUT: 1350 mL / NET: -760 mL      Physical Exam:  	  Gen: NAD  	HEENT:no JVD  	Pulm: lungs clear on ausculation  	CV:  S1S2  	Abd: +BS, soft   	Ext:  B/L Lower ext edema +; wound vac present over left thigh.   	Neuro: No focal deficits  	Skin: Warm and dry     LABS/STUDIES  --------------------------------------------------------------------------------              9.6    7.12  >-----------<  199      [12-14-18 @ 05:10]              31.0     133  |  94  |  105  ----------------------------<  138      [12-14-18 @ 05:10]  6.2   |  21  |  4.01        Ca     8.7     [12-14-18 @ 05:10]      Mg     2.5     [12-14-18 @ 05:10]      Phos  7.3     [12-14-18 @ 05:10]            Creatinine Trend:  SCr 4.01 [12-14 @ 05:10]  SCr 4.38 [12-13 @ 22:15]  SCr 4.54 [12-13 @ 06:40]  SCr 4.26 [12-12 @ 06:35]  SCr 3.71 [12-11 @ 05:20]      Urine Creatinine 92.40      [12-10-18 @ 20:50]  Urine Urea Nitrogen 452.6      [12-10-18 @ 20:50]

## 2018-12-14 NOTE — PROGRESS NOTE ADULT - ASSESSMENT
68M w/ PMHx HFrEF 30%, CAD s/p CABG and stent, DM, PAD s/p L KEI stent, s/p LLE CFA to below-knee bypass with PTFE for long-segment SFA occlusion, L 2nd toe amputation, POC c/b groin SSI requiring washout, sartorius flap, and vac placement initially presented for high output from VAC from office, found to have KESHAV on CKD in setting of diuretic use and s/p IV contrast, with course worsening c/b sob, LE edema and increasing abdominal girth concerning for ADHD. Pt also became hypotensive SBP 90' so was started on lasix gtt and dobutamine gtt and transferred to CCU for further management. 68M w/ PMHx HFrEF 30%, CAD s/p CABG and stent, DM, PAD s/p L KEI stent, s/p LLE CFA to below-knee bypass with PTFE for long-segment SFA occlusion, L 2nd toe amputation, POC c/b groin SSI requiring washout, sartorius flap, and vac placement initially presented for high output from VAC from office, found to have KESHAV on CKD in setting of diuretics use and s/p IV contrast, with course c/b  worsening sob, LE edema and increasing abdominal girth concerning for ADHD. Pt also became hypotensive SBP 90'  on Thursday so was started on lasix gtt and dobutamine gtt and transferred to CCU for further management.

## 2018-12-14 NOTE — DIETITIAN INITIAL EVALUATION ADULT. - OTHER INFO
Nutrition assessment initiated for critical care LOS. Pt. alert, oriented, tolerating PO nutrition , noted 3+ L & R leg, and foot edema ; 4+ scrotum , penile edema , wounds - L LLE, L groin abcess , noted pt. was   in hospital in October 2018, admitted with 73.5 kg and discharged with 80.2 kg , admitted on 12/8/18 with 80 kg , currently 71.3 kg . Noted Hemoglobin A1C improving  currently 6.9% was  8.8%  and last admission it was 7.1% , labs indicates renal dysfunction , met with team and adjusted diet to reflect this .

## 2018-12-14 NOTE — PROGRESS NOTE ADULT - PROBLEM SELECTOR PLAN 2
Latest serum phosphorus noted to be elevated at 7.3. Recommend to start on sevelamer 800 mg TID PO with meals. Low phosphorus diet. Monitor serum phosphorus level.

## 2018-12-14 NOTE — DIETITIAN INITIAL EVALUATION ADULT. - NS AS NUTRI INTERV MEALS SNACK
Carbohydrate - modified diet/Fluid - modified diet/General/healthful diet/Mineral - modified diet/Consistent Carbohydrate , renal with evening snack, Fluid restriction as per MD

## 2018-12-14 NOTE — PROGRESS NOTE ADULT - SUBJECTIVE AND OBJECTIVE BOX
CONTACT INFO:  Deion Padilla MD  Internal Medicine PGY1  Pager:  [phone]      HPI / INTERVAL HISTORY:    - No acute events overnight  - No significant event on telemetry  - Denies CP, SOB, HA, Dizziness, N/V       OBJECTIVE:  VITAL SIGNS:  ICU Vital Signs Last 24 Hrs  T(C): 36.4 (14 Dec 2018 00:05), Max: 36.4 (13 Dec 2018 11:50)  T(F): 97.6 (14 Dec 2018 00:05), Max: 97.6 (14 Dec 2018 00:05)  HR: 64 (14 Dec 2018 06:00) (60 - 75)  BP: 114/70 (14 Dec 2018 06:00) (104/54 - 140/108)  BP(mean): 81 (14 Dec 2018 06:00) (81 - 116)  ABP: --  ABP(mean): --  RR: 13 (14 Dec 2018 06:00) (11 - 18)  SpO2: 98% (14 Dec 2018 06:00) (94% - 100%)        12-13 @ 07:01  -  12-14 @ 07:00  --------------------------------------------------------  IN: 590 mL / OUT: 1350 mL / NET: -760 mL      CAPILLARY BLOOD GLUCOSE      POCT Blood Glucose.: 292 mg/dL (13 Dec 2018 22:00)          LABS:                        9.6    7.12  )-----------( 199      ( 14 Dec 2018 05:10 )             31.0     12-14    133<L>  |  94<L>  |  105<H>  ----------------------------<  138<H>  6.2<HH>   |  21<L>  |  4.01<H>    Ca    8.7      14 Dec 2018 05:10  Phos  7.3     12-14  Mg     2.5     12-14                  RADIOLOGY & ADDITIONAL TESTS:     MEDICATIONS:  acetaminophen   Tablet .. 325 milliGRAM(s) Oral every 4 hours PRN  atorvastatin 40 milliGRAM(s) Oral at bedtime  calamine Lotion 1 Application(s) Topical daily PRN  dextrose 40% Gel 15 Gram(s) Oral once PRN  dextrose 50% Injectable 12.5 Gram(s) IV Push once  dextrose 50% Injectable 25 Gram(s) IV Push once  dextrose 50% Injectable 25 Gram(s) IV Push once  DOBUTamine Infusion 2.5 MICROgram(s)/kG/Min IV Continuous <Continuous>  docusate sodium 100 milliGRAM(s) Oral daily  furosemide Infusion 15 mG/Hr IV Continuous <Continuous>  glucagon  Injectable 1 milliGRAM(s) IntraMuscular once PRN  heparin  Injectable 5000 Unit(s) SubCutaneous every 8 hours  insulin glargine Injectable (LANTUS) 30 Unit(s) SubCutaneous at bedtime  insulin lispro (HumaLOG) corrective regimen sliding scale   SubCutaneous at bedtime  insulin lispro (HumaLOG) corrective regimen sliding scale   SubCutaneous three times a day before meals  oxyCODONE    IR 5 milliGRAM(s) Oral every 6 hours PRN  oxyCODONE    IR 10 milliGRAM(s) Oral every 6 hours PRN  senna 2 Tablet(s) Oral at bedtime      HOME MEDICATIONS  Home Medications:  acetaminophen 325 mg oral tablet: 2 tab(s) orally every 6 hours, As needed, Mild Pain (1 - 3) (07 Dec 2018 20:01)  aspirin 81 mg oral delayed release tablet: 1 tab(s) orally once a day (07 Dec 2018 20:01)  atorvastatin 40 mg oral tablet: 1 tab(s) orally once a day (at bedtime) (07 Dec 2018 20:01)  carvedilol 25 mg oral tablet: 1 tab(s) orally 2 times a day (07 Dec 2018 20:01)  docusate sodium 100 mg oral capsule: 1 cap(s) orally once a day (07 Dec 2018 20:01)  Entresto 24 mg-26 mg oral tablet: 1 tab(s) orally 2 times a day (07 Dec 2018 20:01)  Lantus Solostar Pen 100 units/mL subcutaneous solution: 30 unit(s) subcutaneous once a day (at bedtime) (07 Dec 2018 20:01)  rivaroxaban 15 mg oral tablet: 1 tab(s) orally once a day (07 Dec 2018 20:01)  senna oral tablet: 2 tab(s) orally once a day (at bedtime) (07 Dec 2018 20:01)  torsemide 20 mg oral tablet: 2 tab(s) orally once a day (08 Dec 2018 00:38)      ALLERGIES:  No Known Allergies CONTACT INFO:  Deion Padilla MD  Internal Medicine PGY1  Pager:  [phone]      HPI / INTERVAL HISTORY:    - No acute events overnight  - No significant event on telemetry  - Denies CP, HA, Dizziness, N/V. SOB at baseline.       OBJECTIVE:  VITAL SIGNS:  ICU Vital Signs Last 24 Hrs  T(C): 36.4 (14 Dec 2018 00:05), Max: 36.4 (13 Dec 2018 11:50)  T(F): 97.6 (14 Dec 2018 00:05), Max: 97.6 (14 Dec 2018 00:05)  HR: 64 (14 Dec 2018 06:00) (60 - 75)  BP: 114/70 (14 Dec 2018 06:00) (104/54 - 140/108)  BP(mean): 81 (14 Dec 2018 06:00) (81 - 116)  RR: 13 (14 Dec 2018 06:00) (11 - 18)  SpO2: 98% (14 Dec 2018 06:00) (94% - 100%)    PHYSICAL EXAM:  GENERAL: NAD, well-developed  HEAD:  Atraumatic, Normocephalic  EYES: EOMI, PERRLA, conjunctiva and sclera clear  NECK: Supple, JVD to first tier of neck/ lower ear lobe   CHEST/LUNG: +fine bibasilar crackles   HEART: Regular rate and rhythm; No murmurs, rubs, or gallops  ABDOMEN: Soft, Nontender, Nondistended; Bowel sounds present  EXTREMITIES:  No clubbing or cyanosis, 1+pitting edema in b/l LE   PSYCH: AAOx3  NEUROLOGY: non-focal  SKIN: No rashes or lesions        12-13 @ 07:01  -  12-14 @ 07:00  --------------------------------------------------------  IN: 590 mL / OUT: 1350 mL / NET: -760 mL    CAPILLARY BLOOD GLUCOSE    POCT Blood Glucose.: 292 mg/dL (13 Dec 2018 22:00)    LABS:                        9.6    7.12  )-----------( 199      ( 14 Dec 2018 05:10 )             31.0     12-14    133<L>  |  94<L>  |  105<H>  ----------------------------<  138<H>  6.2<HH>   |  21<L>  |  4.01<H>    Ca    8.7      14 Dec 2018 05:10  Phos  7.3     12-14  Mg     2.5     12-14      RADIOLOGY & ADDITIONAL TESTS:     < from: Transthoracic Echocardiogram (10.16.18 @ 12:43) >    Ejection Fraction (Visual Estimate): 20-25 %  ------------------------------------------------------------------------  CONCLUSIONS:  1. Severe segmental left ventricular systolic dysfunction.  Hypokinesis of anterolateral wall and basal to mid  anteroseptum. akinesis of anterior wall  2.  Right ventricular enlargement with decreased right  ventricular systolic function.  3.  teathered posterior lealet of mitral valve  Moderate-severe eccentric posteriorly directed mitral  regurgitation.  4.  Severe diastolic dysfunction (Stage III) with elevated  filling pressures and a restrictive filling pattern  5. Estimated pulmonary artery systolic pressure equals 58  mm Hg, assuming right atrial pressure equals 10  mm Hg,  consistent with moderate pulmonary hypertension  *** No previous Echo exam.    MEDICATIONS:  acetaminophen   Tablet .. 325 milliGRAM(s) Oral every 4 hours PRN  atorvastatin 40 milliGRAM(s) Oral at bedtime  calamine Lotion 1 Application(s) Topical daily PRN  dextrose 40% Gel 15 Gram(s) Oral once PRN  dextrose 50% Injectable 12.5 Gram(s) IV Push once  dextrose 50% Injectable 25 Gram(s) IV Push once  dextrose 50% Injectable 25 Gram(s) IV Push once  DOBUTamine Infusion 2.5 MICROgram(s)/kG/Min IV Continuous <Continuous>  docusate sodium 100 milliGRAM(s) Oral daily  furosemide Infusion 15 mG/Hr IV Continuous <Continuous>  glucagon  Injectable 1 milliGRAM(s) IntraMuscular once PRN  heparin  Injectable 5000 Unit(s) SubCutaneous every 8 hours  insulin glargine Injectable (LANTUS) 30 Unit(s) SubCutaneous at bedtime  insulin lispro (HumaLOG) corrective regimen sliding scale   SubCutaneous at bedtime  insulin lispro (HumaLOG) corrective regimen sliding scale   SubCutaneous three times a day before meals  oxyCODONE    IR 5 milliGRAM(s) Oral every 6 hours PRN  oxyCODONE    IR 10 milliGRAM(s) Oral every 6 hours PRN  senna 2 Tablet(s) Oral at bedtime      HOME MEDICATIONS  Home Medications:  acetaminophen 325 mg oral tablet: 2 tab(s) orally every 6 hours, As needed, Mild Pain (1 - 3) (07 Dec 2018 20:01)  aspirin 81 mg oral delayed release tablet: 1 tab(s) orally once a day (07 Dec 2018 20:01)  atorvastatin 40 mg oral tablet: 1 tab(s) orally once a day (at bedtime) (07 Dec 2018 20:01)  carvedilol 25 mg oral tablet: 1 tab(s) orally 2 times a day (07 Dec 2018 20:01)  docusate sodium 100 mg oral capsule: 1 cap(s) orally once a day (07 Dec 2018 20:01)  Entresto 24 mg-26 mg oral tablet: 1 tab(s) orally 2 times a day (07 Dec 2018 20:01)  Lantus Solostar Pen 100 units/mL subcutaneous solution: 30 unit(s) subcutaneous once a day (at bedtime) (07 Dec 2018 20:01)  rivaroxaban 15 mg oral tablet: 1 tab(s) orally once a day (07 Dec 2018 20:01)  senna oral tablet: 2 tab(s) orally once a day (at bedtime) (07 Dec 2018 20:01)  torsemide 20 mg oral tablet: 2 tab(s) orally once a day (08 Dec 2018 00:38)      ALLERGIES:  No Known Allergies

## 2018-12-14 NOTE — PROGRESS NOTE ADULT - PROBLEM SELECTOR PLAN 1
Patient with KESHAV in setting of ARB use, diuretic use, and IV contrast. On review of previous labs, Scr. has been WNL. However during previous hospitalization in October, patient had episode of KESHAV which resolved. During current admission, Scr. was elevated at 1.5 on 12/7/18 and then worsened to 4.26 on 12/13/18. However latest Scr. has improved to 4.01 today. Patient is non oliguric and is currently on lasix and dobutamine drip. Patient with hemodynamically mediated KESHAV? Continue with IV diuretics. Continue to hold entresto. Monitor Scr, I/O, and electrolytes daily. Avoid NSAIDs, RCAs, ACE-I/ARBs at this time.

## 2018-12-14 NOTE — PROGRESS NOTE ADULT - ASSESSMENT
68M PMH CHF, CAD s/p CABG, s/p stent (2016), DM, PAD s/p L KEI stent (9/11/18), s/p LLE CFA to below-knee bypass with PTFE for long-segment SFA occlusion on (9/18/18), L 2nd toe amputation (9/19), POC c/b groin SSI requiring washout, sartorius flap, and vac placement, presenting with high output from VAC    - Care per CCU, diureses for fluid overload  - Continue Wound Vac changes MWF  - Will continue to follow  - Discussed with Vascular Surgery fellows      Vascular Surgery  [id number]

## 2018-12-14 NOTE — PROGRESS NOTE ADULT - SUBJECTIVE AND OBJECTIVE BOX
VASCULAR SURGERY PROGRESS NOTE      Subjective:  No acute events overnight. Patient transferred to CCU for management of fluid overload.         Objective:    PE:  General: NAD, well-nourished  HEENT: Atraumatic, EOMI  Resp: Breathing comfortably on RA  CV: Normal sinus rhythm  Ext: LLE: ground wound with surrounding induration and copious clear discharge, no erythema, purulent drainage or signs of infection, wound in place, 2 wounds in more distal leg with wet-dry dressing      Vital Signs Last 24 Hrs  T(C): 36.5 (14 Dec 2018 15:26), Max: 36.7 (14 Dec 2018 08:00)  T(F): 97.7 (14 Dec 2018 15:26), Max: 98.1 (14 Dec 2018 08:00)  HR: 72 (14 Dec 2018 19:00) (64 - 77)  BP: 111/63 (14 Dec 2018 19:00) (111/63 - 156/58)  BP(mean): 75 (14 Dec 2018 19:) (71 - 116)  RR: 20 (14 Dec 2018 19:) (10 - 20)  SpO2: 95% (14 Dec 2018 19:00) (93% - 100%)    I&O's Detail    13 Dec 2018 07:01  -  14 Dec 2018 07:00  --------------------------------------------------------  IN:    furosemide Infusion: 50 mL    Oral Fluid: 540 mL  Total IN: 590 mL    OUT:    Voided: 1350 mL  Total OUT: 1350 mL    Total NET: -760 mL      14 Dec 2018 07:01  -  14 Dec 2018 19:21  --------------------------------------------------------  IN:    DOBUTamine Infusion: 36 mL    furosemide Infusion: 90 mL    Oral Fluid: 400 mL  Total IN: 526 mL    OUT:    Voided: 1700 mL  Total OUT: 1700 mL    Total NET: -1174 mL          Daily     Daily Weight in k.3 (14 Dec 2018 11:21)    MEDICATIONS  (STANDING):  atorvastatin 40 milliGRAM(s) Oral at bedtime  dextrose 50% Injectable 12.5 Gram(s) IV Push once  dextrose 50% Injectable 25 Gram(s) IV Push once  dextrose 50% Injectable 25 Gram(s) IV Push once  docusate sodium 100 milliGRAM(s) Oral daily  furosemide Infusion 15 mG/Hr (7.5 mL/Hr) IV Continuous <Continuous>  heparin  Injectable 5000 Unit(s) SubCutaneous every 8 hours  insulin glargine Injectable (LANTUS) 30 Unit(s) SubCutaneous at bedtime  insulin lispro (HumaLOG) corrective regimen sliding scale   SubCutaneous at bedtime  insulin lispro (HumaLOG) corrective regimen sliding scale   SubCutaneous three times a day before meals  senna 2 Tablet(s) Oral at bedtime    MEDICATIONS  (PRN):  acetaminophen   Tablet .. 325 milliGRAM(s) Oral every 4 hours PRN Mild Pain (1 - 3)  calamine Lotion 1 Application(s) Topical daily PRN Rash and/or Itching  dextrose 40% Gel 15 Gram(s) Oral once PRN Blood Glucose LESS THAN 70 milliGRAM(s)/deciliter  glucagon  Injectable 1 milliGRAM(s) IntraMuscular once PRN Glucose LESS THAN 70 milligrams/deciliter  oxyCODONE    IR 5 milliGRAM(s) Oral every 6 hours PRN Moderate Pain (4 - 6)  oxyCODONE    IR 10 milliGRAM(s) Oral every 6 hours PRN Severe Pain (7 - 10)      LABS:                        9.6    7.12  )-----------( 199      ( 14 Dec 2018 05:10 )             31.0     12-14    133<L>  |  94<L>  |  105<H>  ----------------------------<  138<H>  6.2<HH>   |  21<L>  |  4.01<H>    Ca    8.7      14 Dec 2018 05:10  Phos  7.3     12-14  Mg     2.5     12-14            RADIOLOGY & ADDITIONAL STUDIES:

## 2018-12-14 NOTE — CHART NOTE - NSCHARTNOTEFT_GEN_A_CORE
Reason for CCU Consult: ADHF    HISTORY OF PRESENT ILLNESS:  This is a 68yoM w/ PMHx HFrEF 30%, CAD s/p CABG and stent, DM, PAD s/p L KEI stent, s/p LLE CFA to below-knee bypass with PTFE for long-segment SFA occlusion, L 2nd toe amputation, POC c/b groin SSI requiring washout, sartorius flap, and vac placement initially presented for high output from VAC.    Allergies    No Known Allergies    Intolerances        PAST MEDICAL & SURGICAL HISTORY:  Chronic congestive heart failure, unspecified congestive heart failure type  PAD (peripheral artery disease)  Stented coronary artery  Hyperlipidemia  Diabetes  Hypertension  CAD (coronary artery disease)  S/P amputation: right great toe and 4th and 5th digit  S/P bypass graft of extremity: RLE  S/P angioplasty with stent: about 5 years ago  S/P CABG x 3      MEDICATIONS  (STANDING):  atorvastatin 40 milliGRAM(s) Oral at bedtime  dextrose 50% Injectable 12.5 Gram(s) IV Push once  dextrose 50% Injectable 25 Gram(s) IV Push once  dextrose 50% Injectable 25 Gram(s) IV Push once  DOBUTamine Infusion 2.5 MICROgram(s)/kG/Min (6 mL/Hr) IV Continuous <Continuous>  docusate sodium 100 milliGRAM(s) Oral daily  furosemide Infusion 10 mG/Hr (5 mL/Hr) IV Continuous <Continuous>  heparin  Injectable 5000 Unit(s) SubCutaneous every 8 hours  insulin glargine Injectable (LANTUS) 30 Unit(s) SubCutaneous at bedtime  insulin lispro (HumaLOG) corrective regimen sliding scale   SubCutaneous at bedtime  insulin lispro (HumaLOG) corrective regimen sliding scale   SubCutaneous three times a day before meals  senna 2 Tablet(s) Oral at bedtime    MEDICATIONS  (PRN):  acetaminophen   Tablet .. 325 milliGRAM(s) Oral every 4 hours PRN Mild Pain (1 - 3)  calamine Lotion 1 Application(s) Topical daily PRN Rash and/or Itching  dextrose 40% Gel 15 Gram(s) Oral once PRN Blood Glucose LESS THAN 70 milliGRAM(s)/deciliter  glucagon  Injectable 1 milliGRAM(s) IntraMuscular once PRN Glucose LESS THAN 70 milligrams/deciliter  oxyCODONE    IR 5 milliGRAM(s) Oral every 6 hours PRN Moderate Pain (4 - 6)  oxyCODONE    IR 10 milliGRAM(s) Oral every 6 hours PRN Severe Pain (7 - 10)      Drug Dosing Weight  Height (cm): 170.18 (08 Dec 2018 03:43)  Weight (kg): 80 (08 Dec 2018 01:03)  BMI (kg/m2): 27.6 (08 Dec 2018 03:43)  BSA (m2): 1.92 (08 Dec 2018 03:43)    FAMILY HISTORY:      SOCIAL HISTORY:  Smoker[ ]  Non smoker[ ]  Alcohol [ ]      ADVANCE DIRECTIVES:    REVIEW OF SYSTEMS:    CONSTITUTIONAL: No fevers, No chills, No fatigue, No weight gain  EYES: No vision changes   ENT: No congestion, No ear pain, No sore throat.  NECK: No pain, No stiffness  RESPIRATORY: No shortness of breath, No cough, No wheezing, No hemoptysis  CARDIOVASCULAR: No chest pain. No palpitations, No ORTA, No orthopnea, No paroxysmal nocturnal dyspnea, No pleuritic pain  GASTROINTESTINAL: No abdominal pain, No nausea, No vomiting, No hematemesis, No diarrhea No constipation. No melena  GENITOURINARY: No dysuria, No frequency, No incontinence, No hematuria  NEUROLOGICAL: No dizziness, No lightheadedness, No syncope, No LOC, No headache, No numbness or weakness  EXTREMITIES: No Edema, No joint pain, No joint swelling.  PSYCHIATRIC: No anxiety, No depression  SKIN: No diaphoresis. No itching, No rashes, No pressure ulcers    All other review of systems is negative unless indicated above.    PHYSICAL EXAM:    Appearance: NAD, no distress  HEENT:   Normal oral mucosa, PERRL, EOMI  Cardiovascular: Regular rate and rhythm, Normal S1 S2, No JVD, No murmurs, No edema  Respiratory: Lungs clear to auscultation. No rales, No rhonchi, No wheezing. No tenderness to palpation  Gastrointestinal:  Soft, Non-tender, + BS  Neurologic: Non-focal, A&Ox3  Skin: Warm and dry, No rashes, No ecchymoses, No cyanosis  Extremities: No clubbing, cyanosis or edema  Vascular: Peripheral pulses palpable 2+ bilaterally  Psychiatry: Mood & affect appropriate      	    		            ICU Vital Signs Last 24 Hrs  T(C): 36.4 (13 Dec 2018 20:23), Max: 36.6 (13 Dec 2018 05:09)  T(F): 97.5 (13 Dec 2018 20:23), Max: 97.8 (13 Dec 2018 05:09)  HR: 66 (13 Dec 2018 20:23) (60 - 75)  BP: 115/67 (13 Dec 2018 20:23) (102/83 - 126/74)  BP(mean): --  ABP: --  ABP(mean): --  RR: 18 (13 Dec 2018 20:23) (16 - 18)  SpO2: 96% (13 Dec 2018 20:23) (96% - 100%)          LABS:  CBC Full  -  ( 13 Dec 2018 06:40 )  WBC Count : 6.20 K/uL  Hemoglobin : 9.8 g/dL  Hematocrit : 32.4 %  Platelet Count - Automated : 185 K/uL  Mean Cell Volume : 73.5 fL  Mean Cell Hemoglobin : 22.2 pg  Mean Cell Hemoglobin Concentration : 30.2 %  Auto Neutrophil # : 4.27 K/uL  Auto Lymphocyte # : 0.79 K/uL  Auto Monocyte # : 0.86 K/uL  Auto Eosinophil # : 0.24 K/uL  Auto Basophil # : 0.03 K/uL  Auto Neutrophil % : 68.8 %  Auto Lymphocyte % : 12.7 %  Auto Monocyte % : 13.9 %  Auto Eosinophil % : 3.9 %  Auto Basophil % : 0.5 %    12-13    132<L>  |  94<L>  |  106<H>  ----------------------------<  213<H>  5.2   |  21<L>  |  4.38<H>    Ca    8.7      13 Dec 2018 22:15  Phos  7.2     12-13  Mg     2.5     12-13          CAPILLARY BLOOD GLUCOSE      POCT Blood Glucose.: 292 mg/dL (13 Dec 2018 22:00)          I&O's Detail    12 Dec 2018 07:01  -  13 Dec 2018 07:00  --------------------------------------------------------  IN:    Oral Fluid: 520 mL  Total IN: 520 mL    OUT:    Voided: 770 mL  Total OUT: 770 mL    Total NET: -250 mL      13 Dec 2018 07:01  -  14 Dec 2018 00:43  --------------------------------------------------------  IN:    furosemide Infusion: 50 mL    Oral Fluid: 540 mL  Total IN: 590 mL    OUT:    Voided: 650 mL  Total OUT: 650 mL    Total NET: -60 mL          EKG:    ECHO      RADIOLOGY STUDIES    CXR:     CT SCAN:     ULTRASOUND:     ASSESSMENT      PLAN          Case discussed with Cardiology fellow Reason for CCU Consult: ADHF    HISTORY OF PRESENT ILLNESS:  This is a 68yoM w/ PMHx HFrEF 30%, CAD s/p CABG and stent, DM, PAD s/p L KEI stent, s/p LLE CFA to below-knee bypass with PTFE for long-segment SFA occlusion, L 2nd toe amputation, POC c/b groin SSI requiring washout, sartorius flap, and vac placement initially presented for high output from VAC.  Cardiology initially consulted for increasing Cr and concern for heart failure exacerbation.  Patient started to c/o SOB and increased abdominal girth.  Today, patient became hypotensive SBP 90's, patient was admitted to CCU for lasix gtt and dobutamine gtt.    Allergies  No Known Allergies    PAST MEDICAL & SURGICAL HISTORY:  Chronic congestive heart failure, unspecified congestive heart failure type  PAD (peripheral artery disease)  Stented coronary artery  Hyperlipidemia  Diabetes  Hypertension  CAD (coronary artery disease)  S/P amputation: right great toe and 4th and 5th digit  S/P bypass graft of extremity: RLE  S/P angioplasty with stent: about 5 years ago  S/P CABG x 3    MEDICATIONS  (STANDING):  atorvastatin 40 milliGRAM(s) Oral at bedtime  dextrose 50% Injectable 12.5 Gram(s) IV Push once  dextrose 50% Injectable 25 Gram(s) IV Push once  dextrose 50% Injectable 25 Gram(s) IV Push once  DOBUTamine Infusion 2.5 MICROgram(s)/kG/Min (6 mL/Hr) IV Continuous <Continuous>  docusate sodium 100 milliGRAM(s) Oral daily  furosemide Infusion 10 mG/Hr (5 mL/Hr) IV Continuous <Continuous>  heparin  Injectable 5000 Unit(s) SubCutaneous every 8 hours  insulin glargine Injectable (LANTUS) 30 Unit(s) SubCutaneous at bedtime  insulin lispro (HumaLOG) corrective regimen sliding scale   SubCutaneous at bedtime  insulin lispro (HumaLOG) corrective regimen sliding scale   SubCutaneous three times a day before meals  senna 2 Tablet(s) Oral at bedtime    MEDICATIONS  (PRN):  acetaminophen   Tablet .. 325 milliGRAM(s) Oral every 4 hours PRN Mild Pain (1 - 3)  calamine Lotion 1 Application(s) Topical daily PRN Rash and/or Itching  dextrose 40% Gel 15 Gram(s) Oral once PRN Blood Glucose LESS THAN 70 milliGRAM(s)/deciliter  glucagon  Injectable 1 milliGRAM(s) IntraMuscular once PRN Glucose LESS THAN 70 milligrams/deciliter  oxyCODONE    IR 5 milliGRAM(s) Oral every 6 hours PRN Moderate Pain (4 - 6)  oxyCODONE    IR 10 milliGRAM(s) Oral every 6 hours PRN Severe Pain (7 - 10)    Drug Dosing Weight  Height (cm): 170.18 (08 Dec 2018 03:43)  Weight (kg): 80 (08 Dec 2018 01:03)  BMI (kg/m2): 27.6 (08 Dec 2018 03:43)  BSA (m2): 1.92 (08 Dec 2018 03:43)    All other review of systems is negative unless indicated above.  PHYSICAL EXAM:    Appearance: NAD, no distress  HEENT:   Normal oral mucosa, PERRL, EOMI  Cardiovascular: Regular rate and rhythm, Normal S1 S2, No JVD, No murmurs, LE edema  Respiratory: fine crackles; No rales, No rhonchi, No wheezing. No tenderness to palpation  Gastrointestinal:  Soft, Non-tender, + BS, rigid  Neurologic: Non-focal, A&Ox3  Skin: Warm and dry, No rashes, No ecchymoses, No cyanosis  Extremities: No clubbing, cyanosis or edema  Vascular: Peripheral pulses palpable 2+ bilaterally  Psychiatry: Mood & affect appropriate      ICU Vital Signs Last 24 Hrs  T(C): 36.4 (13 Dec 2018 20:23), Max: 36.6 (13 Dec 2018 05:09)  T(F): 97.5 (13 Dec 2018 20:23), Max: 97.8 (13 Dec 2018 05:09)  HR: 66 (13 Dec 2018 20:23) (60 - 75)  BP: 115/67 (13 Dec 2018 20:23) (102/83 - 126/74)  BP(mean): --  ABP: --  ABP(mean): --  RR: 18 (13 Dec 2018 20:23) (16 - 18)  SpO2: 96% (13 Dec 2018 20:23) (96% - 100%)      LABS:  CBC Full  -  ( 13 Dec 2018 06:40 )  WBC Count : 6.20 K/uL  Hemoglobin : 9.8 g/dL  Hematocrit : 32.4 %  Platelet Count - Automated : 185 K/uL  Mean Cell Volume : 73.5 fL  Mean Cell Hemoglobin : 22.2 pg  Mean Cell Hemoglobin Concentration : 30.2 %  Auto Neutrophil # : 4.27 K/uL  Auto Lymphocyte # : 0.79 K/uL  Auto Monocyte # : 0.86 K/uL  Auto Eosinophil # : 0.24 K/uL  Auto Basophil # : 0.03 K/uL  Auto Neutrophil % : 68.8 %  Auto Lymphocyte % : 12.7 %  Auto Monocyte % : 13.9 %  Auto Eosinophil % : 3.9 %  Auto Basophil % : 0.5 %    12-13    132<L>  |  94<L>  |  106<H>  ----------------------------<  213<H>  5.2   |  21<L>  |  4.38<H>    Ca    8.7      13 Dec 2018 22:15  Phos  7.2     12-13  Mg     2.5     12-13      CAPILLARY BLOOD GLUCOSE  POCT Blood Glucose.: 292 mg/dL (13 Dec 2018 22:00)      I&O's Detail    12 Dec 2018 07:01  -  13 Dec 2018 07:00  --------------------------------------------------------  IN:    Oral Fluid: 520 mL  Total IN: 520 mL    OUT:    Voided: 770 mL  Total OUT: 770 mL    Total NET: -250 mL      13 Dec 2018 07:01  -  14 Dec 2018 00:43  --------------------------------------------------------  IN:    furosemide Infusion: 50 mL    Oral Fluid: 540 mL  Total IN: 590 mL    OUT:    Voided: 650 mL  Total OUT: 650 mL    Total NET: -60 mL    Plan:   - pt noted to be borderline hypotensive, would not stop Lasix, change to Lasix gtt at 10 mg / hr to continue IV diuresis. if not making atleast 100 cc/hr would switch to Bumex gtt at 1 mg/hr   - would start Dobutamine 2.5mcg/kg/min   - strict I/ O  - monitor BMP BID, maintain K > 4, Mg > 2   - obtain a repeat TTE

## 2018-12-14 NOTE — PROGRESS NOTE ADULT - PROBLEM SELECTOR PLAN 3
Followed by vascular surgery. No indication for surgical intervention at this time. s/p LLE CFA to below-knee bypass with PTFE for long-segment SFA occlusion on (9/18/18), L 2nd toe amputation (9/19), POC c/b groin SSI requiring washout, sartorius flap, and vac placement 10/15, discharged 10/30, readmitted 12/7 for continued high output from VAC.   - Followed by vascular surgery for wound care  - Daily wet to dry dressing changes  - No indication for surgical intervention at this time  - Per plastic recs, c/w VAC therapy until output decreases and wound granulates in

## 2018-12-15 DIAGNOSIS — Z51.81 ENCOUNTER FOR THERAPEUTIC DRUG LEVEL MONITORING: ICD-10-CM

## 2018-12-15 DIAGNOSIS — I50.23 ACUTE ON CHRONIC SYSTOLIC (CONGESTIVE) HEART FAILURE: ICD-10-CM

## 2018-12-15 DIAGNOSIS — E87.70 FLUID OVERLOAD, UNSPECIFIED: ICD-10-CM

## 2018-12-15 DIAGNOSIS — Z29.9 ENCOUNTER FOR PROPHYLACTIC MEASURES, UNSPECIFIED: ICD-10-CM

## 2018-12-15 LAB
GLUCOSE BLDC GLUCOMTR-MCNC: 157 MG/DL — HIGH (ref 70–99)
GLUCOSE BLDC GLUCOMTR-MCNC: 261 MG/DL — HIGH (ref 70–99)
GLUCOSE BLDC GLUCOMTR-MCNC: 87 MG/DL — SIGNIFICANT CHANGE UP (ref 70–99)

## 2018-12-15 PROCEDURE — 99232 SBSQ HOSP IP/OBS MODERATE 35: CPT

## 2018-12-15 PROCEDURE — 99233 SBSQ HOSP IP/OBS HIGH 50: CPT

## 2018-12-15 RX ORDER — SEVELAMER CARBONATE 2400 MG/1
800 POWDER, FOR SUSPENSION ORAL
Qty: 0 | Refills: 0 | Status: DISCONTINUED | OUTPATIENT
Start: 2018-12-15 | End: 2018-12-27

## 2018-12-15 RX ORDER — OXYCODONE HYDROCHLORIDE 5 MG/1
10 TABLET ORAL EVERY 6 HOURS
Qty: 0 | Refills: 0 | Status: DISCONTINUED | OUTPATIENT
Start: 2018-12-15 | End: 2018-12-22

## 2018-12-15 RX ORDER — ASPIRIN/CALCIUM CARB/MAGNESIUM 324 MG
81 TABLET ORAL DAILY
Qty: 0 | Refills: 0 | Status: DISCONTINUED | OUTPATIENT
Start: 2018-12-15 | End: 2018-12-15

## 2018-12-15 RX ORDER — ASPIRIN/CALCIUM CARB/MAGNESIUM 324 MG
81 TABLET ORAL DAILY
Qty: 0 | Refills: 0 | Status: DISCONTINUED | OUTPATIENT
Start: 2018-12-15 | End: 2018-12-27

## 2018-12-15 RX ORDER — OXYCODONE HYDROCHLORIDE 5 MG/1
5 TABLET ORAL EVERY 6 HOURS
Qty: 0 | Refills: 0 | Status: DISCONTINUED | OUTPATIENT
Start: 2018-12-16 | End: 2018-12-23

## 2018-12-15 RX ADMIN — ATORVASTATIN CALCIUM 40 MILLIGRAM(S): 80 TABLET, FILM COATED ORAL at 21:19

## 2018-12-15 RX ADMIN — HEPARIN SODIUM 5000 UNIT(S): 5000 INJECTION INTRAVENOUS; SUBCUTANEOUS at 07:03

## 2018-12-15 RX ADMIN — INSULIN GLARGINE 30 UNIT(S): 100 INJECTION, SOLUTION SUBCUTANEOUS at 21:43

## 2018-12-15 RX ADMIN — OXYCODONE HYDROCHLORIDE 10 MILLIGRAM(S): 5 TABLET ORAL at 21:19

## 2018-12-15 RX ADMIN — Medication 81 MILLIGRAM(S): at 12:26

## 2018-12-15 RX ADMIN — HEPARIN SODIUM 5000 UNIT(S): 5000 INJECTION INTRAVENOUS; SUBCUTANEOUS at 12:23

## 2018-12-15 RX ADMIN — SEVELAMER CARBONATE 800 MILLIGRAM(S): 2400 POWDER, FOR SUSPENSION ORAL at 08:38

## 2018-12-15 RX ADMIN — Medication 1: at 17:58

## 2018-12-15 RX ADMIN — SEVELAMER CARBONATE 800 MILLIGRAM(S): 2400 POWDER, FOR SUSPENSION ORAL at 12:23

## 2018-12-15 RX ADMIN — OXYCODONE HYDROCHLORIDE 10 MILLIGRAM(S): 5 TABLET ORAL at 22:24

## 2018-12-15 RX ADMIN — Medication 100 MILLIGRAM(S): at 12:23

## 2018-12-15 RX ADMIN — SEVELAMER CARBONATE 800 MILLIGRAM(S): 2400 POWDER, FOR SUSPENSION ORAL at 17:58

## 2018-12-15 RX ADMIN — HEPARIN SODIUM 5000 UNIT(S): 5000 INJECTION INTRAVENOUS; SUBCUTANEOUS at 21:19

## 2018-12-15 RX ADMIN — Medication 7.5 MG/HR: at 08:38

## 2018-12-15 NOTE — PROGRESS NOTE ADULT - ASSESSMENT
68M PMH of heart failure with EF of 20-25% , CAD s/p CABG, s/p PCI to SVG to OM in  (2016), DM, PAD now with concern of acute decompensated heart failure. Patient has significant LE edema from LE to abdomen, and elevated JVP. Patient's Cr is likely up from veronica-vascular congestion in the setting of profound edema . Warm and wet on exam.   -c/w with furosemide 15 mg/hour gtt, with tele monitoring  - strict I/ O  - monitor BMP BID, maintain K > 4, Mg > 2   - hold Coreg 25 mg BID and Entresto currently held but if continues to tolerate volume removal, will consider slow initiation of BB    Please call on call cardiology team at [phone] with any further questions.

## 2018-12-15 NOTE — PROGRESS NOTE ADULT - PROBLEM SELECTOR PLAN 2
- Improving - Likely 2/2 low-flow state in setting of ADHF  - Cr downtrending, pt refusing labs today, will try again this afternoon   - C/w diuresis, management as above  - Monitor electrolytes, trend BMP, explained to patient importance of monitoring daily labs   - Strict I/O

## 2018-12-15 NOTE — PROGRESS NOTE ADULT - PROBLEM SELECTOR PLAN 1
- Started on lasix and dobutamine drips in CCU and responding well to diuresis with net output ~2L overnight.   gtt d/c'd last night.   - Clinically improved and HDS but still with elevated JVD and significant LE edema  - c/w lasix 7.5cc/hr, f/u with cards re: Lasix gtt titration   - Repeat TTE  unchanged, EF 24% with severe systolic and diastolic dysfunction  - Continue to hold Entresto  - Close monitoring of ouput, Strict I/O, daily weights  - Remains off BB in setting of ADHF - Started on lasix and dobutamine drips in CCU and responding well to diuresis with net output ~2L overnight.   gtt d/c'd last night.   - Clinically improved and HDS but still with elevated JVD and significant LE edema  - c/w lasix 7.5cc/hr, f/u with cards re: Lasix gtt titration   - Repeat TTE  unchanged, EF 24% with severe systolic and diastolic dysfunction  - Continue to hold Entresto  - Close monitoring of ouput, Strict I/O, daily weights  - Remains off BB in setting of ADHF  - As per [employer] review, pt was offered AICD placement 2 years ago, but pt refused.  Pt would benefit from HF evaluation Monday.

## 2018-12-15 NOTE — PROGRESS NOTE ADULT - SUBJECTIVE AND OBJECTIVE BOX
24H hour events: No acute events overnight. Patient doing well with improved volume status but still complains of leg swelling. etc.     MEDICATIONS:  aspirin enteric coated 81 milliGRAM(s) Oral daily  furosemide Infusion 15 mG/Hr IV Continuous <Continuous>  heparin  Injectable 5000 Unit(s) SubCutaneous every 8 hours  acetaminophen   Tablet .. 325 milliGRAM(s) Oral every 4 hours PRN  oxyCODONE    IR 10 milliGRAM(s) Oral every 6 hours PRN  docusate sodium 100 milliGRAM(s) Oral daily  senna 2 Tablet(s) Oral at bedtime  atorvastatin 40 milliGRAM(s) Oral at bedtime  dextrose 40% Gel 15 Gram(s) Oral once PRN  dextrose 50% Injectable 12.5 Gram(s) IV Push once  dextrose 50% Injectable 25 Gram(s) IV Push once  dextrose 50% Injectable 25 Gram(s) IV Push once  glucagon  Injectable 1 milliGRAM(s) IntraMuscular once PRN  insulin glargine Injectable (LANTUS) 30 Unit(s) SubCutaneous at bedtime  insulin lispro (HumaLOG) corrective regimen sliding scale   SubCutaneous at bedtime  insulin lispro (HumaLOG) corrective regimen sliding scale   SubCutaneous three times a day before meals  calamine Lotion 1 Application(s) Topical daily PRN    REVIEW OF SYSTEMS:  Complete 10point ROS negative except as documented above.    PHYSICAL EXAM:  T(C): 36.3 (12-15-18 @ 07:00), Max: 36.5 (12-14-18 @ 15:26)  HR: 69 (12-15-18 @ 07:00) (69 - 77)  BP: 119/90 (12-15-18 @ 07:00) (105/71 - 156/58)  RR: 18 (12-15-18 @ 07:00) (11 - 20)  SpO2: 100% (12-15-18 @ 07:00) (89% - 100%)  Wt(kg): --  I&O's Summary    14 Dec 2018 07:01  -  15 Dec 2018 07:00  --------------------------------------------------------  IN: 1248.5 mL / OUT: 3400 mL / NET: -2151.5 mL    Appearance: Normal	  HEENT:   Normal oral mucosa, PERRL, EOMI	  Lymphatic: No lymphadenopathy  Cardiovascular: Normal S1 S2, JVP at 14  Respiratory: Lungs clear to auscultation	  Psychiatry: A & O x 3, Mood & affect appropriate  Gastrointestinal:  Soft, Non-tender, + BS	  Skin: No rashes, No ecchymoses, No cyanosis	  Neurologic: Non-focal  Extremities: +2 edema b/l   Vascular: Peripheral pulses palpable 2+ bilaterally      LABS:	 	    CBC Full  -  ( 14 Dec 2018 05:10 )  WBC Count : 7.12 K/uL  Hemoglobin : 9.6 g/dL  Hematocrit : 31.0 %  Platelet Count - Automated : 199 K/uL  Mean Cell Volume : 73.1 fL  Mean Cell Hemoglobin : 22.6 pg  Mean Cell Hemoglobin Concentration : 31.0 %  Auto Neutrophil # : 5.01 K/uL  Auto Lymphocyte # : 0.76 K/uL  Auto Monocyte # : 1.04 K/uL  Auto Eosinophil # : 0.25 K/uL  Auto Basophil # : 0.04 K/uL  Auto Neutrophil % : 70.3 %  Auto Lymphocyte % : 10.7 %  Auto Monocyte % : 14.6 %  Auto Eosinophil % : 3.5 %  Auto Basophil % : 0.6 %    12-14    137  |  98  |  106<H>  ----------------------------<  159<H>  4.8   |  22  |  3.75<H>  12-14    133<L>  |  94<L>  |  105<H>  ----------------------------<  138<H>  6.2<HH>   |  21<L>  |  4.01<H>    Ca    8.7      14 Dec 2018 20:00  Ca    8.7      14 Dec 2018 05:10  Phos  7.3     12-14  Mg     2.3     12-14  Mg     2.5     12-14      proBNP: Serum Pro-Brain Natriuretic Peptide: 4507 pg/mL (12-13 @ 06:40)    Lipid Profile:   HgA1c:   TSH:       CARDIAC MARKERS:    TELEMETRY: sinus with rare ventricular couplets 	    ECG:  	  RADIOLOGY:  OTHER: 	    PREVIOUS DIAGNOSTIC TESTING:    [ ] Echocardiogram:  [ ]  Catheterization:  [ ] Stress Test:  	  	  ASSESSMENT/PLAN:

## 2018-12-15 NOTE — CHART NOTE - NSCHARTNOTEFT_GEN_A_CORE
68M PMH CHF EF 20-25%, CAD s/p CABG and PCI, DM, PAD s/p L KEI stent (9/11/18), s/p LLE CFA to below-knee bypass with PTFE for long-segment SFA occlusion on (9/18/18), L 2nd toe amputation (9/19), POC c/b groin SSI requiring washout, sartorius flap, and vac placement, presented to hospital initially with high output from VAC. Patient during hospitalization found to have worsening creatinine secondary to poor forward flow and cardiogenic shock. He was started on high dose lasix followed by lasix gtts and dobutamine gtts and transfer to CCU. Patient improved in CCU with lab work reflecting improving hypervolemic hyponatremia and creatinine. Dobutamine discontinued and patient remains on lasix gtts.     To do:  -f/u cardiology/HF recommendations on titrating lasix gtts  -monitor i/o and daily weights  -f/u vascular recommendations if any  -trend creatinine, avoid nephrotoxins, renally dose medications    Kaycee Walter, PGY 3  Internal Medicine  Pager [phone] | [phone]

## 2018-12-15 NOTE — PROGRESS NOTE ADULT - SUBJECTIVE AND OBJECTIVE BOX
VASCULAR SURGERY PROGRESS NOTE      Subjective:  Patient transferred from CCU to floor, now off dobutamine. Continues on Lasix gtt. KESHAV appears to be improving        Objective:    PE:  General: NAD, well-nourished  HEENT: Atraumatic, EOMI  Resp: Breathing comfortably on RA  CV: Normal sinus rhythm  Ext: LLE: ground wound with surrounding induration and copious clear discharge, no erythema, purulent drainage or signs of infection, wound vac in place      Vital Signs Last 24 Hrs  T(C): 36.3 (15 Dec 2018 07:00), Max: 36.5 (14 Dec 2018 15:26)  T(F): 97.4 (15 Dec 2018 07:00), Max: 97.7 (14 Dec 2018 15:26)  HR: 69 (15 Dec 2018 07:00) (69 - 77)  BP: 119/90 (15 Dec 2018 07:00) (105/71 - 156/58)  BP(mean): 88 (14 Dec 2018 22:00) (71 - 93)  RR: 18 (15 Dec 2018 07:00) (10 - 20)  SpO2: 100% (15 Dec 2018 07:00) (89% - 100%)    I&O's Detail    14 Dec 2018 07:01  -  15 Dec 2018 07:00  --------------------------------------------------------  IN:    DOBUTamine Infusion: 36 mL    furosemide Infusion: 112.5 mL    Oral Fluid: 1100 mL  Total IN: 1248.5 mL    OUT:    Voided: 3400 mL  Total OUT: 3400 mL    Total NET: -2151.5 mL          Daily     Daily Weight in k.3 (15 Dec 2018 07:00)    MEDICATIONS  (STANDING):  atorvastatin 40 milliGRAM(s) Oral at bedtime  dextrose 50% Injectable 12.5 Gram(s) IV Push once  dextrose 50% Injectable 25 Gram(s) IV Push once  dextrose 50% Injectable 25 Gram(s) IV Push once  docusate sodium 100 milliGRAM(s) Oral daily  furosemide Infusion 15 mG/Hr (7.5 mL/Hr) IV Continuous <Continuous>  heparin  Injectable 5000 Unit(s) SubCutaneous every 8 hours  insulin glargine Injectable (LANTUS) 30 Unit(s) SubCutaneous at bedtime  insulin lispro (HumaLOG) corrective regimen sliding scale   SubCutaneous at bedtime  insulin lispro (HumaLOG) corrective regimen sliding scale   SubCutaneous three times a day before meals  senna 2 Tablet(s) Oral at bedtime  sevelamer hydrochloride 800 milliGRAM(s) Oral three times a day with meals    MEDICATIONS  (PRN):  acetaminophen   Tablet .. 325 milliGRAM(s) Oral every 4 hours PRN Mild Pain (1 - 3)  calamine Lotion 1 Application(s) Topical daily PRN Rash and/or Itching  dextrose 40% Gel 15 Gram(s) Oral once PRN Blood Glucose LESS THAN 70 milliGRAM(s)/deciliter  glucagon  Injectable 1 milliGRAM(s) IntraMuscular once PRN Glucose LESS THAN 70 milligrams/deciliter  oxyCODONE    IR 5 milliGRAM(s) Oral every 6 hours PRN Moderate Pain (4 - 6)  oxyCODONE    IR 10 milliGRAM(s) Oral every 6 hours PRN Severe Pain (7 - 10)      LABS:                        9.6    7.12  )-----------( 199      ( 14 Dec 2018 05:10 )             31.0     12-14    137  |  98  |  106<H>  ----------------------------<  159<H>  4.8   |  22  |  3.75<H>    Ca    8.7      14 Dec 2018 20:00  Phos  7.3     12-14  Mg     2.3     12-14            RADIOLOGY & ADDITIONAL STUDIES:

## 2018-12-15 NOTE — PROGRESS NOTE ADULT - ASSESSMENT
68M PMH CHF, CAD s/p CABG, s/p stent (2016), DM, PAD s/p L KEI stent (9/11/18), s/p LLE CFA to below-knee bypass with PTFE for long-segment SFA occlusion on (9/18/18), L 2nd toe amputation (9/19), POC c/b groin SSI requiring washout, sartorius flap, and vac placement, presenting with high output from VAC    - Care per primary team  - Monitor KESHAV  - Continue Wound Vac changes MWF  - No indications for further Vascular surgery intervention at this point  - Will continue to follow    NABIL Mix PGY2  Vascular Surgery  [id number]

## 2018-12-15 NOTE — PROGRESS NOTE ADULT - SUBJECTIVE AND OBJECTIVE BOX
[facility] Division of Kidney Diseases & Hypertension  FOLLOW UP NOTE  --------------------------------------------------------------------------------  Chief Complaint: Acute Kidney Injury    24 hour events/subjective: The patient was seen and evaluated at bedside this morning.  He reports feeling better and he endorses frequent urination.  He has no sob on exam this morning.  He is very frustrated by the easy bruising from getting his blood drawn.    PAST HISTORY  --------------------------------------------------------------------------------  No significant changes to PMH, PSH, FHx, SHx, unless otherwise noted    ALLERGIES & MEDICATIONS  --------------------------------------------------------------------------------  Allergies    No Known Allergies    Intolerances      Standing Inpatient Medications  aspirin enteric coated 81 milliGRAM(s) Oral daily  atorvastatin 40 milliGRAM(s) Oral at bedtime  dextrose 50% Injectable 12.5 Gram(s) IV Push once  dextrose 50% Injectable 25 Gram(s) IV Push once  dextrose 50% Injectable 25 Gram(s) IV Push once  docusate sodium 100 milliGRAM(s) Oral daily  furosemide Infusion 15 mG/Hr IV Continuous <Continuous>  heparin  Injectable 5000 Unit(s) SubCutaneous every 8 hours  insulin glargine Injectable (LANTUS) 30 Unit(s) SubCutaneous at bedtime  insulin lispro (HumaLOG) corrective regimen sliding scale   SubCutaneous at bedtime  insulin lispro (HumaLOG) corrective regimen sliding scale   SubCutaneous three times a day before meals  senna 2 Tablet(s) Oral at bedtime  sevelamer hydrochloride 800 milliGRAM(s) Oral three times a day with meals    PRN Inpatient Medications  acetaminophen   Tablet .. 325 milliGRAM(s) Oral every 4 hours PRN  calamine Lotion 1 Application(s) Topical daily PRN  dextrose 40% Gel 15 Gram(s) Oral once PRN  glucagon  Injectable 1 milliGRAM(s) IntraMuscular once PRN  oxyCODONE    IR 10 milliGRAM(s) Oral every 6 hours PRN      REVIEW OF SYSTEMS  --------------------------------------------------------------------------------  Gen: No fever  CV: No cp  Pulm: no dyspnea  GI: no abdominal pain  Heme: easy bruising    VITALS/PHYSICAL EXAM  --------------------------------------------------------------------------------  T(C): 36.3 (12-15-18 @ 07:00), Max: 36.5 (12-14-18 @ 15:26)  HR: 69 (12-15-18 @ 07:00) (69 - 77)  BP: 119/90 (12-15-18 @ 07:00) (105/71 - 156/58)  RR: 18 (12-15-18 @ 07:00) (11 - 20)  SpO2: 100% (12-15-18 @ 07:00) (89% - 100%)  Wt(kg): --        12-14-18 @ 07:01  -  12-15-18 @ 07:00  --------------------------------------------------------  IN: 1248.5 mL / OUT: 3400 mL / NET: -2151.5 mL    12-15-18 @ 07:01  -  12-15-18 @ 12:27  --------------------------------------------------------  IN: 400 mL / OUT: 500 mL / NET: -100 mL      Physical Exam:              Gen: no acute distress   	HEENT: no JVD anicteric  	Pulm: lungs clear on ausculation  	CV:  S1S2  	Abd: +BS, soft   	Ext:  B/L Lower ext edema +; wound vac present over left thigh.   	Neuro: No focal deficits  	Skin: Warm and dry   	    LABS/STUDIES  --------------------------------------------------------------------------------              9.6    7.12  >-----------<  199      [12-14-18 @ 05:10]              31.0     137  |  98  |  106  ----------------------------<  159      [12-14-18 @ 20:00]  4.8   |  22  |  3.75        Ca     8.7     [12-14-18 @ 20:00]      Mg     2.3     [12-14-18 @ 20:00]      Phos  7.3     [12-14-18 @ 05:10]            Creatinine Trend:  SCr 3.75 [12-14 @ 20:00]  SCr 4.01 [12-14 @ 05:10]  SCr 4.38 [12-13 @ 22:15]  SCr 4.54 [12-13 @ 06:40]  SCr 4.26 [12-12 @ 06:35]      Urine Creatinine 92.40      [12-10-18 @ 20:50]  Urine Urea Nitrogen 452.6      [12-10-18 @ 20:50]

## 2018-12-15 NOTE — PROGRESS NOTE ADULT - ASSESSMENT
68M w/ PMHx HFrEF 30%, CAD s/p CABG and stent, DM2, PAD s/p L KEI stent, s/p LLE CFA to below-knee bypass with PTFE for long-segment SFA occlusion, L 2nd toe amputatio/R toe amputation, c/b groin soft tissue infection requiring washout, sartorius flap, and vac placement initially presented for high output from VAC from office, found to have KESHAV on CKD in setting of diuretics use and s/p IV contrast, with course c/b  worsening sob, LE edema and increasing abdominal girth concerning for ADHF. Pt also became hypotensive SBP 90'  on Thursday so was started on lasix gtt and dobutamine gtt and transferred to CCU for further management. Transferred back to floors, remains on Lasix gtt and off  gtt.

## 2018-12-15 NOTE — PROGRESS NOTE ADULT - SUBJECTIVE AND OBJECTIVE BOX
Patient is a 68y old  Male who presents with a chief complaint of Acute Decompensated HF (14 Dec 2018 07:39)      SUBJECTIVE / OVERNIGHT EVENTS: Transferred out of CCU yesterday, still diuresing well.  Pt denies CP, SOB.  No major tele events (2 PVCs).  States that RLE swelling has significantly improved since admission.  Pt refusing blood draw right now, but will consider later this afternoon.       MEDICATIONS  (STANDING):  atorvastatin 40 milliGRAM(s) Oral at bedtime  dextrose 50% Injectable 12.5 Gram(s) IV Push once  dextrose 50% Injectable 25 Gram(s) IV Push once  dextrose 50% Injectable 25 Gram(s) IV Push once  docusate sodium 100 milliGRAM(s) Oral daily  furosemide Infusion 15 mG/Hr (7.5 mL/Hr) IV Continuous <Continuous>  heparin  Injectable 5000 Unit(s) SubCutaneous every 8 hours  insulin glargine Injectable (LANTUS) 30 Unit(s) SubCutaneous at bedtime  insulin lispro (HumaLOG) corrective regimen sliding scale   SubCutaneous at bedtime  insulin lispro (HumaLOG) corrective regimen sliding scale   SubCutaneous three times a day before meals  senna 2 Tablet(s) Oral at bedtime  sevelamer hydrochloride 800 milliGRAM(s) Oral three times a day with meals    MEDICATIONS  (PRN):  acetaminophen   Tablet .. 325 milliGRAM(s) Oral every 4 hours PRN Mild Pain (1 - 3)  calamine Lotion 1 Application(s) Topical daily PRN Rash and/or Itching  dextrose 40% Gel 15 Gram(s) Oral once PRN Blood Glucose LESS THAN 70 milliGRAM(s)/deciliter  glucagon  Injectable 1 milliGRAM(s) IntraMuscular once PRN Glucose LESS THAN 70 milligrams/deciliter  oxyCODONE    IR 5 milliGRAM(s) Oral every 6 hours PRN Moderate Pain (4 - 6)  oxyCODONE    IR 10 milliGRAM(s) Oral every 6 hours PRN Severe Pain (7 - 10)      Vital Signs Last 24 Hrs  T(C): 36.3 (15 Dec 2018 07:00), Max: 36.5 (14 Dec 2018 15:26)  T(F): 97.4 (15 Dec 2018 07:00), Max: 97.7 (14 Dec 2018 15:26)  HR: 69 (15 Dec 2018 07:00) (69 - 77)  BP: 119/90 (15 Dec 2018 07:00) (105/71 - 156/58)  BP(mean): 88 (14 Dec 2018 22:00) (71 - 93)  RR: 18 (15 Dec 2018 07:00) (10 - 20)  SpO2: 100% (15 Dec 2018 07:00) (89% - 100%)  CAPILLARY BLOOD GLUCOSE      POCT Blood Glucose.: 87 mg/dL (15 Dec 2018 08:53)  POCT Blood Glucose.: 175 mg/dL (14 Dec 2018 21:38)  POCT Blood Glucose.: 195 mg/dL (14 Dec 2018 17:12)  POCT Blood Glucose.: 181 mg/dL (14 Dec 2018 11:44)    I&O's Summary    14 Dec 2018 07:01  -  15 Dec 2018 07:00  --------------------------------------------------------  IN: 1248.5 mL / OUT: 3400 mL / NET: -2151.5 mL      PHYSICAL EXAM  GENERAL: NAD, well-developed  HEAD:  Atraumatic, Normocephalic  EYES: EOMI, PERRLA, conjunctiva and sclera clear  NECK: +JVD  CHEST/LUNG: RLL crackles   HEART: Regular rate and rhythm; No murmurs, rubs, or gallops  ABDOMEN: Soft, Nontender, Nondistended; Bowel sounds present  EXTREMITIES:  s/p toe amputation in b/l feet, RLE 2+ pitting edema up to thigh, LLE wound vacs positioned in 2 locations, C/D/i.    PSYCH: AAOx3    LABS:                        9.6    7.12  )-----------( 199      ( 14 Dec 2018 05:10 )             31.0     12-14    137  |  98  |  106<H>  ----------------------------<  159<H>  4.8   |  22  |  3.75<H>    Ca    8.7      14 Dec 2018 20:00  Phos  7.3     12-14  Mg     2.3     12-14      Serum Pro-Brain Natriuretic Peptide (12.13.18 @ 06:40)    Serum Pro-Brain Natriuretic Peptide: 4507: ACUTE CONGESTIVE HEART FAILURE is UNLIKELY if NT-proBNP is < 300 pg/mL.    CONSIDER ACUTE CONGESTIVE HEART FAILURE if:    AGE                NT-proBNP RESULT  ---                -------------  < 50 YEARS      >  450 pg/mL  50 - 75 YEARS      >  900 pg/mL  > 75 YEARS      > 1800 pg/mL    All results require clinical correlation.  Consider obtaining a  baseline or "dry" NT-proBNP level when the patient is stabilized,  so that subsequent levels can be compared to that value.  Patients  with recurrent CHF may have elevated NT-proBNP levels.  Acute  failure episodes generally produce levels at least 25% greater  than baseline levels.  The above values are derived from a large  multi-center international study, "Klaus, KAYLEN, et al, European  Heart Journal, 2006; 27:330-337."   pg/mL      RADIOLOGY & ADDITIONAL TESTS:    Imaging Personally Reviewed:  < from: CT Pelvis w/ IV Cont (12.08.18 @ 00:26) >  IMPRESSION:     Redemonstration of 3.0 x 2.7 cm simple fluid attenuating left inguinal   collection. Otherwise, no CT evidence for an abscess.    < end of copied text >    < from: Transthoracic Echocardiogram (10.16.18 @ 12:43) >  CONCLUSIONS:  1. Severe segmental left ventricular systolic dysfunction.  Hypokinesis of anterolateral wall and basal to mid  anteroseptum. akinesis of anterior wall  2.  Right ventricular enlargement with decreased right  ventricular systolic function.  3.  teathered posterior lealet of mitral valve  Moderate-severe eccentric posteriorly directed mitral  regurgitation.  4.  Severe diastolic dysfunction (Stage III) with elevated  filling pressures and a restrictive filling pattern  5. Estimated pulmonary artery systolic pressure equals 58  mm Hg, assuming right atrial pressure equals 10  mm Hg,  consistent with moderate pulmonary hypertension    < end of copied text >    < from: Xray Chest 1 View- PORTABLE-Urgent (12.07.18 @ 20:38) >  IMPRESSION: Cardiomegaly with clear lungs.    < end of copied text >    Consultant(s) Notes Reviewed:  Cards, Vascular, Renal

## 2018-12-15 NOTE — PROGRESS NOTE ADULT - PROBLEM SELECTOR PLAN 3
- s/p LLE CFA to below-knee bypass with PTFE for long-segment SFA occlusion on (9/18/18), L 2nd toe amputation (9/19), c/b groin SSI requiring washout, sartorius flap, and vac placement 10/15, discharged 10/30, readmitted 12/7 for continued high output from VAC.   - Followed by vascular surgery for wound care, vac changed every MWF  - Daily wet to dry dressing changes  - No indication for surgical intervention at this time  - Per plastic recs, c/w VAC therapy until output decreases and granulation tissue develops

## 2018-12-15 NOTE — PROGRESS NOTE ADULT - PROBLEM SELECTOR PLAN 2
The patient is quite edematous although his lungs are clear.  Agree with continuing diuretics for today with a goal of a net negative fluid balance overall.

## 2018-12-15 NOTE — PROGRESS NOTE ADULT - PROBLEM SELECTOR PLAN 1
Patient with KESHAV in setting of ARB use, diuretic use, and IV contrast likely ATN now resolving. On review of previous labs, Scr. has been WNL. However during previous hospitalization in October, patient had episode of KESHAV which resolved. During current admission, Scr. was elevated at 1.5 on 12/7/18 and then worsened to 4.26 on 12/13/18. However latest Scr. has improved today. Patient is non oliguric and is currently on lasix.  Continue with IV diuretics. Continue to hold entresto. Monitor Scr, I/O, and electrolytes daily. Avoid NSAIDs, RCAs, ACE-I/ARBs at this time.

## 2018-12-16 LAB
BUN SERPL-MCNC: 95 MG/DL — HIGH (ref 7–23)
CALCIUM SERPL-MCNC: 9.2 MG/DL — SIGNIFICANT CHANGE UP (ref 8.4–10.5)
CHLORIDE SERPL-SCNC: 96 MMOL/L — LOW (ref 98–107)
CO2 SERPL-SCNC: 26 MMOL/L — SIGNIFICANT CHANGE UP (ref 22–31)
CREAT SERPL-MCNC: 3 MG/DL — HIGH (ref 0.5–1.3)
GLUCOSE BLDC GLUCOMTR-MCNC: 177 MG/DL — HIGH (ref 70–99)
GLUCOSE BLDC GLUCOMTR-MCNC: 214 MG/DL — HIGH (ref 70–99)
GLUCOSE SERPL-MCNC: 83 MG/DL — SIGNIFICANT CHANGE UP (ref 70–99)
HBA1C BLD-MCNC: 7.7 % — HIGH (ref 4–5.6)
HCT VFR BLD CALC: 32.6 % — LOW (ref 39–50)
HGB BLD-MCNC: 9.9 G/DL — LOW (ref 13–17)
MAGNESIUM SERPL-MCNC: 2.2 MG/DL — SIGNIFICANT CHANGE UP (ref 1.6–2.6)
MCHC RBC-ENTMCNC: 22.2 PG — LOW (ref 27–34)
MCHC RBC-ENTMCNC: 30.4 % — LOW (ref 32–36)
MCV RBC AUTO: 73.3 FL — LOW (ref 80–100)
NRBC # FLD: 0 — SIGNIFICANT CHANGE UP
PHOSPHATE SERPL-MCNC: 5.6 MG/DL — HIGH (ref 2.5–4.5)
PLATELET # BLD AUTO: 195 K/UL — SIGNIFICANT CHANGE UP (ref 150–400)
PMV BLD: SIGNIFICANT CHANGE UP FL (ref 7–13)
POTASSIUM SERPL-MCNC: 4.3 MMOL/L — SIGNIFICANT CHANGE UP (ref 3.5–5.3)
POTASSIUM SERPL-SCNC: 4.3 MMOL/L — SIGNIFICANT CHANGE UP (ref 3.5–5.3)
RBC # BLD: 4.45 M/UL — SIGNIFICANT CHANGE UP (ref 4.2–5.8)
RBC # FLD: 21 % — HIGH (ref 10.3–14.5)
SODIUM SERPL-SCNC: 137 MMOL/L — SIGNIFICANT CHANGE UP (ref 135–145)
WBC # BLD: 7.2 K/UL — SIGNIFICANT CHANGE UP (ref 3.8–10.5)
WBC # FLD AUTO: 7.2 K/UL — SIGNIFICANT CHANGE UP (ref 3.8–10.5)

## 2018-12-16 PROCEDURE — 99233 SBSQ HOSP IP/OBS HIGH 50: CPT | Mod: GC

## 2018-12-16 PROCEDURE — 99233 SBSQ HOSP IP/OBS HIGH 50: CPT

## 2018-12-16 RX ADMIN — SEVELAMER CARBONATE 800 MILLIGRAM(S): 2400 POWDER, FOR SUSPENSION ORAL at 17:53

## 2018-12-16 RX ADMIN — Medication 1: at 17:53

## 2018-12-16 RX ADMIN — Medication 81 MILLIGRAM(S): at 12:42

## 2018-12-16 RX ADMIN — HEPARIN SODIUM 5000 UNIT(S): 5000 INJECTION INTRAVENOUS; SUBCUTANEOUS at 21:16

## 2018-12-16 RX ADMIN — ATORVASTATIN CALCIUM 40 MILLIGRAM(S): 80 TABLET, FILM COATED ORAL at 21:16

## 2018-12-16 RX ADMIN — SEVELAMER CARBONATE 800 MILLIGRAM(S): 2400 POWDER, FOR SUSPENSION ORAL at 12:43

## 2018-12-16 RX ADMIN — HEPARIN SODIUM 5000 UNIT(S): 5000 INJECTION INTRAVENOUS; SUBCUTANEOUS at 12:43

## 2018-12-16 RX ADMIN — INSULIN GLARGINE 30 UNIT(S): 100 INJECTION, SOLUTION SUBCUTANEOUS at 22:13

## 2018-12-16 RX ADMIN — OXYCODONE HYDROCHLORIDE 10 MILLIGRAM(S): 5 TABLET ORAL at 14:00

## 2018-12-16 RX ADMIN — HEPARIN SODIUM 5000 UNIT(S): 5000 INJECTION INTRAVENOUS; SUBCUTANEOUS at 05:46

## 2018-12-16 RX ADMIN — CALAMINE AND ZINC OXIDE AND PHENOL 1 APPLICATION(S): 160; 10 LOTION TOPICAL at 22:12

## 2018-12-16 RX ADMIN — OXYCODONE HYDROCHLORIDE 10 MILLIGRAM(S): 5 TABLET ORAL at 13:26

## 2018-12-16 RX ADMIN — Medication 7.5 MG/HR: at 16:13

## 2018-12-16 NOTE — PROGRESS NOTE ADULT - PROBLEM SELECTOR PLAN 1
Patient with KESHAV in setting of ARB use, diuretic use, and IV contrast likely ATN now resolving. On review of previous labs, Scr. has been WNL. However during previous hospitalization in October, patient had episode of KESHAV which resolved. During current admission, Scr. was elevated at 1.5 on 12/7/18 and then worsened to 4.26 on 12/13/18. However latest Scr. has improved today. Patient is non oliguric and is currently on lasix drip.  Continue with IV diuretics. Continue to hold entresto. Monitor Scr, I/O, and electrolytes daily. Avoid NSAIDs, RCAs, ACE-I/ARBs at this time.

## 2018-12-16 NOTE — PROGRESS NOTE ADULT - SUBJECTIVE AND OBJECTIVE BOX
VASCULAR SURGERY PROGRESS NOTE      Subjective:  No acute events overnight. Continues on Lasix gtt.         Objective:    PE:  General: NAD, well-nourished  HEENT: Atraumatic, EOMI  Resp: Breathing comfortably on RA  CV: Normal sinus rhythm  Ext: LLE: ground wound with surrounding induration and copious clear discharge, no erythema, purulent drainage or signs of infection, wound vac in place    Vital Signs Last 24 Hrs  T(C): 36.7 (16 Dec 2018 05:41), Max: 36.7 (16 Dec 2018 05:41)  T(F): 98 (16 Dec 2018 05:41), Max: 98 (16 Dec 2018 05:41)  HR: 76 (16 Dec 2018 05:41) (76 - 82)  BP: 115/73 (16 Dec 2018 05:41) (115/73 - 150/81)  BP(mean): --  RR: 18 (16 Dec 2018 05:41) (18 - 18)  SpO2: 97% (16 Dec 2018 05:41) (97% - 98%)    I&O's Detail    15 Dec 2018 07:01  -  16 Dec 2018 07:00  --------------------------------------------------------  IN:    Oral Fluid: 800 mL  Total IN: 800 mL    OUT:    Voided: 2500 mL  Total OUT: 2500 mL    Total NET: -1700 mL          Daily     Daily Weight in k.6 (16 Dec 2018 05:41)    MEDICATIONS  (STANDING):  aspirin enteric coated 81 milliGRAM(s) Oral daily  atorvastatin 40 milliGRAM(s) Oral at bedtime  dextrose 50% Injectable 12.5 Gram(s) IV Push once  dextrose 50% Injectable 25 Gram(s) IV Push once  dextrose 50% Injectable 25 Gram(s) IV Push once  docusate sodium 100 milliGRAM(s) Oral daily  furosemide Infusion 15 mG/Hr (7.5 mL/Hr) IV Continuous <Continuous>  heparin  Injectable 5000 Unit(s) SubCutaneous every 8 hours  insulin glargine Injectable (LANTUS) 30 Unit(s) SubCutaneous at bedtime  insulin lispro (HumaLOG) corrective regimen sliding scale   SubCutaneous at bedtime  insulin lispro (HumaLOG) corrective regimen sliding scale   SubCutaneous three times a day before meals  senna 2 Tablet(s) Oral at bedtime  sevelamer hydrochloride 800 milliGRAM(s) Oral three times a day with meals    MEDICATIONS  (PRN):  acetaminophen   Tablet .. 325 milliGRAM(s) Oral every 4 hours PRN Mild Pain (1 - 3)  calamine Lotion 1 Application(s) Topical daily PRN Rash and/or Itching  dextrose 40% Gel 15 Gram(s) Oral once PRN Blood Glucose LESS THAN 70 milliGRAM(s)/deciliter  glucagon  Injectable 1 milliGRAM(s) IntraMuscular once PRN Glucose LESS THAN 70 milligrams/deciliter  oxyCODONE    IR 5 milliGRAM(s) Oral every 6 hours PRN Moderate Pain (4 - 6)  oxyCODONE    IR 10 milliGRAM(s) Oral every 6 hours PRN Severe Pain (7 - 10)      LABS:                        9.9    7.20  )-----------( 195      ( 16 Dec 2018 05:36 )             32.6     12-16    137  |  96<L>  |  95<H>  ----------------------------<  83  4.3   |  26  |  3.00<H>    Ca    9.2      16 Dec 2018 05:36  Phos  5.6     -  Mg     2.2                 RADIOLOGY & ADDITIONAL STUDIES:

## 2018-12-16 NOTE — PROGRESS NOTE ADULT - PROBLEM SELECTOR PLAN 1
- Started on lasix and dobutamine drips in CCU and responding well to diuresis with net output ~1500cc overnight.   gtt d/c'd Friday.   - Clinically improved and HDS but still with elevated JVD and significant LE edema  - c/w lasix 15mg/hr, f/u with cards re: Lasix gtt titration   - Repeat TTE  unchanged, EF 24% with severe systolic and diastolic dysfunction  - Continue to hold Entresto  - Close monitoring of ouput, Strict I/O, daily weights  - Remains off BB in setting of ADHF  - As per [employer] review, pt was offered AICD placement 2 years ago, but pt refused.  Pt would benefit from HF evaluation Monday.

## 2018-12-16 NOTE — PROGRESS NOTE ADULT - SUBJECTIVE AND OBJECTIVE BOX
[facility] Division of Kidney Diseases & Hypertension  FOLLOW UP NOTE  [phone]--------------------------------------------------------------------------------    68 year old male with h/o CHFrEF, PVD, CAD, DM, HTN was admitted for increased drainage from wound vac. Nephrology was consulted for elevated creatinine. On review of previous records in [facility], patient had normal Scr. from 4/26/16 to 9/24/18. However patient was hospitalized in [facility] from 10/7/18 to 10/30/18 for surgical site infection. During hospital course, Scr. was noted to be elevated at 1.97 on 10/17/18, peaked at 2.13 on 10/18/18, and improved to 1.40 on 10/27/18. During this admission, Scr. was elevated at 1.5 on 12/7/18 and then worsened to 4.54 on 12/13/18. Now Sc.r noted to be improving.     Patient seen and examined. Patient denies c/o SOB today. Still has LE edema but has been improving.  Denies c/o, CP, abdominal pain, nausea, or vomiting. Patient is non oliguric and is on lasix drip.     PAST HISTORY  --------------------------------------------------------------------------------  No significant changes to PMH, PSH, FHx, SHx, unless otherwise noted    ALLERGIES & MEDICATIONS  --------------------------------------------------------------------------------  Allergies    No Known Allergies    Intolerances      Standing Inpatient Medications  aspirin enteric coated 81 milliGRAM(s) Oral daily  atorvastatin 40 milliGRAM(s) Oral at bedtime  dextrose 50% Injectable 12.5 Gram(s) IV Push once  dextrose 50% Injectable 25 Gram(s) IV Push once  dextrose 50% Injectable 25 Gram(s) IV Push once  docusate sodium 100 milliGRAM(s) Oral daily  furosemide Infusion 15 mG/Hr IV Continuous <Continuous>  heparin  Injectable 5000 Unit(s) SubCutaneous every 8 hours  insulin glargine Injectable (LANTUS) 30 Unit(s) SubCutaneous at bedtime  insulin lispro (HumaLOG) corrective regimen sliding scale   SubCutaneous at bedtime  insulin lispro (HumaLOG) corrective regimen sliding scale   SubCutaneous three times a day before meals  senna 2 Tablet(s) Oral at bedtime  sevelamer hydrochloride 800 milliGRAM(s) Oral three times a day with meals    PRN Inpatient Medications  acetaminophen   Tablet .. 325 milliGRAM(s) Oral every 4 hours PRN  calamine Lotion 1 Application(s) Topical daily PRN  dextrose 40% Gel 15 Gram(s) Oral once PRN  glucagon  Injectable 1 milliGRAM(s) IntraMuscular once PRN  oxyCODONE    IR 5 milliGRAM(s) Oral every 6 hours PRN  oxyCODONE    IR 10 milliGRAM(s) Oral every 6 hours PRN      REVIEW OF SYSTEMS  --------------------------------------------------------------------------------  Gen: No fever  CV: No cp  Pulm: no dyspnea  GI: no abdominal pain  Heme: easy bruising    VITALS/PHYSICAL EXAM  --------------------------------------------------------------------------------  T(C): 36.8 (12-16-18 @ 12:36), Max: 36.8 (12-16-18 @ 12:36)  HR: 75 (12-16-18 @ 12:36) (75 - 82)  BP: 122/55 (12-16-18 @ 12:36) (115/73 - 150/81)  RR: 18 (12-16-18 @ 12:36) (18 - 18)  SpO2: 98% (12-16-18 @ 12:36) (97% - 98%)  Wt(kg): --        12-15-18 @ 07:01  -  12-16-18 @ 07:00  --------------------------------------------------------  IN: 800 mL / OUT: 2500 mL / NET: -1700 mL      Physical Exam:  	   Gen: no acute distress   	HEENT: no JVD anicteric  	Pulm: lungs clear on ausculation  	CV:  S1S2  	Abd: +BS, soft   	Ext:  B/L Lower ext edema +; wound vac present over left thigh.   	Neuro: No focal deficits  	Skin: Warm and dry   	    LABS/STUDIES  --------------------------------------------------------------------------------              9.9    7.20  >-----------<  195      [12-16-18 @ 05:36]              32.6     137  |  96  |  95  ----------------------------<  83      [12-16-18 @ 05:36]  4.3   |  26  |  3.00        Ca     9.2     [12-16-18 @ 05:36]      Mg     2.2     [12-16-18 @ 05:36]      Phos  5.6     [12-16-18 @ 05:36]            Creatinine Trend:  SCr 3.00 [12-16 @ 05:36]  SCr 3.75 [12-14 @ 20:00]  SCr 4.01 [12-14 @ 05:10]  SCr 4.38 [12-13 @ 22:15]  SCr 4.54 [12-13 @ 06:40]      Urine Creatinine 92.40      [12-10-18 @ 20:50]  Urine Urea Nitrogen 452.6      [12-10-18 @ 20:50]    HbA1c 7.7      [12-16-18 @ 05:36]

## 2018-12-16 NOTE — PROGRESS NOTE ADULT - PROBLEM SELECTOR PLAN 2
- Improving - Likely 2/2 low-flow state in setting of ADHF  - C/w diuresis, management as above  - Monitor electrolytes, trend BMP, explained to patient importance of monitoring daily labs   - Strict I/O

## 2018-12-16 NOTE — PROGRESS NOTE ADULT - ASSESSMENT
68M PMH CHF, CAD s/p CABG, s/p stent (2016), DM, PAD s/p L KEI stent (9/11/18), s/p LLE CFA to below-knee bypass with PTFE for long-segment SFA occlusion on (9/18/18), L 2nd toe amputation (9/19), POC c/b groin SSI requiring washout, sartorius flap, and vac placement, presenting with high output from VAC    - Care per primary team  - Monitor KESHAV  - Continue Wound Vac changes MWF  - No indications for further Vascular surgery intervention at this point  - Will continue to follow    NABIL Mix PGY2  Vascular Surgery  [id number]

## 2018-12-16 NOTE — PROGRESS NOTE ADULT - SUBJECTIVE AND OBJECTIVE BOX
Patient is a 68y old  Male who presents with a chief complaint of wound vac drainage (15 Dec 2018 12:26)      SUBJECTIVE / OVERNIGHT EVENTS: States that his wound vac was leaking overnight, but someone came by to fix it.  No other acute complaints, continues to diurese well.  Denies CP, SOB, worsening LE edema.       MEDICATIONS  (STANDING):  aspirin enteric coated 81 milliGRAM(s) Oral daily  atorvastatin 40 milliGRAM(s) Oral at bedtime  dextrose 50% Injectable 12.5 Gram(s) IV Push once  dextrose 50% Injectable 25 Gram(s) IV Push once  dextrose 50% Injectable 25 Gram(s) IV Push once  docusate sodium 100 milliGRAM(s) Oral daily  furosemide Infusion 15 mG/Hr (7.5 mL/Hr) IV Continuous <Continuous>  heparin  Injectable 5000 Unit(s) SubCutaneous every 8 hours  insulin glargine Injectable (LANTUS) 30 Unit(s) SubCutaneous at bedtime  insulin lispro (HumaLOG) corrective regimen sliding scale   SubCutaneous at bedtime  insulin lispro (HumaLOG) corrective regimen sliding scale   SubCutaneous three times a day before meals  senna 2 Tablet(s) Oral at bedtime  sevelamer hydrochloride 800 milliGRAM(s) Oral three times a day with meals    MEDICATIONS  (PRN):  acetaminophen   Tablet .. 325 milliGRAM(s) Oral every 4 hours PRN Mild Pain (1 - 3)  calamine Lotion 1 Application(s) Topical daily PRN Rash and/or Itching  dextrose 40% Gel 15 Gram(s) Oral once PRN Blood Glucose LESS THAN 70 milliGRAM(s)/deciliter  glucagon  Injectable 1 milliGRAM(s) IntraMuscular once PRN Glucose LESS THAN 70 milligrams/deciliter  oxyCODONE    IR 5 milliGRAM(s) Oral every 6 hours PRN Moderate Pain (4 - 6)  oxyCODONE    IR 10 milliGRAM(s) Oral every 6 hours PRN Severe Pain (7 - 10)      Vital Signs Last 24 Hrs  T(C): 36.7 (16 Dec 2018 05:41), Max: 36.7 (16 Dec 2018 05:41)  T(F): 98 (16 Dec 2018 05:41), Max: 98 (16 Dec 2018 05:41)  HR: 76 (16 Dec 2018 05:41) (76 - 82)  BP: 115/73 (16 Dec 2018 05:41) (115/73 - 150/81)  BP(mean): --  RR: 18 (16 Dec 2018 05:41) (18 - 18)  SpO2: 97% (16 Dec 2018 05:41) (97% - 98%)  CAPILLARY BLOOD GLUCOSE      POCT Blood Glucose.: 261 mg/dL (15 Dec 2018 21:41)  POCT Blood Glucose.: 157 mg/dL (15 Dec 2018 17:33)    I&O's Summary    15 Dec 2018 07:01  -  16 Dec 2018 07:00  --------------------------------------------------------  IN: 800 mL / OUT: 2500 mL / NET: -1700 mL      PHYSICAL EXAM  GENERAL: NAD, well-developed  HEAD:  Atraumatic, Normocephalic  EYES: EOMI, PERRLA, conjunctiva and sclera clear  NECK: JVD improved  CHEST/LUNG: Clear to auscultation bilaterally; No crackles, improved from yesterdya's exam   HEART: Regular rate and rhythm; No murmurs, rubs, or gallops  ABDOMEN: Soft, Nontender, Nondistended; Bowel sounds present  EXTREMITIES:  2+ pitting edema in b/l LE up to thigh, s/p R and L toe amputations, wound vac in place in 2 locations in [facility]   PSYCH: AAOx3      LABS:                        9.9    7.20  )-----------( 195      ( 16 Dec 2018 05:36 )             32.6     12-16    137  |  96<L>  |  95<H>  ----------------------------<  83  4.3   |  26  |  3.00<H>    Ca    9.2      16 Dec 2018 05:36  Phos  5.6     12-16  Mg     2.2     12-16          Consultant(s) Notes Reviewed:  Cards, Renal, Vascular

## 2018-12-17 LAB
BUN SERPL-MCNC: 83 MG/DL — HIGH (ref 7–23)
CALCIUM SERPL-MCNC: 9.1 MG/DL — SIGNIFICANT CHANGE UP (ref 8.4–10.5)
CHLORIDE SERPL-SCNC: 94 MMOL/L — LOW (ref 98–107)
CO2 SERPL-SCNC: 22 MMOL/L — SIGNIFICANT CHANGE UP (ref 22–31)
CREAT SERPL-MCNC: 2.5 MG/DL — HIGH (ref 0.5–1.3)
GLUCOSE BLDC GLUCOMTR-MCNC: 235 MG/DL — HIGH (ref 70–99)
GLUCOSE SERPL-MCNC: 137 MG/DL — HIGH (ref 70–99)
HCT VFR BLD CALC: 33.4 % — LOW (ref 39–50)
HGB BLD-MCNC: 10.1 G/DL — LOW (ref 13–17)
MAGNESIUM SERPL-MCNC: 2 MG/DL — SIGNIFICANT CHANGE UP (ref 1.6–2.6)
MCHC RBC-ENTMCNC: 22.1 PG — LOW (ref 27–34)
MCHC RBC-ENTMCNC: 30.2 % — LOW (ref 32–36)
MCV RBC AUTO: 73.2 FL — LOW (ref 80–100)
NRBC # FLD: 0 — SIGNIFICANT CHANGE UP
PHOSPHATE SERPL-MCNC: 4.4 MG/DL — SIGNIFICANT CHANGE UP (ref 2.5–4.5)
PLATELET # BLD AUTO: 181 K/UL — SIGNIFICANT CHANGE UP (ref 150–400)
PMV BLD: SIGNIFICANT CHANGE UP FL (ref 7–13)
POTASSIUM SERPL-MCNC: 4.1 MMOL/L — SIGNIFICANT CHANGE UP (ref 3.5–5.3)
POTASSIUM SERPL-SCNC: 4.1 MMOL/L — SIGNIFICANT CHANGE UP (ref 3.5–5.3)
RBC # BLD: 4.56 M/UL — SIGNIFICANT CHANGE UP (ref 4.2–5.8)
RBC # FLD: 20.9 % — HIGH (ref 10.3–14.5)
SODIUM SERPL-SCNC: 136 MMOL/L — SIGNIFICANT CHANGE UP (ref 135–145)
WBC # BLD: 6.75 K/UL — SIGNIFICANT CHANGE UP (ref 3.8–10.5)
WBC # FLD AUTO: 6.75 K/UL — SIGNIFICANT CHANGE UP (ref 3.8–10.5)

## 2018-12-17 PROCEDURE — 99233 SBSQ HOSP IP/OBS HIGH 50: CPT

## 2018-12-17 PROCEDURE — 99255 IP/OBS CONSLTJ NEW/EST HI 80: CPT

## 2018-12-17 RX ADMIN — HEPARIN SODIUM 5000 UNIT(S): 5000 INJECTION INTRAVENOUS; SUBCUTANEOUS at 05:09

## 2018-12-17 RX ADMIN — Medication 2: at 09:13

## 2018-12-17 RX ADMIN — Medication 7.5 MG/HR: at 09:20

## 2018-12-17 RX ADMIN — Medication 325 MILLIGRAM(S): at 05:46

## 2018-12-17 RX ADMIN — Medication 1: at 17:42

## 2018-12-17 RX ADMIN — ATORVASTATIN CALCIUM 40 MILLIGRAM(S): 80 TABLET, FILM COATED ORAL at 21:25

## 2018-12-17 RX ADMIN — Medication 81 MILLIGRAM(S): at 13:03

## 2018-12-17 RX ADMIN — HEPARIN SODIUM 5000 UNIT(S): 5000 INJECTION INTRAVENOUS; SUBCUTANEOUS at 13:24

## 2018-12-17 RX ADMIN — Medication 1: at 13:02

## 2018-12-17 RX ADMIN — INSULIN GLARGINE 30 UNIT(S): 100 INJECTION, SOLUTION SUBCUTANEOUS at 21:25

## 2018-12-17 RX ADMIN — Medication 100 MILLIGRAM(S): at 13:03

## 2018-12-17 RX ADMIN — SEVELAMER CARBONATE 800 MILLIGRAM(S): 2400 POWDER, FOR SUSPENSION ORAL at 13:03

## 2018-12-17 RX ADMIN — SEVELAMER CARBONATE 800 MILLIGRAM(S): 2400 POWDER, FOR SUSPENSION ORAL at 17:42

## 2018-12-17 RX ADMIN — SEVELAMER CARBONATE 800 MILLIGRAM(S): 2400 POWDER, FOR SUSPENSION ORAL at 09:13

## 2018-12-17 RX ADMIN — HEPARIN SODIUM 5000 UNIT(S): 5000 INJECTION INTRAVENOUS; SUBCUTANEOUS at 21:25

## 2018-12-17 NOTE — PROVIDER CONTACT NOTE (OTHER) - RECOMMENDATIONS
bladder scan?
cont to monitor
md in patients room
ointment & benadryl?
continue to monitor q2h as per order.

## 2018-12-17 NOTE — PROVIDER CONTACT NOTE (OTHER) - BACKGROUND
POC c/b groin SSI requiring washout, sartorius flap and vac placement
Pt with complication post lle graft
Pt. s/p infected fem pop
(cont).. to dressing change  pt admitted for High wound vac output c/b ADHF

## 2018-12-17 NOTE — PROGRESS NOTE ADULT - SUBJECTIVE AND OBJECTIVE BOX
VASCULAR SURGERY PROGRESS NOTE      Subjective:  No acute events overnight. Continues on Lasix gtt.         Objective:    PE:  General: NAD, well-nourished  HEENT: Atraumatic, EOMI  Resp: Breathing comfortably on RA  CV: Normal sinus rhythm  Ext: LLE: ground wound with surrounding induration and copious clear discharge, no erythema, purulent drainage or signs of infection, wound vac in place      Vital Signs Last 24 Hrs  T(C): 36.9 (17 Dec 2018 05:01), Max: 37.1 (16 Dec 2018 22:28)  T(F): 98.4 (17 Dec 2018 05:01), Max: 98.8 (16 Dec 2018 22:28)  HR: 79 (17 Dec 2018 05:01) (75 - 80)  BP: 129/67 (17 Dec 2018 05:01) (122/55 - 135/74)  BP(mean): --  RR: 18 (17 Dec 2018 05:01) (18 - 18)  SpO2: 99% (17 Dec 2018 05:01) (98% - 100%)        15 Dec 2018 07:01  -  16 Dec 2018 07:00  --------------------------------------------------------  IN:    Oral Fluid: 800 mL  Total IN: 800 mL    OUT:    Voided: 2500 mL  Total OUT: 2500 mL    Total NET: -1700 mL      16 Dec 2018 07:01  -  17 Dec 2018 06:50  --------------------------------------------------------  IN:    Oral Fluid: 400 mL  Total IN: 400 mL    OUT:    VAC (Vacuum Assisted Closure) System: 250 mL    Voided: 5430 mL  Total OUT: 5680 mL    Total NET: -5280 mL              Daily     Daily Weight in k.6 (16 Dec 2018 05:41)    MEDICATIONS  (STANDING):  aspirin enteric coated 81 milliGRAM(s) Oral daily  atorvastatin 40 milliGRAM(s) Oral at bedtime  dextrose 50% Injectable 12.5 Gram(s) IV Push once  dextrose 50% Injectable 25 Gram(s) IV Push once  dextrose 50% Injectable 25 Gram(s) IV Push once  docusate sodium 100 milliGRAM(s) Oral daily  furosemide Infusion 15 mG/Hr (7.5 mL/Hr) IV Continuous <Continuous>  heparin  Injectable 5000 Unit(s) SubCutaneous every 8 hours  insulin glargine Injectable (LANTUS) 30 Unit(s) SubCutaneous at bedtime  insulin lispro (HumaLOG) corrective regimen sliding scale   SubCutaneous at bedtime  insulin lispro (HumaLOG) corrective regimen sliding scale   SubCutaneous three times a day before meals  senna 2 Tablet(s) Oral at bedtime  sevelamer hydrochloride 800 milliGRAM(s) Oral three times a day with meals    MEDICATIONS  (PRN):  acetaminophen   Tablet .. 325 milliGRAM(s) Oral every 4 hours PRN Mild Pain (1 - 3)  calamine Lotion 1 Application(s) Topical daily PRN Rash and/or Itching  dextrose 40% Gel 15 Gram(s) Oral once PRN Blood Glucose LESS THAN 70 milliGRAM(s)/deciliter  glucagon  Injectable 1 milliGRAM(s) IntraMuscular once PRN Glucose LESS THAN 70 milligrams/deciliter  oxyCODONE    IR 5 milliGRAM(s) Oral every 6 hours PRN Moderate Pain (4 - 6)  oxyCODONE    IR 10 milliGRAM(s) Oral every 6 hours PRN Severe Pain (7 - 10)      LABS:              CBC Full  -  ( 16 Dec 2018 05:36 )  WBC Count : 7.20 K/uL  Hemoglobin : 9.9 g/dL  Hematocrit : 32.6 %  Platelet Count - Automated : 195 K/uL  Mean Cell Volume : 73.3 fL  Mean Cell Hemoglobin : 22.2 pg  Mean Cell Hemoglobin Concentration : 30.4 %  Auto Neutrophil # : x  Auto Lymphocyte # : x  Auto Monocyte # : x  Auto Eosinophil # : x  Auto Basophil # : x  Auto Neutrophil % : x  Auto Lymphocyte % : x  Auto Monocyte % : x  Auto Eosinophil % : x  Auto Basophil % : x    12-16    137  |  96<L>  |  95<H>  ----------------------------<  83  4.3   |  26  |  3.00<H>    Ca    9.2      16 Dec 2018 05:36  Phos  5.6     12-16  Mg     2.2     12-16                      68M PMH CHF, CAD s/p CABG, s/p stent (), DM, PAD s/p L KEI stent (18), s/p LLE CFA to below-knee bypass with PTFE for long-segment SFA occlusion on (18), L 2nd toe amputation (), POC c/b groin SSI requiring washout, sartorius flap, and vac placement, presenting with high output from VAC    - Care per primary team  - Monitor KESHAV  - Continue Wound Vac changes MW (please call wound vac team today to change the VAC)   - No indications for further Vascular surgery intervention at this point      Vascular Surgery  [id number]

## 2018-12-17 NOTE — PROVIDER CONTACT NOTE (OTHER) - ACTION/TREATMENT ORDERED:
No actions ordered. MD stated will extend current PIV. Will continue to monitor.
No actions ordered, will continue to monitor.
Will monitor
cont to monitor
cont to monitor urine
cont to monitor urine, will order ointment for itching
BILLIE Rosenberg notified & aware. as per provider, continue to monitor site.

## 2018-12-17 NOTE — PROGRESS NOTE ADULT - PROBLEM SELECTOR PLAN 2
- Improving - Likely 2/2 low-flow state in setting of ADHF  - C/w diuresis, management as above  - Monitor electrolytes, Strict I/O

## 2018-12-17 NOTE — PROGRESS NOTE ADULT - ASSESSMENT
68M w/ PMHx HFrEF 30%, CAD s/p CABG and stent, DM2, PAD s/p L KEI stent, s/p LLE CFA to below-knee bypass with PTFE for long-segment SFA occlusion, L 2nd toe amputatio/R toe amputation, c/b groin soft tissue infection requiring washout, sartorius flap, and vac placement initially presented for high output from VAC from office, found to have KESHAV on CKD in setting of diuretics use and s/p IV contrast, with course c/b  worsening sob, LE edema and increasing abdominal girth, on lasix gtt for acute on chronic systolic heart failure exacerbation s/p CCU course with dobutamine gtt.

## 2018-12-17 NOTE — PROGRESS NOTE ADULT - SUBJECTIVE AND OBJECTIVE BOX
Patient is a 68y old  Male who presents with a chief complaint of High output from wound vac (16 Dec 2018 09:25)      SUBJECTIVE / OVERNIGHT EVENTS: No acute events overnight. Denies leg pain, SOB, chest pain, N/V/D. Only able to ambulate short distances    MEDICATIONS  (STANDING):  aspirin enteric coated 81 milliGRAM(s) Oral daily  atorvastatin 40 milliGRAM(s) Oral at bedtime  dextrose 50% Injectable 12.5 Gram(s) IV Push once  dextrose 50% Injectable 25 Gram(s) IV Push once  dextrose 50% Injectable 25 Gram(s) IV Push once  docusate sodium 100 milliGRAM(s) Oral daily  furosemide Infusion 15 mG/Hr (7.5 mL/Hr) IV Continuous <Continuous>  heparin  Injectable 5000 Unit(s) SubCutaneous every 8 hours  insulin glargine Injectable (LANTUS) 30 Unit(s) SubCutaneous at bedtime  insulin lispro (HumaLOG) corrective regimen sliding scale   SubCutaneous at bedtime  insulin lispro (HumaLOG) corrective regimen sliding scale   SubCutaneous three times a day before meals  senna 2 Tablet(s) Oral at bedtime  sevelamer hydrochloride 800 milliGRAM(s) Oral three times a day with meals    MEDICATIONS  (PRN):  acetaminophen   Tablet .. 325 milliGRAM(s) Oral every 4 hours PRN Mild Pain (1 - 3)  calamine Lotion 1 Application(s) Topical daily PRN Rash and/or Itching  dextrose 40% Gel 15 Gram(s) Oral once PRN Blood Glucose LESS THAN 70 milliGRAM(s)/deciliter  glucagon  Injectable 1 milliGRAM(s) IntraMuscular once PRN Glucose LESS THAN 70 milligrams/deciliter  oxyCODONE    IR 5 milliGRAM(s) Oral every 6 hours PRN Moderate Pain (4 - 6)  oxyCODONE    IR 10 milliGRAM(s) Oral every 6 hours PRN Severe Pain (7 - 10)      T(C): 36.9 (12-17-18 @ 05:01), Max: 37.1 (12-16-18 @ 22:28)  HR: 79 (12-17-18 @ 05:01) (79 - 80)  BP: 129/67 (12-17-18 @ 05:01) (128/69 - 135/74)  RR: 18 (12-17-18 @ 05:01) (18 - 18)  SpO2: 99% (12-17-18 @ 05:01) (99% - 100%)  CAPILLARY BLOOD GLUCOSE      POCT Blood Glucose.: 196 mg/dL (17 Dec 2018 12:33)  POCT Blood Glucose.: 235 mg/dL (17 Dec 2018 09:00)  POCT Blood Glucose.: 214 mg/dL (16 Dec 2018 21:39)  POCT Blood Glucose.: 177 mg/dL (16 Dec 2018 17:09)    I&O's Summary    16 Dec 2018 07:01  -  17 Dec 2018 07:00  --------------------------------------------------------  IN: 407.5 mL / OUT: 5680 mL / NET: -5272.5 mL    17 Dec 2018 07:01  -  17 Dec 2018 14:03  --------------------------------------------------------  IN: 250 mL / OUT: 850 mL / NET: -600 mL        PHYSICAL EXAM:  GENERAL: no apparent distress, on room air  EYES: sclera clear b/l  CHEST/LUNG: Clear to auscultation bilaterally; No wheezing or crackles   HEART: s1/s2, no murmurs appreciated  ABDOMEN: Soft, Nontender, distended, + abdominal pitting edema  EXTREMITIES: wound vac attached to left groin and left thigh, + 2 pitting edema b/l R>L, s/p R and L toe amputations  NEUROLOGY: awake, alert, responds to Qs appropriately, no gross deficits    LABS:                        10.1   6.75  )-----------( 181      ( 17 Dec 2018 06:30 )             33.4     12-17    136  |  94<L>  |  83<H>  ----------------------------<  137<H>  4.1   |  22  |  2.50<H>    Ca    9.1      17 Dec 2018 06:30  Phos  4.4     12-17  Mg     2.0     12-17                RADIOLOGY & ADDITIONAL TESTS:

## 2018-12-17 NOTE — PROVIDER CONTACT NOTE (OTHER) - SITUATION
pt is on a wound vac, attached to sites on L leg. dressing change to both sites done @ 1300 12/17. pt reports no drainage has come put since dressing change. pt on a wound vac, attached to 2 sites on L leg. dressing change to both sites done @ 1300 12/17. pt reports no output since dressing change. as per day RN, scant sanguinous drainage noted prior.

## 2018-12-17 NOTE — PROVIDER CONTACT NOTE (OTHER) - ASSESSMENT
Pt. is agitated and is refusing new IV access.
NAD, Pt. denies SOB/Pain/Urge to void
Patient on bed with blood from nose noted
patient low b/p asymptomatic
pt A&Ox4. vs stable. Low urine output, 375cc total this shift. denies abd pain
pt complained that he is itchy all over, upon assessment, dry scabs like noted to back and arms,
wound vac on and on ordered settings. no leak as per machine and upon RN assessment. both sites appear clean dry and intact. pt denies pain

## 2018-12-17 NOTE — PROGRESS NOTE ADULT - PROBLEM SELECTOR PLAN 1
- grossly volume overloaded, c/w lasix 15mg/hr, Heart Failure consult: emailed Dr. Alexander  - TTE 12/12 unchanged, EF 24% with severe systolic and diastolic dysfxn  - Continue to hold Entresto  - Strict I/O, daily weights downtrending, not on beta blocker in setting of acute heart failure  - As per [employer] review, pt was offered AICD placement 2 years ago, but pt refused.

## 2018-12-18 LAB
BUN SERPL-MCNC: 71 MG/DL — HIGH (ref 7–23)
CALCIUM SERPL-MCNC: 9.4 MG/DL — SIGNIFICANT CHANGE UP (ref 8.4–10.5)
CHLORIDE SERPL-SCNC: 95 MMOL/L — LOW (ref 98–107)
CO2 SERPL-SCNC: 29 MMOL/L — SIGNIFICANT CHANGE UP (ref 22–31)
CREAT SERPL-MCNC: 2.16 MG/DL — HIGH (ref 0.5–1.3)
GLUCOSE SERPL-MCNC: 99 MG/DL — SIGNIFICANT CHANGE UP (ref 70–99)
HCT VFR BLD CALC: 33.7 % — LOW (ref 39–50)
HGB BLD-MCNC: 10.3 G/DL — LOW (ref 13–17)
MAGNESIUM SERPL-MCNC: 2 MG/DL — SIGNIFICANT CHANGE UP (ref 1.6–2.6)
MCHC RBC-ENTMCNC: 22.1 PG — LOW (ref 27–34)
MCHC RBC-ENTMCNC: 30.6 % — LOW (ref 32–36)
MCV RBC AUTO: 72.3 FL — LOW (ref 80–100)
NRBC # FLD: 0 — SIGNIFICANT CHANGE UP
PLATELET # BLD AUTO: 187 K/UL — SIGNIFICANT CHANGE UP (ref 150–400)
PMV BLD: SIGNIFICANT CHANGE UP FL (ref 7–13)
POTASSIUM SERPL-MCNC: 4 MMOL/L — SIGNIFICANT CHANGE UP (ref 3.5–5.3)
POTASSIUM SERPL-SCNC: 4 MMOL/L — SIGNIFICANT CHANGE UP (ref 3.5–5.3)
RBC # BLD: 4.66 M/UL — SIGNIFICANT CHANGE UP (ref 4.2–5.8)
RBC # FLD: 21 % — HIGH (ref 10.3–14.5)
SODIUM SERPL-SCNC: 139 MMOL/L — SIGNIFICANT CHANGE UP (ref 135–145)
WBC # BLD: 8.52 K/UL — SIGNIFICANT CHANGE UP (ref 3.8–10.5)
WBC # FLD AUTO: 8.52 K/UL — SIGNIFICANT CHANGE UP (ref 3.8–10.5)

## 2018-12-18 PROCEDURE — 99233 SBSQ HOSP IP/OBS HIGH 50: CPT

## 2018-12-18 RX ORDER — HYDRALAZINE HCL 50 MG
10 TABLET ORAL EVERY 8 HOURS
Qty: 0 | Refills: 0 | Status: DISCONTINUED | OUTPATIENT
Start: 2018-12-18 | End: 2018-12-21

## 2018-12-18 RX ORDER — SPIRONOLACTONE 25 MG/1
25 TABLET, FILM COATED ORAL DAILY
Qty: 0 | Refills: 0 | Status: DISCONTINUED | OUTPATIENT
Start: 2018-12-18 | End: 2018-12-27

## 2018-12-18 RX ORDER — ISOSORBIDE DINITRATE 5 MG/1
10 TABLET ORAL THREE TIMES A DAY
Qty: 0 | Refills: 0 | Status: DISCONTINUED | OUTPATIENT
Start: 2018-12-18 | End: 2018-12-27

## 2018-12-18 RX ORDER — FUROSEMIDE 40 MG
20 TABLET ORAL
Qty: 500 | Refills: 0 | Status: DISCONTINUED | OUTPATIENT
Start: 2018-12-18 | End: 2018-12-21

## 2018-12-18 RX ADMIN — HEPARIN SODIUM 5000 UNIT(S): 5000 INJECTION INTRAVENOUS; SUBCUTANEOUS at 12:03

## 2018-12-18 RX ADMIN — ISOSORBIDE DINITRATE 10 MILLIGRAM(S): 5 TABLET ORAL at 12:03

## 2018-12-18 RX ADMIN — Medication 10 MG/HR: at 21:31

## 2018-12-18 RX ADMIN — Medication 7.5 MG/HR: at 00:33

## 2018-12-18 RX ADMIN — OXYCODONE HYDROCHLORIDE 10 MILLIGRAM(S): 5 TABLET ORAL at 05:58

## 2018-12-18 RX ADMIN — SEVELAMER CARBONATE 800 MILLIGRAM(S): 2400 POWDER, FOR SUSPENSION ORAL at 17:28

## 2018-12-18 RX ADMIN — OXYCODONE HYDROCHLORIDE 10 MILLIGRAM(S): 5 TABLET ORAL at 05:13

## 2018-12-18 RX ADMIN — Medication 10 MG/HR: at 12:00

## 2018-12-18 RX ADMIN — SEVELAMER CARBONATE 800 MILLIGRAM(S): 2400 POWDER, FOR SUSPENSION ORAL at 11:41

## 2018-12-18 RX ADMIN — Medication 10 MILLIGRAM(S): at 21:27

## 2018-12-18 RX ADMIN — ATORVASTATIN CALCIUM 40 MILLIGRAM(S): 80 TABLET, FILM COATED ORAL at 21:27

## 2018-12-18 RX ADMIN — Medication 10 MILLIGRAM(S): at 12:07

## 2018-12-18 RX ADMIN — SENNA PLUS 2 TABLET(S): 8.6 TABLET ORAL at 21:27

## 2018-12-18 RX ADMIN — Medication 81 MILLIGRAM(S): at 11:40

## 2018-12-18 RX ADMIN — ISOSORBIDE DINITRATE 10 MILLIGRAM(S): 5 TABLET ORAL at 21:28

## 2018-12-18 RX ADMIN — INSULIN GLARGINE 30 UNIT(S): 100 INJECTION, SOLUTION SUBCUTANEOUS at 21:38

## 2018-12-18 RX ADMIN — HEPARIN SODIUM 5000 UNIT(S): 5000 INJECTION INTRAVENOUS; SUBCUTANEOUS at 21:28

## 2018-12-18 RX ADMIN — OXYCODONE HYDROCHLORIDE 10 MILLIGRAM(S): 5 TABLET ORAL at 21:30

## 2018-12-18 RX ADMIN — Medication 1: at 13:01

## 2018-12-18 RX ADMIN — Medication 4: at 17:45

## 2018-12-18 RX ADMIN — SPIRONOLACTONE 25 MILLIGRAM(S): 25 TABLET, FILM COATED ORAL at 11:38

## 2018-12-18 RX ADMIN — OXYCODONE HYDROCHLORIDE 10 MILLIGRAM(S): 5 TABLET ORAL at 22:30

## 2018-12-18 NOTE — PROGRESS NOTE ADULT - SUBJECTIVE AND OBJECTIVE BOX
Patient is a 68y old  Male who presents with a chief complaint of High wound drainage (18 Dec 2018 10:38)      SUBJECTIVE / OVERNIGHT EVENTS: Reported some SOB overnight that improved with oxygen. Denies SOB currently, no chest pain, still urinating large amounts. Tele with 1st degree AVB, PVCs, triplets    MEDICATIONS  (STANDING):  aspirin enteric coated 81 milliGRAM(s) Oral daily  atorvastatin 40 milliGRAM(s) Oral at bedtime  dextrose 50% Injectable 12.5 Gram(s) IV Push once  dextrose 50% Injectable 25 Gram(s) IV Push once  dextrose 50% Injectable 25 Gram(s) IV Push once  docusate sodium 100 milliGRAM(s) Oral daily  furosemide Infusion 20 mG/Hr (10 mL/Hr) IV Continuous <Continuous>  heparin  Injectable 5000 Unit(s) SubCutaneous every 8 hours  hydrALAZINE 10 milliGRAM(s) Oral every 8 hours  insulin glargine Injectable (LANTUS) 30 Unit(s) SubCutaneous at bedtime  insulin lispro (HumaLOG) corrective regimen sliding scale   SubCutaneous at bedtime  insulin lispro (HumaLOG) corrective regimen sliding scale   SubCutaneous three times a day before meals  isosorbide   dinitrate Tablet (ISORDIL) 10 milliGRAM(s) Oral three times a day  metolazone 2.5 milliGRAM(s) Oral <User Schedule>  senna 2 Tablet(s) Oral at bedtime  sevelamer hydrochloride 800 milliGRAM(s) Oral three times a day with meals  spironolactone 25 milliGRAM(s) Oral daily    MEDICATIONS  (PRN):  acetaminophen   Tablet .. 325 milliGRAM(s) Oral every 4 hours PRN Mild Pain (1 - 3)  calamine Lotion 1 Application(s) Topical daily PRN Rash and/or Itching  dextrose 40% Gel 15 Gram(s) Oral once PRN Blood Glucose LESS THAN 70 milliGRAM(s)/deciliter  glucagon  Injectable 1 milliGRAM(s) IntraMuscular once PRN Glucose LESS THAN 70 milligrams/deciliter  oxyCODONE    IR 5 milliGRAM(s) Oral every 6 hours PRN Moderate Pain (4 - 6)  oxyCODONE    IR 10 milliGRAM(s) Oral every 6 hours PRN Severe Pain (7 - 10)      T(C): 36.4 (12-18-18 @ 11:35), Max: 36.8 (12-17-18 @ 21:17)  HR: 82 (12-18-18 @ 11:35) (79 - 83)  BP: 128/82 (12-18-18 @ 11:35) (126/81 - 141/82)  RR: 18 (12-18-18 @ 11:35) (18 - 18)  SpO2: 99% (12-18-18 @ 11:35) (97% - 99%)  CAPILLARY BLOOD GLUCOSE      POCT Blood Glucose.: 198 mg/dL (18 Dec 2018 12:49)  POCT Blood Glucose.: 129 mg/dL (18 Dec 2018 08:49)  POCT Blood Glucose.: 196 mg/dL (17 Dec 2018 21:14)  POCT Blood Glucose.: 196 mg/dL (17 Dec 2018 17:37)    I&O's Summary    17 Dec 2018 07:01  -  18 Dec 2018 07:00  --------------------------------------------------------  IN: 550 mL / OUT: 2650 mL / NET: -2100 mL    18 Dec 2018 07:01  -  18 Dec 2018 13:56  --------------------------------------------------------  IN: 400 mL / OUT: 950 mL / NET: -550 mL    PHYSICAL EXAM:  GENERAL: no apparent distress, on room air, seated up in bed  EYES: sclera clear b/l  CHEST/LUNG: Clear to auscultation bilaterally; No wheezing or crackles   HEART: s1/s2, no murmurs appreciated  ABDOMEN: Soft, Nontender, distended, + abdominal pitting edema mostly left flank  EXTREMITIES: wound vac attached to left groin and left thigh, + 2 pitting edema b/l R>L, s/p R and L toe amputations  NEUROLOGY: awake, alert, responds to Qs appropriately, no gross deficits    LABS:                        10.3   8.52  )-----------( 187      ( 18 Dec 2018 05:19 )             33.7     12-18    139  |  95<L>  |  71<H>  ----------------------------<  99  4.0   |  29  |  2.16<H>    Ca    9.4      18 Dec 2018 05:19  Phos  4.4     12-17  Mg     2.0     12-18                RADIOLOGY & ADDITIONAL TESTS:

## 2018-12-18 NOTE — PROGRESS NOTE ADULT - PROBLEM SELECTOR PLAN 1
daily wts decreasing, increase lasix gtt 20mg/hr, add aldactone 25mg qd, add hydralazine 10gm TID, add metolazone 2.5mg BID x 3 days, Isordil 10 mg tid  - TTE 12/12 unchanged, EF 24% with severe systolic and diastolic dysfxn  - Pt was offered AICD placement 2 years ago, but pt refused.

## 2018-12-18 NOTE — CONSULT NOTE ADULT - ASSESSMENT
68M with severe ischemic CMP and PAD, now with tight edema, acutely decompensated HFrEF, and normal BP. Renal profile improving with diuresis.    -Increase Lasix gtt to 20 mg/hr.  -Start metolazone 2.5 mg bid x 3 days  -Start spironolactone 25 mg qd (hold for K>4.8)  -Start hydralazine 10 mg tid, Isordil 10 mg tid  -No need for cardiac cath presently

## 2018-12-18 NOTE — CONSULT NOTE ADULT - ATTENDING COMMENTS
See above.
Hemodynamic mediated KESHAV  Contrast Induced Nephropathy?
IF patient not in CCU, will take over care to medicine in morning.  Discussed with vascular resident on call at 6PM, discussed my converseations with cardiology and recommended starting dobutamine.

## 2018-12-18 NOTE — CONSULT NOTE ADULT - SUBJECTIVE AND OBJECTIVE BOX
68M w/ severe ischemic CMP (LVEF 25%, LVEDD 5.4) , CAD s/p CABG and PCI, DM2, PAD s/p L KEI stent, s/p LLE CFA to below-knee bypass with PTFE for long-segment SFA occlusion, L 2nd toe amputation /R toe amputation, c/b groin soft tissue infection requiring washout, sartorius flap, and vac placement.    He presented with high output from VAC. Also found to have KESHAV on CKD, perhaps due to IV contrast, along with worsening sob, LE edema, and increasing abdominal girth. Has been on Lasix gtt for acute on chronic systolic heart failure exacerbation. Completed trial of dobutamine gtt in CCU.    Currently, his main c/o is lack of drainage from wound vac, which he thinks needs to be repositioned.    O/E:  NAD, AAOx3  Wound vacs in place in L groin and leg  + JVD  No carotid bruits  Decreased breath sounds  RRR, no m/r/g  Mildly distended abdomen, NT  Tight, 2-3+ leg edema; warm extremities

## 2018-12-18 NOTE — CHART NOTE - NSCHARTNOTEFT_GEN_A_CORE
Creatinine improving.  Will sign off for now don't hesitate to reconsult.  Patient should follow up with nephrology after discharge

## 2018-12-19 LAB
BUN SERPL-MCNC: 65 MG/DL — HIGH (ref 7–23)
CALCIUM SERPL-MCNC: 9.9 MG/DL — SIGNIFICANT CHANGE UP (ref 8.4–10.5)
CHLORIDE SERPL-SCNC: 88 MMOL/L — LOW (ref 98–107)
CO2 SERPL-SCNC: 33 MMOL/L — HIGH (ref 22–31)
CREAT SERPL-MCNC: 2.17 MG/DL — HIGH (ref 0.5–1.3)
GLUCOSE SERPL-MCNC: 200 MG/DL — HIGH (ref 70–99)
HCT VFR BLD CALC: 37.3 % — LOW (ref 39–50)
HGB BLD-MCNC: 11.3 G/DL — LOW (ref 13–17)
MAGNESIUM SERPL-MCNC: 2 MG/DL — SIGNIFICANT CHANGE UP (ref 1.6–2.6)
MCHC RBC-ENTMCNC: 22.3 PG — LOW (ref 27–34)
MCHC RBC-ENTMCNC: 30.3 % — LOW (ref 32–36)
MCV RBC AUTO: 73.7 FL — LOW (ref 80–100)
NRBC # FLD: 0 — SIGNIFICANT CHANGE UP
PHOSPHATE SERPL-MCNC: 4.3 MG/DL — SIGNIFICANT CHANGE UP (ref 2.5–4.5)
PLATELET # BLD AUTO: 218 K/UL — SIGNIFICANT CHANGE UP (ref 150–400)
PMV BLD: SIGNIFICANT CHANGE UP FL (ref 7–13)
POTASSIUM SERPL-MCNC: 4 MMOL/L — SIGNIFICANT CHANGE UP (ref 3.5–5.3)
POTASSIUM SERPL-SCNC: 4 MMOL/L — SIGNIFICANT CHANGE UP (ref 3.5–5.3)
RBC # BLD: 5.06 M/UL — SIGNIFICANT CHANGE UP (ref 4.2–5.8)
RBC # FLD: 21.2 % — HIGH (ref 10.3–14.5)
SODIUM SERPL-SCNC: 138 MMOL/L — SIGNIFICANT CHANGE UP (ref 135–145)
WBC # BLD: 8.72 K/UL — SIGNIFICANT CHANGE UP (ref 3.8–10.5)
WBC # FLD AUTO: 8.72 K/UL — SIGNIFICANT CHANGE UP (ref 3.8–10.5)

## 2018-12-19 PROCEDURE — 99233 SBSQ HOSP IP/OBS HIGH 50: CPT

## 2018-12-19 RX ORDER — POTASSIUM CHLORIDE 20 MEQ
20 PACKET (EA) ORAL ONCE
Qty: 0 | Refills: 0 | Status: COMPLETED | OUTPATIENT
Start: 2018-12-19 | End: 2018-12-19

## 2018-12-19 RX ADMIN — SEVELAMER CARBONATE 800 MILLIGRAM(S): 2400 POWDER, FOR SUSPENSION ORAL at 09:29

## 2018-12-19 RX ADMIN — INSULIN GLARGINE 30 UNIT(S): 100 INJECTION, SOLUTION SUBCUTANEOUS at 21:49

## 2018-12-19 RX ADMIN — SEVELAMER CARBONATE 800 MILLIGRAM(S): 2400 POWDER, FOR SUSPENSION ORAL at 13:07

## 2018-12-19 RX ADMIN — Medication 10 MILLIGRAM(S): at 21:49

## 2018-12-19 RX ADMIN — Medication 100 MILLIGRAM(S): at 13:07

## 2018-12-19 RX ADMIN — OXYCODONE HYDROCHLORIDE 10 MILLIGRAM(S): 5 TABLET ORAL at 18:00

## 2018-12-19 RX ADMIN — SEVELAMER CARBONATE 800 MILLIGRAM(S): 2400 POWDER, FOR SUSPENSION ORAL at 17:14

## 2018-12-19 RX ADMIN — ATORVASTATIN CALCIUM 40 MILLIGRAM(S): 80 TABLET, FILM COATED ORAL at 21:49

## 2018-12-19 RX ADMIN — ISOSORBIDE DINITRATE 10 MILLIGRAM(S): 5 TABLET ORAL at 05:16

## 2018-12-19 RX ADMIN — ISOSORBIDE DINITRATE 10 MILLIGRAM(S): 5 TABLET ORAL at 13:07

## 2018-12-19 RX ADMIN — Medication 10 MILLIGRAM(S): at 13:07

## 2018-12-19 RX ADMIN — SPIRONOLACTONE 25 MILLIGRAM(S): 25 TABLET, FILM COATED ORAL at 05:16

## 2018-12-19 RX ADMIN — Medication 10 MG/HR: at 21:49

## 2018-12-19 RX ADMIN — Medication 2: at 13:07

## 2018-12-19 RX ADMIN — Medication 81 MILLIGRAM(S): at 13:07

## 2018-12-19 RX ADMIN — Medication 10 MG/HR: at 17:15

## 2018-12-19 RX ADMIN — Medication 1: at 17:39

## 2018-12-19 RX ADMIN — ISOSORBIDE DINITRATE 10 MILLIGRAM(S): 5 TABLET ORAL at 21:49

## 2018-12-19 RX ADMIN — OXYCODONE HYDROCHLORIDE 10 MILLIGRAM(S): 5 TABLET ORAL at 17:14

## 2018-12-19 RX ADMIN — Medication 10 MILLIGRAM(S): at 05:16

## 2018-12-19 RX ADMIN — Medication 20 MILLIEQUIVALENT(S): at 17:14

## 2018-12-19 NOTE — PROGRESS NOTE ADULT - SUBJECTIVE AND OBJECTIVE BOX
Patient is a 68y old  Male who presents with a chief complaint of High wound drainage (18 Dec 2018 10:38)      SUBJECTIVE / OVERNIGHT EVENTS: No acute events overnight. Denies chest pain, SOB, N/V.    MEDICATIONS  (STANDING):  aspirin enteric coated 81 milliGRAM(s) Oral daily  atorvastatin 40 milliGRAM(s) Oral at bedtime  dextrose 50% Injectable 12.5 Gram(s) IV Push once  dextrose 50% Injectable 25 Gram(s) IV Push once  dextrose 50% Injectable 25 Gram(s) IV Push once  docusate sodium 100 milliGRAM(s) Oral daily  furosemide Infusion 20 mG/Hr (10 mL/Hr) IV Continuous <Continuous>  heparin  Injectable 5000 Unit(s) SubCutaneous every 8 hours  hydrALAZINE 10 milliGRAM(s) Oral every 8 hours  insulin glargine Injectable (LANTUS) 30 Unit(s) SubCutaneous at bedtime  insulin lispro (HumaLOG) corrective regimen sliding scale   SubCutaneous at bedtime  insulin lispro (HumaLOG) corrective regimen sliding scale   SubCutaneous three times a day before meals  isosorbide   dinitrate Tablet (ISORDIL) 10 milliGRAM(s) Oral three times a day  metolazone 2.5 milliGRAM(s) Oral <User Schedule>  senna 2 Tablet(s) Oral at bedtime  sevelamer hydrochloride 800 milliGRAM(s) Oral three times a day with meals  spironolactone 25 milliGRAM(s) Oral daily    MEDICATIONS  (PRN):  acetaminophen   Tablet .. 325 milliGRAM(s) Oral every 4 hours PRN Mild Pain (1 - 3)  calamine Lotion 1 Application(s) Topical daily PRN Rash and/or Itching  dextrose 40% Gel 15 Gram(s) Oral once PRN Blood Glucose LESS THAN 70 milliGRAM(s)/deciliter  glucagon  Injectable 1 milliGRAM(s) IntraMuscular once PRN Glucose LESS THAN 70 milligrams/deciliter  oxyCODONE    IR 5 milliGRAM(s) Oral every 6 hours PRN Moderate Pain (4 - 6)  oxyCODONE    IR 10 milliGRAM(s) Oral every 6 hours PRN Severe Pain (7 - 10)      T(C): 36.7 (12-19-18 @ 05:14), Max: 36.9 (12-18-18 @ 21:22)  HR: 87 (12-19-18 @ 05:14) (82 - 87)  BP: 124/75 (12-19-18 @ 05:14) (102/63 - 128/82)  RR: 18 (12-19-18 @ 05:14) (16 - 18)  SpO2: 100% (12-19-18 @ 05:14) (99% - 100%)  CAPILLARY BLOOD GLUCOSE      POCT Blood Glucose.: 128 mg/dL (19 Dec 2018 09:14)  POCT Blood Glucose.: 211 mg/dL (18 Dec 2018 21:23)  POCT Blood Glucose.: 333 mg/dL (18 Dec 2018 17:34)  POCT Blood Glucose.: 198 mg/dL (18 Dec 2018 12:49)    I&O's Summary    18 Dec 2018 07:01  -  19 Dec 2018 07:00  --------------------------------------------------------  IN: 1270 mL / OUT: 5550 mL / NET: -4280 mL    19 Dec 2018 07:01  -  19 Dec 2018 10:36  --------------------------------------------------------  IN: 0 mL / OUT: 1050 mL / NET: -1050 mL    PHYSICAL EXAM:  GENERAL: no apparent distress, on room air, seated up in bed  EYES: sclera clear b/l  CHEST/LUNG: Clear to auscultation bilaterally; No wheezing or crackles   HEART: s1/s2, no murmurs appreciated  ABDOMEN: Soft, Nontender, distended, + abdominal pitting edema mostly left flank  EXTREMITIES: wound vac attached to left groin and left thigh, + 2 pitting edema b/l R>L, s/p R and L toe amputations  NEUROLOGY: awake, alert, responds to Qs appropriately, no gross deficits    LABS:                        10.3   8.52  )-----------( 187      ( 18 Dec 2018 05:19 )             33.7     12-18    139  |  95<L>  |  71<H>  ----------------------------<  99  4.0   |  29  |  2.16<H>    Ca    9.4      18 Dec 2018 05:19  Mg     2.0     12-18                RADIOLOGY & ADDITIONAL TESTS:

## 2018-12-19 NOTE — PROGRESS NOTE ADULT - PROBLEM SELECTOR PLAN 1
daily wts decreasing, urine output of 5.5 liters in last 24 hours  c/w lasix gtt 20mg/hr (monitor BMP BID, keep K>4.0 and <5.0, Mg>2), aldactone 25mg qd,  hydralazine 10mg TID, metolazone 2.5mg BID x 3 days, Isordil 10 mg tid  - TTE 12/12 unchanged, EF 24% with severe systolic and diastolic dysfxn  - Pt was offered AICD placement 2 years ago, but pt refused.

## 2018-12-20 LAB
BUN SERPL-MCNC: 64 MG/DL — HIGH (ref 7–23)
CALCIUM SERPL-MCNC: 9.8 MG/DL — SIGNIFICANT CHANGE UP (ref 8.4–10.5)
CHLORIDE SERPL-SCNC: 84 MMOL/L — LOW (ref 98–107)
CO2 SERPL-SCNC: 26 MMOL/L — SIGNIFICANT CHANGE UP (ref 22–31)
CREAT SERPL-MCNC: 1.86 MG/DL — HIGH (ref 0.5–1.3)
GLUCOSE SERPL-MCNC: 74 MG/DL — SIGNIFICANT CHANGE UP (ref 70–99)
HCT VFR BLD CALC: 34.6 % — LOW (ref 39–50)
HGB BLD-MCNC: 10.7 G/DL — LOW (ref 13–17)
MAGNESIUM SERPL-MCNC: 2.1 MG/DL — SIGNIFICANT CHANGE UP (ref 1.6–2.6)
MCHC RBC-ENTMCNC: 22.1 PG — LOW (ref 27–34)
MCHC RBC-ENTMCNC: 30.9 % — LOW (ref 32–36)
MCV RBC AUTO: 71.5 FL — LOW (ref 80–100)
NRBC # FLD: 0 — SIGNIFICANT CHANGE UP
PHOSPHATE SERPL-MCNC: 4.7 MG/DL — HIGH (ref 2.5–4.5)
PLATELET # BLD AUTO: 185 K/UL — SIGNIFICANT CHANGE UP (ref 150–400)
PMV BLD: SIGNIFICANT CHANGE UP FL (ref 7–13)
POTASSIUM SERPL-MCNC: 4 MMOL/L — SIGNIFICANT CHANGE UP (ref 3.5–5.3)
POTASSIUM SERPL-SCNC: 4 MMOL/L — SIGNIFICANT CHANGE UP (ref 3.5–5.3)
RBC # BLD: 4.84 M/UL — SIGNIFICANT CHANGE UP (ref 4.2–5.8)
RBC # FLD: 21.5 % — HIGH (ref 10.3–14.5)
SODIUM SERPL-SCNC: 132 MMOL/L — LOW (ref 135–145)
WBC # BLD: 9.66 K/UL — SIGNIFICANT CHANGE UP (ref 3.8–10.5)
WBC # FLD AUTO: 9.66 K/UL — SIGNIFICANT CHANGE UP (ref 3.8–10.5)

## 2018-12-20 PROCEDURE — 99233 SBSQ HOSP IP/OBS HIGH 50: CPT

## 2018-12-20 RX ORDER — POTASSIUM CHLORIDE 20 MEQ
20 PACKET (EA) ORAL ONCE
Qty: 0 | Refills: 0 | Status: COMPLETED | OUTPATIENT
Start: 2018-12-20 | End: 2018-12-20

## 2018-12-20 RX ADMIN — ISOSORBIDE DINITRATE 10 MILLIGRAM(S): 5 TABLET ORAL at 05:34

## 2018-12-20 RX ADMIN — Medication 10 MG/HR: at 21:40

## 2018-12-20 RX ADMIN — Medication 1: at 21:40

## 2018-12-20 RX ADMIN — Medication 1: at 12:55

## 2018-12-20 RX ADMIN — Medication 10 MILLIGRAM(S): at 05:34

## 2018-12-20 RX ADMIN — SPIRONOLACTONE 25 MILLIGRAM(S): 25 TABLET, FILM COATED ORAL at 05:34

## 2018-12-20 RX ADMIN — SEVELAMER CARBONATE 800 MILLIGRAM(S): 2400 POWDER, FOR SUSPENSION ORAL at 08:41

## 2018-12-20 RX ADMIN — SEVELAMER CARBONATE 800 MILLIGRAM(S): 2400 POWDER, FOR SUSPENSION ORAL at 17:51

## 2018-12-20 RX ADMIN — Medication 10 MILLIGRAM(S): at 21:40

## 2018-12-20 RX ADMIN — INSULIN GLARGINE 30 UNIT(S): 100 INJECTION, SOLUTION SUBCUTANEOUS at 21:45

## 2018-12-20 RX ADMIN — OXYCODONE HYDROCHLORIDE 10 MILLIGRAM(S): 5 TABLET ORAL at 13:32

## 2018-12-20 RX ADMIN — Medication 2: at 17:50

## 2018-12-20 RX ADMIN — ATORVASTATIN CALCIUM 40 MILLIGRAM(S): 80 TABLET, FILM COATED ORAL at 21:41

## 2018-12-20 RX ADMIN — OXYCODONE HYDROCHLORIDE 10 MILLIGRAM(S): 5 TABLET ORAL at 13:02

## 2018-12-20 RX ADMIN — Medication 20 MILLIEQUIVALENT(S): at 11:31

## 2018-12-20 RX ADMIN — Medication 10 MILLIGRAM(S): at 13:06

## 2018-12-20 RX ADMIN — ISOSORBIDE DINITRATE 10 MILLIGRAM(S): 5 TABLET ORAL at 13:06

## 2018-12-20 RX ADMIN — ISOSORBIDE DINITRATE 10 MILLIGRAM(S): 5 TABLET ORAL at 21:40

## 2018-12-20 RX ADMIN — Medication 10 MG/HR: at 12:54

## 2018-12-20 RX ADMIN — SENNA PLUS 2 TABLET(S): 8.6 TABLET ORAL at 21:40

## 2018-12-20 RX ADMIN — Medication 81 MILLIGRAM(S): at 11:31

## 2018-12-20 NOTE — PROGRESS NOTE ADULT - SUBJECTIVE AND OBJECTIVE BOX
Patient is a 68y old  Male who presents with a chief complaint of High wound drainage (18 Dec 2018 10:38)      SUBJECTIVE / OVERNIGHT EVENTS: No acute events overnight. SOB and swelling in legs and abdomen improving. Tele with multiple episodes of ectopy. Denies chest pain, N/V/D. Asking about wound care change for the wound vac wounds    MEDICATIONS  (STANDING):  aspirin enteric coated 81 milliGRAM(s) Oral daily  atorvastatin 40 milliGRAM(s) Oral at bedtime  dextrose 50% Injectable 12.5 Gram(s) IV Push once  dextrose 50% Injectable 25 Gram(s) IV Push once  dextrose 50% Injectable 25 Gram(s) IV Push once  docusate sodium 100 milliGRAM(s) Oral daily  furosemide Infusion 20 mG/Hr (10 mL/Hr) IV Continuous <Continuous>  heparin  Injectable 5000 Unit(s) SubCutaneous every 8 hours  hydrALAZINE 10 milliGRAM(s) Oral every 8 hours  insulin glargine Injectable (LANTUS) 30 Unit(s) SubCutaneous at bedtime  insulin lispro (HumaLOG) corrective regimen sliding scale   SubCutaneous at bedtime  insulin lispro (HumaLOG) corrective regimen sliding scale   SubCutaneous three times a day before meals  isosorbide   dinitrate Tablet (ISORDIL) 10 milliGRAM(s) Oral three times a day  metolazone 2.5 milliGRAM(s) Oral <User Schedule>  senna 2 Tablet(s) Oral at bedtime  sevelamer hydrochloride 800 milliGRAM(s) Oral three times a day with meals  spironolactone 25 milliGRAM(s) Oral daily    MEDICATIONS  (PRN):  acetaminophen   Tablet .. 325 milliGRAM(s) Oral every 4 hours PRN Mild Pain (1 - 3)  calamine Lotion 1 Application(s) Topical daily PRN Rash and/or Itching  dextrose 40% Gel 15 Gram(s) Oral once PRN Blood Glucose LESS THAN 70 milliGRAM(s)/deciliter  glucagon  Injectable 1 milliGRAM(s) IntraMuscular once PRN Glucose LESS THAN 70 milligrams/deciliter  oxyCODONE    IR 5 milliGRAM(s) Oral every 6 hours PRN Moderate Pain (4 - 6)  oxyCODONE    IR 10 milliGRAM(s) Oral every 6 hours PRN Severe Pain (7 - 10)      T(C): 36.6 (12-20-18 @ 05:31), Max: 36.6 (12-19-18 @ 13:01)  HR: 81 (12-20-18 @ 06:46) (81 - 91)  BP: 110/66 (12-20-18 @ 05:31) (110/66 - 130/84)  RR: 16 (12-20-18 @ 05:31) (16 - 18)  SpO2: 100% (12-20-18 @ 05:31) (96% - 100%)  CAPILLARY BLOOD GLUCOSE      POCT Blood Glucose.: 88 mg/dL (20 Dec 2018 08:31)  POCT Blood Glucose.: 166 mg/dL (19 Dec 2018 21:26)  POCT Blood Glucose.: 179 mg/dL (19 Dec 2018 17:23)  POCT Blood Glucose.: 208 mg/dL (19 Dec 2018 13:02)    I&O's Summary    19 Dec 2018 07:01  -  20 Dec 2018 07:00  --------------------------------------------------------  IN: 450 mL / OUT: 5350 mL / NET: -4900 mL    PHYSICAL EXAM:  GENERAL: no apparent distress, on room air, seated up in bed  EYES: sclera clear b/l  CHEST/LUNG: Clear to auscultation bilaterally; No wheezing or crackles   HEART: s1/s2, no murmurs appreciated  ABDOMEN: Soft, Nontender, slightly distended, + abdominal pitting edema mostly left flank but improving  EXTREMITIES: wound vac attached to left groin and left thigh, + 2 pitting edema b/l R>L improving, s/p R and L toe amputations  NEUROLOGY: awake, alert, responds to Qs appropriately, no gross deficits    LABS:                        10.7   9.66  )-----------( 185      ( 20 Dec 2018 06:22 )             34.6     12-20    132<L>  |  84<L>  |  64<H>  ----------------------------<  74  4.0   |  26  |  1.86<H>    Ca    9.8      20 Dec 2018 06:22  Phos  4.7     12-20  Mg     2.1     12-20                RADIOLOGY & ADDITIONAL TESTS:

## 2018-12-20 NOTE — PROGRESS NOTE ADULT - PROBLEM SELECTOR PLAN 1
still significantly volume overloaded, daily wts decreasing, urine output of 5.3 liters in last 24 hours  c/w lasix gtt 20mg/hr (monitor BMP BID, keep K>4.0 and <5.0, Mg>2), aldactone 25mg qd,  hydralazine 10mg TID, metolazone 2.5mg BID x 3 days, Isordil 10 mg tid  - TTE 12/12 unchanged, EF 24% with severe systolic and diastolic dysfxn  - Pt was offered AICD placement 2 years ago, but pt refused.

## 2018-12-21 LAB
BUN SERPL-MCNC: 67 MG/DL — HIGH (ref 7–23)
CALCIUM SERPL-MCNC: 10.1 MG/DL — SIGNIFICANT CHANGE UP (ref 8.4–10.5)
CHLORIDE SERPL-SCNC: 74 MMOL/L — LOW (ref 98–107)
CO2 SERPL-SCNC: 39 MMOL/L — HIGH (ref 22–31)
CREAT SERPL-MCNC: 2.4 MG/DL — HIGH (ref 0.5–1.3)
GLUCOSE SERPL-MCNC: 160 MG/DL — HIGH (ref 70–99)
HCT VFR BLD CALC: 37.9 % — LOW (ref 39–50)
HGB BLD-MCNC: 12 G/DL — LOW (ref 13–17)
MCHC RBC-ENTMCNC: 22 PG — LOW (ref 27–34)
MCHC RBC-ENTMCNC: 31.7 % — LOW (ref 32–36)
MCV RBC AUTO: 69.4 FL — LOW (ref 80–100)
NRBC # FLD: 0 — SIGNIFICANT CHANGE UP
PLATELET # BLD AUTO: 225 K/UL — SIGNIFICANT CHANGE UP (ref 150–400)
PMV BLD: SIGNIFICANT CHANGE UP FL (ref 7–13)
POTASSIUM SERPL-MCNC: 3.6 MMOL/L — SIGNIFICANT CHANGE UP (ref 3.5–5.3)
POTASSIUM SERPL-SCNC: 3.6 MMOL/L — SIGNIFICANT CHANGE UP (ref 3.5–5.3)
RBC # BLD: 5.46 M/UL — SIGNIFICANT CHANGE UP (ref 4.2–5.8)
RBC # FLD: 21.9 % — HIGH (ref 10.3–14.5)
SODIUM SERPL-SCNC: 130 MMOL/L — LOW (ref 135–145)
WBC # BLD: 10.4 K/UL — SIGNIFICANT CHANGE UP (ref 3.8–10.5)
WBC # FLD AUTO: 10.4 K/UL — SIGNIFICANT CHANGE UP (ref 3.8–10.5)

## 2018-12-21 PROCEDURE — 99232 SBSQ HOSP IP/OBS MODERATE 35: CPT

## 2018-12-21 PROCEDURE — 99233 SBSQ HOSP IP/OBS HIGH 50: CPT

## 2018-12-21 RX ORDER — HYDRALAZINE HCL 50 MG
20 TABLET ORAL EVERY 8 HOURS
Qty: 0 | Refills: 0 | Status: DISCONTINUED | OUTPATIENT
Start: 2018-12-21 | End: 2018-12-24

## 2018-12-21 RX ORDER — FUROSEMIDE 40 MG
10 TABLET ORAL
Qty: 500 | Refills: 0 | Status: DISCONTINUED | OUTPATIENT
Start: 2018-12-21 | End: 2018-12-22

## 2018-12-21 RX ADMIN — Medication 1: at 13:18

## 2018-12-21 RX ADMIN — ISOSORBIDE DINITRATE 10 MILLIGRAM(S): 5 TABLET ORAL at 05:06

## 2018-12-21 RX ADMIN — SEVELAMER CARBONATE 800 MILLIGRAM(S): 2400 POWDER, FOR SUSPENSION ORAL at 08:53

## 2018-12-21 RX ADMIN — ATORVASTATIN CALCIUM 40 MILLIGRAM(S): 80 TABLET, FILM COATED ORAL at 21:29

## 2018-12-21 RX ADMIN — Medication 10 MILLIGRAM(S): at 05:05

## 2018-12-21 RX ADMIN — SEVELAMER CARBONATE 800 MILLIGRAM(S): 2400 POWDER, FOR SUSPENSION ORAL at 17:27

## 2018-12-21 RX ADMIN — Medication 10 MG/HR: at 17:28

## 2018-12-21 RX ADMIN — Medication 20 MILLIGRAM(S): at 21:29

## 2018-12-21 RX ADMIN — Medication 10 MG/HR: at 08:54

## 2018-12-21 RX ADMIN — INSULIN GLARGINE 30 UNIT(S): 100 INJECTION, SOLUTION SUBCUTANEOUS at 21:33

## 2018-12-21 RX ADMIN — Medication 5 MG/HR: at 21:32

## 2018-12-21 RX ADMIN — Medication 1: at 08:53

## 2018-12-21 RX ADMIN — SEVELAMER CARBONATE 800 MILLIGRAM(S): 2400 POWDER, FOR SUSPENSION ORAL at 13:19

## 2018-12-21 RX ADMIN — Medication 81 MILLIGRAM(S): at 13:19

## 2018-12-21 RX ADMIN — SPIRONOLACTONE 25 MILLIGRAM(S): 25 TABLET, FILM COATED ORAL at 05:05

## 2018-12-21 RX ADMIN — Medication 2: at 17:27

## 2018-12-21 RX ADMIN — ISOSORBIDE DINITRATE 10 MILLIGRAM(S): 5 TABLET ORAL at 21:29

## 2018-12-21 NOTE — PROGRESS NOTE ADULT - SUBJECTIVE AND OBJECTIVE BOX
Patient is a 68y old  Male who presents with a chief complaint of High wound drainage (18 Dec 2018 10:38)      SUBJECTIVE / OVERNIGHT EVENTS: No acute events overnight. Reports feeling tired due to constant urination. Denies chest pain, SOB, abd pain. Weight down to 68.6kg    MEDICATIONS  (STANDING):  aspirin enteric coated 81 milliGRAM(s) Oral daily  atorvastatin 40 milliGRAM(s) Oral at bedtime  dextrose 50% Injectable 12.5 Gram(s) IV Push once  dextrose 50% Injectable 25 Gram(s) IV Push once  dextrose 50% Injectable 25 Gram(s) IV Push once  docusate sodium 100 milliGRAM(s) Oral daily  furosemide Infusion 20 mG/Hr (10 mL/Hr) IV Continuous <Continuous>  heparin  Injectable 5000 Unit(s) SubCutaneous every 8 hours  hydrALAZINE 10 milliGRAM(s) Oral every 8 hours  insulin glargine Injectable (LANTUS) 30 Unit(s) SubCutaneous at bedtime  insulin lispro (HumaLOG) corrective regimen sliding scale   SubCutaneous at bedtime  insulin lispro (HumaLOG) corrective regimen sliding scale   SubCutaneous three times a day before meals  isosorbide   dinitrate Tablet (ISORDIL) 10 milliGRAM(s) Oral three times a day  senna 2 Tablet(s) Oral at bedtime  sevelamer hydrochloride 800 milliGRAM(s) Oral three times a day with meals  spironolactone 25 milliGRAM(s) Oral daily    MEDICATIONS  (PRN):  acetaminophen   Tablet .. 325 milliGRAM(s) Oral every 4 hours PRN Mild Pain (1 - 3)  calamine Lotion 1 Application(s) Topical daily PRN Rash and/or Itching  dextrose 40% Gel 15 Gram(s) Oral once PRN Blood Glucose LESS THAN 70 milliGRAM(s)/deciliter  glucagon  Injectable 1 milliGRAM(s) IntraMuscular once PRN Glucose LESS THAN 70 milligrams/deciliter  oxyCODONE    IR 5 milliGRAM(s) Oral every 6 hours PRN Moderate Pain (4 - 6)  oxyCODONE    IR 10 milliGRAM(s) Oral every 6 hours PRN Severe Pain (7 - 10)      T(C): 36.7 (12-21-18 @ 05:03), Max: 36.7 (12-21-18 @ 05:03)  HR: 86 (12-21-18 @ 05:03) (86 - 88)  BP: 114/75 (12-21-18 @ 05:03) (113/70 - 121/68)  RR: 18 (12-21-18 @ 05:03) (18 - 18)  SpO2: 97% (12-21-18 @ 05:03) (95% - 97%)  CAPILLARY BLOOD GLUCOSE      POCT Blood Glucose.: 173 mg/dL (21 Dec 2018 08:47)  POCT Blood Glucose.: 256 mg/dL (20 Dec 2018 21:35)  POCT Blood Glucose.: 243 mg/dL (20 Dec 2018 17:21)  POCT Blood Glucose.: 162 mg/dL (20 Dec 2018 12:21)    I&O's Summary    20 Dec 2018 07:01  -  21 Dec 2018 07:00  --------------------------------------------------------  IN: 520 mL / OUT: 4450 mL / NET: -3930 mL      PHYSICAL EXAM:  GENERAL: no apparent distress, on room air, seated up in bed  EYES: sclera clear b/l  CHEST/LUNG: Clear to auscultation bilaterally; No wheezing or crackles   HEART: s1/s2, no murmurs appreciated  ABDOMEN: Soft, Nontender, slightly distended, + abdominal pitting edema mostly left flank much improved  EXTREMITIES: wound vac attached to left groin and left thigh, +1-2 pitting edema b/l R>L improving, s/p R and L toe amputations  NEUROLOGY: awake, alert, responds to Qs appropriately, no gross deficits    LABS:                        10.7   9.66  )-----------( 185      ( 20 Dec 2018 06:22 )             34.6     12-20    132<L>  |  84<L>  |  64<H>  ----------------------------<  74  4.0   |  26  |  1.86<H>    Ca    9.8      20 Dec 2018 06:22  Phos  4.7     12-20  Mg     2.1     12-20                RADIOLOGY & ADDITIONAL TESTS:

## 2018-12-21 NOTE — PROGRESS NOTE ADULT - PROBLEM SELECTOR PLAN 1
significantly improving, daily wts decreasing, large amts of urine output  c/w lasix gtt 20mg/hr (monitor BMP BID, keep K>4.0 and <5.0, Mg>2), aldactone 25mg qd,  hydralazine 10mg TID, metolazone 2.5mg BID x 3 days, Isordil 10 mg tid  Awaiting followup from Dr. Alexander  - TTE 12/12 unchanged, EF 24% with severe systolic and diastolic dysfxn  - Pt was offered AICD placement 2 years ago, but pt refused.

## 2018-12-21 NOTE — PROGRESS NOTE ADULT - SUBJECTIVE AND OBJECTIVE BOX
Medications:  acetaminophen   Tablet .. 325 milliGRAM(s) Oral every 4 hours PRN  aspirin enteric coated 81 milliGRAM(s) Oral daily  atorvastatin 40 milliGRAM(s) Oral at bedtime  calamine Lotion 1 Application(s) Topical daily PRN  dextrose 40% Gel 15 Gram(s) Oral once PRN  dextrose 50% Injectable 12.5 Gram(s) IV Push once  dextrose 50% Injectable 25 Gram(s) IV Push once  dextrose 50% Injectable 25 Gram(s) IV Push once  docusate sodium 100 milliGRAM(s) Oral daily  furosemide Infusion 20 mG/Hr IV Continuous <Continuous>  glucagon  Injectable 1 milliGRAM(s) IntraMuscular once PRN  heparin  Injectable 5000 Unit(s) SubCutaneous every 8 hours  hydrALAZINE 10 milliGRAM(s) Oral every 8 hours  insulin glargine Injectable (LANTUS) 30 Unit(s) SubCutaneous at bedtime  insulin lispro (HumaLOG) corrective regimen sliding scale   SubCutaneous at bedtime  insulin lispro (HumaLOG) corrective regimen sliding scale   SubCutaneous three times a day before meals  isosorbide   dinitrate Tablet (ISORDIL) 10 milliGRAM(s) Oral three times a day  oxyCODONE    IR 5 milliGRAM(s) Oral every 6 hours PRN  oxyCODONE    IR 10 milliGRAM(s) Oral every 6 hours PRN  senna 2 Tablet(s) Oral at bedtime  sevelamer hydrochloride 800 milliGRAM(s) Oral three times a day with meals  spironolactone 25 milliGRAM(s) Oral daily      Vitals:  Vital Signs Last 24 Hrs  T(C): 36.5 (21 Dec 2018 13:14), Max: 36.7 (21 Dec 2018 05:03)  T(F): 97.7 (21 Dec 2018 13:14), Max: 98.1 (21 Dec 2018 05:03)  HR: 85 (21 Dec 2018 13:14) (85 - 86)  BP: 113/72 (21 Dec 2018 13:14) (113/72 - 121/68)  BP(mean): --  RR: 18 (21 Dec 2018 13:14) (18 - 18)  SpO2: 98% (21 Dec 2018 13:14) (97% - 98%)    Daily     Daily Weight in k.8 (21 Dec 2018 05:03)    I&O's Detail    20 Dec 2018 07:01  -  21 Dec 2018 07:00  --------------------------------------------------------  IN:    furosemide Infusion: 120 mL    Oral Fluid: 400 mL  Total IN: 520 mL    OUT:    Voided: 4450 mL  Total OUT: 4450 mL    Total NET: -3930 mL      21 Dec 2018 07:  -  21 Dec 2018 17:43  --------------------------------------------------------  IN:    Oral Fluid: 400 mL  Total IN: 400 mL    OUT:    Voided: 1500 mL  Total OUT: 1500 mL    Total NET: -1100 mL      Feeling much better but concerned he is "overmedicated."    Physical Exam:     General: No distress. Comfortable.  HEENT: EOM intact.  Neck: Neck supple. JVP not elevated. No masses.  Chest: Clear to auscultation bilaterally.  CV: Irreg. No murmurs, rub, or gallops.  Abdomen: Soft, non-distended, non-tender.  Neurology: Alert and oriented times three.  Psych: Affect normal    Labs:                        10.7   9.66  )-----------( 185      ( 20 Dec 2018 06:22 )             34.6     12-20    132<L>  |  84<L>  |  64<H>  ----------------------------<  74  4.0   |  26  |  1.86<H>    Ca    9.8      20 Dec 2018 06:22  Phos  4.7     12-20  Mg     2.1     12-20

## 2018-12-21 NOTE — PROGRESS NOTE ADULT - ASSESSMENT
68M with severe ischemic CMP and PAD, a/w with tight edema and acutely decompensated HFrEF. Renal profile much improved with diuresis. Volume OL much improved.    -Decrease Lasix gtt to 10 mg/hr.  -Increase hydralazine to 20 tid.

## 2018-12-22 LAB
BUN SERPL-MCNC: 68 MG/DL — HIGH (ref 7–23)
CALCIUM SERPL-MCNC: 10.4 MG/DL — SIGNIFICANT CHANGE UP (ref 8.4–10.5)
CHLORIDE SERPL-SCNC: 72 MMOL/L — LOW (ref 98–107)
CO2 SERPL-SCNC: 44 MMOL/L — HIGH (ref 22–31)
CREAT SERPL-MCNC: 2.3 MG/DL — HIGH (ref 0.5–1.3)
GLUCOSE SERPL-MCNC: 131 MG/DL — HIGH (ref 70–99)
HCT VFR BLD CALC: 39.7 % — SIGNIFICANT CHANGE UP (ref 39–50)
HGB BLD-MCNC: 12.2 G/DL — LOW (ref 13–17)
MAGNESIUM SERPL-MCNC: 2.2 MG/DL — SIGNIFICANT CHANGE UP (ref 1.6–2.6)
MCHC RBC-ENTMCNC: 21.8 PG — LOW (ref 27–34)
MCHC RBC-ENTMCNC: 30.7 % — LOW (ref 32–36)
MCV RBC AUTO: 70.9 FL — LOW (ref 80–100)
NRBC # FLD: 0 — SIGNIFICANT CHANGE UP
PLATELET # BLD AUTO: 212 K/UL — SIGNIFICANT CHANGE UP (ref 150–400)
PMV BLD: SIGNIFICANT CHANGE UP FL (ref 7–13)
POTASSIUM SERPL-MCNC: 3 MMOL/L — LOW (ref 3.5–5.3)
POTASSIUM SERPL-SCNC: 3 MMOL/L — LOW (ref 3.5–5.3)
RBC # BLD: 5.6 M/UL — SIGNIFICANT CHANGE UP (ref 4.2–5.8)
RBC # FLD: 21.6 % — HIGH (ref 10.3–14.5)
SODIUM SERPL-SCNC: 131 MMOL/L — LOW (ref 135–145)
WBC # BLD: 8.12 K/UL — SIGNIFICANT CHANGE UP (ref 3.8–10.5)
WBC # FLD AUTO: 8.12 K/UL — SIGNIFICANT CHANGE UP (ref 3.8–10.5)

## 2018-12-22 PROCEDURE — 99233 SBSQ HOSP IP/OBS HIGH 50: CPT

## 2018-12-22 RX ORDER — POTASSIUM CHLORIDE 20 MEQ
40 PACKET (EA) ORAL EVERY 4 HOURS
Qty: 0 | Refills: 0 | Status: COMPLETED | OUTPATIENT
Start: 2018-12-22 | End: 2018-12-22

## 2018-12-22 RX ORDER — FUROSEMIDE 40 MG
40 TABLET ORAL EVERY 12 HOURS
Qty: 0 | Refills: 0 | Status: DISCONTINUED | OUTPATIENT
Start: 2018-12-22 | End: 2018-12-23

## 2018-12-22 RX ADMIN — Medication 20 MILLIGRAM(S): at 21:44

## 2018-12-22 RX ADMIN — HEPARIN SODIUM 5000 UNIT(S): 5000 INJECTION INTRAVENOUS; SUBCUTANEOUS at 21:43

## 2018-12-22 RX ADMIN — Medication 1: at 12:49

## 2018-12-22 RX ADMIN — Medication 40 MILLIEQUIVALENT(S): at 14:35

## 2018-12-22 RX ADMIN — Medication 40 MILLIGRAM(S): at 18:10

## 2018-12-22 RX ADMIN — Medication 40 MILLIEQUIVALENT(S): at 17:56

## 2018-12-22 RX ADMIN — Medication 40 MILLIEQUIVALENT(S): at 21:44

## 2018-12-22 RX ADMIN — Medication 20 MILLIGRAM(S): at 06:05

## 2018-12-22 RX ADMIN — OXYCODONE HYDROCHLORIDE 10 MILLIGRAM(S): 5 TABLET ORAL at 21:49

## 2018-12-22 RX ADMIN — OXYCODONE HYDROCHLORIDE 10 MILLIGRAM(S): 5 TABLET ORAL at 09:55

## 2018-12-22 RX ADMIN — Medication 5 MG/HR: at 12:51

## 2018-12-22 RX ADMIN — ISOSORBIDE DINITRATE 10 MILLIGRAM(S): 5 TABLET ORAL at 12:48

## 2018-12-22 RX ADMIN — Medication 5: at 17:57

## 2018-12-22 RX ADMIN — Medication 20 MILLIGRAM(S): at 12:48

## 2018-12-22 RX ADMIN — Medication 81 MILLIGRAM(S): at 12:48

## 2018-12-22 RX ADMIN — INSULIN GLARGINE 30 UNIT(S): 100 INJECTION, SOLUTION SUBCUTANEOUS at 21:34

## 2018-12-22 RX ADMIN — ATORVASTATIN CALCIUM 40 MILLIGRAM(S): 80 TABLET, FILM COATED ORAL at 21:43

## 2018-12-22 RX ADMIN — OXYCODONE HYDROCHLORIDE 10 MILLIGRAM(S): 5 TABLET ORAL at 22:19

## 2018-12-22 RX ADMIN — SEVELAMER CARBONATE 800 MILLIGRAM(S): 2400 POWDER, FOR SUSPENSION ORAL at 17:56

## 2018-12-22 RX ADMIN — OXYCODONE HYDROCHLORIDE 10 MILLIGRAM(S): 5 TABLET ORAL at 09:03

## 2018-12-22 RX ADMIN — Medication 2: at 08:59

## 2018-12-22 RX ADMIN — Medication 2: at 21:33

## 2018-12-22 RX ADMIN — SPIRONOLACTONE 25 MILLIGRAM(S): 25 TABLET, FILM COATED ORAL at 06:04

## 2018-12-22 RX ADMIN — ISOSORBIDE DINITRATE 10 MILLIGRAM(S): 5 TABLET ORAL at 21:43

## 2018-12-22 RX ADMIN — ISOSORBIDE DINITRATE 10 MILLIGRAM(S): 5 TABLET ORAL at 06:05

## 2018-12-22 NOTE — PROGRESS NOTE ADULT - ATTENDING COMMENTS
D/W Cardiology fellow covering for Dr Alexander today about lasix diuresis, will d/c Lasix drip and start Lasix 40mg IVP Q12H tomorrow. Cardiology will f/u pt today, will f/u rec. D/W Cardiology Dr Alexander on the phone today about lasix diuresis, will d/c Lasix drip and start Torsemide 40mg PO BID tomorrow as per Cardiology  rec.

## 2018-12-22 NOTE — PROGRESS NOTE ADULT - PROBLEM SELECTOR PLAN 1
significantly improving, daily wts decreasing, large amts of urine output  on  lasix gtt 10mg/hr (monitor BMP BID, keep K>4.0 and <5.0, Mg>2), aldactone 25mg qd,  hydralazine 20mg TID, metolazone 2.5mg BID x 3 days, Isordil 10 mg tid  Clinically overly diuresed, as evidenced by dizziness, contrasting alkalosis  Awaiting followup from Dr. Alexander  - TTE 12/12 unchanged, EF 24% with severe systolic and diastolic dysfxn  - Pt was offered AICD placement 2 years ago, but pt refused.  Consider d/c Lasix drip and change lasix to 40mg IV Q12h  -Replete Potassium

## 2018-12-22 NOTE — PROGRESS NOTE ADULT - SUBJECTIVE AND OBJECTIVE BOX
Patient is a 68y old  Male who presents with a chief complaint of Edema (21 Dec 2018 17:42)      SUBJECTIVE / OVERNIGHT EVENTS: C/O dizziness, denies SOB    MEDICATIONS  (STANDING):  aspirin enteric coated 81 milliGRAM(s) Oral daily  atorvastatin 40 milliGRAM(s) Oral at bedtime  dextrose 50% Injectable 12.5 Gram(s) IV Push once  dextrose 50% Injectable 25 Gram(s) IV Push once  dextrose 50% Injectable 25 Gram(s) IV Push once  docusate sodium 100 milliGRAM(s) Oral daily  furosemide Infusion 10 mG/Hr (5 mL/Hr) IV Continuous <Continuous>  heparin  Injectable 5000 Unit(s) SubCutaneous every 8 hours  hydrALAZINE 20 milliGRAM(s) Oral every 8 hours  insulin glargine Injectable (LANTUS) 30 Unit(s) SubCutaneous at bedtime  insulin lispro (HumaLOG) corrective regimen sliding scale   SubCutaneous at bedtime  insulin lispro (HumaLOG) corrective regimen sliding scale   SubCutaneous three times a day before meals  isosorbide   dinitrate Tablet (ISORDIL) 10 milliGRAM(s) Oral three times a day  senna 2 Tablet(s) Oral at bedtime  sevelamer hydrochloride 800 milliGRAM(s) Oral three times a day with meals  spironolactone 25 milliGRAM(s) Oral daily    MEDICATIONS  (PRN):  acetaminophen   Tablet .. 325 milliGRAM(s) Oral every 4 hours PRN Mild Pain (1 - 3)  calamine Lotion 1 Application(s) Topical daily PRN Rash and/or Itching  dextrose 40% Gel 15 Gram(s) Oral once PRN Blood Glucose LESS THAN 70 milliGRAM(s)/deciliter  glucagon  Injectable 1 milliGRAM(s) IntraMuscular once PRN Glucose LESS THAN 70 milligrams/deciliter  oxyCODONE    IR 5 milliGRAM(s) Oral every 6 hours PRN Moderate Pain (4 - 6)  oxyCODONE    IR 10 milliGRAM(s) Oral every 6 hours PRN Severe Pain (7 - 10)      Vital Signs Last 24 Hrs  T(C): 37 (22 Dec 2018 06:02), Max: 37 (21 Dec 2018 20:22)  T(F): 98.6 (22 Dec 2018 06:02), Max: 98.6 (21 Dec 2018 20:22)  HR: 81 (22 Dec 2018 06:02) (81 - 87)  BP: 120/77 (22 Dec 2018 06:02) (113/72 - 121/78)  BP(mean): --  RR: 16 (22 Dec 2018 06:02) (16 - 18)  SpO2: 94% (22 Dec 2018 06:02) (94% - 98%)  CAPILLARY BLOOD GLUCOSE      POCT Blood Glucose.: 236 mg/dL (22 Dec 2018 08:53)  POCT Blood Glucose.: 180 mg/dL (21 Dec 2018 21:27)  POCT Blood Glucose.: 205 mg/dL (21 Dec 2018 17:06)  POCT Blood Glucose.: 185 mg/dL (21 Dec 2018 12:42)    I&O's Summary    21 Dec 2018 07:01  -  22 Dec 2018 07:00  --------------------------------------------------------  IN: 500 mL / OUT: 3075 mL / NET: -2575 mL        PHYSICAL EXAM:  GENERAL: NAD, well-developed  HEAD:  Atraumatic, Normocephalic  EYES: EOMI, PERRLA, conjunctiva and sclera clear  NECK: Supple, No JVD  CHEST/LUNG: Clear to auscultation bilaterally; No wheeze  HEART: Regular rate and rhythm; No murmurs, rubs, or gallops  ABDOMEN: Soft, Nontender, Nondistended; Bowel sounds present  EXTREMITIES:  2+ Peripheral Pulses, No clubbing, cyanosis, or edema  PSYCH: AAOx3  NEUROLOGY: non-focal  SKIN: No rashes or lesions    LABS:                        12.2   8.12  )-----------( 212      ( 22 Dec 2018 04:20 )             39.7     12-22    131<L>  |  72<L>  |  68<H>  ----------------------------<  131<H>  3.0<L>   |  44<H>  |  2.30<H>    Ca    10.4      22 Dec 2018 04:20  Mg     2.2     12-22                RADIOLOGY & ADDITIONAL TESTS:    Imaging Personally Reviewed:    Consultant(s) Notes Reviewed:      Care Discussed with Consultants/Other Providers:

## 2018-12-22 NOTE — PROGRESS NOTE ADULT - SUBJECTIVE AND OBJECTIVE BOX
Jesse is a very pleasant 68 year old male, with a medical history significant for PMH CHF, CAD s/p CABG, s/p stent (2016), DM, PAD s/p L KEI stent (9/11/18), s/p LLE CFA to below-knee bypass with PTFE for long-segment SFA occlusion on (9/18/18), L 2nd toe amputation (9/19), c/b groin SSI requiring washout, sartorius flap, and vac placement, currently undergoing VAC dressing changes every Monday/Wednesday/Friday. The wound has been evaluated by vascular surgery this week, and continue to heal with no issues. Input from the primary team and heart failure team noted and appreciated.   Will continue to provide full support. Do not hesitate to call us with any questions at anytime at (pager [phone]/spectra [phone]). Will continue to follow closely.    Vascular Surgery (also known as C Team)   [id number]

## 2018-12-23 LAB
BUN SERPL-MCNC: 67 MG/DL — HIGH (ref 7–23)
CALCIUM SERPL-MCNC: 9.8 MG/DL — SIGNIFICANT CHANGE UP (ref 8.4–10.5)
CHLORIDE SERPL-SCNC: 77 MMOL/L — LOW (ref 98–107)
CO2 SERPL-SCNC: 37 MMOL/L — HIGH (ref 22–31)
CREAT SERPL-MCNC: 1.95 MG/DL — HIGH (ref 0.5–1.3)
GLUCOSE SERPL-MCNC: 197 MG/DL — HIGH (ref 70–99)
HCT VFR BLD CALC: 40.8 % — SIGNIFICANT CHANGE UP (ref 39–50)
HGB BLD-MCNC: 12.7 G/DL — LOW (ref 13–17)
MAGNESIUM SERPL-MCNC: 2.3 MG/DL — SIGNIFICANT CHANGE UP (ref 1.6–2.6)
MCHC RBC-ENTMCNC: 22.2 PG — LOW (ref 27–34)
MCHC RBC-ENTMCNC: 31.1 % — LOW (ref 32–36)
MCV RBC AUTO: 71.3 FL — LOW (ref 80–100)
NRBC # FLD: 0 — SIGNIFICANT CHANGE UP
PLATELET # BLD AUTO: 232 K/UL — SIGNIFICANT CHANGE UP (ref 150–400)
PMV BLD: SIGNIFICANT CHANGE UP FL (ref 7–13)
POTASSIUM SERPL-MCNC: 4 MMOL/L — SIGNIFICANT CHANGE UP (ref 3.5–5.3)
POTASSIUM SERPL-SCNC: 4 MMOL/L — SIGNIFICANT CHANGE UP (ref 3.5–5.3)
RBC # BLD: 5.72 M/UL — SIGNIFICANT CHANGE UP (ref 4.2–5.8)
RBC # FLD: 21.7 % — HIGH (ref 10.3–14.5)
SODIUM SERPL-SCNC: 130 MMOL/L — LOW (ref 135–145)
WBC # BLD: 8.56 K/UL — SIGNIFICANT CHANGE UP (ref 3.8–10.5)
WBC # FLD AUTO: 8.56 K/UL — SIGNIFICANT CHANGE UP (ref 3.8–10.5)

## 2018-12-23 PROCEDURE — 99233 SBSQ HOSP IP/OBS HIGH 50: CPT

## 2018-12-23 RX ORDER — FUROSEMIDE 40 MG
10 TABLET ORAL
Qty: 500 | Refills: 0 | Status: DISCONTINUED | OUTPATIENT
Start: 2018-12-23 | End: 2018-12-23

## 2018-12-23 RX ADMIN — OXYCODONE HYDROCHLORIDE 5 MILLIGRAM(S): 5 TABLET ORAL at 22:20

## 2018-12-23 RX ADMIN — ATORVASTATIN CALCIUM 40 MILLIGRAM(S): 80 TABLET, FILM COATED ORAL at 21:49

## 2018-12-23 RX ADMIN — OXYCODONE HYDROCHLORIDE 5 MILLIGRAM(S): 5 TABLET ORAL at 21:50

## 2018-12-23 RX ADMIN — ISOSORBIDE DINITRATE 10 MILLIGRAM(S): 5 TABLET ORAL at 13:40

## 2018-12-23 RX ADMIN — Medication 20 MILLIGRAM(S): at 05:32

## 2018-12-23 RX ADMIN — SPIRONOLACTONE 25 MILLIGRAM(S): 25 TABLET, FILM COATED ORAL at 05:32

## 2018-12-23 RX ADMIN — SEVELAMER CARBONATE 800 MILLIGRAM(S): 2400 POWDER, FOR SUSPENSION ORAL at 09:24

## 2018-12-23 RX ADMIN — Medication 20 MILLIGRAM(S): at 13:40

## 2018-12-23 RX ADMIN — ISOSORBIDE DINITRATE 10 MILLIGRAM(S): 5 TABLET ORAL at 21:49

## 2018-12-23 RX ADMIN — Medication 81 MILLIGRAM(S): at 13:40

## 2018-12-23 RX ADMIN — Medication 1: at 09:24

## 2018-12-23 RX ADMIN — Medication 5: at 17:52

## 2018-12-23 RX ADMIN — OXYCODONE HYDROCHLORIDE 5 MILLIGRAM(S): 5 TABLET ORAL at 12:51

## 2018-12-23 RX ADMIN — INSULIN GLARGINE 30 UNIT(S): 100 INJECTION, SOLUTION SUBCUTANEOUS at 21:54

## 2018-12-23 RX ADMIN — Medication 40 MILLIGRAM(S): at 17:52

## 2018-12-23 RX ADMIN — Medication 20 MILLIGRAM(S): at 21:49

## 2018-12-23 RX ADMIN — Medication 2: at 13:40

## 2018-12-23 RX ADMIN — ISOSORBIDE DINITRATE 10 MILLIGRAM(S): 5 TABLET ORAL at 05:32

## 2018-12-23 RX ADMIN — OXYCODONE HYDROCHLORIDE 5 MILLIGRAM(S): 5 TABLET ORAL at 13:45

## 2018-12-23 RX ADMIN — Medication 40 MILLIGRAM(S): at 05:32

## 2018-12-23 NOTE — PROGRESS NOTE ADULT - PROBLEM SELECTOR PLAN 1
significantly improving, daily wts decreasing, large amts of urine output  on  lasix gtt 10mg/hr (monitor BMP BID, keep K>4.0 and <5.0, Mg>2), aldactone 25mg qd,  hydralazine 20mg TID, metolazone 2.5mg BID x 3 days, Isordil 10 mg tid  Clinically overly diuresed, as evidenced by dizziness, contrasting alkalosis  Awaiting followup from Dr. Alexander  - TTE 12/12 unchanged, EF 24% with severe systolic and diastolic dysfxn  - Pt was offered AICD placement 2 years ago, but pt refused.  Clinically overly diuresed.  Lasix drip d/c'ed yesterday, Improving  Started torsemide 40mg PO Q12H  f/u CMP, replete lytes.  f/u lung exam  f/u I/O's

## 2018-12-23 NOTE — PROGRESS NOTE ADULT - SUBJECTIVE AND OBJECTIVE BOX
Patient is a 68y old  Male who presents with a chief complaint of Edema (21 Dec 2018 17:42)      SUBJECTIVE / OVERNIGHT EVENTS: Feels much better today. No dizziness, No SOB or CP    MEDICATIONS  (STANDING):  aspirin enteric coated 81 milliGRAM(s) Oral daily  atorvastatin 40 milliGRAM(s) Oral at bedtime  dextrose 50% Injectable 12.5 Gram(s) IV Push once  dextrose 50% Injectable 25 Gram(s) IV Push once  dextrose 50% Injectable 25 Gram(s) IV Push once  docusate sodium 100 milliGRAM(s) Oral daily  heparin  Injectable 5000 Unit(s) SubCutaneous every 8 hours  hydrALAZINE 20 milliGRAM(s) Oral every 8 hours  insulin glargine Injectable (LANTUS) 30 Unit(s) SubCutaneous at bedtime  insulin lispro (HumaLOG) corrective regimen sliding scale   SubCutaneous at bedtime  insulin lispro (HumaLOG) corrective regimen sliding scale   SubCutaneous three times a day before meals  isosorbide   dinitrate Tablet (ISORDIL) 10 milliGRAM(s) Oral three times a day  senna 2 Tablet(s) Oral at bedtime  sevelamer hydrochloride 800 milliGRAM(s) Oral three times a day with meals  spironolactone 25 milliGRAM(s) Oral daily  torsemide 40 milliGRAM(s) Oral every 12 hours    MEDICATIONS  (PRN):  acetaminophen   Tablet .. 325 milliGRAM(s) Oral every 4 hours PRN Mild Pain (1 - 3)  calamine Lotion 1 Application(s) Topical daily PRN Rash and/or Itching  dextrose 40% Gel 15 Gram(s) Oral once PRN Blood Glucose LESS THAN 70 milliGRAM(s)/deciliter  glucagon  Injectable 1 milliGRAM(s) IntraMuscular once PRN Glucose LESS THAN 70 milligrams/deciliter  oxyCODONE    IR 5 milliGRAM(s) Oral every 6 hours PRN Moderate Pain (4 - 6)      Vital Signs Last 24 Hrs  T(C): 36.5 (23 Dec 2018 05:30), Max: 36.7 (22 Dec 2018 21:20)  T(F): 97.7 (23 Dec 2018 05:30), Max: 98.1 (22 Dec 2018 21:20)  HR: 81 (23 Dec 2018 05:30) (81 - 95)  BP: 142/84 (23 Dec 2018 05:30) (122/71 - 142/84)  BP(mean): --  RR: 18 (23 Dec 2018 05:30) (18 - 18)  SpO2: 98% (23 Dec 2018 05:30) (96% - 98%)  CAPILLARY BLOOD GLUCOSE      POCT Blood Glucose.: 343 mg/dL (22 Dec 2018 21:07)  POCT Blood Glucose.: 376 mg/dL (22 Dec 2018 17:25)  POCT Blood Glucose.: 166 mg/dL (22 Dec 2018 12:24)  POCT Blood Glucose.: 236 mg/dL (22 Dec 2018 08:53)    I&O's Summary    22 Dec 2018 07:01  -  23 Dec 2018 07:00  --------------------------------------------------------  IN: 400 mL / OUT: 2000 mL / NET: -1600 mL        PHYSICAL EXAM:  GENERAL: NAD, well-developed  HEAD:  Atraumatic, Normocephalic  EYES: EOMI, PERRLA, conjunctiva and sclera clear  NECK: Supple, No JVD  CHEST/LUNG: Clear to auscultation bilaterally; No wheeze  HEART: Regular rate and rhythm; No murmurs, rubs, or gallops  ABDOMEN: Soft, Nontender, Nondistended; Bowel sounds present  EXTREMITIES:  2+ Peripheral Pulses, No clubbing, cyanosis, or edema, (+) VAC  PSYCH: AAOx3  NEUROLOGY: non-focal  SKIN: No rashes or lesions    LABS:                        12.2   8.12  )-----------( 212      ( 22 Dec 2018 04:20 )             39.7     12-22    131<L>  |  72<L>  |  68<H>  ----------------------------<  131<H>  3.0<L>   |  44<H>  |  2.30<H>    Ca    10.4      22 Dec 2018 04:20  Mg     2.2     12-22                RADIOLOGY & ADDITIONAL TESTS:    Imaging Personally Reviewed:    Consultant(s) Notes Reviewed:      Care Discussed with Consultants/Other Providers:

## 2018-12-23 NOTE — PROGRESS NOTE ADULT - PROBLEM SELECTOR PLAN 7
- Currently on HSQ

## 2018-12-24 LAB
BUN SERPL-MCNC: 67 MG/DL — HIGH (ref 7–23)
CALCIUM SERPL-MCNC: 9.7 MG/DL — SIGNIFICANT CHANGE UP (ref 8.4–10.5)
CHLORIDE SERPL-SCNC: 78 MMOL/L — LOW (ref 98–107)
CO2 SERPL-SCNC: 35 MMOL/L — HIGH (ref 22–31)
CREAT SERPL-MCNC: 1.95 MG/DL — HIGH (ref 0.5–1.3)
GLUCOSE BLDC GLUCOMTR-MCNC: 216 MG/DL — HIGH (ref 70–99)
GLUCOSE BLDC GLUCOMTR-MCNC: 224 MG/DL — HIGH (ref 70–99)
GLUCOSE BLDC GLUCOMTR-MCNC: 265 MG/DL — HIGH (ref 70–99)
GLUCOSE SERPL-MCNC: 212 MG/DL — HIGH (ref 70–99)
HCT VFR BLD CALC: 45.1 % — SIGNIFICANT CHANGE UP (ref 39–50)
HGB BLD-MCNC: 13.7 G/DL — SIGNIFICANT CHANGE UP (ref 13–17)
MAGNESIUM SERPL-MCNC: 2.3 MG/DL — SIGNIFICANT CHANGE UP (ref 1.6–2.6)
MCHC RBC-ENTMCNC: 21.8 PG — LOW (ref 27–34)
MCHC RBC-ENTMCNC: 30.4 % — LOW (ref 32–36)
MCV RBC AUTO: 71.8 FL — LOW (ref 80–100)
NRBC # FLD: 0 — SIGNIFICANT CHANGE UP
PLATELET # BLD AUTO: 260 K/UL — SIGNIFICANT CHANGE UP (ref 150–400)
PMV BLD: SIGNIFICANT CHANGE UP FL (ref 7–13)
POTASSIUM SERPL-MCNC: 3.8 MMOL/L — SIGNIFICANT CHANGE UP (ref 3.5–5.3)
POTASSIUM SERPL-SCNC: 3.8 MMOL/L — SIGNIFICANT CHANGE UP (ref 3.5–5.3)
RBC # BLD: 6.28 M/UL — HIGH (ref 4.2–5.8)
RBC # FLD: 22.3 % — HIGH (ref 10.3–14.5)
SODIUM SERPL-SCNC: 128 MMOL/L — LOW (ref 135–145)
WBC # BLD: 8.27 K/UL — SIGNIFICANT CHANGE UP (ref 3.8–10.5)
WBC # FLD AUTO: 8.27 K/UL — SIGNIFICANT CHANGE UP (ref 3.8–10.5)

## 2018-12-24 PROCEDURE — 99233 SBSQ HOSP IP/OBS HIGH 50: CPT

## 2018-12-24 RX ORDER — OXYCODONE HYDROCHLORIDE 5 MG/1
10 TABLET ORAL EVERY 6 HOURS
Qty: 0 | Refills: 0 | Status: DISCONTINUED | OUTPATIENT
Start: 2018-12-24 | End: 2018-12-27

## 2018-12-24 RX ORDER — HYDRALAZINE HCL 50 MG
25 TABLET ORAL EVERY 8 HOURS
Qty: 0 | Refills: 0 | Status: DISCONTINUED | OUTPATIENT
Start: 2018-12-24 | End: 2018-12-27

## 2018-12-24 RX ORDER — POTASSIUM CHLORIDE 20 MEQ
20 PACKET (EA) ORAL ONCE
Qty: 0 | Refills: 0 | Status: COMPLETED | OUTPATIENT
Start: 2018-12-24 | End: 2018-12-24

## 2018-12-24 RX ORDER — OXYCODONE HYDROCHLORIDE 5 MG/1
5 TABLET ORAL EVERY 6 HOURS
Qty: 0 | Refills: 0 | Status: DISCONTINUED | OUTPATIENT
Start: 2018-12-24 | End: 2018-12-27

## 2018-12-24 RX ORDER — CARVEDILOL PHOSPHATE 80 MG/1
3.12 CAPSULE, EXTENDED RELEASE ORAL EVERY 12 HOURS
Qty: 0 | Refills: 0 | Status: DISCONTINUED | OUTPATIENT
Start: 2018-12-24 | End: 2018-12-26

## 2018-12-24 RX ADMIN — ISOSORBIDE DINITRATE 10 MILLIGRAM(S): 5 TABLET ORAL at 05:31

## 2018-12-24 RX ADMIN — Medication 2: at 09:15

## 2018-12-24 RX ADMIN — CARVEDILOL PHOSPHATE 3.12 MILLIGRAM(S): 80 CAPSULE, EXTENDED RELEASE ORAL at 17:37

## 2018-12-24 RX ADMIN — SPIRONOLACTONE 25 MILLIGRAM(S): 25 TABLET, FILM COATED ORAL at 05:31

## 2018-12-24 RX ADMIN — ISOSORBIDE DINITRATE 10 MILLIGRAM(S): 5 TABLET ORAL at 21:33

## 2018-12-24 RX ADMIN — Medication 2: at 17:37

## 2018-12-24 RX ADMIN — SEVELAMER CARBONATE 800 MILLIGRAM(S): 2400 POWDER, FOR SUSPENSION ORAL at 17:37

## 2018-12-24 RX ADMIN — OXYCODONE HYDROCHLORIDE 10 MILLIGRAM(S): 5 TABLET ORAL at 10:06

## 2018-12-24 RX ADMIN — Medication 2: at 13:25

## 2018-12-24 RX ADMIN — Medication 20 MILLIGRAM(S): at 05:31

## 2018-12-24 RX ADMIN — SEVELAMER CARBONATE 800 MILLIGRAM(S): 2400 POWDER, FOR SUSPENSION ORAL at 13:25

## 2018-12-24 RX ADMIN — Medication 25 MILLIGRAM(S): at 13:25

## 2018-12-24 RX ADMIN — OXYCODONE HYDROCHLORIDE 10 MILLIGRAM(S): 5 TABLET ORAL at 09:14

## 2018-12-24 RX ADMIN — Medication 40 MILLIGRAM(S): at 05:31

## 2018-12-24 RX ADMIN — SEVELAMER CARBONATE 800 MILLIGRAM(S): 2400 POWDER, FOR SUSPENSION ORAL at 09:14

## 2018-12-24 RX ADMIN — INSULIN GLARGINE 30 UNIT(S): 100 INJECTION, SOLUTION SUBCUTANEOUS at 21:32

## 2018-12-24 RX ADMIN — ISOSORBIDE DINITRATE 10 MILLIGRAM(S): 5 TABLET ORAL at 13:25

## 2018-12-24 RX ADMIN — OXYCODONE HYDROCHLORIDE 10 MILLIGRAM(S): 5 TABLET ORAL at 20:15

## 2018-12-24 RX ADMIN — ATORVASTATIN CALCIUM 40 MILLIGRAM(S): 80 TABLET, FILM COATED ORAL at 21:32

## 2018-12-24 RX ADMIN — Medication 20 MILLIEQUIVALENT(S): at 11:55

## 2018-12-24 RX ADMIN — Medication 25 MILLIGRAM(S): at 21:34

## 2018-12-24 RX ADMIN — OXYCODONE HYDROCHLORIDE 10 MILLIGRAM(S): 5 TABLET ORAL at 20:45

## 2018-12-24 RX ADMIN — Medication 81 MILLIGRAM(S): at 11:55

## 2018-12-24 RX ADMIN — Medication 1: at 21:32

## 2018-12-24 NOTE — PROGRESS NOTE ADULT - SUBJECTIVE AND OBJECTIVE BOX
Interval History:  feels well today  reports over weekend was feelign sluggish and lightheaded so lasix gtt changed to PO torsemide    Medications:  acetaminophen   Tablet .. 325 milliGRAM(s) Oral every 4 hours PRN  aspirin enteric coated 81 milliGRAM(s) Oral daily  atorvastatin 40 milliGRAM(s) Oral at bedtime  calamine Lotion 1 Application(s) Topical daily PRN  carvedilol 3.125 milliGRAM(s) Oral every 12 hours  dextrose 40% Gel 15 Gram(s) Oral once PRN  dextrose 50% Injectable 12.5 Gram(s) IV Push once  dextrose 50% Injectable 25 Gram(s) IV Push once  dextrose 50% Injectable 25 Gram(s) IV Push once  docusate sodium 100 milliGRAM(s) Oral daily  glucagon  Injectable 1 milliGRAM(s) IntraMuscular once PRN  heparin  Injectable 5000 Unit(s) SubCutaneous every 8 hours  hydrALAZINE 25 milliGRAM(s) Oral every 8 hours  insulin glargine Injectable (LANTUS) 30 Unit(s) SubCutaneous at bedtime  insulin lispro (HumaLOG) corrective regimen sliding scale   SubCutaneous at bedtime  insulin lispro (HumaLOG) corrective regimen sliding scale   SubCutaneous three times a day before meals  isosorbide   dinitrate Tablet (ISORDIL) 10 milliGRAM(s) Oral three times a day  oxyCODONE    IR 10 milliGRAM(s) Oral every 6 hours PRN  oxyCODONE    IR 5 milliGRAM(s) Oral every 6 hours PRN  senna 2 Tablet(s) Oral at bedtime  sevelamer hydrochloride 800 milliGRAM(s) Oral three times a day with meals  spironolactone 25 milliGRAM(s) Oral daily    Vitals:  T(C): 36.7 (18 @ 13:23), Max: 36.9 (18 @ 09:13)  HR: 90 (18 @ 13:23) (86 - 90)  BP: 142/82 (18 @ 13:23) (109/68 - 142/82)  BP(mean): --  RR: 16 (18 @ 13:23) (16 - 18)  SpO2: 97% (18 @ 13:23) (97% - 100%)    Daily     Daily Weight in k.9 (24 Dec 2018 05:28)    I&O's Summary    23 Dec 2018 07:01  -  24 Dec 2018 07:00  --------------------------------------------------------  IN: 400 mL / OUT: 2050 mL / NET: -1650 mL    Physical Exam:  Appearance: chronically ill appearing  HEENT: PERRL  Neck: No JVD  Cardiovascular: Normal S1 S2, No murmurs/rubs/gallops  Respiratory: Clear to auscultation bilaterally  Gastrointestinal: Soft, Non-tender	  Skin: No cyanosis	  Neurologic: Non-focal  Extremities: No LE edema; wound vac in place  Psychiatry: A & O x 3, Mood & affect appropriate    Labs:                        13.7   8.27  )-----------( 260      ( 24 Dec 2018 07:56 )             45.1         128<L>  |  78<L>  |  67<H>  ----------------------------<  212<H>  3.8   |  35<H>  |  1.95<H>    Ca    9.7      24 Dec 2018 07:50  Mg     2.3         TELEMETRY:  no events

## 2018-12-24 NOTE — PROGRESS NOTE ADULT - SUBJECTIVE AND OBJECTIVE BOX
Patient is a 68y old  Male who presents with a chief complaint of Edema         SUBJECTIVE / OVERNIGHT EVENTS: feeling well; LE edema resolved      MEDICATIONS  (STANDING):  aspirin enteric coated 81 milliGRAM(s) Oral daily  atorvastatin 40 milliGRAM(s) Oral at bedtime  carvedilol 3.125 milliGRAM(s) Oral every 12 hours  dextrose 50% Injectable 12.5 Gram(s) IV Push once  dextrose 50% Injectable 25 Gram(s) IV Push once  dextrose 50% Injectable 25 Gram(s) IV Push once  docusate sodium 100 milliGRAM(s) Oral daily  heparin  Injectable 5000 Unit(s) SubCutaneous every 8 hours  hydrALAZINE 25 milliGRAM(s) Oral every 8 hours  insulin glargine Injectable (LANTUS) 30 Unit(s) SubCutaneous at bedtime  insulin lispro (HumaLOG) corrective regimen sliding scale   SubCutaneous at bedtime  insulin lispro (HumaLOG) corrective regimen sliding scale   SubCutaneous three times a day before meals  isosorbide   dinitrate Tablet (ISORDIL) 10 milliGRAM(s) Oral three times a day  potassium chloride    Tablet ER 20 milliEquivalent(s) Oral once  senna 2 Tablet(s) Oral at bedtime  sevelamer hydrochloride 800 milliGRAM(s) Oral three times a day with meals  spironolactone 25 milliGRAM(s) Oral daily    MEDICATIONS  (PRN):  acetaminophen   Tablet .. 325 milliGRAM(s) Oral every 4 hours PRN Mild Pain (1 - 3)  calamine Lotion 1 Application(s) Topical daily PRN Rash and/or Itching  dextrose 40% Gel 15 Gram(s) Oral once PRN Blood Glucose LESS THAN 70 milliGRAM(s)/deciliter  glucagon  Injectable 1 milliGRAM(s) IntraMuscular once PRN Glucose LESS THAN 70 milligrams/deciliter  oxyCODONE    IR 10 milliGRAM(s) Oral every 6 hours PRN Severe Pain (7 - 10)  oxyCODONE    IR 5 milliGRAM(s) Oral every 6 hours PRN Moderate Pain (4 - 6)      Vital Signs Last 24 Hrs    T(F): 98.4 (24 Dec 2018 09:13), Max: 98.4 (24 Dec 2018 09:13)  HR: 89 (24 Dec 2018 09:13) (77 - 89)  BP: 109/68 (24 Dec 2018 09:13) (109/68 - 134/87)  RR: 16 (24 Dec 2018 09:13) (16 - 18)  SpO2: 100% (24 Dec 2018 09:13) (97% - 100%)      CAPILLARY BLOOD GLUCOSE  POCT Blood Glucose.: 211 mg/dL (24 Dec 2018 08:51)  POCT Blood Glucose.: 218 mg/dL (23 Dec 2018 21:35)  POCT Blood Glucose.: 385 mg/dL (23 Dec 2018 17:28)  POCT Blood Glucose.: 232 mg/dL (23 Dec 2018 12:43)            PHYSICAL EXAM  GENERAL: NAD, well-developed  EYES: EOMI, PERRLA, conjunctiva and sclera clear  CHEST/LUNG: Clear to auscultation bilaterally;   HEART: Regular rate and rhythm;   ABDOMEN: Soft, Nontender, Nondistended; Bowel sounds present  EXTREMITIES:  multiple toe amps b/l feet; VAC to groin and R medial LE  PSYCH: AAOx3      LABS:                        13.7   8.27  )-----------( 260      ( 24 Dec 2018 07:56 )             45.1     12-24    128<L>  |  78<L>  |  67<H>  ----------------------------<  212<H>  3.8   |  35<H>  |  1.95<H>    Ca    9.7      24 Dec 2018 07:50  Mg     2.3     12-24

## 2018-12-24 NOTE — PROGRESS NOTE ADULT - PROBLEM SELECTOR PLAN 1
sig improved  - TTE 12/12 unchanged, EF 24% with severe systolic and diastolic dysfxn  - Pt was offered AICD placement 2 years ago, but pt refused.  Started torsemide 40mg PO Q12H  seems euvolemic now  HF f/u

## 2018-12-24 NOTE — PROGRESS NOTE ADULT - PROBLEM SELECTOR PLAN 3
Followed by vascular surgery for wound care, vac changed every MWF   Daily wet to dry dressing changes    - Per plastic recs, c/w VAC therapy until output decreases and granulation tissue develops

## 2018-12-24 NOTE — PROGRESS NOTE ADULT - ASSESSMENT
Briefly, 67 y/o M w/ h/o ICM/HFrEF (EF 10-15%, LVEDD 5.4 cm), mod-severe MR, severe RV dysfunction, refused ICD in past, PAD s/p L fem-pop bypass and recent L common iliac artery stent 9/18, recent soft tissue infection w/ washout/muscle flap 10/18, gangrene of toe s/p amputation who presented on 12/8 with high output from VAC dressings with b/l LE edema. He was markedly overloaded and improved with lasix gtt, now likely slightly hypovolemic. He had acute kidney injury (Cr 4; b/l 1.3) on admission possibly 2/2 CTA which has been improving since. Currently stage C HF, NYHA class II-III now hypovolemic.    1. HFrEF -   - hold diuretics; restart torsemide 40 mg daily on Wednesday  -  on fluid and sodium restriction and to take extra diuretics as needed for weight gain  - increase hydralazine to 25 mg q8h  - continue isoridl 10 mg q8h  - patient continues to refuse ICD despite benefits/risks  - start coreg 3.125 mg q12h    2. DM - BGs elevated  - consider adjustment of lantus and sliding scale  - consider endo c/w    Possible d/c Wednesday

## 2018-12-25 LAB
BASOPHILS # BLD AUTO: 0.07 K/UL — SIGNIFICANT CHANGE UP (ref 0–0.2)
BASOPHILS NFR BLD AUTO: 0.9 % — SIGNIFICANT CHANGE UP (ref 0–2)
BUN SERPL-MCNC: 68 MG/DL — HIGH (ref 7–23)
CALCIUM SERPL-MCNC: 9.9 MG/DL — SIGNIFICANT CHANGE UP (ref 8.4–10.5)
CHLORIDE SERPL-SCNC: 82 MMOL/L — LOW (ref 98–107)
CO2 SERPL-SCNC: 34 MMOL/L — HIGH (ref 22–31)
CREAT SERPL-MCNC: 1.85 MG/DL — HIGH (ref 0.5–1.3)
EOSINOPHIL # BLD AUTO: 0.25 K/UL — SIGNIFICANT CHANGE UP (ref 0–0.5)
EOSINOPHIL NFR BLD AUTO: 3.1 % — SIGNIFICANT CHANGE UP (ref 0–6)
GLUCOSE BLDC GLUCOMTR-MCNC: 161 MG/DL — HIGH (ref 70–99)
GLUCOSE BLDC GLUCOMTR-MCNC: 188 MG/DL — HIGH (ref 70–99)
GLUCOSE BLDC GLUCOMTR-MCNC: 240 MG/DL — HIGH (ref 70–99)
GLUCOSE BLDC GLUCOMTR-MCNC: 283 MG/DL — HIGH (ref 70–99)
GLUCOSE SERPL-MCNC: 83 MG/DL — SIGNIFICANT CHANGE UP (ref 70–99)
HCT VFR BLD CALC: 42.9 % — SIGNIFICANT CHANGE UP (ref 39–50)
HGB BLD-MCNC: 13 G/DL — SIGNIFICANT CHANGE UP (ref 13–17)
IMM GRANULOCYTES # BLD AUTO: 0.02 # — SIGNIFICANT CHANGE UP
IMM GRANULOCYTES NFR BLD AUTO: 0.2 % — SIGNIFICANT CHANGE UP (ref 0–1.5)
LYMPHOCYTES # BLD AUTO: 1.18 K/UL — SIGNIFICANT CHANGE UP (ref 1–3.3)
LYMPHOCYTES # BLD AUTO: 14.6 % — SIGNIFICANT CHANGE UP (ref 13–44)
MAGNESIUM SERPL-MCNC: 2.5 MG/DL — SIGNIFICANT CHANGE UP (ref 1.6–2.6)
MCHC RBC-ENTMCNC: 21.8 PG — LOW (ref 27–34)
MCHC RBC-ENTMCNC: 30.3 % — LOW (ref 32–36)
MCV RBC AUTO: 72.1 FL — LOW (ref 80–100)
MONOCYTES # BLD AUTO: 1.39 K/UL — HIGH (ref 0–0.9)
MONOCYTES NFR BLD AUTO: 17.2 % — HIGH (ref 2–14)
NEUTROPHILS # BLD AUTO: 5.17 K/UL — SIGNIFICANT CHANGE UP (ref 1.8–7.4)
NEUTROPHILS NFR BLD AUTO: 64 % — SIGNIFICANT CHANGE UP (ref 43–77)
NRBC # FLD: 0 — SIGNIFICANT CHANGE UP
PHOSPHATE SERPL-MCNC: 3.7 MG/DL — SIGNIFICANT CHANGE UP (ref 2.5–4.5)
PLATELET # BLD AUTO: 253 K/UL — SIGNIFICANT CHANGE UP (ref 150–400)
PMV BLD: SIGNIFICANT CHANGE UP FL (ref 7–13)
POTASSIUM SERPL-MCNC: 3.8 MMOL/L — SIGNIFICANT CHANGE UP (ref 3.5–5.3)
POTASSIUM SERPL-SCNC: 3.8 MMOL/L — SIGNIFICANT CHANGE UP (ref 3.5–5.3)
RBC # BLD: 5.95 M/UL — HIGH (ref 4.2–5.8)
RBC # FLD: 22.1 % — HIGH (ref 10.3–14.5)
SODIUM SERPL-SCNC: 130 MMOL/L — LOW (ref 135–145)
WBC # BLD: 8.08 K/UL — SIGNIFICANT CHANGE UP (ref 3.8–10.5)
WBC # FLD AUTO: 8.08 K/UL — SIGNIFICANT CHANGE UP (ref 3.8–10.5)

## 2018-12-25 PROCEDURE — 99232 SBSQ HOSP IP/OBS MODERATE 35: CPT

## 2018-12-25 PROCEDURE — 99232 SBSQ HOSP IP/OBS MODERATE 35: CPT | Mod: GC

## 2018-12-25 RX ORDER — INSULIN LISPRO 100/ML
VIAL (ML) SUBCUTANEOUS
Qty: 0 | Refills: 0 | Status: DISCONTINUED | OUTPATIENT
Start: 2018-12-25 | End: 2018-12-26

## 2018-12-25 RX ORDER — DEXTROSE 50 % IN WATER 50 %
15 SYRINGE (ML) INTRAVENOUS ONCE
Qty: 0 | Refills: 0 | Status: DISCONTINUED | OUTPATIENT
Start: 2018-12-25 | End: 2018-12-27

## 2018-12-25 RX ORDER — GLUCAGON INJECTION, SOLUTION 0.5 MG/.1ML
1 INJECTION, SOLUTION SUBCUTANEOUS ONCE
Qty: 0 | Refills: 0 | Status: DISCONTINUED | OUTPATIENT
Start: 2018-12-25 | End: 2018-12-27

## 2018-12-25 RX ORDER — SODIUM CHLORIDE 9 MG/ML
1000 INJECTION, SOLUTION INTRAVENOUS
Qty: 0 | Refills: 0 | Status: DISCONTINUED | OUTPATIENT
Start: 2018-12-25 | End: 2018-12-27

## 2018-12-25 RX ADMIN — OXYCODONE HYDROCHLORIDE 10 MILLIGRAM(S): 5 TABLET ORAL at 22:56

## 2018-12-25 RX ADMIN — ISOSORBIDE DINITRATE 10 MILLIGRAM(S): 5 TABLET ORAL at 05:13

## 2018-12-25 RX ADMIN — Medication 1: at 22:53

## 2018-12-25 RX ADMIN — CARVEDILOL PHOSPHATE 3.12 MILLIGRAM(S): 80 CAPSULE, EXTENDED RELEASE ORAL at 05:13

## 2018-12-25 RX ADMIN — Medication 1: at 09:06

## 2018-12-25 RX ADMIN — CARVEDILOL PHOSPHATE 3.12 MILLIGRAM(S): 80 CAPSULE, EXTENDED RELEASE ORAL at 17:33

## 2018-12-25 RX ADMIN — Medication 25 MILLIGRAM(S): at 05:13

## 2018-12-25 RX ADMIN — Medication 81 MILLIGRAM(S): at 13:04

## 2018-12-25 RX ADMIN — ISOSORBIDE DINITRATE 10 MILLIGRAM(S): 5 TABLET ORAL at 13:04

## 2018-12-25 RX ADMIN — ISOSORBIDE DINITRATE 10 MILLIGRAM(S): 5 TABLET ORAL at 21:24

## 2018-12-25 RX ADMIN — ATORVASTATIN CALCIUM 40 MILLIGRAM(S): 80 TABLET, FILM COATED ORAL at 21:24

## 2018-12-25 RX ADMIN — Medication 25 MILLIGRAM(S): at 13:04

## 2018-12-25 RX ADMIN — SEVELAMER CARBONATE 800 MILLIGRAM(S): 2400 POWDER, FOR SUSPENSION ORAL at 17:33

## 2018-12-25 RX ADMIN — OXYCODONE HYDROCHLORIDE 10 MILLIGRAM(S): 5 TABLET ORAL at 21:56

## 2018-12-25 RX ADMIN — Medication 2: at 17:33

## 2018-12-25 RX ADMIN — INSULIN GLARGINE 30 UNIT(S): 100 INJECTION, SOLUTION SUBCUTANEOUS at 22:54

## 2018-12-25 RX ADMIN — OXYCODONE HYDROCHLORIDE 10 MILLIGRAM(S): 5 TABLET ORAL at 14:04

## 2018-12-25 RX ADMIN — OXYCODONE HYDROCHLORIDE 10 MILLIGRAM(S): 5 TABLET ORAL at 13:05

## 2018-12-25 RX ADMIN — Medication 25 MILLIGRAM(S): at 21:24

## 2018-12-25 RX ADMIN — SPIRONOLACTONE 25 MILLIGRAM(S): 25 TABLET, FILM COATED ORAL at 05:13

## 2018-12-25 RX ADMIN — SEVELAMER CARBONATE 800 MILLIGRAM(S): 2400 POWDER, FOR SUSPENSION ORAL at 13:05

## 2018-12-25 RX ADMIN — Medication 1: at 13:05

## 2018-12-25 RX ADMIN — SEVELAMER CARBONATE 800 MILLIGRAM(S): 2400 POWDER, FOR SUSPENSION ORAL at 09:06

## 2018-12-25 NOTE — PROGRESS NOTE ADULT - SUBJECTIVE AND OBJECTIVE BOX
Patient is a 68y old  Male who presents with a chief complaint of increased drainage from VAC (24 Dec 2018 15:43)      SUBJECTIVE / OVERNIGHT EVENTS:  no events overnight. patient seen at bedside. no new complaints today. feeling well.    Review of Systems:   Gen: no fever, no chills  Pulm: no cough, no SOB  Card: no chest pain, no palpitation  Abd: no abd pain; no diarrhea  Ext: no joint pain, no new swelling.    MEDICATIONS  (STANDING):  aspirin enteric coated 81 milliGRAM(s) Oral daily  atorvastatin 40 milliGRAM(s) Oral at bedtime  carvedilol 3.125 milliGRAM(s) Oral every 12 hours  dextrose 5%. 1000 milliLiter(s) (50 mL/Hr) IV Continuous <Continuous>  dextrose 50% Injectable 12.5 Gram(s) IV Push once  dextrose 50% Injectable 25 Gram(s) IV Push once  dextrose 50% Injectable 25 Gram(s) IV Push once  docusate sodium 100 milliGRAM(s) Oral daily  heparin  Injectable 5000 Unit(s) SubCutaneous every 8 hours  hydrALAZINE 25 milliGRAM(s) Oral every 8 hours  insulin glargine Injectable (LANTUS) 30 Unit(s) SubCutaneous at bedtime  insulin lispro (HumaLOG) corrective regimen sliding scale   SubCutaneous three times a day before meals  insulin lispro (HumaLOG) corrective regimen sliding scale   SubCutaneous at bedtime  insulin lispro (HumaLOG) corrective regimen sliding scale   SubCutaneous three times a day before meals  isosorbide   dinitrate Tablet (ISORDIL) 10 milliGRAM(s) Oral three times a day  senna 2 Tablet(s) Oral at bedtime  sevelamer hydrochloride 800 milliGRAM(s) Oral three times a day with meals  spironolactone 25 milliGRAM(s) Oral daily    MEDICATIONS  (PRN):  acetaminophen   Tablet .. 325 milliGRAM(s) Oral every 4 hours PRN Mild Pain (1 - 3)  calamine Lotion 1 Application(s) Topical daily PRN Rash and/or Itching  dextrose 40% Gel 15 Gram(s) Oral once PRN Blood Glucose LESS THAN 70 milliGRAM(s)/deciliter  dextrose 40% Gel 15 Gram(s) Oral once PRN Blood Glucose LESS THAN 70 milliGRAM(s)/deciliter  glucagon  Injectable 1 milliGRAM(s) IntraMuscular once PRN Glucose LESS THAN 70 milligrams/deciliter  glucagon  Injectable 1 milliGRAM(s) IntraMuscular once PRN Glucose LESS THAN 70 milligrams/deciliter  oxyCODONE    IR 10 milliGRAM(s) Oral every 6 hours PRN Severe Pain (7 - 10)  oxyCODONE    IR 5 milliGRAM(s) Oral every 6 hours PRN Moderate Pain (4 - 6)      PHYSICAL EXAM:  T(C): 36.7 (12-25-18 @ 13:03), Max: 36.7 (12-24-18 @ 13:23)  HR: 88 (12-25-18 @ 13:03) (87 - 100)  BP: 112/55 (12-25-18 @ 13:03) (112/55 - 142/82)  RR: 16 (12-25-18 @ 13:03) (16 - 18)  SpO2: 98% (12-25-18 @ 13:03) (97% - 98%)  I&O's Summary    24 Dec 2018 07:01  -  25 Dec 2018 07:00  --------------------------------------------------------  IN: 400 mL / OUT: 1750 mL / NET: -1350 mL      Gen: NAD; resting in bed  Pulm: no acute respiratory distress; CTA b/l; no wheezing  Card: RRR; S1/S2 wnl; no obvious murmurs  Abd: soft; NT/ND; +bs  Ext: no pitting edemas; LLE wound vac in place.     LABS:  CAPILLARY BLOOD GLUCOSE      POCT Blood Glucose.: 161 mg/dL (25 Dec 2018 12:54)  POCT Blood Glucose.: 188 mg/dL (25 Dec 2018 08:57)  POCT Blood Glucose.: 265 mg/dL (24 Dec 2018 21:19)  POCT Blood Glucose.: 216 mg/dL (24 Dec 2018 17:17)  POCT Blood Glucose.: 224 mg/dL (24 Dec 2018 13:21)                          13.0   8.08  )-----------( 253      ( 25 Dec 2018 06:45 )             42.9     12-25    130<L>  |  82<L>  |  68<H>  ----------------------------<  83  3.8   |  34<H>  |  1.85<H>    Ca    9.9      25 Dec 2018 06:45  Phos  3.7     12-25  Mg     2.5     12-25                RADIOLOGY & ADDITIONAL TESTS:    Imaging Personally Reviewed:    Consultant(s) Notes Reviewed:      Care Discussed with Consultants/Other Providers:

## 2018-12-25 NOTE — PROGRESS NOTE ADULT - ASSESSMENT
Briefly, 69 y/o M w/ h/o ICM/HFrEF (EF 10-15%, LVEDD 5.4 cm), mod-severe MR, severe RV dysfunction, refused ICD in past, PAD s/p L fem-pop bypass and recent L common iliac artery stent 9/18, recent soft tissue infection w/ washout/muscle flap 10/18, gangrene of toe s/p amputation who presented on 12/8 with high output from VAC dressings with b/l LE edema. He was markedly overloaded and improved with lasix gtt, now likely slightly hypovolemic. He had acute kidney injury (Cr 4; b/l 1.3) on admission possibly 2/2 CTA which has been improving since. Currently stage C HF, NYHA class II-III now hypovolemic.    1. HFrEF -   - cont to hold diuretics; restart torsemide 40 mg daily on Wednesday  -  on fluid and sodium restriction and to take extra diuretics as needed for weight gain  - cont hydralazine 25 mg q8h  - continue isordil 10 mg q8h  - patient continues to refuse ICD despite benefits/risks  - cont coreg 3.125 mg q12h

## 2018-12-25 NOTE — PROGRESS NOTE ADULT - ATTENDING COMMENTS
Jesse Gonzalez is a 68 year old man with history of ischemic cardiomyopathy and HFrEF (EF 10-15%, LVEDD 5.4 cm), mod-severe MR, severe RV dysfunction and PAD s/p L fem-pop bypass and recent L common iliac artery stent 9/18, recent soft tissue infection w/ washout/muscle flap 10/18, gangrene of toe s/p amputation. He presented 12/8/18 with high output from VAC dressings with bilateral LE edema. He was markedly volume overloaded and improved with Lasix gtt. He is now slightly hypovolemic, with acute kidney injury (serum creatinine 4 mg/dL with baseline 1.3 mg/dL) on admission possibly due to CTA contrast.  Currently stage C HF, NYHA class II-III now hypovolemic. Standing regimen: EC aspirin 81 mg daily, carvedilol 3.125 mg every 12 hrs, heparin  5000 units subcutaneously every 8 hours, hydralazine 25 mg every 8 hours, isosorbide dinitrate (Isordil) 10 mg 3X daily, spironolactone 25 mg daily, atorvastatin (Lipitor) 40 mg daily at bedtime. Torsemide has been on hold given ARF. He previously declined recommended ICD for severe cardiomyopathy. He has been counseled on fluid and sodium restriction

## 2018-12-25 NOTE — PROGRESS NOTE ADULT - PROBLEM SELECTOR PLAN 1
sig improved  - TTE 12/12 unchanged, EF 24% with severe systolic and diastolic dysfxn  - Pt was offered AICD placement 2 years ago, but pt refused.  diuresis per HF  seems euvolemic now  HF f/u

## 2018-12-25 NOTE — PROGRESS NOTE ADULT - SUBJECTIVE AND OBJECTIVE BOX
24H hour events:   no acute events overnight  pt without complaints  denies sob, orthopnea, cp, palpitations  tele: SR 70-80s with first degree avb    MEDICATIONS:  aspirin enteric coated 81 milliGRAM(s) Oral daily  carvedilol 3.125 milliGRAM(s) Oral every 12 hours  heparin  Injectable 5000 Unit(s) SubCutaneous every 8 hours  hydrALAZINE 25 milliGRAM(s) Oral every 8 hours  isosorbide   dinitrate Tablet (ISORDIL) 10 milliGRAM(s) Oral three times a day  spironolactone 25 milliGRAM(s) Oral daily        acetaminophen   Tablet .. 325 milliGRAM(s) Oral every 4 hours PRN  oxyCODONE    IR 10 milliGRAM(s) Oral every 6 hours PRN  oxyCODONE    IR 5 milliGRAM(s) Oral every 6 hours PRN    docusate sodium 100 milliGRAM(s) Oral daily  senna 2 Tablet(s) Oral at bedtime    atorvastatin 40 milliGRAM(s) Oral at bedtime  dextrose 40% Gel 15 Gram(s) Oral once PRN  dextrose 50% Injectable 12.5 Gram(s) IV Push once  dextrose 50% Injectable 25 Gram(s) IV Push once  dextrose 50% Injectable 25 Gram(s) IV Push once  glucagon  Injectable 1 milliGRAM(s) IntraMuscular once PRN  insulin glargine Injectable (LANTUS) 30 Unit(s) SubCutaneous at bedtime  insulin lispro (HumaLOG) corrective regimen sliding scale   SubCutaneous at bedtime  insulin lispro (HumaLOG) corrective regimen sliding scale   SubCutaneous three times a day before meals    calamine Lotion 1 Application(s) Topical daily PRN          PHYSICAL EXAM:  T(C): 36.6 (12-25-18 @ 05:10), Max: 36.7 (12-24-18 @ 13:23)  HR: 87 (12-25-18 @ 05:10) (87 - 100)  BP: 125/78 (12-25-18 @ 05:10) (125/78 - 142/82)  RR: 16 (12-25-18 @ 05:10) (16 - 18)  SpO2: 98% (12-25-18 @ 05:10) (97% - 98%)  Wt(kg): --  I&O's Summary    24 Dec 2018 07:01  -  25 Dec 2018 07:00  --------------------------------------------------------  IN: 400 mL / OUT: 1750 mL / NET: -1350 mL        Appearance: Normal	  HEENT:   Normal oral mucosa, PERRL, EOMI	  Lymphatic: No lymphadenopathy  Cardiovascular: Normal S1 S2, No JVD, No murmurs, No edema  Respiratory: Lungs grossly clear to auscultation	  Psychiatry: A & O x 3, Mood & affect appropriate  Gastrointestinal:  Soft, Non-tender, + BS	  Skin: No rashes, No ecchymoses, No cyanosis	  Neurologic: Non-focal  Extremities: Normal range of motion, No clubbing, cyanosis, +wound vac in place.       LABS:	 	    CBC Full  -  ( 25 Dec 2018 06:45 )  WBC Count : 8.08 K/uL  Hemoglobin : 13.0 g/dL  Hematocrit : 42.9 %  Platelet Count - Automated : 253 K/uL  Mean Cell Volume : 72.1 fL  Mean Cell Hemoglobin : 21.8 pg  Mean Cell Hemoglobin Concentration : 30.3 %  Auto Neutrophil # : 5.17 K/uL  Auto Lymphocyte # : 1.18 K/uL  Auto Monocyte # : 1.39 K/uL  Auto Eosinophil # : 0.25 K/uL  Auto Basophil # : 0.07 K/uL  Auto Neutrophil % : 64.0 %  Auto Lymphocyte % : 14.6 %  Auto Monocyte % : 17.2 %  Auto Eosinophil % : 3.1 %  Auto Basophil % : 0.9 %    12-25    130<L>  |  82<L>  |  68<H>  ----------------------------<  83  3.8   |  34<H>  |  1.85<H>  12-24    128<L>  |  78<L>  |  67<H>  ----------------------------<  212<H>  3.8   |  35<H>  |  1.95<H>    Ca    9.9      25 Dec 2018 06:45  Ca    9.7      24 Dec 2018 07:50  Phos  3.7     12-25  Mg     2.5     12-25  Mg     2.3     12-24        proBNP:   Lipid Profile:   HgA1c:   TSH:       CARDIAC MARKERS:            TELEMETRY: 	    ECG:  	  RADIOLOGY:  OTHER: 	    PREVIOUS DIAGNOSTIC TESTING:    [ ] Echocardiogram:  [ ]  Catheterization:  [ ] Stress Test:  	  	  ASSESSMENT/PLAN:

## 2018-12-25 NOTE — PROGRESS NOTE ADULT - ASSESSMENT
68M w/ PMHx HFrEF 30%, CAD s/p CABG and stent, DM2, PAD s/p L KEI stent, s/p LLE CFA to below-knee bypass with PTFE for long-segment SFA occlusion, L 2nd toe amputatio/R toe amputation, c/b groin soft tissue infection requiring washout, sartorius flap, and vac placement initially presented for high output from VAC from office, found to have KESHAV on CKD in setting of diuretics use and s/p IV contrast, with course c/b  worsening sob, LE edema and increasing abdominal girth, on lasix gtt for acute on chronic systolic heart failure exacerbation s/p CCU course with dobutamine gtt. now euvolemic.

## 2018-12-26 ENCOUNTER — TRANSCRIPTION ENCOUNTER (OUTPATIENT)
Age: 68
End: 2018-12-26

## 2018-12-26 LAB
GLUCOSE BLDC GLUCOMTR-MCNC: 112 MG/DL — HIGH (ref 70–99)
GLUCOSE BLDC GLUCOMTR-MCNC: 198 MG/DL — HIGH (ref 70–99)
GLUCOSE BLDC GLUCOMTR-MCNC: 217 MG/DL — HIGH (ref 70–99)
GLUCOSE BLDC GLUCOMTR-MCNC: 277 MG/DL — HIGH (ref 70–99)

## 2018-12-26 PROCEDURE — 99233 SBSQ HOSP IP/OBS HIGH 50: CPT

## 2018-12-26 PROCEDURE — 99232 SBSQ HOSP IP/OBS MODERATE 35: CPT

## 2018-12-26 RX ORDER — CARVEDILOL PHOSPHATE 80 MG/1
6.25 CAPSULE, EXTENDED RELEASE ORAL EVERY 12 HOURS
Qty: 0 | Refills: 0 | Status: DISCONTINUED | OUTPATIENT
Start: 2018-12-26 | End: 2018-12-27

## 2018-12-26 RX ADMIN — Medication 25 MILLIGRAM(S): at 12:29

## 2018-12-26 RX ADMIN — INSULIN GLARGINE 30 UNIT(S): 100 INJECTION, SOLUTION SUBCUTANEOUS at 22:46

## 2018-12-26 RX ADMIN — ISOSORBIDE DINITRATE 10 MILLIGRAM(S): 5 TABLET ORAL at 21:16

## 2018-12-26 RX ADMIN — SEVELAMER CARBONATE 800 MILLIGRAM(S): 2400 POWDER, FOR SUSPENSION ORAL at 17:27

## 2018-12-26 RX ADMIN — CARVEDILOL PHOSPHATE 6.25 MILLIGRAM(S): 80 CAPSULE, EXTENDED RELEASE ORAL at 17:27

## 2018-12-26 RX ADMIN — Medication 25 MILLIGRAM(S): at 21:15

## 2018-12-26 RX ADMIN — ATORVASTATIN CALCIUM 40 MILLIGRAM(S): 80 TABLET, FILM COATED ORAL at 21:15

## 2018-12-26 RX ADMIN — ISOSORBIDE DINITRATE 10 MILLIGRAM(S): 5 TABLET ORAL at 05:05

## 2018-12-26 RX ADMIN — Medication 81 MILLIGRAM(S): at 11:38

## 2018-12-26 RX ADMIN — Medication 25 MILLIGRAM(S): at 05:05

## 2018-12-26 RX ADMIN — ISOSORBIDE DINITRATE 10 MILLIGRAM(S): 5 TABLET ORAL at 12:30

## 2018-12-26 RX ADMIN — SEVELAMER CARBONATE 800 MILLIGRAM(S): 2400 POWDER, FOR SUSPENSION ORAL at 08:53

## 2018-12-26 RX ADMIN — OXYCODONE HYDROCHLORIDE 10 MILLIGRAM(S): 5 TABLET ORAL at 09:00

## 2018-12-26 RX ADMIN — Medication 1: at 22:46

## 2018-12-26 RX ADMIN — Medication 20 MILLIGRAM(S): at 13:22

## 2018-12-26 RX ADMIN — SEVELAMER CARBONATE 800 MILLIGRAM(S): 2400 POWDER, FOR SUSPENSION ORAL at 11:38

## 2018-12-26 RX ADMIN — Medication 1: at 12:28

## 2018-12-26 RX ADMIN — Medication 2: at 17:26

## 2018-12-26 RX ADMIN — OXYCODONE HYDROCHLORIDE 10 MILLIGRAM(S): 5 TABLET ORAL at 08:30

## 2018-12-26 RX ADMIN — SPIRONOLACTONE 25 MILLIGRAM(S): 25 TABLET, FILM COATED ORAL at 05:05

## 2018-12-26 RX ADMIN — CARVEDILOL PHOSPHATE 3.12 MILLIGRAM(S): 80 CAPSULE, EXTENDED RELEASE ORAL at 05:05

## 2018-12-26 NOTE — DISCHARGE NOTE ADULT - PLAN OF CARE
Healing of left groin Please change wound vac on Monday, Wednesday, and Friday  If wound vac is not working please change dressing daily with wet guaze (normal saline) and cover with dry gauze and secure with paper tape.  Please follow up with Dr. Nagi Jung Please change wound vac on Monday, Wednesday, and Friday  If wound vac is not working please change dressing daily with wet guaze (normal saline) and cover with dry gauze and secure with paper tape.  Please follow up with Dr. Nagi Jung [phone] Stop Entresto, continue hydralazine 25 mg q8h, continue isordil 10 mg q8h, continue coreg 6.25 mg bid  Follow up with appt with Dr. Enrique Dubon January 9th at 2:30PM in oncology building Monitor Creatinine   Follow up with PMD or Kidney doctor for follow up blood test

## 2018-12-26 NOTE — DISCHARGE NOTE ADULT - HOME CARE AGENCY
[facility] [phone]. Initial visit will be day after discharge home. A nurse will call prior to home visit

## 2018-12-26 NOTE — DISCHARGE NOTE ADULT - PATIENT PORTAL LINK FT
You can access the [facility] Patient Portal, offered by [facility], by registering with the following website: [email or web address]

## 2018-12-26 NOTE — PROGRESS NOTE ADULT - ASSESSMENT
Briefly, 67 y/o M w/ h/o ICM/HFrEF (EF 10-15%, LVEDD 5.4 cm), mod-severe MR, severe RV dysfunction, refused ICD in past, PAD s/p L fem-pop bypass and recent L common iliac artery stent 9/18, recent soft tissue infection w/ washout/muscle flap 10/18, gangrene of toe s/p amputation who presented on 12/8 with high output from VAC dressings with b/l LE edema. He was markedly overloaded and improved with lasix gtt, now likely slightly hypovolemic. He had acute kidney injury (Cr 4; b/l 1.3) on admission possibly 2/2 CTA which has been improving since. Currently stage C HF, NYHA class II-III now appears euvolemic.     1. HFrEF -   - restart torsemide 20 mg PO daily today; instructed pt to weigh himself daily  -  on fluid and sodium restriction and to take extra diuretics as needed for weight gain  - continue hydralazine 25 mg q8h  - continue isordil 10 mg q8h  - increase coreg to 6.25 mg bid  - patient continues to refuse ICD despite benefits/risks  - offered outpt f/u but patient is unsure if insurance will cover; will have office arrange      Ok to be d/c'ed from my perspective.

## 2018-12-26 NOTE — DISCHARGE NOTE ADULT - HOSPITAL COURSE
68M PMH CHF, CAD s/p CABG, s/p stent (2016), DM, PAD s/p L KEI stent (9/11/18), s/p LLE CFA to below-knee bypass with PTFE for long-segment SFA occlusion on (9/18/18), L 2nd toe amputation (9/19), POC c/b groin SSI requiring washout, sartorius flap, and vac placement, presenting with high output from VAC. Patient saw Dr. Jung in the office today and was noted to have high output of clear fluid coming from VAC, sent in to [facility] ED for further evaluation.    Upon arrival to [facility] ED, AVSS. WBC 7.32. Alk phos 361. AST/ALT 41/45. Prealbumin 12. CT Pelvis with IV contrast obtained which showed redemonstration of 3.0 x 2.7 cm simple fluid attenuating left inguinal collection, which was visible on previous CT abd/pelvis (10/2018 HF f/u appt with Dr. Enrique Dubon January 9th at 2:30PM in oncology building   Write script for wound vac nurse for home vac:  Reapply patient home vac. Change dressing 3x/week on MWF. Pt with L groin and L calf wound with Y connector to one vac.     *Speak to Cat (case mgmt) for clarification 68M w/ PMHx HFrEF 30%, CAD s/p CABG and stent, DM, PAD s/p L KEI stent, s/p LLE CFA to below-knee bypass with PTFE for long-segment SFA occlusion, L 2nd toe amputation, POC c/b groin SSI requiring washout, sartorius flap, and vac placement initially presented for high output from VAC from office, found to have KESHAV on CKD in setting of diuretics use and s/p IV contrast, with course c/b  worsening sob, LE edema and increasing abdominal girth concerning for ADHD. Pt also became hypotensive SBP 90'  on Thursday so was started on lasix gtt and dobutamine gtt and transferred to CCU for further management.     12/14 Transfer from CCU     + Acute on chronic heart failure: s/p dobutamine gtt, s/p Lasix gtt, on PO torsemide. HF following   + KESHAV Likely 2/2 low-flow state in setting of ADHF: s/p lasix gtt, on PO torsemide  + s/p LLE CFA to below-knee bypass (9/18/18), readmitted 12/7 for continued high output from VAC. was on xarelto outpatient for graft thrombosis now on hold, Continue Wound Vac changes MWF, No further Vascular surgery intervention   + Hyperphosphatemia started on Sevelamer 800 mg TID PO     Hospital course    12/10: Increase in Cr, Enterso and torsemide discontinued, Renal consulted  12/11: Cr increased further to 3.7, Cardiology consulted. Nephrology wants to hold diuretics, cardiology recommending transfer to telemetry and 80mg IV lasix TID  12/12: Uptrending Cr. Concern for cardio-renal syndrome. Started on 80mg IV lasix BID. Wound VAC placed to groin and lower right extremity.   12/13: transfer to CCU, start on dobutamine, increase lasix to 15mg/hr  12/14: Diuresing well >1L daytime and clinically improved. c/w lasix 15mg/hr, discontinued dobutamine for now and merline continue to monitor. Will restart dobutamine if not responding to lasix alone. TTE unchanged from prior EF 24% with severe systolic and diastolic dysfunction.    12/10: Increase in Cr, Enterso and torsemide discontinued, Renal consulted  12/11: Cr increased further to 3.7, Cardiology consulted. Nephrology wants to hold diuretics, cardiology recommending transfer to telemetry and 80mg IV lasix TID  12/12: Uptrending Cr. Concern for cardio-renal syndrome. Started on 80mg IV lasix BID. Wound VAC placed to groin and lower right extremity.   12/13: transfer to CCU, start on dobutamine, increase lasix to 15mg/hr  12/14: Diuresing well >1L daytime and clinically improved. c/w lasix 15mg/hr, discontinued dobutamine for now and merline continue to monitor. Will restart dobutamine if not responding to lasix alone. TTE unchanged from prior EF 24% with severe systolic and diastolic dysfunction.   12/14 Renal: Patient with hemodynamically mediated KESHAV? Continue with IV diuretics. Continue to hold entresto.  Hyperphosphatemia, latest serum phosphorus noted to be elevated at 7.3. Recommend to start on sevelamer 800 mg TID PO with meals. Low phosphorus diet. Monitor serum phosphorus level.   12/15 vasc: high output from vac, c/w wound vac change MWF. no vascular intervention  12/15 med: still elevated JVD and LE edema, c/w lasix. f/u cards. continue to hold entresto. off BB in setting of ADHF, offered AICD 2 yrs ago but refuse, HF eval Monday, KESHAV would avoid xarelto given by [facility] for graft thrombosis, d/w vasc if still require AC  12/15 cards: c/w lasix, monitor I/O, hold coreg and entresto  12/15 renal: KESHAV in setting of IV contrast and medication. non oliguric on lasix.   12/16 vasc: continue wound vac dressing change, no intervention  12/16 med: c/w diuretics per cards, off BB and entresto due to ADHF, HF eval Monday. d.w cards regarding AC on discharge   12/16 renal: c/w current regimen, Cr improved   12/18 RENAL: Creatinine improving.  Will sign off for now don't hesitate to reconsult.  Patient should follow up with nephrology after discharge .  12/18 HF c/s: 68M with severe ischemic CMP and PAD, now with tight edema, acutely decompensated HFrEF, and normal BP. Renal profile improving with diuresis. Meds adjusted  12/20: Med: , urine output of 5.3 liters in last 24 hours c/w lasix gtt 20mg/hr- TTE 12/12 unchanged, EF 24% with severe systolic and diastolic dysfxn- Pt was offered AICD placement 2 years ago, but pt refused. c/w VAC therapy until output decreases and granulation tissue develops.     - Per plastic recs, c/w VAC therapy until output decreases and granulation tissue develops.       D/W Cardiology Dr Alexander on the phone today about lasix diuresis, will d/c Lasix drip and start Torsemide 40mg PO BID tomorrow as per Cardiology  rec.  D/W Cardiology fellow covering for Dr Alexander today about lasix diuresis, will d/c Lasix drip and start Lasix 40mg IVP Q12H tomorrow. Cardiology will f/u pt today, will f/u rec.      12/22: Vascular: Jesse is a very pleasant 68 year old male, with a medical history significant for PMH CHF, CAD s/p CABG, s/p stent (2016), DM, PAD s/p L KEI stent (9/11/18), s/p LLE CFA to below-knee bypass with PTFE for long-segment SFA occlusion on (9/18/18), L 2nd toe amputation (9/19), c/b groin SSI requiring washout, sartorius flap, and vac placement, currently undergoing VAC dressing changes every Monday/Wednesday/Friday. The wound has been evaluated by vascular surgery this week, and continue to heal with no issues. Input from the primary team and heart failure team noted and appreciated.     12/26 HF:  - restart torsemide 20 mg PO daily today; instructed pt to weigh himself daily  -  on fluid and sodium restriction and to take extra diuretics as needed for weight gain  - continue hydralazine 25 mg q8h  - continue isordil 10 mg q8h  - increase coreg to 6.25 mg bid  - patient continues to refuse ICD despite benefits/risks  - offered outpt f/u but patient is unsure if insurance will cover; will have office arrange  12/27: Stable for discharge. Refused rehab. Refused ICD. Home with home care. Change dressing 3x/week on MWF. Pt with L groin and L calf wound with Y connector to one vac. 68M w/ PMHx HFrEF 30%, CAD s/p CABG and stent, DM, PAD s/p L KEI stent, s/p LLE CFA to below-knee bypass with PTFE for long-segment SFA occlusion, L 2nd toe amputation, POC c/b groin SSI requiring washout, sartorius flap, and vac placement initially presented for high output from VAC from office, found to have KESHAV on CKD in setting of diuretics use and s/p IV contrast, with course c/b  worsening sob, LE edema and increasing abdominal girth concerning for ADHD. Pt also became hypotensive SBP 90'  on Thursday so was started on lasix gtt and dobutamine gtt and transferred to CCU for further management.     12/14 Transfer from CCU     + Acute on chronic heart failure: s/p dobutamine gtt, s/p Lasix gtt, on PO torsemide. HF following   + KESHAV Likely 2/2 low-flow state in setting of ADHF: s/p lasix gtt, on PO torsemide  + s/p LLE CFA to below-knee bypass (9/18/18), readmitted 12/7 for continued high output from VAC. was on xarelto outpatient for graft thrombosis is no longer necessary per vascular. Continue Wound Vac changes MWF, No further Vascular surgery intervention     Stable for discharge. Refused rehab. Refused ICD. Home with home care. Change dressing 3x/week on MWF. Pt with L groin and L calf wound with Y connector to one vac.

## 2018-12-26 NOTE — DISCHARGE NOTE ADULT - MEDICATION SUMMARY - MEDICATIONS TO TAKE
I will START or STAY ON the medications listed below when I get home from the hospital:    Rolling Walker  -- 1 unit(s)   -- Indication: For for walking    spironolactone 25 mg oral tablet  -- 1 tab(s) by mouth once a day  -- Indication: For CHF    aspirin 81 mg oral delayed release tablet  -- 1 tab(s) by mouth once a day  -- Indication: For CAD (coronary artery disease)    oxyCODONE 5 mg oral tablet  -- 1 tab(s) by mouth every 4 hours, As needed, Moderate to Severe Pain (4 - 6) MDD:4 tabs  -- Indication: For PAin control    acetaminophen 325 mg oral tablet  -- 1 tab(s) by mouth every 4 hours, As needed, Mild Pain (1 - 3)  -- Indication: For PAin control    isosorbide dinitrate 10 mg oral tablet  -- 1 tab(s) by mouth 3 times a day  -- Indication: For CAD (coronary artery disease)    Lantus Solostar Pen 100 units/mL subcutaneous solution  -- 30 unit(s) subcutaneous once a day (at bedtime)  -- Indication: For Diabetes    atorvastatin 40 mg oral tablet  -- 1 tab(s) by mouth once a day (at bedtime)  -- Indication: For HLD    carvedilol 6.25 mg oral tablet  -- 1 tab(s) by mouth every 12 hours  -- Indication: For HTN    calamine topical lotion  -- 1 application on skin once a day, As needed, Rash and/or Itching  -- Indication: For Skin care    torsemide 20 mg oral tablet  -- 1 tab(s) by mouth once a day  -- Indication: For CHF    docusate sodium 100 mg oral capsule  -- 1 cap(s) by mouth once a day  -- Indication: For Constipation    senna oral tablet  -- 2 tab(s) by mouth once a day (at bedtime)  -- Indication: For Constipation    sevelamer hydrochloride 800 mg oral tablet  -- 1 tab(s) by mouth 3 times a day (with meals)  -- Indication: For Renal disease    hydrALAZINE 25 mg oral tablet  -- 1 tab(s) by mouth every 8 hours  -- Indication: For HTN

## 2018-12-26 NOTE — PROGRESS NOTE ADULT - SUBJECTIVE AND OBJECTIVE BOX
Interval History:  feels well  awaiting PT  denies CP/SOB    Medications:  acetaminophen   Tablet .. 325 milliGRAM(s) Oral every 4 hours PRN  aspirin enteric coated 81 milliGRAM(s) Oral daily  atorvastatin 40 milliGRAM(s) Oral at bedtime  calamine Lotion 1 Application(s) Topical daily PRN  carvedilol 6.25 milliGRAM(s) Oral every 12 hours  dextrose 40% Gel 15 Gram(s) Oral once PRN  dextrose 40% Gel 15 Gram(s) Oral once PRN  dextrose 5%. 1000 milliLiter(s) IV Continuous <Continuous>  dextrose 50% Injectable 12.5 Gram(s) IV Push once  dextrose 50% Injectable 25 Gram(s) IV Push once  dextrose 50% Injectable 25 Gram(s) IV Push once  docusate sodium 100 milliGRAM(s) Oral daily  glucagon  Injectable 1 milliGRAM(s) IntraMuscular once PRN  glucagon  Injectable 1 milliGRAM(s) IntraMuscular once PRN  heparin  Injectable 5000 Unit(s) SubCutaneous every 8 hours  hydrALAZINE 25 milliGRAM(s) Oral every 8 hours  insulin glargine Injectable (LANTUS) 30 Unit(s) SubCutaneous at bedtime  insulin lispro (HumaLOG) corrective regimen sliding scale   SubCutaneous at bedtime  insulin lispro (HumaLOG) corrective regimen sliding scale   SubCutaneous three times a day before meals  isosorbide   dinitrate Tablet (ISORDIL) 10 milliGRAM(s) Oral three times a day  oxyCODONE    IR 10 milliGRAM(s) Oral every 6 hours PRN  oxyCODONE    IR 5 milliGRAM(s) Oral every 6 hours PRN  senna 2 Tablet(s) Oral at bedtime  sevelamer hydrochloride 800 milliGRAM(s) Oral three times a day with meals  spironolactone 25 milliGRAM(s) Oral daily  torsemide 20 milliGRAM(s) Oral daily    Vitals:  T(C): 36.4 (18 @ 05:00), Max: 36.8 (18 @ 20:32)  HR: 77 (18 @ 05:00) (77 - 100)  BP: 119/76 (18 @ 05:00) (112/55 - 131/60)  BP(mean): --  RR: 16 (18 @ 05:00) (16 - 18)  SpO2: 99% (18 @ 05:00) (98% - 99%)    Daily     Daily Weight in k.9 (26 Dec 2018 11:43)    I&O's Summary    25 Dec 2018 07:  -  26 Dec 2018 07:00  --------------------------------------------------------  IN: 550 mL / OUT: 1740 mL / NET: -1190 mL    26 Dec 2018 07:01  -  26 Dec 2018 12:17  --------------------------------------------------------  IN: 0 mL / OUT: 320 mL / NET: -320 mL    Physical Exam:  Appearance: No Acute Distress  HEENT: PERRL  Neck: JVD approx 6-8 cm with mild HJR  Cardiovascular: Normal S1 S2, No murmurs/rubs/gallops  Respiratory: Clear to auscultation bilaterally  Gastrointestinal: Soft, Non-tender	  Skin: No cyanosis	  Neurologic: Non-focal  Extremities: No LE edema; wound vac in place  Psychiatry: A & O x 3, Mood & affect appropriate    Labs:                        13.0   8.08  )-----------( 253      ( 25 Dec 2018 06:45 )             42.9     12-25    130<L>  |  82<L>  |  68<H>  ----------------------------<  83  3.8   |  34<H>  |  1.85<H>    Ca    9.9      25 Dec 2018 06:45  Phos  3.7     12-25  Mg     2.5     12    TELEMETRY:  sinus tach

## 2018-12-26 NOTE — PROGRESS NOTE ADULT - SUBJECTIVE AND OBJECTIVE BOX
Patient is a 68y old  Male who presents with a chief complaint of CHF exacerbation      SUBJECTIVE / OVERNIGHT EVENTS: feeling better, states he cannot go home with 2 VAC      MEDICATIONS  (STANDING):  aspirin enteric coated 81 milliGRAM(s) Oral daily  atorvastatin 40 milliGRAM(s) Oral at bedtime  carvedilol 3.125 milliGRAM(s) Oral every 12 hours  dextrose 5%. 1000 milliLiter(s) (50 mL/Hr) IV Continuous <Continuous>  dextrose 50% Injectable 12.5 Gram(s) IV Push once  dextrose 50% Injectable 25 Gram(s) IV Push once  dextrose 50% Injectable 25 Gram(s) IV Push once  docusate sodium 100 milliGRAM(s) Oral daily  heparin  Injectable 5000 Unit(s) SubCutaneous every 8 hours  hydrALAZINE 25 milliGRAM(s) Oral every 8 hours  insulin glargine Injectable (LANTUS) 30 Unit(s) SubCutaneous at bedtime  insulin lispro (HumaLOG) corrective regimen sliding scale   SubCutaneous at bedtime  insulin lispro (HumaLOG) corrective regimen sliding scale   SubCutaneous three times a day before meals  isosorbide   dinitrate Tablet (ISORDIL) 10 milliGRAM(s) Oral three times a day  senna 2 Tablet(s) Oral at bedtime  sevelamer hydrochloride 800 milliGRAM(s) Oral three times a day with meals  spironolactone 25 milliGRAM(s) Oral daily    MEDICATIONS  (PRN):  acetaminophen   Tablet .. 325 milliGRAM(s) Oral every 4 hours PRN Mild Pain (1 - 3)  calamine Lotion 1 Application(s) Topical daily PRN Rash and/or Itching  dextrose 40% Gel 15 Gram(s) Oral once PRN Blood Glucose LESS THAN 70 milliGRAM(s)/deciliter  dextrose 40% Gel 15 Gram(s) Oral once PRN Blood Glucose LESS THAN 70 milliGRAM(s)/deciliter  glucagon  Injectable 1 milliGRAM(s) IntraMuscular once PRN Glucose LESS THAN 70 milligrams/deciliter  glucagon  Injectable 1 milliGRAM(s) IntraMuscular once PRN Glucose LESS THAN 70 milligrams/deciliter  oxyCODONE    IR 10 milliGRAM(s) Oral every 6 hours PRN Severe Pain (7 - 10)  oxyCODONE    IR 5 milliGRAM(s) Oral every 6 hours PRN Moderate Pain (4 - 6)      Vital Signs Last 24 Hrs  T(F): 97.6 (26 Dec 2018 05:00), Max: 98.2 (25 Dec 2018 20:32)  HR: 77 (26 Dec 2018 05:00) (77 - 100)  BP: 119/76 (26 Dec 2018 05:00) (112/55 - 131/60)  RR: 16 (26 Dec 2018 05:00) (16 - 18)  SpO2: 99% (26 Dec 2018 05:00) (98% - 99%)      CAPILLARY BLOOD GLUCOSE  POCT Blood Glucose.: 112 mg/dL (26 Dec 2018 08:38)  POCT Blood Glucose.: 283 mg/dL (25 Dec 2018 22:37)  POCT Blood Glucose.: 240 mg/dL (25 Dec 2018 17:20)  POCT Blood Glucose.: 161 mg/dL (25 Dec 2018 12:54)              PHYSICAL EXAM  GENERAL: NAD, well-developed  EYES: EOMI, PERRLA, conjunctiva and sclera clear  CHEST/LUNG: Clear to auscultation bilaterally; No wheeze  HEART: Regular rate and rhythm; No murmurs, rubs, or gallops  ABDOMEN: Soft, Nontender, Nondistended; Bowel sounds present  EXTREMITIES: No clubbing, cyanosis, or edema  SKIN: VAC to LLE medial and L groin    LABS:                        13.0   8.08  )-----------( 253      ( 25 Dec 2018 06:45 )             42.9     12-25    130<L>  |  82<L>  |  68<H>  ----------------------------<  83  3.8   |  34<H>  |  1.85<H>    Ca    9.9      25 Dec 2018 06:45  Phos  3.7     12-25  Mg     2.5     12-25

## 2018-12-26 NOTE — DISCHARGE NOTE ADULT - SECONDARY DIAGNOSIS.
Acute on chronic heart failure with reduced ejection fraction and diastolic dysfunction Kidney disease

## 2018-12-26 NOTE — DISCHARGE NOTE ADULT - CARE PLAN
Goal:	Healing of left groin  Assessment and plan of treatment:	Please change wound vac on Monday, Wednesday, and Friday  If wound vac is not working please change dressing daily with wet guaze (normal saline) and cover with dry gauze and secure with paper tape.  Please follow up with Dr. Nagi Jung Principal Discharge DX:	Wound  Goal:	Healing of left groin  Assessment and plan of treatment:	Please change wound vac on Monday, Wednesday, and Friday  If wound vac is not working please change dressing daily with wet guaze (normal saline) and cover with dry gauze and secure with paper tape.  Please follow up with Dr. Nagi Jung [phone]  Secondary Diagnosis:	Acute on chronic heart failure with reduced ejection fraction and diastolic dysfunction  Assessment and plan of treatment:	Stop Entresto, continue hydralazine 25 mg q8h, continue isordil 10 mg q8h, continue coreg 6.25 mg bid  Follow up with appt with Dr. Enrique Dubon January 9th at 2:30PM in oncology building  Secondary Diagnosis:	Kidney disease  Assessment and plan of treatment:	Monitor Creatinine   Follow up with PMD or Kidney doctor for follow up blood test

## 2018-12-26 NOTE — DISCHARGE NOTE ADULT - CARE PROVIDER_API CALL
Nagi Jung (MD), Surgery  Vascular  [address] [place] [address]  Phone: [phone]  Fax: [phone]

## 2018-12-27 VITALS — DIASTOLIC BLOOD PRESSURE: 70 MMHG | HEART RATE: 95 BPM | SYSTOLIC BLOOD PRESSURE: 128 MMHG

## 2018-12-27 LAB
GLUCOSE BLDC GLUCOMTR-MCNC: 129 MG/DL — HIGH (ref 70–99)
GLUCOSE BLDC GLUCOMTR-MCNC: 156 MG/DL — HIGH (ref 70–99)
GLUCOSE BLDC GLUCOMTR-MCNC: 256 MG/DL — HIGH (ref 70–99)

## 2018-12-27 PROCEDURE — 99239 HOSP IP/OBS DSCHRG MGMT >30: CPT

## 2018-12-27 PROCEDURE — 99233 SBSQ HOSP IP/OBS HIGH 50: CPT

## 2018-12-27 RX ORDER — CARVEDILOL PHOSPHATE 80 MG/1
1 CAPSULE, EXTENDED RELEASE ORAL
Qty: 60 | Refills: 0 | OUTPATIENT
Start: 2018-12-27 | End: 2019-01-25

## 2018-12-27 RX ORDER — SEVELAMER CARBONATE 2400 MG/1
1 POWDER, FOR SUSPENSION ORAL
Qty: 0 | Refills: 0 | COMMUNITY
Start: 2018-12-27

## 2018-12-27 RX ORDER — CARVEDILOL PHOSPHATE 80 MG/1
1 CAPSULE, EXTENDED RELEASE ORAL
Qty: 0 | Refills: 0 | COMMUNITY
Start: 2018-12-27

## 2018-12-27 RX ORDER — CALAMINE AND ZINC OXIDE AND PHENOL 160; 10 MG/ML; MG/ML
1 LOTION TOPICAL
Qty: 0 | Refills: 0 | COMMUNITY
Start: 2018-12-27

## 2018-12-27 RX ORDER — ISOSORBIDE DINITRATE 5 MG/1
1 TABLET ORAL
Qty: 0 | Refills: 0 | COMMUNITY
Start: 2018-12-27

## 2018-12-27 RX ORDER — SPIRONOLACTONE 25 MG/1
1 TABLET, FILM COATED ORAL
Qty: 30 | Refills: 0 | OUTPATIENT
Start: 2018-12-27 | End: 2019-01-25

## 2018-12-27 RX ORDER — ACETAMINOPHEN 500 MG
1 TABLET ORAL
Qty: 0 | Refills: 0 | COMMUNITY
Start: 2018-12-27

## 2018-12-27 RX ORDER — HYDRALAZINE HCL 50 MG
1 TABLET ORAL
Qty: 0 | Refills: 0 | COMMUNITY
Start: 2018-12-27

## 2018-12-27 RX ORDER — SACUBITRIL AND VALSARTAN 24; 26 MG/1; MG/1
1 TABLET, FILM COATED ORAL
Qty: 0 | Refills: 0 | COMMUNITY

## 2018-12-27 RX ORDER — ISOSORBIDE DINITRATE 5 MG/1
1 TABLET ORAL
Qty: 90 | Refills: 0 | OUTPATIENT
Start: 2018-12-27 | End: 2019-01-25

## 2018-12-27 RX ORDER — HYDRALAZINE HCL 50 MG
1 TABLET ORAL
Qty: 90 | Refills: 0 | OUTPATIENT
Start: 2018-12-27 | End: 2019-01-25

## 2018-12-27 RX ORDER — ASPIRIN/CALCIUM CARB/MAGNESIUM 324 MG
1 TABLET ORAL
Qty: 0 | Refills: 0 | COMMUNITY
Start: 2018-12-27

## 2018-12-27 RX ORDER — CARVEDILOL PHOSPHATE 80 MG/1
1 CAPSULE, EXTENDED RELEASE ORAL
Qty: 0 | Refills: 0 | COMMUNITY

## 2018-12-27 RX ORDER — ATORVASTATIN CALCIUM 80 MG/1
1 TABLET, FILM COATED ORAL
Qty: 0 | Refills: 0 | COMMUNITY
Start: 2018-12-27

## 2018-12-27 RX ORDER — SEVELAMER CARBONATE 2400 MG/1
1 POWDER, FOR SUSPENSION ORAL
Qty: 42 | Refills: 0 | OUTPATIENT
Start: 2018-12-27 | End: 2019-01-09

## 2018-12-27 RX ORDER — SPIRONOLACTONE 25 MG/1
1 TABLET, FILM COATED ORAL
Qty: 0 | Refills: 0 | COMMUNITY
Start: 2018-12-27

## 2018-12-27 RX ORDER — ATORVASTATIN CALCIUM 80 MG/1
1 TABLET, FILM COATED ORAL
Qty: 30 | Refills: 0 | OUTPATIENT
Start: 2018-12-27 | End: 2019-01-25

## 2018-12-27 RX ADMIN — SEVELAMER CARBONATE 800 MILLIGRAM(S): 2400 POWDER, FOR SUSPENSION ORAL at 12:43

## 2018-12-27 RX ADMIN — Medication 25 MILLIGRAM(S): at 13:08

## 2018-12-27 RX ADMIN — Medication 20 MILLIGRAM(S): at 05:08

## 2018-12-27 RX ADMIN — Medication 3: at 17:32

## 2018-12-27 RX ADMIN — CARVEDILOL PHOSPHATE 6.25 MILLIGRAM(S): 80 CAPSULE, EXTENDED RELEASE ORAL at 17:33

## 2018-12-27 RX ADMIN — Medication 81 MILLIGRAM(S): at 11:29

## 2018-12-27 RX ADMIN — ISOSORBIDE DINITRATE 10 MILLIGRAM(S): 5 TABLET ORAL at 05:09

## 2018-12-27 RX ADMIN — SPIRONOLACTONE 25 MILLIGRAM(S): 25 TABLET, FILM COATED ORAL at 05:09

## 2018-12-27 RX ADMIN — CARVEDILOL PHOSPHATE 6.25 MILLIGRAM(S): 80 CAPSULE, EXTENDED RELEASE ORAL at 05:08

## 2018-12-27 RX ADMIN — Medication 25 MILLIGRAM(S): at 05:09

## 2018-12-27 RX ADMIN — Medication 1: at 12:43

## 2018-12-27 RX ADMIN — ISOSORBIDE DINITRATE 10 MILLIGRAM(S): 5 TABLET ORAL at 13:08

## 2018-12-27 RX ADMIN — SEVELAMER CARBONATE 800 MILLIGRAM(S): 2400 POWDER, FOR SUSPENSION ORAL at 08:32

## 2018-12-27 NOTE — PROGRESS NOTE ADULT - ATTENDING COMMENTS
SW?CM to revisit pt with recs of 2 VAC sites and rehab placement, if pt wishes to go home arrangements will be made for 2 VAC sites for dc; pt already has VAC machine at time, will need additional supplies for additional site  35 min spent with dc planning

## 2018-12-27 NOTE — PROGRESS NOTE ADULT - PROBLEM SELECTOR PROBLEM 2
KESHAV (acute kidney injury)
KESHAV (acute kidney injury)
Hyperphosphatemia
Hypervolemia
Hypervolemia
KESHAV (acute kidney injury)

## 2018-12-27 NOTE — PROGRESS NOTE ADULT - NSHPATTENDINGPLANDISCUSS_GEN_ALL_CORE
Gerardo Mcclelland MD
patient
PA
patient and cardiology fellow
patient and CCU team
PA
PA
patient
patient, Dr. Alexander, wound care RN
patient, RN, BILLIE Lucas
patient, tele PA
patient, tele PA, Dr. Alexander
PA
PA

## 2018-12-27 NOTE — PROGRESS NOTE ADULT - PROBLEM SELECTOR PROBLEM 4
CAD (coronary artery disease)

## 2018-12-27 NOTE — PROGRESS NOTE ADULT - SUBJECTIVE AND OBJECTIVE BOX
Interval History:  doing well  denies complaints    Medications:  acetaminophen   Tablet .. 325 milliGRAM(s) Oral every 4 hours PRN  aspirin enteric coated 81 milliGRAM(s) Oral daily  atorvastatin 40 milliGRAM(s) Oral at bedtime  calamine Lotion 1 Application(s) Topical daily PRN  carvedilol 6.25 milliGRAM(s) Oral every 12 hours  dextrose 40% Gel 15 Gram(s) Oral once PRN  dextrose 40% Gel 15 Gram(s) Oral once PRN  dextrose 5%. 1000 milliLiter(s) IV Continuous <Continuous>  dextrose 50% Injectable 12.5 Gram(s) IV Push once  dextrose 50% Injectable 25 Gram(s) IV Push once  dextrose 50% Injectable 25 Gram(s) IV Push once  docusate sodium 100 milliGRAM(s) Oral daily  glucagon  Injectable 1 milliGRAM(s) IntraMuscular once PRN  glucagon  Injectable 1 milliGRAM(s) IntraMuscular once PRN  heparin  Injectable 5000 Unit(s) SubCutaneous every 8 hours  hydrALAZINE 25 milliGRAM(s) Oral every 8 hours  insulin glargine Injectable (LANTUS) 30 Unit(s) SubCutaneous at bedtime  insulin lispro (HumaLOG) corrective regimen sliding scale   SubCutaneous at bedtime  insulin lispro (HumaLOG) corrective regimen sliding scale   SubCutaneous three times a day before meals  isosorbide   dinitrate Tablet (ISORDIL) 10 milliGRAM(s) Oral three times a day  oxyCODONE    IR 10 milliGRAM(s) Oral every 6 hours PRN  oxyCODONE    IR 5 milliGRAM(s) Oral every 6 hours PRN  senna 2 Tablet(s) Oral at bedtime  sevelamer hydrochloride 800 milliGRAM(s) Oral three times a day with meals  spironolactone 25 milliGRAM(s) Oral daily  torsemide 20 milliGRAM(s) Oral daily      Vitals:  T(C): 36.9 (18 @ 13:08), Max: 36.9 (18 @ 21:13)  HR: 92 (18 @ 13:08) (86 - 101)  BP: 102/68 (18 @ 13:08) (102/68 - 125/79)  BP(mean): --  RR: 16 (18 @ 13:08) (16 - 18)  SpO2: 99% (18 @ 13:08) (98% - 100%)    Daily     Daily Weight in k.3 (27 Dec 2018 05:04)        I&O's Summary    26 Dec 2018 07:01  -  27 Dec 2018 07:00  --------------------------------------------------------  IN: 590 mL / OUT: 1750 mL / NET: -1160 mL    Physical Exam:  Appearance: No Acute Distress  HEENT: PERRL  Neck: No JVD  Cardiovascular: Normal S1 S2, No murmurs/rubs/gallops  Respiratory: Clear to auscultation bilaterally  Gastrointestinal: Soft, Non-tender	  Skin: No cyanosis	  Neurologic: Non-focal  Extremities: No LE edema; wound vac in place  Psychiatry: A & O x 3, Mood & affect appropriate

## 2018-12-27 NOTE — PROGRESS NOTE ADULT - PROBLEM SELECTOR PROBLEM 5
Type 2 diabetes mellitus with diabetic peripheral angiopathy without gangrene, with long-term current use of insulin

## 2018-12-27 NOTE — PROGRESS NOTE ADULT - PROVIDER SPECIALTY LIST ADULT
CCU
Cardiology
Heart Failure
Hospitalist
Nephrology
Vascular Surgery
Heart Failure
Hospitalist
Hospitalist

## 2018-12-27 NOTE — PROGRESS NOTE ADULT - PROBLEM SELECTOR PLAN 6
- Currently on HSQ
- Pt was prescribed Xarelto on discharge in Oct 2018 by vascular team for ppx against graft thrombosis.    - Given pt's KESHAV, would avoid using Xarelto at this time.  Will need to discuss with vascular team if pt still requires AC on discharge.
- Pt was prescribed Xarelto on discharge in Oct 2018 by vascular team for ppx against graft thrombosis. Per vascular team, no need to restart
- Pt was prescribed Xarelto on discharge in Oct 2018 by vascular team for ppx against graft thrombosis.    - Given pt's KESHAV, would avoid using Xarelto at this time.  Will need to discuss with vascular team if pt still requires AC on discharge.

## 2018-12-27 NOTE — PROGRESS NOTE ADULT - PROBLEM SELECTOR PLAN 5
cont bedtime Lantus 30 u and low ISS  A1C: 7.7  FS with adequate control
- c/w bedtime Lantus 30 u and start meal time insulin with low SS
- c/w bedtime Lantus 30 u and low ISS  - Last HbA1c checked Oct 2018, will check repeat in AM
- c/w bedtime Lantus 30 u and low ISS  - Last HbA1c checked Oct 2018, will check repeat in AM
- c/w bedtime Lantus 30 u and low ISS  A1C: 7.7
cont bedtime Lantus 30 u and low ISS  A1C: 7.7  FS with adequate control
- c/w bedtime Lantus 30 u and low ISS

## 2018-12-27 NOTE — PROGRESS NOTE ADULT - PROBLEM SELECTOR PLAN 1
sig improved  - TTE 12/12 unchanged, EF 24% with severe systolic and diastolic dysfxn  - Pt was offered AICD placement 2 years ago, but pt refused, pt cont to refuse  cont torsemide 40mg PO Q12H  seems euvolemic

## 2018-12-27 NOTE — PROGRESS NOTE ADULT - REASON FOR ADMISSION
Acute Decompensated HF
Edema
HF
b/l LE swelling
heart failure
increased drainage from VAC
wound vac drainage
CHF exac
High output from wound vac
CHF exacerbation
CHF exac
CHF exac
High output from wound vac

## 2018-12-27 NOTE — PROGRESS NOTE ADULT - ASSESSMENT
Briefly, 69 y/o M w/ h/o ICM/HFrEF (EF 10-15%, LVEDD 5.4 cm), mod-severe MR, severe RV dysfunction, refused ICD in past, PAD s/p L fem-pop bypass and recent L common iliac artery stent 9/18, recent soft tissue infection w/ washout/muscle flap 10/18, gangrene of toe s/p amputation who presented on 12/8 with high output from VAC dressings with b/l LE edema. He was markedly overloaded and improved with lasix gtt, now likely slightly hypovolemic. He had acute kidney injury (Cr 4; b/l 1.3) on admission possibly 2/2 CTA which has been improving since. Currently stage C HF, NYHA class II-III now appears euvolemic.     1. HFrEF -   - continue torsemide 20 mg PO daily; instructed pt to weigh himself daily; goal weight 133 pounds  -  on fluid and sodium restriction and to take extra diuretics as needed for weight gain  - continue hydralazine 25 mg q8h  - continue isordil 10 mg q8h  - continue coreg 6.25 mg bid  - patient continues to refuse ICD despite benefits/risks  - offered outpt f/u but patient is unsure if insurance will cover; arranged f/u on 1/9 at 2:30 pm    Ok to be d/c'ed from my perspective.

## 2018-12-27 NOTE — PROGRESS NOTE ADULT - PROBLEM SELECTOR PROBLEM 1
Acute on chronic systolic (congestive) heart failure
KESHAV (acute kidney injury)
Acute on chronic heart failure with reduced ejection fraction and diastolic dysfunction
Acute on chronic systolic (congestive) heart failure

## 2019-01-01 ENCOUNTER — OUTPATIENT (OUTPATIENT)
Dept: OUTPATIENT SERVICES | Facility: HOSPITAL | Age: 69
LOS: 1 days | End: 2019-01-01
Payer: MEDICAID

## 2019-01-01 DIAGNOSIS — Z95.9 PRESENCE OF CARDIAC AND VASCULAR IMPLANT AND GRAFT, UNSPECIFIED: Chronic | ICD-10-CM

## 2019-01-01 DIAGNOSIS — Z89.9 ACQUIRED ABSENCE OF LIMB, UNSPECIFIED: Chronic | ICD-10-CM

## 2019-01-01 DIAGNOSIS — Z95.1 PRESENCE OF AORTOCORONARY BYPASS GRAFT: Chronic | ICD-10-CM

## 2019-01-01 DIAGNOSIS — Z95.828 PRESENCE OF OTHER VASCULAR IMPLANTS AND GRAFTS: Chronic | ICD-10-CM

## 2019-01-01 PROCEDURE — G9001: CPT

## 2019-01-11 ENCOUNTER — APPOINTMENT (OUTPATIENT)
Dept: VASCULAR SURGERY | Facility: CLINIC | Age: 69
End: 2019-01-11
Payer: COMMERCIAL

## 2019-01-11 VITALS
TEMPERATURE: 98.6 F | SYSTOLIC BLOOD PRESSURE: 115 MMHG | WEIGHT: 170 LBS | HEIGHT: 66.5 IN | HEART RATE: 69 BPM | BODY MASS INDEX: 27 KG/M2 | DIASTOLIC BLOOD PRESSURE: 78 MMHG

## 2019-01-11 DIAGNOSIS — L97.929 NON-PRESSURE CHRONIC ULCER OF UNSPECIFIED PART OF LEFT LOWER LEG WITH UNSPECIFIED SEVERITY: ICD-10-CM

## 2019-01-11 PROCEDURE — 99024 POSTOP FOLLOW-UP VISIT: CPT

## 2019-01-11 RX ORDER — FOAM BANDAGE 4" X 4"
6"X6" BANDAGE TOPICAL
Qty: 12 | Refills: 3 | Status: ACTIVE | COMMUNITY
Start: 2019-01-11 | End: 1900-01-01

## 2019-01-11 NOTE — ASSESSMENT
[FreeTextEntry1] : Given that the wouds have healed or contracted significantly, will dc NPWT.  start aquacel ag dressing 3 x/ week.\par fu in 2 weeks for wound check\par I advised him to follow up closely with his cardiologist and pcp

## 2019-01-11 NOTE — PHYSICAL EXAM
[Carotid Bruits] : no carotid bruits [Normal Breath Sounds] : Normal breath sounds [Normal Heart Sounds] : normal heart sounds [Ankle Swelling (On Exam)] : present [Ankle Swelling Bilaterally] : bilaterally  [Abdomen Tenderness] : ~T ~M No abdominal tenderness [de-identified] : awake, alert [de-identified] : no lad [FreeTextEntry1] : feet warm well perfused.  The above knee pop incision is closed.  The below knee is much smaller, good granulation, no signs of infection..  The sartorious flap is also healing well, the flap is well vascularized, + granulation. significantly smaller.  there is minimal serous oozing.\par \par His edema is significantly improved\par \par superficial portion seems to track, with serous drainage [de-identified] : no gross motor/sensory deficits

## 2019-01-11 NOTE — HISTORY OF PRESENT ILLNESS
[FreeTextEntry1] : s/p R groin washout and sartorial muscle flap 10/15\par \par hospitalized for extensive bilat lower ext edema [de-identified] : since his hospitalization, reports doing better.  swelling significantly improved.  He has had the VAC, and now reports minimal drainage from the site.  no fevers chills.  He has yet to follow up with his pcp/cardiologist.

## 2019-01-15 NOTE — PROGRESS NOTE ADULT - PROBLEM SELECTOR PROBLEM 4
Coronary artery disease involving native coronary artery of native heart without angina pectoris yes

## 2019-01-16 ENCOUNTER — INPATIENT (INPATIENT)
Facility: HOSPITAL | Age: 69
LOS: 39 days | Discharge: HOME HEALTH | End: 2019-02-25
Attending: INTERNAL MEDICINE | Admitting: HOSPITALIST
Payer: COMMERCIAL

## 2019-01-16 VITALS
SYSTOLIC BLOOD PRESSURE: 114 MMHG | TEMPERATURE: 98 F | HEART RATE: 82 BPM | DIASTOLIC BLOOD PRESSURE: 69 MMHG | RESPIRATION RATE: 18 BRPM

## 2019-01-16 DIAGNOSIS — Z95.9 PRESENCE OF CARDIAC AND VASCULAR IMPLANT AND GRAFT, UNSPECIFIED: Chronic | ICD-10-CM

## 2019-01-16 DIAGNOSIS — Z89.9 ACQUIRED ABSENCE OF LIMB, UNSPECIFIED: Chronic | ICD-10-CM

## 2019-01-16 DIAGNOSIS — Z95.1 PRESENCE OF AORTOCORONARY BYPASS GRAFT: Chronic | ICD-10-CM

## 2019-01-16 DIAGNOSIS — Z95.828 PRESENCE OF OTHER VASCULAR IMPLANTS AND GRAFTS: Chronic | ICD-10-CM

## 2019-01-16 PROCEDURE — 71046 X-RAY EXAM CHEST 2 VIEWS: CPT | Mod: 26

## 2019-01-16 RX ORDER — FUROSEMIDE 40 MG
40 TABLET ORAL ONCE
Qty: 0 | Refills: 0 | Status: COMPLETED | OUTPATIENT
Start: 2019-01-16 | End: 2019-01-16

## 2019-01-16 NOTE — ED ADULT NURSE NOTE - NSIMPLEMENTINTERV_GEN_ALL_ED
Implemented All Fall Risk Interventions:  [place] to call system. Call bell, personal items and telephone within reach. Instruct patient to call for assistance. Room bathroom lighting operational. Non-slip footwear when patient is off stretcher. Physically safe environment: no spills, clutter or unnecessary equipment. Stretcher in lowest position, wheels locked, appropriate side rails in place. Provide visual cue, wrist band, yellow gown, etc. Monitor gait and stability. Monitor for mental status changes and reorient to person, place, and time. Review medications for side effects contributing to fall risk. Reinforce activity limits and safety measures with patient and family.

## 2019-01-16 NOTE — ED ADULT TRIAGE NOTE - CHIEF COMPLAINT QUOTE
Pt. HALEYEMS states "I haven't urinated since 8 am and feel short of breath when lying down." Endorses irregular urinary flow since recent admission in December when degroot catheter placed, abdominal distention, groin pain. Denies blood in urine. Also states stent placed for PVD about 2 months ago. Denies chest pain.

## 2019-01-16 NOTE — ED ADULT NURSE NOTE - OBJECTIVE STATEMENT
Pt is a 68 year old male reporting urinary retention. Pt reports he has been unable to urinate since 8 am this morning. Pt reports feeling like he cannot fully empty his bladder. Pt reports adnominal distention and discomfort starting today. Pt abdomen is distended an dpt reports pain on palpation. Pt denies fever, chills, n/v/d. Pt denies hematuria, dysuria. Pt is AOX4. Pt reports SOB while lying flat. Pt respirations are even and unlabored, SPo2 96 % on room air. Pt denies chest pain. Pt had right big toe amputation and left second toe amputation. Pt had CABG and left groin dressing over insertion site. Pt awaiting gurdeep vee, will continue to monitor

## 2019-01-17 DIAGNOSIS — I50.23 ACUTE ON CHRONIC SYSTOLIC (CONGESTIVE) HEART FAILURE: ICD-10-CM

## 2019-01-17 DIAGNOSIS — I10 ESSENTIAL (PRIMARY) HYPERTENSION: ICD-10-CM

## 2019-01-17 DIAGNOSIS — Z95.828 PRESENCE OF OTHER VASCULAR IMPLANTS AND GRAFTS: Chronic | ICD-10-CM

## 2019-01-17 DIAGNOSIS — I50.9 HEART FAILURE, UNSPECIFIED: ICD-10-CM

## 2019-01-17 DIAGNOSIS — I25.10 ATHEROSCLEROTIC HEART DISEASE OF NATIVE CORONARY ARTERY WITHOUT ANGINA PECTORIS: ICD-10-CM

## 2019-01-17 DIAGNOSIS — R17 UNSPECIFIED JAUNDICE: ICD-10-CM

## 2019-01-17 DIAGNOSIS — E78.49 OTHER HYPERLIPIDEMIA: ICD-10-CM

## 2019-01-17 DIAGNOSIS — E11.9 TYPE 2 DIABETES MELLITUS WITHOUT COMPLICATIONS: ICD-10-CM

## 2019-01-17 DIAGNOSIS — Z29.9 ENCOUNTER FOR PROPHYLACTIC MEASURES, UNSPECIFIED: ICD-10-CM

## 2019-01-17 DIAGNOSIS — T07.XXXA UNSPECIFIED MULTIPLE INJURIES, INITIAL ENCOUNTER: ICD-10-CM

## 2019-01-17 DIAGNOSIS — N17.9 ACUTE KIDNEY FAILURE, UNSPECIFIED: ICD-10-CM

## 2019-01-17 DIAGNOSIS — E87.5 HYPERKALEMIA: ICD-10-CM

## 2019-01-17 LAB
ALBUMIN SERPL ELPH-MCNC: 3.4 G/DL — SIGNIFICANT CHANGE UP (ref 3.3–5)
ALP SERPL-CCNC: 603 U/L — HIGH (ref 40–120)
ALT FLD-CCNC: 50 U/L — HIGH (ref 4–41)
ALT FLD-CCNC: 54 U/L — HIGH (ref 4–41)
ANION GAP SERPL CALC-SCNC: 13 MMO/L — SIGNIFICANT CHANGE UP (ref 7–14)
ANION GAP SERPL CALC-SCNC: 13 MMO/L — SIGNIFICANT CHANGE UP (ref 7–14)
ANISOCYTOSIS BLD QL: SLIGHT — SIGNIFICANT CHANGE UP
APTT BLD: 38.9 SEC — HIGH (ref 27.5–36.3)
APTT BLD: 39.3 SEC — HIGH (ref 27.5–36.3)
AST SERPL-CCNC: 42 U/L — HIGH (ref 4–40)
AST SERPL-CCNC: 44 U/L — HIGH (ref 4–40)
BASOPHILS # BLD AUTO: 0.06 K/UL — SIGNIFICANT CHANGE UP (ref 0–0.2)
BASOPHILS NFR BLD AUTO: 1 % — SIGNIFICANT CHANGE UP (ref 0–2)
BASOPHILS NFR SPEC: 3.5 % — HIGH (ref 0–2)
BILIRUB SERPL-MCNC: 1.9 MG/DL — HIGH (ref 0.2–1.2)
BLASTS # FLD: 0 % — SIGNIFICANT CHANGE UP (ref 0–0)
BUN SERPL-MCNC: 46 MG/DL — HIGH (ref 7–23)
BUN SERPL-MCNC: 47 MG/DL — HIGH (ref 7–23)
CALCIUM SERPL-MCNC: 8.6 MG/DL — SIGNIFICANT CHANGE UP (ref 8.4–10.5)
CALCIUM SERPL-MCNC: 8.9 MG/DL — SIGNIFICANT CHANGE UP (ref 8.4–10.5)
CHLORIDE SERPL-SCNC: 92 MMOL/L — LOW (ref 98–107)
CHLORIDE SERPL-SCNC: 93 MMOL/L — LOW (ref 98–107)
CHOLEST SERPL-MCNC: 95 MG/DL — LOW (ref 120–199)
CK MB BLD-MCNC: 4.59 NG/ML — SIGNIFICANT CHANGE UP (ref 1–6.6)
CK SERPL-CCNC: 89 U/L — SIGNIFICANT CHANGE UP (ref 30–200)
CO2 SERPL-SCNC: 25 MMOL/L — SIGNIFICANT CHANGE UP (ref 22–31)
CO2 SERPL-SCNC: 26 MMOL/L — SIGNIFICANT CHANGE UP (ref 22–31)
CREAT SERPL-MCNC: 2.05 MG/DL — HIGH (ref 0.5–1.3)
CREAT SERPL-MCNC: 2.15 MG/DL — HIGH (ref 0.5–1.3)
ELLIPTOCYTES BLD QL SMEAR: SLIGHT — SIGNIFICANT CHANGE UP
EOSINOPHIL # BLD AUTO: 0.17 K/UL — SIGNIFICANT CHANGE UP (ref 0–0.5)
EOSINOPHIL NFR BLD AUTO: 2.8 % — SIGNIFICANT CHANGE UP (ref 0–6)
EOSINOPHIL NFR FLD: 4.3 % — SIGNIFICANT CHANGE UP (ref 0–6)
GIANT PLATELETS BLD QL SMEAR: PRESENT — SIGNIFICANT CHANGE UP
GLUCOSE BLDC GLUCOMTR-MCNC: 111 MG/DL — HIGH (ref 70–99)
GLUCOSE BLDC GLUCOMTR-MCNC: 163 MG/DL — HIGH (ref 70–99)
GLUCOSE BLDC GLUCOMTR-MCNC: 171 MG/DL — HIGH (ref 70–99)
GLUCOSE SERPL-MCNC: 121 MG/DL — HIGH (ref 70–99)
GLUCOSE SERPL-MCNC: 170 MG/DL — HIGH (ref 70–99)
HBA1C BLD-MCNC: 7.8 % — HIGH (ref 4–5.6)
HCT VFR BLD CALC: 31.7 % — LOW (ref 39–50)
HCT VFR BLD CALC: 32.5 % — LOW (ref 39–50)
HDLC SERPL-MCNC: 33 MG/DL — LOW (ref 35–55)
HGB BLD-MCNC: 10 G/DL — LOW (ref 13–17)
HGB BLD-MCNC: 10 G/DL — LOW (ref 13–17)
HYPOCHROMIA BLD QL: SIGNIFICANT CHANGE UP
IMM GRANULOCYTES NFR BLD AUTO: 0.7 % — SIGNIFICANT CHANGE UP (ref 0–1.5)
INR BLD: 1.33 — HIGH (ref 0.88–1.17)
INR BLD: 1.38 — HIGH (ref 0.88–1.17)
LIPID PNL WITH DIRECT LDL SERPL: 59 MG/DL — SIGNIFICANT CHANGE UP
LYMPHOCYTES # BLD AUTO: 0.88 K/UL — LOW (ref 1–3.3)
LYMPHOCYTES # BLD AUTO: 14.7 % — SIGNIFICANT CHANGE UP (ref 13–44)
LYMPHOCYTES NFR SPEC AUTO: 10.3 % — LOW (ref 13–44)
MACROCYTES BLD QL: SLIGHT — SIGNIFICANT CHANGE UP
MAGNESIUM SERPL-MCNC: 2.4 MG/DL — SIGNIFICANT CHANGE UP (ref 1.6–2.6)
MCHC RBC-ENTMCNC: 22.4 PG — LOW (ref 27–34)
MCHC RBC-ENTMCNC: 22.7 PG — LOW (ref 27–34)
MCHC RBC-ENTMCNC: 30.8 % — LOW (ref 32–36)
MCHC RBC-ENTMCNC: 31.5 % — LOW (ref 32–36)
MCV RBC AUTO: 71.9 FL — LOW (ref 80–100)
MCV RBC AUTO: 72.9 FL — LOW (ref 80–100)
METAMYELOCYTES # FLD: 0 % — SIGNIFICANT CHANGE UP (ref 0–1)
MICROCYTES BLD QL: SIGNIFICANT CHANGE UP
MONOCYTES # BLD AUTO: 0.81 K/UL — SIGNIFICANT CHANGE UP (ref 0–0.9)
MONOCYTES NFR BLD AUTO: 13.6 % — SIGNIFICANT CHANGE UP (ref 2–14)
MONOCYTES NFR BLD: 10.3 % — HIGH (ref 2–9)
MYELOCYTES NFR BLD: 0 % — SIGNIFICANT CHANGE UP (ref 0–0)
NEUTROPHIL AB SER-ACNC: 66.4 % — SIGNIFICANT CHANGE UP (ref 43–77)
NEUTROPHILS # BLD AUTO: 4.01 K/UL — SIGNIFICANT CHANGE UP (ref 1.8–7.4)
NEUTROPHILS NFR BLD AUTO: 67.2 % — SIGNIFICANT CHANGE UP (ref 43–77)
NEUTS BAND # BLD: 0 % — SIGNIFICANT CHANGE UP (ref 0–6)
NRBC # FLD: 0 K/UL — LOW (ref 25–125)
NRBC # FLD: 0 K/UL — LOW (ref 25–125)
NT-PROBNP SERPL-SCNC: 4578 PG/ML — SIGNIFICANT CHANGE UP
OTHER - HEMATOLOGY %: 0 — SIGNIFICANT CHANGE UP
OVALOCYTES BLD QL SMEAR: SIGNIFICANT CHANGE UP
PHOSPHATE SERPL-MCNC: 4.2 MG/DL — SIGNIFICANT CHANGE UP (ref 2.5–4.5)
PLATELET # BLD AUTO: 204 K/UL — SIGNIFICANT CHANGE UP (ref 150–400)
PLATELET # BLD AUTO: 218 K/UL — SIGNIFICANT CHANGE UP (ref 150–400)
PLATELET COUNT - ESTIMATE: NORMAL — SIGNIFICANT CHANGE UP
PMV BLD: SIGNIFICANT CHANGE UP FL (ref 7–13)
PMV BLD: SIGNIFICANT CHANGE UP FL (ref 7–13)
POIKILOCYTOSIS BLD QL AUTO: SIGNIFICANT CHANGE UP
POLYCHROMASIA BLD QL SMEAR: SLIGHT — SIGNIFICANT CHANGE UP
POTASSIUM SERPL-MCNC: 4.7 MMOL/L — SIGNIFICANT CHANGE UP (ref 3.5–5.3)
POTASSIUM SERPL-MCNC: 4.9 MMOL/L — SIGNIFICANT CHANGE UP (ref 3.5–5.3)
POTASSIUM SERPL-MCNC: 5.7 MMOL/L — HIGH (ref 3.5–5.3)
POTASSIUM SERPL-SCNC: 4.7 MMOL/L — SIGNIFICANT CHANGE UP (ref 3.5–5.3)
POTASSIUM SERPL-SCNC: 4.9 MMOL/L — SIGNIFICANT CHANGE UP (ref 3.5–5.3)
POTASSIUM SERPL-SCNC: 5.7 MMOL/L — HIGH (ref 3.5–5.3)
PROMYELOCYTES # FLD: 0 % — SIGNIFICANT CHANGE UP (ref 0–0)
PROT SERPL-MCNC: 7.3 G/DL — SIGNIFICANT CHANGE UP (ref 6–8.3)
PROTHROM AB SERPL-ACNC: 15.3 SEC — HIGH (ref 9.8–13.1)
PROTHROM AB SERPL-ACNC: 15.5 SEC — HIGH (ref 9.8–13.1)
RBC # BLD: 4.41 M/UL — SIGNIFICANT CHANGE UP (ref 4.2–5.8)
RBC # BLD: 4.46 M/UL — SIGNIFICANT CHANGE UP (ref 4.2–5.8)
RBC # FLD: 24.1 % — HIGH (ref 10.3–14.5)
RBC # FLD: 24.7 % — HIGH (ref 10.3–14.5)
SCHISTOCYTES BLD QL AUTO: SLIGHT — SIGNIFICANT CHANGE UP
SODIUM SERPL-SCNC: 130 MMOL/L — LOW (ref 135–145)
SODIUM SERPL-SCNC: 132 MMOL/L — LOW (ref 135–145)
TARGETS BLD QL SMEAR: SIGNIFICANT CHANGE UP
TRIGL SERPL-MCNC: 47 MG/DL — SIGNIFICANT CHANGE UP (ref 10–149)
TROPONIN T, HIGH SENSITIVITY: 76 NG/L — CRITICAL HIGH (ref ?–14)
TROPONIN T, HIGH SENSITIVITY: 83 NG/L — CRITICAL HIGH (ref ?–14)
TROPONIN T, HIGH SENSITIVITY: 86 NG/L — CRITICAL HIGH (ref ?–14)
TSH SERPL-MCNC: 3.72 UIU/ML — SIGNIFICANT CHANGE UP (ref 0.27–4.2)
VARIANT LYMPHS # BLD: 5.2 % — SIGNIFICANT CHANGE UP
WBC # BLD: 5.67 K/UL — SIGNIFICANT CHANGE UP (ref 3.8–10.5)
WBC # BLD: 5.97 K/UL — SIGNIFICANT CHANGE UP (ref 3.8–10.5)
WBC # FLD AUTO: 5.67 K/UL — SIGNIFICANT CHANGE UP (ref 3.8–10.5)
WBC # FLD AUTO: 5.97 K/UL — SIGNIFICANT CHANGE UP (ref 3.8–10.5)

## 2019-01-17 PROCEDURE — 12345: CPT | Mod: NC

## 2019-01-17 PROCEDURE — 99254 IP/OBS CNSLTJ NEW/EST MOD 60: CPT | Mod: GC

## 2019-01-17 PROCEDURE — 99223 1ST HOSP IP/OBS HIGH 75: CPT | Mod: GC

## 2019-01-17 RX ORDER — ASPIRIN/CALCIUM CARB/MAGNESIUM 324 MG
81 TABLET ORAL DAILY
Qty: 0 | Refills: 0 | Status: DISCONTINUED | OUTPATIENT
Start: 2019-01-17 | End: 2019-02-25

## 2019-01-17 RX ORDER — ISOSORBIDE DINITRATE 5 MG/1
10 TABLET ORAL THREE TIMES A DAY
Qty: 0 | Refills: 0 | Status: DISCONTINUED | OUTPATIENT
Start: 2019-01-17 | End: 2019-01-18

## 2019-01-17 RX ORDER — SENNA PLUS 8.6 MG/1
2 TABLET ORAL AT BEDTIME
Qty: 0 | Refills: 0 | Status: DISCONTINUED | OUTPATIENT
Start: 2019-01-17 | End: 2019-02-25

## 2019-01-17 RX ORDER — DEXTROSE 50 % IN WATER 50 %
15 SYRINGE (ML) INTRAVENOUS ONCE
Qty: 0 | Refills: 0 | Status: DISCONTINUED | OUTPATIENT
Start: 2019-01-17 | End: 2019-02-25

## 2019-01-17 RX ORDER — CARVEDILOL PHOSPHATE 80 MG/1
6.25 CAPSULE, EXTENDED RELEASE ORAL EVERY 12 HOURS
Qty: 0 | Refills: 0 | Status: DISCONTINUED | OUTPATIENT
Start: 2019-01-17 | End: 2019-01-18

## 2019-01-17 RX ORDER — SPIRONOLACTONE 25 MG/1
25 TABLET, FILM COATED ORAL DAILY
Qty: 0 | Refills: 0 | Status: DISCONTINUED | OUTPATIENT
Start: 2019-01-17 | End: 2019-01-18

## 2019-01-17 RX ORDER — ALBUTEROL 90 UG/1
7.5 AEROSOL, METERED ORAL ONCE
Qty: 0 | Refills: 0 | Status: COMPLETED | OUTPATIENT
Start: 2019-01-17 | End: 2019-01-17

## 2019-01-17 RX ORDER — HYDRALAZINE HCL 50 MG
25 TABLET ORAL EVERY 8 HOURS
Qty: 0 | Refills: 0 | Status: DISCONTINUED | OUTPATIENT
Start: 2019-01-17 | End: 2019-01-18

## 2019-01-17 RX ORDER — SEVELAMER CARBONATE 2400 MG/1
800 POWDER, FOR SUSPENSION ORAL THREE TIMES A DAY
Qty: 0 | Refills: 0 | Status: DISCONTINUED | OUTPATIENT
Start: 2019-01-17 | End: 2019-01-26

## 2019-01-17 RX ORDER — FUROSEMIDE 40 MG
60 TABLET ORAL DAILY
Qty: 0 | Refills: 0 | Status: DISCONTINUED | OUTPATIENT
Start: 2019-01-17 | End: 2019-01-17

## 2019-01-17 RX ORDER — DEXTROSE 50 % IN WATER 50 %
12.5 SYRINGE (ML) INTRAVENOUS ONCE
Qty: 0 | Refills: 0 | Status: DISCONTINUED | OUTPATIENT
Start: 2019-01-17 | End: 2019-02-25

## 2019-01-17 RX ORDER — OXYCODONE HYDROCHLORIDE 5 MG/1
5 TABLET ORAL EVERY 4 HOURS
Qty: 0 | Refills: 0 | Status: DISCONTINUED | OUTPATIENT
Start: 2019-01-17 | End: 2019-01-20

## 2019-01-17 RX ORDER — SODIUM POLYSTYRENE SULFONATE 4.1 MEQ/G
30 POWDER, FOR SUSPENSION ORAL ONCE
Qty: 0 | Refills: 0 | Status: DISCONTINUED | OUTPATIENT
Start: 2019-01-17 | End: 2019-01-17

## 2019-01-17 RX ORDER — INSULIN GLARGINE 100 [IU]/ML
30 INJECTION, SOLUTION SUBCUTANEOUS AT BEDTIME
Qty: 0 | Refills: 0 | Status: DISCONTINUED | OUTPATIENT
Start: 2019-01-17 | End: 2019-01-23

## 2019-01-17 RX ORDER — DEXTROSE 50 % IN WATER 50 %
25 SYRINGE (ML) INTRAVENOUS ONCE
Qty: 0 | Refills: 0 | Status: DISCONTINUED | OUTPATIENT
Start: 2019-01-17 | End: 2019-02-25

## 2019-01-17 RX ORDER — GLUCAGON INJECTION, SOLUTION 0.5 MG/.1ML
1 INJECTION, SOLUTION SUBCUTANEOUS ONCE
Qty: 0 | Refills: 0 | Status: DISCONTINUED | OUTPATIENT
Start: 2019-01-17 | End: 2019-02-25

## 2019-01-17 RX ORDER — DOCUSATE SODIUM 100 MG
100 CAPSULE ORAL DAILY
Qty: 0 | Refills: 0 | Status: ACTIVE | OUTPATIENT
Start: 2019-01-17 | End: 2019-12-16

## 2019-01-17 RX ORDER — INSULIN LISPRO 100/ML
VIAL (ML) SUBCUTANEOUS AT BEDTIME
Qty: 0 | Refills: 0 | Status: DISCONTINUED | OUTPATIENT
Start: 2019-01-17 | End: 2019-02-25

## 2019-01-17 RX ORDER — FUROSEMIDE 40 MG
60 TABLET ORAL ONCE
Qty: 0 | Refills: 0 | Status: COMPLETED | OUTPATIENT
Start: 2019-01-17 | End: 2019-01-17

## 2019-01-17 RX ORDER — SODIUM CHLORIDE 9 MG/ML
3 INJECTION INTRAMUSCULAR; INTRAVENOUS; SUBCUTANEOUS EVERY 8 HOURS
Qty: 0 | Refills: 0 | Status: DISCONTINUED | OUTPATIENT
Start: 2019-01-17 | End: 2019-02-25

## 2019-01-17 RX ORDER — INSULIN LISPRO 100/ML
VIAL (ML) SUBCUTANEOUS
Qty: 0 | Refills: 0 | Status: DISCONTINUED | OUTPATIENT
Start: 2019-01-17 | End: 2019-02-25

## 2019-01-17 RX ORDER — FUROSEMIDE 40 MG
40 TABLET ORAL DAILY
Qty: 0 | Refills: 0 | Status: DISCONTINUED | OUTPATIENT
Start: 2019-01-17 | End: 2019-01-17

## 2019-01-17 RX ORDER — SODIUM CHLORIDE 9 MG/ML
1000 INJECTION, SOLUTION INTRAVENOUS
Qty: 0 | Refills: 0 | Status: DISCONTINUED | OUTPATIENT
Start: 2019-01-17 | End: 2019-02-25

## 2019-01-17 RX ORDER — FLUTICASONE PROPIONATE 50 MCG
1 SPRAY, SUSPENSION NASAL
Qty: 0 | Refills: 0 | Status: DISCONTINUED | OUTPATIENT
Start: 2019-01-17 | End: 2019-01-26

## 2019-01-17 RX ORDER — DEXTROSE 50 % IN WATER 50 %
50 SYRINGE (ML) INTRAVENOUS ONCE
Qty: 0 | Refills: 0 | Status: COMPLETED | OUTPATIENT
Start: 2019-01-17 | End: 2019-01-17

## 2019-01-17 RX ORDER — FUROSEMIDE 40 MG
60 TABLET ORAL DAILY
Qty: 0 | Refills: 0 | Status: DISCONTINUED | OUTPATIENT
Start: 2019-01-18 | End: 2019-01-18

## 2019-01-17 RX ORDER — OXYCODONE AND ACETAMINOPHEN 5; 325 MG/1; MG/1
1 TABLET ORAL ONCE
Qty: 0 | Refills: 0 | Status: DISCONTINUED | OUTPATIENT
Start: 2019-01-17 | End: 2019-01-17

## 2019-01-17 RX ORDER — INSULIN HUMAN 100 [IU]/ML
5 INJECTION, SOLUTION SUBCUTANEOUS ONCE
Qty: 0 | Refills: 0 | Status: COMPLETED | OUTPATIENT
Start: 2019-01-17 | End: 2019-01-17

## 2019-01-17 RX ORDER — HEPARIN SODIUM 5000 [USP'U]/ML
5000 INJECTION INTRAVENOUS; SUBCUTANEOUS EVERY 12 HOURS
Qty: 0 | Refills: 0 | Status: DISCONTINUED | OUTPATIENT
Start: 2019-01-17 | End: 2019-02-25

## 2019-01-17 RX ORDER — CALAMINE AND ZINC OXIDE AND PHENOL 160; 10 MG/ML; MG/ML
1 LOTION TOPICAL DAILY
Qty: 0 | Refills: 0 | Status: DISCONTINUED | OUTPATIENT
Start: 2019-01-17 | End: 2019-02-25

## 2019-01-17 RX ADMIN — SEVELAMER CARBONATE 800 MILLIGRAM(S): 2400 POWDER, FOR SUSPENSION ORAL at 13:52

## 2019-01-17 RX ADMIN — Medication 200 MILLIGRAM(S): at 22:15

## 2019-01-17 RX ADMIN — Medication 50 MILLILITER(S): at 01:28

## 2019-01-17 RX ADMIN — SODIUM CHLORIDE 3 MILLILITER(S): 9 INJECTION INTRAMUSCULAR; INTRAVENOUS; SUBCUTANEOUS at 13:55

## 2019-01-17 RX ADMIN — Medication 2: at 13:52

## 2019-01-17 RX ADMIN — OXYCODONE HYDROCHLORIDE 5 MILLIGRAM(S): 5 TABLET ORAL at 22:16

## 2019-01-17 RX ADMIN — SPIRONOLACTONE 25 MILLIGRAM(S): 25 TABLET, FILM COATED ORAL at 09:37

## 2019-01-17 RX ADMIN — OXYCODONE HYDROCHLORIDE 5 MILLIGRAM(S): 5 TABLET ORAL at 10:30

## 2019-01-17 RX ADMIN — SODIUM CHLORIDE 3 MILLILITER(S): 9 INJECTION INTRAMUSCULAR; INTRAVENOUS; SUBCUTANEOUS at 22:20

## 2019-01-17 RX ADMIN — Medication 25 MILLIGRAM(S): at 13:52

## 2019-01-17 RX ADMIN — INSULIN HUMAN 5 UNIT(S): 100 INJECTION, SOLUTION SUBCUTANEOUS at 01:27

## 2019-01-17 RX ADMIN — ISOSORBIDE DINITRATE 10 MILLIGRAM(S): 5 TABLET ORAL at 13:52

## 2019-01-17 RX ADMIN — Medication 2: at 19:03

## 2019-01-17 RX ADMIN — CARVEDILOL PHOSPHATE 6.25 MILLIGRAM(S): 80 CAPSULE, EXTENDED RELEASE ORAL at 19:03

## 2019-01-17 RX ADMIN — OXYCODONE AND ACETAMINOPHEN 1 TABLET(S): 5; 325 TABLET ORAL at 03:34

## 2019-01-17 RX ADMIN — ISOSORBIDE DINITRATE 10 MILLIGRAM(S): 5 TABLET ORAL at 22:18

## 2019-01-17 RX ADMIN — HEPARIN SODIUM 5000 UNIT(S): 5000 INJECTION INTRAVENOUS; SUBCUTANEOUS at 06:16

## 2019-01-17 RX ADMIN — ALBUTEROL 7.5 MILLIGRAM(S): 90 AEROSOL, METERED ORAL at 01:28

## 2019-01-17 RX ADMIN — Medication 40 MILLIGRAM(S): at 00:04

## 2019-01-17 RX ADMIN — OXYCODONE HYDROCHLORIDE 5 MILLIGRAM(S): 5 TABLET ORAL at 23:16

## 2019-01-17 RX ADMIN — Medication 200 MILLIGRAM(S): at 06:22

## 2019-01-17 RX ADMIN — Medication 60 MILLIGRAM(S): at 06:57

## 2019-01-17 RX ADMIN — OXYCODONE AND ACETAMINOPHEN 1 TABLET(S): 5; 325 TABLET ORAL at 02:09

## 2019-01-17 RX ADMIN — Medication 25 MILLIGRAM(S): at 22:18

## 2019-01-17 RX ADMIN — SODIUM CHLORIDE 3 MILLILITER(S): 9 INJECTION INTRAMUSCULAR; INTRAVENOUS; SUBCUTANEOUS at 06:02

## 2019-01-17 RX ADMIN — SEVELAMER CARBONATE 800 MILLIGRAM(S): 2400 POWDER, FOR SUSPENSION ORAL at 22:19

## 2019-01-17 RX ADMIN — INSULIN GLARGINE 30 UNIT(S): 100 INJECTION, SOLUTION SUBCUTANEOUS at 23:13

## 2019-01-17 RX ADMIN — OXYCODONE HYDROCHLORIDE 5 MILLIGRAM(S): 5 TABLET ORAL at 09:37

## 2019-01-17 RX ADMIN — Medication 100 MILLIGRAM(S): at 13:52

## 2019-01-17 RX ADMIN — HEPARIN SODIUM 5000 UNIT(S): 5000 INJECTION INTRAVENOUS; SUBCUTANEOUS at 19:03

## 2019-01-17 RX ADMIN — Medication 81 MILLIGRAM(S): at 13:51

## 2019-01-17 RX ADMIN — SEVELAMER CARBONATE 800 MILLIGRAM(S): 2400 POWDER, FOR SUSPENSION ORAL at 09:37

## 2019-01-17 NOTE — H&P ADULT - NEGATIVE ENMT SYMPTOMS
no ear pain/no nasal congestion/no tinnitus/no vertigo/no sinus symptoms/no nasal obstruction/no post-nasal discharge/no nose bleeds/no recurrent cold sores/no nasal discharge/no abnormal taste sensation

## 2019-01-17 NOTE — H&P ADULT - PROBLEM SELECTOR PLAN 5
Low salt diet.  Continue BP meds. Borderline lower blood pressure trend  Low salt diet.  Continue BP meds, with holding parameters.

## 2019-01-17 NOTE — ADVANCED PRACTICE NURSE CONSULT - REASON FOR CONSULT
Patient seen on skin care rounds after wound care referral received for assessment of skin impairment and recommendations of topical management. Chart reviewed: Serum albumin 3.4g/dL, HgbA1C 7.8%, 1.33 INR, Hgb/Hct 10.0/32.5, WBC 5.67, BMI 27.8kg/m2. Patient H/O CHF, PAD, HTN, HLD, DM, CAD with stents. Patient known to [facility] service line from NPWT/VAC placement to left groin wound from previous admission.

## 2019-01-17 NOTE — H&P ADULT - PROBLEM SELECTOR PLAN 2
Resolved s/p treatment. K+= 4.7 Potassium initially 5.7  In the setting of worsening renal function  now resolved s/p treatment. K+= 4.7  ACEi and ARB on hold  f/u trend

## 2019-01-17 NOTE — H&P ADULT - NEGATIVE NEUROLOGICAL SYMPTOMS
no focal seizures/no tremors/no vertigo/no weakness/no paresthesias/no generalized seizures/no syncope/no loss of sensation

## 2019-01-17 NOTE — CHART NOTE - NSCHARTNOTEFT_GEN_A_CORE
See H&P from earlier today for details    Pt seen and examined  AFVSS  lungs essentially clear  no respiratory distress  Cr slightly improved, close to baseline    -c/w Lasix, CHF team to be consulted (was following last admission in December, d/c'ed on torsemide 20 po daily)  -FS controlled on Lantus 30  -wound care c/s for LLE ulcer/h/o bypass  PT eval See H&P from earlier today for details    Pt seen and examined  AFVSS  bibasilar crackles on exam  no respiratory distress  Cr slightly improved, hyperkalemia resolved    -c/w Lasix, CHF team to be consulted (was following last admission in December, d/c'ed on torsemide 20 po daily)  -FS controlled on Lantus 30  -wound care c/s for LLE ulcer/h/o bypass  PT eval

## 2019-01-17 NOTE — H&P ADULT - FAMILY HISTORY
Mother  Still living? No  Coronary artery disease, Age at diagnosis: Age Unknown     Father  Still living? No  No family history of cancer, Age at diagnosis: Age Unknown

## 2019-01-17 NOTE — H&P ADULT - HISTORY OF PRESENT ILLNESS
68M, ambulates with a walker, due to b/L LE bypass and b/L LE multiple toe amputations, history of DM2, S HF with EF= 20%, HTN, Dyslipidemia, CAD, 3v CABG, PVD, groin  sartorius flap, and vac placement, L LE with surgical scar wounds, being treated and followed by VNS. The pt  was discharged from [facility] on 12/27/18 after a 20 day extensive stay for KESHAV on CKD in setting of diuretics use and s/p IV contrast, with course c/b  worsening sob, LE edema and increasing abdominal girth concerning for ADHD. Pt also became hypotensive SBP 90'  and  started on Lasix gtt and dobutamine gtt and transferred to CCU for further management.. The pt comes to the ED due to dyspnea felt while laying flat, dry cough, increased abdominal girth and 30 pound weight gain since discharge from the hospital and chills. The pt denies dietary indiscretions with salt and has been complaint with his mediations.  Denies dysuria, diaphoresis,  palpitations, nausea, vomit body aches.   In the Ed, the pt is found to be in fluid overload with a ProBNP= 4578, CXR  preliminary revealing pulmonary edema, Hyperkalemia @ 5.7 not hemolyzed and treated with repeat K+= 4.7, elevated Troponin  83-->76 in the setting of KESHAV on CKD = Creatinine 1.85 --> 2.15.  The pt was given Lasix 40 mg IV with positive urine out put..  The pt says the treatment help to alleviate  his symptoms. But still with a dry cough and requesting cough suppressant.

## 2019-01-17 NOTE — ED PROVIDER NOTE - ATTENDING CONTRIBUTION TO CARE
68M w/ PMHx CHF, CAD s/p CABG and stent, DM, PAD presents with 3 days of abdominal distention, pnd, orthopnea, exertional sob, weight gain. minimal BL LE edema. distension has significantly increased, abdominal tension but not significant pain. last urinated at 9am this morning, reports occasional dysuria.   saw vascular doctor 2 days ago, reports improved L groin and LLE wound, with decreased size, no drainage. doesn't know how much weight he has gained but reports quite a bit  no sob at rest. not on oxygen at home. no uri symptoms, cough, f/ch, sob.  PE no distress. mild increased wob. lungs with rales bl lower lung fields. + significant abd distension, no abdominal TTP. minimal 1+ bl LE edema. compartments soft. L groin wound appears to be healing well with no significant erythema/discharge/warmth to touch  takes torsemide 20mg daily, doubled dose this morning 68M w/ PMHx CHF, CAD s/p CABG and stent, DM, PAD presents with 3 days of abdominal distention, pnd, orthopnea, exertional sob, weight gain. minimal BL LE edema. distension has significantly increased, abdominal tension but not significant pain. last urinated at 9am this morning, reports occasional dysuria.   saw vascular doctor 2 days ago, reports improved L groin and LLE wound, with decreased size, no drainage. doesn't know how much weight he has gained but reports quite a bit  no sob at rest. not on oxygen at home. no uri symptoms, cough, f/ch, sob.  PE no distress. mild increased wob. lungs with rales bl lower lung fields. + significant abd distension, no abdominal TTP. minimal 1+ bl LE edema. compartments soft. L groin wound appears to be healing well with no significant erythema/discharge/warmth to touch  takes torsemide 20mg daily, doubled dose this morning  Chest x-ray with pulmonary edema.  Elevated BNP 4500. KESHAV with BUN 46 and Cr 2.15. initial K 5.7.   patient given lasix 40mgIVP in the er. repeat K 4.9. on Patient reevaluation patient breathing comfortably, normal work of breathing. Patient admitted in stable condition    I reviewed all lab and imaging results from this ED visit, and discussed ALL results with the patient and/or family, including all abnormal results and incidental findings. I addressed all of patient's/family's questions and concerns, and I recommended appropriate follow up for all incidental findings. Based on patient's HPI as well as the results of today's workup, I felt that patient warranted admission to the hospital for further workup/evaluation and continued management. The patient was agreeable with admission. Patient was signed out to admitting team. Admitting team accepted the patient for admission and subsequently took over the patient's care in its entirety.

## 2019-01-17 NOTE — H&P ADULT - ASSESSMENT
68M admitted for acute on chronic systolic HF and Hyperkalemia.   HX of S HF with EF = 20%, DM2, HTN, Dyslipidemia, CAD, 3V CABG, RLE Bypass.

## 2019-01-17 NOTE — H&P ADULT - NEUROLOGICAL DETAILS
normal strength/alert and oriented x 3/no spontaneous movement/sensation intact/responds to verbal commands

## 2019-01-17 NOTE — H&P ADULT - SKIN COMMENTS
Limited= Icterus and see extremity exam. Limited= Icterus and see extremity exam., scattered wounds on LE.  L-groin with dressing in place

## 2019-01-17 NOTE — PATIENT PROFILE ADULT - NSPROHMDIABETBLDGLCTARGET_GEN_A_NUR
PRE-SEDATION ASSESSMENT    CONSENT  Consent for procedure and sedation obtained: Yes    MEDICAL HISTORY       PHYSICAL EXAM  History and Physical Reviewed: H&P completed today  Airway Risk History: No previous complications  Airway Anatomy : Class II  Heart : Abnormal  Heart (Comment): RRR, normal S1 and mechanical S2, no murmur, no edema  Lungs : Normal  LOC/Mental Status : Normal    OTHER FINDINGS       SEDATION RISK ASSESSMENT  Risk Status ASA: Class II - Normal patient with mild systemic disease  Plan for Sedation: Minimal Sedation  EKG Monitoring: Yes    NARRATIVE FINDINGS      known

## 2019-01-17 NOTE — ADVANCED PRACTICE NURSE CONSULT - ASSESSMENT
A&Ox3, continent of urine and stool. Skin warm, dry with increased moisture in intertriginous folds, adequate skin turgor. Right medial leg with linear surgical scar. Scattered areas of ecchymosis on abdomen.    Bilateral lower extremities with scattered areas of hyper and hypopigmentation.  Dry, flaky skin.  Thickened-yellow toenails. No temperature changes noted. Increased coolness from midfoot to distal toes. +3 Edema of lower legs. Capillary refill >3 seconds. b/L DP/PT pulses nonplable, with  monophasic doppler sounds. Reports numbness and tingling of feet. Multiple toe amputations on right foot and left 2nd toe amputation on left foot. Bilateral feet with bone deformities. Right 2nd toe dorsal aspect with scab.     Multiple surgical wounds of left leg:  -Groin measures 5cmx1.5cmx0.2cm. Tissue type 100% friable, moist, hypergranular tissue. Periwound skin with hypopigmentation and soft tissue contracture, 50% reepithelialization at wound edges, no induration, no increased warmth, no erythema. Moderate amount of serosanguinous drainage, no odor. Goal of care: decrease/control bioburden, manage exudate.  -Medial thigh measures [id number], 100% dried serous exudate (scab) unable to remove during cleansing. Periwound skin with circumferential hypopigmentation from healing and reepithelialization no induration, no increased warmth, no erythema. No active drainage.  -Medial lower leg measures 7.3znl1tbe4.2cm. 100% granulation tissue, red, moist, 30% reepithelialization at wound edges. No induration, no increased warmth, no erythema. Goal of care: maintain moist environment for wound healing.

## 2019-01-17 NOTE — H&P ADULT - PSH
S/P amputation  right great toe and 4th and 5th digit  S/P angioplasty with stent  about 5 years ago  S/P bypass graft of extremity  Left LE  9/2018  S/P bypass graft of extremity  RLE  S/P CABG x 3

## 2019-01-17 NOTE — H&P ADULT - PROBLEM SELECTOR PLAN 1
Consider HF team consult   Cm  F/U #3 CE, EKG, I's, O's daily weights, Lytes.  Low salt diet. Fluid restrict 1L / day.  Give O2, Lasix 40 mg IV, nitrate, BB.  Hold ACE/ ARB due to worsening renal function.  F/U PT and dietary consults. Consider HF team consult   Cm  F/U #3 CE, EKG, I's, O's daily weights, Lytes.  Low salt diet. Fluid restrict 1L / day.  Give O2, Lasix 60 mg IV, nitrate, BB.  Hold ACE/ ARB due to worsening renal function.  F/U PT and dietary consults. In the setting of worsening renal failure  Consider HF team consult   Cm  F/U #3 CE, EKG, TSH, I's, O's daily weights, Lytes.  Low salt diet. Fluid restrict 1L / day.  Give O2, Lasix 60 mg IV, nitrate, BB.  Hold ACE/ ARB due to worsening renal function.  F/U PT and dietary consults.  HOB elevation

## 2019-01-17 NOTE — H&P ADULT - NSHPLABSRESULTS_GEN_ALL_CORE
CXR Preliminary = Pulmonary edema. Awaiting for CXR results to cross over to [place]  ProBNP= 4578  Lasix 40 mg IV X 1  K+= 5.7 Treated --> 4.9  BUN/ Creatinine= 46/ 2.15  PT/ INR = 15.5/ 1.38  Glucose = 170  H &H = 10/ 32   EKG SR @ 83b/ min , 1 st AVB, TWI 1,2, V5 V6, QW V3, QT/QTC= 380/ 446   Trop = 83--> 76     12/14/18--TTE - -EF= 20 to 25%  1. Mitral annular calcification, otherwise normal mitral  valve. Moderate-severe mitral regurgitation.  2. Moderately dilated left atrium.  LA volume index = 45  cc/m2.  3. Normal left ventricular internal dimensions and wall  thicknesses.  4. Severe global left ventricular systolic dysfunction.  Endocardial visualization enhanced with intravenous  injection of echo contrast (Definity).  5. Severe diastolic dysfunction.  6. Right ventricular enlargement with decreased right  ventricular systolic function.  7. Estimated right ventricular systolic pressure equals 49  mm Hg, assuming right atrial pressure equals 10 mm Hg,  consistent with mild pulmonary hypertension.    12/10/18-- Renal Sono  *  Sonographically normal kidneys.  *  No hydronephrosis.

## 2019-01-17 NOTE — ADVANCED PRACTICE NURSE CONSULT - RECOMMEDATIONS
Recommend follow up care at [facility] ([phone], [address] [place] [address]).     Left groin: Cleanse with SAF-clens, rinse with NS, pat dry. Apply Liquid barrier film to periwound skin. Apply AG aquacel, cover with foam with border. Change daily.    Left medial thigh and medial lower leg: Cleanse with Ns, pat dry. Apply Liquid barrier film to periwound skin. Apply foam with border. Change daily.    Low air loss bed therapy, continue heel elevation while in bed, continue to turn & reposition q2h,  continue measures to decrease friction/shear/pressure.     Please call wound care service line is further assistance is needed ([phone]).

## 2019-01-17 NOTE — CONSULT NOTE ADULT - PROBLEM SELECTOR RECOMMENDATION 9
Pt with Pt with KESHAV in the setting of ADHF. On review of previous records in [facility], patient had normal Scr. from 4/26/16 to 9/24/18. Pt had previous admission at [facility] from 12/7/18 to 12/27/18 where he had an episode of KESHAV that resolved. SCr on discharge 1.85 on 1/25/18. At admission SCr elevated to 2.15 on 1/16/19 and stable to 2.05 today 1/17/19.   Pt on lasix IV daily. Recommend to increase lasix 40 mg IV BID.   Check UA, urine electrolytes (Na, K, Cl, urea, Osm, creatinine), US Kidney.   Monitor UOP and daily weights. Avoid volume depletion, NSAIDs, ARB/ACE-I. Dose medications as per eGFR. Pt with KESHAV in the setting of ADHF. On review of previous records in [facility], patient had normal Scr. from 4/26/16 to 9/24/18. Pt had previous admission at [facility] from 12/7/18 to 12/27/18 where he had an episode of KESHAV that resolved. SCr on discharge 1.85 on 1/25/18. At admission SCr elevated to 2.15 on 1/16/19 and stable to 2.05 today 1/17/19.   Continue on lasix IV. Check UA, urine electrolytes (Na, K, Cl, urea, Osm, creatinine), US Kidney.   Monitor UOP and daily weights. Avoid volume depletion, NSAIDs, ARB/ACE-I. Dose medications as per eGFR.

## 2019-01-17 NOTE — ED PROVIDER NOTE - OBJECTIVE STATEMENT
68M w/ PMHx CHF, CAD s/p CABG and stent, DM, PAD p/w abdominal swelling and shortness of breath. Worsening SOB for past few days. Pt doubled her torsemide dose today w/o relief. No known fevers or chills. Denies LE swelling. No chest pain.

## 2019-01-17 NOTE — H&P ADULT - NEGATIVE OPHTHALMOLOGIC SYMPTOMS
no lacrimation R/no lacrimation L/no blurred vision R/no discharge R/no blurred vision L/no discharge L/no photophobia

## 2019-01-17 NOTE — H&P ADULT - PROBLEM SELECTOR PLAN 4
F/U A1C.  FS QID  Continue Lantus, HISS.  DM diet. Glucose on CMP = 170  Last HgbA1c = 7.7 in 12/2018  FS QID  Continue Lantus, HISS.  DM diet.

## 2019-01-17 NOTE — H&P ADULT - PROBLEM SELECTOR PLAN 6
Continue ASA, BB, Nitrate.  Hold Statin due to Icterus. Shortness of breath, but no complaining of chest pain  ECG = sinus w/ 1st degree AV block at 83 bpm; QTc = 446  Continue ASA, BB, Nitrate.  Hold Statin due to Icterus.  f/u A lab-work

## 2019-01-17 NOTE — H&P ADULT - PROBLEM SELECTOR PLAN 3
12/10/18-- Renal Sono  *  Sonographically normal kidneys.  *  No hydronephrosis.  Hold nephrotoxic agents.   Monitor Creatinine.  Continue Sevelamer.   Consider renal consult 12/10/18-- Renal Sono  *  Sonographically normal kidneys.  *  No hydronephrosis.  Hold nephrotoxic agents.   Monitor Creatinine.  Continue Sevelamer.   Would obtain renal consult

## 2019-01-17 NOTE — CONSULT NOTE ADULT - SUBJECTIVE AND OBJECTIVE BOX
[facility] DIVISION OF KIDNEY DISEASES AND HYPERTENSION -- [phone]  -- INITIAL CONSULT NOTE  --------------------------------------------------------------------------------  HPI:        PAST HISTORY  --------------------------------------------------------------------------------  PAST MEDICAL & SURGICAL HISTORY:  Chronic congestive heart failure, unspecified congestive heart failure type  PAD (peripheral artery disease)  Stented coronary artery  Hyperlipidemia  Diabetes  Hypertension  CAD (coronary artery disease)  S/P bypass graft of extremity: Left LE  9/2018  S/P amputation: right great toe and 4th and 5th digit  S/P bypass graft of extremity: RLE  S/P angioplasty with stent: about 5 years ago  S/P CABG x 3    FAMILY HISTORY:  No family history of cancer (Father)  Coronary artery disease (Mother)    PAST SOCIAL HISTORY:    ALLERGIES & MEDICATIONS  --------------------------------------------------------------------------------  Allergies    No Known Allergies    Intolerances      Standing Inpatient Medications  aspirin enteric coated 81 milliGRAM(s) Oral daily  carvedilol 6.25 milliGRAM(s) Oral every 12 hours  dextrose 5%. 1000 milliLiter(s) IV Continuous <Continuous>  dextrose 50% Injectable 12.5 Gram(s) IV Push once  dextrose 50% Injectable 25 Gram(s) IV Push once  dextrose 50% Injectable 25 Gram(s) IV Push once  docusate sodium 100 milliGRAM(s) Oral daily  heparin  Injectable 5000 Unit(s) SubCutaneous every 12 hours  hydrALAZINE 25 milliGRAM(s) Oral every 8 hours  insulin glargine Injectable (LANTUS) 30 Unit(s) SubCutaneous at bedtime  insulin lispro (HumaLOG) corrective regimen sliding scale   SubCutaneous three times a day before meals  insulin lispro (HumaLOG) corrective regimen sliding scale   SubCutaneous at bedtime  isosorbide   dinitrate Tablet (ISORDIL) 10 milliGRAM(s) Oral three times a day  senna 2 Tablet(s) Oral at bedtime  sevelamer hydrochloride 800 milliGRAM(s) Oral three times a day  sodium chloride 0.9% lock flush 3 milliLiter(s) IV Push every 8 hours  spironolactone 25 milliGRAM(s) Oral daily    PRN Inpatient Medications  calamine Lotion 1 Application(s) Topical daily PRN  dextrose 40% Gel 15 Gram(s) Oral once PRN  glucagon  Injectable 1 milliGRAM(s) IntraMuscular once PRN  guaiFENesin   Syrup  (Sugar-Free) 200 milliGRAM(s) Oral every 6 hours PRN  oxyCODONE    IR 5 milliGRAM(s) Oral every 4 hours PRN      REVIEW OF SYSTEMS  --------------------------------------------------------------------------------  Gen: No fevers/chills  Skin: No rashes  Head/Eyes/Ears: Normal hearing,   Respiratory: No dyspnea, cough  CV: No chest pain  GI: No abdominal pain, diarrhea  : No dysuria, hematuria  MSK: No  edema  Heme: No easy bruising or bleeding  Psych: No significant depression    All other systems were reviewed and are negative, except as noted.    VITALS/PHYSICAL EXAM  --------------------------------------------------------------------------------  T(C): 36.6 (01-17-19 @ 13:50), Max: 37 (01-16-19 @ 23:40)  HR: 83 (01-17-19 @ 13:50) (78 - 83)  BP: 110/58 (01-17-19 @ 13:50) (98/54 - 129/68)  RR: 17 (01-17-19 @ 13:50) (17 - 18)  SpO2: 99% (01-17-19 @ 13:50) (96% - 100%)  Wt(kg): --  Height (cm): 167.64 (01-17-19 @ 04:44)  Weight (kg): 78 (01-17-19 @ 04:44)  BMI (kg/m2): 27.8 (01-17-19 @ 04:44)  BSA (m2): 1.88 (01-17-19 @ 04:44)      Physical Exam:  	Gen: NAD  	HEENT: MMM  	Pulm: CTA B/L  	CV: S1S2  	Abd: Soft, +BS   	Ext: No LE edema B/L  	Neuro: Awake  	Skin: Warm and dry  	Vascular access:    LABS/STUDIES  --------------------------------------------------------------------------------              10.0   5.67  >-----------<  218      [01-17-19 @ 06:34]              32.5     132  |  93  |  47  ----------------------------<  121      [01-17-19 @ 06:34]  4.7   |  26  |  2.05        Ca     8.6     [01-17-19 @ 06:34]      Mg     2.4     [01-17-19 @ 06:34]      Phos  4.2     [01-17-19 @ 06:34]    TPro  x   /  Alb  x   /  TBili  x   /  DBili  x   /  AST  42  /  ALT  50  /  AlkPhos  x   [01-17-19 @ 06:34]    PT/INR: PT 15.3 , INR 1.33       [01-17-19 @ 06:34]  PTT: 39.3       [01-17-19 @ 06:34]    CK 89      [01-17-19 @ 06:34]    Creatinine Trend:  SCr 2.05 [01-17 @ 06:34]  SCr 2.15 [01-16 @ 23:45]  SCr 1.85 [12-25 @ 06:45]  SCr 1.95 [12-24 @ 07:50]  SCr 1.95 [12-23 @ 07:35]    Urinalysis - [09-18-18 @ 07:24]      Color Light Yellow / Appearance Clear / SG 1.008 / pH 6.5      Gluc Negative / Ketone Negative  / Bili Negative / Urobili Negative       Blood Negative / Protein Negative / Leuk Est Negative / Nitrite Negative      RBC  / WBC  / Hyaline  / Gran  / Sq Epi  / Non Sq Epi  / Bacteria       HbA1c 7.8      [01-17-19 @ 06:34]  TSH 3.72      [01-17-19 @ 06:34]  Lipid: chol 95, TG 47, HDL 33, LDL 59      [01-17-19 @ 06:34] [facility] DIVISION OF KIDNEY DISEASES AND HYPERTENSION -- [phone]  -- INITIAL CONSULT NOTE  --------------------------------------------------------------------------------  HPI: 69 yo male with medical history of b/L LE bypass and b/L LE multiple toe amputations, DM2, HFrEF (EF 20%), HTN, Dyslipidemia, CAD, 3v CABG, PVD, groin  sartorius flap, and vac placement, L LE with surgical scar wound admitted for worsening SOB. Nephrology consulted for KESHAV. Pt had previous admission at [facility] from 12/7/18 to 12/27/18         PAST HISTORY  --------------------------------------------------------------------------------  PAST MEDICAL & SURGICAL HISTORY:  Chronic congestive heart failure, unspecified congestive heart failure type  PAD (peripheral artery disease)  Stented coronary artery  Hyperlipidemia  Diabetes  Hypertension  CAD (coronary artery disease)  S/P bypass graft of extremity: Left LE  9/2018  S/P amputation: right great toe and 4th and 5th digit  S/P bypass graft of extremity: RLE  S/P angioplasty with stent: about 5 years ago  S/P CABG x 3    FAMILY HISTORY:  No family history of cancer (Father)  Coronary artery disease (Mother)    PAST SOCIAL HISTORY:    ALLERGIES & MEDICATIONS  --------------------------------------------------------------------------------  Allergies    No Known Allergies    Intolerances      Standing Inpatient Medications  aspirin enteric coated 81 milliGRAM(s) Oral daily  carvedilol 6.25 milliGRAM(s) Oral every 12 hours  dextrose 5%. 1000 milliLiter(s) IV Continuous <Continuous>  dextrose 50% Injectable 12.5 Gram(s) IV Push once  dextrose 50% Injectable 25 Gram(s) IV Push once  dextrose 50% Injectable 25 Gram(s) IV Push once  docusate sodium 100 milliGRAM(s) Oral daily  heparin  Injectable 5000 Unit(s) SubCutaneous every 12 hours  hydrALAZINE 25 milliGRAM(s) Oral every 8 hours  insulin glargine Injectable (LANTUS) 30 Unit(s) SubCutaneous at bedtime  insulin lispro (HumaLOG) corrective regimen sliding scale   SubCutaneous three times a day before meals  insulin lispro (HumaLOG) corrective regimen sliding scale   SubCutaneous at bedtime  isosorbide   dinitrate Tablet (ISORDIL) 10 milliGRAM(s) Oral three times a day  senna 2 Tablet(s) Oral at bedtime  sevelamer hydrochloride 800 milliGRAM(s) Oral three times a day  sodium chloride 0.9% lock flush 3 milliLiter(s) IV Push every 8 hours  spironolactone 25 milliGRAM(s) Oral daily    PRN Inpatient Medications  calamine Lotion 1 Application(s) Topical daily PRN  dextrose 40% Gel 15 Gram(s) Oral once PRN  glucagon  Injectable 1 milliGRAM(s) IntraMuscular once PRN  guaiFENesin   Syrup  (Sugar-Free) 200 milliGRAM(s) Oral every 6 hours PRN  oxyCODONE    IR 5 milliGRAM(s) Oral every 4 hours PRN      REVIEW OF SYSTEMS  --------------------------------------------------------------------------------  Gen: No fevers/chills  Skin: No rashes  Head/Eyes/Ears: Normal hearing,   Respiratory: No dyspnea, cough  CV: No chest pain  GI: No abdominal pain, diarrhea  : No dysuria, hematuria  MSK: No  edema  Heme: No easy bruising or bleeding  Psych: No significant depression    All other systems were reviewed and are negative, except as noted.    VITALS/PHYSICAL EXAM  --------------------------------------------------------------------------------  T(C): 36.6 (01-17-19 @ 13:50), Max: 37 (01-16-19 @ 23:40)  HR: 83 (01-17-19 @ 13:50) (78 - 83)  BP: 110/58 (01-17-19 @ 13:50) (98/54 - 129/68)  RR: 17 (01-17-19 @ 13:50) (17 - 18)  SpO2: 99% (01-17-19 @ 13:50) (96% - 100%)  Wt(kg): --  Height (cm): 167.64 (01-17-19 @ 04:44)  Weight (kg): 78 (01-17-19 @ 04:44)  BMI (kg/m2): 27.8 (01-17-19 @ 04:44)  BSA (m2): 1.88 (01-17-19 @ 04:44)      Physical Exam:  	Gen: NAD  	HEENT: MMM  	Pulm: CTA B/L  	CV: S1S2  	Abd: Soft, +BS   	Ext: No LE edema B/L  	Neuro: Awake  	Skin: Warm and dry  	Vascular access:    LABS/STUDIES  --------------------------------------------------------------------------------              10.0   5.67  >-----------<  218      [01-17-19 @ 06:34]              32.5     132  |  93  |  47  ----------------------------<  121      [01-17-19 @ 06:34]  4.7   |  26  |  2.05        Ca     8.6     [01-17-19 @ 06:34]      Mg     2.4     [01-17-19 @ 06:34]      Phos  4.2     [01-17-19 @ 06:34]    TPro  x   /  Alb  x   /  TBili  x   /  DBili  x   /  AST  42  /  ALT  50  /  AlkPhos  x   [01-17-19 @ 06:34]    PT/INR: PT 15.3 , INR 1.33       [01-17-19 @ 06:34]  PTT: 39.3       [01-17-19 @ 06:34]    CK 89      [01-17-19 @ 06:34]    Creatinine Trend:  SCr 2.05 [01-17 @ 06:34]  SCr 2.15 [01-16 @ 23:45]  SCr 1.85 [12-25 @ 06:45]  SCr 1.95 [12-24 @ 07:50]  SCr 1.95 [12-23 @ 07:35]    Urinalysis - [09-18-18 @ 07:24]      Color Light Yellow / Appearance Clear / SG 1.008 / pH 6.5      Gluc Negative / Ketone Negative  / Bili Negative / Urobili Negative       Blood Negative / Protein Negative / Leuk Est Negative / Nitrite Negative      RBC  / WBC  / Hyaline  / Gran  / Sq Epi  / Non Sq Epi  / Bacteria       HbA1c 7.8      [01-17-19 @ 06:34]  TSH 3.72      [01-17-19 @ 06:34]  Lipid: chol 95, TG 47, HDL 33, LDL 59      [01-17-19 @ 06:34] [facility] DIVISION OF KIDNEY DISEASES AND HYPERTENSION -- [phone]  -- INITIAL CONSULT NOTE  --------------------------------------------------------------------------------  HPI: 69 yo male with medical history of b/L LE bypass and b/L LE multiple toe amputations, DM2, HFrEF (EF 20%), HTN, Dyslipidemia, CAD, 3v CABG, PVD, groin  sartorius flap, and vac placement, L LE with surgical scar wound admitted for worsening SOB. Nephrology consulted for KESHAV. On review of previous records in [facility]/[place], patient had normal Scr. from 4/26/16 to 9/24/18. Pt had previous admission at [facility] from 12/7/18 to 12/27/18 where he had an episode of KESHAV that resolved. SCr on discharge 1.85 on 1/25/18.   Pt was seen and examined at bedside, reports feeling SOB.         PAST HISTORY  --------------------------------------------------------------------------------  PAST MEDICAL & SURGICAL HISTORY:  Chronic congestive heart failure, unspecified congestive heart failure type  PAD (peripheral artery disease)  Stented coronary artery  Hyperlipidemia  Diabetes  Hypertension  CAD (coronary artery disease)  S/P bypass graft of extremity: Left LE  9/2018  S/P amputation: right great toe and 4th and 5th digit  S/P bypass graft of extremity: RLE  S/P angioplasty with stent: about 5 years ago  S/P CABG x 3    FAMILY HISTORY:  No family history of cancer (Father)  Coronary artery disease (Mother)    PAST SOCIAL HISTORY:    ALLERGIES & MEDICATIONS  --------------------------------------------------------------------------------  Allergies    No Known Allergies    Intolerances      Standing Inpatient Medications  aspirin enteric coated 81 milliGRAM(s) Oral daily  carvedilol 6.25 milliGRAM(s) Oral every 12 hours  dextrose 5%. 1000 milliLiter(s) IV Continuous <Continuous>  dextrose 50% Injectable 12.5 Gram(s) IV Push once  dextrose 50% Injectable 25 Gram(s) IV Push once  dextrose 50% Injectable 25 Gram(s) IV Push once  docusate sodium 100 milliGRAM(s) Oral daily  heparin  Injectable 5000 Unit(s) SubCutaneous every 12 hours  hydrALAZINE 25 milliGRAM(s) Oral every 8 hours  insulin glargine Injectable (LANTUS) 30 Unit(s) SubCutaneous at bedtime  insulin lispro (HumaLOG) corrective regimen sliding scale   SubCutaneous three times a day before meals  insulin lispro (HumaLOG) corrective regimen sliding scale   SubCutaneous at bedtime  isosorbide   dinitrate Tablet (ISORDIL) 10 milliGRAM(s) Oral three times a day  senna 2 Tablet(s) Oral at bedtime  sevelamer hydrochloride 800 milliGRAM(s) Oral three times a day  sodium chloride 0.9% lock flush 3 milliLiter(s) IV Push every 8 hours  spironolactone 25 milliGRAM(s) Oral daily    PRN Inpatient Medications  calamine Lotion 1 Application(s) Topical daily PRN  dextrose 40% Gel 15 Gram(s) Oral once PRN  glucagon  Injectable 1 milliGRAM(s) IntraMuscular once PRN  guaiFENesin   Syrup  (Sugar-Free) 200 milliGRAM(s) Oral every 6 hours PRN  oxyCODONE    IR 5 milliGRAM(s) Oral every 4 hours PRN      REVIEW OF SYSTEMS  --------------------------------------------------------------------------------  Gen: No fevers/chills  Skin: No rashes  Head/Eyes/Ears: Normal hearing,   Respiratory: +dyspnea, cough  CV: No chest pain  GI: No abdominal pain, diarrhea  : No dysuria, hematuria  MSK: +  edema    All other systems were reviewed and are negative, except as noted.    VITALS/PHYSICAL EXAM  --------------------------------------------------------------------------------  T(C): 36.6 (01-17-19 @ 13:50), Max: 37 (01-16-19 @ 23:40)  HR: 83 (01-17-19 @ 13:50) (78 - 83)  BP: 110/58 (01-17-19 @ 13:50) (98/54 - 129/68)  RR: 17 (01-17-19 @ 13:50) (17 - 18)  SpO2: 99% (01-17-19 @ 13:50) (96% - 100%)  Wt(kg): --  Height (cm): 167.64 (01-17-19 @ 04:44)  Weight (kg): 78 (01-17-19 @ 04:44)  BMI (kg/m2): 27.8 (01-17-19 @ 04:44)  BSA (m2): 1.88 (01-17-19 @ 04:44)      Physical Exam:  	Gen: mild tachypnea   	HEENT: MMM  	Pulm: decrease breath sounds and rales B/L  	CV: S1S2  	Abd: Soft, +BS   	Ext: pitting LE edema B/L  	Neuro: Awake  	Skin: Warm and dry    LABS/STUDIES  --------------------------------------------------------------------------------              10.0   5.67  >-----------<  218      [01-17-19 @ 06:34]              32.5     132  |  93  |  47  ----------------------------<  121      [01-17-19 @ 06:34]  4.7   |  26  |  2.05        Ca     8.6     [01-17-19 @ 06:34]      Mg     2.4     [01-17-19 @ 06:34]      Phos  4.2     [01-17-19 @ 06:34]    TPro  x   /  Alb  x   /  TBili  x   /  DBili  x   /  AST  42  /  ALT  50  /  AlkPhos  x   [01-17-19 @ 06:34]    PT/INR: PT 15.3 , INR 1.33       [01-17-19 @ 06:34]  PTT: 39.3       [01-17-19 @ 06:34]    CK 89      [01-17-19 @ 06:34]    Creatinine Trend:  SCr 2.05 [01-17 @ 06:34]  SCr 2.15 [01-16 @ 23:45]  SCr 1.85 [12-25 @ 06:45]  SCr 1.95 [12-24 @ 07:50]  SCr 1.95 [12-23 @ 07:35] [facility] DIVISION OF KIDNEY DISEASES AND HYPERTENSION -- [phone]  -- INITIAL CONSULT NOTE  --------------------------------------------------------------------------------  HPI: 69 yo male with medical history of b/L LE bypass and b/L LE multiple toe amputations, DM2, HFrEF (EF 20%), HTN, Dyslipidemia, CAD, 3v CABG, PVD, groin  sartorius flap, and vac placement, L LE with surgical scar wound admitted for worsening SOB. Nephrology consulted for KESHAV. On review of previous records in [facility]/[place], patient had normal Scr. from 4/26/16 to 9/24/18. Pt had previous admission at [facility] from 12/7/18 to 12/27/18 where he had an episode of KESHAV that resolved. SCr on discharge 1.85 on 1/25/18. At admission SCr elevated to 2.15 on 1/16/19 and stable to 2.05 today 1/17/19. Currently been treated for ADHF with IV lasix  Pt was seen and examined at bedside, reports feeling SOB. Denies CP, abdominal pain.     PAST HISTORY  --------------------------------------------------------------------------------  PAST MEDICAL & SURGICAL HISTORY:  Chronic congestive heart failure, unspecified congestive heart failure type  PAD (peripheral artery disease)  Stented coronary artery  Hyperlipidemia  Diabetes  Hypertension  CAD (coronary artery disease)  S/P bypass graft of extremity: Left LE  9/2018  S/P amputation: right great toe and 4th and 5th digit  S/P bypass graft of extremity: RLE  S/P angioplasty with stent: about 5 years ago  S/P CABG x 3    FAMILY HISTORY:  No family history of cancer (Father)  Coronary artery disease (Mother)    PAST SOCIAL HISTORY:    ALLERGIES & MEDICATIONS  --------------------------------------------------------------------------------  Allergies    No Known Allergies    Intolerances      Standing Inpatient Medications  aspirin enteric coated 81 milliGRAM(s) Oral daily  carvedilol 6.25 milliGRAM(s) Oral every 12 hours  dextrose 5%. 1000 milliLiter(s) IV Continuous <Continuous>  dextrose 50% Injectable 12.5 Gram(s) IV Push once  dextrose 50% Injectable 25 Gram(s) IV Push once  dextrose 50% Injectable 25 Gram(s) IV Push once  docusate sodium 100 milliGRAM(s) Oral daily  heparin  Injectable 5000 Unit(s) SubCutaneous every 12 hours  hydrALAZINE 25 milliGRAM(s) Oral every 8 hours  insulin glargine Injectable (LANTUS) 30 Unit(s) SubCutaneous at bedtime  insulin lispro (HumaLOG) corrective regimen sliding scale   SubCutaneous three times a day before meals  insulin lispro (HumaLOG) corrective regimen sliding scale   SubCutaneous at bedtime  isosorbide   dinitrate Tablet (ISORDIL) 10 milliGRAM(s) Oral three times a day  senna 2 Tablet(s) Oral at bedtime  sevelamer hydrochloride 800 milliGRAM(s) Oral three times a day  sodium chloride 0.9% lock flush 3 milliLiter(s) IV Push every 8 hours  spironolactone 25 milliGRAM(s) Oral daily    PRN Inpatient Medications  calamine Lotion 1 Application(s) Topical daily PRN  dextrose 40% Gel 15 Gram(s) Oral once PRN  glucagon  Injectable 1 milliGRAM(s) IntraMuscular once PRN  guaiFENesin   Syrup  (Sugar-Free) 200 milliGRAM(s) Oral every 6 hours PRN  oxyCODONE    IR 5 milliGRAM(s) Oral every 4 hours PRN      REVIEW OF SYSTEMS  --------------------------------------------------------------------------------  Gen: No fevers/chills  Skin: No rashes  Head/Eyes/Ears: Normal hearing,   Respiratory: +dyspnea, cough  CV: No chest pain  GI: No abdominal pain, diarrhea  : No dysuria, hematuria  MSK: +  edema    All other systems were reviewed and are negative, except as noted.    VITALS/PHYSICAL EXAM  --------------------------------------------------------------------------------  T(C): 36.6 (01-17-19 @ 13:50), Max: 37 (01-16-19 @ 23:40)  HR: 83 (01-17-19 @ 13:50) (78 - 83)  BP: 110/58 (01-17-19 @ 13:50) (98/54 - 129/68)  RR: 17 (01-17-19 @ 13:50) (17 - 18)  SpO2: 99% (01-17-19 @ 13:50) (96% - 100%)  Wt(kg): --  Height (cm): 167.64 (01-17-19 @ 04:44)  Weight (kg): 78 (01-17-19 @ 04:44)  BMI (kg/m2): 27.8 (01-17-19 @ 04:44)  BSA (m2): 1.88 (01-17-19 @ 04:44)      Physical Exam:  	Gen: mild tachypnea   	HEENT: MMM  	Pulm: decrease breath sounds and rales B/L  	CV: S1S2  	Abd: Soft, +BS   	Ext: pitting LE edema B/L  	Neuro: Awake  	Skin: Warm and dry    LABS/STUDIES  --------------------------------------------------------------------------------              10.0   5.67  >-----------<  218      [01-17-19 @ 06:34]              32.5     132  |  93  |  47  ----------------------------<  121      [01-17-19 @ 06:34]  4.7   |  26  |  2.05        Ca     8.6     [01-17-19 @ 06:34]      Mg     2.4     [01-17-19 @ 06:34]      Phos  4.2     [01-17-19 @ 06:34]    TPro  x   /  Alb  x   /  TBili  x   /  DBili  x   /  AST  42  /  ALT  50  /  AlkPhos  x   [01-17-19 @ 06:34]    PT/INR: PT 15.3 , INR 1.33       [01-17-19 @ 06:34]  PTT: 39.3       [01-17-19 @ 06:34]    CK 89      [01-17-19 @ 06:34]    Creatinine Trend:  SCr 2.05 [01-17 @ 06:34]  SCr 2.15 [01-16 @ 23:45]  SCr 1.85 [12-25 @ 06:45]  SCr 1.95 [12-24 @ 07:50]  SCr 1.95 [12-23 @ 07:35]

## 2019-01-17 NOTE — CONSULT NOTE ADULT - ASSESSMENT
69 yo male with medical history of b/L LE bypass and b/L LE multiple toe amputations, DM2, HFrEF (EF 20%), HTN, Dyslipidemia, CAD, 3v CABG, PVD, groin  sartorius flap, and vac placement, L LE with surgical scar wound admitted for worsening SOB. Nephrology consulted for KESHAV.

## 2019-01-17 NOTE — H&P ADULT - EXTREMITIES COMMENTS
L LE with 3 surgical scar wounds healing nicely and being treated by [facility] and last seen 1/16.  R LE Foot with amputated great, 4th and 5th toe.  L LE Foot Amputated 2nd toe.

## 2019-01-17 NOTE — ED PROVIDER NOTE - PROGRESS NOTE DETAILS
Sriram Aguirre MD PGY4: CXR unable to cross into pacs viewer, radiology working on issue, xray tech able to show study, +pulmonary edema.

## 2019-01-17 NOTE — ED PROVIDER NOTE - MEDICAL DECISION MAKING DETAILS
pt with likely chf exacerbation with ascites, will check labs, ekg, cxr, diuretics. anticipate admission. I reviewed triage note, pt reports decreased urine output however able to urinate, pt describing distention of abdomen more consistent with previous failure episodes.

## 2019-01-17 NOTE — ED PROVIDER NOTE - CARE PLAN
Principal Discharge DX:	Congestive heart failure Principal Discharge DX:	Congestive heart failure  Secondary Diagnosis:	KESHAV (acute kidney injury)  Secondary Diagnosis:	Hyperkalemia

## 2019-01-18 DIAGNOSIS — I50.23 ACUTE ON CHRONIC SYSTOLIC (CONGESTIVE) HEART FAILURE: ICD-10-CM

## 2019-01-18 DIAGNOSIS — Z71.89 OTHER SPECIFIED COUNSELING: ICD-10-CM

## 2019-01-18 DIAGNOSIS — N17.9 ACUTE KIDNEY FAILURE, UNSPECIFIED: ICD-10-CM

## 2019-01-18 LAB
24R-OH-CALCIDIOL SERPL-MCNC: 22.8 NG/ML — LOW (ref 30–80)
ANION GAP SERPL CALC-SCNC: 13 MMO/L — SIGNIFICANT CHANGE UP (ref 7–14)
ANION GAP SERPL CALC-SCNC: 16 MMO/L — HIGH (ref 7–14)
BUN SERPL-MCNC: 57 MG/DL — HIGH (ref 7–23)
BUN SERPL-MCNC: 62 MG/DL — HIGH (ref 7–23)
CALCIUM SERPL-MCNC: 8.8 MG/DL — SIGNIFICANT CHANGE UP (ref 8.4–10.5)
CALCIUM SERPL-MCNC: 9 MG/DL — SIGNIFICANT CHANGE UP (ref 8.4–10.5)
CHLORIDE SERPL-SCNC: 92 MMOL/L — LOW (ref 98–107)
CHLORIDE SERPL-SCNC: 94 MMOL/L — LOW (ref 98–107)
CO2 SERPL-SCNC: 23 MMOL/L — SIGNIFICANT CHANGE UP (ref 22–31)
CO2 SERPL-SCNC: 25 MMOL/L — SIGNIFICANT CHANGE UP (ref 22–31)
CREAT ?TM UR-MCNC: 117.3 MG/DL — SIGNIFICANT CHANGE UP
CREAT SERPL-MCNC: 2.44 MG/DL — HIGH (ref 0.5–1.3)
CREAT SERPL-MCNC: 2.5 MG/DL — HIGH (ref 0.5–1.3)
GLUCOSE BLDC GLUCOMTR-MCNC: 144 MG/DL — HIGH (ref 70–99)
GLUCOSE BLDC GLUCOMTR-MCNC: 159 MG/DL — HIGH (ref 70–99)
GLUCOSE SERPL-MCNC: 157 MG/DL — HIGH (ref 70–99)
GLUCOSE SERPL-MCNC: 169 MG/DL — HIGH (ref 70–99)
HCT VFR BLD CALC: 32.1 % — LOW (ref 39–50)
HGB BLD-MCNC: 9.9 G/DL — LOW (ref 13–17)
MAGNESIUM SERPL-MCNC: 2.4 MG/DL — SIGNIFICANT CHANGE UP (ref 1.6–2.6)
MCHC RBC-ENTMCNC: 22.1 PG — LOW (ref 27–34)
MCHC RBC-ENTMCNC: 30.8 % — LOW (ref 32–36)
MCV RBC AUTO: 71.8 FL — LOW (ref 80–100)
NRBC # FLD: 0 K/UL — LOW (ref 25–125)
NT-PROBNP SERPL-SCNC: 4995 PG/ML — SIGNIFICANT CHANGE UP
PHOSPHATE SERPL-MCNC: 5.1 MG/DL — HIGH (ref 2.5–4.5)
PLATELET # BLD AUTO: 225 K/UL — SIGNIFICANT CHANGE UP (ref 150–400)
PMV BLD: SIGNIFICANT CHANGE UP FL (ref 7–13)
POTASSIUM SERPL-MCNC: 5.1 MMOL/L — SIGNIFICANT CHANGE UP (ref 3.5–5.3)
POTASSIUM SERPL-MCNC: 5.4 MMOL/L — HIGH (ref 3.5–5.3)
POTASSIUM SERPL-SCNC: 5.1 MMOL/L — SIGNIFICANT CHANGE UP (ref 3.5–5.3)
POTASSIUM SERPL-SCNC: 5.4 MMOL/L — HIGH (ref 3.5–5.3)
RBC # BLD: 4.47 M/UL — SIGNIFICANT CHANGE UP (ref 4.2–5.8)
RBC # FLD: 24.8 % — HIGH (ref 10.3–14.5)
SODIUM SERPL-SCNC: 131 MMOL/L — LOW (ref 135–145)
SODIUM SERPL-SCNC: 132 MMOL/L — LOW (ref 135–145)
WBC # BLD: 7.42 K/UL — SIGNIFICANT CHANGE UP (ref 3.8–10.5)
WBC # FLD AUTO: 7.42 K/UL — SIGNIFICANT CHANGE UP (ref 3.8–10.5)

## 2019-01-18 PROCEDURE — 76770 US EXAM ABDO BACK WALL COMP: CPT | Mod: 26

## 2019-01-18 PROCEDURE — 99232 SBSQ HOSP IP/OBS MODERATE 35: CPT | Mod: GC

## 2019-01-18 PROCEDURE — 99233 SBSQ HOSP IP/OBS HIGH 50: CPT

## 2019-01-18 RX ORDER — FUROSEMIDE 40 MG
40 TABLET ORAL ONCE
Qty: 0 | Refills: 0 | Status: COMPLETED | OUTPATIENT
Start: 2019-01-18 | End: 2019-01-18

## 2019-01-18 RX ORDER — ISOSORBIDE DINITRATE 5 MG/1
10 TABLET ORAL THREE TIMES A DAY
Qty: 0 | Refills: 0 | Status: DISCONTINUED | OUTPATIENT
Start: 2019-01-18 | End: 2019-01-23

## 2019-01-18 RX ORDER — FUROSEMIDE 40 MG
10 TABLET ORAL
Qty: 500 | Refills: 0 | Status: DISCONTINUED | OUTPATIENT
Start: 2019-01-18 | End: 2019-01-19

## 2019-01-18 RX ORDER — SODIUM POLYSTYRENE SULFONATE 4.1 MEQ/G
15 POWDER, FOR SUSPENSION ORAL ONCE
Qty: 0 | Refills: 0 | Status: COMPLETED | OUTPATIENT
Start: 2019-01-18 | End: 2019-01-18

## 2019-01-18 RX ORDER — HYDRALAZINE HCL 50 MG
25 TABLET ORAL EVERY 8 HOURS
Qty: 0 | Refills: 0 | Status: DISCONTINUED | OUTPATIENT
Start: 2019-01-18 | End: 2019-01-23

## 2019-01-18 RX ORDER — CARVEDILOL PHOSPHATE 80 MG/1
6.25 CAPSULE, EXTENDED RELEASE ORAL EVERY 12 HOURS
Qty: 0 | Refills: 0 | Status: DISCONTINUED | OUTPATIENT
Start: 2019-01-18 | End: 2019-01-22

## 2019-01-18 RX ORDER — FUROSEMIDE 40 MG
60 TABLET ORAL DAILY
Qty: 0 | Refills: 0 | Status: DISCONTINUED | OUTPATIENT
Start: 2019-01-18 | End: 2019-01-18

## 2019-01-18 RX ADMIN — SEVELAMER CARBONATE 800 MILLIGRAM(S): 2400 POWDER, FOR SUSPENSION ORAL at 22:00

## 2019-01-18 RX ADMIN — SODIUM CHLORIDE 3 MILLILITER(S): 9 INJECTION INTRAMUSCULAR; INTRAVENOUS; SUBCUTANEOUS at 13:39

## 2019-01-18 RX ADMIN — Medication 25 MILLIGRAM(S): at 13:38

## 2019-01-18 RX ADMIN — INSULIN GLARGINE 30 UNIT(S): 100 INJECTION, SOLUTION SUBCUTANEOUS at 22:00

## 2019-01-18 RX ADMIN — HEPARIN SODIUM 5000 UNIT(S): 5000 INJECTION INTRAVENOUS; SUBCUTANEOUS at 17:41

## 2019-01-18 RX ADMIN — Medication 200 MILLIGRAM(S): at 05:22

## 2019-01-18 RX ADMIN — Medication 5 MG/HR: at 13:37

## 2019-01-18 RX ADMIN — Medication 2: at 17:41

## 2019-01-18 RX ADMIN — CARVEDILOL PHOSPHATE 6.25 MILLIGRAM(S): 80 CAPSULE, EXTENDED RELEASE ORAL at 17:41

## 2019-01-18 RX ADMIN — Medication 40 MILLIGRAM(S): at 13:37

## 2019-01-18 RX ADMIN — OXYCODONE HYDROCHLORIDE 5 MILLIGRAM(S): 5 TABLET ORAL at 05:22

## 2019-01-18 RX ADMIN — Medication 25 MILLIGRAM(S): at 22:00

## 2019-01-18 RX ADMIN — SODIUM CHLORIDE 3 MILLILITER(S): 9 INJECTION INTRAMUSCULAR; INTRAVENOUS; SUBCUTANEOUS at 21:57

## 2019-01-18 RX ADMIN — OXYCODONE HYDROCHLORIDE 5 MILLIGRAM(S): 5 TABLET ORAL at 14:49

## 2019-01-18 RX ADMIN — OXYCODONE HYDROCHLORIDE 5 MILLIGRAM(S): 5 TABLET ORAL at 22:58

## 2019-01-18 RX ADMIN — SEVELAMER CARBONATE 800 MILLIGRAM(S): 2400 POWDER, FOR SUSPENSION ORAL at 13:38

## 2019-01-18 RX ADMIN — OXYCODONE HYDROCHLORIDE 5 MILLIGRAM(S): 5 TABLET ORAL at 06:22

## 2019-01-18 RX ADMIN — ISOSORBIDE DINITRATE 10 MILLIGRAM(S): 5 TABLET ORAL at 22:00

## 2019-01-18 RX ADMIN — SODIUM CHLORIDE 3 MILLILITER(S): 9 INJECTION INTRAMUSCULAR; INTRAVENOUS; SUBCUTANEOUS at 05:17

## 2019-01-18 RX ADMIN — SEVELAMER CARBONATE 800 MILLIGRAM(S): 2400 POWDER, FOR SUSPENSION ORAL at 05:22

## 2019-01-18 RX ADMIN — Medication 200 MILLIGRAM(S): at 11:30

## 2019-01-18 RX ADMIN — OXYCODONE HYDROCHLORIDE 5 MILLIGRAM(S): 5 TABLET ORAL at 13:49

## 2019-01-18 RX ADMIN — Medication 81 MILLIGRAM(S): at 13:38

## 2019-01-18 RX ADMIN — Medication 2: at 09:15

## 2019-01-18 RX ADMIN — SODIUM POLYSTYRENE SULFONATE 15 GRAM(S): 4.1 POWDER, FOR SUSPENSION ORAL at 10:57

## 2019-01-18 RX ADMIN — Medication 100 MILLIGRAM(S): at 13:38

## 2019-01-18 RX ADMIN — ISOSORBIDE DINITRATE 10 MILLIGRAM(S): 5 TABLET ORAL at 13:38

## 2019-01-18 RX ADMIN — OXYCODONE HYDROCHLORIDE 5 MILLIGRAM(S): 5 TABLET ORAL at 21:58

## 2019-01-18 NOTE — DIETITIAN INITIAL EVALUATION ADULT. - PERTINENT LABORATORY DATA
01-18 Na131 mmol/L<L> Glu 169 mg/dL<H> K+ 5.4 mmol/L<H> Cr  2.44 mg/dL<H> BUN 57 mg/dL<H> 01-17 Phos 4.2 mg/dL 01-16 Alb 3.4 g/dL 01-17 [id number] 7.8 %<H> 01-17 Chol 95 mg/dL<L> LDL 59 mg/dL HDL 33 mg/dL<L> Trig 47 mg/dL. POCT: 164, 199, 148,171 mg/dL

## 2019-01-18 NOTE — PROGRESS NOTE ADULT - PROBLEM SELECTOR PLAN 1
Pt with KESHAV in the setting of ADHF. On review of previous records in [facility]/[place], patient had normal Scr. from 4/26/16 to 9/24/18. Pt had previous admission at [facility] from 12/7/18 to 12/27/18 where he had an episode of KESHAV that resolved. SCr on discharge 1.85 on 1/25/18. At admission SCr elevated to 2.15 on 1/16/19 was to 2.05 on 1/17/19, increase to 2.44 today.  Pt. on lasix drip. US Kidney showed increased bilateral renal cortical echogenicity, no hydronephrosis on 1/18/19.   Monitor UOP and daily weights. Avoid nephrotoxins. Dose medications as per eGFR. Pt. with KESHAV in the setting of ADHF. On review of previous records in [facility]/[place], patient with normal Scr levels from 4/26/16 to 9/24/18. Pt. noted to have an episode of KESHAV during previous/recent hospitalization at [facility] (12/7/18 to 12/27/18). On this admission, Scr was elevated at 2.15 on 1/16/19, increased to 2.44 today (1/18/19). Pt. currently on IV Lasix drip. US Kidney showed increased bilateral renal cortical echogenicity, no hydronephrosis on 1/18/19. Monitor labs and urine output. Avoid nephrotoxins. Dose medications as per eGFR

## 2019-01-18 NOTE — DIETITIAN INITIAL EVALUATION ADULT. - ENERGY NEEDS
Ht: 66 inches  Wt: 171.9 BMI: 27.5 kg/m2 IBW: 136 pounds (+/-10%) %IBW: 125%    Edema: + 1 edema- left/right -foot, ankle, knee, leg. Skin: Patient noted with multiple wounds - left groin, left medial thigh, left medial lower leg.

## 2019-01-18 NOTE — PROGRESS NOTE ADULT - SUBJECTIVE AND OBJECTIVE BOX
[facility] DIVISION OF KIDNEY DISEASES AND HYPERTENSION -- FOLLOW UP NOTE  --------------------------------------------------------------------------------  HPI: 69 yo male with medical history of b/L LE bypass and b/L LE multiple toe amputations, DM2, HFrEF (EF 20%), HTN, Dyslipidemia, CAD, 3v CABG, PVD, groin  sartorius flap, and vac placement, L LE with surgical scar wound admitted for worsening SOB. Nephrology consulted for EKSHAV. Currently been treated for ADHF with lasix drip. On review of previous records in [facility]/[place], patient had normal Scr. from 4/26/16 to 9/24/18. Pt had previous admission at [facility] from 12/7/18 to 12/27/18 where he had an episode of KESHAV that resolved. SCr on discharge 1.85 on 1/25/18. At admission SCr elevated to 2.15 on 1/16/19, stable to 2.05 on 1/17/19, uptrend at 2.44 today 1/18/19. Also noted to have serum potassium elevated at 5.4 today on 1/18/19.    Pt was seen and examined at bedside, reports feeling SOB has improved however persistent LE edema. Denies CP, abdominal pain.       PAST HISTORY  --------------------------------------------------------------------------------  No significant changes to PMH, PSH, FHx, SHx, unless otherwise noted    ALLERGIES & MEDICATIONS  --------------------------------------------------------------------------------  Allergies    No Known Allergies    Intolerances      Standing Inpatient Medications  aspirin enteric coated 81 milliGRAM(s) Oral daily  carvedilol 6.25 milliGRAM(s) Oral every 12 hours  dextrose 5%. 1000 milliLiter(s) IV Continuous <Continuous>  dextrose 50% Injectable 12.5 Gram(s) IV Push once  dextrose 50% Injectable 25 Gram(s) IV Push once  dextrose 50% Injectable 25 Gram(s) IV Push once  docusate sodium 100 milliGRAM(s) Oral daily  furosemide Infusion 10 mG/Hr IV Continuous <Continuous>  heparin  Injectable 5000 Unit(s) SubCutaneous every 12 hours  hydrALAZINE 25 milliGRAM(s) Oral every 8 hours  insulin glargine Injectable (LANTUS) 30 Unit(s) SubCutaneous at bedtime  insulin lispro (HumaLOG) corrective regimen sliding scale   SubCutaneous three times a day before meals  insulin lispro (HumaLOG) corrective regimen sliding scale   SubCutaneous at bedtime  isosorbide   dinitrate Tablet (ISORDIL) 10 milliGRAM(s) Oral three times a day  senna 2 Tablet(s) Oral at bedtime  sevelamer hydrochloride 800 milliGRAM(s) Oral three times a day  sodium chloride 0.9% lock flush 3 milliLiter(s) IV Push every 8 hours  spironolactone 25 milliGRAM(s) Oral daily    REVIEW OF SYSTEMS  --------------------------------------------------------------------------------  Gen: No fevers  Respiratory: + dyspnea  CV: No chest pain  GI: No abdominal pain  : No dysuria, hematuria  MSK: +  edema    All other systems were reviewed and are negative, except as noted.    VITALS/PHYSICAL EXAM  --------------------------------------------------------------------------------  T(C): 36.5 (01-18-19 @ 16:12), Max: 36.8 (01-18-19 @ 05:08)  HR: 76 (01-18-19 @ 16:12) (75 - 86)  BP: 125/76 (01-18-19 @ 16:12) (94/72 - 125/76)  RR: 18 (01-18-19 @ 16:12) (18 - 18)  SpO2: 100% (01-18-19 @ 16:12) (98% - 100%)  Wt(kg): --  Height (cm): 167.64 (01-17-19 @ 04:44)  Weight (kg): 78 (01-17-19 @ 04:44)  BMI (kg/m2): 27.8 (01-17-19 @ 04:44)  BSA (m2): 1.88 (01-17-19 @ 04:44)      01-18-19 @ 07:01  -  01-18-19 @ 16:52  --------------------------------------------------------  IN: 0 mL / OUT: 250 mL / NET: -250 mL        Physical Exam:  	Gen: NAD, mild tachypnea  	HEENT: MMM  	Pulm: decrease breath sounds at lung bases B/L  	CV: S1S2  	Abd: Soft, +BS   	Ext: pitting LE edema Left more that right leg. Medial scar.  	Neuro: Awake  	Skin: Warm and dry    LABS/STUDIES  --------------------------------------------------------------------------------              9.9    7.42  >-----------<  225      [01-18-19 @ 06:00]              32.1     131  |  92  |  57  ----------------------------<  169      [01-18-19 @ 06:00]  5.4   |  23  |  2.44        Ca     8.8     [01-18-19 @ 06:00]      Mg     2.4     [01-17-19 @ 06:34]      Phos  4.2     [01-17-19 @ 06:34]    TPro  x   /  Alb  x   /  TBili  x   /  DBili  x   /  AST  42  /  ALT  50  /  AlkPhos  x   [01-17-19 @ 06:34]    PT/INR: PT 15.3 , INR 1.33       [01-17-19 @ 06:34]  PTT: 39.3       [01-17-19 @ 06:34]    CK 89      [01-17-19 @ 06:34]    Creatinine Trend:  SCr 2.44 [01-18 @ 06:00]  SCr 2.05 [01-17 @ 06:34]  SCr 2.15 [01-16 @ 23:45]  SCr 1.85 [12-25 @ 06:45]  SCr 1.95 [12-24 @ 07:50] [facility] DIVISION OF KIDNEY DISEASES AND HYPERTENSION -- FOLLOW UP NOTE  --------------------------------------------------------------------------------  HPI: 68-year-old male with medical history of b/L LE bypass and b/L LE multiple toe amputations, DM2, HFrEF (EF 20%), HTN, Dyslipidemia, CAD, CABG, PVD, groin  sartorius flap, and vac placement, LLE with surgical scar wound admitted for worsening SOB. Nephrology consulted for KESHAV. Currently been treated for ADHF with lasix drip. On review of previous records in [facility]/[place], patient had normal Scr levels from 4/26/16 to 9/24/18. Pt. noted to have an episode of KESHAV during previous/recent hospitalization at [facility] (12/7/18 to 12/27/18). On this admission, Scr was elevated at 2.15 on 1/16/19, increased to 2.44 today (1/18/19). Also noted to have serum potassium elevated at 5.4 today (1/18/19).    Calixto was seen and examined at bedside, reports feeling SOB has improved however persistent B/L LE edema. Denies CP, abdominal pain.     PAST HISTORY  --------------------------------------------------------------------------------  No significant changes to PMH, PSH, FHx, SHx, unless otherwise noted    ALLERGIES & MEDICATIONS  --------------------------------------------------------------------------------  Allergies    No Known Allergies    Intolerances    Standing Inpatient Medications  aspirin enteric coated 81 milliGRAM(s) Oral daily  carvedilol 6.25 milliGRAM(s) Oral every 12 hours  dextrose 5%. 1000 milliLiter(s) IV Continuous <Continuous>  dextrose 50% Injectable 12.5 Gram(s) IV Push once  dextrose 50% Injectable 25 Gram(s) IV Push once  dextrose 50% Injectable 25 Gram(s) IV Push once  docusate sodium 100 milliGRAM(s) Oral daily  furosemide Infusion 10 mG/Hr IV Continuous <Continuous>  heparin  Injectable 5000 Unit(s) SubCutaneous every 12 hours  hydrALAZINE 25 milliGRAM(s) Oral every 8 hours  insulin glargine Injectable (LANTUS) 30 Unit(s) SubCutaneous at bedtime  insulin lispro (HumaLOG) corrective regimen sliding scale   SubCutaneous three times a day before meals  insulin lispro (HumaLOG) corrective regimen sliding scale   SubCutaneous at bedtime  isosorbide   dinitrate Tablet (ISORDIL) 10 milliGRAM(s) Oral three times a day  senna 2 Tablet(s) Oral at bedtime  sevelamer hydrochloride 800 milliGRAM(s) Oral three times a day  sodium chloride 0.9% lock flush 3 milliLiter(s) IV Push every 8 hours  spironolactone 25 milliGRAM(s) Oral daily    REVIEW OF SYSTEMS  --------------------------------------------------------------------------------  Gen: No fevers  Respiratory: + dyspnea  CV: No chest pain  GI: No abdominal pain  : No dysuria, hematuria  MSK: +  edema    All other systems were reviewed and are negative, except as noted.    VITALS/PHYSICAL EXAM  --------------------------------------------------------------------------------  YASMINE): 36.5 (01-18-19 @ 16:12), Max: 36.8 (01-18-19 @ 05:08)  HR: 76 (01-18-19 @ 16:12) (75 - 86)  BP: 125/76 (01-18-19 @ 16:12) (94/72 - 125/76)  RR: 18 (01-18-19 @ 16:12) (18 - 18)  SpO2: 100% (01-18-19 @ 16:12) (98% - 100%)  Wt(kg): --  Height (cm): 167.64 (01-17-19 @ 04:44)  Weight (kg): 78 (01-17-19 @ 04:44)  BMI (kg/m2): 27.8 (01-17-19 @ 04:44)  BSA (m2): 1.88 (01-17-19 @ 04:44)    01-18-19 @ 07:01  -  01-18-19 @ 16:52  --------------------------------------------------------  IN: 0 mL / OUT: 250 mL / NET: -250 mL      Physical Exam:  	Gen: resting, NAD  	HEENT: No JVD  	Pulm: decreased breath sounds at lung bases B/L  	CV: S1S2  	Abd: Soft, +BS   	Ext: pitting LE edema Left more that right leg. Medial scar.  	Neuro: Awake  	Skin: Warm and dry    LABS/STUDIES  --------------------------------------------------------------------------------              9.9    7.42  >-----------<  225      [01-18-19 @ 06:00]              32.1     131  |  92  |  57  ----------------------------<  169      [01-18-19 @ 06:00]  5.4   |  23  |  2.44        Ca     8.8     [01-18-19 @ 06:00]      Mg     2.4     [01-17-19 @ 06:34]      Phos  4.2     [01-17-19 @ 06:34]    TPro  x   /  Alb  x   /  TBili  x   /  DBili  x   /  AST  42  /  ALT  50  /  AlkPhos  x   [01-17-19 @ 06:34]    CK 89      [01-17-19 @ 06:34]    Creatinine Trend:  SCr 2.44 [01-18 @ 06:00]  SCr 2.05 [01-17 @ 06:34]  SCr 2.15 [01-16 @ 23:45]  SCr 1.85 [12-25 @ 06:45]  SCr 1.95 [12-24 @ 07:50]

## 2019-01-18 NOTE — CONSULT NOTE ADULT - ASSESSMENT
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 His last admission from 12/8-12/27 required CCU admission and placement on dobutamine. He has had 3 admissions in 2018, for various complications from DM.  He comes in this time due to worsening SOB. He admits to 2 pillow orthopnea, frequent PND and ORTA after 1/2 block and walking up 5 steps. No CP, palpitations, dizziness. Patient's son by bedside.  His dry weight s/p last hospitalization was 132 lbs.      NYHA class IV. Moderately hypervolemic.

## 2019-01-18 NOTE — DIETITIAN INITIAL EVALUATION ADULT. - PROBLEM SELECTOR PLAN 3
12/10/18-- Renal Sono  *  Sonographically normal kidneys.  *  No hydronephrosis.  Hold nephrotoxic agents.   Monitor Creatinine.  Continue Sevelamer.   Would obtain renal consult

## 2019-01-18 NOTE — DIETITIAN INITIAL EVALUATION ADULT. - ADHERENCE
Tries to monitor carbohydrate intake- Monitors BGs twice a day usually in the AM and PM. Usual BG range 95 -200 mg/dL 1/17 Hba1c: 7.8%

## 2019-01-18 NOTE — CONSULT NOTE ADULT - PROBLEM SELECTOR RECOMMENDATION 2
Secondary to renal venous congestion.   If continues to worsen, and is hypotensive, may need inotrope.

## 2019-01-18 NOTE — PROGRESS NOTE ADULT - PROBLEM SELECTOR PLAN 2
Pt with hyperkalemia in the setting of KESHAV, spironolactone use. serum potassium elevated to 5.4 today. Received medical management. Pt. on lasix drip. Consider holding spironolactone. Low potassium diet. Monitor serum potassium. Pt. with hyperkalemia in the setting of KESHAV and spironolactone use. Serum potassium elevated at 5.4 today. Received medical management. Pt. on IV lasix drip. Recommend to hold spironolactone. Low potassium diet. Monitor serum potassium

## 2019-01-18 NOTE — DIETITIAN INITIAL EVALUATION ADULT. - NS AS NUTRI INTERV ED CONTENT
RD provided the patient with extensive verbal  DM diet education; including, carb counting, label reading, meal planning, pre-prandial and post-prandial finger stick goals, and HbA1c goal. Patient was also made aware of the physiological implications of poor glycemic control. The Patient was provided with Heart Healthy diet education (low sodium, low cholesterol, "good fats" vs "bad fats," fiber, label reading, meal planning, and daily weight monitoring).

## 2019-01-18 NOTE — DIETITIAN INITIAL EVALUATION ADULT. - PERTINENT MEDS FT
MEDICATIONS  (STANDING):  aspirin enteric coated 81 milliGRAM(s) Oral daily  carvedilol 6.25 milliGRAM(s) Oral every 12 hours  dextrose 5%. 1000 milliLiter(s) (50 mL/Hr) IV Continuous <Continuous>  dextrose 50% Injectable 12.5 Gram(s) IV Push once  dextrose 50% Injectable 25 Gram(s) IV Push once  dextrose 50% Injectable 25 Gram(s) IV Push once  docusate sodium 100 milliGRAM(s) Oral daily  furosemide Infusion 10 mG/Hr (5 mL/Hr) IV Continuous <Continuous>  heparin  Injectable 5000 Unit(s) SubCutaneous every 12 hours  hydrALAZINE 25 milliGRAM(s) Oral every 8 hours  insulin glargine Injectable (LANTUS) 30 Unit(s) SubCutaneous at bedtime  insulin lispro (HumaLOG) corrective regimen sliding scale   SubCutaneous three times a day before meals  insulin lispro (HumaLOG) corrective regimen sliding scale   SubCutaneous at bedtime  isosorbide   dinitrate Tablet (ISORDIL) 10 milliGRAM(s) Oral three times a day  senna 2 Tablet(s) Oral at bedtime  sevelamer hydrochloride 800 milliGRAM(s) Oral three times a day  sodium chloride 0.9% lock flush 3 milliLiter(s) IV Push every 8 hours  spironolactone 25 milliGRAM(s) Oral daily    MEDICATIONS  (PRN):  calamine Lotion 1 Application(s) Topical daily PRN Rash and/or Itching  dextrose 40% Gel 15 Gram(s) Oral once PRN Blood Glucose LESS THAN 70 milliGRAM(s)/deciliter  fluticasone propionate 50 MICROgram(s)/spray Nasal Spray 1 Spray(s) Both Nostrils two times a day PRN nasal congestion  glucagon  Injectable 1 milliGRAM(s) IntraMuscular once PRN Glucose LESS THAN 70 milligrams/deciliter  guaiFENesin   Syrup  (Sugar-Free) 200 milliGRAM(s) Oral every 6 hours PRN Cough  oxyCODONE    IR 5 milliGRAM(s) Oral every 4 hours PRN Moderate Pain and sever pain

## 2019-01-18 NOTE — PROGRESS NOTE ADULT - SUBJECTIVE AND OBJECTIVE BOX
Patient is a 68y old  Male who presents with a chief complaint of Dyspnea x 2 days (18 Jan 2019 11:14)    SUBJECTIVE / OVERNIGHT EVENTS:      MEDICATIONS  (STANDING):  aspirin enteric coated 81 milliGRAM(s) Oral daily  carvedilol 6.25 milliGRAM(s) Oral every 12 hours  dextrose 5%. 1000 milliLiter(s) (50 mL/Hr) IV Continuous <Continuous>  dextrose 50% Injectable 12.5 Gram(s) IV Push once  dextrose 50% Injectable 25 Gram(s) IV Push once  dextrose 50% Injectable 25 Gram(s) IV Push once  docusate sodium 100 milliGRAM(s) Oral daily  furosemide   Injectable 40 milliGRAM(s) IV Push once  furosemide Infusion 10 mG/Hr (5 mL/Hr) IV Continuous <Continuous>  heparin  Injectable 5000 Unit(s) SubCutaneous every 12 hours  hydrALAZINE 25 milliGRAM(s) Oral every 8 hours  insulin glargine Injectable (LANTUS) 30 Unit(s) SubCutaneous at bedtime  insulin lispro (HumaLOG) corrective regimen sliding scale   SubCutaneous three times a day before meals  insulin lispro (HumaLOG) corrective regimen sliding scale   SubCutaneous at bedtime  isosorbide   dinitrate Tablet (ISORDIL) 10 milliGRAM(s) Oral three times a day  senna 2 Tablet(s) Oral at bedtime  sevelamer hydrochloride 800 milliGRAM(s) Oral three times a day  sodium chloride 0.9% lock flush 3 milliLiter(s) IV Push every 8 hours  spironolactone 25 milliGRAM(s) Oral daily    MEDICATIONS  (PRN):  calamine Lotion 1 Application(s) Topical daily PRN Rash and/or Itching  dextrose 40% Gel 15 Gram(s) Oral once PRN Blood Glucose LESS THAN 70 milliGRAM(s)/deciliter  fluticasone propionate 50 MICROgram(s)/spray Nasal Spray 1 Spray(s) Both Nostrils two times a day PRN nasal congestion  glucagon  Injectable 1 milliGRAM(s) IntraMuscular once PRN Glucose LESS THAN 70 milligrams/deciliter  guaiFENesin   Syrup  (Sugar-Free) 200 milliGRAM(s) Oral every 6 hours PRN Cough  oxyCODONE    IR 5 milliGRAM(s) Oral every 4 hours PRN Moderate Pain and sever pain      Vital Signs Last 24 Hrs  T(C): 36.8 (18 Jan 2019 05:08), Max: 36.8 (18 Jan 2019 05:08)  T(F): 98.3 (18 Jan 2019 05:08), Max: 98.3 (18 Jan 2019 05:08)  HR: 86 (18 Jan 2019 05:08) (78 - 86)  BP: 94/72 (18 Jan 2019 05:08) (94/72 - 113/50)  BP(mean): --  RR: 18 (18 Jan 2019 05:08) (17 - 18)  SpO2: 100% (18 Jan 2019 05:08) (99% - 100%)      CAPILLARY BLOOD GLUCOSE  POCT Blood Glucose.: 134 mg/dL (18 Jan 2019 12:20)  POCT Blood Glucose.: 164 mg/dL (18 Jan 2019 08:52)  POCT Blood Glucose.: 199 mg/dL (17 Jan 2019 23:11)  POCT Blood Glucose.: 148 mg/dL (17 Jan 2019 22:05)  POCT Blood Glucose.: 171 mg/dL (17 Jan 2019 18:13)  POCT Blood Glucose.: 163 mg/dL (17 Jan 2019 13:29)        PHYSICAL EXAM  GENERAL: NAD, well-developed  HEAD:  Atraumatic, Normocephalic  EYES: EOMI, PERRLA, conjunctiva and sclera clear  NECK: Supple, No JVD  CHEST/LUNG: Clear to auscultation bilaterally; No wheeze  HEART: Regular rate and rhythm; No murmurs, rubs, or gallops  ABDOMEN: Soft, Nontender, Nondistended; Bowel sounds present  EXTREMITIES:  2+ Peripheral Pulses, No clubbing, cyanosis, or edema  PSYCH: AAOx3  SKIN: No rashes or lesions    LABS:                        9.9    7.42  )-----------( 225      ( 18 Jan 2019 06:00 )             32.1     01-18    131<L>  |  92<L>  |  57<H>  ----------------------------<  169<H>  5.4<H>   |  23  |  2.44<H>    Ca    8.8      18 Jan 2019 06:00          RADIOLOGY & ADDITIONAL TESTS:    Imaging Personally Reviewed:  Consultant(s) Notes Reviewed:    Care Discussed with Consultants/Other Providers: Patient is a 68y old  Male who presents with a chief complaint of Dyspnea x 2 days (18 Jan 2019 11:14)    SUBJECTIVE / OVERNIGHT EVENTS:  Patient seen and examined. Patient reports improvement in breathing and decreased swelling in his abdomen and legs bilaterally. He denies chest pain or palpitations. Eating well without nausea/vomiting.     MEDICATIONS  (STANDING):  aspirin enteric coated 81 milliGRAM(s) Oral daily  carvedilol 6.25 milliGRAM(s) Oral every 12 hours  dextrose 5%. 1000 milliLiter(s) (50 mL/Hr) IV Continuous <Continuous>  dextrose 50% Injectable 12.5 Gram(s) IV Push once  dextrose 50% Injectable 25 Gram(s) IV Push once  dextrose 50% Injectable 25 Gram(s) IV Push once  docusate sodium 100 milliGRAM(s) Oral daily  furosemide   Injectable 40 milliGRAM(s) IV Push once  furosemide Infusion 10 mG/Hr (5 mL/Hr) IV Continuous <Continuous>  heparin  Injectable 5000 Unit(s) SubCutaneous every 12 hours  hydrALAZINE 25 milliGRAM(s) Oral every 8 hours  insulin glargine Injectable (LANTUS) 30 Unit(s) SubCutaneous at bedtime  insulin lispro (HumaLOG) corrective regimen sliding scale   SubCutaneous three times a day before meals  insulin lispro (HumaLOG) corrective regimen sliding scale   SubCutaneous at bedtime  isosorbide   dinitrate Tablet (ISORDIL) 10 milliGRAM(s) Oral three times a day  senna 2 Tablet(s) Oral at bedtime  sevelamer hydrochloride 800 milliGRAM(s) Oral three times a day  sodium chloride 0.9% lock flush 3 milliLiter(s) IV Push every 8 hours  spironolactone 25 milliGRAM(s) Oral daily    MEDICATIONS  (PRN):  calamine Lotion 1 Application(s) Topical daily PRN Rash and/or Itching  dextrose 40% Gel 15 Gram(s) Oral once PRN Blood Glucose LESS THAN 70 milliGRAM(s)/deciliter  fluticasone propionate 50 MICROgram(s)/spray Nasal Spray 1 Spray(s) Both Nostrils two times a day PRN nasal congestion  glucagon  Injectable 1 milliGRAM(s) IntraMuscular once PRN Glucose LESS THAN 70 milligrams/deciliter  guaiFENesin   Syrup  (Sugar-Free) 200 milliGRAM(s) Oral every 6 hours PRN Cough  oxyCODONE    IR 5 milliGRAM(s) Oral every 4 hours PRN Moderate Pain and sever pain      Vital Signs Last 24 Hrs  T(C): 36.8 (18 Jan 2019 05:08), Max: 36.8 (18 Jan 2019 05:08)  T(F): 98.3 (18 Jan 2019 05:08), Max: 98.3 (18 Jan 2019 05:08)  HR: 86 (18 Jan 2019 05:08) (78 - 86)  BP: 94/72 (18 Jan 2019 05:08) (94/72 - 113/50)  BP(mean): --  RR: 18 (18 Jan 2019 05:08) (17 - 18)  SpO2: 100% (18 Jan 2019 05:08) (99% - 100%)      CAPILLARY BLOOD GLUCOSE  POCT Blood Glucose.: 134 mg/dL (18 Jan 2019 12:20)  POCT Blood Glucose.: 164 mg/dL (18 Jan 2019 08:52)  POCT Blood Glucose.: 199 mg/dL (17 Jan 2019 23:11)  POCT Blood Glucose.: 148 mg/dL (17 Jan 2019 22:05)  POCT Blood Glucose.: 171 mg/dL (17 Jan 2019 18:13)  POCT Blood Glucose.: 163 mg/dL (17 Jan 2019 13:29)        PHYSICAL EXAM  GENERAL: NAD, well-developed  CHEST/LUNG: Decreased BS on the left compared to the R lung fields but Clear to auscultation; No wheeze  HEART: Regular rate and rhythm; No murmurs, rubs, or gallops  ABDOMEN: Protuberant, Soft, Nontender, Distended; Bowel sounds present  EXTREMITIES:  ROM intact. No clubbing or cyanosis. B/l LE swelling extending up to the thighs from the lower legs  PSYCH: AAOx3  SKIN: Scattered wound scars of LE bilaterally    LABS:                        9.9    7.42  )-----------( 225      ( 18 Jan 2019 06:00 )             32.1     01-18    131<L>  |  92<L>  |  57<H>  ----------------------------<  169<H>  5.4<H>   |  23  |  2.44<H>    Ca    8.8      18 Jan 2019 06:00      Consultant(s) Notes Reviewed:  Yes  Care Discussed with Consultants/Other Providers:

## 2019-01-18 NOTE — DIETITIAN INITIAL EVALUATION ADULT. - ORAL INTAKE PTA
fair/Patient reported having a fair appetite- consuming about 2 meals a day- usual food recall: juice, coffee, cereal, low fat milk, chicken, beef, fish. Will consume 1 Glucerna daily.

## 2019-01-18 NOTE — DIETITIAN INITIAL EVALUATION ADULT. - PROBLEM SELECTOR PLAN 1
In the setting of worsening renal failure  Consider HF team consult   Cm  F/U #3 CE, EKG, TSH, I's, O's daily weights, Lytes.  Low salt diet. Fluid restrict 1L / day.  Give O2, Lasix 60 mg IV, nitrate, BB.  Hold ACE/ ARB due to worsening renal function.  F/U PT and dietary consults.  HOB elevation

## 2019-01-18 NOTE — CONSULT NOTE ADULT - SUBJECTIVE AND OBJECTIVE BOX
Date of Admission:    CHIEF COMPLAINT:    HISTORY OF PRESENT ILLNESS:    68M w/ severe ischemic CMP (LVEF 25%, LVEDD 5.4) , CAD s/p CABG and PCI, DM2, PAD s/p L KEI stent, s/p LLE CFA to below-knee bypass with PTFE for long-segment SFA occlusion, L 2nd toe amputation /R toe amputation, c/b groin soft tissue infection requiring washout, sartorius flap, and vac placement. His last admission required CCU admission and placement on dobutamine.           Allergies    No Known Allergies    Intolerances    	    MEDICATIONS:  aspirin enteric coated 81 milliGRAM(s) Oral daily  carvedilol 6.25 milliGRAM(s) Oral every 12 hours  furosemide   Injectable 60 milliGRAM(s) IV Push daily  heparin  Injectable 5000 Unit(s) SubCutaneous every 12 hours  hydrALAZINE 25 milliGRAM(s) Oral every 8 hours  isosorbide   dinitrate Tablet (ISORDIL) 10 milliGRAM(s) Oral three times a day  spironolactone 25 milliGRAM(s) Oral daily      guaiFENesin   Syrup  (Sugar-Free) 200 milliGRAM(s) Oral every 6 hours PRN    oxyCODONE    IR 5 milliGRAM(s) Oral every 4 hours PRN    docusate sodium 100 milliGRAM(s) Oral daily  senna 2 Tablet(s) Oral at bedtime    dextrose 40% Gel 15 Gram(s) Oral once PRN  dextrose 50% Injectable 12.5 Gram(s) IV Push once  dextrose 50% Injectable 25 Gram(s) IV Push once  dextrose 50% Injectable 25 Gram(s) IV Push once  glucagon  Injectable 1 milliGRAM(s) IntraMuscular once PRN  insulin glargine Injectable (LANTUS) 30 Unit(s) SubCutaneous at bedtime  insulin lispro (HumaLOG) corrective regimen sliding scale   SubCutaneous three times a day before meals  insulin lispro (HumaLOG) corrective regimen sliding scale   SubCutaneous at bedtime    calamine Lotion 1 Application(s) Topical daily PRN  dextrose 5%. 1000 milliLiter(s) IV Continuous <Continuous>  fluticasone propionate 50 MICROgram(s)/spray Nasal Spray 1 Spray(s) Both Nostrils two times a day PRN  sodium chloride 0.9% lock flush 3 milliLiter(s) IV Push every 8 hours      PAST MEDICAL & SURGICAL HISTORY:  Chronic congestive heart failure, unspecified congestive heart failure type  PAD (peripheral artery disease)  Stented coronary artery  Hyperlipidemia  Diabetes  Hypertension  CAD (coronary artery disease)  S/P bypass graft of extremity: Left LE  9/2018  S/P amputation: right great toe and 4th and 5th digit  S/P bypass graft of extremity: RLE  S/P angioplasty with stent: about 5 years ago  S/P CABG x 3      FAMILY HISTORY:  No family history of cancer (Father)  Coronary artery disease (Mother)      SOCIAL HISTORY:    [ ] Non-smoker  [ ] Smoker  [ ] Alcohol      REVIEW OF SYSTEMS:  CONSTITUTIONAL: No fever, weight loss, or fatigue  EYES: No eye pain, visual disturbances, or discharge  ENMT:  No difficulty hearing, tinnitus, vertigo; No sinus or throat pain  NECK: No pain or stiffness  RESPIRATORY: No cough, wheezing, chills or hemoptysis; No Shortness of Breath  CARDIOVASCULAR: No chest pain, palpitations, passing out, dizziness, or leg swelling  GASTROINTESTINAL: No abdominal or epigastric pain. No nausea, vomiting, or hematemesis; No diarrhea or constipation. No melena or hematochezia.  GENITOURINARY: No dysuria, frequency, hematuria, or incontinence  NEUROLOGICAL: No headaches, memory loss, loss of strength, numbness, or tremors  SKIN: No itching, burning, rashes, or lesions   LYMPH Nodes: No enlarged glands  ENDOCRINE: No heat or cold intolerance; No hair loss  MUSCULOSKELETAL: No joint pain or swelling; No muscle, back, or extremity pain  PSYCHIATRIC: No depression, anxiety, mood swings, or difficulty sleeping  HEME/LYMPH: No easy bruising, or bleeding gums  ALLERY AND IMMUNOLOGIC: No hives or eczema	    [ ] All others negative	  [ ] Unable to obtain    PHYSICAL EXAM:  T(C): 36.8 (01-18-19 @ 05:08), Max: 36.8 (01-18-19 @ 05:08)  HR: 86 (01-18-19 @ 05:08) (78 - 86)  BP: 94/72 (01-18-19 @ 05:08) (94/72 - 113/50)  RR: 18 (01-18-19 @ 05:08) (17 - 18)  SpO2: 100% (01-18-19 @ 05:08) (99% - 100%)  Wt(kg): --  I&O's Summary      Appearance: Normal	  HEENT:   Normal oral mucosa, PERRL, EOMI	  Lymphatic: No lymphadenopathy  Cardiovascular: Normal S1 S2, No JVD, No murmurs, No edema  Respiratory: Lungs clear to auscultation	  Psychiatry: A & O x 3, Mood & affect appropriate  Gastrointestinal:  Soft, Non-tender, + BS	  Skin: No rashes, No ecchymoses, No cyanosis	  Neurologic: Non-focal  Extremities: Normal range of motion, No clubbing, cyanosis or edema  Vascular: Peripheral pulses palpable 2+ bilaterally        LABS:	 	    CBC Full  -  ( 18 Jan 2019 06:00 )  WBC Count : 7.42 K/uL  Hemoglobin : 9.9 g/dL  Hematocrit : 32.1 %  Platelet Count - Automated : 225 K/uL  Mean Cell Volume : 71.8 fL  Mean Cell Hemoglobin : 22.1 pg  Mean Cell Hemoglobin Concentration : 30.8 %  Auto Neutrophil # : x  Auto Lymphocyte # : x  Auto Monocyte # : x  Auto Eosinophil # : x  Auto Basophil # : x  Auto Neutrophil % : x  Auto Lymphocyte % : x  Auto Monocyte % : x  Auto Eosinophil % : x  Auto Basophil % : x    01-18    131<L>  |  92<L>  |  57<H>  ----------------------------<  169<H>  5.4<H>   |  23  |  2.44<H>  01-17    132<L>  |  93<L>  |  47<H>  ----------------------------<  121<H>  4.7   |  26  |  2.05<H>    Ca    8.8      18 Jan 2019 06:00  Ca    8.6      17 Jan 2019 06:34  Phos  4.2     01-17  Mg     2.4     01-17    TPro  x   /  Alb  x   /  TBili  x   /  DBili  x   /  AST  42<H>  /  ALT  50<H>  /  AlkPhos  x   01-17  TPro  7.3  /  Alb  3.4  /  TBili  1.9<H>  /  DBili  x   /  AST  44<H>  /  ALT  54<H>  /  AlkPhos  603<H>  01-16      proBNP: Serum Pro-Brain Natriuretic Peptide: 4995 pg/mL (01-18 @ 06:00)    Lipid Profile:   HgA1c:   TSH:       CARDIAC MARKERS:      CKMB: 4.59 ng/mL (01-17 @ 06:34)        TELEMETRY: 	    ECG:  	  RADIOLOGY:  OTHER: 	    PREVIOUS DIAGNOSTIC TESTING:    [ ] Echocardiogram:  [ ]  Catheterization:  [ ] Stress Test:  	  	  ASSESSMENT/PLAN: Date of Admission: 19    CHIEF COMPLAINT:     HISTORY OF PRESENT ILLNESS:    68 male w/ PMHX of CAD s/p CABG x 3 and PCI, HFrEF (LVEF 24%, LVEDD 5.4) mod-severe MR, severe diastolic dysfunction, RV systolic failure, DM II, PAD s/p L KEI stent, s/p LLE CFA to below-knee bypass with PTFE for long-segment SFA occlusion, L 2nd toe amputation /R toe amputation, c/b groin soft tissue infection requiring washout, sartorius flap, and vac placement. His last admission from - required CCU admission and placement on dobutamine. He has had 3 admissions in 2018, for various complications from DM.  He comes in this time due to worsening SOB.          Allergies    No Known Allergies    MEDICATIONS:  aspirin enteric coated 81 milliGRAM(s) Oral daily  carvedilol 6.25 milliGRAM(s) Oral every 12 hours  furosemide   Injectable 60 milliGRAM(s) IV Push daily  heparin  Injectable 5000 Unit(s) SubCutaneous every 12 hours  hydrALAZINE 25 milliGRAM(s) Oral every 8 hours  isosorbide   dinitrate Tablet (ISORDIL) 10 milliGRAM(s) Oral three times a day  spironolactone 25 milliGRAM(s) Oral daily  guaiFENesin   Syrup  (Sugar-Free) 200 milliGRAM(s) Oral every 6 hours PRN  oxyCODONE    IR 5 milliGRAM(s) Oral every 4 hours PRN  docusate sodium 100 milliGRAM(s) Oral daily  senna 2 Tablet(s) Oral at bedtime  dextrose 40% Gel 15 Gram(s) Oral once PRN  dextrose 50% Injectable 12.5 Gram(s) IV Push once  dextrose 50% Injectable 25 Gram(s) IV Push once  dextrose 50% Injectable 25 Gram(s) IV Push once  glucagon  Injectable 1 milliGRAM(s) IntraMuscular once PRN  insulin glargine Injectable (LANTUS) 30 Unit(s) SubCutaneous at bedtime  insulin lispro (HumaLOG) corrective regimen sliding scale   SubCutaneous three times a day before meals  insulin lispro (HumaLOG) corrective regimen sliding scale   SubCutaneous at bedtime  calamine Lotion 1 Application(s) Topical daily PRN  dextrose 5%. 1000 milliLiter(s) IV Continuous <Continuous>  fluticasone propionate 50 MICROgram(s)/spray Nasal Spray 1 Spray(s) Both Nostrils two times a day PRN  sodium chloride 0.9% lock flush 3 milliLiter(s) IV Push every 8 hours      PAST MEDICAL & SURGICAL HISTORY:  Chronic congestive heart failure, unspecified congestive heart failure type  PAD (peripheral artery disease)  Stented coronary artery  Hyperlipidemia  Diabetes  Hypertension  CAD (coronary artery disease)  S/P bypass graft of extremity: Left LE  2018  S/P amputation: right great toe and 4th and 5th digit  S/P bypass graft of extremity: RLE  S/P angioplasty with stent: about 5 years ago  S/P CABG x 3      FAMILY HISTORY:  No family history of cancer (Father)  Coronary artery disease (Mother)      SOCIAL HISTORY:          REVIEW OF SYSTEMS:  CONSTITUTIONAL: No fever, weight loss, or fatigue  EYES: No eye pain, visual disturbances, or discharge  ENMT:  No difficulty hearing, tinnitus, vertigo; No sinus or throat pain  NECK: No pain or stiffness  RESPIRATORY: No cough, wheezing, chills or hemoptysis; No Shortness of Breath  CARDIOVASCULAR: No chest pain, palpitations, passing out, dizziness, or leg swelling  GASTROINTESTINAL: No abdominal or epigastric pain. No nausea, vomiting, or hematemesis; No diarrhea or constipation. No melena or hematochezia.  GENITOURINARY: No dysuria, frequency, hematuria, or incontinence  NEUROLOGICAL: No headaches, memory loss, loss of strength, numbness, or tremors  SKIN: No itching, burning, rashes, or lesions   LYMPH Nodes: No enlarged glands  ENDOCRINE: No heat or cold intolerance; No hair loss  MUSCULOSKELETAL: No joint pain or swelling; No muscle, back, or extremity pain  PSYCHIATRIC: No depression, anxiety, mood swings, or difficulty sleeping  HEME/LYMPH: No easy bruising, or bleeding gums  ALLERY AND IMMUNOLOGIC: No hives or eczema	    [ ] All others negative	  [ ] Unable to obtain    PHYSICAL EXAM:  T(C): 36.8 (19 @ 05:08), Max: 36.8 (19 @ 05:08)  HR: 86 (19 @ 05:08) (78 - 86)  BP: 94/72 (19 @ 05:08) (94/72 - 113/50)  RR: 18 (19 @ 05:08) (17 - 18)  SpO2: 100% (19 @ 05:08) (99% - 100%)  Wt(kg): --  I&O's Summary      Appearance: Normal	  HEENT:   Normal oral mucosa, PERRL, EOMI	  Lymphatic: No lymphadenopathy  Cardiovascular: Normal S1 S2, No JVD, No murmurs, No edema  Respiratory: Lungs clear to auscultation	  Psychiatry: A & O x 3, Mood & affect appropriate  Gastrointestinal:  Soft, Non-tender, + BS	  Skin: No rashes, No ecchymoses, No cyanosis	  Neurologic: Non-focal  Extremities: Normal range of motion, No clubbing, cyanosis or edema  Vascular: Peripheral pulses palpable 2+ bilaterally        LABS:	 	    CBC Full  -  ( 2019 06:00 )  WBC Count : 7.42 K/uL  Hemoglobin : 9.9 g/dL  Hematocrit : 32.1 %  Platelet Count - Automated : 225 K/uL  Mean Cell Volume : 71.8 fL  Mean Cell Hemoglobin : 22.1 pg  Mean Cell Hemoglobin Concentration : 30.8 %  Auto Neutrophil # : x  Auto Lymphocyte # : x  Auto Monocyte # : x  Auto Eosinophil # : x  Auto Basophil # : x  Auto Neutrophil % : x  Auto Lymphocyte % : x  Auto Monocyte % : x  Auto Eosinophil % : x  Auto Basophil % : x        131<L>  |  92<L>  |  57<H>  ----------------------------<  169<H>  5.4<H>   |  23  |  2.44<H>      132<L>  |  93<L>  |  47<H>  ----------------------------<  121<H>  4.7   |  26  |  2.05<H>    Ca    8.8      2019 06:00  Ca    8.6      2019 06:34  Phos  4.2       Mg     2.4         TPro  x   /  Alb  x   /  TBili  x   /  DBili  x   /  AST  42<H>  /  ALT  50<H>  /  AlkPhos  x     TPro  7.3  /  Alb  3.4  /  TBili  1.9<H>  /  DBili  x   /  AST  44<H>  /  ALT  54<H>  /  AlkPhos  603<H>        proBNP: Serum Pro-Brain Natriuretic Peptide: 4995 pg/mL ( @ 06:00)    < from: TTE with Doppler (w/Cont) (18 @ 11:38) >  DIMENSIONS:  Dimensions:     Normal Values:  LA:     4.3 cm    2.0 - 4.0 cm  Ao:     3.0 cm    2.0 - 3.8 cm  SEPTUM: 0.6 cm    0.6 - 1.2 cm  PWT:    0.6 cm    0.6 - 1.1 cm  LVIDd:  5.4cm    3.0 - 5.6 cm  LVIDs:  4.8 cm    1.8 - 4.0 cm  Derived Variables:  LVMI: 59 g/m2  RWT: 0.22  Fractional short: 11 %  Ejection Fraction (Teicholtz): 24 %  ------------------------------------------------------------------------  OBSERVATIONS:  Mitral Valve: Mitral annular calcification, otherwise  normal mitral valve. Moderate-severe mitral regurgitation.  Aortic Root: Normal aortic root.  Aortic Valve: Calcified trileaflet aortic valve with normal  opening.  Left Atrium: Moderately dilated left atrium.  LA volume  index = 45 cc/m2.  Left Ventricle: Severe global left ventricular systolic  dysfunction.  Endocardial visualization enhanced with  intravenous injection of echo contrast (Definity). Normal  left ventricular internal dimensions and wall thicknesses.  Severe diastolic dysfunction.  Right Heart: Normal right atrium. Right ventricular  enlargement with decreased right ventricular systolic  function. Normal tricuspid valve. Mild tricuspid  regurgitation. Normal pulmonic valve. Minimal pulmonic  regurgitation.  Pericardium/PleuraNormal pericardium with no pericardial  effusion.  Hemodynamic: Estimated right ventricular systolic pressure  equals 49 mm Hg, assuming right atrial pressure equals 10  mm Hg, consistent with mild pulmonary hypertension.  ------------------------------------------------------------------------    < end of copied text >      < from: Cardiac Cath Lab - Adult (16 @ 17:43) >  CORONARY VESSELS: The coronary circulation is right dominant.  LM:   --  Distal left main: There was a 100 % stenosis.  RCA:   --  Mid RCA: There was a 100 % stenosis.  GRAFTS:   --  Graft to the mid LAD: The graft was a LIMA. Graft angiography  showed minor luminal irregularities.  --  Graft to the 1st diagonal: The graft was a saphenous vein graft from  the aorta. It was occluded.  --  Graft to the 1st obtuse marginal: The graft was a saphenous vein graft  from the aorta. There was a 90 % stenosis in the proximal third of the  graft.  COMPLICATIONS: There were no complications.  DIAGNOSTIC RECOMMENDATIONS: The patient should continue with the present  medications.  Prepared and signed by  Ankit Alexander M.D.  Signed 2016 10:11:06  HEMODYNAMIC TABLES  Pressures:  Baseline/ Room Air  Pressures:  - HR: 92  Pressures:  - Rhythm:  Pressures:  -- Aortic Pressure (S/D/M): 131/77/104  Pressures:  Intervention  Pressures:  - HR: 87  Pressures:  - Rhythm:  Pressures:  -- Aortic Pressure (S/D/M): 127/69/95  Outputs:  Baseline/ Room Air  Outputs:  -- CALCULATIONS[age]  Outputs:  -- CALCULATIONS: Body Surface Area: 1.82  Outputs:  -- CALCULATIONS: Height in cm: 170.00  Outputs:  -- CALCULATIONS: Sex: Male  Outputs:  -- CALCULATIONS: Weight in k.20    < end of copied text >      < from: Xray Chest 2 Views PA/Lat (19 @ 23:00) >  INTERPRETATION: There is an enlarged cardiac silhouette. Coronary artery   calcification and/or stent is seen. Median sternotomy sutures and   surgical clips areseen. This is accentuation and poor definition of the   pulmonary vascular markings suggesting mild interstitial pulmonary edema.   There is linear atelectasis in the left mid and lower lung. No pleural   effusion or pneumothorax is seen.      IMPRESSION:  Enlarged cardiac silhouette.    Mild interstitial pulmonary edema.    Linear atelectasis in left mid and lower lung    < end of copied text > Date of Admission: 19    CHIEF COMPLAINT:     HISTORY OF PRESENT ILLNESS:    68 male w/ PMHX of CAD s/p CABG x 3 and PCI, HFrEF (LVEF 24%, LVEDD 5.4) mod-severe MR, severe diastolic dysfunction, RV systolic failure, no AICD (has refused it in past) DM II, PAD s/p L KEI stent, s/p LLE CFA to below-knee bypass with PTFE for long-segment SFA occlusion, L 2nd toe amputation /R toe amputation, c/b groin soft tissue infection requiring washout, sartorius flap, and vac placement. His last admission from - required CCU admission and placement on dobutamine. He has had 3 admissions in 2018, for various complications from DM.  He comes in this time due to worsening SOB. He admits to 2 pillow orthopnea, frequent PND and ORTA after 1/2 block and walking up 5 steps. No CP, palpitations, dizziness. Patient's son by bedside.  His dry weight s/p last hospitalization was 132 lbs.            Allergies    No Known Allergies    MEDICATIONS:  aspirin enteric coated 81 milliGRAM(s) Oral daily  carvedilol 6.25 milliGRAM(s) Oral every 12 hours  furosemide   Injectable 60 milliGRAM(s) IV Push daily  heparin  Injectable 5000 Unit(s) SubCutaneous every 12 hours  hydrALAZINE 25 milliGRAM(s) Oral every 8 hours  isosorbide   dinitrate Tablet (ISORDIL) 10 milliGRAM(s) Oral three times a day  spironolactone 25 milliGRAM(s) Oral daily  guaiFENesin   Syrup  (Sugar-Free) 200 milliGRAM(s) Oral every 6 hours PRN  oxyCODONE    IR 5 milliGRAM(s) Oral every 4 hours PRN  docusate sodium 100 milliGRAM(s) Oral daily  senna 2 Tablet(s) Oral at bedtime  dextrose 40% Gel 15 Gram(s) Oral once PRN  dextrose 50% Injectable 12.5 Gram(s) IV Push once  dextrose 50% Injectable 25 Gram(s) IV Push once  dextrose 50% Injectable 25 Gram(s) IV Push once  glucagon  Injectable 1 milliGRAM(s) IntraMuscular once PRN  insulin glargine Injectable (LANTUS) 30 Unit(s) SubCutaneous at bedtime  insulin lispro (HumaLOG) corrective regimen sliding scale   SubCutaneous three times a day before meals  insulin lispro (HumaLOG) corrective regimen sliding scale   SubCutaneous at bedtime  calamine Lotion 1 Application(s) Topical daily PRN  dextrose 5%. 1000 milliLiter(s) IV Continuous <Continuous>  fluticasone propionate 50 MICROgram(s)/spray Nasal Spray 1 Spray(s) Both Nostrils two times a day PRN  sodium chloride 0.9% lock flush 3 milliLiter(s) IV Push every 8 hours      PAST MEDICAL & SURGICAL HISTORY:  Chronic congestive heart failure, unspecified congestive heart failure type  PAD (peripheral artery disease)  Stented coronary artery  Hyperlipidemia  Diabetes  Hypertension  CAD (coronary artery disease)  S/P bypass graft of extremity: Left LE  2018  S/P amputation: right great toe and 4th and 5th digit  S/P bypass graft of extremity: RLE  S/P angioplasty with stent: about 5 years ago  S/P CABG x 3      FAMILY HISTORY:  No family history of cancer (Father)  Coronary artery disease (Mother)      SOCIAL HISTORY:    No ETOH, smoking or other drug abuse       REVIEW OF SYSTEMS:  CONSTITUTIONAL: No fever, weight loss, admits to fatigue  EYES: No eye pain, visual disturbances, or discharge  ENMT:  No difficulty hearing, tinnitus, vertigo; No sinus or throat pain  NECK: No pain or stiffness  RESPIRATORY: No cough, wheezing, chills or hemoptysis; admits to Shortness of Breath  CARDIOVASCULAR: No chest pain, palpitations, passing out, dizziness, admits to leg swelling  GASTROINTESTINAL: No abdominal or epigastric pain. No nausea, vomiting, or hematemesis; No diarrhea or constipation. No melena or hematochezia.  GENITOURINARY: No dysuria, frequency, hematuria, or incontinence  NEUROLOGICAL: No headaches, memory loss, loss of strength, numbness, or tremors  SKIN: No itching, burning, rashes, or lesions   LYMPH Nodes: No enlarged glands  ENDOCRINE: No heat or cold intolerance; No hair loss  MUSCULOSKELETAL: No joint pain or swelling; No muscle, back, or extremity pain  PSYCHIATRIC: No depression, anxiety, mood swings, or difficulty sleeping  HEME/LYMPH: No easy bruising, or bleeding gums  ALLERY AND IMMUNOLOGIC: No hives or eczema	      PHYSICAL EXAM:  T(C): 36.8 (19 @ 05:08), Max: 36.8 (19 @ 05:08)  HR: 86 (19 @ 05:08) (78 - 86)  BP: 94/72 (19 @ 05:08) (94/72 - 113/50)  RR: 18 (19 @ 05:08) (17 - 18)  SpO2: 100% (19 @ 05:08) (99% - 100%)  Wt(kg): --  I&O's Summary      Appearance: Normal	  HEENT:   Normal oral mucosa, PERRL, EOMI	  Lymphatic: No lymphadenopathy  Cardiovascular:  S1 S2 RRR, moderately elevated JVP, No murmurs, 3+ b/l LE edema.  Respiratory: b/l crackles   Psychiatry: A & O x 3, Mood & affect appropriate  Gastrointestinal:  Soft, Non-tender, + BS	  Skin: No rashes, No ecchymoses, No cyanosis	  Neurologic: Non-focal  Extremities: Normal range of motion, No clubbing, cyanosis   Vascular: Peripheral pulses palpable 2+ bilaterally        LABS:	 	    CBC Full  -  ( 2019 06:00 )  WBC Count : 7.42 K/uL  Hemoglobin : 9.9 g/dL  Hematocrit : 32.1 %  Platelet Count - Automated : 225 K/uL  Mean Cell Volume : 71.8 fL  Mean Cell Hemoglobin : 22.1 pg  Mean Cell Hemoglobin Concentration : 30.8 %          131<L>  |  92<L>  |  57<H>  ----------------------------<  169<H>  5.4<H>   |  23  |  2.44<H>      132<L>  |  93<L>  |  47<H>  ----------------------------<  121<H>  4.7   |  26  |  2.05<H>    Ca    8.8      2019 06:00  Ca    8.6      2019 06:34  Phos  4.2       Mg     2.4         TPro  x   /  Alb  x   /  TBili  x   /  DBili  x   /  AST  42<H>  /  ALT  50<H>  /  AlkPhos  x     TPro  7.3  /  Alb  3.4  /  TBili  1.9<H>  /  DBili  x   /  AST  44<H>  /  ALT  54<H>  /  AlkPhos  603<H>        proBNP: Serum Pro-Brain Natriuretic Peptide: 4995 pg/mL ( @ 06:00)    < from: TTE with Doppler (w/Cont) (18 @ 11:38) >  DIMENSIONS:  Dimensions:     Normal Values:  LA:     4.3 cm    2.0 - 4.0 cm  Ao:     3.0 cm    2.0 - 3.8 cm  SEPTUM: 0.6 cm    0.6 - 1.2 cm  PWT:    0.6 cm    0.6 - 1.1 cm  LVIDd:  5.4cm    3.0 - 5.6 cm  LVIDs:  4.8 cm    1.8 - 4.0 cm  Derived Variables:  LVMI: 59 g/m2  RWT: 0.22  Fractional short: 11 %  Ejection Fraction (Teicholtz): 24 %  ------------------------------------------------------------------------  OBSERVATIONS:  Mitral Valve: Mitral annular calcification, otherwise  normal mitral valve. Moderate-severe mitral regurgitation.  Aortic Root: Normal aortic root.  Aortic Valve: Calcified trileaflet aortic valve with normal  opening.  Left Atrium: Moderately dilated left atrium.  LA volume  index = 45 cc/m2.  Left Ventricle: Severe global left ventricular systolic  dysfunction.  Endocardial visualization enhanced with  intravenous injection of echo contrast (Definity). Normal  left ventricular internal dimensions and wall thicknesses.  Severe diastolic dysfunction.  Right Heart: Normal right atrium. Right ventricular  enlargement with decreased right ventricular systolic  function. Normal tricuspid valve. Mild tricuspid  regurgitation. Normal pulmonic valve. Minimal pulmonic  regurgitation.  Pericardium/PleuraNormal pericardium with no pericardial  effusion.  Hemodynamic: Estimated right ventricular systolic pressure  equals 49 mm Hg, assuming right atrial pressure equals 10  mm Hg, consistent with mild pulmonary hypertension.  ------------------------------------------------------------------------    < end of copied text >      < from: Cardiac Cath Lab - Adult (16 @ 17:43) >  CORONARY VESSELS: The coronary circulation is right dominant.  LM:   --  Distal left main: There was a 100 % stenosis.  RCA:   --  Mid RCA: There was a 100 % stenosis.  GRAFTS:   --  Graft to the mid LAD: The graft was a LIMA. Graft angiography  showed minor luminal irregularities.  --  Graft to the 1st diagonal: The graft was a saphenous vein graft from  the aorta. It was occluded.  --  Graft to the 1st obtuse marginal: The graft was a saphenous vein graft  from the aorta. There was a 90 % stenosis in the proximal third of the  graft.  COMPLICATIONS: There were no complications.  DIAGNOSTIC RECOMMENDATIONS: The patient should continue with the present  medications.  Prepared and signed by  Ankit Alexander M.D.  Signed 2016 10:11:06  HEMODYNAMIC TABLES  Pressures:  Baseline/ Room Air  Pressures:  - HR: 92  Pressures:  - Rhythm:  Pressures:  -- Aortic Pressure (S/D/M): 131/77/104  Pressures:  Intervention  Pressures:  - HR: 87  Pressures:  - Rhythm:  Pressures:  -- Aortic Pressure (S/D/M): 127/69/95  Outputs:  Baseline/ Room Air  Outputs:  -- CALCULATIONS[age]  Outputs:  -- CALCULATIONS: Body Surface Area: 1.82  Outputs:  -- CALCULATIONS: Height in cm: 170.00  Outputs:  -- CALCULATIONS: Sex: Male  Outputs:  -- CALCULATIONS: Weight in k.20    < end of copied text >      < from: Xray Chest 2 Views PA/Lat (19 @ 23:00) >  INTERPRETATION: There is an enlarged cardiac silhouette. Coronary artery   calcification and/or stent is seen. Median sternotomy sutures and   surgical clips areseen. This is accentuation and poor definition of the   pulmonary vascular markings suggesting mild interstitial pulmonary edema.   There is linear atelectasis in the left mid and lower lung. No pleural   effusion or pneumothorax is seen.      IMPRESSION:  Enlarged cardiac silhouette.    Mild interstitial pulmonary edema.    Linear atelectasis in left mid and lower lung    < end of copied text >

## 2019-01-18 NOTE — PROGRESS NOTE ADULT - ASSESSMENT
Mr. Gonzalez is a 69 yo man with PMHx notable for PVD c/b multiple toe amputations, s/p B/l LE bypass and groin sartorius flap with vac placement, DM2, chronic systolic HF with LVEF= 20%, HTN, CKD, CAD s/p 3v CABGg, and chronic L LE surgical scar wounds, admitted for acute decompensated heart failure

## 2019-01-18 NOTE — DIETITIAN INITIAL EVALUATION ADULT. - PROBLEM SELECTOR PLAN 6
Shortness of breath, but no complaining of chest pain  ECG = sinus w/ 1st degree AV block at 83 bpm; QTc = 446  Continue ASA, BB, Nitrate.  Hold Statin due to Icterus.  f/u A lab-work

## 2019-01-18 NOTE — PROGRESS NOTE ADULT - PROBLEM SELECTOR PLAN 6
Shortness of breath, but no complaining of chest pain  ECG = sinus w/ 1st degree AV block at 83 bpm; QTc = 446  Continue ASA, BB, Nitrate.  Hold Statin due to Icterus.  f/u A lab-work Continue ASA, BB, Nitrate.  - statin initially held but can likely resume pending repeat liver tests. Suspect elevated liver enzymes 2/2 congestive hepatopathy due to decompensated heart failure

## 2019-01-18 NOTE — PROGRESS NOTE ADULT - PROBLEM SELECTOR PLAN 1
In the setting of worsening renal failure. HF team following, recs reviewed and appreciated.  - I&O's daily weights, Lytes.  - Low salt diet. Fluid restrict 1L / day.  Give O2, Lasix 60 mg IV, nitrate, BB.  Hold ACE/ ARB due to worsening renal function.  F/U PT and dietary consults.  HOB elevation In the setting of worsening renal failure. HF team following, recs reviewed and appreciated.  - I&O's daily weights, Lytes.  - Low salt diet. Fluid restrict 1L / day.  - transitioned to Lasix gtt today  - carvedilol 6.25mg q12h   - hydralazine 25mg q8h  - isosorbide dinitrate 10mg TID  Hold ACE/ ARB due to worsening renal function.  F/U PT and dietary consults.  HOB elevation

## 2019-01-18 NOTE — PROGRESS NOTE ADULT - PROBLEM SELECTOR PLAN 2
Potassium initially 5.7  In the setting of worsening renal function  now resolved s/p treatment. K+= 4.7  ACEi and ARB on hold  f/u trend

## 2019-01-18 NOTE — CONSULT NOTE ADULT - PROBLEM SELECTOR RECOMMENDATION 9
NYHA class IV. Moderately hypervolemic.   D/cd Lasix 60 mg IV BID (pt was missing doses due to BP parameters).   Gave Lasix 40 mg IV bolus, followed by Lasix gtt 10 mg/hr.   Strict I/O and daily standing weights.  Dry weight is 132 lbs.   Continue Coreg 6.25 mg po BID.  If K continues to be elevated, hold spironolactone.   Continue hydral 25 mg po TID.   Continue Isordil 10 mg po TID. NYHA class IV. Moderately hypervolemic.   D/cd Lasix 60 mg IV BID (pt was missing doses due to BP parameters).   Gave Lasix 40 mg IV bolus, followed by Lasix gtt 10 mg/hr.   Strict I/O and daily standing weights.  Dry weight is 132 lbs.   Continue Coreg 6.25 mg po BID.  Hyperkalemic, hold spironolactone.   Continue hydral 25 mg po TID.   Continue Isordil 10 mg po TID.

## 2019-01-18 NOTE — PROGRESS NOTE ADULT - PROBLEM SELECTOR PLAN 8
F/U LFT's  Hold Tylenol.  Hold Statin. VTE with Heparin 5000U sub cut.  fall, Aspiration, safety, seizure precautions.

## 2019-01-18 NOTE — PROGRESS NOTE ADULT - PROBLEM SELECTOR PLAN 5
Borderline lower blood pressure trend  Low salt diet.  Continue BP meds, with holding parameters. Low salt diet.  Continue BP meds, with holding parameters.

## 2019-01-18 NOTE — DIETITIAN INITIAL EVALUATION ADULT. - OTHER INFO
Patient seen for nutrition consult for CHF exacerbation + DM2. Per chart: Patient is a 68 year old male with history of CHF, CKD, DM, HTN, HLD, CAD, CABG x 3, RLE bypass. Patient reported having a fair appetite in house- consuming about 50% of meals. Patient state he feels full quickly. Patient denies any nausea/vomiting/diarrhea/constipation or difficulty chewing and swallowing. Patient reported usual weight 165 to 170 pounds. Stated two weeks ago weight was 130 pounds (? accuracy about stated weight- possible weight change due to fluid shifts ?) Current weight: 171.9 pounds. Patient noted with + 1 edema.

## 2019-01-19 DIAGNOSIS — E87.70 FLUID OVERLOAD, UNSPECIFIED: ICD-10-CM

## 2019-01-19 LAB
ANION GAP SERPL CALC-SCNC: 14 MMO/L — SIGNIFICANT CHANGE UP (ref 7–14)
ANION GAP SERPL CALC-SCNC: 14 MMO/L — SIGNIFICANT CHANGE UP (ref 7–14)
BUN SERPL-MCNC: 64 MG/DL — HIGH (ref 7–23)
BUN SERPL-MCNC: 66 MG/DL — HIGH (ref 7–23)
CALCIUM SERPL-MCNC: 8.2 MG/DL — LOW (ref 8.4–10.5)
CALCIUM SERPL-MCNC: 8.7 MG/DL — SIGNIFICANT CHANGE UP (ref 8.4–10.5)
CHLORIDE SERPL-SCNC: 94 MMOL/L — LOW (ref 98–107)
CHLORIDE SERPL-SCNC: 98 MMOL/L — SIGNIFICANT CHANGE UP (ref 98–107)
CO2 SERPL-SCNC: 22 MMOL/L — SIGNIFICANT CHANGE UP (ref 22–31)
CO2 SERPL-SCNC: 24 MMOL/L — SIGNIFICANT CHANGE UP (ref 22–31)
CREAT SERPL-MCNC: 2.14 MG/DL — HIGH (ref 0.5–1.3)
CREAT SERPL-MCNC: 2.38 MG/DL — HIGH (ref 0.5–1.3)
GLUCOSE BLDC GLUCOMTR-MCNC: 100 MG/DL — HIGH (ref 70–99)
GLUCOSE BLDC GLUCOMTR-MCNC: 147 MG/DL — HIGH (ref 70–99)
GLUCOSE BLDC GLUCOMTR-MCNC: 196 MG/DL — HIGH (ref 70–99)
GLUCOSE BLDC GLUCOMTR-MCNC: 246 MG/DL — HIGH (ref 70–99)
GLUCOSE BLDC GLUCOMTR-MCNC: 257 MG/DL — HIGH (ref 70–99)
GLUCOSE SERPL-MCNC: 212 MG/DL — HIGH (ref 70–99)
GLUCOSE SERPL-MCNC: 86 MG/DL — SIGNIFICANT CHANGE UP (ref 70–99)
HCT VFR BLD CALC: 29.5 % — LOW (ref 39–50)
HGB BLD-MCNC: 9.3 G/DL — LOW (ref 13–17)
MAGNESIUM SERPL-MCNC: 2.1 MG/DL — SIGNIFICANT CHANGE UP (ref 1.6–2.6)
MAGNESIUM SERPL-MCNC: 2.3 MG/DL — SIGNIFICANT CHANGE UP (ref 1.6–2.6)
MCHC RBC-ENTMCNC: 22.6 PG — LOW (ref 27–34)
MCHC RBC-ENTMCNC: 31.5 % — LOW (ref 32–36)
MCV RBC AUTO: 71.8 FL — LOW (ref 80–100)
NRBC # FLD: 0 K/UL — LOW (ref 25–125)
PLATELET # BLD AUTO: 190 K/UL — SIGNIFICANT CHANGE UP (ref 150–400)
PMV BLD: SIGNIFICANT CHANGE UP FL (ref 7–13)
POTASSIUM SERPL-MCNC: 3.8 MMOL/L — SIGNIFICANT CHANGE UP (ref 3.5–5.3)
POTASSIUM SERPL-MCNC: 4.5 MMOL/L — SIGNIFICANT CHANGE UP (ref 3.5–5.3)
POTASSIUM SERPL-SCNC: 3.8 MMOL/L — SIGNIFICANT CHANGE UP (ref 3.5–5.3)
POTASSIUM SERPL-SCNC: 4.5 MMOL/L — SIGNIFICANT CHANGE UP (ref 3.5–5.3)
RBC # BLD: 4.11 M/UL — LOW (ref 4.2–5.8)
RBC # FLD: 24 % — HIGH (ref 10.3–14.5)
SODIUM SERPL-SCNC: 132 MMOL/L — LOW (ref 135–145)
SODIUM SERPL-SCNC: 134 MMOL/L — LOW (ref 135–145)
WBC # BLD: 5.62 K/UL — SIGNIFICANT CHANGE UP (ref 3.8–10.5)
WBC # FLD AUTO: 5.62 K/UL — SIGNIFICANT CHANGE UP (ref 3.8–10.5)

## 2019-01-19 PROCEDURE — 99233 SBSQ HOSP IP/OBS HIGH 50: CPT

## 2019-01-19 PROCEDURE — 99233 SBSQ HOSP IP/OBS HIGH 50: CPT | Mod: GC

## 2019-01-19 RX ORDER — FUROSEMIDE 40 MG
20 TABLET ORAL
Qty: 500 | Refills: 0 | Status: DISCONTINUED | OUTPATIENT
Start: 2019-01-19 | End: 2019-01-21

## 2019-01-19 RX ADMIN — SODIUM CHLORIDE 3 MILLILITER(S): 9 INJECTION INTRAMUSCULAR; INTRAVENOUS; SUBCUTANEOUS at 13:46

## 2019-01-19 RX ADMIN — Medication 1 SPRAY(S): at 15:52

## 2019-01-19 RX ADMIN — OXYCODONE HYDROCHLORIDE 5 MILLIGRAM(S): 5 TABLET ORAL at 12:30

## 2019-01-19 RX ADMIN — Medication 25 MILLIGRAM(S): at 05:59

## 2019-01-19 RX ADMIN — ISOSORBIDE DINITRATE 10 MILLIGRAM(S): 5 TABLET ORAL at 13:45

## 2019-01-19 RX ADMIN — OXYCODONE HYDROCHLORIDE 5 MILLIGRAM(S): 5 TABLET ORAL at 13:30

## 2019-01-19 RX ADMIN — ISOSORBIDE DINITRATE 10 MILLIGRAM(S): 5 TABLET ORAL at 05:59

## 2019-01-19 RX ADMIN — Medication 6: at 18:48

## 2019-01-19 RX ADMIN — OXYCODONE HYDROCHLORIDE 5 MILLIGRAM(S): 5 TABLET ORAL at 19:59

## 2019-01-19 RX ADMIN — Medication 1 SPRAY(S): at 21:12

## 2019-01-19 RX ADMIN — SEVELAMER CARBONATE 800 MILLIGRAM(S): 2400 POWDER, FOR SUSPENSION ORAL at 13:45

## 2019-01-19 RX ADMIN — ISOSORBIDE DINITRATE 10 MILLIGRAM(S): 5 TABLET ORAL at 21:11

## 2019-01-19 RX ADMIN — Medication 25 MILLIGRAM(S): at 21:12

## 2019-01-19 RX ADMIN — SEVELAMER CARBONATE 800 MILLIGRAM(S): 2400 POWDER, FOR SUSPENSION ORAL at 05:59

## 2019-01-19 RX ADMIN — SEVELAMER CARBONATE 800 MILLIGRAM(S): 2400 POWDER, FOR SUSPENSION ORAL at 21:11

## 2019-01-19 RX ADMIN — SODIUM CHLORIDE 3 MILLILITER(S): 9 INJECTION INTRAMUSCULAR; INTRAVENOUS; SUBCUTANEOUS at 21:13

## 2019-01-19 RX ADMIN — SODIUM CHLORIDE 3 MILLILITER(S): 9 INJECTION INTRAMUSCULAR; INTRAVENOUS; SUBCUTANEOUS at 05:59

## 2019-01-19 RX ADMIN — CARVEDILOL PHOSPHATE 6.25 MILLIGRAM(S): 80 CAPSULE, EXTENDED RELEASE ORAL at 18:35

## 2019-01-19 RX ADMIN — INSULIN GLARGINE 30 UNIT(S): 100 INJECTION, SOLUTION SUBCUTANEOUS at 21:20

## 2019-01-19 RX ADMIN — Medication 81 MILLIGRAM(S): at 13:45

## 2019-01-19 RX ADMIN — OXYCODONE HYDROCHLORIDE 5 MILLIGRAM(S): 5 TABLET ORAL at 18:59

## 2019-01-19 RX ADMIN — CARVEDILOL PHOSPHATE 6.25 MILLIGRAM(S): 80 CAPSULE, EXTENDED RELEASE ORAL at 05:59

## 2019-01-19 RX ADMIN — Medication 100 MILLIGRAM(S): at 13:45

## 2019-01-19 RX ADMIN — Medication 25 MILLIGRAM(S): at 13:45

## 2019-01-19 RX ADMIN — HEPARIN SODIUM 5000 UNIT(S): 5000 INJECTION INTRAVENOUS; SUBCUTANEOUS at 18:35

## 2019-01-19 NOTE — PROGRESS NOTE ADULT - SUBJECTIVE AND OBJECTIVE BOX
Patient is a 68y old  Male who presents with a chief complaint of Dyspnea x 2 days (19 Jan 2019 13:12)      SUBJECTIVE / OVERNIGHT EVENTS:    No acute event. says breathing has improved. no new complaint     Review of Systems:    RESPIRATORY: No cough, wheezing, chills or hemoptysis; + shortness of breath  CARDIOVASCULAR: No chest pain, palpitations, dizziness, or leg swelling  GASTROINTESTINAL: No abdominal or epigastric pain. No nausea, vomiting, or hematemesis; No diarrhea or constipation. No melena or hematochezia.      MEDICATIONS  (STANDING):  aspirin enteric coated 81 milliGRAM(s) Oral daily  carvedilol 6.25 milliGRAM(s) Oral every 12 hours  dextrose 5%. 1000 milliLiter(s) (50 mL/Hr) IV Continuous <Continuous>  dextrose 50% Injectable 12.5 Gram(s) IV Push once  dextrose 50% Injectable 25 Gram(s) IV Push once  dextrose 50% Injectable 25 Gram(s) IV Push once  docusate sodium 100 milliGRAM(s) Oral daily  furosemide Infusion 10 mG/Hr (5 mL/Hr) IV Continuous <Continuous>  heparin  Injectable 5000 Unit(s) SubCutaneous every 12 hours  hydrALAZINE 25 milliGRAM(s) Oral every 8 hours  insulin glargine Injectable (LANTUS) 30 Unit(s) SubCutaneous at bedtime  insulin lispro (HumaLOG) corrective regimen sliding scale   SubCutaneous three times a day before meals  insulin lispro (HumaLOG) corrective regimen sliding scale   SubCutaneous at bedtime  isosorbide   dinitrate Tablet (ISORDIL) 10 milliGRAM(s) Oral three times a day  senna 2 Tablet(s) Oral at bedtime  sevelamer hydrochloride 800 milliGRAM(s) Oral three times a day  sodium chloride 0.9% lock flush 3 milliLiter(s) IV Push every 8 hours    MEDICATIONS  (PRN):  calamine Lotion 1 Application(s) Topical daily PRN Rash and/or Itching  dextrose 40% Gel 15 Gram(s) Oral once PRN Blood Glucose LESS THAN 70 milliGRAM(s)/deciliter  fluticasone propionate 50 MICROgram(s)/spray Nasal Spray 1 Spray(s) Both Nostrils two times a day PRN nasal congestion  glucagon  Injectable 1 milliGRAM(s) IntraMuscular once PRN Glucose LESS THAN 70 milligrams/deciliter  guaiFENesin   Syrup  (Sugar-Free) 200 milliGRAM(s) Oral every 6 hours PRN Cough  oxyCODONE    IR 5 milliGRAM(s) Oral every 4 hours PRN Moderate Pain and sever pain      PHYSICAL EXAM:  T(C): 36.5 (01-19-19 @ 12:10), Max: 36.8 (01-18-19 @ 21:48)  HR: 85 (01-19-19 @ 13:43) (64 - 85)  BP: 95/48 (01-19-19 @ 13:43) (95/48 - 125/76)  RR: 18 (01-19-19 @ 12:10) (18 - 18)  SpO2: 97% (01-19-19 @ 12:10) (95% - 100%)  I&O's Summary    18 Jan 2019 07:01  -  19 Jan 2019 07:00  --------------------------------------------------------  IN: 500 mL / OUT: 1050 mL / NET: -550 mL      GENERAL: elderly male lying in bed in NAD   MENTAL STATUS/PSYCH:  AAO x3   HEAD:  Atraumatic, Normocephalic  EYES: EOMI, PERRLA, conjunctiva and sclera clear  NECK: Supple, No elevated JVD  CHEST/LUNG: basilar crackles bilaterally; No wheeze  HEART: Regular rate and rhythm; No murmurs, rubs, or gallops  ABDOMEN: Soft, Nontender, Nondistended; Bowel sounds present  EXTREMITIES:  2+ LE edema  NEUROLOGY: CN II-XII grossly intact, moving all extremities  SKIN: No rashes or lesions    LABS:  CAPILLARY BLOOD GLUCOSE      POCT Blood Glucose.: 147 mg/dL (19 Jan 2019 12:34)  POCT Blood Glucose.: 100 mg/dL (19 Jan 2019 08:54)  POCT Blood Glucose.: 144 mg/dL (18 Jan 2019 21:55)  POCT Blood Glucose.: 159 mg/dL (18 Jan 2019 17:25)                          9.3    5.62  )-----------( 190      ( 19 Jan 2019 07:00 )             29.5     01-19    132<L>  |  94<L>  |  66<H>  ----------------------------<  86  4.5   |  24  |  2.38<H>    Ca    8.7      19 Jan 2019 07:00  Phos  5.1     01-18  Mg     2.3     01-19

## 2019-01-19 NOTE — PROGRESS NOTE ADULT - PROBLEM SELECTOR PLAN 3
Superimposed on CKD stage 3-4. Likely due to cardiorenal syndrome in the setting of HF exacerbation  -Hold nephrotoxic agents.   -Monitor Creatinine.  -Continue Sevelamer.   -nephrology consulted, recs reviewed and appreciated  - renally dose meds accordingly

## 2019-01-19 NOTE — PROGRESS NOTE ADULT - PROBLEM SELECTOR PLAN 1
Pt. with KESHAV in the setting of ADHF. On review of previous records in [facility]/[place], patient with normal Scr levels from 4/26/16 to 9/24/18. Pt. noted to have an episode of KESHAV during previous/recent hospitalization at [facility] (12/7/18 to 12/27/18). On this admission, Scr was elevated at 2.15 on 1/16/19, increased to 2.44 to 1/18/19. Scr remains stable today at 2.38. Pt. currently on IV Lasix drip. US Kidney showed increased bilateral renal cortical echogenicity, no hydronephrosis on 1/18/19. Monitor labs and urine output. Avoid nephrotoxins. Dose medications as per eGFR

## 2019-01-19 NOTE — PROGRESS NOTE ADULT - PROBLEM SELECTOR PLAN 3
Patient with hypervolemia in the setting of ADHF. Patient currently being treated with Lasix gtt with 1L of UOP in last 24 hours. Recommend adding metolazone 5mg PO daily to improve diuresis. Monitor daily weights. Low salt diet

## 2019-01-19 NOTE — PROGRESS NOTE ADULT - PROBLEM SELECTOR PLAN 1
NYHA class IV. ICM. Weight downtrend w negative 550cc balance. Remains moderately hypervolemic.   -inc Lasix gtt 15 mg/hr.   Strict I/O and daily standing weights.  Dry weight is 132 lbs.   Continue Coreg 6.25 mg po BID.  Hyperkalemic, hold spironolactone. Resume if <4  Continue hydral 25 mg po TID.   Continue Isordil 10 mg po TID.  -Would discuss w EP for possible ICD placement. ICM <35% w fractionated QRS on EKG.

## 2019-01-19 NOTE — PROGRESS NOTE ADULT - SUBJECTIVE AND OBJECTIVE BOX
24H hour events: No acute events.    MEDICATIONS:  aspirin enteric coated 81 milliGRAM(s) Oral daily  carvedilol 6.25 milliGRAM(s) Oral every 12 hours  furosemide Infusion 10 mG/Hr IV Continuous <Continuous>  heparin  Injectable 5000 Unit(s) SubCutaneous every 12 hours  hydrALAZINE 25 milliGRAM(s) Oral every 8 hours  isosorbide   dinitrate Tablet (ISORDIL) 10 milliGRAM(s) Oral three times a day  guaiFENesin   Syrup  (Sugar-Free) 200 milliGRAM(s) Oral every 6 hours PRN  oxyCODONE    IR 5 milliGRAM(s) Oral every 4 hours PRN  docusate sodium 100 milliGRAM(s) Oral daily  senna 2 Tablet(s) Oral at bedtime  dextrose 40% Gel 15 Gram(s) Oral once PRN  dextrose 50% Injectable 12.5 Gram(s) IV Push once  dextrose 50% Injectable 25 Gram(s) IV Push once  dextrose 50% Injectable 25 Gram(s) IV Push once  glucagon  Injectable 1 milliGRAM(s) IntraMuscular once PRN  insulin glargine Injectable (LANTUS) 30 Unit(s) SubCutaneous at bedtime  insulin lispro (HumaLOG) corrective regimen sliding scale   SubCutaneous three times a day before meals  insulin lispro (HumaLOG) corrective regimen sliding scale   SubCutaneous at bedtime  calamine Lotion 1 Application(s) Topical daily PRN  dextrose 5%. 1000 milliLiter(s) IV Continuous <Continuous>  fluticasone propionate 50 MICROgram(s)/spray Nasal Spray 1 Spray(s) Both Nostrils two times a day PRN  sodium chloride 0.9% lock flush 3 milliLiter(s) IV Push every 8 hours      REVIEW OF SYSTEMS:  Complete 10point ROS negative other than mild orthopnea and residual LE swelling.    PHYSICAL EXAM:  T(C): 36.6 (01-19-19 @ 05:57), Max: 36.8 (01-18-19 @ 21:48)  HR: 76 (01-19-19 @ 05:57) (75 - 78)  BP: 101/58 (01-19-19 @ 05:57) (101/58 - 125/76)  RR: 18 (01-19-19 @ 05:57) (18 - 18)  SpO2: 99% (01-19-19 @ 05:57) (98% - 100%)  Wt(kg): --  I&O's Summary    18 Jan 2019 07:01  -  19 Jan 2019 07:00  --------------------------------------------------------  IN: 500 mL / OUT: 1050 mL / NET: -550 mL      Appearance: NAD, eating breakfast comfortably	  HEENT:   Normal oral mucosa, PERRL, EOMI	  Cardiovascular: Normal S1 S2, No JVD, No murmurs, 1+ LE edema  Respiratory: Lungs clear to auscultation	  Gastrointestinal:  Soft, Non-tender, + BS	  Skin: No rashes, No ecchymoses, No cyanosis	  Neurologic: Non-focal  Vascular: Peripheral pulses palpable 2+ bilaterally        LABS:	 	    CBC Full  -  ( 19 Jan 2019 07:00 )  WBC Count : 5.62 K/uL  Hemoglobin : 9.3 g/dL  Hematocrit : 29.5 %  Platelet Count - Automated : 190 K/uL  Mean Cell Volume : 71.8 fL  Mean Cell Hemoglobin : 22.6 pg  Mean Cell Hemoglobin Concentration : 31.5 %    01-19    132<L>  |  94<L>  |  66<H>  ----------------------------<  86  4.5   |  24  |  2.38<H>  01-18    132<L>  |  94<L>  |  62<H>  ----------------------------<  157<H>  5.1   |  25  |  2.50<H>    Ca    8.7      19 Jan 2019 07:00  Ca    9.0      18 Jan 2019 18:22  Phos  5.1     01-18  Mg     2.3     01-19  Mg     2.4     01-18    proBNP: Serum Pro-Brain Natriuretic Peptide: 4995 pg/mL (01-18 @ 06:00)  Serum Pro-Brain Natriuretic Peptide: 4578 pg/mL (01-16 @ 23:45)    TELEMETRY: sinus 1st deg 70s, PVCs, couplets.    < from: TTE with Doppler (w/Cont) (12.14.18 @ 11:38) >  OBSERVATIONS:  Mitral Valve: Mitral annular calcification, otherwise  normal mitral valve. Moderate-severe mitral regurgitation.  Aortic Root: Normal aortic root.  Aortic Valve: Calcified trileaflet aortic valve with normal  opening.  Left Atrium: Moderately dilated left atrium.  LA volume  index = 45 cc/m2.  Left Ventricle: Severe global left ventricular systolic  dysfunction.  Endocardial visualization enhanced with  intravenous injection of echo contrast (Definity). Normal  left ventricular internal dimensions and wall thicknesses.  Severe diastolic dysfunction.  Right Heart: Normal right atrium. Right ventricular  enlargement with decreased right ventricular systolic  function. Normal tricuspid valve. Mild tricuspid  regurgitation. Normal pulmonic valve. Minimal pulmonic  regurgitation.  Pericardium/PleuraNormal pericardium with no pericardial  effusion.  Hemodynamic: Estimated right ventricular systolic pressure  equals 49 mm Hg, assuming right atrial pressure equals 10  mm Hg, consistent with mild pulmonary hypertension.  ------------------------------------------------------------------------  CONCLUSIONS:  1. Mitral annular calcification, otherwise normal mitral  valve. Moderate-severe mitral regurgitation.  2. Moderately dilated left atrium.  LA volume index = 45  cc/m2.  3. Normal left ventricular internal dimensions and wall  thicknesses.  4. Severe global left ventricular systolic dysfunction.  Endocardial visualization enhanced with intravenous  injection of echo contrast (Definity).  5. Severe diastolic dysfunction.  6. Right ventricular enlargement with decreased right  ventricular systolic function.  7. Estimated right ventricular systolic pressure equals 49  mm Hg, assuming right atrial pressure equals 10 mm Hg,  consistent with mild pulmonary hypertension.  *** Compared with echocardiogram of 10/16/2018, no  significant changes noted.    < end of copied text >

## 2019-01-19 NOTE — PROGRESS NOTE ADULT - PROBLEM SELECTOR PLAN 2
Pt. with hyperkalemia in the setting of KESHAV and spironolactone use. Patient received medical management with improvement of serum potassium. Serum potassium this AM is WNL. Pt. on IV lasix drip. Continue to hold spironolactone. Low potassium diet. Monitor serum potassium

## 2019-01-19 NOTE — PROGRESS NOTE ADULT - SUBJECTIVE AND OBJECTIVE BOX
[facility] DIVISION OF KIDNEY DISEASES AND HYPERTENSION -- FOLLOW UP NOTE  --------------------------------------------------------------------------------  HPI: 68-year-old male with medical history of b/L LE bypass and b/L LE multiple toe amputations, DM2, HFrEF (EF 20%), HTN, Dyslipidemia, CAD, CABG, PVD, groin  sartorius flap, and vac placement, LLE with surgical scar wound admitted for worsening SOB. Nephrology consulted for KESHAV. Currently been treated for ADHF with lasix drip. On review of previous records in [facility]/[place], patient had normal Scr levels from 4/26/16 to 9/24/18. Pt. noted to have an episode of KESHAV during previous/recent hospitalization at [facility] (12/7/18 to 12/27/18). On this admission, Scr was elevated at 2.15 on 1/16/19, increased to 2.44 on 1/18/19. Also noted to have serum potassium elevated at 5.4 on 1/18/19.    Pt was seen and examined at bedside this AM in presence of family. Patient states that his shortness of breath is improved, but feels head congestion. Patient noted to have 1L of UOP in 24 hours. Denies CP, N/V/F/C.    PAST HISTORY  --------------------------------------------------------------------------------  No significant changes to PMH, PSH, FHx, SHx, unless otherwise noted    ALLERGIES & MEDICATIONS  --------------------------------------------------------------------------------  Allergies    No Known Allergies    Intolerances    Standing Inpatient Medications  aspirin enteric coated 81 milliGRAM(s) Oral daily  carvedilol 6.25 milliGRAM(s) Oral every 12 hours  dextrose 5%. 1000 milliLiter(s) IV Continuous <Continuous>  dextrose 50% Injectable 12.5 Gram(s) IV Push once  dextrose 50% Injectable 25 Gram(s) IV Push once  dextrose 50% Injectable 25 Gram(s) IV Push once  docusate sodium 100 milliGRAM(s) Oral daily  furosemide Infusion 10 mG/Hr IV Continuous <Continuous>  heparin  Injectable 5000 Unit(s) SubCutaneous every 12 hours  hydrALAZINE 25 milliGRAM(s) Oral every 8 hours  insulin glargine Injectable (LANTUS) 30 Unit(s) SubCutaneous at bedtime  insulin lispro (HumaLOG) corrective regimen sliding scale   SubCutaneous three times a day before meals  insulin lispro (HumaLOG) corrective regimen sliding scale   SubCutaneous at bedtime  isosorbide   dinitrate Tablet (ISORDIL) 10 milliGRAM(s) Oral three times a day  senna 2 Tablet(s) Oral at bedtime  sevelamer hydrochloride 800 milliGRAM(s) Oral three times a day  sodium chloride 0.9% lock flush 3 milliLiter(s) IV Push every 8 hours    REVIEW OF SYSTEMS  --------------------------------------------------------------------------------  Gen: No fevers  Respiratory: + dyspnea (improved), + nasal congestion  CV: No chest pain  GI: No abdominal pain  : No dysuria, hematuria  MSK: + LE edema    All other systems were reviewed and are negative, except as noted.    VITALS/PHYSICAL EXAM  --------------------------------------------------------------------------------  T(C): 36.5 (01-19-19 @ 12:10), Max: 36.8 (01-18-19 @ 21:48)  HR: 74 (01-19-19 @ 12:10) (64 - 78)  BP: 107/66 (01-19-19 @ 12:10) (101/58 - 125/76)  RR: 18 (01-19-19 @ 12:10) (18 - 18)  SpO2: 97% (01-19-19 @ 12:10) (95% - 100%)  Wt(kg): --    01-18-19 @ 07:01  -  01-19-19 @ 07:00  --------------------------------------------------------  IN: 500 mL / OUT: 1050 mL / NET: -550 mL    Physical Exam:  	Gen: resting, NAD  	HEENT: No JVD  	Pulm: b/l crackles R>L  	CV: S1S2  	Abd: Soft, +BS   	Ext: + b/l LE edema  	Neuro: Awake  	Skin: Warm and dry    LABS/STUDIES  --------------------------------------------------------------------------------              9.3    5.62  >-----------<  190      [01-19-19 @ 07:00]              29.5     132  |  94  |  66  ----------------------------<  86      [01-19-19 @ 07:00]  4.5   |  24  |  2.38        Ca     8.7     [01-19-19 @ 07:00]      Mg     2.3     [01-19-19 @ 07:00]      Phos  5.1     [01-18-19 @ 18:22]    Creatinine Trend:  SCr 2.38 [01-19 @ 07:00]  SCr 2.50 [01-18 @ 18:22]  SCr 2.44 [01-18 @ 06:00]  SCr 2.05 [01-17 @ 06:34]  SCr 2.15 [01-16 @ 23:45]

## 2019-01-19 NOTE — PROGRESS NOTE ADULT - PROBLEM SELECTOR PLAN 1
remains fluid overloaded  -LVEF 25% last echo  -agree with increasing lasix gtt to 15mg/hr  - I&O's daily weights, Lytes.  - Low salt diet. Fluid restrict 1L / day.  - carvedilol 6.25mg q12h   - hydralazine 25mg q8h  - isosorbide dinitrate 10mg TID  -Hold ACE/ ARB due to worsening renal function.  -EP consult for ICD

## 2019-01-19 NOTE — PROGRESS NOTE ADULT - PROBLEM SELECTOR PLAN 2
Potassium initially 5.7 In the setting of worsening renal function  now resolved s/p treatment. K+= 4.7  -ACEi and aldactone on hold  -f/u trend

## 2019-01-19 NOTE — PROGRESS NOTE ADULT - PROBLEM SELECTOR PLAN 6
Continue ASA, BB, Nitrate.  - statin initially held but can likely resume pending repeat liver tests. Suspect elevated liver enzymes 2/2 congestive hepatopathy due to decompensated heart failure

## 2019-01-20 LAB
ALBUMIN SERPL ELPH-MCNC: 3.1 G/DL — LOW (ref 3.3–5)
ALP SERPL-CCNC: 455 U/L — HIGH (ref 40–120)
ALT FLD-CCNC: 26 U/L — SIGNIFICANT CHANGE UP (ref 4–41)
ANION GAP SERPL CALC-SCNC: 16 MMO/L — HIGH (ref 7–14)
ANION GAP SERPL CALC-SCNC: 16 MMO/L — HIGH (ref 7–14)
AST SERPL-CCNC: 23 U/L — SIGNIFICANT CHANGE UP (ref 4–40)
BASE EXCESS BLDV CALC-SCNC: -0.9 MMOL/L — SIGNIFICANT CHANGE UP
BASE EXCESS BLDV CALC-SCNC: -1.9 MMOL/L — SIGNIFICANT CHANGE UP
BILIRUB SERPL-MCNC: 1.6 MG/DL — HIGH (ref 0.2–1.2)
BLD GP AB SCN SERPL QL: NEGATIVE — SIGNIFICANT CHANGE UP
BUN SERPL-MCNC: 71 MG/DL — HIGH (ref 7–23)
BUN SERPL-MCNC: 72 MG/DL — HIGH (ref 7–23)
CALCIUM SERPL-MCNC: 8.5 MG/DL — SIGNIFICANT CHANGE UP (ref 8.4–10.5)
CALCIUM SERPL-MCNC: 8.8 MG/DL — SIGNIFICANT CHANGE UP (ref 8.4–10.5)
CHLORIDE SERPL-SCNC: 94 MMOL/L — LOW (ref 98–107)
CHLORIDE SERPL-SCNC: 95 MMOL/L — LOW (ref 98–107)
CO2 SERPL-SCNC: 21 MMOL/L — LOW (ref 22–31)
CO2 SERPL-SCNC: 24 MMOL/L — SIGNIFICANT CHANGE UP (ref 22–31)
CREAT SERPL-MCNC: 2.65 MG/DL — HIGH (ref 0.5–1.3)
CREAT SERPL-MCNC: 2.65 MG/DL — HIGH (ref 0.5–1.3)
GAS PNL BLDV: 131 MMOL/L — LOW (ref 136–146)
GAS PNL BLDV: 132 MMOL/L — LOW (ref 136–146)
GLUCOSE BLDC GLUCOMTR-MCNC: 107 MG/DL — HIGH (ref 70–99)
GLUCOSE BLDC GLUCOMTR-MCNC: 116 MG/DL — HIGH (ref 70–99)
GLUCOSE BLDC GLUCOMTR-MCNC: 145 MG/DL — HIGH (ref 70–99)
GLUCOSE BLDC GLUCOMTR-MCNC: 204 MG/DL — HIGH (ref 70–99)
GLUCOSE BLDC GLUCOMTR-MCNC: 219 MG/DL — HIGH (ref 70–99)
GLUCOSE BLDV-MCNC: 140 — HIGH (ref 70–99)
GLUCOSE BLDV-MCNC: 228 — HIGH (ref 70–99)
GLUCOSE SERPL-MCNC: 121 MG/DL — HIGH (ref 70–99)
GLUCOSE SERPL-MCNC: 231 MG/DL — HIGH (ref 70–99)
HCO3 BLDV-SCNC: 22 MMOL/L — SIGNIFICANT CHANGE UP (ref 20–27)
HCO3 BLDV-SCNC: 24 MMOL/L — SIGNIFICANT CHANGE UP (ref 20–27)
HCT VFR BLD CALC: 29.8 % — LOW (ref 39–50)
HCT VFR BLDV CALC: 27.8 % — LOW (ref 39–51)
HCT VFR BLDV CALC: 28.7 % — LOW (ref 39–51)
HGB BLD-MCNC: 9.2 G/DL — LOW (ref 13–17)
HGB BLDV-MCNC: 9 G/DL — LOW (ref 13–17)
HGB BLDV-MCNC: 9.3 G/DL — LOW (ref 13–17)
LACTATE BLDV-MCNC: 1.4 MMOL/L — SIGNIFICANT CHANGE UP (ref 0.5–2)
MAGNESIUM SERPL-MCNC: 2.2 MG/DL — SIGNIFICANT CHANGE UP (ref 1.6–2.6)
MAGNESIUM SERPL-MCNC: 2.2 MG/DL — SIGNIFICANT CHANGE UP (ref 1.6–2.6)
MCHC RBC-ENTMCNC: 22.5 PG — LOW (ref 27–34)
MCHC RBC-ENTMCNC: 30.9 % — LOW (ref 32–36)
MCV RBC AUTO: 72.9 FL — LOW (ref 80–100)
NRBC # FLD: 0 K/UL — LOW (ref 25–125)
PCO2 BLDV: 36 MMHG — LOW (ref 41–51)
PCO2 BLDV: 41 MMHG — SIGNIFICANT CHANGE UP (ref 41–51)
PH BLDV: 7.36 PH — SIGNIFICANT CHANGE UP (ref 7.32–7.43)
PH BLDV: 7.42 PH — SIGNIFICANT CHANGE UP (ref 7.32–7.43)
PHOSPHATE SERPL-MCNC: 5.8 MG/DL — HIGH (ref 2.5–4.5)
PLATELET # BLD AUTO: 200 K/UL — SIGNIFICANT CHANGE UP (ref 150–400)
PMV BLD: SIGNIFICANT CHANGE UP FL (ref 7–13)
PO2 BLDV: 43 MMHG — HIGH (ref 35–40)
PO2 BLDV: 59 MMHG — HIGH (ref 35–40)
POTASSIUM BLDV-SCNC: 4.1 MMOL/L — SIGNIFICANT CHANGE UP (ref 3.4–4.5)
POTASSIUM BLDV-SCNC: 4.4 MMOL/L — SIGNIFICANT CHANGE UP (ref 3.4–4.5)
POTASSIUM SERPL-MCNC: 4.5 MMOL/L — SIGNIFICANT CHANGE UP (ref 3.5–5.3)
POTASSIUM SERPL-MCNC: 4.6 MMOL/L — SIGNIFICANT CHANGE UP (ref 3.5–5.3)
POTASSIUM SERPL-SCNC: 4.5 MMOL/L — SIGNIFICANT CHANGE UP (ref 3.5–5.3)
POTASSIUM SERPL-SCNC: 4.6 MMOL/L — SIGNIFICANT CHANGE UP (ref 3.5–5.3)
PROT SERPL-MCNC: 6.6 G/DL — SIGNIFICANT CHANGE UP (ref 6–8.3)
RBC # BLD: 4.09 M/UL — LOW (ref 4.2–5.8)
RBC # FLD: 24.9 % — HIGH (ref 10.3–14.5)
RH IG SCN BLD-IMP: NEGATIVE — SIGNIFICANT CHANGE UP
SAO2 % BLDV: 68.4 % — SIGNIFICANT CHANGE UP (ref 60–85)
SAO2 % BLDV: 87.8 % — HIGH (ref 60–85)
SODIUM SERPL-SCNC: 132 MMOL/L — LOW (ref 135–145)
SODIUM SERPL-SCNC: 134 MMOL/L — LOW (ref 135–145)
WBC # BLD: 5.82 K/UL — SIGNIFICANT CHANGE UP (ref 3.8–10.5)
WBC # FLD AUTO: 5.82 K/UL — SIGNIFICANT CHANGE UP (ref 3.8–10.5)

## 2019-01-20 PROCEDURE — 99233 SBSQ HOSP IP/OBS HIGH 50: CPT

## 2019-01-20 PROCEDURE — 71045 X-RAY EXAM CHEST 1 VIEW: CPT | Mod: 26

## 2019-01-20 PROCEDURE — 93306 TTE W/DOPPLER COMPLETE: CPT | Mod: 26

## 2019-01-20 PROCEDURE — 99233 SBSQ HOSP IP/OBS HIGH 50: CPT | Mod: GC

## 2019-01-20 RX ORDER — FUROSEMIDE 40 MG
40 TABLET ORAL ONCE
Qty: 0 | Refills: 0 | Status: COMPLETED | OUTPATIENT
Start: 2019-01-20 | End: 2019-01-20

## 2019-01-20 RX ORDER — MILRINONE LACTATE 1 MG/ML
0.25 INJECTION, SOLUTION INTRAVENOUS
Qty: 20 | Refills: 0 | Status: DISCONTINUED | OUTPATIENT
Start: 2019-01-20 | End: 2019-01-22

## 2019-01-20 RX ORDER — CHLORHEXIDINE GLUCONATE 213 G/1000ML
1 SOLUTION TOPICAL
Qty: 0 | Refills: 0 | Status: DISCONTINUED | OUTPATIENT
Start: 2019-01-20 | End: 2019-02-06

## 2019-01-20 RX ORDER — IPRATROPIUM/ALBUTEROL SULFATE 18-103MCG
3 AEROSOL WITH ADAPTER (GRAM) INHALATION ONCE
Qty: 0 | Refills: 0 | Status: COMPLETED | OUTPATIENT
Start: 2019-01-20 | End: 2019-01-20

## 2019-01-20 RX ORDER — OXYCODONE HYDROCHLORIDE 5 MG/1
5 TABLET ORAL EVERY 6 HOURS
Qty: 0 | Refills: 0 | Status: DISCONTINUED | OUTPATIENT
Start: 2019-01-20 | End: 2019-01-27

## 2019-01-20 RX ADMIN — INSULIN GLARGINE 30 UNIT(S): 100 INJECTION, SOLUTION SUBCUTANEOUS at 21:44

## 2019-01-20 RX ADMIN — SODIUM CHLORIDE 3 MILLILITER(S): 9 INJECTION INTRAMUSCULAR; INTRAVENOUS; SUBCUTANEOUS at 13:35

## 2019-01-20 RX ADMIN — Medication 81 MILLIGRAM(S): at 11:58

## 2019-01-20 RX ADMIN — ISOSORBIDE DINITRATE 10 MILLIGRAM(S): 5 TABLET ORAL at 13:38

## 2019-01-20 RX ADMIN — Medication 25 MILLIGRAM(S): at 05:39

## 2019-01-20 RX ADMIN — Medication 4: at 17:41

## 2019-01-20 RX ADMIN — SEVELAMER CARBONATE 800 MILLIGRAM(S): 2400 POWDER, FOR SUSPENSION ORAL at 21:44

## 2019-01-20 RX ADMIN — Medication 4: at 13:37

## 2019-01-20 RX ADMIN — SEVELAMER CARBONATE 800 MILLIGRAM(S): 2400 POWDER, FOR SUSPENSION ORAL at 05:39

## 2019-01-20 RX ADMIN — Medication 25 MILLIGRAM(S): at 13:38

## 2019-01-20 RX ADMIN — Medication 7.5 MG/HR: at 17:42

## 2019-01-20 RX ADMIN — Medication 40 MILLIGRAM(S): at 07:46

## 2019-01-20 RX ADMIN — ISOSORBIDE DINITRATE 10 MILLIGRAM(S): 5 TABLET ORAL at 21:44

## 2019-01-20 RX ADMIN — SODIUM CHLORIDE 3 MILLILITER(S): 9 INJECTION INTRAMUSCULAR; INTRAVENOUS; SUBCUTANEOUS at 05:39

## 2019-01-20 RX ADMIN — OXYCODONE HYDROCHLORIDE 5 MILLIGRAM(S): 5 TABLET ORAL at 11:57

## 2019-01-20 RX ADMIN — Medication 25 MILLIGRAM(S): at 21:44

## 2019-01-20 RX ADMIN — Medication 7.5 MG/HR: at 21:47

## 2019-01-20 RX ADMIN — Medication 3 MILLILITER(S): at 08:04

## 2019-01-20 RX ADMIN — SEVELAMER CARBONATE 800 MILLIGRAM(S): 2400 POWDER, FOR SUSPENSION ORAL at 13:37

## 2019-01-20 RX ADMIN — CARVEDILOL PHOSPHATE 6.25 MILLIGRAM(S): 80 CAPSULE, EXTENDED RELEASE ORAL at 17:41

## 2019-01-20 RX ADMIN — OXYCODONE HYDROCHLORIDE 5 MILLIGRAM(S): 5 TABLET ORAL at 21:44

## 2019-01-20 RX ADMIN — SODIUM CHLORIDE 3 MILLILITER(S): 9 INJECTION INTRAMUSCULAR; INTRAVENOUS; SUBCUTANEOUS at 21:43

## 2019-01-20 RX ADMIN — Medication 100 MILLIGRAM(S): at 11:57

## 2019-01-20 RX ADMIN — HEPARIN SODIUM 5000 UNIT(S): 5000 INJECTION INTRAVENOUS; SUBCUTANEOUS at 17:41

## 2019-01-20 RX ADMIN — Medication 1 SPRAY(S): at 11:58

## 2019-01-20 RX ADMIN — ISOSORBIDE DINITRATE 10 MILLIGRAM(S): 5 TABLET ORAL at 05:39

## 2019-01-20 RX ADMIN — MILRINONE LACTATE 5.85 MICROGRAM(S)/KG/MIN: 1 INJECTION, SOLUTION INTRAVENOUS at 17:40

## 2019-01-20 RX ADMIN — CARVEDILOL PHOSPHATE 6.25 MILLIGRAM(S): 80 CAPSULE, EXTENDED RELEASE ORAL at 05:39

## 2019-01-20 RX ADMIN — OXYCODONE HYDROCHLORIDE 5 MILLIGRAM(S): 5 TABLET ORAL at 22:25

## 2019-01-20 RX ADMIN — MILRINONE LACTATE 5.85 MICROGRAM(S)/KG/MIN: 1 INJECTION, SOLUTION INTRAVENOUS at 21:46

## 2019-01-20 NOTE — PROGRESS NOTE ADULT - SUBJECTIVE AND OBJECTIVE BOX
Patient is a 68y old  Male who presents with a chief complaint of Dyspnea x 2 days (20 Jan 2019 10:07)      SUBJECTIVE / OVERNIGHT EVENTS:    pt cont to c/o sob overnight. denies chest pain           MEDICATIONS  (STANDING):  aspirin enteric coated 81 milliGRAM(s) Oral daily  carvedilol 6.25 milliGRAM(s) Oral every 12 hours  dextrose 5%. 1000 milliLiter(s) (50 mL/Hr) IV Continuous <Continuous>  dextrose 50% Injectable 12.5 Gram(s) IV Push once  dextrose 50% Injectable 25 Gram(s) IV Push once  dextrose 50% Injectable 25 Gram(s) IV Push once  docusate sodium 100 milliGRAM(s) Oral daily  furosemide Infusion 15 mG/Hr (7.5 mL/Hr) IV Continuous <Continuous>  heparin  Injectable 5000 Unit(s) SubCutaneous every 12 hours  hydrALAZINE 25 milliGRAM(s) Oral every 8 hours  insulin glargine Injectable (LANTUS) 30 Unit(s) SubCutaneous at bedtime  insulin lispro (HumaLOG) corrective regimen sliding scale   SubCutaneous three times a day before meals  insulin lispro (HumaLOG) corrective regimen sliding scale   SubCutaneous at bedtime  isosorbide   dinitrate Tablet (ISORDIL) 10 milliGRAM(s) Oral three times a day  senna 2 Tablet(s) Oral at bedtime  sevelamer hydrochloride 800 milliGRAM(s) Oral three times a day  sodium chloride 0.9% lock flush 3 milliLiter(s) IV Push every 8 hours    MEDICATIONS  (PRN):  calamine Lotion 1 Application(s) Topical daily PRN Rash and/or Itching  dextrose 40% Gel 15 Gram(s) Oral once PRN Blood Glucose LESS THAN 70 milliGRAM(s)/deciliter  fluticasone propionate 50 MICROgram(s)/spray Nasal Spray 1 Spray(s) Both Nostrils two times a day PRN nasal congestion  glucagon  Injectable 1 milliGRAM(s) IntraMuscular once PRN Glucose LESS THAN 70 milligrams/deciliter  guaiFENesin   Syrup  (Sugar-Free) 200 milliGRAM(s) Oral every 6 hours PRN Cough  oxyCODONE    IR 5 milliGRAM(s) Oral every 4 hours PRN Moderate Pain and sever pain      PHYSICAL EXAM:  T(C): 36.4 (01-20-19 @ 09:00), Max: 36.7 (01-19-19 @ 20:32)  HR: 67 (01-20-19 @ 09:00) (61 - 89)  BP: 103/58 (01-20-19 @ 09:00) (92/48 - 105/62)  RR: 16 (01-20-19 @ 09:00) (16 - 18)  SpO2: 97% (01-20-19 @ 09:00) (97% - 100%)  I&O's Summary    19 Jan 2019 07:01  -  20 Jan 2019 07:00  --------------------------------------------------------  IN: 1540 mL / OUT: 1250 mL / NET: 290 mL    20 Jan 2019 07:01  -  20 Jan 2019 13:32  --------------------------------------------------------  IN: 50 mL / OUT: 270 mL / NET: -220 mL      GENERAL: elderly male lying in bed in NAD   MENTAL STATUS/PSYCH:  AAO x3   HEAD:  Atraumatic, Normocephalic  EYES: EOMI, PERRLA, conjunctiva and sclera clear  NECK: Supple, No elevated JVD  CHEST/LUNG: basilar crackles bilaterally; No wheeze  HEART: Regular rate and rhythm; No murmurs, rubs, or gallops  ABDOMEN: Soft, Nontender, Nondistended; Bowel sounds present  EXTREMITIES:  2+ LE edema  NEUROLOGY: CN II-XII grossly intact, moving all extremities  SKIN: No rashes or lesions    LABS:  CAPILLARY BLOOD GLUCOSE      POCT Blood Glucose.: 204 mg/dL (20 Jan 2019 12:45)  POCT Blood Glucose.: 107 mg/dL (20 Jan 2019 08:53)  POCT Blood Glucose.: 116 mg/dL (20 Jan 2019 07:51)  POCT Blood Glucose.: 196 mg/dL (19 Jan 2019 21:19)  POCT Blood Glucose.: 257 mg/dL (19 Jan 2019 18:48)  POCT Blood Glucose.: 246 mg/dL (19 Jan 2019 17:20)                          9.2    5.82  )-----------( 200      ( 20 Jan 2019 05:55 )             29.8     01-20    134<L>  |  94<L>  |  71<H>  ----------------------------<  121<H>  4.6   |  24  |  2.65<H>    Ca    8.8      20 Jan 2019 05:55  Phos  5.1     01-18  Mg     2.2     01-20

## 2019-01-20 NOTE — PROGRESS NOTE ADULT - ATTENDING COMMENTS
CKD and KESHAV in the setting of AHF- Cardiorenal   he is being considered for Ionotrops with diuretics to improve his flow  monitor cr and lytes closely

## 2019-01-20 NOTE — PROGRESS NOTE ADULT - ASSESSMENT
Mr. Gonzalez is a 67 yo man with PMHx notable for PVD c/b multiple toe amputations, s/p B/l LE bypass and groin sartorius flap with vac placement, DM2, chronic systolic HF with LVEF= 20%, HTN, CKD, CAD s/p 3v CABGg, and chronic L LE surgical scar wounds, admitted for acute decompensated heart failure

## 2019-01-20 NOTE — PROGRESS NOTE ADULT - PROBLEM SELECTOR PLAN 1
Pt. with KESHAV in the setting of ADHF. On review of previous records in [facility], patient with normal Scr levels from 4/26/16 to 9/24/18. Pt. noted to have an episode of KESHAV during previous/recent hospitalization at [facility] (12/7/18 to 12/27/18). On this admission, Scr was elevated at 2.15 on 1/16/19, increased to 2.44 to 1/18/19. Scr worsened today to 2.65. Pt. currently on IV Lasix drip. US Kidney showed increased bilateral renal cortical echogenicity, no hydronephrosis on 1/18/19. Monitor labs and urine output. Avoid nephrotoxins. Dose medications as per eGFR

## 2019-01-20 NOTE — PROGRESS NOTE ADULT - PROBLEM SELECTOR PLAN 2
Potassium initially 5.7 In the setting of worsening renal function  now resolved s/p treatment. K 4.6   -ACEi and aldactone on hold  -f/u trend

## 2019-01-20 NOTE — PROGRESS NOTE ADULT - PROBLEM SELECTOR PLAN 1
remains fluid overloaded. net positive in the past 24 hr  -CHF team consult appreciated. pt will transferred to CCU for further manangement  -LVEF 25% last echo  - I&O's daily weights, Lytes.  - Low salt diet. Fluid restrict 1L / day.  - carvedilol 6.25mg q12h   - hydralazine 25mg q8h  - isosorbide dinitrate 10mg TID  -Hold ACE/ ARB due to worsening renal function.  -EP consult for ICD

## 2019-01-20 NOTE — CHART NOTE - NSCHARTNOTEFT_GEN_A_CORE
Reason for CCU Consult:     HISTORY OF PRESENT ILLNESS:  Patient is a 68y old  Male who presents with a chief complaint of Dyspnea x 2 days (20 Jan 2019 10:07)    No Known Allergies    Intolerances        PAST MEDICAL & SURGICAL HISTORY:  Chronic congestive heart failure, unspecified congestive heart failure type  PAD (peripheral artery disease)  Stented coronary artery  Hyperlipidemia  Diabetes  Hypertension  CAD (coronary artery disease)  S/P bypass graft of extremity: Left LE  9/2018  S/P amputation: right great toe and 4th and 5th digit  S/P bypass graft of extremity: RLE  S/P angioplasty with stent: about 5 years ago  S/P CABG x 3      MEDICATIONS  (STANDING):  aspirin enteric coated 81 milliGRAM(s) Oral daily  carvedilol 6.25 milliGRAM(s) Oral every 12 hours  dextrose 5%. 1000 milliLiter(s) (50 mL/Hr) IV Continuous <Continuous>  dextrose 50% Injectable 12.5 Gram(s) IV Push once  dextrose 50% Injectable 25 Gram(s) IV Push once  dextrose 50% Injectable 25 Gram(s) IV Push once  docusate sodium 100 milliGRAM(s) Oral daily  furosemide Infusion 15 mG/Hr (7.5 mL/Hr) IV Continuous <Continuous>  heparin  Injectable 5000 Unit(s) SubCutaneous every 12 hours  hydrALAZINE 25 milliGRAM(s) Oral every 8 hours  insulin glargine Injectable (LANTUS) 30 Unit(s) SubCutaneous at bedtime  insulin lispro (HumaLOG) corrective regimen sliding scale   SubCutaneous three times a day before meals  insulin lispro (HumaLOG) corrective regimen sliding scale   SubCutaneous at bedtime  isosorbide   dinitrate Tablet (ISORDIL) 10 milliGRAM(s) Oral three times a day  senna 2 Tablet(s) Oral at bedtime  sevelamer hydrochloride 800 milliGRAM(s) Oral three times a day  sodium chloride 0.9% lock flush 3 milliLiter(s) IV Push every 8 hours    MEDICATIONS  (PRN):  calamine Lotion 1 Application(s) Topical daily PRN Rash and/or Itching  dextrose 40% Gel 15 Gram(s) Oral once PRN Blood Glucose LESS THAN 70 milliGRAM(s)/deciliter  fluticasone propionate 50 MICROgram(s)/spray Nasal Spray 1 Spray(s) Both Nostrils two times a day PRN nasal congestion  glucagon  Injectable 1 milliGRAM(s) IntraMuscular once PRN Glucose LESS THAN 70 milligrams/deciliter  guaiFENesin   Syrup  (Sugar-Free) 200 milliGRAM(s) Oral every 6 hours PRN Cough  oxyCODONE    IR 5 milliGRAM(s) Oral every 4 hours PRN Moderate Pain and sever pain      Drug Dosing Weight  Height (cm): 167.64 (17 Jan 2019 04:44)  Weight (kg): 78 (17 Jan 2019 04:44)  BMI (kg/m2): 27.8 (17 Jan 2019 04:44)  BSA (m2): 1.88 (17 Jan 2019 04:44)    FAMILY HISTORY:  No family history of cancer (Father)  Coronary artery disease (Mother)      SOCIAL HISTORY:  Smoker[ ]  Non smoker[ ]  Alcohol [ ]      ADVANCE DIRECTIVES:    REVIEW OF SYSTEMS:    CONSTITUTIONAL: No fevers, No chills, No fatigue, No weight gain  EYES: No vision changes   ENT: No congestion, No ear pain, No sore throat.  NECK: No pain, No stiffness  RESPIRATORY: No shortness of breath, No cough, No wheezing, No hemoptysis  CARDIOVASCULAR: No chest pain. No palpitations, No ORTA, No orthopnea, No paroxysmal nocturnal dyspnea, No pleuritic pain  GASTROINTESTINAL: No abdominal pain, No nausea, No vomiting, No hematemesis, No diarrhea No constipation. No melena  GENITOURINARY: No dysuria, No frequency, No incontinence, No hematuria  NEUROLOGICAL: No dizziness, No lightheadedness, No syncope, No LOC, No headache, No numbness or weakness  EXTREMITIES: No Edema, No joint pain, No joint swelling.  PSYCHIATRIC: No anxiety, No depression  SKIN: No diaphoresis. No itching, No rashes, No pressure ulcers    All other review of systems is negative unless indicated above.    PHYSICAL EXAM:    Appearance: NAD, no distress  HEENT:   Normal oral mucosa, PERRL, EOMI  Cardiovascular: Regular rate and rhythm, Normal S1 S2, No JVD, No murmurs, No edema  Respiratory: Lungs clear to auscultation. No rales, No rhonchi, No wheezing. No tenderness to palpation  Gastrointestinal:  Soft, Non-tender, + BS  Neurologic: Non-focal, A&Ox3  Skin: Warm and dry, No rashes, No ecchymoses, No cyanosis  Extremities: No clubbing, cyanosis or edema  Vascular: Peripheral pulses palpable 2+ bilaterally  Psychiatry: Mood & affect appropriate    ICU Vital Signs Last 24 Hrs  T(C): 36.4 (20 Jan 2019 09:00), Max: 36.7 (19 Jan 2019 20:32)  T(F): 97.6 (20 Jan 2019 09:00), Max: 98 (19 Jan 2019 20:32)  HR: 67 (20 Jan 2019 09:00) (61 - 89)  BP: 103/58 (20 Jan 2019 09:00) (92/48 - 105/62)  BP(mean): --  ABP: --  ABP(mean): --  RR: 16 (20 Jan 2019 09:00) (16 - 18)  SpO2: 97% (20 Jan 2019 09:00) (97% - 100%)          LABS:  CBC Full  -  ( 20 Jan 2019 05:55 )  WBC Count : 5.82 K/uL  Hemoglobin : 9.2 g/dL  Hematocrit : 29.8 %  Platelet Count - Automated : 200 K/uL  Mean Cell Volume : 72.9 fL  Mean Cell Hemoglobin : 22.5 pg  Mean Cell Hemoglobin Concentration : 30.9 %  Auto Neutrophil # : x  Auto Lymphocyte # : x  Auto Monocyte # : x  Auto Eosinophil # : x  Auto Basophil # : x  Auto Neutrophil % : x  Auto Lymphocyte % : x  Auto Monocyte % : x  Auto Eosinophil % : x  Auto Basophil % : x    01-20    134<L>  |  94<L>  |  71<H>  ----------------------------<  121<H>  4.6   |  24  |  2.65<H>    Ca    8.8      20 Jan 2019 05:55  Phos  5.1     01-18  Mg     2.2     01-20          CAPILLARY BLOOD GLUCOSE      POCT Blood Glucose.: 107 mg/dL (20 Jan 2019 08:53)          I&O's Detail    19 Jan 2019 07:01  -  20 Jan 2019 07:00  --------------------------------------------------------  IN:    Oral Fluid: 1540 mL  Total IN: 1540 mL    OUT:    Voided: 1250 mL  Total OUT: 1250 mL    Total NET: 290 mL      20 Jan 2019 07:01  -  20 Jan 2019 12:27  --------------------------------------------------------  IN:    Oral Fluid: 50 mL  Total IN: 50 mL    OUT:    Voided: 270 mL  Total OUT: 270 mL    Total NET: -220 mL      ASSESSMENT  68 male w/ PMHX of CAD s/p CABG x 3 and PCI, HFrEF (LVEF 24%, LVEDD 5.4) mod-severe MR, severe diastolic dysfunction, RV systolic failure, no AICD (has refused it in past) DM II, PAD s/p L KEI stent, s/p LLE CFA to below-knee bypass with PTFE for long-segment SFA occlusion, L 2nd toe amputation /R toe amputation, c/b groin soft tissue infection requiring washout, sartorius flap, and vac placement. His last admission from 12/8-12/27 required CCU admission and placement on dobutamine. He has had 3 admissions in 2018, for various complications from DM.  He comes in this time due to worsening SOB. He admits to 2 pillow orthopnea, frequent PND and ORTA after 1/2 block and walking up 5 steps. No CP, palpitations, dizziness. Patient's son by bedside. On tele, patient not diuresing well and condition guarded. Admitted to CCU for inotropic therapy,initiation of primacor infusion and IV diuresis with lasix gtt.     His dry weight s/p last hospitalization was 132 lbs.    NYHA class IV. Moderately hypervolemic.   Please record only standing weights for comparison    PLAN  Problem/Plan - 1:  ·  Problem: Acute on chronic systolic heart failure.  Plan: NYHA class IV. ICM. On physical exam, patient is volume overloaded.   -primacor infusion to be initiated at 0.25 mcg/kg/min  -cont Lasix gtt @15 mg/hr.   -Strict I/O and daily standing weights.  Dry weight is 132 lbs.   -Continue Coreg 6.25 mg po BID, for now.  -holding spironalactone for hyperkalemia, will resume once metabolic derangement improves  -Continue hydral 25 mg po TID and Isordil 10 mg po TID for afterload reduction  -consider RHC vs. SWAN at bedside  -consider A-line     Once hemodynamically optimized, consider ICD placement, given low EF and ischemic cardiomyopathy      Problem/Plan - 2: Acute on chronic renal failure.  Plan: -Secondary to renal venous congestion. Renal markers starting to downtrend.     Case discussed with Cardiology fellow Reason for CCU Consult:     HISTORY OF PRESENT ILLNESS:  Patient is a 68y old  Male who presents with a chief complaint of Dyspnea x 2 days (20 Jan 2019 10:07)    No Known Allergies    Intolerances        PAST MEDICAL & SURGICAL HISTORY:  Chronic congestive heart failure, unspecified congestive heart failure type  PAD (peripheral artery disease)  Stented coronary artery  Hyperlipidemia  Diabetes  Hypertension  CAD (coronary artery disease)  S/P bypass graft of extremity: Left LE  9/2018  S/P amputation: right great toe and 4th and 5th digit  S/P bypass graft of extremity: RLE  S/P angioplasty with stent: about 5 years ago  S/P CABG x 3      MEDICATIONS  (STANDING):  aspirin enteric coated 81 milliGRAM(s) Oral daily  carvedilol 6.25 milliGRAM(s) Oral every 12 hours  dextrose 5%. 1000 milliLiter(s) (50 mL/Hr) IV Continuous <Continuous>  dextrose 50% Injectable 12.5 Gram(s) IV Push once  dextrose 50% Injectable 25 Gram(s) IV Push once  dextrose 50% Injectable 25 Gram(s) IV Push once  docusate sodium 100 milliGRAM(s) Oral daily  furosemide Infusion 15 mG/Hr (7.5 mL/Hr) IV Continuous <Continuous>  heparin  Injectable 5000 Unit(s) SubCutaneous every 12 hours  hydrALAZINE 25 milliGRAM(s) Oral every 8 hours  insulin glargine Injectable (LANTUS) 30 Unit(s) SubCutaneous at bedtime  insulin lispro (HumaLOG) corrective regimen sliding scale   SubCutaneous three times a day before meals  insulin lispro (HumaLOG) corrective regimen sliding scale   SubCutaneous at bedtime  isosorbide   dinitrate Tablet (ISORDIL) 10 milliGRAM(s) Oral three times a day  senna 2 Tablet(s) Oral at bedtime  sevelamer hydrochloride 800 milliGRAM(s) Oral three times a day  sodium chloride 0.9% lock flush 3 milliLiter(s) IV Push every 8 hours    MEDICATIONS  (PRN):  calamine Lotion 1 Application(s) Topical daily PRN Rash and/or Itching  dextrose 40% Gel 15 Gram(s) Oral once PRN Blood Glucose LESS THAN 70 milliGRAM(s)/deciliter  fluticasone propionate 50 MICROgram(s)/spray Nasal Spray 1 Spray(s) Both Nostrils two times a day PRN nasal congestion  glucagon  Injectable 1 milliGRAM(s) IntraMuscular once PRN Glucose LESS THAN 70 milligrams/deciliter  guaiFENesin   Syrup  (Sugar-Free) 200 milliGRAM(s) Oral every 6 hours PRN Cough  oxyCODONE    IR 5 milliGRAM(s) Oral every 4 hours PRN Moderate Pain and sever pain      Drug Dosing Weight  Height (cm): 167.64 (17 Jan 2019 04:44)  Weight (kg): 78 (17 Jan 2019 04:44)  BMI (kg/m2): 27.8 (17 Jan 2019 04:44)  BSA (m2): 1.88 (17 Jan 2019 04:44)    FAMILY HISTORY:  No family history of cancer (Father)  Coronary artery disease (Mother)      SOCIAL HISTORY:  Smoker[ ]  Non smoker[ X] Quit 25 yrs ago  Alcohol [ ]      ADVANCE DIRECTIVES:    REVIEW OF SYSTEMS:    CONSTITUTIONAL: No fevers, No chills, No fatigue, No weight gain  EYES: No vision changes   ENT: No congestion, No ear pain, No sore throat.  NECK: No pain, No stiffness  RESPIRATORY: No shortness of breath, No cough, No wheezing, No hemoptysis  CARDIOVASCULAR: No chest pain. No palpitations, No ORTA, No orthopnea, No paroxysmal nocturnal dyspnea, No pleuritic pain  GASTROINTESTINAL: No abdominal pain, No nausea, No vomiting, No hematemesis, No diarrhea No constipation. No melena  GENITOURINARY: No dysuria, No frequency, No incontinence, No hematuria  NEUROLOGICAL: No dizziness, No lightheadedness, No syncope, No LOC, No headache, No numbness or weakness  EXTREMITIES: No Edema, No joint pain, No joint swelling.  PSYCHIATRIC: No anxiety, No depression  SKIN: No diaphoresis. No itching, No rashes, No pressure ulcers    All other review of systems is negative unless indicated above.    PHYSICAL EXAM:    Appearance: NAD, no distress, sitting comfortably upright  HEENT:   Normal oral mucosa, PERRL, EOMI. Mild JVD, no hepatojugular reflex  Cardiovascular: Regular rate and rhythm, Normal S1 S2, Systolic murmur 2/6 left sternal border  Respiratory: CTAB  Gastrointestinal:  Distended, dull to percussion. Non-tender.   Neurologic: Non-focal, A&Ox3  Skin: Smooth shiny skin bilateral lower extremities, more prominent on right. 2 surface bandages by left groin and left calf.  Extremities: 2+ bilateral pitting edema  Vascular: DP pulses poor bilaterally   Psychiatry: Mood & affect appropriate    ICU Vital Signs Last 24 Hrs  T(C): 36.4 (20 Jan 2019 09:00), Max: 36.7 (19 Jan 2019 20:32)  T(F): 97.6 (20 Jan 2019 09:00), Max: 98 (19 Jan 2019 20:32)  HR: 67 (20 Jan 2019 09:00) (61 - 89)  BP: 103/58 (20 Jan 2019 09:00) (92/48 - 105/62)  BP(mean): --  ABP: --  ABP(mean): --  RR: 16 (20 Jan 2019 09:00) (16 - 18)  SpO2: 97% (20 Jan 2019 09:00) (97% - 100%)          LABS:  CBC Full  -  ( 20 Jan 2019 05:55 )  WBC Count : 5.82 K/uL  Hemoglobin : 9.2 g/dL  Hematocrit : 29.8 %  Platelet Count - Automated : 200 K/uL  Mean Cell Volume : 72.9 fL  Mean Cell Hemoglobin : 22.5 pg  Mean Cell Hemoglobin Concentration : 30.9 %  Auto Neutrophil # : x  Auto Lymphocyte # : x  Auto Monocyte # : x  Auto Eosinophil # : x  Auto Basophil # : x  Auto Neutrophil % : x  Auto Lymphocyte % : x  Auto Monocyte % : x  Auto Eosinophil % : x  Auto Basophil % : x    01-20    134<L>  |  94<L>  |  71<H>  ----------------------------<  121<H>  4.6   |  24  |  2.65<H>    Ca    8.8      20 Jan 2019 05:55  Phos  5.1     01-18  Mg     2.2     01-20          CAPILLARY BLOOD GLUCOSE      POCT Blood Glucose.: 107 mg/dL (20 Jan 2019 08:53)          I&O's Detail    19 Jan 2019 07:01  -  20 Jan 2019 07:00  --------------------------------------------------------  IN:    Oral Fluid: 1540 mL  Total IN: 1540 mL    OUT:    Voided: 1250 mL  Total OUT: 1250 mL    Total NET: 290 mL      20 Jan 2019 07:01  -  20 Jan 2019 12:27  --------------------------------------------------------  IN:    Oral Fluid: 50 mL  Total IN: 50 mL    OUT:    Voided: 270 mL  Total OUT: 270 mL    Total NET: -220 mL      ASSESSMENT  68 male w/ PMHX of CAD s/p CABG x 3 and PCI, HFrEF (LVEF 24%, LVEDD 5.4) mod-severe MR, severe diastolic dysfunction, RV systolic failure, no AICD (has refused it in past) DM II, PAD s/p L KEI stent, s/p LLE CFA to below-knee bypass with PTFE for long-segment SFA occlusion, L 2nd toe amputation /R toe amputation, c/b groin soft tissue infection requiring washout, sartorius flap, and vac placement. His last admission from 12/8-12/27 required CCU admission and placement on dobutamine. He has had 3 admissions in 2018, for various complications from DM.  He comes in this time due to worsening SOB. He admits to 2 pillow orthopnea, frequent PND and ORTA after 1/2 block and walking up 5 steps. No CP, palpitations, dizziness. Patient's son by bedside. On tele, patient not diuresing well and condition guarded. Admitted to CCU for inotropic therapy, initiation of primacor infusion and IV diuresis with lasix gtt.     His dry weight s/p last hospitalization was 132 lbs.    NYHA class IV. Moderately hypervolemic.   Please record only standing weights for comparison    PLAN    Neuro:  AO x 3    Pulm:   CTAB, on room air.     Cardiovascular:   NYHA class IV. ICM. On physical exam, patient is volume overloaded although lungs are clear  -primacor infusion to be initiated at 0.25 mcg/kg/min  -baseline VBG, repeat Q4 for now   -Echo STAT  -cont Lasix gtt @15 mg/hr.   -Strict I/O and daily standing weights.  Dry weight is 132 lbs.   -Continue Coreg 6.25 mg po BID, for now.  -holding spironalactone for hyperkalemia, will resume once metabolic derangement improves  -Continue hydral 25 mg po TID and Isordil 10 mg po TID for afterload reduction  -consider RHC vs. SWAN at bedside  -consider A-line   -Once hemodynamically optimized, consider ICD placement, given low EF and ischemic cardiomyopathy    GI/:  Distended abdomen with dullness to percussion, suspect abdominal fluid overload  -Currently no N/V/D. Continue with diuresis    Renal:  Acute on chronic renal failure. Elevated today.   -Secondary to renal venous congestion from cardiorenal syndrome. Diuresis and continue to monitor.     Heme:   Anemia to low 9s with low MCV  Chronic disease vs iron deficiency  Iron studies AM labs  Aspirin and Hep Subq    ID:   No issues    Endo:  No issues    DVT: Heparin  Diet: Regular, DASH/TLC, Carb restricted, fluid 1000mL restrict     Code status: Full code Reason for CCU Consult:     HISTORY OF PRESENT ILLNESS:  Patient is a 68y old  Male who presents with a chief complaint of Dyspnea x 2 days (20 Jan 2019 10:07)    No Known Allergies    Intolerances        PAST MEDICAL & SURGICAL HISTORY:  Chronic congestive heart failure, unspecified congestive heart failure type  PAD (peripheral artery disease)  Stented coronary artery  Hyperlipidemia  Diabetes  Hypertension  CAD (coronary artery disease)  S/P bypass graft of extremity: Left LE  9/2018  S/P amputation: right great toe and 4th and 5th digit  S/P bypass graft of extremity: RLE  S/P angioplasty with stent: about 5 years ago  S/P CABG x 3      MEDICATIONS  (STANDING):  aspirin enteric coated 81 milliGRAM(s) Oral daily  carvedilol 6.25 milliGRAM(s) Oral every 12 hours  dextrose 5%. 1000 milliLiter(s) (50 mL/Hr) IV Continuous <Continuous>  dextrose 50% Injectable 12.5 Gram(s) IV Push once  dextrose 50% Injectable 25 Gram(s) IV Push once  dextrose 50% Injectable 25 Gram(s) IV Push once  docusate sodium 100 milliGRAM(s) Oral daily  furosemide Infusion 15 mG/Hr (7.5 mL/Hr) IV Continuous <Continuous>  heparin  Injectable 5000 Unit(s) SubCutaneous every 12 hours  hydrALAZINE 25 milliGRAM(s) Oral every 8 hours  insulin glargine Injectable (LANTUS) 30 Unit(s) SubCutaneous at bedtime  insulin lispro (HumaLOG) corrective regimen sliding scale   SubCutaneous three times a day before meals  insulin lispro (HumaLOG) corrective regimen sliding scale   SubCutaneous at bedtime  isosorbide   dinitrate Tablet (ISORDIL) 10 milliGRAM(s) Oral three times a day  senna 2 Tablet(s) Oral at bedtime  sevelamer hydrochloride 800 milliGRAM(s) Oral three times a day  sodium chloride 0.9% lock flush 3 milliLiter(s) IV Push every 8 hours    MEDICATIONS  (PRN):  calamine Lotion 1 Application(s) Topical daily PRN Rash and/or Itching  dextrose 40% Gel 15 Gram(s) Oral once PRN Blood Glucose LESS THAN 70 milliGRAM(s)/deciliter  fluticasone propionate 50 MICROgram(s)/spray Nasal Spray 1 Spray(s) Both Nostrils two times a day PRN nasal congestion  glucagon  Injectable 1 milliGRAM(s) IntraMuscular once PRN Glucose LESS THAN 70 milligrams/deciliter  guaiFENesin   Syrup  (Sugar-Free) 200 milliGRAM(s) Oral every 6 hours PRN Cough  oxyCODONE    IR 5 milliGRAM(s) Oral every 4 hours PRN Moderate Pain and sever pain      Drug Dosing Weight  Height (cm): 167.64 (17 Jan 2019 04:44)  Weight (kg): 78 (17 Jan 2019 04:44)  BMI (kg/m2): 27.8 (17 Jan 2019 04:44)  BSA (m2): 1.88 (17 Jan 2019 04:44)    FAMILY HISTORY:  No family history of cancer (Father)  Coronary artery disease (Mother)      SOCIAL HISTORY:  Smoker[ ]  Non smoker[ X] Quit 25 yrs ago  Alcohol [ ]      ADVANCE DIRECTIVES:    REVIEW OF SYSTEMS:    CONSTITUTIONAL: No fevers, No chills, No fatigue, No weight gain  EYES: No vision changes   ENT: No congestion, No ear pain, No sore throat.  NECK: No pain, No stiffness  RESPIRATORY: No shortness of breath, No cough, No wheezing, No hemoptysis  CARDIOVASCULAR: No chest pain. No palpitations, No ORTA, No orthopnea, No paroxysmal nocturnal dyspnea, No pleuritic pain  GASTROINTESTINAL: No abdominal pain, No nausea, No vomiting, No hematemesis, No diarrhea No constipation. No melena  GENITOURINARY: No dysuria, No frequency, No incontinence, No hematuria  NEUROLOGICAL: No dizziness, No lightheadedness, No syncope, No LOC, No headache, No numbness or weakness  EXTREMITIES: No Edema, No joint pain, No joint swelling.  PSYCHIATRIC: No anxiety, No depression  SKIN: No diaphoresis. No itching, No rashes, No pressure ulcers    All other review of systems is negative unless indicated above.    PHYSICAL EXAM:    Appearance: NAD, no distress, sitting comfortably upright  HEENT:   Normal oral mucosa, PERRL, EOMI. Mild JVD, no hepatojugular reflex  Cardiovascular: Regular rate and rhythm, Normal S1 S2, Systolic murmur 2/6 left sternal border  Respiratory: CTAB  Gastrointestinal:  Distended, dull to percussion. Non-tender.   Neurologic: Non-focal, A&Ox3  Skin: Smooth shiny skin bilateral lower extremities, more prominent on right. 2 surface bandages by left groin and left calf.  Extremities: 2+ bilateral pitting edema  Vascular: DP pulses poor bilaterally   Psychiatry: Mood & affect appropriate    ICU Vital Signs Last 24 Hrs  T(C): 36.4 (20 Jan 2019 09:00), Max: 36.7 (19 Jan 2019 20:32)  T(F): 97.6 (20 Jan 2019 09:00), Max: 98 (19 Jan 2019 20:32)  HR: 67 (20 Jan 2019 09:00) (61 - 89)  BP: 103/58 (20 Jan 2019 09:00) (92/48 - 105/62)  BP(mean): --  ABP: --  ABP(mean): --  RR: 16 (20 Jan 2019 09:00) (16 - 18)  SpO2: 97% (20 Jan 2019 09:00) (97% - 100%)          LABS:  CBC Full  -  ( 20 Jan 2019 05:55 )  WBC Count : 5.82 K/uL  Hemoglobin : 9.2 g/dL  Hematocrit : 29.8 %  Platelet Count - Automated : 200 K/uL  Mean Cell Volume : 72.9 fL  Mean Cell Hemoglobin : 22.5 pg  Mean Cell Hemoglobin Concentration : 30.9 %  Auto Neutrophil # : x  Auto Lymphocyte # : x  Auto Monocyte # : x  Auto Eosinophil # : x  Auto Basophil # : x  Auto Neutrophil % : x  Auto Lymphocyte % : x  Auto Monocyte % : x  Auto Eosinophil % : x  Auto Basophil % : x    01-20    134<L>  |  94<L>  |  71<H>  ----------------------------<  121<H>  4.6   |  24  |  2.65<H>    Ca    8.8      20 Jan 2019 05:55  Phos  5.1     01-18  Mg     2.2     01-20          CAPILLARY BLOOD GLUCOSE      POCT Blood Glucose.: 107 mg/dL (20 Jan 2019 08:53)          I&O's Detail    19 Jan 2019 07:01  -  20 Jan 2019 07:00  --------------------------------------------------------  IN:    Oral Fluid: 1540 mL  Total IN: 1540 mL    OUT:    Voided: 1250 mL  Total OUT: 1250 mL    Total NET: 290 mL      20 Jan 2019 07:01  -  20 Jan 2019 12:27  --------------------------------------------------------  IN:    Oral Fluid: 50 mL  Total IN: 50 mL    OUT:    Voided: 270 mL  Total OUT: 270 mL    Total NET: -220 mL      ASSESSMENT  68 male w/ PMHX of CAD s/p CABG x 3 and PCI, HFrEF (LVEF 24%, LVEDD 5.4) mod-severe MR, severe diastolic dysfunction, RV systolic failure, no AICD (has refused it in past) DM II, PAD s/p L KEI stent, s/p LLE CFA to below-knee bypass with PTFE for long-segment SFA occlusion, L 2nd toe amputation /R toe amputation, c/b groin soft tissue infection requiring washout, sartorius flap, and vac placement. His last admission from 12/8-12/27 required CCU admission and placement on dobutamine. He has had 3 admissions in 2018, for various complications from DM.  He comes in this time due to worsening SOB. He admits to 2 pillow orthopnea, frequent PND and ORTA after 1/2 block and walking up 5 steps. No CP, palpitations, dizziness. Patient's son by bedside. On tele, patient not diuresing well and condition guarded. Admitted to CCU for inotropic therapy, initiation of primacor infusion and IV diuresis with lasix gtt.     His dry weight s/p last hospitalization was 132 lbs.    NYHA class IV. Moderately hypervolemic.   Please record only standing weights for comparison    PLAN    Neuro:  AO x 3    Pulm:   CTAB, on room air.     Cardiovascular:   NYHA class IV. ICM. On physical exam, patient is volume overloaded although lungs are clear  -primacor infusion to be initiated at 0.25 mcg/kg/min  -baseline VBG, repeat Q4 for now   -Echo STAT  -cont Lasix gtt @15 mg/hr.   -Strict I/O and daily standing weights.  Dry weight is 132 lbs.   -Continue Coreg 6.25 mg po BID, for now.  -holding spironalactone for hyperkalemia, will resume once metabolic derangement improves  -Continue hydralazine 25 mg po TID and Isordil 10 mg po TID for afterload reduction  -consider RHC vs. SWAN at bedside  -consider A-line   -Once hemodynamically optimized, consider ICD placement, given low EF and ischemic cardiomyopathy    GI/:  Distended abdomen with dullness to percussion, suspect abdominal fluid overload  -Currently no N/V/D. 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 Elevated today. Ultrasound shows renal parenchymal disease.   -Likely secondary to renal venous congestion from cardiorenal syndrome. Diuresis and continue to monitor.     Heme:   Anemia to low 9s with low MCV  Chronic disease vs iron deficiency  Iron studies AM labs  Aspirin and Hep Subq    ID:   No issues    Endo:  DM, on 30 Lantus and ISS.   Fingerstick stable, continue to monitor.     DVT: Heparin  Diet: Regular, DASH/TLC, Carb restricted, fluid 1000mL restrict     Code status: Full code Reason for CCU Consult:     HISTORY OF PRESENT ILLNESS:  Patient is a 68y old  Male who presents with a chief complaint of Dyspnea x 2 days (20 Jan 2019 10:07)    No Known Allergies    Intolerances        PAST MEDICAL & SURGICAL HISTORY:  Chronic congestive heart failure, unspecified congestive heart failure type  PAD (peripheral artery disease)  Stented coronary artery  Hyperlipidemia  Diabetes  Hypertension  CAD (coronary artery disease)  S/P bypass graft of extremity: Left LE  9/2018  S/P amputation: right great toe and 4th and 5th digit  S/P bypass graft of extremity: RLE  S/P angioplasty with stent: about 5 years ago  S/P CABG x 3      MEDICATIONS  (STANDING):  aspirin enteric coated 81 milliGRAM(s) Oral daily  carvedilol 6.25 milliGRAM(s) Oral every 12 hours  dextrose 5%. 1000 milliLiter(s) (50 mL/Hr) IV Continuous <Continuous>  dextrose 50% Injectable 12.5 Gram(s) IV Push once  dextrose 50% Injectable 25 Gram(s) IV Push once  dextrose 50% Injectable 25 Gram(s) IV Push once  docusate sodium 100 milliGRAM(s) Oral daily  furosemide Infusion 15 mG/Hr (7.5 mL/Hr) IV Continuous <Continuous>  heparin  Injectable 5000 Unit(s) SubCutaneous every 12 hours  hydrALAZINE 25 milliGRAM(s) Oral every 8 hours  insulin glargine Injectable (LANTUS) 30 Unit(s) SubCutaneous at bedtime  insulin lispro (HumaLOG) corrective regimen sliding scale   SubCutaneous three times a day before meals  insulin lispro (HumaLOG) corrective regimen sliding scale   SubCutaneous at bedtime  isosorbide   dinitrate Tablet (ISORDIL) 10 milliGRAM(s) Oral three times a day  senna 2 Tablet(s) Oral at bedtime  sevelamer hydrochloride 800 milliGRAM(s) Oral three times a day  sodium chloride 0.9% lock flush 3 milliLiter(s) IV Push every 8 hours    MEDICATIONS  (PRN):  calamine Lotion 1 Application(s) Topical daily PRN Rash and/or Itching  dextrose 40% Gel 15 Gram(s) Oral once PRN Blood Glucose LESS THAN 70 milliGRAM(s)/deciliter  fluticasone propionate 50 MICROgram(s)/spray Nasal Spray 1 Spray(s) Both Nostrils two times a day PRN nasal congestion  glucagon  Injectable 1 milliGRAM(s) IntraMuscular once PRN Glucose LESS THAN 70 milligrams/deciliter  guaiFENesin   Syrup  (Sugar-Free) 200 milliGRAM(s) Oral every 6 hours PRN Cough  oxyCODONE    IR 5 milliGRAM(s) Oral every 4 hours PRN Moderate Pain and sever pain      Drug Dosing Weight  Height (cm): 167.64 (17 Jan 2019 04:44)  Weight (kg): 78 (17 Jan 2019 04:44)  BMI (kg/m2): 27.8 (17 Jan 2019 04:44)  BSA (m2): 1.88 (17 Jan 2019 04:44)    FAMILY HISTORY:  No family history of cancer (Father)  Coronary artery disease (Mother)      SOCIAL HISTORY:  Smoker[ ]  Non smoker[ X] Quit 25 yrs ago  Alcohol [ ]      ADVANCE DIRECTIVES:    REVIEW OF SYSTEMS:    CONSTITUTIONAL: No fevers, No chills, No fatigue, No weight gain  EYES: No vision changes   ENT: No congestion, No ear pain, No sore throat.  NECK: No pain, No stiffness  RESPIRATORY: No shortness of breath, No cough, No wheezing, No hemoptysis  CARDIOVASCULAR: No chest pain. No palpitations, No ORTA, No orthopnea, No paroxysmal nocturnal dyspnea, No pleuritic pain  GASTROINTESTINAL: No abdominal pain, No nausea, No vomiting, No hematemesis, No diarrhea No constipation. No melena  GENITOURINARY: No dysuria, No frequency, No incontinence, No hematuria  NEUROLOGICAL: No dizziness, No lightheadedness, No syncope, No LOC, No headache, No numbness or weakness  EXTREMITIES: No Edema, No joint pain, No joint swelling.  PSYCHIATRIC: No anxiety, No depression  SKIN: No diaphoresis. No itching, No rashes, No pressure ulcers    All other review of systems is negative unless indicated above.    PHYSICAL EXAM:    Appearance: NAD, no distress, sitting comfortably upright  HEENT:   Normal oral mucosa, PERRL, EOMI. Mild JVD, no hepatojugular reflex  Cardiovascular: Regular rate and rhythm, Normal S1 S2, Systolic murmur 2/6 left sternal border  Respiratory: CTAB  Gastrointestinal:  Distended, dull to percussion. Non-tender.   Neurologic: Non-focal, A&Ox3  Skin: Smooth shiny skin bilateral lower extremities, more prominent on right. 2 surface bandages by left groin and left calf.  Extremities: 2+ bilateral pitting edema  Vascular: DP pulses poor bilaterally   Psychiatry: Mood & affect appropriate    ICU Vital Signs Last 24 Hrs  T(C): 36.4 (20 Jan 2019 09:00), Max: 36.7 (19 Jan 2019 20:32)  T(F): 97.6 (20 Jan 2019 09:00), Max: 98 (19 Jan 2019 20:32)  HR: 67 (20 Jan 2019 09:00) (61 - 89)  BP: 103/58 (20 Jan 2019 09:00) (92/48 - 105/62)  BP(mean): --  ABP: --  ABP(mean): --  RR: 16 (20 Jan 2019 09:00) (16 - 18)  SpO2: 97% (20 Jan 2019 09:00) (97% - 100%)          LABS:  CBC Full  -  ( 20 Jan 2019 05:55 )  WBC Count : 5.82 K/uL  Hemoglobin : 9.2 g/dL  Hematocrit : 29.8 %  Platelet Count - Automated : 200 K/uL  Mean Cell Volume : 72.9 fL  Mean Cell Hemoglobin : 22.5 pg  Mean Cell Hemoglobin Concentration : 30.9 %  Auto Neutrophil # : x  Auto Lymphocyte # : x  Auto Monocyte # : x  Auto Eosinophil # : x  Auto Basophil # : x  Auto Neutrophil % : x  Auto Lymphocyte % : x  Auto Monocyte % : x  Auto Eosinophil % : x  Auto Basophil % : x    01-20    134<L>  |  94<L>  |  71<H>  ----------------------------<  121<H>  4.6   |  24  |  2.65<H>    Ca    8.8      20 Jan 2019 05:55  Phos  5.1     01-18  Mg     2.2     01-20          CAPILLARY BLOOD GLUCOSE      POCT Blood Glucose.: 107 mg/dL (20 Jan 2019 08:53)          I&O's Detail    19 Jan 2019 07:01  -  20 Jan 2019 07:00  --------------------------------------------------------  IN:    Oral Fluid: 1540 mL  Total IN: 1540 mL    OUT:    Voided: 1250 mL  Total OUT: 1250 mL    Total NET: 290 mL      20 Jan 2019 07:01  -  20 Jan 2019 12:27  --------------------------------------------------------  IN:    Oral Fluid: 50 mL  Total IN: 50 mL    OUT:    Voided: 270 mL  Total OUT: 270 mL    Total NET: -220 mL    < from: Xray Chest 1 View- PORTABLE-Urgent (01.20.19 @ 09:09) >    Impression:    The heart is enlarged. The lungs are clear.No radiographic evidence for   pulmonary vascular congestion. Status post sternotomy.    < end of copied text >        ASSESSMENT  68 male w/ PMHX of CAD s/p CABG x 3 and PCI, HFrEF (LVEF 24%, LVEDD 5.4) mod-severe MR, severe diastolic dysfunction, RV systolic failure, no AICD (has refused it in past) DM II, PAD s/p L KEI stent, s/p LLE CFA to below-knee bypass with PTFE for long-segment SFA occlusion, L 2nd toe amputation /R toe amputation, c/b groin soft tissue infection requiring washout, sartorius flap, and vac placement. His last admission from 12/8-12/27 required CCU admission and placement on dobutamine. He has had 3 admissions in 2018, for various complications from DM.  He comes in this time due to worsening SOB. He admits to 2 pillow orthopnea, frequent PND and ORTA after 1/2 block and walking up 5 steps. No CP, palpitations, dizziness. Patient's son by bedside. On tele, patient not diuresing well and condition guarded. Admitted to CCU for inotropic therapy, initiation of primacor infusion and IV diuresis with lasix gtt.     His dry weight s/p last hospitalization was 132 lbs.    NYHA class IV. Moderately hypervolemic.   Please record only standing weights for comparison    PLAN    Neuro:  AO x 3    Pulm:   CTAB, on room air.     Cardiovascular:   NYHA class IV. ICM. On physical exam, patient is volume overloaded although lungs are clear  -primacor infusion to be initiated at 0.25 mcg/kg/min  -baseline VBG, repeat Q4 for now   -Echo STAT  -cont Lasix gtt @15 mg/hr.   -Strict I/O and daily standing weights.  Dry weight is 132 lbs.   -Continue Coreg 6.25 mg po BID, for now.  -holding spironalactone for hyperkalemia, will resume once metabolic derangement improves  -Continue hydralazine 25 mg po TID and Isordil 10 mg po TID for afterload reduction  -consider RHC vs. SWAN at bedside  -consider A-line   -Once hemodynamically optimized, consider ICD placement, given low EF and ischemic cardiomyopathy    GI/:  Distended abdomen with dullness to percussion, suspect abdominal fluid overload  -Currently no N/V/D. 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 Elevated today. Ultrasound shows renal parenchymal disease.   -Likely secondary to renal venous congestion from cardiorenal syndrome. Diuresis and continue to monitor.     Heme:   Anemia to low 9s with low MCV  Chronic disease vs iron deficiency  Iron studies AM labs  Aspirin and Hep Subq    ID:   No issues    Endo:  DM, on 30 Lantus and ISS.   Fingerstick stable, continue to monitor.     DVT: Heparin  Diet: Regular, DASH/TLC, Carb restricted, fluid 1000mL restrict     Code status: Full code Reason for CCU Consult:     HISTORY OF PRESENT ILLNESS:  Patient is a 68y old  Male who presents with a chief complaint of Dyspnea x 2 days (20 Jan 2019 10:07)    No Known Allergies    Intolerances        PAST MEDICAL & SURGICAL HISTORY:  Chronic congestive heart failure, unspecified congestive heart failure type  PAD (peripheral artery disease)  Stented coronary artery  Hyperlipidemia  Diabetes  Hypertension  CAD (coronary artery disease)  S/P bypass graft of extremity: Left LE  9/2018  S/P amputation: right great toe and 4th and 5th digit  S/P bypass graft of extremity: RLE  S/P angioplasty with stent: about 5 years ago  S/P CABG x 3      MEDICATIONS  (STANDING):  aspirin enteric coated 81 milliGRAM(s) Oral daily  carvedilol 6.25 milliGRAM(s) Oral every 12 hours  dextrose 5%. 1000 milliLiter(s) (50 mL/Hr) IV Continuous <Continuous>  dextrose 50% Injectable 12.5 Gram(s) IV Push once  dextrose 50% Injectable 25 Gram(s) IV Push once  dextrose 50% Injectable 25 Gram(s) IV Push once  docusate sodium 100 milliGRAM(s) Oral daily  furosemide Infusion 15 mG/Hr (7.5 mL/Hr) IV Continuous <Continuous>  heparin  Injectable 5000 Unit(s) SubCutaneous every 12 hours  hydrALAZINE 25 milliGRAM(s) Oral every 8 hours  insulin glargine Injectable (LANTUS) 30 Unit(s) SubCutaneous at bedtime  insulin lispro (HumaLOG) corrective regimen sliding scale   SubCutaneous three times a day before meals  insulin lispro (HumaLOG) corrective regimen sliding scale   SubCutaneous at bedtime  isosorbide   dinitrate Tablet (ISORDIL) 10 milliGRAM(s) Oral three times a day  senna 2 Tablet(s) Oral at bedtime  sevelamer hydrochloride 800 milliGRAM(s) Oral three times a day  sodium chloride 0.9% lock flush 3 milliLiter(s) IV Push every 8 hours    MEDICATIONS  (PRN):  calamine Lotion 1 Application(s) Topical daily PRN Rash and/or Itching  dextrose 40% Gel 15 Gram(s) Oral once PRN Blood Glucose LESS THAN 70 milliGRAM(s)/deciliter  fluticasone propionate 50 MICROgram(s)/spray Nasal Spray 1 Spray(s) Both Nostrils two times a day PRN nasal congestion  glucagon  Injectable 1 milliGRAM(s) IntraMuscular once PRN Glucose LESS THAN 70 milligrams/deciliter  guaiFENesin   Syrup  (Sugar-Free) 200 milliGRAM(s) Oral every 6 hours PRN Cough  oxyCODONE    IR 5 milliGRAM(s) Oral every 4 hours PRN Moderate Pain and sever pain      Drug Dosing Weight  Height (cm): 167.64 (17 Jan 2019 04:44)  Weight (kg): 78 (17 Jan 2019 04:44)  BMI (kg/m2): 27.8 (17 Jan 2019 04:44)  BSA (m2): 1.88 (17 Jan 2019 04:44)    FAMILY HISTORY:  No family history of cancer (Father)  Coronary artery disease (Mother)      SOCIAL HISTORY:  Smoker[ ]  Non smoker[ X] Quit 25 yrs ago  Alcohol [ ]      ADVANCE DIRECTIVES:    REVIEW OF SYSTEMS:    CONSTITUTIONAL: No fevers, No chills, No fatigue, No weight gain  EYES: No vision changes   ENT: No congestion, No ear pain, No sore throat.  NECK: No pain, No stiffness  RESPIRATORY: No shortness of breath, No cough, No wheezing, No hemoptysis  CARDIOVASCULAR: No chest pain. No palpitations, No ORTA, No orthopnea, No paroxysmal nocturnal dyspnea, No pleuritic pain  GASTROINTESTINAL: No abdominal pain, No nausea, No vomiting, No hematemesis, No diarrhea No constipation. No melena  GENITOURINARY: No dysuria, No frequency, No incontinence, No hematuria  NEUROLOGICAL: No dizziness, No lightheadedness, No syncope, No LOC, No headache, No numbness or weakness  EXTREMITIES: No Edema, No joint pain, No joint swelling.  PSYCHIATRIC: No anxiety, No depression  SKIN: No diaphoresis. No itching, No rashes, No pressure ulcers    All other review of systems is negative unless indicated above.    PHYSICAL EXAM:    Appearance: NAD, no distress, sitting comfortably upright  HEENT:   Normal oral mucosa, PERRL, EOMI. Mild JVD, no hepatojugular reflex  Cardiovascular: Regular rate and rhythm, Normal S1 S2, Systolic murmur 2/6 left sternal border  Respiratory: CTAB  Gastrointestinal:  Distended, dull to percussion. Non-tender.   Neurologic: Non-focal, A&Ox3  Skin: Smooth shiny skin bilateral lower extremities, more prominent on right. 2 surface bandages by left groin and left calf.  Extremities: 2+ bilateral pitting edema  Vascular: DP pulses poor bilaterally   Psychiatry: Mood & affect appropriate    ICU Vital Signs Last 24 Hrs  T(C): 36.4 (20 Jan 2019 09:00), Max: 36.7 (19 Jan 2019 20:32)  T(F): 97.6 (20 Jan 2019 09:00), Max: 98 (19 Jan 2019 20:32)  HR: 67 (20 Jan 2019 09:00) (61 - 89)  BP: 103/58 (20 Jan 2019 09:00) (92/48 - 105/62)  BP(mean): --  ABP: --  ABP(mean): --  RR: 16 (20 Jan 2019 09:00) (16 - 18)  SpO2: 97% (20 Jan 2019 09:00) (97% - 100%)          LABS:  CBC Full  -  ( 20 Jan 2019 05:55 )  WBC Count : 5.82 K/uL  Hemoglobin : 9.2 g/dL  Hematocrit : 29.8 %  Platelet Count - Automated : 200 K/uL  Mean Cell Volume : 72.9 fL  Mean Cell Hemoglobin : 22.5 pg  Mean Cell Hemoglobin Concentration : 30.9 %  Auto Neutrophil # : x  Auto Lymphocyte # : x  Auto Monocyte # : x  Auto Eosinophil # : x  Auto Basophil # : x  Auto Neutrophil % : x  Auto Lymphocyte % : x  Auto Monocyte % : x  Auto Eosinophil % : x  Auto Basophil % : x    01-20    134<L>  |  94<L>  |  71<H>  ----------------------------<  121<H>  4.6   |  24  |  2.65<H>    Ca    8.8      20 Jan 2019 05:55  Phos  5.1     01-18  Mg     2.2     01-20          CAPILLARY BLOOD GLUCOSE      POCT Blood Glucose.: 107 mg/dL (20 Jan 2019 08:53)          I&O's Detail    19 Jan 2019 07:01  -  20 Jan 2019 07:00  --------------------------------------------------------  IN:    Oral Fluid: 1540 mL  Total IN: 1540 mL    OUT:    Voided: 1250 mL  Total OUT: 1250 mL    Total NET: 290 mL      20 Jan 2019 07:01  -  20 Jan 2019 12:27  --------------------------------------------------------  IN:    Oral Fluid: 50 mL  Total IN: 50 mL    OUT:    Voided: 270 mL  Total OUT: 270 mL    Total NET: -220 mL    < from: Xray Chest 1 View- PORTABLE-Urgent (01.20.19 @ 09:09) >    Impression:    The heart is enlarged. The lungs are clear.No radiographic evidence for   pulmonary vascular congestion. Status post sternotomy.    < end of copied text >    EKG: sinus rhythm first degree AV block     ASSESSMENT  68 male w/ PMHX of CAD s/p CABG x 3 and PCI, HFrEF (LVEF 24%, LVEDD 5.4) mod-severe MR, severe diastolic dysfunction, RV systolic failure, no AICD (has refused it in past) DM II, PAD s/p L KEI stent, s/p LLE CFA to below-knee bypass with PTFE for long-segment SFA occlusion, L 2nd toe amputation /R toe amputation, c/b groin soft tissue infection requiring washout, sartorius flap, and vac placement. His last admission from 12/8-12/27 required CCU admission and placement on dobutamine. He has had 3 admissions in 2018, for various complications from DM.  He comes in this time due to worsening SOB. He admits to 2 pillow orthopnea, frequent PND and ORTA after 1/2 block and walking up 5 steps. No CP, palpitations, dizziness. Patient's son by bedside. On tele, patient not diuresing well and condition guarded. Admitted to CCU for inotropic therapy, initiation of primacor infusion and IV diuresis with lasix gtt.     His dry weight s/p last hospitalization was 132 lbs.    NYHA class IV. Moderately hypervolemic.   Please record only standing weights for comparison    PLAN    Neuro:  AO x 3    Pulm:   CTAB, on room air.     Cardiovascular:   NYHA class IV. ICM. On physical exam, patient is volume overloaded although lungs are clear  -primacor infusion to be initiated at 0.25 mcg/kg/min  -baseline VBG, repeat Q4 for now   -Echo STAT  -cont Lasix gtt @15 mg/hr.   -Strict I/O and daily standing weights.  Dry weight is 132 lbs.   -Continue Coreg 6.25 mg po BID, for now.  -holding spironalactone for hyperkalemia, will resume once metabolic derangement improves  -Continue hydralazine 25 mg po TID and Isordil 10 mg po TID for afterload reduction  -consider RHC vs. SWAN at bedside  -consider A-line   -Once hemodynamically optimized, consider ICD placement, given low EF and ischemic cardiomyopathy    GI/:  Distended abdomen with dullness to percussion, suspect abdominal fluid overload  -Currently no N/V/D. 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 Elevated today. Ultrasound shows renal parenchymal disease.   -Likely secondary to renal venous congestion from cardiorenal syndrome. Diuresis and continue to monitor.     Heme:   Anemia to low 9s with low MCV  Chronic disease vs iron deficiency  Iron studies AM labs  Aspirin and Hep Subq    ID:   No issues    Endo:  DM, on 30 Lantus and ISS.   Fingerstick stable, continue to monitor.     DVT: Heparin  Diet: Regular, DASH/TLC, Carb restricted, fluid 1000mL restrict     Code status: Full code

## 2019-01-20 NOTE — PROGRESS NOTE ADULT - SUBJECTIVE AND OBJECTIVE BOX
[facility] DIVISION OF KIDNEY DISEASES AND HYPERTENSION -- FOLLOW UP NOTE  --------------------------------------------------------------------------------  HPI: 68-year-old male with medical history of b/L LE bypass and b/L LE multiple toe amputations, DM2, HFrEF (EF 20%), HTN, Dyslipidemia, CAD, CABG, PVD, groin  sartorius flap, and vac placement, LLE with surgical scar wound admitted for worsening SOB. Nephrology consulted for KESHAV. Currently been treated for ADHF with lasix drip. On review of previous records in [facility], patient had normal Scr levels from 4/26/16 to 9/24/18. Pt. noted to have an episode of KESHAV during previous/recent hospitalization at [facility] (12/7/18 to 12/27/18). On this admission, Scr was elevated at 2.15 on 1/16/19, increased to 2.44 on 1/18/19. Also noted to have serum potassium elevated at 5.4 on 1/18/19. Scr increased to 2.65 this AM.    Pt was seen and examined at bedside this AM. Patient states that his shortness of breath is improved, but feels head congestion. Patient noted to have 1.25 L of UOP in 24 hours. Denies CP, N/V/F/C.    PAST HISTORY  --------------------------------------------------------------------------------  No significant changes to PMH, PSH, FHx, SHx, unless otherwise noted    ALLERGIES & MEDICATIONS  --------------------------------------------------------------------------------  Allergies    No Known Allergies    Intolerances    Standing Inpatient Medications  aspirin enteric coated 81 milliGRAM(s) Oral daily  carvedilol 6.25 milliGRAM(s) Oral every 12 hours  dextrose 5%. 1000 milliLiter(s) IV Continuous <Continuous>  dextrose 50% Injectable 12.5 Gram(s) IV Push once  dextrose 50% Injectable 25 Gram(s) IV Push once  dextrose 50% Injectable 25 Gram(s) IV Push once  docusate sodium 100 milliGRAM(s) Oral daily  furosemide Infusion 15 mG/Hr IV Continuous <Continuous>  heparin  Injectable 5000 Unit(s) SubCutaneous every 12 hours  hydrALAZINE 25 milliGRAM(s) Oral every 8 hours  insulin glargine Injectable (LANTUS) 30 Unit(s) SubCutaneous at bedtime  insulin lispro (HumaLOG) corrective regimen sliding scale   SubCutaneous three times a day before meals  insulin lispro (HumaLOG) corrective regimen sliding scale   SubCutaneous at bedtime  isosorbide   dinitrate Tablet (ISORDIL) 10 milliGRAM(s) Oral three times a day  senna 2 Tablet(s) Oral at bedtime  sevelamer hydrochloride 800 milliGRAM(s) Oral three times a day  sodium chloride 0.9% lock flush 3 milliLiter(s) IV Push every 8 hours    REVIEW OF SYSTEMS  --------------------------------------------------------------------------------  Gen: No fevers  Respiratory: + dyspnea (improved), + nasal congestion  CV: No chest pain  GI: No abdominal pain  : No dysuria, hematuria  MSK: + LE edema    All other systems were reviewed and are negative, except as noted.    VITALS/PHYSICAL EXAM  --------------------------------------------------------------------------------  T(C): 36.4 (01-20-19 @ 09:00), Max: 36.7 (01-19-19 @ 20:32)  HR: 67 (01-20-19 @ 09:00) (61 - 89)  BP: 103/58 (01-20-19 @ 09:00) (92/48 - 105/62)  RR: 16 (01-20-19 @ 09:00) (16 - 18)  SpO2: 97% (01-20-19 @ 09:00) (97% - 100%)  Wt(kg): --    01-19-19 @ 07:01  -  01-20-19 @ 07:00  --------------------------------------------------------  IN: 1540 mL / OUT: 1250 mL / NET: 290 mL    01-20-19 @ 07:01  -  01-20-19 @ 14:10  --------------------------------------------------------  IN: 50 mL / OUT: 270 mL / NET: -220 mL    Physical Exam:  	Gen: resting, NAD  	HEENT: No JVD  	Pulm: b/l crackles R>L  	CV: S1S2  	Abd: Soft, +BS   	Ext: + b/l LE edema  	Neuro: Awake  	Skin: Warm and dry    LABS/STUDIES  --------------------------------------------------------------------------------              9.2    5.82  >-----------<  200      [01-20-19 @ 05:55]              29.8     134  |  94  |  71  ----------------------------<  121      [01-20-19 @ 05:55]  4.6   |  24  |  2.65        Ca     8.8     [01-20-19 @ 05:55]      Mg     2.2     [01-20-19 @ 05:55]      Phos  5.1     [01-18-19 @ 18:22]    Creatinine Trend:  SCr 2.65 [01-20 @ 05:55]  SCr 2.14 [01-19 @ 18:30]  SCr 2.38 [01-19 @ 07:00]  SCr 2.50 [01-18 @ 18:22]  SCr 2.44 [01-18 @ 06:00]

## 2019-01-20 NOTE — PROGRESS NOTE ADULT - PROBLEM SELECTOR PLAN 3
Patient with hypervolemia in the setting of ADHF. Patient currently being treated with Lasix gtt with 1.25 L of UOP in last 24 hours. Consider adding metolazone 5mg PO daily to improve diuresis. Monitor daily weights. Low salt diet

## 2019-01-20 NOTE — PROGRESS NOTE ADULT - ASSESSMENT
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 His last admission from 12/8-12/27 required CCU admission and placement on dobutamine. He has had 3 admissions in 2018, for various complications from DM.  He comes in this time due to worsening SOB. He admits to 2 pillow orthopnea, frequent PND and ORTA after 1/2 block and walking up 5 steps. No CP, palpitations, dizziness. Patient's son by bedside.  His dry weight s/p last hospitalization was 132 lbs.      NYHA class IV. Moderately hypervolemic.   Please record only standing weights for comparison 68 male w/ PMHX of CAD s/p CABG x 3 and PCI, HFrEF (LVEF 24%, LVEDD 5.4) mod-severe MR, severe diastolic dysfunction, RV systolic failure, no AICD (has refused it in past) DM II, PAD s/p L KEI stent, s/p LLE CFA to below-knee bypass with PTFE for long-segment SFA occlusion, L 2nd toe amputation /R toe amputation, c/b groin soft tissue infection requiring washout, sartorius flap, and vac placement. His last admission from 12/8-12/27 required CCU admission and placement on dobutamine. He has had 3 admissions in 2018, for various complications from DM.  He comes in this time due to worsening SOB. He admits to 2 pillow orthopnea, frequent PND and ORTA after 1/2 block and walking up 5 steps. No CP, palpitations, dizziness. Patient's son by bedside.  His dry weight s/p last hospitalization was 132 lbs.      NYHA class IV. Moderately hypervolemic.   Please record only standing weights for comparison    [id number]; evening [id number]

## 2019-01-20 NOTE — CHART NOTE - NSCHARTNOTEFT_GEN_A_CORE
Pt presented with CHF exacerbation, this morning complaint of SOB and dizziness. BP in high 90s, sat 97 100 on 2L. Pt seen at bedside reported feeling well yesterday but dizzy and SOB today. Pt net outpt yesterday 1.2L. S1S2 no murmur no gallops. Lung noted decreased breath sound bilaterally.  Lasix 40IV push given, nebs ordered. CXR ordered. per cardiology will transfer pt to CCU for further care and milrinone gtt. Attending made aware

## 2019-01-20 NOTE — PROGRESS NOTE ADULT - SUBJECTIVE AND OBJECTIVE BOX
24H hour events: Today feels dizzy. Some orthopnea as well.    MEDICATIONS:  aspirin enteric coated 81 milliGRAM(s) Oral daily  carvedilol 6.25 milliGRAM(s) Oral every 12 hours  furosemide Infusion 15 mG/Hr IV Continuous <Continuous>  heparin  Injectable 5000 Unit(s) SubCutaneous every 12 hours  hydrALAZINE 25 milliGRAM(s) Oral every 8 hours  isosorbide   dinitrate Tablet (ISORDIL) 10 milliGRAM(s) Oral three times a day  guaiFENesin   Syrup  (Sugar-Free) 200 milliGRAM(s) Oral every 6 hours PRN  oxyCODONE    IR 5 milliGRAM(s) Oral every 4 hours PRN  docusate sodium 100 milliGRAM(s) Oral daily  senna 2 Tablet(s) Oral at bedtime  dextrose 40% Gel 15 Gram(s) Oral once PRN  dextrose 50% Injectable 12.5 Gram(s) IV Push once  dextrose 50% Injectable 25 Gram(s) IV Push once  dextrose 50% Injectable 25 Gram(s) IV Push once  glucagon  Injectable 1 milliGRAM(s) IntraMuscular once PRN  insulin glargine Injectable (LANTUS) 30 Unit(s) SubCutaneous at bedtime  insulin lispro (HumaLOG) corrective regimen sliding scale   SubCutaneous three times a day before meals  insulin lispro (HumaLOG) corrective regimen sliding scale   SubCutaneous at bedtime  calamine Lotion 1 Application(s) Topical daily PRN  dextrose 5%. 1000 milliLiter(s) IV Continuous <Continuous>  fluticasone propionate 50 MICROgram(s)/spray Nasal Spray 1 Spray(s) Both Nostrils two times a day PRN  sodium chloride 0.9% lock flush 3 milliLiter(s) IV Push every 8 hours      REVIEW OF SYSTEMS:  Complete 10point ROS negative.    PHYSICAL EXAM:  T(C): 36.4 (01-20-19 @ 09:00), Max: 36.7 (01-19-19 @ 20:32)  HR: 67 (01-20-19 @ 09:00) (61 - 89)  BP: 103/58 (01-20-19 @ 09:00) (92/48 - 107/66)  RR: 16 (01-20-19 @ 09:00) (16 - 18)  SpO2: 97% (01-20-19 @ 09:00) (97% - 100%)  Wt(kg): --  I&O's Summary    19 Jan 2019 07:01  -  20 Jan 2019 07:00  --------------------------------------------------------  IN: 1540 mL / OUT: 1250 mL / NET: 290 mL      Appearance: NAD  HEENT:   Normal oral mucosa, PERRL, EOMI	  Cardiovascular: Normal S1 S2, No JVD, No murmurs, 1+ LE edema  Respiratory: Lungs clear to auscultation	  Gastrointestinal:  Soft, Non-tender, + BS	  Neurologic: Non-focal  Vascular: Peripheral pulses palpable 2+ bilaterally      LABS:	 	    CBC Full  -  ( 20 Jan 2019 05:55 )  WBC Count : 5.82 K/uL  Hemoglobin : 9.2 g/dL  Hematocrit : 29.8 %  Platelet Count - Automated : 200 K/uL  Mean Cell Volume : 72.9 fL  Mean Cell Hemoglobin : 22.5 pg  Mean Cell Hemoglobin Concentration : 30.9 %    01-20    134<L>  |  94<L>  |  71<H>  ----------------------------<  121<H>  4.6   |  24  |  2.65<H>  01-19    134<L>  |  98  |  64<H>  ----------------------------<  212<H>  3.8   |  22  |  2.14<H>    Ca    8.8      20 Jan 2019 05:55  Ca    8.2<L>      19 Jan 2019 18:30  Phos  5.1     01-18  Mg     2.2     01-20  Mg     2.1     01-19      proBNP: Serum Pro-Brain Natriuretic Peptide: 4995 pg/mL (01-18 @ 06:00)  Serum Pro-Brain Natriuretic Peptide: 4578 pg/mL (01-16 @ 23:45)      TELEMETRY: normal sinus, rare blocked APC

## 2019-01-21 LAB
ALBUMIN SERPL ELPH-MCNC: 2.9 G/DL — LOW (ref 3.3–5)
ALP SERPL-CCNC: 441 U/L — HIGH (ref 40–120)
ALT FLD-CCNC: 27 U/L — SIGNIFICANT CHANGE UP (ref 4–41)
ANION GAP SERPL CALC-SCNC: 16 MMO/L — HIGH (ref 7–14)
ANION GAP SERPL CALC-SCNC: 16 MMO/L — HIGH (ref 7–14)
AST SERPL-CCNC: 24 U/L — SIGNIFICANT CHANGE UP (ref 4–40)
BASE EXCESS BLDV CALC-SCNC: -1 MMOL/L — SIGNIFICANT CHANGE UP
BASE EXCESS BLDV CALC-SCNC: -1 MMOL/L — SIGNIFICANT CHANGE UP
BILIRUB SERPL-MCNC: 1.5 MG/DL — HIGH (ref 0.2–1.2)
BUN SERPL-MCNC: 72 MG/DL — HIGH (ref 7–23)
BUN SERPL-MCNC: 78 MG/DL — HIGH (ref 7–23)
CALCIUM SERPL-MCNC: 8.7 MG/DL — SIGNIFICANT CHANGE UP (ref 8.4–10.5)
CALCIUM SERPL-MCNC: 8.8 MG/DL — SIGNIFICANT CHANGE UP (ref 8.4–10.5)
CHLORIDE SERPL-SCNC: 94 MMOL/L — LOW (ref 98–107)
CHLORIDE SERPL-SCNC: 96 MMOL/L — LOW (ref 98–107)
CO2 SERPL-SCNC: 20 MMOL/L — LOW (ref 22–31)
CO2 SERPL-SCNC: 21 MMOL/L — LOW (ref 22–31)
CREAT SERPL-MCNC: 2.75 MG/DL — HIGH (ref 0.5–1.3)
CREAT SERPL-MCNC: 3.19 MG/DL — HIGH (ref 0.5–1.3)
FERRITIN SERPL-MCNC: 114.3 NG/ML — SIGNIFICANT CHANGE UP (ref 30–400)
GAS PNL BLDV: 129 MMOL/L — LOW (ref 136–146)
GAS PNL BLDV: 131 MMOL/L — LOW (ref 136–146)
GLUCOSE BLDC GLUCOMTR-MCNC: 108 MG/DL — HIGH (ref 70–99)
GLUCOSE BLDC GLUCOMTR-MCNC: 142 MG/DL — HIGH (ref 70–99)
GLUCOSE BLDC GLUCOMTR-MCNC: 171 MG/DL — HIGH (ref 70–99)
GLUCOSE BLDC GLUCOMTR-MCNC: 248 MG/DL — HIGH (ref 70–99)
GLUCOSE BLDC GLUCOMTR-MCNC: 251 MG/DL — HIGH (ref 70–99)
GLUCOSE BLDC GLUCOMTR-MCNC: 381 MG/DL — HIGH (ref 70–99)
GLUCOSE BLDV-MCNC: 240 — HIGH (ref 70–99)
GLUCOSE BLDV-MCNC: 73 — SIGNIFICANT CHANGE UP (ref 70–99)
GLUCOSE SERPL-MCNC: 244 MG/DL — HIGH (ref 70–99)
GLUCOSE SERPL-MCNC: 68 MG/DL — LOW (ref 70–99)
HCO3 BLDV-SCNC: 23 MMOL/L — SIGNIFICANT CHANGE UP (ref 20–27)
HCO3 BLDV-SCNC: 24 MMOL/L — SIGNIFICANT CHANGE UP (ref 20–27)
HCT VFR BLD CALC: 27.6 % — LOW (ref 39–50)
HCT VFR BLDV CALC: 26.7 % — LOW (ref 39–51)
HCT VFR BLDV CALC: 33.9 % — LOW (ref 39–51)
HGB BLD-MCNC: 8.8 G/DL — LOW (ref 13–17)
HGB BLDV-MCNC: 11 G/DL — LOW (ref 13–17)
HGB BLDV-MCNC: 8.6 G/DL — LOW (ref 13–17)
IRON SATN MFR SERPL: 339 UG/DL — SIGNIFICANT CHANGE UP (ref 155–535)
IRON SATN MFR SERPL: 35 UG/DL — LOW (ref 45–165)
MAGNESIUM SERPL-MCNC: 2.2 MG/DL — SIGNIFICANT CHANGE UP (ref 1.6–2.6)
MAGNESIUM SERPL-MCNC: 2.3 MG/DL — SIGNIFICANT CHANGE UP (ref 1.6–2.6)
MCHC RBC-ENTMCNC: 22.8 PG — LOW (ref 27–34)
MCHC RBC-ENTMCNC: 31.9 % — LOW (ref 32–36)
MCV RBC AUTO: 71.5 FL — LOW (ref 80–100)
NRBC # FLD: 0 K/UL — LOW (ref 25–125)
PCO2 BLDV: 39 MMHG — LOW (ref 41–51)
PCO2 BLDV: 39 MMHG — LOW (ref 41–51)
PH BLDV: 7.39 PH — SIGNIFICANT CHANGE UP (ref 7.32–7.43)
PH BLDV: 7.39 PH — SIGNIFICANT CHANGE UP (ref 7.32–7.43)
PHOSPHATE SERPL-MCNC: 5.5 MG/DL — HIGH (ref 2.5–4.5)
PHOSPHATE SERPL-MCNC: 5.9 MG/DL — HIGH (ref 2.5–4.5)
PLATELET # BLD AUTO: 170 K/UL — SIGNIFICANT CHANGE UP (ref 150–400)
PMV BLD: SIGNIFICANT CHANGE UP FL (ref 7–13)
PO2 BLDV: 57 MMHG — HIGH (ref 35–40)
PO2 BLDV: 77 MMHG — HIGH (ref 35–40)
POTASSIUM BLDV-SCNC: 4.1 MMOL/L — SIGNIFICANT CHANGE UP (ref 3.4–4.5)
POTASSIUM BLDV-SCNC: 4.3 MMOL/L — SIGNIFICANT CHANGE UP (ref 3.4–4.5)
POTASSIUM SERPL-MCNC: 4 MMOL/L — SIGNIFICANT CHANGE UP (ref 3.5–5.3)
POTASSIUM SERPL-MCNC: 4.5 MMOL/L — SIGNIFICANT CHANGE UP (ref 3.5–5.3)
POTASSIUM SERPL-SCNC: 4 MMOL/L — SIGNIFICANT CHANGE UP (ref 3.5–5.3)
POTASSIUM SERPL-SCNC: 4.5 MMOL/L — SIGNIFICANT CHANGE UP (ref 3.5–5.3)
PROT SERPL-MCNC: 6.7 G/DL — SIGNIFICANT CHANGE UP (ref 6–8.3)
RBC # BLD: 3.86 M/UL — LOW (ref 4.2–5.8)
RBC # FLD: 24.7 % — HIGH (ref 10.3–14.5)
SAO2 % BLDV: 85.5 % — HIGH (ref 60–85)
SAO2 % BLDV: 94.3 % — HIGH (ref 60–85)
SODIUM SERPL-SCNC: 131 MMOL/L — LOW (ref 135–145)
SODIUM SERPL-SCNC: 132 MMOL/L — LOW (ref 135–145)
UIBC SERPL-MCNC: 303.6 UG/DL — SIGNIFICANT CHANGE UP (ref 110–370)
WBC # BLD: 5.57 K/UL — SIGNIFICANT CHANGE UP (ref 3.8–10.5)
WBC # FLD AUTO: 5.57 K/UL — SIGNIFICANT CHANGE UP (ref 3.8–10.5)

## 2019-01-21 PROCEDURE — 99233 SBSQ HOSP IP/OBS HIGH 50: CPT | Mod: GC

## 2019-01-21 PROCEDURE — 99233 SBSQ HOSP IP/OBS HIGH 50: CPT

## 2019-01-21 RX ORDER — BUMETANIDE 0.25 MG/ML
2 INJECTION INTRAMUSCULAR; INTRAVENOUS
Qty: 20 | Refills: 0 | Status: DISCONTINUED | OUTPATIENT
Start: 2019-01-21 | End: 2019-01-22

## 2019-01-21 RX ORDER — TAMSULOSIN HYDROCHLORIDE 0.4 MG/1
0.4 CAPSULE ORAL AT BEDTIME
Qty: 0 | Refills: 0 | Status: DISCONTINUED | OUTPATIENT
Start: 2019-01-21 | End: 2019-02-25

## 2019-01-21 RX ORDER — BUMETANIDE 0.25 MG/ML
2 INJECTION INTRAMUSCULAR; INTRAVENOUS ONCE
Qty: 0 | Refills: 0 | Status: COMPLETED | OUTPATIENT
Start: 2019-01-21 | End: 2019-01-21

## 2019-01-21 RX ADMIN — SEVELAMER CARBONATE 800 MILLIGRAM(S): 2400 POWDER, FOR SUSPENSION ORAL at 22:05

## 2019-01-21 RX ADMIN — CHLORHEXIDINE GLUCONATE 1 APPLICATION(S): 213 SOLUTION TOPICAL at 12:32

## 2019-01-21 RX ADMIN — Medication 25 MILLIGRAM(S): at 06:35

## 2019-01-21 RX ADMIN — ISOSORBIDE DINITRATE 10 MILLIGRAM(S): 5 TABLET ORAL at 15:15

## 2019-01-21 RX ADMIN — Medication 4: at 17:23

## 2019-01-21 RX ADMIN — SODIUM CHLORIDE 3 MILLILITER(S): 9 INJECTION INTRAMUSCULAR; INTRAVENOUS; SUBCUTANEOUS at 06:25

## 2019-01-21 RX ADMIN — BUMETANIDE 10 MG/HR: 0.25 INJECTION INTRAMUSCULAR; INTRAVENOUS at 12:31

## 2019-01-21 RX ADMIN — BUMETANIDE 10 MG/HR: 0.25 INJECTION INTRAMUSCULAR; INTRAVENOUS at 03:35

## 2019-01-21 RX ADMIN — Medication 81 MILLIGRAM(S): at 12:32

## 2019-01-21 RX ADMIN — MILRINONE LACTATE 5.85 MICROGRAM(S)/KG/MIN: 1 INJECTION, SOLUTION INTRAVENOUS at 12:36

## 2019-01-21 RX ADMIN — OXYCODONE HYDROCHLORIDE 5 MILLIGRAM(S): 5 TABLET ORAL at 20:30

## 2019-01-21 RX ADMIN — BUMETANIDE 10 MG/HR: 0.25 INJECTION INTRAMUSCULAR; INTRAVENOUS at 03:32

## 2019-01-21 RX ADMIN — TAMSULOSIN HYDROCHLORIDE 0.4 MILLIGRAM(S): 0.4 CAPSULE ORAL at 22:07

## 2019-01-21 RX ADMIN — CARVEDILOL PHOSPHATE 6.25 MILLIGRAM(S): 80 CAPSULE, EXTENDED RELEASE ORAL at 17:24

## 2019-01-21 RX ADMIN — HEPARIN SODIUM 5000 UNIT(S): 5000 INJECTION INTRAVENOUS; SUBCUTANEOUS at 17:24

## 2019-01-21 RX ADMIN — HEPARIN SODIUM 5000 UNIT(S): 5000 INJECTION INTRAVENOUS; SUBCUTANEOUS at 06:34

## 2019-01-21 RX ADMIN — OXYCODONE HYDROCHLORIDE 5 MILLIGRAM(S): 5 TABLET ORAL at 10:30

## 2019-01-21 RX ADMIN — Medication 1 SPRAY(S): at 00:11

## 2019-01-21 RX ADMIN — INSULIN GLARGINE 30 UNIT(S): 100 INJECTION, SOLUTION SUBCUTANEOUS at 22:05

## 2019-01-21 RX ADMIN — Medication 25 MILLIGRAM(S): at 15:15

## 2019-01-21 RX ADMIN — OXYCODONE HYDROCHLORIDE 5 MILLIGRAM(S): 5 TABLET ORAL at 09:30

## 2019-01-21 RX ADMIN — OXYCODONE HYDROCHLORIDE 5 MILLIGRAM(S): 5 TABLET ORAL at 19:51

## 2019-01-21 RX ADMIN — ISOSORBIDE DINITRATE 10 MILLIGRAM(S): 5 TABLET ORAL at 22:04

## 2019-01-21 RX ADMIN — MILRINONE LACTATE 5.85 MICROGRAM(S)/KG/MIN: 1 INJECTION, SOLUTION INTRAVENOUS at 06:35

## 2019-01-21 RX ADMIN — SODIUM CHLORIDE 3 MILLILITER(S): 9 INJECTION INTRAMUSCULAR; INTRAVENOUS; SUBCUTANEOUS at 21:50

## 2019-01-21 RX ADMIN — SEVELAMER CARBONATE 800 MILLIGRAM(S): 2400 POWDER, FOR SUSPENSION ORAL at 12:35

## 2019-01-21 RX ADMIN — Medication 1 SPRAY(S): at 23:51

## 2019-01-21 RX ADMIN — Medication 25 MILLIGRAM(S): at 22:04

## 2019-01-21 RX ADMIN — ISOSORBIDE DINITRATE 10 MILLIGRAM(S): 5 TABLET ORAL at 06:35

## 2019-01-21 RX ADMIN — BUMETANIDE 2 MILLIGRAM(S): 0.25 INJECTION INTRAMUSCULAR; INTRAVENOUS at 03:22

## 2019-01-21 RX ADMIN — Medication 10 MG/HR: at 00:40

## 2019-01-21 RX ADMIN — SEVELAMER CARBONATE 800 MILLIGRAM(S): 2400 POWDER, FOR SUSPENSION ORAL at 06:35

## 2019-01-21 RX ADMIN — CARVEDILOL PHOSPHATE 6.25 MILLIGRAM(S): 80 CAPSULE, EXTENDED RELEASE ORAL at 06:35

## 2019-01-21 NOTE — PROGRESS NOTE ADULT - SUBJECTIVE AND OBJECTIVE BOX
Keith Burns, PGY1  Pager: [phone]      Patient is a 68y old  Male who presents with a chief complaint of Dyspnea x 2 days (21 Jan 2019 09:41)      SUBJECTIVE / OVERNIGHT EVENTS: Pt increased to Lasix 20 drip overnight without adequate output, given Bumex 2mg IV push and started on Bumex drip at 2/hr. Pt with 400cc of urine output after starting Bumex.    This morning at bedside pt without any complaints.     REVIEW OF SYSTEMS:    CONSTITUTIONAL: No weakness, fevers or chills  EYES/ENT: No visual changes;  No vertigo or throat pain   NECK: No pain or stiffness  RESPIRATORY: No cough, wheezing, hemoptysis; No shortness of breath  CARDIOVASCULAR: No chest pain or palpitations  GASTROINTESTINAL: No abdominal or epigastric pain. No nausea, vomiting, or hematemesis; No diarrhea or constipation. No melena or hematochezia.  GENITOURINARY: No dysuria, frequency or hematuria  NEUROLOGICAL: No numbness or weakness  SKIN: No itching, rashes    MEDICATIONS  (STANDING):  aspirin enteric coated 81 milliGRAM(s) Oral daily  buMETAnide Infusion 2 mG/Hr (10 mL/Hr) IV Continuous <Continuous>  carvedilol 6.25 milliGRAM(s) Oral every 12 hours  chlorhexidine 4% Liquid 1 Application(s) Topical <User Schedule>  dextrose 5%. 1000 milliLiter(s) (50 mL/Hr) IV Continuous <Continuous>  dextrose 50% Injectable 12.5 Gram(s) IV Push once  dextrose 50% Injectable 25 Gram(s) IV Push once  dextrose 50% Injectable 25 Gram(s) IV Push once  docusate sodium 100 milliGRAM(s) Oral daily  heparin  Injectable 5000 Unit(s) SubCutaneous every 12 hours  hydrALAZINE 25 milliGRAM(s) Oral every 8 hours  insulin glargine Injectable (LANTUS) 30 Unit(s) SubCutaneous at bedtime  insulin lispro (HumaLOG) corrective regimen sliding scale   SubCutaneous three times a day before meals  insulin lispro (HumaLOG) corrective regimen sliding scale   SubCutaneous at bedtime  isosorbide   dinitrate Tablet (ISORDIL) 10 milliGRAM(s) Oral three times a day  milrinone Infusion 0.25 MICROgram(s)/kG/Min (5.85 mL/Hr) IV Continuous <Continuous>  senna 2 Tablet(s) Oral at bedtime  sevelamer hydrochloride 800 milliGRAM(s) Oral three times a day  sodium chloride 0.9% lock flush 3 milliLiter(s) IV Push every 8 hours    MEDICATIONS  (PRN):  calamine Lotion 1 Application(s) Topical daily PRN Rash and/or Itching  dextrose 40% Gel 15 Gram(s) Oral once PRN Blood Glucose LESS THAN 70 milliGRAM(s)/deciliter  fluticasone propionate 50 MICROgram(s)/spray Nasal Spray 1 Spray(s) Both Nostrils two times a day PRN nasal congestion  glucagon  Injectable 1 milliGRAM(s) IntraMuscular once PRN Glucose LESS THAN 70 milligrams/deciliter  guaiFENesin   Syrup  (Sugar-Free) 200 milliGRAM(s) Oral every 6 hours PRN Cough  oxyCODONE    IR 5 milliGRAM(s) Oral every 6 hours PRN Moderate Pain (4 - 6)      Vital Signs Last 24 Hrs  T(C): 36.7 (21 Jan 2019 08:26), Max: 37.2 (20 Jan 2019 16:00)  T(F): 98.1 (21 Jan 2019 08:26), Max: 99 (20 Jan 2019 16:00)  HR: 81 (21 Jan 2019 06:00) (73 - 92)  BP: 107/58 (21 Jan 2019 06:00) (98/56 - 113/69)  BP(mean): 69 (21 Jan 2019 06:00) (63 - 95)  RR: 17 (21 Jan 2019 06:00) (10 - 29)  SpO2: 95% (21 Jan 2019 06:00) (95% - 99%)  CAPILLARY BLOOD GLUCOSE      POCT Blood Glucose.: 108 mg/dL (21 Jan 2019 08:13)  POCT Blood Glucose.: 145 mg/dL (20 Jan 2019 21:33)  POCT Blood Glucose.: 219 mg/dL (20 Jan 2019 17:03)  POCT Blood Glucose.: 204 mg/dL (20 Jan 2019 12:45)    I&O's Summary    20 Jan 2019 07:01  -  21 Jan 2019 07:00  --------------------------------------------------------  IN: 735.4 mL / OUT: 1445 mL / NET: -709.6 mL        PHYSICAL EXAM:  GENERAL: NAD, appears older than age, sitting upright comfortably  EYES: EOMI, PERRLA, conjunctiva and sclera clear  NECK:  Prominent JVD  CHEST/LUNG: Fine crackles bilaterally lower lung fields  HEART: RRR; No murmurs  ABDOMEN: Distended, dull to percussion   : No [place]  EXTREMITIES:  2+ Peripheral Pulses, 2+ pitting edema b/l up to knees  PSYCH: AAOx3  NEUROLOGY: non-focal  SKIN: No rashes or lesions. No sacral ulcer    LABS:                        8.8    5.57  )-----------( 170      ( 21 Jan 2019 06:30 )             27.6     01-21    132<L>  |  96<L>  |  72<H>  ----------------------------<  68<L>  4.0   |  20<L>  |  2.75<H>    Ca    8.7      21 Jan 2019 06:30  Phos  5.5     01-21  Mg     2.2     01-21    TPro  6.7  /  Alb  2.9<L>  /  TBili  1.5<H>  /  DBili  x   /  AST  24  /  ALT  27  /  AlkPhos  441<H>  01-21              Microbiology;        RADIOLOGY & ADDITIONAL TESTS:    EF: 35%  < from: TTE with Doppler (w/Cont) (01.20.19 @ 13:36) >  CONCLUSIONS:  1. Tethered mitral valve leaflets. Moderate mitral  regurgitation.  2. Calcified trileaflet aortic valve with mildly decreased  opening.  3. Normal left ventricular internal dimensions and wall  thicknesses.  4. Moderate to severe segmental left ventricular systolic  dysfunction. The basal to mid septum, basal to mid  inferior, basal to mid anterolateral walls are hypokinetic.  5. Right ventricular enlargement with decreased right  ventricular systolic function.  6. Estimated pulmonary artery systolic pressure equals 43  mmHg, assuming right atrial pressure equals 10  mm Hg,  consistent with mild pulmonary hypertension.    < end of copied text >      Imaging Personally Reviewed: Yes           Consultant(s) Notes Reviewed: Yes     Care Discussed with Consultants/Other Providers: Yes

## 2019-01-21 NOTE — PROGRESS NOTE ADULT - PROBLEM SELECTOR PLAN 2
Pt. with hyperkalemia in the setting of KESHAV and spironolactone use. Patient received medical management with improvement of serum potassium. Serum potassium this AM is WNL (4.0). Pt. on IV Bumex drip. Continue to hold spironolactone. Low potassium diet. Monitor serum potassium

## 2019-01-21 NOTE — PROGRESS NOTE ADULT - PROBLEM SELECTOR PLAN 1
Pt. with KESHAV in the setting of ADHF. On review of previous records in [facility]/[place], patient with normal Scr levels from 4/26/16 to 9/24/18. Pt. noted to have an episode of KESHAV during previous/recent hospitalization at [facility] (12/7/18 to 12/27/18). On this admission, Scr was elevated at 2.15 on 1/16/19, increased to 2.44 to 1/18/19. Scr worsened today to 2.75. Pt. on Bumex gtt and milrinone per CCU team. US Kidney showed increased bilateral renal cortical echogenicity, no hydronephrosis on 1/18/19. Monitor labs and urine output. Avoid nephrotoxins. Dose medications as per eGFR

## 2019-01-21 NOTE — PROGRESS NOTE ADULT - SUBJECTIVE AND OBJECTIVE BOX
[facility] DIVISION OF KIDNEY DISEASES AND HYPERTENSION -- FOLLOW UP NOTE  --------------------------------------------------------------------------------  HPI: 68-year-old male with medical history of b/L LE bypass and b/L LE multiple toe amputations, DM2, HFrEF (EF 20%), HTN, Dyslipidemia, CAD, CABG, PVD, groin  sartorius flap, and vac placement, LLE with surgical scar wound admitted for worsening SOB. Nephrology consulted for KESHAV. Currently been treated for ADHF with lasix drip. On review of previous records in [facility]/[place], patient had normal Scr levels from 4/26/16 to 9/24/18. Pt. noted to have an episode of KESHAV during previous/recent hospitalization at [facility] (12/7/18 to 12/27/18). On this admission, Scr was elevated at 2.15 on 1/16/19, increased to 2.44 on 1/18/19. Also noted to have serum potassium elevated at 5.4 on 1/18/19. Scr increased to 2.75 this AM.    Pt was seen and examined at bedside this AM. Transferred to CCU yesterday and placed on milrinone and Bumex gtt. Patient states that he has RLE pain and mild SOB. Denies dysuria or difficulty urinating. Denies CP, N/V/F/C.    PAST HISTORY  --------------------------------------------------------------------------------  No significant changes to PMH, PSH, FHx, SHx, unless otherwise noted    ALLERGIES & MEDICATIONS  --------------------------------------------------------------------------------  Allergies    No Known Allergies    Intolerances    Standing Inpatient Medications  aspirin enteric coated 81 milliGRAM(s) Oral daily  buMETAnide Infusion 2 mG/Hr IV Continuous <Continuous>  carvedilol 6.25 milliGRAM(s) Oral every 12 hours  chlorhexidine 4% Liquid 1 Application(s) Topical <User Schedule>  dextrose 5%. 1000 milliLiter(s) IV Continuous <Continuous>  dextrose 50% Injectable 12.5 Gram(s) IV Push once  dextrose 50% Injectable 25 Gram(s) IV Push once  dextrose 50% Injectable 25 Gram(s) IV Push once  docusate sodium 100 milliGRAM(s) Oral daily  heparin  Injectable 5000 Unit(s) SubCutaneous every 12 hours  hydrALAZINE 25 milliGRAM(s) Oral every 8 hours  insulin glargine Injectable (LANTUS) 30 Unit(s) SubCutaneous at bedtime  insulin lispro (HumaLOG) corrective regimen sliding scale   SubCutaneous three times a day before meals  insulin lispro (HumaLOG) corrective regimen sliding scale   SubCutaneous at bedtime  isosorbide   dinitrate Tablet (ISORDIL) 10 milliGRAM(s) Oral three times a day  milrinone Infusion 0.25 MICROgram(s)/kG/Min IV Continuous <Continuous>  senna 2 Tablet(s) Oral at bedtime  sevelamer hydrochloride 800 milliGRAM(s) Oral three times a day  sodium chloride 0.9% lock flush 3 milliLiter(s) IV Push every 8 hours    REVIEW OF SYSTEMS  --------------------------------------------------------------------------------  Gen: No fevers  Respiratory: + dyspnea  CV: No chest pain  GI: No abdominal pain  : No dysuria, hematuria  MSK: + LE edema    All other systems were reviewed and are negative, except as noted.    VITALS/PHYSICAL EXAM  --------------------------------------------------------------------------------  T(C): 36.7 (01-21-19 @ 08:26), Max: 37.2 (01-20-19 @ 16:00)  HR: 81 (01-21-19 @ 06:00) (73 - 92)  BP: 107/58 (01-21-19 @ 06:00) (98/56 - 113/69)  RR: 17 (01-21-19 @ 06:00) (10 - 29)  SpO2: 95% (01-21-19 @ 06:00) (95% - 99%)  Wt(kg): --    Weight (kg): 77.1 (01-21-19 @ 06:00)    01-20-19 @ 07:01  -  01-21-19 @ 07:00  --------------------------------------------------------  IN: 735.4 mL / OUT: 1445 mL / NET: -709.6 mL    Physical Exam:  	Gen: resting, NAD  	HEENT: No JVD  	Pulm: b/l crackles R>L  	CV: S1S2  	Abd: Soft, +BS   	Ext: + b/l LE edema  	Neuro: Awake  	Skin: Warm and dry    LABS/STUDIES  --------------------------------------------------------------------------------              8.8    5.57  >-----------<  170      [01-21-19 @ 06:30]              27.6     132  |  96  |  72  ----------------------------<  68      [01-21-19 @ 06:30]  4.0   |  20  |  2.75        Ca     8.7     [01-21-19 @ 06:30]      Mg     2.2     [01-21-19 @ 06:30]      Phos  5.5     [01-21-19 @ 06:30]    Creatinine Trend:  SCr 2.75 [01-21 @ 06:30]  SCr 2.65 [01-20 @ 15:00]  SCr 2.65 [01-20 @ 05:55]  SCr 2.14 [01-19 @ 18:30]  SCr 2.38 [01-19 @ 07:00]

## 2019-01-21 NOTE — PROGRESS NOTE ADULT - ASSESSMENT
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 His last admission from 12/8-12/27 required CCU admission and placement on dobutamine. He has had 3 admissions in 2018, for various complications from DM.  He comes in this time due to worsening SOB. He admits to 2 pillow orthopnea, frequent PND and ORTA after 1/2 block and walking up 5 steps. No CP, palpitations, dizziness. Patient's son by bedside. On tele, patient not diuresing well and condition guarded. Admitted to CCU for inotropic therapy, initiation of primacor infusion and IV diuresis with lasix gtt, now on Bumex drip.       PLAN:    Neuro:  AO x 3    Pulm:   Fine crackles due to overloaded state, on room air.    Cardiovascular:   NYHA class IV due to ischemic cardiomyopathy. On physical exam, patient is volume overloaded although lungs are relatively clear  -primacor infusion at 0.25 mcg/kg/min  -baseline VBG which was normal at 68 but then increased, will stop trending  -Repeat Echo with increased EF and diffuse LV hypokinesis   -Started on Bumex drip at 2mg/hr  -Strict I/O and daily standing weights. Output for past 24hrs around 800cc  -Continue Coreg 6.25 mg po BID, for now.  -holding spironalactone for hyperkalemia, continue holding per nephrology  -Continue hydralazine 25 mg po TID and Isordil 10 mg po TID for afterload reduction  -Once hemodynamically optimized, consider ICD placement, given low EF and ischemic cardiomyopathy  -to discuss with heart failure team regarding long term goals    GI/:  Distended abdomen with dullness to percussion, suspect abdominal fluid overload  -Currently no N/V/D. 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 Elevated today. Ultrasound shows renal parenchymal disease.   -Likely secondary to renal venous congestion from cardiorenal syndrome. Diuresis and continue to monitor.     Heme:   Anemia to low 9s with low MCV  iron studies inconclusive  reticulocyte index and hemolysis labs tomorrow AM  Aspirin and Hep Subq    ID:   No issues    Endo:  DM, on 30 Lantus and ISS.   Fingerstick stable, continue to monitor.     DVT: Heparin  Diet: Regular, DASH/TLC, Carb restricted, fluid 1000mL restrict     Code status: Full code.

## 2019-01-21 NOTE — PROGRESS NOTE ADULT - PROBLEM SELECTOR PLAN 3
Patient with hypervolemia in the setting of ADHF. Patient currently being treated with Bumex gtt with 1.5 L of UOP in last 24 hours. Diuretic and ADHF management per CCU team. Monitor daily weights. Low salt diet

## 2019-01-22 LAB
ALBUMIN SERPL ELPH-MCNC: 2.9 G/DL — LOW (ref 3.3–5)
ALBUMIN SERPL ELPH-MCNC: 3.2 G/DL — LOW (ref 3.3–5)
ALP SERPL-CCNC: 384 U/L — HIGH (ref 40–120)
ALP SERPL-CCNC: 438 U/L — HIGH (ref 40–120)
ALT FLD-CCNC: 23 U/L — SIGNIFICANT CHANGE UP (ref 4–41)
ALT FLD-CCNC: 24 U/L — SIGNIFICANT CHANGE UP (ref 4–41)
ANION GAP SERPL CALC-SCNC: 16 MMO/L — HIGH (ref 7–14)
ANION GAP SERPL CALC-SCNC: 17 MMO/L — HIGH (ref 7–14)
AST SERPL-CCNC: 22 U/L — SIGNIFICANT CHANGE UP (ref 4–40)
AST SERPL-CCNC: 22 U/L — SIGNIFICANT CHANGE UP (ref 4–40)
BASE EXCESS BLDV CALC-SCNC: -0.9 MMOL/L — SIGNIFICANT CHANGE UP
BASE EXCESS BLDV CALC-SCNC: 0.2 MMOL/L — SIGNIFICANT CHANGE UP
BASE EXCESS BLDV CALC-SCNC: 1.2 MMOL/L — SIGNIFICANT CHANGE UP
BASE EXCESS BLDV CALC-SCNC: 2.3 MMOL/L — SIGNIFICANT CHANGE UP
BILIRUB SERPL-MCNC: 1.4 MG/DL — HIGH (ref 0.2–1.2)
BILIRUB SERPL-MCNC: 1.5 MG/DL — HIGH (ref 0.2–1.2)
BLOOD GAS VENOUS - CREATININE: 2.85 MG/DL — HIGH (ref 0.5–1.3)
BLOOD GAS VENOUS - CREATININE: 3.12 MG/DL — HIGH (ref 0.5–1.3)
BLOOD GAS VENOUS - CREATININE: 3.21 MG/DL — HIGH (ref 0.5–1.3)
BUN SERPL-MCNC: 80 MG/DL — HIGH (ref 7–23)
BUN SERPL-MCNC: 80 MG/DL — HIGH (ref 7–23)
CALCIUM SERPL-MCNC: 8.6 MG/DL — SIGNIFICANT CHANGE UP (ref 8.4–10.5)
CALCIUM SERPL-MCNC: 9 MG/DL — SIGNIFICANT CHANGE UP (ref 8.4–10.5)
CHLORIDE BLDV-SCNC: 103 MMOL/L — SIGNIFICANT CHANGE UP (ref 96–108)
CHLORIDE BLDV-SCNC: 95 MMOL/L — LOW (ref 96–108)
CHLORIDE BLDV-SCNC: 99 MMOL/L — SIGNIFICANT CHANGE UP (ref 96–108)
CHLORIDE SERPL-SCNC: 95 MMOL/L — LOW (ref 98–107)
CHLORIDE SERPL-SCNC: 97 MMOL/L — LOW (ref 98–107)
CO2 SERPL-SCNC: 22 MMOL/L — SIGNIFICANT CHANGE UP (ref 22–31)
CO2 SERPL-SCNC: 23 MMOL/L — SIGNIFICANT CHANGE UP (ref 22–31)
CREAT SERPL-MCNC: 3.2 MG/DL — HIGH (ref 0.5–1.3)
CREAT SERPL-MCNC: 3.28 MG/DL — HIGH (ref 0.5–1.3)
GAS PNL BLDV: 129 MMOL/L — LOW (ref 136–146)
GAS PNL BLDV: 132 MMOL/L — LOW (ref 136–146)
GAS PNL BLDV: 132 MMOL/L — LOW (ref 136–146)
GLUCOSE BLDC GLUCOMTR-MCNC: 135 MG/DL — HIGH (ref 70–99)
GLUCOSE BLDC GLUCOMTR-MCNC: 165 MG/DL — HIGH (ref 70–99)
GLUCOSE BLDC GLUCOMTR-MCNC: 179 MG/DL — HIGH (ref 70–99)
GLUCOSE BLDC GLUCOMTR-MCNC: 250 MG/DL — HIGH (ref 70–99)
GLUCOSE BLDC GLUCOMTR-MCNC: 250 MG/DL — HIGH (ref 70–99)
GLUCOSE BLDV-MCNC: 141 — HIGH (ref 70–99)
GLUCOSE BLDV-MCNC: 156 — HIGH (ref 70–99)
GLUCOSE BLDV-MCNC: 220 — HIGH (ref 70–99)
GLUCOSE SERPL-MCNC: 144 MG/DL — HIGH (ref 70–99)
GLUCOSE SERPL-MCNC: 146 MG/DL — HIGH (ref 70–99)
HAPTOGLOB SERPL-MCNC: 167 MG/DL — SIGNIFICANT CHANGE UP (ref 34–200)
HCO3 BLDV-SCNC: 23 MMOL/L — SIGNIFICANT CHANGE UP (ref 20–27)
HCO3 BLDV-SCNC: 23 MMOL/L — SIGNIFICANT CHANGE UP (ref 20–27)
HCO3 BLDV-SCNC: 25 MMOL/L — SIGNIFICANT CHANGE UP (ref 20–27)
HCO3 BLDV-SCNC: 26 MMOL/L — SIGNIFICANT CHANGE UP (ref 20–27)
HCT VFR BLD CALC: 26.2 % — LOW (ref 39–50)
HCT VFR BLD CALC: 29.2 % — LOW (ref 39–50)
HCT VFR BLDV CALC: 24.9 % — LOW (ref 39–51)
HCT VFR BLDV CALC: 25.5 % — LOW (ref 39–51)
HCT VFR BLDV CALC: 25.8 % — LOW (ref 39–51)
HGB BLD-MCNC: 8.3 G/DL — LOW (ref 13–17)
HGB BLD-MCNC: 9.3 G/DL — LOW (ref 13–17)
HGB BLDV-MCNC: 8 G/DL — LOW (ref 13–17)
HGB BLDV-MCNC: 8.2 G/DL — LOW (ref 13–17)
HGB BLDV-MCNC: 8.3 G/DL — LOW (ref 13–17)
LACTATE BLDV-MCNC: 0.7 MMOL/L — SIGNIFICANT CHANGE UP (ref 0.5–2)
LACTATE BLDV-MCNC: 0.8 MMOL/L — SIGNIFICANT CHANGE UP (ref 0.5–2)
LACTATE BLDV-MCNC: 1.4 MMOL/L — SIGNIFICANT CHANGE UP (ref 0.5–2)
LACTATE BLDV-MCNC: 1.6 MMOL/L — SIGNIFICANT CHANGE UP (ref 0.5–2)
LDH SERPL L TO P-CCNC: 218 U/L — SIGNIFICANT CHANGE UP (ref 135–225)
MAGNESIUM SERPL-MCNC: 2.2 MG/DL — SIGNIFICANT CHANGE UP (ref 1.6–2.6)
MAGNESIUM SERPL-MCNC: 2.4 MG/DL — SIGNIFICANT CHANGE UP (ref 1.6–2.6)
MCHC RBC-ENTMCNC: 22.6 PG — LOW (ref 27–34)
MCHC RBC-ENTMCNC: 22.7 PG — LOW (ref 27–34)
MCHC RBC-ENTMCNC: 31.7 % — LOW (ref 32–36)
MCHC RBC-ENTMCNC: 31.8 % — LOW (ref 32–36)
MCV RBC AUTO: 71 FL — LOW (ref 80–100)
MCV RBC AUTO: 71.6 FL — LOW (ref 80–100)
NRBC # FLD: 0 K/UL — LOW (ref 25–125)
NRBC # FLD: 0 K/UL — LOW (ref 25–125)
PCO2 BLDV: 44 MMHG — SIGNIFICANT CHANGE UP (ref 41–51)
PCO2 BLDV: 46 MMHG — SIGNIFICANT CHANGE UP (ref 41–51)
PCO2 BLDV: 47 MMHG — SIGNIFICANT CHANGE UP (ref 41–51)
PCO2 BLDV: 48 MMHG — SIGNIFICANT CHANGE UP (ref 41–51)
PH BLDV: 7.34 PH — SIGNIFICANT CHANGE UP (ref 7.32–7.43)
PH BLDV: 7.36 PH — SIGNIFICANT CHANGE UP (ref 7.32–7.43)
PH BLDV: 7.37 PH — SIGNIFICANT CHANGE UP (ref 7.32–7.43)
PH BLDV: 7.38 PH — SIGNIFICANT CHANGE UP (ref 7.32–7.43)
PHOSPHATE SERPL-MCNC: 6.1 MG/DL — HIGH (ref 2.5–4.5)
PHOSPHATE SERPL-MCNC: 6.2 MG/DL — HIGH (ref 2.5–4.5)
PLATELET # BLD AUTO: 161 K/UL — SIGNIFICANT CHANGE UP (ref 150–400)
PLATELET # BLD AUTO: 201 K/UL — SIGNIFICANT CHANGE UP (ref 150–400)
PMV BLD: SIGNIFICANT CHANGE UP FL (ref 7–13)
PMV BLD: SIGNIFICANT CHANGE UP FL (ref 7–13)
PO2 BLDV: 29 MMHG — LOW (ref 35–40)
PO2 BLDV: 40 MMHG — SIGNIFICANT CHANGE UP (ref 35–40)
PO2 BLDV: 41 MMHG — HIGH (ref 35–40)
PO2 BLDV: 49 MMHG — HIGH (ref 35–40)
POTASSIUM BLDV-SCNC: 3.5 MMOL/L — SIGNIFICANT CHANGE UP (ref 3.4–4.5)
POTASSIUM BLDV-SCNC: 3.7 MMOL/L — SIGNIFICANT CHANGE UP (ref 3.4–4.5)
POTASSIUM BLDV-SCNC: 3.8 MMOL/L — SIGNIFICANT CHANGE UP (ref 3.4–4.5)
POTASSIUM SERPL-MCNC: 4.1 MMOL/L — SIGNIFICANT CHANGE UP (ref 3.5–5.3)
POTASSIUM SERPL-MCNC: 4.1 MMOL/L — SIGNIFICANT CHANGE UP (ref 3.5–5.3)
POTASSIUM SERPL-SCNC: 4.1 MMOL/L — SIGNIFICANT CHANGE UP (ref 3.5–5.3)
POTASSIUM SERPL-SCNC: 4.1 MMOL/L — SIGNIFICANT CHANGE UP (ref 3.5–5.3)
PROT SERPL-MCNC: 6.6 G/DL — SIGNIFICANT CHANGE UP (ref 6–8.3)
PROT SERPL-MCNC: 7.3 G/DL — SIGNIFICANT CHANGE UP (ref 6–8.3)
RBC # BLD: 3.66 M/UL — LOW (ref 4.2–5.8)
RBC # BLD: 4.11 M/UL — LOW (ref 4.2–5.8)
RBC # FLD: 24.5 % — HIGH (ref 10.3–14.5)
RBC # FLD: 24.6 % — HIGH (ref 10.3–14.5)
RETICS #: 32 K/UL — SIGNIFICANT CHANGE UP (ref 25–125)
RETICS/RBC NFR: 0.8 % — SIGNIFICANT CHANGE UP (ref 0.5–2.5)
SAO2 % BLDV: 40.6 % — LOW (ref 60–85)
SAO2 % BLDV: 65.8 % — SIGNIFICANT CHANGE UP (ref 60–85)
SAO2 % BLDV: 65.9 % — SIGNIFICANT CHANGE UP (ref 60–85)
SAO2 % BLDV: 76.9 % — SIGNIFICANT CHANGE UP (ref 60–85)
SODIUM SERPL-SCNC: 135 MMOL/L — SIGNIFICANT CHANGE UP (ref 135–145)
SODIUM SERPL-SCNC: 135 MMOL/L — SIGNIFICANT CHANGE UP (ref 135–145)
WBC # BLD: 5.92 K/UL — SIGNIFICANT CHANGE UP (ref 3.8–10.5)
WBC # BLD: 5.92 K/UL — SIGNIFICANT CHANGE UP (ref 3.8–10.5)
WBC # FLD AUTO: 5.92 K/UL — SIGNIFICANT CHANGE UP (ref 3.8–10.5)
WBC # FLD AUTO: 5.92 K/UL — SIGNIFICANT CHANGE UP (ref 3.8–10.5)

## 2019-01-22 PROCEDURE — 99233 SBSQ HOSP IP/OBS HIGH 50: CPT

## 2019-01-22 PROCEDURE — 99233 SBSQ HOSP IP/OBS HIGH 50: CPT | Mod: GC

## 2019-01-22 PROCEDURE — 76705 ECHO EXAM OF ABDOMEN: CPT | Mod: 26

## 2019-01-22 PROCEDURE — 74019 RADEX ABDOMEN 2 VIEWS: CPT | Mod: 26

## 2019-01-22 PROCEDURE — 71045 X-RAY EXAM CHEST 1 VIEW: CPT | Mod: 26

## 2019-01-22 RX ORDER — BUMETANIDE 0.25 MG/ML
3 INJECTION INTRAMUSCULAR; INTRAVENOUS
Qty: 20 | Refills: 0 | Status: DISCONTINUED | OUTPATIENT
Start: 2019-01-22 | End: 2019-01-25

## 2019-01-22 RX ORDER — MILRINONE LACTATE 1 MG/ML
0.12 INJECTION, SOLUTION INTRAVENOUS
Qty: 20 | Refills: 0 | Status: DISCONTINUED | OUTPATIENT
Start: 2019-01-22 | End: 2019-01-23

## 2019-01-22 RX ORDER — MILRINONE LACTATE 1 MG/ML
0.1 INJECTION, SOLUTION INTRAVENOUS
Qty: 20 | Refills: 0 | Status: DISCONTINUED | OUTPATIENT
Start: 2019-01-22 | End: 2019-01-22

## 2019-01-22 RX ORDER — CHLOROTHIAZIDE 500 MG
500 TABLET ORAL EVERY 12 HOURS
Qty: 0 | Refills: 0 | Status: COMPLETED | OUTPATIENT
Start: 2019-01-22 | End: 2019-01-23

## 2019-01-22 RX ADMIN — OXYCODONE HYDROCHLORIDE 5 MILLIGRAM(S): 5 TABLET ORAL at 22:31

## 2019-01-22 RX ADMIN — INSULIN GLARGINE 30 UNIT(S): 100 INJECTION, SOLUTION SUBCUTANEOUS at 22:06

## 2019-01-22 RX ADMIN — BUMETANIDE 15 MG/HR: 0.25 INJECTION INTRAMUSCULAR; INTRAVENOUS at 15:22

## 2019-01-22 RX ADMIN — Medication 2: at 12:26

## 2019-01-22 RX ADMIN — SODIUM CHLORIDE 3 MILLILITER(S): 9 INJECTION INTRAMUSCULAR; INTRAVENOUS; SUBCUTANEOUS at 16:10

## 2019-01-22 RX ADMIN — SENNA PLUS 2 TABLET(S): 8.6 TABLET ORAL at 22:06

## 2019-01-22 RX ADMIN — Medication 2: at 17:09

## 2019-01-22 RX ADMIN — BUMETANIDE 15 MG/HR: 0.25 INJECTION INTRAMUSCULAR; INTRAVENOUS at 20:09

## 2019-01-22 RX ADMIN — HEPARIN SODIUM 5000 UNIT(S): 5000 INJECTION INTRAVENOUS; SUBCUTANEOUS at 06:44

## 2019-01-22 RX ADMIN — SEVELAMER CARBONATE 800 MILLIGRAM(S): 2400 POWDER, FOR SUSPENSION ORAL at 22:06

## 2019-01-22 RX ADMIN — ISOSORBIDE DINITRATE 10 MILLIGRAM(S): 5 TABLET ORAL at 13:22

## 2019-01-22 RX ADMIN — TAMSULOSIN HYDROCHLORIDE 0.4 MILLIGRAM(S): 0.4 CAPSULE ORAL at 22:07

## 2019-01-22 RX ADMIN — SODIUM CHLORIDE 3 MILLILITER(S): 9 INJECTION INTRAMUSCULAR; INTRAVENOUS; SUBCUTANEOUS at 22:07

## 2019-01-22 RX ADMIN — MILRINONE LACTATE 3 MICROGRAM(S)/KG/MIN: 1 INJECTION, SOLUTION INTRAVENOUS at 20:08

## 2019-01-22 RX ADMIN — Medication 100 MILLIGRAM(S): at 12:56

## 2019-01-22 RX ADMIN — OXYCODONE HYDROCHLORIDE 5 MILLIGRAM(S): 5 TABLET ORAL at 15:25

## 2019-01-22 RX ADMIN — HEPARIN SODIUM 5000 UNIT(S): 5000 INJECTION INTRAVENOUS; SUBCUTANEOUS at 17:10

## 2019-01-22 RX ADMIN — OXYCODONE HYDROCHLORIDE 5 MILLIGRAM(S): 5 TABLET ORAL at 16:00

## 2019-01-22 RX ADMIN — Medication 81 MILLIGRAM(S): at 12:56

## 2019-01-22 RX ADMIN — BUMETANIDE 15 MG/HR: 0.25 INJECTION INTRAMUSCULAR; INTRAVENOUS at 22:06

## 2019-01-22 RX ADMIN — OXYCODONE HYDROCHLORIDE 5 MILLIGRAM(S): 5 TABLET ORAL at 07:30

## 2019-01-22 RX ADMIN — Medication 25 MILLIGRAM(S): at 06:44

## 2019-01-22 RX ADMIN — Medication 25 MILLIGRAM(S): at 14:47

## 2019-01-22 RX ADMIN — Medication 72 MILLIGRAM(S): at 16:02

## 2019-01-22 RX ADMIN — ISOSORBIDE DINITRATE 10 MILLIGRAM(S): 5 TABLET ORAL at 22:06

## 2019-01-22 RX ADMIN — CARVEDILOL PHOSPHATE 6.25 MILLIGRAM(S): 80 CAPSULE, EXTENDED RELEASE ORAL at 06:44

## 2019-01-22 RX ADMIN — SODIUM CHLORIDE 3 MILLILITER(S): 9 INJECTION INTRAMUSCULAR; INTRAVENOUS; SUBCUTANEOUS at 05:36

## 2019-01-22 RX ADMIN — BUMETANIDE 10 MG/HR: 0.25 INJECTION INTRAMUSCULAR; INTRAVENOUS at 15:20

## 2019-01-22 RX ADMIN — SEVELAMER CARBONATE 800 MILLIGRAM(S): 2400 POWDER, FOR SUSPENSION ORAL at 13:22

## 2019-01-22 RX ADMIN — CHLORHEXIDINE GLUCONATE 1 APPLICATION(S): 213 SOLUTION TOPICAL at 12:41

## 2019-01-22 RX ADMIN — BUMETANIDE 10 MG/HR: 0.25 INJECTION INTRAMUSCULAR; INTRAVENOUS at 08:10

## 2019-01-22 RX ADMIN — SEVELAMER CARBONATE 800 MILLIGRAM(S): 2400 POWDER, FOR SUSPENSION ORAL at 06:44

## 2019-01-22 RX ADMIN — MILRINONE LACTATE 3 MICROGRAM(S)/KG/MIN: 1 INJECTION, SOLUTION INTRAVENOUS at 15:21

## 2019-01-22 RX ADMIN — ISOSORBIDE DINITRATE 10 MILLIGRAM(S): 5 TABLET ORAL at 06:44

## 2019-01-22 RX ADMIN — OXYCODONE HYDROCHLORIDE 5 MILLIGRAM(S): 5 TABLET ORAL at 06:46

## 2019-01-22 RX ADMIN — Medication 25 MILLIGRAM(S): at 22:06

## 2019-01-22 NOTE — PROGRESS NOTE ADULT - ATTENDING COMMENTS
transferred to ccu over weekend, empiric inotropes due to worsening renal function, swan placed 01/22 showing RA 18, W 23; PA 70/30/43, MV74 on mil 0.25  - diuresis as above  - wean milrinone to 0.125 repeat MVO2  - agree with invasive hemodynamics  - abd us

## 2019-01-22 NOTE — PROGRESS NOTE ADULT - PROBLEM SELECTOR PLAN 2
Pt. with hyperkalemia in the setting of KESHAV and spironolactone use. Patient received medical management with improvement of serum potassium. Serum potassium this AM is WNL (4.1). Pt. on IV Bumex drip. Continue to hold spironolactone. Low potassium diet. Monitor serum potassium

## 2019-01-22 NOTE — PROGRESS NOTE ADULT - PROBLEM SELECTOR PLAN 1
Severely hypervolemic. RA 18 this AM. PA sat 76.9%. PCWP 23. PA 73/30/47.  Increased Bumex gtt to 3 mg/hr.  Decreased milrinone to 0.125 mcg/kg/min. Send repeat sat.  Strict I/O. Dry weight is 132 lbs. Unable to get standing weight currently.  Continue Coreg 6.25 mg po BID.  Was hyperkalemic, hold spironolactone.   Continue hydral 25 mg po TID.   Continue Isordil 10 mg po TID.  Pt refused ICD when discussed w/ . Family at bedside. Will have ongoing discussions. Severely hypervolemic. RA 18 this AM. PA sat 76.9%. PCWP 23. PA 73/30/47.  Increased Bumex gtt to 3 mg/hr.  Add diuril 500 mg IV BID x 1 day.  Decreased milrinone to 0.125 mcg/kg/min. Send repeat sat.  Strict I/O. Dry weight is 132 lbs. Unable to get standing weight currently.  Continue Coreg 6.25 mg po BID.  Was hyperkalemic, hold spironolactone.   Continue hydral 25 mg po TID.   Continue Isordil 10 mg po TID.  Pt refused ICD when discussed w/ . Family at bedside. Will have ongoing discussions.

## 2019-01-22 NOTE — PROGRESS NOTE ADULT - ASSESSMENT
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 His last admission from 12/8-12/27 required CCU admission and placement on dobutamine. He has had 3 admissions in 2018, for various complications from DM.  He comes in this time due to worsening SOB. He admits to 2 pillow orthopnea, frequent PND and ORTA after 1/2 block and walking up 5 steps. No CP, palpitations, dizziness. Patient's son by bedside. On tele, patient not diuresing well and condition guarded. Admitted to CCU for inotropic therapy, initiation of primacor infusion and IV diuresis with lasix gtt, now on Bumex drip.       PLAN:    Neuro:  AO x 3    Pulm:   Clear to ascultation.   On NC for nasal congestion and symptomatic relief.     Cardiovascular:   NYHA class IV due to ischemic cardiomyopathy. On physical exam, patient is volume overloaded although lungs are relatively clear  -primacor infusion at 0.25 mcg/kg/min  -VBG mixed venous decreased to 40 overnight - consider placement of Pulmonary Artery Catheter today  -Repeat Echo with increased EF and diffuse LV hypokinesis   -Started on Bumex drip at 2mg/hr - 1200 overnight  -Strict I/O and daily standing weights. Output for past 24hrs around 800cc  -Continue Coreg 6.25 mg po BID, for now.  -holding spironolactone for hyperkalemia, continue holding per nephrology  -Continue hydralazine 25 mg po TID and Isordil 10 mg po TID for afterload reduction  -Once hemodynamically optimized, consider ICD placement, given low EF and ischemic cardiomyopathy  -to discuss with heart failure team regarding long term goals    GI/:  Distended abdomen with dullness to percussion, suspect abdominal fluid overload with possible ascites  -pt with baseline elevated Tbili and Alk phos  -abdominal xray and ultrasound - consider CT  -Currently no N/V/D. 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 Elevated today. Ultrasound shows renal parenchymal disease.   -Likely secondary to renal venous congestion from cardiorenal syndrome. Diuresis and continue to monitor.     Heme:   Anemia to low 9s with low MCV  iron studies inconclusive  reticulocyte index indicating hypoproliferation  Aspirin and Hep Subq    ID:   No issues    Endo:  DM, on 30 Lantus and ISS.   Fingerstick stable, continue to monitor.     DVT: Heparin  Diet: Regular, DASH/TLC, Carb restricted, fluid 1000mL restrict     Code status: Full code. 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 His last admission from 12/8-12/27 required CCU admission and placement on dobutamine. He has had 3 admissions in 2018, for various complications from DM.  He comes in this time due to worsening SOB. He admits to 2 pillow orthopnea, frequent PND and ORTA after 1/2 block and walking up 5 steps. No CP, palpitations, dizziness. Patient's son by bedside. On tele, patient not diuresing well and condition guarded. Admitted to CCU for inotropic therapy, initiation of primacor infusion and IV diuresis with lasix gtt, now on Bumex drip.       PLAN:    Neuro:  AO x 3    Pulm:   Clear to ascultation.   On NC for nasal congestion and symptomatic relief.     Cardiovascular:   NYHA class IV due to ischemic cardiomyopathy. On physical exam, patient is volume overloaded although lungs are relatively clear  -primacor infusion at 0.25 mcg/kg/min  -VBG mixed venous decreased to 40 overnight - will place pulmonary artery catheter at bedside today  -Repeat Echo with increased EF and diffuse LV hypokinesis   -Started on Bumex drip at 2mg/hr - 1200 overnight  -Strict I/O and daily standing weights. Output for past 24hrs around 800cc  -Continue Coreg 6.25 mg po BID, for now.  -holding spironolactone for hyperkalemia, continue holding per nephrology  -Continue hydralazine 25 mg po TID and Isordil 10 mg po TID for afterload reduction  -Once hemodynamically optimized, consider ICD placement, given low EF and ischemic cardiomyopathy  -to discuss with heart failure team regarding long term goals    GI/:  Distended abdomen with dullness to percussion, suspect abdominal fluid overload with possible ascites  -pt with baseline elevated Tbili and Alk phos  -abdominal xray showing diffuse bowel air, ultrasound pending  -Currently no N/V/D. 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 Elevated today. Ultrasound shows renal parenchymal disease.   -Likely secondary to renal venous congestion from cardiorenal syndrome. Diuresis and continue to monitor.     Heme:   Anemia to low 9s with low MCV  iron studies inconclusive  reticulocyte index indicating hypoproliferation  Aspirin and Hep Subq    ID:   No issues    Endo:  DM, on 30 Lantus and ISS.   Fingerstick stable, continue to monitor.     DVT: Heparin  Diet: Regular, DASH/TLC, Carb restricted, fluid 1000mL restrict     Code status: Full code.

## 2019-01-22 NOTE — PROGRESS NOTE ADULT - SUBJECTIVE AND OBJECTIVE BOX
Keith Burns, PGY1  Pager: [phone]      Patient is a 68y old  Male who presents with a chief complaint of Dyspnea x 2 days (22 Jan 2019 07:48)      SUBJECTIVE / OVERNIGHT EVENTS: No acute events overnight. Pt this morning complaining of nasal congestion and mild left sided abdominal pain.     REVIEW OF SYSTEMS:    CONSTITUTIONAL: No weakness, fevers or chills  EYES/ENT: No visual changes;  No vertigo or throat pain   NECK: No pain or stiffness  RESPIRATORY: No cough, wheezing, hemoptysis; No shortness of breath  CARDIOVASCULAR: No chest pain or palpitations  GASTROINTESTINAL: Mild left sided abdominal pain. No nausea, vomiting, or hematemesis; No diarrhea or constipation. No melena or hematochezia.  GENITOURINARY: No dysuria, frequency or hematuria  NEUROLOGICAL: No numbness or weakness  SKIN: No itching, rashes    MEDICATIONS  (STANDING):  aspirin enteric coated 81 milliGRAM(s) Oral daily  buMETAnide Infusion 2 mG/Hr (10 mL/Hr) IV Continuous <Continuous>  carvedilol 6.25 milliGRAM(s) Oral every 12 hours  chlorhexidine 4% Liquid 1 Application(s) Topical <User Schedule>  dextrose 5%. 1000 milliLiter(s) (50 mL/Hr) IV Continuous <Continuous>  dextrose 50% Injectable 12.5 Gram(s) IV Push once  dextrose 50% Injectable 25 Gram(s) IV Push once  dextrose 50% Injectable 25 Gram(s) IV Push once  docusate sodium 100 milliGRAM(s) Oral daily  heparin  Injectable 5000 Unit(s) SubCutaneous every 12 hours  hydrALAZINE 25 milliGRAM(s) Oral every 8 hours  insulin glargine Injectable (LANTUS) 30 Unit(s) SubCutaneous at bedtime  insulin lispro (HumaLOG) corrective regimen sliding scale   SubCutaneous three times a day before meals  insulin lispro (HumaLOG) corrective regimen sliding scale   SubCutaneous at bedtime  isosorbide   dinitrate Tablet (ISORDIL) 10 milliGRAM(s) Oral three times a day  milrinone Infusion 0.25 MICROgram(s)/kG/Min (5.85 mL/Hr) IV Continuous <Continuous>  senna 2 Tablet(s) Oral at bedtime  sevelamer hydrochloride 800 milliGRAM(s) Oral three times a day  sodium chloride 0.9% lock flush 3 milliLiter(s) IV Push every 8 hours  tamsulosin 0.4 milliGRAM(s) Oral at bedtime    MEDICATIONS  (PRN):  calamine Lotion 1 Application(s) Topical daily PRN Rash and/or Itching  dextrose 40% Gel 15 Gram(s) Oral once PRN Blood Glucose LESS THAN 70 milliGRAM(s)/deciliter  fluticasone propionate 50 MICROgram(s)/spray Nasal Spray 1 Spray(s) Both Nostrils two times a day PRN nasal congestion  glucagon  Injectable 1 milliGRAM(s) IntraMuscular once PRN Glucose LESS THAN 70 milligrams/deciliter  guaiFENesin   Syrup  (Sugar-Free) 200 milliGRAM(s) Oral every 6 hours PRN Cough  oxyCODONE    IR 5 milliGRAM(s) Oral every 6 hours PRN Moderate Pain (4 - 6)      Vital Signs Last 24 Hrs  T(C): 36.6 (22 Jan 2019 08:00), Max: 36.7 (21 Jan 2019 08:26)  T(F): 97.8 (22 Jan 2019 08:00), Max: 98.1 (21 Jan 2019 08:26)  HR: 86 (22 Jan 2019 08:00) (83 - 96)  BP: 116/59 (22 Jan 2019 08:00) (91/47 - 139/116)  BP(mean): 72 (22 Jan 2019 08:00) (58 - 121)  RR: 12 (22 Jan 2019 08:00) (10 - 22)  SpO2: 94% (22 Jan 2019 08:00) (92% - 99%)  CAPILLARY BLOOD GLUCOSE      POCT Blood Glucose.: 135 mg/dL (22 Jan 2019 08:03)  POCT Blood Glucose.: 171 mg/dL (21 Jan 2019 22:03)  POCT Blood Glucose.: 381 mg/dL (21 Jan 2019 22:00)  POCT Blood Glucose.: 248 mg/dL (21 Jan 2019 16:50)  POCT Blood Glucose.: 251 mg/dL (21 Jan 2019 15:26)  POCT Blood Glucose.: 142 mg/dL (21 Jan 2019 12:26)    I&O's Summary    21 Jan 2019 07:01  -  22 Jan 2019 07:00  --------------------------------------------------------  IN: 916 mL / OUT: 2155 mL / NET: -1239 mL    22 Jan 2019 07:01  -  22 Jan 2019 08:25  --------------------------------------------------------  IN: 0 mL / OUT: 300 mL / NET: -300 mL        PHYSICAL EXAM:  GENERAL: Appears older than stated age, sitting upright in hospital bed, visible respiratory effort  EYES: EOMI, PERRLA, conjunctiva and sclera clear  NECK:  Mild JVD  CHEST/LUNG: Difficult to auscultate, sounds clear   HEART: RRR; No murmurs  ABDOMEN: Severely distended, pain on palpation of left side without rigidity or guarding. Positive bowel sounds. Dull to percussion.   : No Palacios  EXTREMITIES:  2+ Peripheral Pulses, 2+ pitting edema up to mid calf bilaterally   PSYCH: AAOx3  NEUROLOGY: non-focal  SKIN: No rashes or lesions. No sacral ulcer    LABS:                        9.3    5.92  )-----------( 201      ( 22 Jan 2019 05:00 )             29.2     01-22    135  |  95<L>  |  80<H>  ----------------------------<  144<H>  4.1   |  23  |  3.28<H>    Ca    9.0      22 Jan 2019 05:00  Phos  6.2     01-22  Mg     2.4     01-22    TPro  7.3  /  Alb  3.2<L>  /  TBili  1.5<H>  /  DBili  x   /  AST  22  /  ALT  24  /  AlkPhos  438<H>  01-22              Microbiology;        RADIOLOGY & ADDITIONAL TESTS:  < from: TTE with Doppler (w/Cont) (01.20.19 @ 13:36) >  CONCLUSIONS:  1. Tethered mitral valve leaflets. Moderate mitral  regurgitation.  2. Calcified trileaflet aortic valve with mildly decreased  opening.  3. Normal left ventricular internal dimensions and wall  thicknesses.  4. Moderate to severe segmental left ventricular systolic  dysfunction. The basal to mid septum, basal to mid  inferior, basal to mid anterolateral walls are hypokinetic.  5. Right ventricular enlargement with decreased right  ventricular systolic function.  6. Estimated pulmonary artery systolic pressure equals 43  mmHg, assuming right atrial pressure equals 10  mm Hg,  consistent with mild pulmonary hypertension.    < end of copied text >      Imaging Personally Reviewed: No           Consultant(s) Notes Reviewed:  Nephrology    Care Discussed with Consultants/Other Providers: Nephrology

## 2019-01-22 NOTE — PROGRESS NOTE ADULT - SUBJECTIVE AND OBJECTIVE BOX
Patient seen and examined. He appears dyspnic and states he has abdominal pain tender to palpation. PCWP 23, RA 18 this morning.    Medications:  aspirin enteric coated 81 milliGRAM(s) Oral daily  buMETAnide Infusion 3 mG/Hr IV Continuous <Continuous>  calamine Lotion 1 Application(s) Topical daily PRN  chlorhexidine 4% Liquid 1 Application(s) Topical <User Schedule>  chlorothiazide IVPB 500 milliGRAM(s) IV Intermittent every 12 hours  dextrose 40% Gel 15 Gram(s) Oral once PRN  dextrose 5%. 1000 milliLiter(s) IV Continuous <Continuous>  dextrose 50% Injectable 12.5 Gram(s) IV Push once  dextrose 50% Injectable 25 Gram(s) IV Push once  dextrose 50% Injectable 25 Gram(s) IV Push once  docusate sodium 100 milliGRAM(s) Oral daily  fluticasone propionate 50 MICROgram(s)/spray Nasal Spray 1 Spray(s) Both Nostrils two times a day PRN  glucagon  Injectable 1 milliGRAM(s) IntraMuscular once PRN  guaiFENesin   Syrup  (Sugar-Free) 200 milliGRAM(s) Oral every 6 hours PRN  heparin  Injectable 5000 Unit(s) SubCutaneous every 12 hours  hydrALAZINE 25 milliGRAM(s) Oral every 8 hours  insulin glargine Injectable (LANTUS) 30 Unit(s) SubCutaneous at bedtime  insulin lispro (HumaLOG) corrective regimen sliding scale   SubCutaneous three times a day before meals  insulin lispro (HumaLOG) corrective regimen sliding scale   SubCutaneous at bedtime  isosorbide   dinitrate Tablet (ISORDIL) 10 milliGRAM(s) Oral three times a day  milrinone Infusion 0.125 MICROgram(s)/kG/Min IV Continuous <Continuous>  oxyCODONE    IR 5 milliGRAM(s) Oral every 6 hours PRN  senna 2 Tablet(s) Oral at bedtime  sevelamer hydrochloride 800 milliGRAM(s) Oral three times a day  sodium chloride 0.9% lock flush 3 milliLiter(s) IV Push every 8 hours  tamsulosin 0.4 milliGRAM(s) Oral at bedtime      Vitals:  Vital Signs Last 24 Hrs  T(C): 36 (22 Jan 2019 16:00), Max: 36.6 (22 Jan 2019 00:00)  T(F): 96.8 (22 Jan 2019 16:00), Max: 97.8 (22 Jan 2019 00:00)  HR: 88 (22 Jan 2019 18:00) (82 - 94)  BP: 100/52 (22 Jan 2019 18:00) (97/55 - 139/116)  BP(mean): 64 (22 Jan 2019 18:00) (63 - 121)  RR: 12 (22 Jan 2019 18:00) (11 - 22)  SpO2: 92% (22 Jan 2019 18:00) (92% - 100%)    Daily     Daily     I&O's Detail    21 Jan 2019 07:01  -  22 Jan 2019 07:00  --------------------------------------------------------  IN:    bumetanide Infusion: 230 mL    milrinone  Infusion: 136 mL    Oral Fluid: 550 mL  Total IN: 916 mL    OUT:    Voided: 2155 mL  Total OUT: 2155 mL    Total NET: -1239 mL      22 Jan 2019 07:01  -  22 Jan 2019 18:27  --------------------------------------------------------  IN:    bumetanide Infusion: 80 mL    bumetanide Infusion: 60 mL    milrinone  Infusion: 11.6 mL    milrinone  Infusion: 47.2 mL  Total IN: 198.8 mL    OUT:    Voided: 1325 mL  Total OUT: 1325 mL    Total NET: -1126.2 mL          Physical Exam:     General: No distress. Comfortable.  HEENT: EOM intact.  Neck: Neck supple. JVP not evaluated, swan in place. No masses  Chest: Clear to auscultation bilaterally  CV: Normal S1 and S2. No murmurs, rub, or gallops. Radial pulses normal.   Abdomen: Soft, non-distended, non-tender  Skin: No rashes or skin breakdown  Neurology: Alert and oriented times three. Sensation intact  Psych: Affect normal    Labs:                        8.3    5.92  )-----------( 161      ( 22 Jan 2019 12:50 )             26.2     01-22    135  |  97<L>  |  80<H>  ----------------------------<  146<H>  4.1   |  22  |  3.20<H>    Ca    8.6      22 Jan 2019 12:50  Phos  6.1     01-22  Mg     2.2     01-22    TPro  6.6  /  Alb  2.9<L>  /  TBili  1.4<H>  /  DBili  x   /  AST  22  /  ALT  23  /  AlkPhos  384<H>  01-22    < from: TTE with Doppler (w/Cont) (01.20.19 @ 13:36) >  DIMENSIONS:  Dimensions:     Normal Values:  LA:       ---     2.0 - 4.0 cm  Ao:     2.7 cm    2.0 - 3.8 cm  SEPTUM: 0.7 cm    0.6 - 1.2 cm  PWT:    1.0 cm    0.6 - 1.1 cm  LVIDd:  5.5 cm    3.0 - 5.6 cm  LVIDs:    ---     1.8 - 4.0 cm  Derived Variables:  LVMI: 92 g/m2  RWT: 0.36  Ejection Fraction (Modified Mon Rule): 35 %  ------------------------------------------------------------------------  OBSERVATIONS:  Mitral Valve: Tethered mitral valve leaflets. Moderate  mitral regurgitation.  Aortic Root: Normal aortic root.  Aortic Valve: Calcified trileaflet aortic valve with mildly  decreased opening.  Left Atrium: Normal left atrium.  LA volume index = 23  cc/m2.  Left Ventricle: Moderate to severe segmental left  ventricular systolic dysfunction. The basal to mid septum,  basal to mid inferior, basal to mid anterolateral walls are  hypokinetic. Normal left ventricular internal dimensions  and wall thicknesses.  Right Heart: Normal right atrium. Right ventricular  enlargement with decreased right ventricular systolic  function. Normal tricuspid valve. Mild tricuspid  regurgitation. Normal pulmonic valve.  Pericardium/PleuraNormal pericardium with no pericardial  effusion.  Hemodynamic: Estimated right ventricular systolic pressure  equals 43 mm Hg, assuming right atrial pressure equals 10  mm Hg, consistent with mild pulmonary hypertension.    < end of copied text >

## 2019-01-22 NOTE — PROGRESS NOTE ADULT - ASSESSMENT
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 His last admission from 12/8-12/27 required CCU admission and placement on dobutamine. He has had 3 admissions in 2018, for various complications from DM.  He comes in this time due to worsening SOB. He admits to 2 pillow orthopnea, frequent PND and ORTA after 1/2 block and walking up 5 steps. No CP, palpitations, dizziness. Patient's son by bedside.  His dry weight s/p last hospitalization was 132 lbs.  NYHA class IV. Moderately hypervolemic. 1/22/18 PCWP was 23, RA 18 in AM.

## 2019-01-22 NOTE — CHART NOTE - NSCHARTNOTEFT_GEN_A_CORE
Procedure Note: Max Jalyn catheter insertion    Patient consented for the procedure.    Procedure was done under sterile field via Seldinger technique with ultrasound.    Patient tolerated the procedure well with minimal blood loss during the procedure.    RA: 22/16/18  RV: 77/10  PA: 77/33/52  PAWP: 30    Chest x-ray was ordered to confirm proper placement with no anatomic complications.    Chico Pedroza, #[id number]  CCU Fellow

## 2019-01-22 NOTE — PROGRESS NOTE ADULT - SUBJECTIVE AND OBJECTIVE BOX
[facility] DIVISION OF KIDNEY DISEASES AND HYPERTENSION -- FOLLOW UP NOTE  --------------------------------------------------------------------------------  HPI: 68-year-old male with medical history of b/L LE bypass and b/L LE multiple toe amputations, DM2, HFrEF (EF 20%), HTN, Dyslipidemia, CAD, CABG, PVD, groin  sartorius flap, and vac placement, LLE with surgical scar wound admitted for worsening SOB. Nephrology consulted for KESHAV. Currently been treated for ADHF. On review of previous records in [facility]/[place], patient had normal Scr levels from 4/26/16 to 9/24/18. Pt. noted to have an episode of KESHAV during previous/recent hospitalization at [facility] (12/7/18 to 12/27/18). Transfer to CCU on 1/20/19. On this admission, Scr slowly uptrending, latest SCr elevated at 3.28 today 1/22/19.     Pt was seen and examined at bedside at CCU, patient currently on bumex and milrinone infusion, reports SOB improved compare to yesterday. Denies dysuria or difficulty urinating. Denies CP, N/V/F/C.    PAST HISTORY  --------------------------------------------------------------------------------  No significant changes to PMH, PSH, FHx, SHx, unless otherwise noted    ALLERGIES & MEDICATIONS  --------------------------------------------------------------------------------  Allergies    No Known Allergies    Intolerances      Standing Inpatient Medications  aspirin enteric coated 81 milliGRAM(s) Oral daily  buMETAnide Infusion 2 mG/Hr IV Continuous <Continuous>  carvedilol 6.25 milliGRAM(s) Oral every 12 hours  chlorhexidine 4% Liquid 1 Application(s) Topical <User Schedule>  dextrose 5%. 1000 milliLiter(s) IV Continuous <Continuous>  dextrose 50% Injectable 12.5 Gram(s) IV Push once  dextrose 50% Injectable 25 Gram(s) IV Push once  dextrose 50% Injectable 25 Gram(s) IV Push once  docusate sodium 100 milliGRAM(s) Oral daily  heparin  Injectable 5000 Unit(s) SubCutaneous every 12 hours  hydrALAZINE 25 milliGRAM(s) Oral every 8 hours  insulin glargine Injectable (LANTUS) 30 Unit(s) SubCutaneous at bedtime  insulin lispro (HumaLOG) corrective regimen sliding scale   SubCutaneous three times a day before meals  insulin lispro (HumaLOG) corrective regimen sliding scale   SubCutaneous at bedtime  isosorbide   dinitrate Tablet (ISORDIL) 10 milliGRAM(s) Oral three times a day  milrinone Infusion 0.25 MICROgram(s)/kG/Min IV Continuous <Continuous>  senna 2 Tablet(s) Oral at bedtime  sevelamer hydrochloride 800 milliGRAM(s) Oral three times a day  sodium chloride 0.9% lock flush 3 milliLiter(s) IV Push every 8 hours  tamsulosin 0.4 milliGRAM(s) Oral at bedtime    PRN Inpatient Medications  calamine Lotion 1 Application(s) Topical daily PRN  dextrose 40% Gel 15 Gram(s) Oral once PRN  fluticasone propionate 50 MICROgram(s)/spray Nasal Spray 1 Spray(s) Both Nostrils two times a day PRN  glucagon  Injectable 1 milliGRAM(s) IntraMuscular once PRN  guaiFENesin   Syrup  (Sugar-Free) 200 milliGRAM(s) Oral every 6 hours PRN  oxyCODONE    IR 5 milliGRAM(s) Oral every 6 hours PRN      REVIEW OF SYSTEMS  --------------------------------------------------------------------------------  Gen: No fevers  Respiratory: + dyspnea  CV: No chest pain  GI: No abdominal pain  : No dysuria, hematuria  MSK: + LE edema    All other systems were reviewed and are negative, except as noted.    VITALS/PHYSICAL EXAM  --------------------------------------------------------------------------------  T(C): 36.5 (01-22-19 @ 04:00), Max: 36.7 (01-21-19 @ 08:26)  HR: 85 (01-22-19 @ 06:00) (81 - 96)  BP: 99/67 (01-22-19 @ 06:00) (86/48 - 139/116)  RR: 19 (01-22-19 @ 06:00) (10 - 22)  SpO2: 94% (01-22-19 @ 06:00) (92% - 99%)  Wt(kg): --    Weight (kg): 80 (01-22-19 @ 04:00)      01-21-19 @ 07:01  -  01-22-19 @ 07:00  --------------------------------------------------------  IN: 916 mL / OUT: 2155 mL / NET: -1239 mL        Physical Exam:  	Gen: resting, NAD  	HEENT: No JVD  	Pulm: decrease breath sounds B/L  	CV: S1S2  	Abd: Soft, +BS   	Ext: + b/l LE edema  	Neuro: Awake  	Skin: Warm and dry    LABS/STUDIES  --------------------------------------------------------------------------------              9.3    5.92  >-----------<  201      [01-22-19 @ 05:00]              29.2     135  |  95  |  80  ----------------------------<  144      [01-22-19 @ 05:00]  4.1   |  23  |  3.28        Ca     9.0     [01-22-19 @ 05:00]      Mg     2.4     [01-22-19 @ 05:00]      Phos  6.2     [01-22-19 @ 05:00]    TPro  7.3  /  Alb  3.2  /  TBili  1.5  /  DBili  x   /  AST  22  /  ALT  24  /  AlkPhos  438  [01-22-19 @ 05:00]              [01-22-19 @ 05:00]    Creatinine Trend:  SCr 3.28 [01-22 @ 05:00]  SCr 3.19 [01-21 @ 15:00]  SCr 2.75 [01-21 @ 06:30]  SCr 2.65 [01-20 @ 15:00]  SCr 2.65 [01-20 @ 05:55] [facility] DIVISION OF KIDNEY DISEASES AND HYPERTENSION -- FOLLOW UP NOTE  --------------------------------------------------------------------------------  HPI: 68-year-old male with medical history of b/L LE bypass and b/L LE multiple toe amputations, DM2, HFrEF (EF 20%), HTN, Dyslipidemia, CAD, CABG, PVD, groin  sartorius flap, and vac placement, LLE with surgical scar wound admitted for worsening SOB. Nephrology consulted for KESHAV. Currently been treated for ADHF. On review of previous records in [facility]/[place], patient had normal Scr levels from 4/26/16 to 9/24/18. Pt. noted to have an episode of KESHAV during previous/recent hospitalization at [facility] (12/7/18 to 12/27/18). Transfer to CCU on 1/20/19. On this admission, Scr slowly uptrending, latest Scr elevated at 3.28 today (1/22/19).     Pt was seen and examined at bedside at CCU, patient currently on bumex and milrinone infusion, reports SOB improved compare to yesterday. Denies dysuria or difficulty urinating. Denies CP, N/V/F/C.    PAST HISTORY  --------------------------------------------------------------------------------  No significant changes to PMH, PSH, FHx, SHx, unless otherwise noted    ALLERGIES & MEDICATIONS  --------------------------------------------------------------------------------  Allergies    No Known Allergies    Intolerances    Standing Inpatient Medications  aspirin enteric coated 81 milliGRAM(s) Oral daily  buMETAnide Infusion 2 mG/Hr IV Continuous <Continuous>  carvedilol 6.25 milliGRAM(s) Oral every 12 hours  chlorhexidine 4% Liquid 1 Application(s) Topical <User Schedule>  dextrose 5%. 1000 milliLiter(s) IV Continuous <Continuous>  dextrose 50% Injectable 12.5 Gram(s) IV Push once  dextrose 50% Injectable 25 Gram(s) IV Push once  dextrose 50% Injectable 25 Gram(s) IV Push once  docusate sodium 100 milliGRAM(s) Oral daily  heparin  Injectable 5000 Unit(s) SubCutaneous every 12 hours  hydrALAZINE 25 milliGRAM(s) Oral every 8 hours  insulin glargine Injectable (LANTUS) 30 Unit(s) SubCutaneous at bedtime  insulin lispro (HumaLOG) corrective regimen sliding scale   SubCutaneous three times a day before meals  insulin lispro (HumaLOG) corrective regimen sliding scale   SubCutaneous at bedtime  isosorbide   dinitrate Tablet (ISORDIL) 10 milliGRAM(s) Oral three times a day  milrinone Infusion 0.25 MICROgram(s)/kG/Min IV Continuous <Continuous>  senna 2 Tablet(s) Oral at bedtime  sevelamer hydrochloride 800 milliGRAM(s) Oral three times a day  sodium chloride 0.9% lock flush 3 milliLiter(s) IV Push every 8 hours  tamsulosin 0.4 milliGRAM(s) Oral at bedtime    PRN Inpatient Medications  calamine Lotion 1 Application(s) Topical daily PRN  dextrose 40% Gel 15 Gram(s) Oral once PRN  fluticasone propionate 50 MICROgram(s)/spray Nasal Spray 1 Spray(s) Both Nostrils two times a day PRN  glucagon  Injectable 1 milliGRAM(s) IntraMuscular once PRN  guaiFENesin   Syrup  (Sugar-Free) 200 milliGRAM(s) Oral every 6 hours PRN  oxyCODONE    IR 5 milliGRAM(s) Oral every 6 hours PRN    REVIEW OF SYSTEMS  --------------------------------------------------------------------------------  Unable to obtain    VITALS/PHYSICAL EXAM  --------------------------------------------------------------------------------  T(C): 36.5 (01-22-19 @ 04:00), Max: 36.7 (01-21-19 @ 08:26)  HR: 85 (01-22-19 @ 06:00) (81 - 96)  BP: 99/67 (01-22-19 @ 06:00) (86/48 - 139/116)  RR: 19 (01-22-19 @ 06:00) (10 - 22)  SpO2: 94% (01-22-19 @ 06:00) (92% - 99%)  Wt(kg): --    Weight (kg): 80 (01-22-19 @ 04:00)    01-21-19 @ 07:01  -  01-22-19 @ 07:00  --------------------------------------------------------  IN: 916 mL / OUT: 2155 mL / NET: -1239 mL    Physical Exam:  	Gen: resting, NAD  	HEENT: No JVD  	Pulm: decreased breath sounds B/L  	CV: S1S2  	Abd: Soft, +BS   	Ext: + b/l LE edema  	Neuro: Awake  	Skin: Warm and dry    LABS/STUDIES  --------------------------------------------------------------------------------              9.3    5.92  >-----------<  201      [01-22-19 @ 05:00]              29.2     135  |  95  |  80  ----------------------------<  144      [01-22-19 @ 05:00]  4.1   |  23  |  3.28        Ca     9.0     [01-22-19 @ 05:00]      Mg     2.4     [01-22-19 @ 05:00]      Phos  6.2     [01-22-19 @ 05:00]    TPro  7.3  /  Alb  3.2  /  TBili  1.5  /  DBili  x   /  AST  22  /  ALT  24  /  AlkPhos  438  [01-22-19 @ 05:00]          [01-22-19 @ 05:00]    Creatinine Trend:  SCr 3.28 [01-22 @ 05:00]  SCr 3.19 [01-21 @ 15:00]  SCr 2.75 [01-21 @ 06:30]  SCr 2.65 [01-20 @ 15:00]  SCr 2.65 [01-20 @ 05:55]

## 2019-01-22 NOTE — PROGRESS NOTE ADULT - PROBLEM SELECTOR PLAN 1
Pt. with KESHAV in the setting of ADHF. On review of previous records in [facility]/[place], patient with normal Scr levels from 4/26/16 to 9/24/18. Pt. noted to have an episode of KESHAV during previous/recent hospitalization at [facility] (12/7/18 to 12/27/18). On this admission, Scr was elevated at 2.15 on 1/16/19, increased to 2.44 to 1/18/19. Scr elevated at 3.28 today. Pt. on Bumex gtt and milrinone per CCU team. US Kidney showed increased bilateral renal cortical echogenicity, no hydronephrosis on 1/18/19. Monitor labs and urine output. Avoid nephrotoxins. Dose medications as per eGFR Pt. with KESHAV in the setting of ADHF. On review of previous records in [facility]/[place], patient with normal Scr levels from 4/26/16 to 9/24/18. Pt. noted to have an episode of KESHAV during previous/recent hospitalization at [facility] (12/7/18 to 12/27/18). On this admission, Scr was elevated at 2.15 on 1/16/19, increased to 2.44 to 1/18/19. Scr increased to 3.28 today. Pt. on Bumex gtt and milrinone per CCU team. US Kidney showed increased bilateral renal cortical echogenicity, no hydronephrosis on 1/18/19. Monitor labs and urine output. Avoid nephrotoxins. Dose medications as per eGFR

## 2019-01-22 NOTE — PROGRESS NOTE ADULT - PROBLEM SELECTOR PLAN 3
Patient with hypervolemia in the setting of ADHF. Patient currently being treated with Bumex gtt with 2.1 L of UOP in last 24 hours. Diuretic and ADHF management per CCU team. Monitor daily weights. Low salt diet

## 2019-01-23 ENCOUNTER — APPOINTMENT (OUTPATIENT)
Dept: CARDIOLOGY | Facility: CLINIC | Age: 69
End: 2019-01-23

## 2019-01-23 LAB
ANION GAP SERPL CALC-SCNC: 16 MMO/L — HIGH (ref 7–14)
BASE EXCESS BLDV CALC-SCNC: 1.5 MMOL/L — SIGNIFICANT CHANGE UP
BASE EXCESS BLDV CALC-SCNC: 1.7 MMOL/L — SIGNIFICANT CHANGE UP
BASE EXCESS BLDV CALC-SCNC: 2.5 MMOL/L — SIGNIFICANT CHANGE UP
BASE EXCESS BLDV CALC-SCNC: 3.5 MMOL/L — SIGNIFICANT CHANGE UP
BLOOD GAS VENOUS - CREATININE: 3.25 MG/DL — HIGH (ref 0.5–1.3)
BLOOD GAS VENOUS - CREATININE: 3.26 MG/DL — HIGH (ref 0.5–1.3)
BLOOD GAS VENOUS - CREATININE: 3.45 MG/DL — HIGH (ref 0.5–1.3)
BLOOD GAS VENOUS - CREATININE: 3.48 MG/DL — HIGH (ref 0.5–1.3)
BUN SERPL-MCNC: 79 MG/DL — HIGH (ref 7–23)
CALCIUM SERPL-MCNC: 8.8 MG/DL — SIGNIFICANT CHANGE UP (ref 8.4–10.5)
CHLORIDE BLDV-SCNC: 101 MMOL/L — SIGNIFICANT CHANGE UP (ref 96–108)
CHLORIDE BLDV-SCNC: 101 MMOL/L — SIGNIFICANT CHANGE UP (ref 96–108)
CHLORIDE BLDV-SCNC: 102 MMOL/L — SIGNIFICANT CHANGE UP (ref 96–108)
CHLORIDE BLDV-SCNC: 96 MMOL/L — SIGNIFICANT CHANGE UP (ref 96–108)
CHLORIDE SERPL-SCNC: 95 MMOL/L — LOW (ref 98–107)
CK SERPL-CCNC: 117 U/L — SIGNIFICANT CHANGE UP (ref 30–200)
CO2 SERPL-SCNC: 23 MMOL/L — SIGNIFICANT CHANGE UP (ref 22–31)
CREAT SERPL-MCNC: 3.3 MG/DL — HIGH (ref 0.5–1.3)
GAS PNL BLDV: 130 MMOL/L — LOW (ref 136–146)
GAS PNL BLDV: 130 MMOL/L — LOW (ref 136–146)
GAS PNL BLDV: 131 MMOL/L — LOW (ref 136–146)
GAS PNL BLDV: 132 MMOL/L — LOW (ref 136–146)
GLUCOSE BLDC GLUCOMTR-MCNC: 104 MG/DL — HIGH (ref 70–99)
GLUCOSE BLDC GLUCOMTR-MCNC: 105 MG/DL — HIGH (ref 70–99)
GLUCOSE BLDC GLUCOMTR-MCNC: 115 MG/DL — HIGH (ref 70–99)
GLUCOSE BLDC GLUCOMTR-MCNC: 120 MG/DL — HIGH (ref 70–99)
GLUCOSE BLDC GLUCOMTR-MCNC: 187 MG/DL — HIGH (ref 70–99)
GLUCOSE BLDC GLUCOMTR-MCNC: 56 MG/DL — LOW (ref 70–99)
GLUCOSE BLDC GLUCOMTR-MCNC: 68 MG/DL — LOW (ref 70–99)
GLUCOSE BLDC GLUCOMTR-MCNC: 87 MG/DL — SIGNIFICANT CHANGE UP (ref 70–99)
GLUCOSE BLDV-MCNC: 104 — HIGH (ref 70–99)
GLUCOSE BLDV-MCNC: 105 — HIGH (ref 70–99)
GLUCOSE BLDV-MCNC: 167 — HIGH (ref 70–99)
GLUCOSE BLDV-MCNC: 66 — LOW (ref 70–99)
GLUCOSE SERPL-MCNC: 59 MG/DL — LOW (ref 70–99)
HCO3 BLDV-SCNC: 25 MMOL/L — SIGNIFICANT CHANGE UP (ref 20–27)
HCO3 BLDV-SCNC: 25 MMOL/L — SIGNIFICANT CHANGE UP (ref 20–27)
HCO3 BLDV-SCNC: 26 MMOL/L — SIGNIFICANT CHANGE UP (ref 20–27)
HCO3 BLDV-SCNC: 27 MMOL/L — SIGNIFICANT CHANGE UP (ref 20–27)
HCT VFR BLD CALC: 26.4 % — LOW (ref 39–50)
HCT VFR BLDV CALC: 25.3 % — LOW (ref 39–51)
HCT VFR BLDV CALC: 25.7 % — LOW (ref 39–51)
HCT VFR BLDV CALC: 25.9 % — LOW (ref 39–51)
HCT VFR BLDV CALC: 26.3 % — LOW (ref 39–51)
HGB BLD-MCNC: 8.3 G/DL — LOW (ref 13–17)
HGB BLDV-MCNC: 8.1 G/DL — LOW (ref 13–17)
HGB BLDV-MCNC: 8.3 G/DL — LOW (ref 13–17)
HGB BLDV-MCNC: 8.3 G/DL — LOW (ref 13–17)
HGB BLDV-MCNC: 8.5 G/DL — LOW (ref 13–17)
LACTATE BLDV-MCNC: 1.1 MMOL/L — SIGNIFICANT CHANGE UP (ref 0.5–2)
LACTATE BLDV-MCNC: 1.2 MMOL/L — SIGNIFICANT CHANGE UP (ref 0.5–2)
LACTATE BLDV-MCNC: 1.3 MMOL/L — SIGNIFICANT CHANGE UP (ref 0.5–2)
LACTATE BLDV-MCNC: 1.3 MMOL/L — SIGNIFICANT CHANGE UP (ref 0.5–2)
MAGNESIUM SERPL-MCNC: 2.3 MG/DL — SIGNIFICANT CHANGE UP (ref 1.6–2.6)
MCHC RBC-ENTMCNC: 22.4 PG — LOW (ref 27–34)
MCHC RBC-ENTMCNC: 31.4 % — LOW (ref 32–36)
MCV RBC AUTO: 71.2 FL — LOW (ref 80–100)
NRBC # FLD: 0 K/UL — LOW (ref 25–125)
PCO2 BLDV: 43 MMHG — SIGNIFICANT CHANGE UP (ref 41–51)
PCO2 BLDV: 44 MMHG — SIGNIFICANT CHANGE UP (ref 41–51)
PCO2 BLDV: 46 MMHG — SIGNIFICANT CHANGE UP (ref 41–51)
PCO2 BLDV: 47 MMHG — SIGNIFICANT CHANGE UP (ref 41–51)
PH BLDV: 7.37 PH — SIGNIFICANT CHANGE UP (ref 7.32–7.43)
PH BLDV: 7.39 PH — SIGNIFICANT CHANGE UP (ref 7.32–7.43)
PH BLDV: 7.4 PH — SIGNIFICANT CHANGE UP (ref 7.32–7.43)
PH BLDV: 7.41 PH — SIGNIFICANT CHANGE UP (ref 7.32–7.43)
PHOSPHATE SERPL-MCNC: 6.3 MG/DL — HIGH (ref 2.5–4.5)
PLATELET # BLD AUTO: 182 K/UL — SIGNIFICANT CHANGE UP (ref 150–400)
PMV BLD: SIGNIFICANT CHANGE UP FL (ref 7–13)
PO2 BLDV: 35 MMHG — SIGNIFICANT CHANGE UP (ref 35–40)
PO2 BLDV: 40 MMHG — SIGNIFICANT CHANGE UP (ref 35–40)
POTASSIUM BLDV-SCNC: 3.5 MMOL/L — SIGNIFICANT CHANGE UP (ref 3.4–4.5)
POTASSIUM BLDV-SCNC: 3.7 MMOL/L — SIGNIFICANT CHANGE UP (ref 3.4–4.5)
POTASSIUM BLDV-SCNC: 3.7 MMOL/L — SIGNIFICANT CHANGE UP (ref 3.4–4.5)
POTASSIUM BLDV-SCNC: 3.8 MMOL/L — SIGNIFICANT CHANGE UP (ref 3.4–4.5)
POTASSIUM SERPL-MCNC: 4 MMOL/L — SIGNIFICANT CHANGE UP (ref 3.5–5.3)
POTASSIUM SERPL-SCNC: 4 MMOL/L — SIGNIFICANT CHANGE UP (ref 3.5–5.3)
RBC # BLD: 3.71 M/UL — LOW (ref 4.2–5.8)
RBC # FLD: 24.2 % — HIGH (ref 10.3–14.5)
SAO2 % BLDV: 57.2 % — LOW (ref 60–85)
SAO2 % BLDV: 64.8 % — SIGNIFICANT CHANGE UP (ref 60–85)
SAO2 % BLDV: 65.4 % — SIGNIFICANT CHANGE UP (ref 60–85)
SAO2 % BLDV: 66.1 % — SIGNIFICANT CHANGE UP (ref 60–85)
SODIUM SERPL-SCNC: 134 MMOL/L — LOW (ref 135–145)
WBC # BLD: 6.28 K/UL — SIGNIFICANT CHANGE UP (ref 3.8–10.5)
WBC # FLD AUTO: 6.28 K/UL — SIGNIFICANT CHANGE UP (ref 3.8–10.5)

## 2019-01-23 PROCEDURE — 99233 SBSQ HOSP IP/OBS HIGH 50: CPT | Mod: GC

## 2019-01-23 PROCEDURE — 99232 SBSQ HOSP IP/OBS MODERATE 35: CPT | Mod: GC

## 2019-01-23 RX ORDER — CHLOROTHIAZIDE 500 MG
500 TABLET ORAL EVERY 12 HOURS
Qty: 0 | Refills: 0 | Status: COMPLETED | OUTPATIENT
Start: 2019-01-23 | End: 2019-01-24

## 2019-01-23 RX ORDER — INSULIN GLARGINE 100 [IU]/ML
25 INJECTION, SOLUTION SUBCUTANEOUS AT BEDTIME
Qty: 0 | Refills: 0 | Status: DISCONTINUED | OUTPATIENT
Start: 2019-01-23 | End: 2019-02-01

## 2019-01-23 RX ORDER — SIMETHICONE 80 MG/1
80 TABLET, CHEWABLE ORAL THREE TIMES A DAY
Qty: 0 | Refills: 0 | Status: DISCONTINUED | OUTPATIENT
Start: 2019-01-23 | End: 2019-02-25

## 2019-01-23 RX ORDER — CHLOROTHIAZIDE 500 MG
500 TABLET ORAL EVERY 12 HOURS
Qty: 0 | Refills: 0 | Status: DISCONTINUED | OUTPATIENT
Start: 2019-01-23 | End: 2019-01-23

## 2019-01-23 RX ADMIN — Medication 72 MILLIGRAM(S): at 17:35

## 2019-01-23 RX ADMIN — SENNA PLUS 2 TABLET(S): 8.6 TABLET ORAL at 21:46

## 2019-01-23 RX ADMIN — ISOSORBIDE DINITRATE 10 MILLIGRAM(S): 5 TABLET ORAL at 05:22

## 2019-01-23 RX ADMIN — SEVELAMER CARBONATE 800 MILLIGRAM(S): 2400 POWDER, FOR SUSPENSION ORAL at 21:46

## 2019-01-23 RX ADMIN — SODIUM CHLORIDE 3 MILLILITER(S): 9 INJECTION INTRAMUSCULAR; INTRAVENOUS; SUBCUTANEOUS at 21:47

## 2019-01-23 RX ADMIN — OXYCODONE HYDROCHLORIDE 5 MILLIGRAM(S): 5 TABLET ORAL at 18:00

## 2019-01-23 RX ADMIN — SIMETHICONE 80 MILLIGRAM(S): 80 TABLET, CHEWABLE ORAL at 21:46

## 2019-01-23 RX ADMIN — BUMETANIDE 15 MG/HR: 0.25 INJECTION INTRAMUSCULAR; INTRAVENOUS at 04:54

## 2019-01-23 RX ADMIN — BUMETANIDE 15 MG/HR: 0.25 INJECTION INTRAMUSCULAR; INTRAVENOUS at 21:46

## 2019-01-23 RX ADMIN — HEPARIN SODIUM 5000 UNIT(S): 5000 INJECTION INTRAVENOUS; SUBCUTANEOUS at 17:16

## 2019-01-23 RX ADMIN — Medication 81 MILLIGRAM(S): at 12:57

## 2019-01-23 RX ADMIN — BUMETANIDE 15 MG/HR: 0.25 INJECTION INTRAMUSCULAR; INTRAVENOUS at 12:57

## 2019-01-23 RX ADMIN — SIMETHICONE 80 MILLIGRAM(S): 80 TABLET, CHEWABLE ORAL at 13:00

## 2019-01-23 RX ADMIN — Medication 25 MILLIGRAM(S): at 05:22

## 2019-01-23 RX ADMIN — OXYCODONE HYDROCHLORIDE 5 MILLIGRAM(S): 5 TABLET ORAL at 17:15

## 2019-01-23 RX ADMIN — Medication 72 MILLIGRAM(S): at 04:53

## 2019-01-23 RX ADMIN — SEVELAMER CARBONATE 800 MILLIGRAM(S): 2400 POWDER, FOR SUSPENSION ORAL at 05:22

## 2019-01-23 RX ADMIN — SODIUM CHLORIDE 3 MILLILITER(S): 9 INJECTION INTRAMUSCULAR; INTRAVENOUS; SUBCUTANEOUS at 05:22

## 2019-01-23 RX ADMIN — CHLORHEXIDINE GLUCONATE 1 APPLICATION(S): 213 SOLUTION TOPICAL at 12:56

## 2019-01-23 RX ADMIN — SEVELAMER CARBONATE 800 MILLIGRAM(S): 2400 POWDER, FOR SUSPENSION ORAL at 13:00

## 2019-01-23 RX ADMIN — TAMSULOSIN HYDROCHLORIDE 0.4 MILLIGRAM(S): 0.4 CAPSULE ORAL at 21:46

## 2019-01-23 RX ADMIN — INSULIN GLARGINE 25 UNIT(S): 100 INJECTION, SOLUTION SUBCUTANEOUS at 21:47

## 2019-01-23 RX ADMIN — Medication 100 MILLIGRAM(S): at 12:58

## 2019-01-23 RX ADMIN — SODIUM CHLORIDE 3 MILLILITER(S): 9 INJECTION INTRAMUSCULAR; INTRAVENOUS; SUBCUTANEOUS at 13:01

## 2019-01-23 RX ADMIN — Medication 200 MILLIGRAM(S): at 00:08

## 2019-01-23 NOTE — PROGRESS NOTE ADULT - SUBJECTIVE AND OBJECTIVE BOX
Keith Burns, PGY1  Pager: [phone]      Patient is a 68y old  Male who presents with a chief complaint of Dyspnea x 2 days (23 Jan 2019 07:17)      SUBJECTIVE / OVERNIGHT EVENTS: No acute events overnight. Pt this morning hypoglycemic to 53, given soda with rebound to 115. Pt without complaints this morning.     REVIEW OF SYSTEMS:    CONSTITUTIONAL: No weakness, fevers or chills  EYES/ENT: No visual changes;  No vertigo or throat pain   NECK: No pain or stiffness  RESPIRATORY: No cough, wheezing, hemoptysis; No shortness of breath  CARDIOVASCULAR: No chest pain or palpitations  GASTROINTESTINAL: No abdominal or epigastric pain. No nausea, vomiting, or hematemesis; No diarrhea or constipation. No melena or hematochezia.  GENITOURINARY: No dysuria, frequency or hematuria  NEUROLOGICAL: No numbness or weakness  SKIN: No itching, rashes    MEDICATIONS  (STANDING):  aspirin enteric coated 81 milliGRAM(s) Oral daily  buMETAnide Infusion 3 mG/Hr (15 mL/Hr) IV Continuous <Continuous>  chlorhexidine 4% Liquid 1 Application(s) Topical <User Schedule>  dextrose 5%. 1000 milliLiter(s) (50 mL/Hr) IV Continuous <Continuous>  dextrose 50% Injectable 12.5 Gram(s) IV Push once  dextrose 50% Injectable 25 Gram(s) IV Push once  dextrose 50% Injectable 25 Gram(s) IV Push once  docusate sodium 100 milliGRAM(s) Oral daily  heparin  Injectable 5000 Unit(s) SubCutaneous every 12 hours  hydrALAZINE 25 milliGRAM(s) Oral every 8 hours  insulin glargine Injectable (LANTUS) 25 Unit(s) SubCutaneous at bedtime  insulin lispro (HumaLOG) corrective regimen sliding scale   SubCutaneous three times a day before meals  insulin lispro (HumaLOG) corrective regimen sliding scale   SubCutaneous at bedtime  isosorbide   dinitrate Tablet (ISORDIL) 10 milliGRAM(s) Oral three times a day  milrinone Infusion 0.125 MICROgram(s)/kG/Min (3 mL/Hr) IV Continuous <Continuous>  senna 2 Tablet(s) Oral at bedtime  sevelamer hydrochloride 800 milliGRAM(s) Oral three times a day  sodium chloride 0.9% lock flush 3 milliLiter(s) IV Push every 8 hours  tamsulosin 0.4 milliGRAM(s) Oral at bedtime    MEDICATIONS  (PRN):  calamine Lotion 1 Application(s) Topical daily PRN Rash and/or Itching  dextrose 40% Gel 15 Gram(s) Oral once PRN Blood Glucose LESS THAN 70 milliGRAM(s)/deciliter  fluticasone propionate 50 MICROgram(s)/spray Nasal Spray 1 Spray(s) Both Nostrils two times a day PRN nasal congestion  glucagon  Injectable 1 milliGRAM(s) IntraMuscular once PRN Glucose LESS THAN 70 milligrams/deciliter  guaiFENesin   Syrup  (Sugar-Free) 200 milliGRAM(s) Oral every 6 hours PRN Cough  oxyCODONE    IR 5 milliGRAM(s) Oral every 6 hours PRN Moderate Pain (4 - 6)      Vital Signs Last 24 Hrs  T(C): 36.8 (23 Jan 2019 07:54), Max: 36.8 (23 Jan 2019 07:54)  T(F): 98.3 (23 Jan 2019 07:54), Max: 98.3 (23 Jan 2019 07:54)  HR: 86 (23 Jan 2019 06:00) (79 - 92)  BP: 88/47 (23 Jan 2019 06:00) (88/47 - 118/61)  BP(mean): 55 (23 Jan 2019 06:00) (54 - 82)  RR: 14 (23 Jan 2019 06:00) (9 - 24)  SpO2: 98% (23 Jan 2019 06:00) (92% - 100%)  CAPILLARY BLOOD GLUCOSE      POCT Blood Glucose.: 68 mg/dL (23 Jan 2019 08:27)  POCT Blood Glucose.: 115 mg/dL (23 Jan 2019 06:13)  POCT Blood Glucose.: 56 mg/dL (23 Jan 2019 05:15)  POCT Blood Glucose.: 250 mg/dL (22 Jan 2019 21:12)  POCT Blood Glucose.: 250 mg/dL (22 Jan 2019 21:11)  POCT Blood Glucose.: 179 mg/dL (22 Jan 2019 16:45)  POCT Blood Glucose.: 165 mg/dL (22 Jan 2019 12:22)    I&O's Summary    22 Jan 2019 07:01  -  23 Jan 2019 07:00  --------------------------------------------------------  IN: 774.8 mL / OUT: 2625 mL / NET: -1850.2 mL      PHYSICAL EXAM:  GENERAL: Appears older than stated age, sitting upright in hospital bed  EYES: EOMI, PERRLA, conjunctiva and sclera clear  NECK:  Mild JVD  CHEST/LUNG: Difficult to auscultate, sounds clear   HEART: RRR; No murmurs  ABDOMEN: Severely distended, pain on palpation of left side without rigidity or guarding. Positive bowel sounds. Tympanic to percussion. Improved since yesterday.   : No Palacios  EXTREMITIES:  2+ Peripheral Pulses, 2+ pitting edema up to mid calf bilaterally   PSYCH: AAOx3  NEUROLOGY: non-focal  SKIN: No rashes or lesions. No sacral ulcer    LABS:                        8.3    6.28  )-----------( 182      ( 23 Jan 2019 04:30 )             26.4     01-23    134<L>  |  95<L>  |  79<H>  ----------------------------<  59<L>  4.0   |  23  |  3.30<H>    Ca    8.8      23 Jan 2019 04:30  Phos  6.3     01-23  Mg     2.3     01-23    TPro  6.6  /  Alb  2.9<L>  /  TBili  1.4<H>  /  DBili  x   /  AST  22  /  ALT  23  /  AlkPhos  384<H>  01-22              Microbiology;        RADIOLOGY & ADDITIONAL TESTS:  < from: TTE with Doppler (w/Cont) (01.20.19 @ 13:36) >  CONCLUSIONS:  1. Tethered mitral valve leaflets. Moderate mitral  regurgitation.  2. Calcified trileaflet aortic valve with mildly decreased  opening.  3. Normal left ventricular internal dimensions and wall  thicknesses.  4. Moderate to severe segmental left ventricular systolic  dysfunction. The basal to mid septum, basal to mid  inferior, basal to mid anterolateral walls are hypokinetic.  5. Right ventricular enlargement with decreased right  ventricular systolic function.  6. Estimated pulmonary artery systolic pressure equals 43  mmHg, assuming right atrial pressure equals 10  mm Hg,  consistent with mild pulmonary hypertension.    < end of copied text >      Imaging Personally Reviewed:          Consultant(s) Notes Reviewed: Heart failure      Care Discussed with Consultants/Other Providers: Heart failure Keith Burns, PGY1  Pager: [phone]      Patient is a 68y old  Male who presents with a chief complaint of Dyspnea x 2 days (23 Jan 2019 07:17)      SUBJECTIVE / OVERNIGHT EVENTS: No acute events overnight. Pt this morning hypoglycemic to 53, given soda with rebound to 115. Pt without complaints this morning.     REVIEW OF SYSTEMS:    CONSTITUTIONAL: No weakness, fevers or chills  EYES/ENT: No visual changes;  No vertigo or throat pain   NECK: No pain or stiffness  RESPIRATORY: No cough, wheezing, hemoptysis; No shortness of breath  CARDIOVASCULAR: No chest pain or palpitations  GASTROINTESTINAL: No abdominal or epigastric pain. No nausea, vomiting, or hematemesis; No diarrhea or constipation. No melena or hematochezia.  GENITOURINARY: No dysuria, frequency or hematuria  NEUROLOGICAL: No numbness or weakness  SKIN: No itching, rashes    MEDICATIONS  (STANDING):  aspirin enteric coated 81 milliGRAM(s) Oral daily  buMETAnide Infusion 3 mG/Hr (15 mL/Hr) IV Continuous <Continuous>  chlorhexidine 4% Liquid 1 Application(s) Topical <User Schedule>  dextrose 5%. 1000 milliLiter(s) (50 mL/Hr) IV Continuous <Continuous>  dextrose 50% Injectable 12.5 Gram(s) IV Push once  dextrose 50% Injectable 25 Gram(s) IV Push once  dextrose 50% Injectable 25 Gram(s) IV Push once  docusate sodium 100 milliGRAM(s) Oral daily  heparin  Injectable 5000 Unit(s) SubCutaneous every 12 hours  hydrALAZINE 25 milliGRAM(s) Oral every 8 hours  insulin glargine Injectable (LANTUS) 25 Unit(s) SubCutaneous at bedtime  insulin lispro (HumaLOG) corrective regimen sliding scale   SubCutaneous three times a day before meals  insulin lispro (HumaLOG) corrective regimen sliding scale   SubCutaneous at bedtime  isosorbide   dinitrate Tablet (ISORDIL) 10 milliGRAM(s) Oral three times a day  milrinone Infusion 0.125 MICROgram(s)/kG/Min (3 mL/Hr) IV Continuous <Continuous>  senna 2 Tablet(s) Oral at bedtime  sevelamer hydrochloride 800 milliGRAM(s) Oral three times a day  sodium chloride 0.9% lock flush 3 milliLiter(s) IV Push every 8 hours  tamsulosin 0.4 milliGRAM(s) Oral at bedtime    MEDICATIONS  (PRN):  calamine Lotion 1 Application(s) Topical daily PRN Rash and/or Itching  dextrose 40% Gel 15 Gram(s) Oral once PRN Blood Glucose LESS THAN 70 milliGRAM(s)/deciliter  fluticasone propionate 50 MICROgram(s)/spray Nasal Spray 1 Spray(s) Both Nostrils two times a day PRN nasal congestion  glucagon  Injectable 1 milliGRAM(s) IntraMuscular once PRN Glucose LESS THAN 70 milligrams/deciliter  guaiFENesin   Syrup  (Sugar-Free) 200 milliGRAM(s) Oral every 6 hours PRN Cough  oxyCODONE    IR 5 milliGRAM(s) Oral every 6 hours PRN Moderate Pain (4 - 6)      Vital Signs Last 24 Hrs  T(C): 36.8 (23 Jan 2019 07:54), Max: 36.8 (23 Jan 2019 07:54)  T(F): 98.3 (23 Jan 2019 07:54), Max: 98.3 (23 Jan 2019 07:54)  HR: 86 (23 Jan 2019 06:00) (79 - 92)  BP: 88/47 (23 Jan 2019 06:00) (88/47 - 118/61)  BP(mean): 55 (23 Jan 2019 06:00) (54 - 82)  RR: 14 (23 Jan 2019 06:00) (9 - 24)  SpO2: 98% (23 Jan 2019 06:00) (92% - 100%)  CAPILLARY BLOOD GLUCOSE      POCT Blood Glucose.: 68 mg/dL (23 Jan 2019 08:27)  POCT Blood Glucose.: 115 mg/dL (23 Jan 2019 06:13)  POCT Blood Glucose.: 56 mg/dL (23 Jan 2019 05:15)  POCT Blood Glucose.: 250 mg/dL (22 Jan 2019 21:12)  POCT Blood Glucose.: 250 mg/dL (22 Jan 2019 21:11)  POCT Blood Glucose.: 179 mg/dL (22 Jan 2019 16:45)  POCT Blood Glucose.: 165 mg/dL (22 Jan 2019 12:22)    I&O's Summary    22 Jan 2019 07:01  -  23 Jan 2019 07:00  --------------------------------------------------------  IN: 774.8 mL / OUT: 2625 mL / NET: -1850.2 mL      PHYSICAL EXAM:  GENERAL: Appears older than stated age, sitting upright in hospital bed  EYES: EOMI, PERRLA, conjunctiva and sclera clear  NECK:  Mild JVD  CHEST/LUNG: Difficult to auscultate, sounds clear   HEART: RRR; No murmurs  ABDOMEN: Severely distended, pain on palpation of left side without rigidity or guarding. Positive bowel sounds. Tympanic to percussion. Improved since yesterday.   : No Palacios  EXTREMITIES:  2+ Peripheral Pulses, 2+ pitting edema up to mid calf bilaterally   PSYCH: AAOx3  NEUROLOGY: non-focal  SKIN: No rashes or lesions. No sacral ulcer    LABS:                        8.3    6.28  )-----------( 182      ( 23 Jan 2019 04:30 )             26.4     01-23    134<L>  |  95<L>  |  79<H>  ----------------------------<  59<L>  4.0   |  23  |  3.30<H>    Ca    8.8      23 Jan 2019 04:30  Phos  6.3     01-23  Mg     2.3     01-23    TPro  6.6  /  Alb  2.9<L>  /  TBili  1.4<H>  /  DBili  x   /  AST  22  /  ALT  23  /  AlkPhos  384<H>  01-22              Microbiology;        RADIOLOGY & ADDITIONAL TESTS:  < from: TTE with Doppler (w/Cont) (01.20.19 @ 13:36) >  CONCLUSIONS:  1. Tethered mitral valve leaflets. Moderate mitral  regurgitation.  2. Calcified trileaflet aortic valve with mildly decreased  opening.  3. Normal left ventricular internal dimensions and wall  thicknesses.  4. Moderate to severe segmental left ventricular systolic  dysfunction. The basal to mid septum, basal to mid  inferior, basal to mid anterolateral walls are hypokinetic.  5. Right ventricular enlargement with decreased right  ventricular systolic function.  6. Estimated pulmonary artery systolic pressure equals 43  mmHg, assuming right atrial pressure equals 10  mm Hg,  consistent with mild pulmonary hypertension.    < end of copied text >      Imaging Personally Reviewed: No          Consultant(s) Notes Reviewed: Heart failure      Care Discussed with Consultants/Other Providers: Heart failure

## 2019-01-23 NOTE — PROGRESS NOTE ADULT - ASSESSMENT
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 His last admission from 12/8-12/27 required CCU admission and placement on dobutamine. He has had 3 admissions in 2018, for various complications from DM.  He comes in this time due to worsening SOB. He admits to 2 pillow orthopnea, frequent PND and ORTA after 1/2 block and walking up 5 steps. No CP, palpitations, dizziness. Patient's son by bedside. On tele, patient not diuresing well and condition guarded. Admitted to CCU for inotropic therapy, initiation of primacor infusion and IV diuresis with lasix gtt, now on Bumex drip.       PLAN:    Neuro:  AO x 3    Pulm:   Clear to ascultation.   On NC for nasal congestion and symptomatic relief.     Cardiovascular:   NYHA class IV due to ischemic cardiomyopathy. On physical exam, patient is volume overloaded although lungs are relatively clear  -primacor infusion at 0.125 mcg/kg/min  -VBG mixed venous decreased to 40 overnight - will place pulmonary artery catheter at bedside today  -Repeat Echo with increased EF and diffuse LV hypokinesis   -Started on Bumex drip at 3mg/hr - 2625 out overnight, negative 1850  -Strict I/O and daily standing weights.   -Continue Coreg 6.25 mg po BID, for now.  -holding spironolactone for hyperkalemia, continue holding per nephrology  -Continue hydralazine 25 mg po TID and Isordil 10 mg po TID for afterload reduction  -Once hemodynamically optimized, consider ICD placement, given low EF and ischemic cardiomyopathy  -to discuss with heart failure team regarding long term goals    GI/:  Distended abdomen with dullness to percussion, suspect abdominal fluid overload with possible ascites  -pt with baseline elevated Tbili and Alk phos  -abdominal xray showing diffuse bowel air, ultrasound pending  -Currently no N/V/D. 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 Elevated today. Ultrasound shows renal parenchymal disease.   -Likely secondary to renal venous congestion from cardiorenal syndrome. Diuresis and continue to monitor.     Heme:   Anemia to low 9s with low MCV  iron studies inconclusive  reticulocyte index indicating hypoproliferation  Aspirin and Hep Subq    ID:   No issues    Endo:  DM, on 25 Lantus and ISS. Lantus reduced tonight due to hypoglycemia in the AM.    Fingerstick stable, continue to monitor.     DVT: Heparin  Diet: Regular, DASH/TLC, Carb restricted, fluid 1000mL restrict     Code status: Full code. 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 His last admission from 12/8-12/27 required CCU admission and placement on dobutamine. He has had 3 admissions in 2018, for various complications from DM.  He comes in this time due to worsening SOB. He admits to 2 pillow orthopnea, frequent PND and ORTA after 1/2 block and walking up 5 steps. No CP, palpitations, dizziness. Patient's son by bedside. On tele, patient not diuresing well and condition guarded. Admitted to CCU for inotropic therapy, initiation of primacor infusion and IV diuresis with lasix gtt, now on Bumex drip.       PLAN:    Neuro:  AO x 3    Pulm:   Clear to ascultation.   On NC for nasal congestion and symptomatic relief.     Cardiovascular:   NYHA class IV due to ischemic cardiomyopathy. On physical exam, patient is volume overloaded although lungs are relatively clear  -will d/c milrinone today per HF - 12PM VBG on milrinone, will repeat another VBG at 4PM  -Repeat Echo with increased EF and diffuse LV hypokinesis   -Started on Bumex drip at 3mg/hr - 2625 out overnight, negative 1850  -will give another 2x Diuril 500mg Q12 hrs  -Strict I/O and daily standing weights. Fluid restriction to 1500mL  -holding all BP meds for now in the setting of rising creatinine to try to increase preload  -Once hemodynamically optimized, consider ICD placement, given low EF and ischemic cardiomyopathy  -to discuss with heart failure team regarding long term goals - pt has previously refused     GI/:  Distended abdomen with dullness to percussion, suspect abdominal fluid overload with possible ascites  -pt with baseline elevated Tbili and Alk phos  -abdominal xray showing diffuse bowel air, ultrasound pending  -Currently no N/V/D. 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 Elevated today. Ultrasound shows renal parenchymal disease.   -Likely secondary to renal venous congestion from cardiorenal syndrome. Diuresis and continue to monitor.     Heme:   Anemia to low 9s with low MCV  iron studies inconclusive  reticulocyte index indicating hypoproliferation  Aspirin and Hep Subq    ID:   No issues    Endo:  DM, on 25 Lantus and ISS. Lantus reduced tonight due to hypoglycemia in the AM.    Fingerstick stable, continue to monitor.     DVT: Heparin  Diet: Regular, DASH/TLC, Carb restricted, fluid 1000mL restrict     Code status: Full code.

## 2019-01-23 NOTE — PROGRESS NOTE ADULT - PROBLEM SELECTOR PLAN 1
RA 14-15, PA 64/25/41, W 14  continue Bumex gtt to 3 mg/hr.  Add diuril 500 mg IV BID  today again  stop milrinone, recheck MVO2  goal MAP 70 (hold hydral and isdn for now to allow renal perfusion as scr is unchanged).  Strict I/O. Dry weight is 132 lbs. Unable to get standing weight currently.  Continue Coreg 6.25 mg po BID.  Was hyperkalemic, hold spironolactone. .  Pt refused ICD when discussed w/ . Family at bedside. Will have ongoing discussions.  in terms of PA pressures - we will reassess when he is euvolemic

## 2019-01-23 NOTE — PROGRESS NOTE ADULT - SUBJECTIVE AND OBJECTIVE BOX
[facility] DIVISION OF KIDNEY DISEASES AND HYPERTENSION -- FOLLOW UP NOTE  --------------------------------------------------------------------------------  HPI: 68-year-old male with medical history of b/L LE bypass and b/L LE multiple toe amputations, DM2, HFrEF (EF 20%), HTN, Dyslipidemia, CAD, CABG, PVD, groin  sartorius flap, and vac placement, LLE with surgical scar wound admitted for worsening SOB. Nephrology consulted for KESHAV. Currently been treated for ADHF. On review of previous records in [facility]/[place], patient had normal Scr levels from 4/26/16 to 9/24/18. Pt. noted to have an episode of KESHAV during previous/recent hospitalization at [facility] (12/7/18 to 12/27/18). Transfer to CCU on 1/20/19. On this admission, Scr slowly uptrending, latest Scr elevated but stable at 3.30 today (1/23/19).     Pt was seen and examined at bedside at CCU, patient continues to be on bumex and milrinone infusion. Denies dysuria or difficulty urinating. Denies CP, N/V/F/C. Non - oliguric UOP stable in the past 24 hrs.     PAST HISTORY  --------------------------------------------------------------------------------  No significant changes to PMH, PSH, FHx, SHx, unless otherwise noted    ALLERGIES & MEDICATIONS  --------------------------------------------------------------------------------  Allergies    No Known Allergies    Intolerances      Standing Inpatient Medications  aspirin enteric coated 81 milliGRAM(s) Oral daily  buMETAnide Infusion 3 mG/Hr IV Continuous <Continuous>  chlorhexidine 4% Liquid 1 Application(s) Topical <User Schedule>  dextrose 5%. 1000 milliLiter(s) IV Continuous <Continuous>  dextrose 50% Injectable 12.5 Gram(s) IV Push once  dextrose 50% Injectable 25 Gram(s) IV Push once  dextrose 50% Injectable 25 Gram(s) IV Push once  docusate sodium 100 milliGRAM(s) Oral daily  heparin  Injectable 5000 Unit(s) SubCutaneous every 12 hours  hydrALAZINE 25 milliGRAM(s) Oral every 8 hours  insulin glargine Injectable (LANTUS) 30 Unit(s) SubCutaneous at bedtime  insulin lispro (HumaLOG) corrective regimen sliding scale   SubCutaneous three times a day before meals  insulin lispro (HumaLOG) corrective regimen sliding scale   SubCutaneous at bedtime  isosorbide   dinitrate Tablet (ISORDIL) 10 milliGRAM(s) Oral three times a day  milrinone Infusion 0.125 MICROgram(s)/kG/Min IV Continuous <Continuous>  senna 2 Tablet(s) Oral at bedtime  sevelamer hydrochloride 800 milliGRAM(s) Oral three times a day  sodium chloride 0.9% lock flush 3 milliLiter(s) IV Push every 8 hours  tamsulosin 0.4 milliGRAM(s) Oral at bedtime    REVIEW OF SYSTEMS  --------------------------------------------------------------------------------  Gen: No fevers  Respiratory: + dyspnea (improving)  CV: No chest pain  GI: No abdominal pain  : No dysuria, hematuria  MSK: + edema    All other systems were reviewed and are negative, except as noted.    VITALS/PHYSICAL EXAM  --------------------------------------------------------------------------------  T(C): 36.2 (01-23-19 @ 04:00), Max: 36.6 (01-22-19 @ 08:00)  HR: 86 (01-23-19 @ 06:00) (79 - 92)  BP: 88/47 (01-23-19 @ 06:00) (88/47 - 118/61)  RR: 14 (01-23-19 @ 06:00) (9 - 24)  SpO2: 98% (01-23-19 @ 06:00) (92% - 100%)  Wt(kg): --    Weight (kg): 80 (01-22-19 @ 04:00)      01-22-19 @ 07:01  -  01-23-19 @ 07:00  --------------------------------------------------------  IN: 774.8 mL / OUT: 2625 mL / NET: -1850.2 mL        Physical Exam:  	Gen: resting, NAD  	HEENT: No JVD  	Pulm: decreased breath sounds B/L  	CV: S1S2  	Abd: Soft, +BS   	Ext: + b/l LE edema  	Neuro: Awake  	Skin: Warm and dry      LABS/STUDIES  --------------------------------------------------------------------------------              8.3    6.28  >-----------<  182      [01-23-19 @ 04:30]              26.4     134  |  95  |  79  ----------------------------<  59      [01-23-19 @ 04:30]  4.0   |  23  |  3.30        Ca     8.8     [01-23-19 @ 04:30]      Mg     2.3     [01-23-19 @ 04:30]      Phos  6.3     [01-23-19 @ 04:30]    TPro  6.6  /  Alb  2.9  /  TBili  1.4  /  DBili  x   /  AST  22  /  ALT  23  /  AlkPhos  384  [01-22-19 @ 12:50]              [01-22-19 @ 05:00]    Creatinine Trend:  SCr 3.30 [01-23 @ 04:30]  SCr 3.20 [01-22 @ 12:50]  SCr 3.28 [01-22 @ 05:00]  SCr 3.19 [01-21 @ 15:00]  SCr 2.75 [01-21 @ 06:30] [facility] DIVISION OF KIDNEY DISEASES AND HYPERTENSION -- FOLLOW UP NOTE  --------------------------------------------------------------------------------  HPI: 68-year-old male with medical history of b/L LE bypass and b/L LE multiple toe amputations, DM2, HFrEF (EF 20%), HTN, Dyslipidemia, CAD, CABG, PVD, groin  sartorius flap, and vac placement, LLE with surgical scar wound admitted for worsening SOB. Nephrology consulted for KESHAV. Currently been treated for ADHF. On review of previous records in [facility]/[place], patient had normal Scr levels from 4/26/16 to 9/24/18. Pt. noted to have an episode of KESHAV during previous/recent hospitalization at [facility] (12/7/18 to 12/27/18). Transfer to CCU on 1/20/19. On this admission, Scr slowly uptrending, latest Scr elevated but stable at 3.30 today (1/23/19).     Pt was seen and examined at bedside at CCU, patient continues to be on bumex and milrinone infusion. Denies dysuria or difficulty urinating. Denies CP, N/V/F/C. Non - oliguric UOP stable in the past 24 hrs.     PAST HISTORY  --------------------------------------------------------------------------------  No significant changes to PMH, PSH, FHx, SHx, unless otherwise noted    ALLERGIES & MEDICATIONS  --------------------------------------------------------------------------------  Allergies    No Known Allergies    Intolerances      Standing Inpatient Medications  aspirin enteric coated 81 milliGRAM(s) Oral daily  buMETAnide Infusion 3 mG/Hr IV Continuous <Continuous>  chlorhexidine 4% Liquid 1 Application(s) Topical <User Schedule>  dextrose 5%. 1000 milliLiter(s) IV Continuous <Continuous>  dextrose 50% Injectable 12.5 Gram(s) IV Push once  dextrose 50% Injectable 25 Gram(s) IV Push once  dextrose 50% Injectable 25 Gram(s) IV Push once  docusate sodium 100 milliGRAM(s) Oral daily  heparin  Injectable 5000 Unit(s) SubCutaneous every 12 hours  hydrALAZINE 25 milliGRAM(s) Oral every 8 hours  insulin glargine Injectable (LANTUS) 30 Unit(s) SubCutaneous at bedtime  insulin lispro (HumaLOG) corrective regimen sliding scale   SubCutaneous three times a day before meals  insulin lispro (HumaLOG) corrective regimen sliding scale   SubCutaneous at bedtime  isosorbide   dinitrate Tablet (ISORDIL) 10 milliGRAM(s) Oral three times a day  milrinone Infusion 0.125 MICROgram(s)/kG/Min IV Continuous <Continuous>  senna 2 Tablet(s) Oral at bedtime  sevelamer hydrochloride 800 milliGRAM(s) Oral three times a day  sodium chloride 0.9% lock flush 3 milliLiter(s) IV Push every 8 hours  tamsulosin 0.4 milliGRAM(s) Oral at bedtime    REVIEW OF SYSTEMS  --------------------------------------------------------------------------------  Gen: No fevers  Respiratory: + dyspnea (improving)  CV: No chest pain  GI: No abdominal pain  : No dysuria, hematuria  MSK: + edema    All other systems were reviewed and are negative, except as noted.    VITALS/PHYSICAL EXAM  --------------------------------------------------------------------------------  T(C): 36.2 (01-23-19 @ 04:00), Max: 36.6 (01-22-19 @ 08:00)  HR: 86 (01-23-19 @ 06:00) (79 - 92)  BP: 88/47 (01-23-19 @ 06:00) (88/47 - 118/61)  RR: 14 (01-23-19 @ 06:00) (9 - 24)  SpO2: 98% (01-23-19 @ 06:00) (92% - 100%)  Wt(kg): --    Weight (kg): 80 (01-22-19 @ 04:00)    01-22-19 @ 07:01  -  01-23-19 @ 07:00  --------------------------------------------------------  IN: 774.8 mL / OUT: 2625 mL / NET: -1850.2 mL    Physical Exam:  	Gen: resting, NAD  	HEENT: No JVD  	Pulm: decreased breath sounds B/L  	CV: S1S2  	Abd: Soft, +BS   	Ext: + b/l LE edema  	Neuro: Awake  	Skin: Warm and dry    LABS/STUDIES  --------------------------------------------------------------------------------              8.3    6.28  >-----------<  182      [01-23-19 @ 04:30]              26.4     134  |  95  |  79  ----------------------------<  59      [01-23-19 @ 04:30]  4.0   |  23  |  3.30        Ca     8.8     [01-23-19 @ 04:30]      Mg     2.3     [01-23-19 @ 04:30]      Phos  6.3     [01-23-19 @ 04:30]    TPro  6.6  /  Alb  2.9  /  TBili  1.4  /  DBili  x   /  AST  22  /  ALT  23  /  AlkPhos  384  [01-22-19 @ 12:50]    Creatinine Trend:  SCr 3.30 [01-23 @ 04:30]  SCr 3.20 [01-22 @ 12:50]  SCr 3.28 [01-22 @ 05:00]  SCr 3.19 [01-21 @ 15:00]  SCr 2.75 [01-21 @ 06:30]

## 2019-01-23 NOTE — PROGRESS NOTE ADULT - SUBJECTIVE AND OBJECTIVE BOX
Subjective:  volume up still    Medications:  aspirin enteric coated 81 milliGRAM(s) Oral daily  buMETAnide Infusion 3 mG/Hr IV Continuous <Continuous>  calamine Lotion 1 Application(s) Topical daily PRN  chlorhexidine 4% Liquid 1 Application(s) Topical <User Schedule>  chlorothiazide IVPB 500 milliGRAM(s) IV Intermittent every 12 hours  dextrose 40% Gel 15 Gram(s) Oral once PRN  dextrose 5%. 1000 milliLiter(s) IV Continuous <Continuous>  dextrose 50% Injectable 12.5 Gram(s) IV Push once  dextrose 50% Injectable 25 Gram(s) IV Push once  dextrose 50% Injectable 25 Gram(s) IV Push once  docusate sodium 100 milliGRAM(s) Oral daily  fluticasone propionate 50 MICROgram(s)/spray Nasal Spray 1 Spray(s) Both Nostrils two times a day PRN  glucagon  Injectable 1 milliGRAM(s) IntraMuscular once PRN  guaiFENesin   Syrup  (Sugar-Free) 200 milliGRAM(s) Oral every 6 hours PRN  heparin  Injectable 5000 Unit(s) SubCutaneous every 12 hours  insulin glargine Injectable (LANTUS) 25 Unit(s) SubCutaneous at bedtime  insulin lispro (HumaLOG) corrective regimen sliding scale   SubCutaneous three times a day before meals  insulin lispro (HumaLOG) corrective regimen sliding scale   SubCutaneous at bedtime  oxyCODONE    IR 5 milliGRAM(s) Oral every 6 hours PRN  senna 2 Tablet(s) Oral at bedtime  sevelamer hydrochloride 800 milliGRAM(s) Oral three times a day  simethicone 80 milliGRAM(s) Chew three times a day  sodium chloride 0.9% lock flush 3 milliLiter(s) IV Push every 8 hours  tamsulosin 0.4 milliGRAM(s) Oral at bedtime      Physical Exam:    Vitals:  T(C): 36.8 (19 @ 16:21), Max: 36.8 (19 @ 07:54)  HR: 88 (19 @ 11:00) (79 - 91)  BP: 111/61 (19 @ 11:00) (88/47 - 118/61)  BP(mean): 71 (19 @ 11:00) (54 - 82)  ABP: --  ABP(mean): --  RR: 12 (19 @ 11:00) (9 - 24)  SpO2: 97% (19 11:00) (92% - 99%)  Wt(kg): --  CVP(cm H2O): --  CO: 4.5 (19 @ 17:00) (4.5 - 4.5)  CI: 2.3 (19 17:00) (2.3 - 2.3)  PA: 67/25 (19 @ 11:00) (60/24 - 76/34)  PA(mean): 43 (19 @ 11:00) (32 - 50)  PCWP: 30 (19 @ 17:00) (30 - 30)  SVR: 994 (19 @ 17:00) (994 - 994)  PVR: 284 (19 17:00) (284 - 284)  Vital Signs Last 24 Hrs  T(C): 36.8 (2019 16:21), Max: 36.8 (2019 07:54)  T(F): 98.3 (2019 16:21), Max: 98.3 (2019 07:54)  HR: 88 (2019 11:00) (79 - 91)  BP: 111/61 (2019 11:00) (88/47 - 118/61)  BP(mean): 71 (2019 11:00) (54 - 82)  RR: 12 (2019 11:00) (9 - 24)  SpO2: 97% (2019 11:00) (92% - 99%)    Daily     Daily Weight in k.6 (2019 06:00)    I&O's Summary    2019 07:01  -  2019 07:00  --------------------------------------------------------  IN: 774.8 mL / OUT: 2625 mL / NET: -1850.2 mL    2019 07:01  -  2019 16:34  --------------------------------------------------------  IN: 0 mL / OUT: 300 mL / NET: -300 mL        General: No distress. Comfortable.  HEENT: EOM intact.  Chest: Clear to auscultation bilaterally  CV: Normal S1 and S2. No murmurs, rub, or gallops. Radial pulses normal.  Abdomen: Soft, non-distended, non-tender  Skin: No rashes or skin breakdown  Neurology: Alert and oriented times three. Sensation intact  Psych: Affect normal    Labs:                        8.3    6.28  )-----------( 182      ( 2019 04:30 )             26.4         134<L>  |  95<L>  |  79<H>  ----------------------------<  59<L>  4.0   |  23  |  3.30<H>    Ca    8.8      2019 04:30  Phos  6.3       Mg     2.3         TPro  6.6  /  Alb  2.9<L>  /  TBili  1.4<H>  /  DBili  x   /  AST  22  /  ALT  23  /  AlkPhos  384<H>            Serum Pro-Brain Natriuretic Peptide: 4995 pg/mL ( @ 06:00)  Serum Pro-Brain Natriuretic Peptide: 4578 pg/mL ( @ 23:45)      Lactate Dehydrogenase, Serum: 218 U/L ( @ 05:00)

## 2019-01-23 NOTE — PROGRESS NOTE ADULT - PROBLEM SELECTOR PLAN 2
Unclear etiology of KESHAV  No LV thrombus on TTE  avoid nephrotoxic agents  gas pattern by xray, no significant ascites, would consider reglan,abd is so distended that there might be a component of intraabdominal compartment sd

## 2019-01-23 NOTE — PROGRESS NOTE ADULT - PROBLEM SELECTOR PLAN 3
Patient with hypervolemia in the setting of ADHF. Patient currently being treated with Bumex gtt with 2.3 L of UOP in last 24 hours. Diuretic and ADHF management per CCU team. Monitor daily weights. Low salt diet

## 2019-01-23 NOTE — PROGRESS NOTE ADULT - ASSESSMENT
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 His last admission from 12/8-12/27 required CCU admission and placement on dobutamine. He has had 3 admissions in 2018, for various complications from DM.  He comes in this time due to worsening SOB. He admits to 2 pillow orthopnea, frequent PND and ORTA after 1/2 block and walking up 5 steps. No CP, palpitations, dizziness. Patient's son by bedside.  His dry weight s/p last hospitalization was 132 lbs.    good urine output, still moderately volume up, mostly RV dysfunction, moderate PHTN however still overloaded

## 2019-01-23 NOTE — PROGRESS NOTE ADULT - PROBLEM SELECTOR PLAN 1
Pt. with KESHAV in the setting of ADHF. On review of previous records in [facility]/[place], patient with normal Scr levels from 4/26/16 to 9/24/18. Pt. noted to have an episode of KESHAV during previous/recent hospitalization at [facility] (12/7/18 to 12/27/18). On this admission, Scr was elevated at 2.15 on 1/16/19, increased to 2.44 to 1/18/19. Scr increased but stable at 3.30 today (1/23/19). Pt. on Bumex gtt and milrinone per CCU team. US Kidney showed increased bilateral renal cortical echogenicity, no hydronephrosis on 1/18/19. Monitor labs and urine output. Avoid nephrotoxins. Dose medications as per eGFR

## 2019-01-24 LAB
ANION GAP SERPL CALC-SCNC: 17 MMO/L — HIGH (ref 7–14)
APPEARANCE UR: CLEAR — SIGNIFICANT CHANGE UP
BASE EXCESS BLDV CALC-SCNC: 1.8 MMOL/L — SIGNIFICANT CHANGE UP
BASE EXCESS BLDV CALC-SCNC: 2.3 MMOL/L — SIGNIFICANT CHANGE UP
BASE EXCESS BLDV CALC-SCNC: 3.6 MMOL/L — SIGNIFICANT CHANGE UP
BASE EXCESS BLDV CALC-SCNC: 4.5 MMOL/L — SIGNIFICANT CHANGE UP
BILIRUB UR-MCNC: NEGATIVE — SIGNIFICANT CHANGE UP
BLOOD GAS VENOUS - CREATININE: 3.81 MG/DL — HIGH (ref 0.5–1.3)
BLOOD GAS VENOUS - CREATININE: 3.91 MG/DL — HIGH (ref 0.5–1.3)
BLOOD UR QL VISUAL: NEGATIVE — SIGNIFICANT CHANGE UP
BUN SERPL-MCNC: 83 MG/DL — HIGH (ref 7–23)
CALCIUM SERPL-MCNC: 8.9 MG/DL — SIGNIFICANT CHANGE UP (ref 8.4–10.5)
CHLORIDE BLDV-SCNC: 95 MMOL/L — LOW (ref 96–108)
CHLORIDE BLDV-SCNC: 96 MMOL/L — SIGNIFICANT CHANGE UP (ref 96–108)
CHLORIDE SERPL-SCNC: 91 MMOL/L — LOW (ref 98–107)
CO2 SERPL-SCNC: 26 MMOL/L — SIGNIFICANT CHANGE UP (ref 22–31)
COLOR SPEC: SIGNIFICANT CHANGE UP
CREAT ?TM UR-MCNC: 21.7 MG/DL — SIGNIFICANT CHANGE UP
CREAT SERPL-MCNC: 3.69 MG/DL — HIGH (ref 0.5–1.3)
GAS PNL BLDV: 131 MMOL/L — LOW (ref 136–146)
GAS PNL BLDV: 132 MMOL/L — LOW (ref 136–146)
GLUCOSE BLDC GLUCOMTR-MCNC: 102 MG/DL — HIGH (ref 70–99)
GLUCOSE BLDC GLUCOMTR-MCNC: 103 MG/DL — HIGH (ref 70–99)
GLUCOSE BLDC GLUCOMTR-MCNC: 134 MG/DL — HIGH (ref 70–99)
GLUCOSE BLDC GLUCOMTR-MCNC: 155 MG/DL — HIGH (ref 70–99)
GLUCOSE BLDV-MCNC: 135 — HIGH (ref 70–99)
GLUCOSE BLDV-MCNC: 91 — SIGNIFICANT CHANGE UP (ref 70–99)
GLUCOSE SERPL-MCNC: 118 MG/DL — HIGH (ref 70–99)
GLUCOSE UR-MCNC: NEGATIVE — SIGNIFICANT CHANGE UP
HCO3 BLDV-SCNC: 25 MMOL/L — SIGNIFICANT CHANGE UP (ref 20–27)
HCO3 BLDV-SCNC: 26 MMOL/L — SIGNIFICANT CHANGE UP (ref 20–27)
HCO3 BLDV-SCNC: 27 MMOL/L — SIGNIFICANT CHANGE UP (ref 20–27)
HCO3 BLDV-SCNC: 28 MMOL/L — HIGH (ref 20–27)
HCT VFR BLD CALC: 25.4 % — LOW (ref 39–50)
HCT VFR BLDV CALC: 26 % — LOW (ref 39–51)
HCT VFR BLDV CALC: 26.4 % — LOW (ref 39–51)
HGB BLD-MCNC: 8.2 G/DL — LOW (ref 13–17)
HGB BLDV-MCNC: 8.4 G/DL — LOW (ref 13–17)
HGB BLDV-MCNC: 8.5 G/DL — LOW (ref 13–17)
KETONES UR-MCNC: NEGATIVE — SIGNIFICANT CHANGE UP
LACTATE BLDV-MCNC: 1.1 MMOL/L — SIGNIFICANT CHANGE UP (ref 0.5–2)
LACTATE BLDV-MCNC: 1.1 MMOL/L — SIGNIFICANT CHANGE UP (ref 0.5–2)
LACTATE BLDV-MCNC: 1.2 MMOL/L — SIGNIFICANT CHANGE UP (ref 0.5–2)
LACTATE BLDV-MCNC: 1.2 MMOL/L — SIGNIFICANT CHANGE UP (ref 0.5–2)
LEUKOCYTE ESTERASE UR-ACNC: NEGATIVE — SIGNIFICANT CHANGE UP
MAGNESIUM SERPL-MCNC: 2.4 MG/DL — SIGNIFICANT CHANGE UP (ref 1.6–2.6)
MCHC RBC-ENTMCNC: 22.8 PG — LOW (ref 27–34)
MCHC RBC-ENTMCNC: 32.3 % — SIGNIFICANT CHANGE UP (ref 32–36)
MCV RBC AUTO: 70.6 FL — LOW (ref 80–100)
NITRITE UR-MCNC: NEGATIVE — SIGNIFICANT CHANGE UP
NRBC # FLD: 0 K/UL — LOW (ref 25–125)
PCO2 BLDV: 46 MMHG — SIGNIFICANT CHANGE UP (ref 41–51)
PCO2 BLDV: 49 MMHG — SIGNIFICANT CHANGE UP (ref 41–51)
PCO2 BLDV: 50 MMHG — SIGNIFICANT CHANGE UP (ref 41–51)
PCO2 BLDV: 52 MMHG — HIGH (ref 41–51)
PH BLDV: 7.36 PH — SIGNIFICANT CHANGE UP (ref 7.32–7.43)
PH BLDV: 7.36 PH — SIGNIFICANT CHANGE UP (ref 7.32–7.43)
PH BLDV: 7.38 PH — SIGNIFICANT CHANGE UP (ref 7.32–7.43)
PH BLDV: 7.39 PH — SIGNIFICANT CHANGE UP (ref 7.32–7.43)
PH UR: 5 — SIGNIFICANT CHANGE UP (ref 5–8)
PHOSPHATE SERPL-MCNC: 7.3 MG/DL — HIGH (ref 2.5–4.5)
PLATELET # BLD AUTO: 148 K/UL — LOW (ref 150–400)
PMV BLD: SIGNIFICANT CHANGE UP FL (ref 7–13)
PO2 BLDV: 34 MMHG — LOW (ref 35–40)
PO2 BLDV: 35 MMHG — SIGNIFICANT CHANGE UP (ref 35–40)
PO2 BLDV: 36 MMHG — SIGNIFICANT CHANGE UP (ref 35–40)
PO2 BLDV: 36 MMHG — SIGNIFICANT CHANGE UP (ref 35–40)
POTASSIUM BLDV-SCNC: 3.6 MMOL/L — SIGNIFICANT CHANGE UP (ref 3.4–4.5)
POTASSIUM BLDV-SCNC: 3.7 MMOL/L — SIGNIFICANT CHANGE UP (ref 3.4–4.5)
POTASSIUM SERPL-MCNC: 4 MMOL/L — SIGNIFICANT CHANGE UP (ref 3.5–5.3)
POTASSIUM SERPL-SCNC: 4 MMOL/L — SIGNIFICANT CHANGE UP (ref 3.5–5.3)
PROT UR-MCNC: NEGATIVE — SIGNIFICANT CHANGE UP
RBC # BLD: 3.6 M/UL — LOW (ref 4.2–5.8)
RBC # FLD: 24.5 % — HIGH (ref 10.3–14.5)
SAO2 % BLDV: 51.3 % — LOW (ref 60–85)
SAO2 % BLDV: 54.3 % — LOW (ref 60–85)
SAO2 % BLDV: 54.5 % — LOW (ref 60–85)
SAO2 % BLDV: 55.8 % — LOW (ref 60–85)
SODIUM SERPL-SCNC: 134 MMOL/L — LOW (ref 135–145)
SP GR SPEC: 1.01 — SIGNIFICANT CHANGE UP (ref 1–1.04)
UROBILINOGEN FLD QL: NORMAL — SIGNIFICANT CHANGE UP
UUN UR-MCNC: 162 MG/DL — SIGNIFICANT CHANGE UP
WBC # BLD: 5.76 K/UL — SIGNIFICANT CHANGE UP (ref 3.8–10.5)
WBC # FLD AUTO: 5.76 K/UL — SIGNIFICANT CHANGE UP (ref 3.8–10.5)

## 2019-01-24 PROCEDURE — 99233 SBSQ HOSP IP/OBS HIGH 50: CPT | Mod: GC

## 2019-01-24 PROCEDURE — 99232 SBSQ HOSP IP/OBS MODERATE 35: CPT | Mod: GC

## 2019-01-24 RX ORDER — SODIUM CHLORIDE 0.65 %
1 AEROSOL, SPRAY (ML) NASAL THREE TIMES A DAY
Qty: 0 | Refills: 0 | Status: DISCONTINUED | OUTPATIENT
Start: 2019-01-24 | End: 2019-02-25

## 2019-01-24 RX ORDER — MILRINONE LACTATE 1 MG/ML
0.12 INJECTION, SOLUTION INTRAVENOUS
Qty: 20 | Refills: 0 | Status: DISCONTINUED | OUTPATIENT
Start: 2019-01-24 | End: 2019-01-25

## 2019-01-24 RX ADMIN — SODIUM CHLORIDE 3 MILLILITER(S): 9 INJECTION INTRAMUSCULAR; INTRAVENOUS; SUBCUTANEOUS at 06:11

## 2019-01-24 RX ADMIN — OXYCODONE HYDROCHLORIDE 5 MILLIGRAM(S): 5 TABLET ORAL at 03:03

## 2019-01-24 RX ADMIN — BUMETANIDE 15 MG/HR: 0.25 INJECTION INTRAMUSCULAR; INTRAVENOUS at 08:46

## 2019-01-24 RX ADMIN — HEPARIN SODIUM 5000 UNIT(S): 5000 INJECTION INTRAVENOUS; SUBCUTANEOUS at 06:49

## 2019-01-24 RX ADMIN — Medication 81 MILLIGRAM(S): at 11:20

## 2019-01-24 RX ADMIN — OXYCODONE HYDROCHLORIDE 5 MILLIGRAM(S): 5 TABLET ORAL at 02:33

## 2019-01-24 RX ADMIN — INSULIN GLARGINE 25 UNIT(S): 100 INJECTION, SOLUTION SUBCUTANEOUS at 22:28

## 2019-01-24 RX ADMIN — SIMETHICONE 80 MILLIGRAM(S): 80 TABLET, CHEWABLE ORAL at 14:27

## 2019-01-24 RX ADMIN — Medication 72 MILLIGRAM(S): at 06:49

## 2019-01-24 RX ADMIN — SIMETHICONE 80 MILLIGRAM(S): 80 TABLET, CHEWABLE ORAL at 22:28

## 2019-01-24 RX ADMIN — HEPARIN SODIUM 5000 UNIT(S): 5000 INJECTION INTRAVENOUS; SUBCUTANEOUS at 17:26

## 2019-01-24 RX ADMIN — SEVELAMER CARBONATE 800 MILLIGRAM(S): 2400 POWDER, FOR SUSPENSION ORAL at 22:29

## 2019-01-24 RX ADMIN — OXYCODONE HYDROCHLORIDE 5 MILLIGRAM(S): 5 TABLET ORAL at 12:00

## 2019-01-24 RX ADMIN — BUMETANIDE 15 MG/HR: 0.25 INJECTION INTRAMUSCULAR; INTRAVENOUS at 14:27

## 2019-01-24 RX ADMIN — SIMETHICONE 80 MILLIGRAM(S): 80 TABLET, CHEWABLE ORAL at 06:48

## 2019-01-24 RX ADMIN — SEVELAMER CARBONATE 800 MILLIGRAM(S): 2400 POWDER, FOR SUSPENSION ORAL at 06:48

## 2019-01-24 RX ADMIN — OXYCODONE HYDROCHLORIDE 5 MILLIGRAM(S): 5 TABLET ORAL at 22:36

## 2019-01-24 RX ADMIN — Medication 2: at 17:26

## 2019-01-24 RX ADMIN — BUMETANIDE 15 MG/HR: 0.25 INJECTION INTRAMUSCULAR; INTRAVENOUS at 20:00

## 2019-01-24 RX ADMIN — CHLORHEXIDINE GLUCONATE 1 APPLICATION(S): 213 SOLUTION TOPICAL at 10:51

## 2019-01-24 RX ADMIN — SODIUM CHLORIDE 3 MILLILITER(S): 9 INJECTION INTRAMUSCULAR; INTRAVENOUS; SUBCUTANEOUS at 13:11

## 2019-01-24 RX ADMIN — SEVELAMER CARBONATE 800 MILLIGRAM(S): 2400 POWDER, FOR SUSPENSION ORAL at 14:27

## 2019-01-24 RX ADMIN — SODIUM CHLORIDE 3 MILLILITER(S): 9 INJECTION INTRAMUSCULAR; INTRAVENOUS; SUBCUTANEOUS at 22:30

## 2019-01-24 RX ADMIN — TAMSULOSIN HYDROCHLORIDE 0.4 MILLIGRAM(S): 0.4 CAPSULE ORAL at 22:36

## 2019-01-24 RX ADMIN — MILRINONE LACTATE 3 MICROGRAM(S)/KG/MIN: 1 INJECTION, SOLUTION INTRAVENOUS at 20:01

## 2019-01-24 RX ADMIN — OXYCODONE HYDROCHLORIDE 5 MILLIGRAM(S): 5 TABLET ORAL at 11:20

## 2019-01-24 RX ADMIN — Medication 100 MILLIGRAM(S): at 11:20

## 2019-01-24 RX ADMIN — MILRINONE LACTATE 3 MICROGRAM(S)/KG/MIN: 1 INJECTION, SOLUTION INTRAVENOUS at 13:14

## 2019-01-24 RX ADMIN — OXYCODONE HYDROCHLORIDE 5 MILLIGRAM(S): 5 TABLET ORAL at 23:06

## 2019-01-24 NOTE — PROGRESS NOTE ADULT - PROBLEM SELECTOR PLAN 3
Patient with hypervolemia in the setting of ADHF. Patient currently being treated with Bumex gtt with 2.7 L of UOP in last 24 hours. Diuretic and ADHF management per CCU team. Monitor daily weights. Low salt diet Patient with hypervolemia in the setting of ADHF. Patient currently being treated with Bumex gtt. Pt. with 2.7 L of UOP in last 24 hours. Diuretic and ADHF management per CCU team. Monitor daily weights. Low salt diet

## 2019-01-24 NOTE — PROGRESS NOTE ADULT - ASSESSMENT
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 His last admission from 12/8-12/27 required CCU admission and placement on dobutamine. He has had 3 admissions in 2018, for various complications from DM.  He comes in this time due to worsening SOB. He admits to 2 pillow orthopnea, frequent PND and ORTA after 1/2 block and walking up 5 steps. No CP, palpitations, dizziness. Patient's son by bedside. On tele, patient not diuresing well and condition guarded. Admitted to CCU for inotropic therapy, initiation of primacor infusion and IV diuresis with lasix gtt, now on Bumex drip.       PLAN:    Neuro:  AO x 3    Pulm:   Clear to ascultation.   On NC for nasal congestion and symptomatic relief.     Cardiovascular:   NYHA class IV due to ischemic cardiomyopathy. On physical exam, patient is volume overloaded although lungs are relatively clear  -d/c milrinone per HF  -Repeat Echo with increased EF and diffuse LV hypokinesis   -Started on Bumex drip at 3mg/hr - 2770 out overnight, negative 1650  -another 2x Diuril 500mg Q12 hrs given overnight  -Strict I/O and daily standing weights. Fluid restriction to 1500mL  -holding all BP meds for now in the setting of rising creatinine to try to increase preload  -Once hemodynamically optimized, consider ICD placement, given low EF and ischemic cardiomyopathy  -to discuss with heart failure team regarding long term goals - pt has previously refused     GI/:  Distended abdomen with dullness to percussion, suspect abdominal fluid overload with possible ascites  -pt with baseline elevated Tbili and Alk phos  -abdominal xray showing diffuse bowel air, ultrasound pending  -Currently no N/V/D. 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 Elevated today. Ultrasound shows renal parenchymal disease.   -Likely secondary to renal venous congestion from cardiorenal syndrome. Diuresis and continue to monitor.     Heme:   Anemia to low 9s with low MCV  iron studies inconclusive  reticulocyte index indicating hypoproliferation  Aspirin and Hep Subq    ID:   No issues    Endo:  DM, on 25 Lantus and ISS.   Fingerstick stable, continue to monitor.     DVT: Heparin  Diet: Regular, DASH/TLC, Carb restricted, fluid 1000mL restrict     Code status: Full code. 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 His last admission from 12/8-12/27 required CCU admission and placement on dobutamine. He has had 3 admissions in 2018, for various complications from DM.  He comes in this time due to worsening SOB. He admits to 2 pillow orthopnea, frequent PND and ORTA after 1/2 block and walking up 5 steps. No CP, palpitations, dizziness. Patient's son by bedside. On tele, patient not diuresing well and condition guarded. Admitted to CCU for inotropic therapy, initiation of primacor infusion and IV diuresis with lasix gtt, now on Bumex drip.       PLAN:    Neuro:  AO x 3    Pulm:   Clear to ascultation.   On NC for nasal congestion and symptomatic relief.     Cardiovascular:   NYHA class IV due to ischemic cardiomyopathy. On physical exam, patient is volume overloaded although lungs are relatively clear  -d/c milrinone per HF although pt with decreasing mixed venous and although wedge pressure has improved, CVP has stayed the same indicating right sided failure. Consider restarting.  -Repeat Echo with increased EF and diffuse LV hypokinesis   -Started on Bumex drip at 3mg/hr - 2770 out overnight, negative 1650  -another 2x Diuril 500mg Q12 hrs given overnight  -Strict I/O and daily standing weights. Fluid restriction to 1500mL  -holding all BP meds for now in the setting of rising creatinine to try to increase preload  -Once hemodynamically optimized, consider ICD placement, given low EF and ischemic cardiomyopathy  -to discuss with heart failure team regarding long term goals - pt has previously refused invasive procedures     GI/:  Distended abdomen with dullness to percussion, suspect abdominal fluid overload with possible ascites  -pt with baseline elevated Tbili and Alk phos  -abdominal xray showing diffuse bowel air, ultrasound pending  -Currently no N/V/D. 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 Elevated today. Ultrasound shows renal parenchymal disease.   -Likely secondary to renal venous congestion from cardiorenal syndrome. Diuresis and continue to monitor.     Heme:   Anemia to low 9s with low MCV  iron studies inconclusive  reticulocyte index indicating hypoproliferation  Aspirin and Hep Subq    ID:   No issues    Endo:  DM, on 25 Lantus and ISS.   Fingerstick stable, continue to monitor.     DVT: Heparin  Diet: Regular, DASH/TLC, Carb restricted, fluid 1000mL restrict     Code status: Full code.

## 2019-01-24 NOTE — PROGRESS NOTE ADULT - ASSESSMENT
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 His last admission from 12/8-12/27 required CCU admission and placement on dobutamine. He has had 3 admissions in 2018, for various complications from DM.  He comes in this time due to worsening SOB. He admits to 2 pillow orthopnea, frequent PND and ORTA after 1/2 block and walking up 5 steps. No CP, palpitations, dizziness. Patient's son by bedside.  His dry weight s/p last hospitalization was 132 lbs.  NYHA class IV. Moderately hypervolemic. 1/22/18 PCWP was 23, RA 18 in AM.  CVP 1/24/19 was 14.

## 2019-01-24 NOTE — PROGRESS NOTE ADULT - PROBLEM SELECTOR PLAN 2
Pt. with hyperkalemia in the setting of KESHAV and spironolactone use. Patient received medical management with improvement of serum potassium. Serum potassium  WNL (4.0) on 1/23/19. Pt. on IV Bumex drip. Continue to hold spironolactone. Low potassium diet. Monitor serum potassium

## 2019-01-24 NOTE — PROGRESS NOTE ADULT - SUBJECTIVE AND OBJECTIVE BOX
[facility] DIVISION OF KIDNEY DISEASES AND HYPERTENSION -- FOLLOW UP NOTE  --------------------------------------------------------------------------------  HPI: 68-year-old male with medical history of b/L LE bypass and b/L LE multiple toe amputations, DM2, HFrEF (EF 20%), HTN, Dyslipidemia, CAD, CABG, PVD, groin  sartorius flap, and vac placement, LLE with surgical scar wound admitted for worsening SOB. Nephrology consulted for KESHAV. Currently been treated for ADHF. On review of previous records in [facility]/[place], patient had normal Scr levels from 4/26/16 to 9/24/18. Pt. noted to have an episode of KESHAV during previous/recent hospitalization at [facility] (12/7/18 to 12/27/18). Transfer to CCU on 1/20/19. On this admission, Scr slowly uptrending, latest Scr elevated but stable at 3.30 on 1/23/19.     Pt was seen and examined at bedside at CCU, patient feels better, dyspnea and LE edema slowly improving. Continues to be on bumex and milrinone infusion. Denies dysuria or difficulty urinating. Denies CP, N/V/F/C. Non - oliguric UOP stable in the past 24 hrs.     PAST HISTORY  --------------------------------------------------------------------------------  No significant changes to PMH, PSH, FHx, SHx, unless otherwise noted    ALLERGIES & MEDICATIONS  --------------------------------------------------------------------------------  Allergies    No Known Allergies    Intolerances      Standing Inpatient Medications  aspirin enteric coated 81 milliGRAM(s) Oral daily  buMETAnide Infusion 3 mG/Hr IV Continuous <Continuous>  chlorhexidine 4% Liquid 1 Application(s) Topical <User Schedule>  dextrose 5%. 1000 milliLiter(s) IV Continuous <Continuous>  dextrose 50% Injectable 12.5 Gram(s) IV Push once  dextrose 50% Injectable 25 Gram(s) IV Push once  dextrose 50% Injectable 25 Gram(s) IV Push once  docusate sodium 100 milliGRAM(s) Oral daily  heparin  Injectable 5000 Unit(s) SubCutaneous every 12 hours  insulin glargine Injectable (LANTUS) 25 Unit(s) SubCutaneous at bedtime  insulin lispro (HumaLOG) corrective regimen sliding scale   SubCutaneous three times a day before meals  insulin lispro (HumaLOG) corrective regimen sliding scale   SubCutaneous at bedtime  senna 2 Tablet(s) Oral at bedtime  sevelamer hydrochloride 800 milliGRAM(s) Oral three times a day  simethicone 80 milliGRAM(s) Chew three times a day  sodium chloride 0.9% lock flush 3 milliLiter(s) IV Push every 8 hours  tamsulosin 0.4 milliGRAM(s) Oral at bedtime    REVIEW OF SYSTEMS  --------------------------------------------------------------------------------  Gen: No fevers  Respiratory: + dyspnea (improving)  CV: No chest pain  GI: No abdominal pain  : No dysuria, hematuria  MSK: + edema (improving)    All other systems were reviewed and are negative, except as noted.    VITALS/PHYSICAL EXAM  --------------------------------------------------------------------------------  T(C): 37.4 (01-24-19 @ 04:00), Max: 37.4 (01-24-19 @ 04:00)  HR: 81 (01-24-19 @ 06:00) (80 - 94)  BP: 105/53 (01-24-19 @ 06:00) (99/58 - 125/62)  RR: 10 (01-24-19 @ 06:00) (8 - 21)  SpO2: 95% (01-24-19 @ 06:00) (91% - 99%)  Wt(kg): --        01-23-19 @ 07:01  -  01-24-19 @ 07:00  --------------------------------------------------------  IN: 1118 mL / OUT: 2770 mL / NET: -1652 mL        Physical Exam:  	Gen: resting, NAD  	HEENT: No JVD  	Pulm: decreased breath sounds B/L  	CV: S1S2  	Abd: Soft, +BS   	Ext: + b/l LE edema  	Neuro: Awake  	Skin: Warm and dry    LABS/STUDIES  --------------------------------------------------------------------------------              8.2    5.76  >-----------<  148      [01-24-19 @ 06:35]              25.4     134  |  95  |  79  ----------------------------<  59      [01-23-19 @ 04:30]  4.0   |  23  |  3.30        Ca     8.8     [01-23-19 @ 04:30]      Mg     2.3     [01-23-19 @ 04:30]      Phos  6.3     [01-23-19 @ 04:30]    TPro  6.6  /  Alb  2.9  /  TBili  1.4  /  DBili  x   /  AST  22  /  ALT  23  /  AlkPhos  384  [01-22-19 @ 12:50]          [01-23-19 @ 16:42]    Creatinine Trend:  SCr 3.30 [01-23 @ 04:30]  SCr 3.20 [01-22 @ 12:50]  SCr 3.28 [01-22 @ 05:00]  SCr 3.19 [01-21 @ 15:00]  SCr 2.75 [01-21 @ 06:30] [facility] DIVISION OF KIDNEY DISEASES AND HYPERTENSION -- FOLLOW UP NOTE  --------------------------------------------------------------------------------  HPI: 68-year-old male with medical history of b/L LE bypass and b/L LE multiple toe amputations, DM2, HFrEF (EF 20%), HTN, Dyslipidemia, CAD, CABG, PVD, groin  sartorius flap, and vac placement, LLE with surgical scar wound admitted for worsening SOB. Nephrology consulted for KESHAV. Currently been treated for ADHF. On review of previous records in [facility]/[place], patient had normal Scr levels from 4/26/16 to 9/24/18. Pt. noted to have an episode of KESHAV during previous/recent hospitalization at [facility] (12/7/18 to 12/27/18). Transfer to CCU on 1/20/19. On this admission, Scr slowly uptrending, latest Scr elevated but stable at 3.30 on 1/23/19.     Pt was seen and examined at bedside at CCU, patient feels better, dyspnea and LE edema slowly improving. Continues to be on bumex and milrinone infusion. Denies dysuria or difficulty urinating. Denies CP, N/V/F/C. Non - oliguric UOP stable in the past 24 hrs.     PAST HISTORY  --------------------------------------------------------------------------------  No significant changes to PMH, PSH, FHx, SHx, unless otherwise noted    ALLERGIES & MEDICATIONS  --------------------------------------------------------------------------------  Allergies    No Known Allergies    Intolerances    Standing Inpatient Medications  aspirin enteric coated 81 milliGRAM(s) Oral daily  buMETAnide Infusion 3 mG/Hr IV Continuous <Continuous>  chlorhexidine 4% Liquid 1 Application(s) Topical <User Schedule>  dextrose 5%. 1000 milliLiter(s) IV Continuous <Continuous>  dextrose 50% Injectable 12.5 Gram(s) IV Push once  dextrose 50% Injectable 25 Gram(s) IV Push once  dextrose 50% Injectable 25 Gram(s) IV Push once  docusate sodium 100 milliGRAM(s) Oral daily  heparin  Injectable 5000 Unit(s) SubCutaneous every 12 hours  insulin glargine Injectable (LANTUS) 25 Unit(s) SubCutaneous at bedtime  insulin lispro (HumaLOG) corrective regimen sliding scale   SubCutaneous three times a day before meals  insulin lispro (HumaLOG) corrective regimen sliding scale   SubCutaneous at bedtime  senna 2 Tablet(s) Oral at bedtime  sevelamer hydrochloride 800 milliGRAM(s) Oral three times a day  simethicone 80 milliGRAM(s) Chew three times a day  sodium chloride 0.9% lock flush 3 milliLiter(s) IV Push every 8 hours  tamsulosin 0.4 milliGRAM(s) Oral at bedtime    REVIEW OF SYSTEMS  --------------------------------------------------------------------------------  Gen: No fevers  Respiratory: + dyspnea (improving)  CV: No chest pain  GI: No abdominal pain  : No dysuria, hematuria  MSK: + edema (improving)    All other systems were reviewed and are negative, except as noted.    VITALS/PHYSICAL EXAM  --------------------------------------------------------------------------------  T(C): 37.4 (01-24-19 @ 04:00), Max: 37.4 (01-24-19 @ 04:00)  HR: 81 (01-24-19 @ 06:00) (80 - 94)  BP: 105/53 (01-24-19 @ 06:00) (99/58 - 125/62)  RR: 10 (01-24-19 @ 06:00) (8 - 21)  SpO2: 95% (01-24-19 @ 06:00) (91% - 99%)  Wt(kg): --    01-23-19 @ 07:01  -  01-24-19 @ 07:00  --------------------------------------------------------  IN: 1118 mL / OUT: 2770 mL / NET: -1652 mL    Physical Exam:  	Gen: resting, NAD  	HEENT: No JVD  	Pulm: decreased breath sounds B/L  	CV: S1S2  	Abd: Soft, +BS   	Ext: + b/l LE edema  	Neuro: Awake  	Skin: Warm and dry    LABS/STUDIES  --------------------------------------------------------------------------------              8.2    5.76  >-----------<  148      [01-24-19 @ 06:35]              25.4     134  |  95  |  79  ----------------------------<  59      [01-23-19 @ 04:30]  4.0   |  23  |  3.30        Ca     8.8     [01-23-19 @ 04:30]      Mg     2.3     [01-23-19 @ 04:30]      Phos  6.3     [01-23-19 @ 04:30]    TPro  6.6  /  Alb  2.9  /  TBili  1.4  /  DBili  x   /  AST  22  /  ALT  23  /  AlkPhos  384  [01-22-19 @ 12:50]    Creatinine Trend:  SCr 3.30 [01-23 @ 04:30]  SCr 3.20 [01-22 @ 12:50]  SCr 3.28 [01-22 @ 05:00]  SCr 3.19 [01-21 @ 15:00]  SCr 2.75 [01-21 @ 06:30]

## 2019-01-24 NOTE — PROGRESS NOTE ADULT - PROBLEM SELECTOR PLAN 1
Severely hypervolemic. RA 18 this AM. PA sat 76.9%. PCWP 23. PA 73/30/47.  Increased Bumex gtt to 3 mg/hr.  Add diuril 500 mg IV BID x 1 day.  Decreased milrinone to 0.125 mcg/kg/min. Send repeat sat.  Strict I/O. Dry weight is 132 lbs. Unable to get standing weight currently.  Continue Coreg 6.25 mg po BID.  Was hyperkalemic, hold spironolactone.   Continue hydral 25 mg po TID.   Continue Isordil 10 mg po TID.  Pt refused ICD when discussed w/ . Family at bedside. Will have ongoing discussions. moderately hypervolemic. RA 14 this AM. PA sat 51.3%. CO 4.9,CI 2.5.  Continue Bumex gtt 3 mg/hr.  Will add back milrinone 0.125 mcg/kg/min.   Send repeat sat.  Strict I/O. Dry weight is 132 lbs. Unable to get standing weight currently.  Holding Coreg, wali, hydral, isordil.  Pt refused ICD when discussed w/ . Family at bedside. Will have ongoing discussions.

## 2019-01-24 NOTE — PROGRESS NOTE ADULT - PROBLEM SELECTOR PLAN 1
Pt. with KESHAV in the setting of ADHF. On review of previous records in [facility]/[place], patient with normal Scr levels from 4/26/16 to 9/24/18. Pt. noted to have an episode of KESHAV during previous/recent hospitalization at [facility] (12/7/18 to 12/27/18). On this admission, Scr was elevated at 2.15 on 1/16/19, increased to 2.44 to 1/18/19. Scr increased but stable at 3.30 on 1/23/19. Check BMP today. Pt. on Bumex gtt and milrinone per CCU team. US Kidney showed increased bilateral renal cortical echogenicity, no hydronephrosis on 1/18/19. Monitor labs and urine output. Avoid nephrotoxins. Dose medications as per eGFR

## 2019-01-24 NOTE — PROGRESS NOTE ADULT - PROBLEM SELECTOR PLAN 2
Unclear etiology of KESHAV  Continue milrinone as above  Diuretics as above  No LV thrombus on TTE Unclear etiology of KESHAV  Continue milrinone as above  Diuretics as above  No LV thrombus on TTE  Suggest getting arterial studies of kidneys and b/l LE.

## 2019-01-24 NOTE — PROGRESS NOTE ADULT - SUBJECTIVE AND OBJECTIVE BOX
Keith Burns, PGY1  Pager: [phone]      Patient is a 68y old  Male who presents with a chief complaint of Dyspnea x 2 days (24 Jan 2019 07:22)      SUBJECTIVE / OVERNIGHT EVENTS: MV on VBG trending down overnight, milrinone not restarted. Pt hemodynamically stable.    REVIEW OF SYSTEMS:    CONSTITUTIONAL: No weakness, fevers or chills  EYES/ENT: No visual changes;  No vertigo or throat pain   NECK: No pain or stiffness  RESPIRATORY: No cough, wheezing, hemoptysis; No shortness of breath  CARDIOVASCULAR: No chest pain or palpitations  GASTROINTESTINAL: No abdominal or epigastric pain. No nausea, vomiting, or hematemesis; No diarrhea or constipation. No melena or hematochezia.  GENITOURINARY: No dysuria, frequency or hematuria  NEUROLOGICAL: No numbness or weakness  SKIN: No itching, rashes    MEDICATIONS  (STANDING):  aspirin enteric coated 81 milliGRAM(s) Oral daily  buMETAnide Infusion 3 mG/Hr (15 mL/Hr) IV Continuous <Continuous>  chlorhexidine 4% Liquid 1 Application(s) Topical <User Schedule>  dextrose 5%. 1000 milliLiter(s) (50 mL/Hr) IV Continuous <Continuous>  dextrose 50% Injectable 12.5 Gram(s) IV Push once  dextrose 50% Injectable 25 Gram(s) IV Push once  dextrose 50% Injectable 25 Gram(s) IV Push once  docusate sodium 100 milliGRAM(s) Oral daily  heparin  Injectable 5000 Unit(s) SubCutaneous every 12 hours  insulin glargine Injectable (LANTUS) 25 Unit(s) SubCutaneous at bedtime  insulin lispro (HumaLOG) corrective regimen sliding scale   SubCutaneous three times a day before meals  insulin lispro (HumaLOG) corrective regimen sliding scale   SubCutaneous at bedtime  senna 2 Tablet(s) Oral at bedtime  sevelamer hydrochloride 800 milliGRAM(s) Oral three times a day  simethicone 80 milliGRAM(s) Chew three times a day  sodium chloride 0.9% lock flush 3 milliLiter(s) IV Push every 8 hours  tamsulosin 0.4 milliGRAM(s) Oral at bedtime    MEDICATIONS  (PRN):  calamine Lotion 1 Application(s) Topical daily PRN Rash and/or Itching  dextrose 40% Gel 15 Gram(s) Oral once PRN Blood Glucose LESS THAN 70 milliGRAM(s)/deciliter  fluticasone propionate 50 MICROgram(s)/spray Nasal Spray 1 Spray(s) Both Nostrils two times a day PRN nasal congestion  glucagon  Injectable 1 milliGRAM(s) IntraMuscular once PRN Glucose LESS THAN 70 milligrams/deciliter  guaiFENesin   Syrup  (Sugar-Free) 200 milliGRAM(s) Oral every 6 hours PRN Cough  oxyCODONE    IR 5 milliGRAM(s) Oral every 6 hours PRN Moderate Pain (4 - 6)      Vital Signs Last 24 Hrs  T(C): 36.8 (24 Jan 2019 07:50), Max: 37.4 (24 Jan 2019 04:00)  T(F): 98.2 (24 Jan 2019 07:50), Max: 99.3 (24 Jan 2019 04:00)  HR: 81 (24 Jan 2019 06:00) (81 - 94)  BP: 105/53 (24 Jan 2019 06:00) (99/58 - 125/62)  BP(mean): 63 (24 Jan 2019 06:00) (56 - 83)  RR: 10 (24 Jan 2019 06:00) (8 - 21)  SpO2: 95% (24 Jan 2019 06:00) (91% - 99%)  CAPILLARY BLOOD GLUCOSE      POCT Blood Glucose.: 187 mg/dL (23 Jan 2019 21:34)  POCT Blood Glucose.: 120 mg/dL (23 Jan 2019 16:31)  POCT Blood Glucose.: 104 mg/dL (23 Jan 2019 11:56)  POCT Blood Glucose.: 105 mg/dL (23 Jan 2019 09:12)  POCT Blood Glucose.: 87 mg/dL (23 Jan 2019 08:48)  POCT Blood Glucose.: 68 mg/dL (23 Jan 2019 08:27)    I&O's Summary    23 Jan 2019 07:01  -  24 Jan 2019 07:00  --------------------------------------------------------  IN: 1118 mL / OUT: 2770 mL / NET: -1652 mL        PHYSICAL EXAM:  GENERAL: NAD, well-developed  EYES: EOMI, PERRLA, conjunctiva and sclera clear  NECK:  No JVD  CHEST/LUNG: CTABL ; No wheeze  HEART: RRR; No murmurs  ABDOMEN: Soft, Nontender, Nondistended; Bowel sounds present  : No Palacios  EXTREMITIES:  2+ Peripheral Pulses, No edema  PSYCH: AAOx3  NEUROLOGY: non-focal  SKIN: No rashes or lesions. No sacral ulcer    LABS:                        8.2    5.76  )-----------( 148      ( 24 Jan 2019 06:35 )             25.4     01-24    134<L>  |  91<L>  |  83<H>  ----------------------------<  118<H>  4.0   |  26  |  3.69<H>    Ca    8.9      24 Jan 2019 06:35  Phos  7.3     01-24  Mg     2.4     01-24    TPro  6.6  /  Alb  2.9<L>  /  TBili  1.4<H>  /  DBili  x   /  AST  22  /  ALT  23  /  AlkPhos  384<H>  01-22      CARDIAC MARKERS ( 23 Jan 2019 16:42 )  x     / x     / 117 u/L / x     / x              Microbiology;        RADIOLOGY & ADDITIONAL TESTS:  < from: TTE with Doppler (w/Cont) (01.20.19 @ 13:36) >  CONCLUSIONS:  1. Tethered mitral valve leaflets. Moderate mitral  regurgitation.  2. Calcified trileaflet aortic valve with mildly decreased  opening.  3. Normal left ventricular internal dimensions and wall  thicknesses.  4. Moderate to severe segmental left ventricular systolic  dysfunction. The basal to mid septum, basal to mid  inferior, basal to mid anterolateral walls are hypokinetic.  5. Right ventricular enlargement with decreased right  ventricular systolic function.  6. Estimated pulmonary artery systolic pressure equals 43  mmHg, assuming right atrial pressure equals 10  mm Hg,  consistent with mild pulmonary hypertension.    < end of copied text >    < from: US Kidney and Bladder (01.18.19 @ 10:34) >  IMPRESSION:     No hydronephrosis of either kidney.    Increased bilateral renal cortical echogenicity, consistent with renal   parenchymal disease.    Free fluid in both lower quadrants.    < end of copied text >      Imaging Personally Reviewed: No          Consultant(s) Notes Reviewed: Nephrology, HF    Care Discussed with Consultants/Other Providers: Nephrology, HF Keith Burns, PGY1  Pager: [phone]      Patient is a 68y old  Male who presents with a chief complaint of Dyspnea x 2 days (24 Jan 2019 07:22)      SUBJECTIVE / OVERNIGHT EVENTS: MV on VBG trending down overnight, milrinone not restarted. Pt hemodynamically stable. This morning pt endorses feeling better and "lighter."     REVIEW OF SYSTEMS:    CONSTITUTIONAL: No weakness, fevers or chills  EYES/ENT: No visual changes;  No vertigo or throat pain   NECK: No pain or stiffness  RESPIRATORY: No cough, wheezing, hemoptysis; No shortness of breath  CARDIOVASCULAR: No chest pain or palpitations  GASTROINTESTINAL: No abdominal or epigastric pain. No nausea, vomiting, or hematemesis; No diarrhea or constipation. No melena or hematochezia.  GENITOURINARY: No dysuria, frequency or hematuria  NEUROLOGICAL: No numbness or weakness  SKIN: No itching, rashes    MEDICATIONS  (STANDING):  aspirin enteric coated 81 milliGRAM(s) Oral daily  buMETAnide Infusion 3 mG/Hr (15 mL/Hr) IV Continuous <Continuous>  chlorhexidine 4% Liquid 1 Application(s) Topical <User Schedule>  dextrose 5%. 1000 milliLiter(s) (50 mL/Hr) IV Continuous <Continuous>  dextrose 50% Injectable 12.5 Gram(s) IV Push once  dextrose 50% Injectable 25 Gram(s) IV Push once  dextrose 50% Injectable 25 Gram(s) IV Push once  docusate sodium 100 milliGRAM(s) Oral daily  heparin  Injectable 5000 Unit(s) SubCutaneous every 12 hours  insulin glargine Injectable (LANTUS) 25 Unit(s) SubCutaneous at bedtime  insulin lispro (HumaLOG) corrective regimen sliding scale   SubCutaneous three times a day before meals  insulin lispro (HumaLOG) corrective regimen sliding scale   SubCutaneous at bedtime  senna 2 Tablet(s) Oral at bedtime  sevelamer hydrochloride 800 milliGRAM(s) Oral three times a day  simethicone 80 milliGRAM(s) Chew three times a day  sodium chloride 0.9% lock flush 3 milliLiter(s) IV Push every 8 hours  tamsulosin 0.4 milliGRAM(s) Oral at bedtime    MEDICATIONS  (PRN):  calamine Lotion 1 Application(s) Topical daily PRN Rash and/or Itching  dextrose 40% Gel 15 Gram(s) Oral once PRN Blood Glucose LESS THAN 70 milliGRAM(s)/deciliter  fluticasone propionate 50 MICROgram(s)/spray Nasal Spray 1 Spray(s) Both Nostrils two times a day PRN nasal congestion  glucagon  Injectable 1 milliGRAM(s) IntraMuscular once PRN Glucose LESS THAN 70 milligrams/deciliter  guaiFENesin   Syrup  (Sugar-Free) 200 milliGRAM(s) Oral every 6 hours PRN Cough  oxyCODONE    IR 5 milliGRAM(s) Oral every 6 hours PRN Moderate Pain (4 - 6)      Vital Signs Last 24 Hrs  T(C): 36.8 (24 Jan 2019 07:50), Max: 37.4 (24 Jan 2019 04:00)  T(F): 98.2 (24 Jan 2019 07:50), Max: 99.3 (24 Jan 2019 04:00)  HR: 81 (24 Jan 2019 06:00) (81 - 94)  BP: 105/53 (24 Jan 2019 06:00) (99/58 - 125/62)  BP(mean): 63 (24 Jan 2019 06:00) (56 - 83)  RR: 10 (24 Jan 2019 06:00) (8 - 21)  SpO2: 95% (24 Jan 2019 06:00) (91% - 99%)  CAPILLARY BLOOD GLUCOSE      POCT Blood Glucose.: 187 mg/dL (23 Jan 2019 21:34)  POCT Blood Glucose.: 120 mg/dL (23 Jan 2019 16:31)  POCT Blood Glucose.: 104 mg/dL (23 Jan 2019 11:56)  POCT Blood Glucose.: 105 mg/dL (23 Jan 2019 09:12)  POCT Blood Glucose.: 87 mg/dL (23 Jan 2019 08:48)  POCT Blood Glucose.: 68 mg/dL (23 Jan 2019 08:27)    I&O's Summary    23 Jan 2019 07:01  -  24 Jan 2019 07:00  --------------------------------------------------------  IN: 1118 mL / OUT: 2770 mL / NET: -1652 mL        PHYSICAL EXAM:  GENERAL: Appears older than stated age, sitting upright in hospital bed  EYES: EOMI, PERRLA, conjunctiva and sclera clear  NECK:  Mild JVD, right IJ pulmonary artery catheter  CHEST/LUNG: Difficult to auscultate, sounds clear   HEART: RRR; No murmurs  ABDOMEN: Severely distended, pain on palpation of left side without rigidity or guarding. Positive bowel sounds. Tympanic to percussion. Improved since yesterday.   : No Palacios  EXTREMITIES:  2+ Peripheral Pulses, 2+ pitting edema up to mid calf bilaterally   PSYCH: AAOx3  NEUROLOGY: non-focal  SKIN: No rashes or lesions. No sacral ulcer  LABS:                        8.2    5.76  )-----------( 148      ( 24 Jan 2019 06:35 )             25.4     01-24    134<L>  |  91<L>  |  83<H>  ----------------------------<  118<H>  4.0   |  26  |  3.69<H>    Ca    8.9      24 Jan 2019 06:35  Phos  7.3     01-24  Mg     2.4     01-24    TPro  6.6  /  Alb  2.9<L>  /  TBili  1.4<H>  /  DBili  x   /  AST  22  /  ALT  23  /  AlkPhos  384<H>  01-22      CARDIAC MARKERS ( 23 Jan 2019 16:42 )  x     / x     / 117 u/L / x     / x              Microbiology;        RADIOLOGY & ADDITIONAL TESTS:  < from: TTE with Doppler (w/Cont) (01.20.19 @ 13:36) >  CONCLUSIONS:  1. Tethered mitral valve leaflets. Moderate mitral  regurgitation.  2. Calcified trileaflet aortic valve with mildly decreased  opening.  3. Normal left ventricular internal dimensions and wall  thicknesses.  4. Moderate to severe segmental left ventricular systolic  dysfunction. The basal to mid septum, basal to mid  inferior, basal to mid anterolateral walls are hypokinetic.  5. Right ventricular enlargement with decreased right  ventricular systolic function.  6. Estimated pulmonary artery systolic pressure equals 43  mmHg, assuming right atrial pressure equals 10  mm Hg,  consistent with mild pulmonary hypertension.    < end of copied text >    < from: US Kidney and Bladder (01.18.19 @ 10:34) >  IMPRESSION:     No hydronephrosis of either kidney.    Increased bilateral renal cortical echogenicity, consistent with renal   parenchymal disease.    Free fluid in both lower quadrants.    < end of copied text >      Imaging Personally Reviewed: No          Consultant(s) Notes Reviewed: Nephrology, HF    Care Discussed with Consultants/Other Providers: Nephrology, HF

## 2019-01-24 NOTE — PROGRESS NOTE ADULT - SUBJECTIVE AND OBJECTIVE BOX
Patient seen and examined. He states he feels better today, less lethargic. But still dyspnic.       Medications:  aspirin enteric coated 81 milliGRAM(s) Oral daily  buMETAnide Infusion 3 mG/Hr IV Continuous <Continuous>  calamine Lotion 1 Application(s) Topical daily PRN  chlorhexidine 4% Liquid 1 Application(s) Topical <User Schedule>  dextrose 40% Gel 15 Gram(s) Oral once PRN  dextrose 5%. 1000 milliLiter(s) IV Continuous <Continuous>  dextrose 50% Injectable 12.5 Gram(s) IV Push once  dextrose 50% Injectable 25 Gram(s) IV Push once  dextrose 50% Injectable 25 Gram(s) IV Push once  docusate sodium 100 milliGRAM(s) Oral daily  fluticasone propionate 50 MICROgram(s)/spray Nasal Spray 1 Spray(s) Both Nostrils two times a day PRN  glucagon  Injectable 1 milliGRAM(s) IntraMuscular once PRN  guaiFENesin   Syrup  (Sugar-Free) 200 milliGRAM(s) Oral every 6 hours PRN  heparin  Injectable 5000 Unit(s) SubCutaneous every 12 hours  insulin glargine Injectable (LANTUS) 25 Unit(s) SubCutaneous at bedtime  insulin lispro (HumaLOG) corrective regimen sliding scale   SubCutaneous three times a day before meals  insulin lispro (HumaLOG) corrective regimen sliding scale   SubCutaneous at bedtime  milrinone Infusion 0.125 MICROgram(s)/kG/Min IV Continuous <Continuous>  oxyCODONE    IR 5 milliGRAM(s) Oral every 6 hours PRN  senna 2 Tablet(s) Oral at bedtime  sevelamer hydrochloride 800 milliGRAM(s) Oral three times a day  simethicone 80 milliGRAM(s) Chew three times a day  sodium chloride 0.65% Nasal 1 Spray(s) Both Nostrils three times a day PRN  sodium chloride 0.9% lock flush 3 milliLiter(s) IV Push every 8 hours  tamsulosin 0.4 milliGRAM(s) Oral at bedtime      Vitals:  Vital Signs Last 24 Hrs  T(C): 37.1 (2019 12:00), Max: 37.4 (2019 04:00)  T(F): 98.8 (2019 12:00), Max: 99.3 (2019 04:00)  HR: 88 (2019 14:00) (79 - 94)  BP: 121/61 (2019 14:00) (90/60 - 125/62)  BP(mean): 75 (2019 14:00) (56 - 102)  RR: 15 (2019 14:00) (8 - 25)  SpO2: 98% (2019 14:00) (91% - 100%)    Daily     Daily Weight in k.3 (2019 06:00)    I&O's Detail    2019 07:01  -  2019 07:00  --------------------------------------------------------  IN:    bumetanide Infusion: 345 mL    IV PiggyBack: 18 mL    milrinone  Infusion: 15 mL    Oral Fluid: 740 mL  Total IN: 1118 mL    OUT:    Voided: 2770 mL  Total OUT: 2770 mL    Total NET: -1652 mL      2019 07:01  -  2019 14:36  --------------------------------------------------------  IN:    bumetanide Infusion: 120 mL    milrinone  Infusion: 9 mL    Oral Fluid: 390 mL  Total IN: 519 mL    OUT:    Voided: 1300 mL  Total OUT: 1300 mL    Total NET: -781 mL          Physical Exam:     General: No distress. Comfortable.  HEENT: EOM intact.  Neck: Neck supple. JVP  elevated. No masses  Chest: Clear to auscultation bilaterally  CV: Normal S1 and S2. No murmurs, rub, or gallops. Radial pulses normal. No LE edema b/l.   Abdomen: Soft, non-distended, non-tender  Skin: No rashes or skin breakdown  Neurology: Alert and oriented times three. Sensation intact  Psych: Affect normal    Labs:                        8.2    5.76  )-----------( 148      ( 2019 06:35 )             25.4     01-24    134<L>  |  91<L>  |  83<H>  ----------------------------<  118<H>  4.0   |  26  |  3.69<H>    Ca    8.9      2019 06:35  Phos  7.3       Mg     2.4             CARDIAC MARKERS ( 2019 16:42 )  x     / x     / 117 u/L / x     / x        < from: TTE with Doppler (w/Cont) (19 @ 13:36) >  DIMENSIONS:  Dimensions:     Normal Values:  LA:       ---     2.0 - 4.0 cm  Ao:     2.7 cm    2.0 - 3.8 cm  SEPTUM: 0.7 cm    0.6 - 1.2 cm  PWT:    1.0 cm    0.6 - 1.1 cm  LVIDd:  5.5 cm    3.0 - 5.6 cm  LVIDs:    ---     1.8 - 4.0 cm  Derived Variables:  LVMI: 92 g/m2  RWT: 0.36  Ejection Fraction (Modified Mon Rule): 35 %  ------------------------------------------------------------------------  OBSERVATIONS:  Mitral Valve: Tethered mitral valve leaflets. Moderate  mitral regurgitation.  Aortic Root: Normal aortic root.  Aortic Valve: Calcified trileaflet aortic valve with mildly  decreased opening.  Left Atrium: Normal left atrium.  LA volume index = 23  cc/m2.  Left Ventricle: Moderate to severe segmental left  ventricular systolic dysfunction. The basal to mid septum,  basal to mid inferior, basal to mid anterolateral walls are  hypokinetic. Normal left ventricular internal dimensions  and wall thicknesses.  Right Heart: Normal right atrium. Right ventricular  enlargement with decreased right ventricular systolic  function. Normal tricuspid valve. Mild tricuspid  regurgitation. Normal pulmonic valve.  Pericardium/PleuraNormal pericardium with no pericardial  effusion.  Hemodynamic: Estimated right ventricular systolic pressure  equals 43 mm Hg, assuming right atrial pressure equals 10  mm Hg, consistent with mild pulmonary hypertension.    < end of copied text > Patient seen and examined. He states he feels better today, less lethargic. But still dyspneic.       Medications:  aspirin enteric coated 81 milliGRAM(s) Oral daily  buMETAnide Infusion 3 mG/Hr IV Continuous <Continuous>  calamine Lotion 1 Application(s) Topical daily PRN  chlorhexidine 4% Liquid 1 Application(s) Topical <User Schedule>  dextrose 40% Gel 15 Gram(s) Oral once PRN  dextrose 5%. 1000 milliLiter(s) IV Continuous <Continuous>  dextrose 50% Injectable 12.5 Gram(s) IV Push once  dextrose 50% Injectable 25 Gram(s) IV Push once  dextrose 50% Injectable 25 Gram(s) IV Push once  docusate sodium 100 milliGRAM(s) Oral daily  fluticasone propionate 50 MICROgram(s)/spray Nasal Spray 1 Spray(s) Both Nostrils two times a day PRN  glucagon  Injectable 1 milliGRAM(s) IntraMuscular once PRN  guaiFENesin   Syrup  (Sugar-Free) 200 milliGRAM(s) Oral every 6 hours PRN  heparin  Injectable 5000 Unit(s) SubCutaneous every 12 hours  insulin glargine Injectable (LANTUS) 25 Unit(s) SubCutaneous at bedtime  insulin lispro (HumaLOG) corrective regimen sliding scale   SubCutaneous three times a day before meals  insulin lispro (HumaLOG) corrective regimen sliding scale   SubCutaneous at bedtime  milrinone Infusion 0.125 MICROgram(s)/kG/Min IV Continuous <Continuous>  oxyCODONE    IR 5 milliGRAM(s) Oral every 6 hours PRN  senna 2 Tablet(s) Oral at bedtime  sevelamer hydrochloride 800 milliGRAM(s) Oral three times a day  simethicone 80 milliGRAM(s) Chew three times a day  sodium chloride 0.65% Nasal 1 Spray(s) Both Nostrils three times a day PRN  sodium chloride 0.9% lock flush 3 milliLiter(s) IV Push every 8 hours  tamsulosin 0.4 milliGRAM(s) Oral at bedtime      Vitals:  Vital Signs Last 24 Hrs  T(C): 37.1 (2019 12:00), Max: 37.4 (2019 04:00)  T(F): 98.8 (2019 12:00), Max: 99.3 (2019 04:00)  HR: 88 (2019 14:00) (79 - 94)  BP: 121/61 (2019 14:00) (90/60 - 125/62)  BP(mean): 75 (2019 14:00) (56 - 102)  RR: 15 (2019 14:00) (8 - 25)  SpO2: 98% (2019 14:00) (91% - 100%)    Daily     Daily Weight in k.3 (2019 06:00)    I&O's Detail    2019 07:01  -  2019 07:00  --------------------------------------------------------  IN:    bumetanide Infusion: 345 mL    IV PiggyBack: 18 mL    milrinone  Infusion: 15 mL    Oral Fluid: 740 mL  Total IN: 1118 mL    OUT:    Voided: 2770 mL  Total OUT: 2770 mL    Total NET: -1652 mL      2019 07:01  -  2019 14:36  --------------------------------------------------------  IN:    bumetanide Infusion: 120 mL    milrinone  Infusion: 9 mL    Oral Fluid: 390 mL  Total IN: 519 mL    OUT:    Voided: 1300 mL  Total OUT: 1300 mL    Total NET: -781 mL          Physical Exam:     General: No distress. Comfortable.  HEENT: EOM intact.  Neck: Neck supple. JVP  elevated. No masses  Chest: Clear to auscultation bilaterally  CV: Normal S1 and S2. No murmurs, rub, or gallops. Radial pulses normal. No LE edema b/l.   Abdomen: Soft, non-distended, non-tender  Skin: No rashes or skin breakdown  Neurology: Alert and oriented times three. Sensation intact  Psych: Affect normal    Labs:                        8.2    5.76  )-----------( 148      ( 2019 06:35 )             25.4     -    134<L>  |  91<L>  |  83<H>  ----------------------------<  118<H>  4.0   |  26  |  3.69<H>    Ca    8.9      2019 06:35  Phos  7.3       Mg     2.4             CARDIAC MARKERS ( 2019 16:42 )  x     / x     / 117 u/L / x     / x        < from: TTE with Doppler (w/Cont) (19 @ 13:36) >  DIMENSIONS:  Dimensions:     Normal Values:  LA:       ---     2.0 - 4.0 cm  Ao:     2.7 cm    2.0 - 3.8 cm  SEPTUM: 0.7 cm    0.6 - 1.2 cm  PWT:    1.0 cm    0.6 - 1.1 cm  LVIDd:  5.5 cm    3.0 - 5.6 cm  LVIDs:    ---     1.8 - 4.0 cm  Derived Variables:  LVMI: 92 g/m2  RWT: 0.36  Ejection Fraction (Modified Mon Rule): 35 %  ------------------------------------------------------------------------  OBSERVATIONS:  Mitral Valve: Tethered mitral valve leaflets. Moderate  mitral regurgitation.  Aortic Root: Normal aortic root.  Aortic Valve: Calcified trileaflet aortic valve with mildly  decreased opening.  Left Atrium: Normal left atrium.  LA volume index = 23  cc/m2.  Left Ventricle: Moderate to severe segmental left  ventricular systolic dysfunction. The basal to mid septum,  basal to mid inferior, basal to mid anterolateral walls are  hypokinetic. Normal left ventricular internal dimensions  and wall thicknesses.  Right Heart: Normal right atrium. Right ventricular  enlargement with decreased right ventricular systolic  function. Normal tricuspid valve. Mild tricuspid  regurgitation. Normal pulmonic valve.  Pericardium/PleuraNormal pericardium with no pericardial  effusion.  Hemodynamic: Estimated right ventricular systolic pressure  equals 43 mm Hg, assuming right atrial pressure equals 10  mm Hg, consistent with mild pulmonary hypertension.    < end of copied text >

## 2019-01-25 LAB
ANION GAP SERPL CALC-SCNC: 18 MMO/L — HIGH (ref 7–14)
ANION GAP SERPL CALC-SCNC: 19 MMO/L — HIGH (ref 7–14)
BASE EXCESS BLDV CALC-SCNC: 3.8 MMOL/L — SIGNIFICANT CHANGE UP
BASE EXCESS BLDV CALC-SCNC: 4.7 MMOL/L — SIGNIFICANT CHANGE UP
BASE EXCESS BLDV CALC-SCNC: 4.8 MMOL/L — SIGNIFICANT CHANGE UP
BASE EXCESS BLDV CALC-SCNC: 5.8 MMOL/L — SIGNIFICANT CHANGE UP
BLOOD GAS VENOUS - CREATININE: 3.59 MG/DL — HIGH (ref 0.5–1.3)
BLOOD GAS VENOUS - CREATININE: 3.63 MG/DL — HIGH (ref 0.5–1.3)
BLOOD GAS VENOUS - CREATININE: 3.82 MG/DL — HIGH (ref 0.5–1.3)
BLOOD GAS VENOUS - CREATININE: 3.83 MG/DL — HIGH (ref 0.5–1.3)
BUN SERPL-MCNC: 88 MG/DL — HIGH (ref 7–23)
BUN SERPL-MCNC: 93 MG/DL — HIGH (ref 7–23)
CALCIUM SERPL-MCNC: 9.2 MG/DL — SIGNIFICANT CHANGE UP (ref 8.4–10.5)
CALCIUM SERPL-MCNC: 9.3 MG/DL — SIGNIFICANT CHANGE UP (ref 8.4–10.5)
CHLORIDE BLDV-SCNC: 93 MMOL/L — LOW (ref 96–108)
CHLORIDE BLDV-SCNC: 95 MMOL/L — LOW (ref 96–108)
CHLORIDE BLDV-SCNC: 96 MMOL/L — SIGNIFICANT CHANGE UP (ref 96–108)
CHLORIDE BLDV-SCNC: 96 MMOL/L — SIGNIFICANT CHANGE UP (ref 96–108)
CHLORIDE SERPL-SCNC: 88 MMOL/L — LOW (ref 98–107)
CHLORIDE SERPL-SCNC: 90 MMOL/L — LOW (ref 98–107)
CO2 SERPL-SCNC: 26 MMOL/L — SIGNIFICANT CHANGE UP (ref 22–31)
CO2 SERPL-SCNC: 27 MMOL/L — SIGNIFICANT CHANGE UP (ref 22–31)
CREAT SERPL-MCNC: 3.88 MG/DL — HIGH (ref 0.5–1.3)
CREAT SERPL-MCNC: 4 MG/DL — HIGH (ref 0.5–1.3)
GAS PNL BLDV: 129 MMOL/L — LOW (ref 136–146)
GAS PNL BLDV: 130 MMOL/L — LOW (ref 136–146)
GAS PNL BLDV: 131 MMOL/L — LOW (ref 136–146)
GAS PNL BLDV: 132 MMOL/L — LOW (ref 136–146)
GLUCOSE BLDC GLUCOMTR-MCNC: 126 MG/DL — HIGH (ref 70–99)
GLUCOSE BLDC GLUCOMTR-MCNC: 210 MG/DL — HIGH (ref 70–99)
GLUCOSE BLDC GLUCOMTR-MCNC: 215 MG/DL — HIGH (ref 70–99)
GLUCOSE BLDC GLUCOMTR-MCNC: 78 MG/DL — SIGNIFICANT CHANGE UP (ref 70–99)
GLUCOSE BLDV-MCNC: 121 — HIGH (ref 70–99)
GLUCOSE BLDV-MCNC: 206 — HIGH (ref 70–99)
GLUCOSE BLDV-MCNC: 246 — HIGH (ref 70–99)
GLUCOSE BLDV-MCNC: 77 — SIGNIFICANT CHANGE UP (ref 70–99)
GLUCOSE SERPL-MCNC: 205 MG/DL — HIGH (ref 70–99)
GLUCOSE SERPL-MCNC: 78 MG/DL — SIGNIFICANT CHANGE UP (ref 70–99)
HCO3 BLDV-SCNC: 27 MMOL/L — SIGNIFICANT CHANGE UP (ref 20–27)
HCO3 BLDV-SCNC: 28 MMOL/L — HIGH (ref 20–27)
HCO3 BLDV-SCNC: 28 MMOL/L — HIGH (ref 20–27)
HCO3 BLDV-SCNC: 29 MMOL/L — HIGH (ref 20–27)
HCT VFR BLD CALC: 26.6 % — LOW (ref 39–50)
HCT VFR BLD CALC: 26.7 % — LOW (ref 39–50)
HCT VFR BLDV CALC: 26.5 % — LOW (ref 39–51)
HCT VFR BLDV CALC: 26.7 % — LOW (ref 39–51)
HCT VFR BLDV CALC: 26.9 % — LOW (ref 39–51)
HCT VFR BLDV CALC: 26.9 % — LOW (ref 39–51)
HGB BLD-MCNC: 8.5 G/DL — LOW (ref 13–17)
HGB BLD-MCNC: 8.6 G/DL — LOW (ref 13–17)
HGB BLDV-MCNC: 8.5 G/DL — LOW (ref 13–17)
HGB BLDV-MCNC: 8.6 G/DL — LOW (ref 13–17)
HGB BLDV-MCNC: 8.6 G/DL — LOW (ref 13–17)
HGB BLDV-MCNC: 8.7 G/DL — LOW (ref 13–17)
LACTATE BLDV-MCNC: 0.9 MMOL/L — SIGNIFICANT CHANGE UP (ref 0.5–2)
LACTATE BLDV-MCNC: 0.9 MMOL/L — SIGNIFICANT CHANGE UP (ref 0.5–2)
LACTATE BLDV-MCNC: 1.3 MMOL/L — SIGNIFICANT CHANGE UP (ref 0.5–2)
LACTATE BLDV-MCNC: 1.4 MMOL/L — SIGNIFICANT CHANGE UP (ref 0.5–2)
MAGNESIUM SERPL-MCNC: 2.4 MG/DL — SIGNIFICANT CHANGE UP (ref 1.6–2.6)
MAGNESIUM SERPL-MCNC: 2.4 MG/DL — SIGNIFICANT CHANGE UP (ref 1.6–2.6)
MCHC RBC-ENTMCNC: 22.7 PG — LOW (ref 27–34)
MCHC RBC-ENTMCNC: 23.1 PG — LOW (ref 27–34)
MCHC RBC-ENTMCNC: 31.8 % — LOW (ref 32–36)
MCHC RBC-ENTMCNC: 32.3 % — SIGNIFICANT CHANGE UP (ref 32–36)
MCV RBC AUTO: 71.3 FL — LOW (ref 80–100)
MCV RBC AUTO: 71.4 FL — LOW (ref 80–100)
NRBC # FLD: 0 K/UL — LOW (ref 25–125)
NRBC # FLD: 0 K/UL — LOW (ref 25–125)
PCO2 BLDV: 48 MMHG — SIGNIFICANT CHANGE UP (ref 41–51)
PCO2 BLDV: 48 MMHG — SIGNIFICANT CHANGE UP (ref 41–51)
PCO2 BLDV: 49 MMHG — SIGNIFICANT CHANGE UP (ref 41–51)
PCO2 BLDV: 50 MMHG — SIGNIFICANT CHANGE UP (ref 41–51)
PH BLDV: 7.39 PH — SIGNIFICANT CHANGE UP (ref 7.32–7.43)
PH BLDV: 7.39 PH — SIGNIFICANT CHANGE UP (ref 7.32–7.43)
PH BLDV: 7.4 PH — SIGNIFICANT CHANGE UP (ref 7.32–7.43)
PH BLDV: 7.41 PH — SIGNIFICANT CHANGE UP (ref 7.32–7.43)
PHOSPHATE SERPL-MCNC: 7.7 MG/DL — HIGH (ref 2.5–4.5)
PHOSPHATE SERPL-MCNC: 7.9 MG/DL — HIGH (ref 2.5–4.5)
PLATELET # BLD AUTO: 124 K/UL — LOW (ref 150–400)
PLATELET # BLD AUTO: 149 K/UL — LOW (ref 150–400)
PMV BLD: SIGNIFICANT CHANGE UP FL (ref 7–13)
PMV BLD: SIGNIFICANT CHANGE UP FL (ref 7–13)
PO2 BLDV: 34 MMHG — LOW (ref 35–40)
PO2 BLDV: 35 MMHG — SIGNIFICANT CHANGE UP (ref 35–40)
PO2 BLDV: 36 MMHG — SIGNIFICANT CHANGE UP (ref 35–40)
PO2 BLDV: 39 MMHG — SIGNIFICANT CHANGE UP (ref 35–40)
POTASSIUM BLDV-SCNC: 3.3 MMOL/L — LOW (ref 3.4–4.5)
POTASSIUM BLDV-SCNC: 3.4 MMOL/L — SIGNIFICANT CHANGE UP (ref 3.4–4.5)
POTASSIUM BLDV-SCNC: 3.9 MMOL/L — SIGNIFICANT CHANGE UP (ref 3.4–4.5)
POTASSIUM BLDV-SCNC: 4.2 MMOL/L — SIGNIFICANT CHANGE UP (ref 3.4–4.5)
POTASSIUM SERPL-MCNC: 3.6 MMOL/L — SIGNIFICANT CHANGE UP (ref 3.5–5.3)
POTASSIUM SERPL-MCNC: 4.1 MMOL/L — SIGNIFICANT CHANGE UP (ref 3.5–5.3)
POTASSIUM SERPL-SCNC: 3.6 MMOL/L — SIGNIFICANT CHANGE UP (ref 3.5–5.3)
POTASSIUM SERPL-SCNC: 4.1 MMOL/L — SIGNIFICANT CHANGE UP (ref 3.5–5.3)
RBC # BLD: 3.73 M/UL — LOW (ref 4.2–5.8)
RBC # BLD: 3.74 M/UL — LOW (ref 4.2–5.8)
RBC # FLD: 24.4 % — HIGH (ref 10.3–14.5)
RBC # FLD: 24.5 % — HIGH (ref 10.3–14.5)
SAO2 % BLDV: 52.7 % — LOW (ref 60–85)
SAO2 % BLDV: 54.4 % — LOW (ref 60–85)
SAO2 % BLDV: 56.6 % — LOW (ref 60–85)
SAO2 % BLDV: 61.6 % — SIGNIFICANT CHANGE UP (ref 60–85)
SODIUM SERPL-SCNC: 133 MMOL/L — LOW (ref 135–145)
SODIUM SERPL-SCNC: 135 MMOL/L — SIGNIFICANT CHANGE UP (ref 135–145)
WBC # BLD: 6.15 K/UL — SIGNIFICANT CHANGE UP (ref 3.8–10.5)
WBC # BLD: 6.49 K/UL — SIGNIFICANT CHANGE UP (ref 3.8–10.5)
WBC # FLD AUTO: 6.15 K/UL — SIGNIFICANT CHANGE UP (ref 3.8–10.5)
WBC # FLD AUTO: 6.49 K/UL — SIGNIFICANT CHANGE UP (ref 3.8–10.5)

## 2019-01-25 PROCEDURE — 99233 SBSQ HOSP IP/OBS HIGH 50: CPT | Mod: GC

## 2019-01-25 PROCEDURE — 71045 X-RAY EXAM CHEST 1 VIEW: CPT | Mod: 26

## 2019-01-25 PROCEDURE — 99233 SBSQ HOSP IP/OBS HIGH 50: CPT

## 2019-01-25 RX ORDER — MILRINONE LACTATE 1 MG/ML
0.25 INJECTION, SOLUTION INTRAVENOUS
Qty: 20 | Refills: 0 | Status: DISCONTINUED | OUTPATIENT
Start: 2019-01-25 | End: 2019-01-25

## 2019-01-25 RX ORDER — MILRINONE LACTATE 1 MG/ML
0.12 INJECTION, SOLUTION INTRAVENOUS
Qty: 20 | Refills: 0 | Status: DISCONTINUED | OUTPATIENT
Start: 2019-01-25 | End: 2019-01-31

## 2019-01-25 RX ORDER — POTASSIUM CHLORIDE 20 MEQ
40 PACKET (EA) ORAL EVERY 4 HOURS
Qty: 0 | Refills: 0 | Status: COMPLETED | OUTPATIENT
Start: 2019-01-25 | End: 2019-01-25

## 2019-01-25 RX ADMIN — CHLORHEXIDINE GLUCONATE 1 APPLICATION(S): 213 SOLUTION TOPICAL at 09:39

## 2019-01-25 RX ADMIN — HEPARIN SODIUM 5000 UNIT(S): 5000 INJECTION INTRAVENOUS; SUBCUTANEOUS at 06:16

## 2019-01-25 RX ADMIN — Medication 30 MILLILITER(S): at 08:56

## 2019-01-25 RX ADMIN — Medication 1 SPRAY(S): at 09:38

## 2019-01-25 RX ADMIN — MILRINONE LACTATE 6 MICROGRAM(S)/KG/MIN: 1 INJECTION, SOLUTION INTRAVENOUS at 19:43

## 2019-01-25 RX ADMIN — SEVELAMER CARBONATE 800 MILLIGRAM(S): 2400 POWDER, FOR SUSPENSION ORAL at 22:02

## 2019-01-25 RX ADMIN — SIMETHICONE 80 MILLIGRAM(S): 80 TABLET, CHEWABLE ORAL at 06:17

## 2019-01-25 RX ADMIN — SEVELAMER CARBONATE 800 MILLIGRAM(S): 2400 POWDER, FOR SUSPENSION ORAL at 06:17

## 2019-01-25 RX ADMIN — SODIUM CHLORIDE 3 MILLILITER(S): 9 INJECTION INTRAMUSCULAR; INTRAVENOUS; SUBCUTANEOUS at 21:27

## 2019-01-25 RX ADMIN — INSULIN GLARGINE 25 UNIT(S): 100 INJECTION, SOLUTION SUBCUTANEOUS at 22:00

## 2019-01-25 RX ADMIN — OXYCODONE HYDROCHLORIDE 5 MILLIGRAM(S): 5 TABLET ORAL at 23:00

## 2019-01-25 RX ADMIN — SODIUM CHLORIDE 3 MILLILITER(S): 9 INJECTION INTRAMUSCULAR; INTRAVENOUS; SUBCUTANEOUS at 13:21

## 2019-01-25 RX ADMIN — CALAMINE AND ZINC OXIDE AND PHENOL 1 APPLICATION(S): 160; 10 LOTION TOPICAL at 15:08

## 2019-01-25 RX ADMIN — SIMETHICONE 80 MILLIGRAM(S): 80 TABLET, CHEWABLE ORAL at 14:51

## 2019-01-25 RX ADMIN — SODIUM CHLORIDE 3 MILLILITER(S): 9 INJECTION INTRAMUSCULAR; INTRAVENOUS; SUBCUTANEOUS at 05:50

## 2019-01-25 RX ADMIN — OXYCODONE HYDROCHLORIDE 5 MILLIGRAM(S): 5 TABLET ORAL at 22:30

## 2019-01-25 RX ADMIN — TAMSULOSIN HYDROCHLORIDE 0.4 MILLIGRAM(S): 0.4 CAPSULE ORAL at 22:02

## 2019-01-25 RX ADMIN — Medication 40 MILLIEQUIVALENT(S): at 17:33

## 2019-01-25 RX ADMIN — BUMETANIDE 15 MG/HR: 0.25 INJECTION INTRAMUSCULAR; INTRAVENOUS at 07:59

## 2019-01-25 RX ADMIN — MILRINONE LACTATE 3 MICROGRAM(S)/KG/MIN: 1 INJECTION, SOLUTION INTRAVENOUS at 08:03

## 2019-01-25 RX ADMIN — Medication 4: at 17:33

## 2019-01-25 RX ADMIN — SIMETHICONE 80 MILLIGRAM(S): 80 TABLET, CHEWABLE ORAL at 22:02

## 2019-01-25 RX ADMIN — SEVELAMER CARBONATE 800 MILLIGRAM(S): 2400 POWDER, FOR SUSPENSION ORAL at 14:52

## 2019-01-25 RX ADMIN — Medication 81 MILLIGRAM(S): at 12:18

## 2019-01-25 RX ADMIN — HEPARIN SODIUM 5000 UNIT(S): 5000 INJECTION INTRAVENOUS; SUBCUTANEOUS at 17:33

## 2019-01-25 RX ADMIN — Medication 40 MILLIEQUIVALENT(S): at 12:18

## 2019-01-25 RX ADMIN — BUMETANIDE 15 MG/HR: 0.25 INJECTION INTRAMUSCULAR; INTRAVENOUS at 03:12

## 2019-01-25 RX ADMIN — MILRINONE LACTATE 6 MICROGRAM(S)/KG/MIN: 1 INJECTION, SOLUTION INTRAVENOUS at 13:00

## 2019-01-25 NOTE — PROGRESS NOTE ADULT - PROBLEM SELECTOR PLAN 1
moderately hypervolemic. RA 12 this AM. PA sat 56.6%. CO 5.41,CI 2.84  D/c Bumex gtt. Start Bumex 3 mg po BID.  Continue milrinone 0.25 mcg/kg/min for now.  Send repeat sat.  Strict I/O. Dry weight is 132 lbs. Unable to get standing weight currently.  Holding Coreg, wali, hydral, isordil.  Pt refused ICD when discussed w/ . Family at bedside. Will have ongoing discussions. moderately hypervolemic. RA 12 this AM. PA sat 56.6%. CO 5.41,CI 2.84  D/c Bumex gtt. Start Bumex 3 mg po BID tomorrow  Continue milrinone 0.25 mcg/kg/min for now.  Send repeat sat.  Strict I/O. Dry weight is 132 lbs. Unable to get standing weight currently.  Holding Coreg, wali, hydral, isordil.  Pt refused ICD when discussed w/ . Family at bedside. Will have ongoing discussions.

## 2019-01-25 NOTE — PROGRESS NOTE ADULT - PROBLEM SELECTOR PLAN 2
Unclear etiology of KESHAV  Continue milrinone as above  Diuretics as above  No LV thrombus on TTE  Suggest getting arterial studies of kidneys and b/l LE.

## 2019-01-25 NOTE — PROGRESS NOTE ADULT - PROBLEM SELECTOR PLAN 3
Patient with hypervolemia in the setting of ADHF. Patient currently being treated with Bumex gtt. Pt. with 2.9 L of UOP in last 24 hours. Diuretic and ADHF management per CCU team. Monitor daily weights. Low salt diet

## 2019-01-25 NOTE — PROGRESS NOTE ADULT - SUBJECTIVE AND OBJECTIVE BOX
[facility] DIVISION OF KIDNEY DISEASES AND HYPERTENSION -- FOLLOW UP NOTE  --------------------------------------------------------------------------------  HPI: 68-year-old male with medical history of b/L LE bypass and b/L LE multiple toe amputations, DM2, HFrEF (EF 20%), HTN, Dyslipidemia, CAD, CABG, PVD, groin  sartorius flap, and vac placement, LLE with surgical scar wound admitted for worsening SOB. Nephrology consulted for EKSHAV. Currently been treated for ADHF. On review of previous records in [facility]/[place], patient had normal Scr levels from 4/26/16 to 9/24/18. Pt. noted to have an episode of KESHAV during previous/recent hospitalization at [facility] (12/7/18 to 12/27/18). Transfer to CCU on 1/20/19. On this admission, Scr slowly uptrending, latest Scr elevated  at 4.0 today (1/25/19)    Pt was seen and examined at bedside at CCU, patient feels well, dyspnea slowly improving. Continues to be on bumex and milrinone infusion. Denies dysuria or difficulty urinating. Denies CP, N/V/F/C. Non - oliguric UOP stable in the past 24 hrs.     PAST HISTORY  --------------------------------------------------------------------------------  No significant changes to PMH, PSH, FHx, SHx, unless otherwise noted    ALLERGIES & MEDICATIONS  --------------------------------------------------------------------------------  Allergies    No Known Allergies    Intolerances      Standing Inpatient Medications  aspirin enteric coated 81 milliGRAM(s) Oral daily  buMETAnide Infusion 3 mG/Hr IV Continuous <Continuous>  chlorhexidine 4% Liquid 1 Application(s) Topical <User Schedule>  dextrose 5%. 1000 milliLiter(s) IV Continuous <Continuous>  dextrose 50% Injectable 12.5 Gram(s) IV Push once  dextrose 50% Injectable 25 Gram(s) IV Push once  dextrose 50% Injectable 25 Gram(s) IV Push once  docusate sodium 100 milliGRAM(s) Oral daily  heparin  Injectable 5000 Unit(s) SubCutaneous every 12 hours  insulin glargine Injectable (LANTUS) 25 Unit(s) SubCutaneous at bedtime  insulin lispro (HumaLOG) corrective regimen sliding scale   SubCutaneous three times a day before meals  insulin lispro (HumaLOG) corrective regimen sliding scale   SubCutaneous at bedtime  metolazone 5 milliGRAM(s) Oral daily  milrinone Infusion 0.125 MICROgram(s)/kG/Min IV Continuous <Continuous>  senna 2 Tablet(s) Oral at bedtime  sevelamer hydrochloride 800 milliGRAM(s) Oral three times a day  simethicone 80 milliGRAM(s) Chew three times a day  sodium chloride 0.9% lock flush 3 milliLiter(s) IV Push every 8 hours  tamsulosin 0.4 milliGRAM(s) Oral at bedtime    PRN Inpatient Medications  calamine Lotion 1 Application(s) Topical daily PRN  dextrose 40% Gel 15 Gram(s) Oral once PRN  fluticasone propionate 50 MICROgram(s)/spray Nasal Spray 1 Spray(s) Both Nostrils two times a day PRN  glucagon  Injectable 1 milliGRAM(s) IntraMuscular once PRN  guaiFENesin   Syrup  (Sugar-Free) 200 milliGRAM(s) Oral every 6 hours PRN  oxyCODONE    IR 5 milliGRAM(s) Oral every 6 hours PRN  sodium chloride 0.65% Nasal 1 Spray(s) Both Nostrils three times a day PRN      REVIEW OF SYSTEMS  --------------------------------------------------------------------------------  Gen: No fevers  Respiratory: + dyspnea (improving)  CV: No chest pain  GI: No abdominal pain  : No dysuria, hematuria  MSK: + edema (improving)    All other systems were reviewed and are negative, except as noted.    VITALS/PHYSICAL EXAM  --------------------------------------------------------------------------------  T(C): 36.2 (01-25-19 @ 04:00), Max: 37.2 (01-24-19 @ 16:00)  HR: 76 (01-25-19 @ 06:30) (74 - 91)  BP: 120/70 (01-25-19 @ 06:00) (90/60 - 130/60)  RR: 17 (01-25-19 @ 06:30) (9 - 25)  SpO2: 97% (01-25-19 @ 06:30) (91% - 100%)  Wt(kg): --        01-24-19 @ 07:01  -  01-25-19 @ 07:00  --------------------------------------------------------  IN: 1139 mL / OUT: 2950 mL / NET: -1811 mL        Physical Exam:  	Gen: resting, NAD  	HEENT: No JVD  	Pulm: Fair air entry B/L  	CV: S1S2  	Abd: Soft, +BS   	Ext: +pitting b/l LE edema (improved)  	Neuro: Awake  	Skin: Warm and dry      LABS/STUDIES  --------------------------------------------------------------------------------              8.6    6.49  >-----------<  149      [01-25-19 @ 06:10]              26.6     135  |  90  |  88  ----------------------------<  78      [01-25-19 @ 06:10]  3.6   |  26  |  4.00        Ca     9.2     [01-25-19 @ 06:10]      Mg     2.4     [01-25-19 @ 06:10]      Phos  7.9     [01-25-19 @ 06:10]          [01-23-19 @ 16:42]    Creatinine Trend:  SCr 4.00 [01-25 @ 06:10]  SCr 3.69 [01-24 @ 06:35]  SCr 3.30 [01-23 @ 04:30]  SCr 3.20 [01-22 @ 12:50]  SCr 3.28 [01-22 @ 05:00] [facility] DIVISION OF KIDNEY DISEASES AND HYPERTENSION -- FOLLOW UP NOTE  --------------------------------------------------------------------------------  HPI: 68-year-old male with medical history of b/L LE bypass and b/L LE multiple toe amputations, DM2, HFrEF (EF 20%), HTN, Dyslipidemia, CAD, CABG, PVD, groin  sartorius flap, and vac placement, LLE with surgical scar wound admitted for worsening SOB. Nephrology consulted for KESHAV. Currently been treated for ADHF. On review of previous records in [facility]/[place], patient had normal Scr levels from 4/26/16 to 9/24/18. Pt. noted to have an episode of KESHAV during previous/recent hospitalization at [facility] (12/7/18 to 12/27/18). Transfer to CCU on 1/20/19. On this admission, Scr slowly uptrending, latest Scr elevated  at 4.0 today (1/25/19)    Pt. was seen and examined in CCU. Pt. patient feels well, dyspnea slowly improving. Continues to be on IV Bumex and IV milrinone infusions. Denies dysuria or difficulty urinating. Denies CP, N/V/F/C. Non-oliguric UOP stable in the past 24 hrs.     PAST HISTORY  --------------------------------------------------------------------------------  No significant changes to PMH, PSH, FHx, SHx, unless otherwise noted    ALLERGIES & MEDICATIONS  --------------------------------------------------------------------------------  Allergies    No Known Allergies    Intolerances    Standing Inpatient Medications  aspirin enteric coated 81 milliGRAM(s) Oral daily  buMETAnide Infusion 3 mG/Hr IV Continuous <Continuous>  chlorhexidine 4% Liquid 1 Application(s) Topical <User Schedule>  dextrose 5%. 1000 milliLiter(s) IV Continuous <Continuous>  dextrose 50% Injectable 12.5 Gram(s) IV Push once  dextrose 50% Injectable 25 Gram(s) IV Push once  dextrose 50% Injectable 25 Gram(s) IV Push once  docusate sodium 100 milliGRAM(s) Oral daily  heparin  Injectable 5000 Unit(s) SubCutaneous every 12 hours  insulin glargine Injectable (LANTUS) 25 Unit(s) SubCutaneous at bedtime  insulin lispro (HumaLOG) corrective regimen sliding scale   SubCutaneous three times a day before meals  insulin lispro (HumaLOG) corrective regimen sliding scale   SubCutaneous at bedtime  metolazone 5 milliGRAM(s) Oral daily  milrinone Infusion 0.125 MICROgram(s)/kG/Min IV Continuous <Continuous>  senna 2 Tablet(s) Oral at bedtime  sevelamer hydrochloride 800 milliGRAM(s) Oral three times a day  simethicone 80 milliGRAM(s) Chew three times a day  sodium chloride 0.9% lock flush 3 milliLiter(s) IV Push every 8 hours  tamsulosin 0.4 milliGRAM(s) Oral at bedtime    REVIEW OF SYSTEMS  --------------------------------------------------------------------------------  Gen: No fever  Respiratory: + dyspnea (improving)  CV: No chest pain  GI: No abdominal pain  : No dysuria, hematuria  MSK: + edema (improving)    All other systems were reviewed and are negative, except as noted.    VITALS/PHYSICAL EXAM  --------------------------------------------------------------------------------  T(C): 36.2 (01-25-19 @ 04:00), Max: 37.2 (01-24-19 @ 16:00)  HR: 76 (01-25-19 @ 06:30) (74 - 91)  BP: 120/70 (01-25-19 @ 06:00) (90/60 - 130/60)  RR: 17 (01-25-19 @ 06:30) (9 - 25)  SpO2: 97% (01-25-19 @ 06:30) (91% - 100%)  Wt(kg): --    01-24-19 @ 07:01  -  01-25-19 @ 07:00  --------------------------------------------------------  IN: 1139 mL / OUT: 2950 mL / NET: -1811 mL    Physical Exam:  	Gen: resting, NAD  	HEENT: No JVD  	Pulm: Fair air entry B/L  	CV: S1S2  	Abd: Soft, +BS   	Ext: +pitting b/l LE edema (improved)  	Neuro: Awake  	Skin: Warm and dry    LABS/STUDIES  --------------------------------------------------------------------------------              8.6    6.49  >-----------<  149      [01-25-19 @ 06:10]              26.6     135  |  90  |  88  ----------------------------<  78      [01-25-19 @ 06:10]  3.6   |  26  |  4.00        Ca     9.2     [01-25-19 @ 06:10]      Mg     2.4     [01-25-19 @ 06:10]      Phos  7.9     [01-25-19 @ 06:10]          [01-23-19 @ 16:42]    Creatinine Trend:  SCr 4.00 [01-25 @ 06:10]  SCr 3.69 [01-24 @ 06:35]  SCr 3.30 [01-23 @ 04:30]  SCr 3.20 [01-22 @ 12:50]  SCr 3.28 [01-22 @ 05:00]

## 2019-01-25 NOTE — PROGRESS NOTE ADULT - SUBJECTIVE AND OBJECTIVE BOX
Patient seen and examined. No complaints of SOB, orthopnea, CP, palpitations.     Medications:  aspirin enteric coated 81 milliGRAM(s) Oral daily  calamine Lotion 1 Application(s) Topical daily PRN  chlorhexidine 4% Liquid 1 Application(s) Topical <User Schedule>  dextrose 40% Gel 15 Gram(s) Oral once PRN  dextrose 5%. 1000 milliLiter(s) IV Continuous <Continuous>  dextrose 50% Injectable 12.5 Gram(s) IV Push once  dextrose 50% Injectable 25 Gram(s) IV Push once  dextrose 50% Injectable 25 Gram(s) IV Push once  docusate sodium 100 milliGRAM(s) Oral daily  fluticasone propionate 50 MICROgram(s)/spray Nasal Spray 1 Spray(s) Both Nostrils two times a day PRN  glucagon  Injectable 1 milliGRAM(s) IntraMuscular once PRN  guaiFENesin   Syrup  (Sugar-Free) 200 milliGRAM(s) Oral every 6 hours PRN  heparin  Injectable 5000 Unit(s) SubCutaneous every 12 hours  insulin glargine Injectable (LANTUS) 25 Unit(s) SubCutaneous at bedtime  insulin lispro (HumaLOG) corrective regimen sliding scale   SubCutaneous three times a day before meals  insulin lispro (HumaLOG) corrective regimen sliding scale   SubCutaneous at bedtime  milrinone Infusion 0.25 MICROgram(s)/kG/Min IV Continuous <Continuous>  oxyCODONE    IR 5 milliGRAM(s) Oral every 6 hours PRN  potassium chloride    Tablet ER 40 milliEquivalent(s) Oral every 4 hours  senna 2 Tablet(s) Oral at bedtime  sevelamer hydrochloride 800 milliGRAM(s) Oral three times a day  simethicone 80 milliGRAM(s) Chew three times a day  sodium chloride 0.65% Nasal 1 Spray(s) Both Nostrils three times a day PRN  sodium chloride 0.9% lock flush 3 milliLiter(s) IV Push every 8 hours  tamsulosin 0.4 milliGRAM(s) Oral at bedtime      Vitals:  Vital Signs Last 24 Hrs  T(C): 36 (2019 13:00), Max: 37.2 (2019 20:00)  T(F): 96.8 (2019 13:00), Max: 98.9 (2019 20:00)  HR: 85 (2019 15:00) (74 - 93)  BP: 119/63 (2019 15:00) (101/57 - 131/103)  BP(mean): 76 (2019 15:00) (68 - 109)  RR: 11 (2019 15:00) (9 - 19)  SpO2: 98% (2019 15:00) (91% - 100%)    Daily     Daily Weight in k.9 (2019 06:00)    I&O's Detail    2019 07:01  -  2019 07:00  --------------------------------------------------------  IN:    bumetanide Infusion: 360 mL    milrinone  Infusion: 42 mL    milrinone  Infusion: 15 mL    Oral Fluid: 740 mL  Total IN: 1157 mL    OUT:    Voided: 2950 mL  Total OUT: 2950 mL    Total NET: -1793 mL      2019 07:01  -  2019 16:54  --------------------------------------------------------  IN:    bumetanide Infusion: 60 mL    milrinone  Infusion: 9 mL    milrinone  Infusion: 18 mL    Oral Fluid: 300 mL  Total IN: 387 mL    OUT:    Voided: 1075 mL  Total OUT: 1075 mL    Total NET: -688 mL          Physical Exam:     General: No distress. Comfortable.  HEENT: EOM intact.  Neck: Neck supple. JVP not elevated. No masses  Chest: Clear to auscultation bilaterally  CV: Normal S1 and S2. No murmurs, rub, or gallops. Radial pulses normal. No LE edema b/l.   Abdomen: Soft, non-distended, non-tender  Skin: No rashes or skin breakdown  Neurology: Alert and oriented times three. Sensation intact  Psych: Affect normal    Labs:                        8.5    6.15  )-----------( 124      ( 2019 13:00 )             26.7         135  |  90<L>  |  88<H>  ----------------------------<  78  3.6   |  26  |  4.00<H>    Ca    9.2      2019 06:10  Phos  7.9       Mg     2.4

## 2019-01-25 NOTE — PROGRESS NOTE ADULT - SUBJECTIVE AND OBJECTIVE BOX
Keith Burns, PGY1  Pager: [phone]      Patient is a 68y old  Male who presents with a chief complaint of Dyspnea x 2 days (2019 07:27)      SUBJECTIVE / OVERNIGHT EVENTS:    REVIEW OF SYSTEMS:    CONSTITUTIONAL: No weakness, fevers or chills  EYES/ENT: No visual changes;  No vertigo or throat pain   NECK: No pain or stiffness  RESPIRATORY: No cough, wheezing, hemoptysis; No shortness of breath  CARDIOVASCULAR: No chest pain or palpitations  GASTROINTESTINAL: No abdominal or epigastric pain. No nausea, vomiting, or hematemesis; No diarrhea or constipation. No melena or hematochezia.  GENITOURINARY: No dysuria, frequency or hematuria  NEUROLOGICAL: No numbness or weakness  SKIN: No itching, rashes    MEDICATIONS  (STANDING):  aspirin enteric coated 81 milliGRAM(s) Oral daily  buMETAnide Infusion 3 mG/Hr (15 mL/Hr) IV Continuous <Continuous>  chlorhexidine 4% Liquid 1 Application(s) Topical <User Schedule>  dextrose 5%. 1000 milliLiter(s) (50 mL/Hr) IV Continuous <Continuous>  dextrose 50% Injectable 12.5 Gram(s) IV Push once  dextrose 50% Injectable 25 Gram(s) IV Push once  dextrose 50% Injectable 25 Gram(s) IV Push once  docusate sodium 100 milliGRAM(s) Oral daily  heparin  Injectable 5000 Unit(s) SubCutaneous every 12 hours  insulin glargine Injectable (LANTUS) 25 Unit(s) SubCutaneous at bedtime  insulin lispro (HumaLOG) corrective regimen sliding scale   SubCutaneous three times a day before meals  insulin lispro (HumaLOG) corrective regimen sliding scale   SubCutaneous at bedtime  metolazone 5 milliGRAM(s) Oral daily  milrinone Infusion 0.25 MICROgram(s)/kG/Min (6 mL/Hr) IV Continuous <Continuous>  senna 2 Tablet(s) Oral at bedtime  sevelamer hydrochloride 800 milliGRAM(s) Oral three times a day  simethicone 80 milliGRAM(s) Chew three times a day  sodium chloride 0.9% lock flush 3 milliLiter(s) IV Push every 8 hours  tamsulosin 0.4 milliGRAM(s) Oral at bedtime    MEDICATIONS  (PRN):  calamine Lotion 1 Application(s) Topical daily PRN Rash and/or Itching  dextrose 40% Gel 15 Gram(s) Oral once PRN Blood Glucose LESS THAN 70 milliGRAM(s)/deciliter  fluticasone propionate 50 MICROgram(s)/spray Nasal Spray 1 Spray(s) Both Nostrils two times a day PRN nasal congestion  glucagon  Injectable 1 milliGRAM(s) IntraMuscular once PRN Glucose LESS THAN 70 milligrams/deciliter  guaiFENesin   Syrup  (Sugar-Free) 200 milliGRAM(s) Oral every 6 hours PRN Cough  oxyCODONE    IR 5 milliGRAM(s) Oral every 6 hours PRN Moderate Pain (4 - 6)  sodium chloride 0.65% Nasal 1 Spray(s) Both Nostrils three times a day PRN Nasal Congestion      Vital Signs Last 24 Hrs  T(C): 35.9 (2019 09:00), Max: 37.2 (2019 16:00)  T(F): 96.6 (2019 09:00), Max: 99 (2019 16:00)  HR: 76 (2019 10:00) (74 - 93)  BP: 126/72 (2019 10:00) (98/82 - 130/60)  BP(mean): 85 (2019 10:00) (68 - 102)  RR: 10 (2019 10:00) (9 - 19)  SpO2: 99% (2019 10:00) (91% - 100%)  CAPILLARY BLOOD GLUCOSE      POCT Blood Glucose.: 78 mg/dL (2019 08:13)  POCT Blood Glucose.: 134 mg/dL (2019 22:26)  POCT Blood Glucose.: 155 mg/dL (2019 17:22)  POCT Blood Glucose.: 102 mg/dL (2019 12:08)    I&O's Summary    2019 07:01  -  2019 07:00  --------------------------------------------------------  IN: 1157 mL / OUT: 2950 mL / NET: -1793 mL    2019 07:01  -  2019 11:06  --------------------------------------------------------  IN: 174 mL / OUT: 700 mL / NET: -526 mL        PHYSICAL EXAM:  GENERAL: NAD, well-developed  EYES: EOMI, PERRLA, conjunctiva and sclera clear  NECK:  No JVD  CHEST/LUNG: CTABL ; No wheeze  HEART: RRR; No murmurs  ABDOMEN: Soft, Nontender, Nondistended; Bowel sounds present  : No Palacios  EXTREMITIES:  2+ Peripheral Pulses, No edema  PSYCH: AAOx3  NEUROLOGY: non-focal  SKIN: No rashes or lesions. No sacral ulcer    LABS:                        8.6    6.49  )-----------( 149      ( 2019 06:10 )             26.6     -    135  |  90<L>  |  88<H>  ----------------------------<  78  3.6   |  26  |  4.00<H>    Ca    9.2      2019 06:10  Phos  7.9       Mg     2.4             CARDIAC MARKERS ( 2019 16:42 )  x     / x     / 117 u/L / x     / x          Urinalysis Basic - ( 2019 12:10 )    Color: LIGHT YELLOW / Appearance: CLEAR / S.008 / pH: 5.0  Gluc: NEGATIVE / Ketone: NEGATIVE  / Bili: NEGATIVE / Urobili: NORMAL   Blood: NEGATIVE / Protein: NEGATIVE / Nitrite: NEGATIVE   Leuk Esterase: NEGATIVE / RBC: x / WBC x   Sq Epi: x / Non Sq Epi: x / Bacteria: x        Microbiology;        RADIOLOGY & ADDITIONAL TESTS:    Imaging Personally Reviewed:          Consultant(s) Notes Reviewed:      Care Discussed with Consultants/Other Providers: Keith Burns, PGY1  Pager: [phone]      Patient is a 68y old  Male who presents with a chief complaint of Dyspnea x 2 days (2019 07:27)      SUBJECTIVE / OVERNIGHT EVENTS: Overnight pt given metolazone x1, 2.9L output net negative 1800. This morning pt complained of chest tightness after eating cookies and drinking diet soda. EKG without change. Given Mylanta x1 with cessation of chest pain. No other acute complaints.     REVIEW OF SYSTEMS:    CONSTITUTIONAL: No weakness, fevers or chills  EYES/ENT: No visual changes;  No vertigo or throat pain   NECK: No pain or stiffness  RESPIRATORY: No cough, wheezing, hemoptysis; No shortness of breath  CARDIOVASCULAR: No chest pain or palpitations  GASTROINTESTINAL: No abdominal or epigastric pain. No nausea, vomiting, or hematemesis; No diarrhea or constipation. No melena or hematochezia.  GENITOURINARY: No dysuria, frequency or hematuria  NEUROLOGICAL: No numbness or weakness  SKIN: No itching, rashes    MEDICATIONS  (STANDING):  aspirin enteric coated 81 milliGRAM(s) Oral daily  buMETAnide Infusion 3 mG/Hr (15 mL/Hr) IV Continuous <Continuous>  chlorhexidine 4% Liquid 1 Application(s) Topical <User Schedule>  dextrose 5%. 1000 milliLiter(s) (50 mL/Hr) IV Continuous <Continuous>  dextrose 50% Injectable 12.5 Gram(s) IV Push once  dextrose 50% Injectable 25 Gram(s) IV Push once  dextrose 50% Injectable 25 Gram(s) IV Push once  docusate sodium 100 milliGRAM(s) Oral daily  heparin  Injectable 5000 Unit(s) SubCutaneous every 12 hours  insulin glargine Injectable (LANTUS) 25 Unit(s) SubCutaneous at bedtime  insulin lispro (HumaLOG) corrective regimen sliding scale   SubCutaneous three times a day before meals  insulin lispro (HumaLOG) corrective regimen sliding scale   SubCutaneous at bedtime  metolazone 5 milliGRAM(s) Oral daily  milrinone Infusion 0.25 MICROgram(s)/kG/Min (6 mL/Hr) IV Continuous <Continuous>  senna 2 Tablet(s) Oral at bedtime  sevelamer hydrochloride 800 milliGRAM(s) Oral three times a day  simethicone 80 milliGRAM(s) Chew three times a day  sodium chloride 0.9% lock flush 3 milliLiter(s) IV Push every 8 hours  tamsulosin 0.4 milliGRAM(s) Oral at bedtime    MEDICATIONS  (PRN):  calamine Lotion 1 Application(s) Topical daily PRN Rash and/or Itching  dextrose 40% Gel 15 Gram(s) Oral once PRN Blood Glucose LESS THAN 70 milliGRAM(s)/deciliter  fluticasone propionate 50 MICROgram(s)/spray Nasal Spray 1 Spray(s) Both Nostrils two times a day PRN nasal congestion  glucagon  Injectable 1 milliGRAM(s) IntraMuscular once PRN Glucose LESS THAN 70 milligrams/deciliter  guaiFENesin   Syrup  (Sugar-Free) 200 milliGRAM(s) Oral every 6 hours PRN Cough  oxyCODONE    IR 5 milliGRAM(s) Oral every 6 hours PRN Moderate Pain (4 - 6)  sodium chloride 0.65% Nasal 1 Spray(s) Both Nostrils three times a day PRN Nasal Congestion      Vital Signs Last 24 Hrs  T(C): 35.9 (2019 09:00), Max: 37.2 (2019 16:00)  T(F): 96.6 (2019 09:00), Max: 99 (2019 16:00)  HR: 76 (2019 10:00) (74 - 93)  BP: 126/72 (2019 10:00) (98/82 - 130/60)  BP(mean): 85 (2019 10:00) (68 - 102)  RR: 10 (2019 10:00) (9 - 19)  SpO2: 99% (2019 10:00) (91% - 100%)  CAPILLARY BLOOD GLUCOSE      POCT Blood Glucose.: 78 mg/dL (2019 08:13)  POCT Blood Glucose.: 134 mg/dL (2019 22:26)  POCT Blood Glucose.: 155 mg/dL (2019 17:22)  POCT Blood Glucose.: 102 mg/dL (2019 12:08)    I&O's Summary    2019 07:01  -  2019 07:00  --------------------------------------------------------  IN: 1157 mL / OUT: 2950 mL / NET: -1793 mL    2019 07:01  -  2019 11:06  --------------------------------------------------------  IN: 174 mL / OUT: 700 mL / NET: -526 mL        PHYSICAL EXAM:  GENERAL: Appears older than stated age, sitting upright in hospital bed  EYES: EOMI, PERRLA, conjunctiva and sclera clear  NECK:  Mild JVD, right IJ pulmonary artery catheter  CHEST/LUNG: Difficult to auscultate, sounds clear   HEART: RRR; No murmurs  ABDOMEN: Severely distended, pain on palpation of left side without rigidity or guarding. Positive bowel sounds. Tympanic to percussion. Improved since yesterday.   : No Palacios  EXTREMITIES:  2+ Peripheral Pulses, 2+ pitting edema up to mid calf bilaterally   PSYCH: AAOx3  NEUROLOGY: non-focal  SKIN: No rashes or lesions. No sacral ulcer    LABS:                        8.6    6.49  )-----------( 149      ( 2019 06:10 )             26.6         135  |  90<L>  |  88<H>  ----------------------------<  78  3.6   |  26  |  4.00<H>    Ca    9.2      2019 06:10  Phos  7.9       Mg     2.4             CARDIAC MARKERS ( 2019 16:42 )  x     / x     / 117 u/L / x     / x          Urinalysis Basic - ( 2019 12:10 )    Color: LIGHT YELLOW / Appearance: CLEAR / S.008 / pH: 5.0  Gluc: NEGATIVE / Ketone: NEGATIVE  / Bili: NEGATIVE / Urobili: NORMAL   Blood: NEGATIVE / Protein: NEGATIVE / Nitrite: NEGATIVE   Leuk Esterase: NEGATIVE / RBC: x / WBC x   Sq Epi: x / Non Sq Epi: x / Bacteria: x        Microbiology;        RADIOLOGY & ADDITIONAL TESTS:  < from: TTE with Doppler (w/Cont) (19 @ 13:36) >  CONCLUSIONS:  1. Tethered mitral valve leaflets. Moderate mitral  regurgitation.  2. Calcified trileaflet aortic valve with mildly decreased  opening.  3. Normal left ventricular internal dimensions and wall  thicknesses.  4. Moderate to severe segmental left ventricular systolic  dysfunction. The basal to mid septum, basal to mid  inferior, basal to mid anterolateral walls are hypokinetic.  5. Right ventricular enlargement with decreased right  ventricular systolic function.  6. Estimated pulmonary artery systolic pressure equals 43  mmHg, assuming right atrial pressure equals 10  mm Hg,  consistent with mild pulmonary hypertension.    < end of copied text >      Imaging Personally Reviewed: No          Consultant(s) Notes Reviewed: Heart Failure     Care Discussed with Consultants/Other Providers: Heart Failure

## 2019-01-25 NOTE — PROGRESS NOTE ADULT - PROBLEM SELECTOR PLAN 1
Pt. with KESHAV in the setting of ADHF. On review of previous records in [facility]/[place], patient with normal Scr levels from 4/26/16 to 9/24/18. Pt. noted to have an episode of KESHAV during previous/recent hospitalization at [facility] (12/7/18 to 12/27/18). On this admission, Scr was elevated at 2.15 on 1/16/19, increased to 3.30 on 1/23/19. Scr increased at 4 today (1/25/19). Pt. on Bumex gtt and milrinone per CCU team. US Kidney showed increased bilateral renal cortical echogenicity, no hydronephrosis on 1/18/19. Monitor labs and urine output. Avoid nephrotoxins. Dose medications as per eGFR Pt. with KESHAV in the setting of ADHF. On review of previous records in [facility]/[place], patient with normal Scr levels from 4/26/16 to 9/24/18. Pt. noted to have an episode of KESHAV during previous/recent hospitalization at [facility] (12/7/18 to 12/27/18). On this admission, Scr was elevated at 2.15 on 1/16/19, increased to 4 today (1/25/19). Pt. on Bumex gtt and milrinone per CCU team. US Kidney showed increased bilateral renal cortical echogenicity, no hydronephrosis on 1/18/19. Monitor labs and urine output. Avoid nephrotoxins. Dose medications as per eGFR

## 2019-01-25 NOTE — PROGRESS NOTE ADULT - PROBLEM SELECTOR PLAN 2
Pt. with hyperkalemia in the setting of KESHAV and spironolactone use. Patient received medical management with improvement of serum potassium. Serum potassium  WNL (3.6) on 1/25/19. Pt. on IV Bumex drip. Continue to hold spironolactone. Low potassium diet. Monitor serum potassium Hyperkalemia resolved with medical management. Serum potassium WNL (3.6) today (1/25/19). Pt. on IV Bumex drip. Continue to hold spironolactone. Low potassium diet. Monitor serum potassium

## 2019-01-25 NOTE — PROGRESS NOTE ADULT - ASSESSMENT
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 His last admission from 12/8-12/27 required CCU admission and placement on dobutamine. He has had 3 admissions in 2018, for various complications from DM.  He comes in this time due to worsening SOB. He admits to 2 pillow orthopnea, frequent PND and ORTA after 1/2 block and walking up 5 steps. No CP, palpitations, dizziness. Patient's son by bedside. On tele, patient not diuresing well and condition guarded. Admitted to CCU for inotropic therapy, initiation of primacor infusion and IV diuresis with lasix gtt, now on Bumex drip.       PLAN:    Neuro:  AO x 3    Pulm:   Clear to ascultation.   On NC for nasal congestion and symptomatic relief.     Cardiovascular:   NYHA class IV due to ischemic cardiomyopathy. On physical exam, patient is volume overloaded although lungs are relatively clear  -d/c milrinone per HF although pt with decreasing mixed venous and although wedge pressure has improved, CVP has stayed the same indicating right sided failure. Consider restarting.  -Repeat Echo with increased EF and diffuse LV hypokinesis   -Started on Bumex drip at 3mg/hr - 2770 out overnight, negative 1650  -another 2x Diuril 500mg Q12 hrs given overnight  -Strict I/O and daily standing weights. Fluid restriction to 1500mL  -holding all BP meds for now in the setting of rising creatinine to try to increase preload  -Once hemodynamically optimized, consider ICD placement, given low EF and ischemic cardiomyopathy  -to discuss with heart failure team regarding long term goals - pt has previously refused invasive procedures     GI/:  Distended abdomen with dullness to percussion, suspect abdominal fluid overload with possible ascites  -pt with baseline elevated Tbili and Alk phos  -abdominal xray showing diffuse bowel air, ultrasound pending  -Currently no N/V/D. 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 Elevated today. Ultrasound shows renal parenchymal disease.   -Likely secondary to renal venous congestion from cardiorenal syndrome. Diuresis and continue to monitor.     Heme:   Anemia to low 9s with low MCV  iron studies inconclusive  reticulocyte index indicating hypoproliferation  Aspirin and Hep Subq    ID:   No issues    Endo:  DM, on 25 Lantus and ISS.   Fingerstick stable, continue to monitor.     DVT: Heparin  Diet: Regular, DASH/TLC, Carb restricted, fluid 1000mL restrict     Code status: Full code. 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 His last admission from 12/8-12/27 required CCU admission and placement on dobutamine. He has had 3 admissions in 2018, for various complications from DM.  He comes in this time due to worsening SOB. He admits to 2 pillow orthopnea, frequent PND and ORTA after 1/2 block and walking up 5 steps. No CP, palpitations, dizziness. Patient's son by bedside. On tele, patient not diuresing well and condition guarded. Admitted to CCU for inotropic therapy, initiation of primacor infusion and IV diuresis with lasix gtt, now on Bumex drip.       PLAN:    Neuro:  AO x 3    Pulm:   Clear to ascultation.   On NC for nasal congestion and symptomatic relief.     Cardiovascular:   NYHA class IV due to ischemic cardiomyopathy. On physical exam, patient is volume overloaded although lungs are relatively clear  -Increase milrinone to .25 today and recheck mixed venous in the afternoon   -Repeat Echo with increased EF and diffuse LV hypokinesis   -d/c bumex drip and transition to bumex 3mg IV BID tonight - pt was net negative 1800 on drip overnight and CVP 8-10, pt appears closer to euvolemic today  -Strict I/O and daily standing weights. Fluid restriction to 1500mL  -holding all BP meds for now in the setting of rising creatinine to try to increase preload  -Once hemodynamically optimized, consider ICD placement, given low EF and ischemic cardiomyopathy  -to discuss with heart failure team regarding long term goals - pt has previously refused invasive procedures     GI/:  Distended abdomen with dullness to percussion, suspect abdominal fluid overload with possible ascites  -pt with baseline elevated Tbili and Alk phos  -abdominal xray showing diffuse bowel air, ultrasound pending  -Currently no N/V/D. 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 Elevated today. Ultrasound shows renal parenchymal disease.   -Likely secondary to renal venous congestion from cardiorenal syndrome. Diuresis and continue to monitor.     Heme:   Anemia to low 9s with low MCV  iron studies inconclusive  reticulocyte index indicating hypoproliferation  Aspirin and Hep Subq    ID:   No issues    Endo:  DM, on 25 Lantus and ISS.   Fingerstick stable, continue to monitor.     DVT: Heparin  Diet: Regular, DASH/TLC, Carb restricted, fluid 1000mL restrict     Code status: Full code.

## 2019-01-26 LAB
ALBUMIN SERPL ELPH-MCNC: 3.7 G/DL — SIGNIFICANT CHANGE UP (ref 3.3–5)
ALP SERPL-CCNC: 408 U/L — HIGH (ref 40–120)
ALT FLD-CCNC: 20 U/L — SIGNIFICANT CHANGE UP (ref 4–41)
ANION GAP SERPL CALC-SCNC: 16 MMO/L — HIGH (ref 7–14)
AST SERPL-CCNC: 26 U/L — SIGNIFICANT CHANGE UP (ref 4–40)
BASE EXCESS BLDV CALC-SCNC: 5.6 MMOL/L — SIGNIFICANT CHANGE UP
BASE EXCESS BLDV CALC-SCNC: 6.5 MMOL/L — SIGNIFICANT CHANGE UP
BASE EXCESS BLDV CALC-SCNC: 6.8 MMOL/L — SIGNIFICANT CHANGE UP
BILIRUB SERPL-MCNC: 1.4 MG/DL — HIGH (ref 0.2–1.2)
BLOOD GAS VENOUS - CREATININE: 3.51 MG/DL — HIGH (ref 0.5–1.3)
BLOOD GAS VENOUS - CREATININE: 3.6 MG/DL — HIGH (ref 0.5–1.3)
BLOOD GAS VENOUS - CREATININE: 3.76 MG/DL — HIGH (ref 0.5–1.3)
BUN SERPL-MCNC: 95 MG/DL — HIGH (ref 7–23)
CALCIUM SERPL-MCNC: 9.6 MG/DL — SIGNIFICANT CHANGE UP (ref 8.4–10.5)
CHLORIDE BLDV-SCNC: 94 MMOL/L — LOW (ref 96–108)
CHLORIDE BLDV-SCNC: 95 MMOL/L — LOW (ref 96–108)
CHLORIDE BLDV-SCNC: 97 MMOL/L — SIGNIFICANT CHANGE UP (ref 96–108)
CHLORIDE SERPL-SCNC: 89 MMOL/L — LOW (ref 98–107)
CO2 SERPL-SCNC: 30 MMOL/L — SIGNIFICANT CHANGE UP (ref 22–31)
CREAT SERPL-MCNC: 3.9 MG/DL — HIGH (ref 0.5–1.3)
GAS PNL BLDV: 130 MMOL/L — LOW (ref 136–146)
GAS PNL BLDV: 131 MMOL/L — LOW (ref 136–146)
GAS PNL BLDV: 132 MMOL/L — LOW (ref 136–146)
GLUCOSE BLDC GLUCOMTR-MCNC: 117 MG/DL — HIGH (ref 70–99)
GLUCOSE BLDC GLUCOMTR-MCNC: 128 MG/DL — HIGH (ref 70–99)
GLUCOSE BLDC GLUCOMTR-MCNC: 217 MG/DL — HIGH (ref 70–99)
GLUCOSE BLDC GLUCOMTR-MCNC: 289 MG/DL — HIGH (ref 70–99)
GLUCOSE BLDV-MCNC: 102 — HIGH (ref 70–99)
GLUCOSE BLDV-MCNC: 163 — HIGH (ref 70–99)
GLUCOSE BLDV-MCNC: 244 — HIGH (ref 70–99)
GLUCOSE SERPL-MCNC: 102 MG/DL — HIGH (ref 70–99)
HCO3 BLDV-SCNC: 29 MMOL/L — HIGH (ref 20–27)
HCO3 BLDV-SCNC: 29 MMOL/L — HIGH (ref 20–27)
HCO3 BLDV-SCNC: 30 MMOL/L — HIGH (ref 20–27)
HCT VFR BLD CALC: 27.2 % — LOW (ref 39–50)
HCT VFR BLDV CALC: 24.4 % — LOW (ref 39–51)
HCT VFR BLDV CALC: 26.8 % — LOW (ref 39–51)
HCT VFR BLDV CALC: 26.9 % — LOW (ref 39–51)
HGB BLD-MCNC: 8.6 G/DL — LOW (ref 13–17)
HGB BLDV-MCNC: 7.8 G/DL — LOW (ref 13–17)
HGB BLDV-MCNC: 8.6 G/DL — LOW (ref 13–17)
HGB BLDV-MCNC: 8.7 G/DL — LOW (ref 13–17)
LACTATE BLDV-MCNC: 1 MMOL/L — SIGNIFICANT CHANGE UP (ref 0.5–2)
LACTATE BLDV-MCNC: 1.3 MMOL/L — SIGNIFICANT CHANGE UP (ref 0.5–2)
LACTATE BLDV-MCNC: 2.2 MMOL/L — HIGH (ref 0.5–2)
MAGNESIUM SERPL-MCNC: 2.5 MG/DL — SIGNIFICANT CHANGE UP (ref 1.6–2.6)
MCHC RBC-ENTMCNC: 22.5 PG — LOW (ref 27–34)
MCHC RBC-ENTMCNC: 31.6 % — LOW (ref 32–36)
MCV RBC AUTO: 71 FL — LOW (ref 80–100)
NRBC # FLD: 0 K/UL — LOW (ref 25–125)
PCO2 BLDV: 46 MMHG — SIGNIFICANT CHANGE UP (ref 41–51)
PCO2 BLDV: 48 MMHG — SIGNIFICANT CHANGE UP (ref 41–51)
PCO2 BLDV: 52 MMHG — HIGH (ref 41–51)
PH BLDV: 7.4 PH — SIGNIFICANT CHANGE UP (ref 7.32–7.43)
PH BLDV: 7.42 PH — SIGNIFICANT CHANGE UP (ref 7.32–7.43)
PH BLDV: 7.43 PH — SIGNIFICANT CHANGE UP (ref 7.32–7.43)
PHOSPHATE SERPL-MCNC: 6.6 MG/DL — HIGH (ref 2.5–4.5)
PLATELET # BLD AUTO: 140 K/UL — LOW (ref 150–400)
PMV BLD: SIGNIFICANT CHANGE UP FL (ref 7–13)
PO2 BLDV: 32 MMHG — LOW (ref 35–40)
PO2 BLDV: 33 MMHG — LOW (ref 35–40)
PO2 BLDV: 35 MMHG — SIGNIFICANT CHANGE UP (ref 35–40)
POTASSIUM BLDV-SCNC: 3.7 MMOL/L — SIGNIFICANT CHANGE UP (ref 3.4–4.5)
POTASSIUM BLDV-SCNC: 3.8 MMOL/L — SIGNIFICANT CHANGE UP (ref 3.4–4.5)
POTASSIUM BLDV-SCNC: 4 MMOL/L — SIGNIFICANT CHANGE UP (ref 3.4–4.5)
POTASSIUM SERPL-MCNC: 4.3 MMOL/L — SIGNIFICANT CHANGE UP (ref 3.5–5.3)
POTASSIUM SERPL-SCNC: 4.3 MMOL/L — SIGNIFICANT CHANGE UP (ref 3.5–5.3)
PROT SERPL-MCNC: 7.7 G/DL — SIGNIFICANT CHANGE UP (ref 6–8.3)
RBC # BLD: 3.83 M/UL — LOW (ref 4.2–5.8)
RBC # FLD: 25.2 % — HIGH (ref 10.3–14.5)
SAO2 % BLDV: 48.4 % — LOW (ref 60–85)
SAO2 % BLDV: 50.8 % — LOW (ref 60–85)
SAO2 % BLDV: 55.5 % — LOW (ref 60–85)
SODIUM SERPL-SCNC: 135 MMOL/L — SIGNIFICANT CHANGE UP (ref 135–145)
WBC # BLD: 6.88 K/UL — SIGNIFICANT CHANGE UP (ref 3.8–10.5)
WBC # FLD AUTO: 6.88 K/UL — SIGNIFICANT CHANGE UP (ref 3.8–10.5)

## 2019-01-26 PROCEDURE — 99232 SBSQ HOSP IP/OBS MODERATE 35: CPT | Mod: GC

## 2019-01-26 PROCEDURE — 71045 X-RAY EXAM CHEST 1 VIEW: CPT | Mod: 26

## 2019-01-26 PROCEDURE — 93925 LOWER EXTREMITY STUDY: CPT | Mod: 26

## 2019-01-26 PROCEDURE — 99233 SBSQ HOSP IP/OBS HIGH 50: CPT | Mod: GC

## 2019-01-26 RX ORDER — BUMETANIDE 0.25 MG/ML
3 INJECTION INTRAMUSCULAR; INTRAVENOUS
Qty: 0 | Refills: 0 | Status: DISCONTINUED | OUTPATIENT
Start: 2019-01-26 | End: 2019-01-29

## 2019-01-26 RX ORDER — OXYMETAZOLINE HYDROCHLORIDE 0.5 MG/ML
1 SPRAY NASAL ONCE
Qty: 0 | Refills: 0 | Status: COMPLETED | OUTPATIENT
Start: 2019-01-26 | End: 2019-01-26

## 2019-01-26 RX ORDER — FAMOTIDINE 10 MG/ML
20 INJECTION INTRAVENOUS ONCE
Qty: 0 | Refills: 0 | Status: COMPLETED | OUTPATIENT
Start: 2019-01-26 | End: 2019-01-26

## 2019-01-26 RX ORDER — OXYMETAZOLINE HYDROCHLORIDE 0.5 MG/ML
1 SPRAY NASAL
Qty: 0 | Refills: 0 | Status: DISCONTINUED | OUTPATIENT
Start: 2019-01-26 | End: 2019-01-31

## 2019-01-26 RX ORDER — SEVELAMER CARBONATE 2400 MG/1
800 POWDER, FOR SUSPENSION ORAL
Qty: 0 | Refills: 0 | Status: DISCONTINUED | OUTPATIENT
Start: 2019-01-26 | End: 2019-02-25

## 2019-01-26 RX ADMIN — OXYCODONE HYDROCHLORIDE 5 MILLIGRAM(S): 5 TABLET ORAL at 21:18

## 2019-01-26 RX ADMIN — BUMETANIDE 3 MILLIGRAM(S): 0.25 INJECTION INTRAMUSCULAR; INTRAVENOUS at 21:25

## 2019-01-26 RX ADMIN — SEVELAMER CARBONATE 800 MILLIGRAM(S): 2400 POWDER, FOR SUSPENSION ORAL at 06:16

## 2019-01-26 RX ADMIN — Medication 1 SPRAY(S): at 21:19

## 2019-01-26 RX ADMIN — CHLORHEXIDINE GLUCONATE 1 APPLICATION(S): 213 SOLUTION TOPICAL at 12:54

## 2019-01-26 RX ADMIN — FAMOTIDINE 20 MILLIGRAM(S): 10 INJECTION INTRAVENOUS at 00:48

## 2019-01-26 RX ADMIN — SODIUM CHLORIDE 3 MILLILITER(S): 9 INJECTION INTRAMUSCULAR; INTRAVENOUS; SUBCUTANEOUS at 21:21

## 2019-01-26 RX ADMIN — TAMSULOSIN HYDROCHLORIDE 0.4 MILLIGRAM(S): 0.4 CAPSULE ORAL at 21:18

## 2019-01-26 RX ADMIN — CALAMINE AND ZINC OXIDE AND PHENOL 1 APPLICATION(S): 160; 10 LOTION TOPICAL at 21:19

## 2019-01-26 RX ADMIN — INSULIN GLARGINE 25 UNIT(S): 100 INJECTION, SOLUTION SUBCUTANEOUS at 21:19

## 2019-01-26 RX ADMIN — SIMETHICONE 80 MILLIGRAM(S): 80 TABLET, CHEWABLE ORAL at 06:16

## 2019-01-26 RX ADMIN — SIMETHICONE 80 MILLIGRAM(S): 80 TABLET, CHEWABLE ORAL at 21:18

## 2019-01-26 RX ADMIN — OXYCODONE HYDROCHLORIDE 5 MILLIGRAM(S): 5 TABLET ORAL at 21:48

## 2019-01-26 RX ADMIN — SIMETHICONE 80 MILLIGRAM(S): 80 TABLET, CHEWABLE ORAL at 12:59

## 2019-01-26 RX ADMIN — HEPARIN SODIUM 5000 UNIT(S): 5000 INJECTION INTRAVENOUS; SUBCUTANEOUS at 17:37

## 2019-01-26 RX ADMIN — SODIUM CHLORIDE 3 MILLILITER(S): 9 INJECTION INTRAMUSCULAR; INTRAVENOUS; SUBCUTANEOUS at 05:46

## 2019-01-26 RX ADMIN — MILRINONE LACTATE 6 MICROGRAM(S)/KG/MIN: 1 INJECTION, SOLUTION INTRAVENOUS at 21:18

## 2019-01-26 RX ADMIN — MILRINONE LACTATE 6 MICROGRAM(S)/KG/MIN: 1 INJECTION, SOLUTION INTRAVENOUS at 00:54

## 2019-01-26 RX ADMIN — Medication 81 MILLIGRAM(S): at 12:57

## 2019-01-26 RX ADMIN — Medication 100 MILLIGRAM(S): at 12:55

## 2019-01-26 RX ADMIN — OXYMETAZOLINE HYDROCHLORIDE 1 SPRAY(S): 0.5 SPRAY NASAL at 21:18

## 2019-01-26 RX ADMIN — Medication 2: at 21:19

## 2019-01-26 RX ADMIN — SEVELAMER CARBONATE 800 MILLIGRAM(S): 2400 POWDER, FOR SUSPENSION ORAL at 17:37

## 2019-01-26 RX ADMIN — HEPARIN SODIUM 5000 UNIT(S): 5000 INJECTION INTRAVENOUS; SUBCUTANEOUS at 06:16

## 2019-01-26 RX ADMIN — BUMETANIDE 3 MILLIGRAM(S): 0.25 INJECTION INTRAMUSCULAR; INTRAVENOUS at 16:01

## 2019-01-26 RX ADMIN — Medication 4: at 17:36

## 2019-01-26 RX ADMIN — SEVELAMER CARBONATE 800 MILLIGRAM(S): 2400 POWDER, FOR SUSPENSION ORAL at 12:55

## 2019-01-26 NOTE — CONSULT NOTE ADULT - SUBJECTIVE AND OBJECTIVE BOX
68M PMH CAD sp CABG and PCI, CHFrEF, DM, PAD p/w L sided epistaxis since earlier this afternoon. Pt with intermittent bleeding and passing clots. Denies any nasal trauma, not on any AC, no h/o recurrent epistaxis in past.    avss  nad, lying in bed  nonlabored breathing on ra  NC: gauze in place on L, removed, small blood vessel exposed along L nasal septum minimally oozing and mild turbinate mucosa w/ mild abrasion and blood coating, no profuse bleeding noted or collecting posteriorly, nasal cavity suctioned and silver nitrate applied to L nasal septum and IT, R NC clear, no bleeding or clots  OC/OP: small clot noted posteriorly cleared with water gurgles, no active bleeding noted from NP to OP  neck soft, flat    Afrin soaked surgicel placed in L nasal cavity

## 2019-01-26 NOTE — PROGRESS NOTE ADULT - PROBLEM SELECTOR PLAN 3
Patient with hypervolemia in the setting of ADHF. Patient currently being treated with Bumex gtt. Pt. with 2.9 L of UOP in last 24 hours. Diuretic and ADHF management per CCU team. Monitor daily weights. Low salt diet Patient with hypervolemia in the setting of ADHF. Patient received treatment with Bumex gtt until 1/25/19. Pt. with 3.3 L of UOP in last 24 hours. Weight decrease by 8.8 kg since admission. Diuretic and ADHF management per CCU team. Monitor daily weights. Low salt diet

## 2019-01-26 NOTE — PROGRESS NOTE ADULT - ATTENDING COMMENTS
Jesse Gonzalez is a 68 year old man with history of ischemic cardiomyopathy and HFrEF (EF 10-15%, LVEDD 5.4 cm), mod-severe MR, severe RV dysfunction and PAD s/p L fem-pop bypass and recent L common iliac artery stent 9/18, recent soft tissue infection w/ washout/muscle flap 10/18, gangrene of toe s/p amputation. He presented 12/8/18 with high output from VAC dressings with bilateral LE edema. He was markedly volume overloaded and improved with Lasix gtt. He now p/w  worsening SOB, with  2 pillow orthopnea, PND and ORTA after 1/2 block or walking up 5 steps. There was no associated CP, palpitations, or dizziness. On telemetry floor there was insufficient diuresis. He was admitted to CCU for inotropic therapy, with milrinone (Primacor) i and aggressive IV diuresis, initially with furosemide (Lasix) gtt, and then bumetanide (Bumex )drip. On 1/26/19, SG noted CI 2.5 by Jas and 2.2 L/min/sqm by thermodilution. PA 70/28 mm Hg, CVP 12 mm Hg, MVO2 51%.  There is acute on chronic renal failure (CKD 3-4), with ultrasound showing renal parenchymal disease. Current standing regimen: aspirin 81 mg daily, docusate sodium 100 mg daily, heparin 5000 Unit(s) subcutaneously every 12 hours,  insulin glargine Injectable (Lantus) 25 Unit(s) xubcutaneously at bedtime, insulin lispro (HumaLOG) corrective regimen sliding scale, milrinone 0.25 mCg / kG /Min (6 mL/Hr) IV, senna 2 tabs daily at bedtime, sevelamer  800 mg 3X daily, simethicone 80 mg chew 3X daily and tamsulosin 0.4 mg daily at bedtime

## 2019-01-26 NOTE — PROGRESS NOTE ADULT - PROBLEM SELECTOR PLAN 2
Hyperkalemia resolved with medical management. Serum potassium WNL (3.6) today (1/25/19). Pt. on IV Bumex drip. Continue to hold spironolactone. Low potassium diet. Monitor serum potassium Hyperkalemia resolved with medical management. Serum potassium WNL (4.3) today (1/26/19). Continue to hold spironolactone. Low potassium diet. Monitor serum potassium

## 2019-01-26 NOTE — CONSULT NOTE ADULT - ASSESSMENT
L epistaxis s/p silver nitrate cauterization and dissolvable packing placement  -afrin 2 sprays BID x 3 days  -nasal saline sprays q4h while awake starting vasu  -no nose blowing, sneeze with open mouth, no straining for next 24hr  -can f/u orl as outpt  -if rebleeds, spray afrin and hold pressure, if does not stop after 10-15min of continuous pressure then page ORL  -will d/w attending and update with further recs  -call with questions

## 2019-01-26 NOTE — PROGRESS NOTE ADULT - ATTENDING COMMENTS
Patient examined and ROS reviewed. A case of KESHAV in the  setting of ADHF. Patient is being diuresed.

## 2019-01-26 NOTE — PROGRESS NOTE ADULT - PROBLEM SELECTOR PLAN 1
Pt. with KESHAV in the setting of ADHF. On review of previous records in [facility]/[place], patient with normal Scr levels from 4/26/16 to 9/24/18. Pt. noted to have an episode of KESHAV during previous/recent hospitalization at [facility] (12/7/18 to 12/27/18). On this admission, Scr was elevated at 2.15 on 1/16/19, increased to 4 on 1/25/19. Pt. on Bumex gtt and milrinone per CCU team. US Kidney showed increased bilateral renal cortical echogenicity, no hydronephrosis on 1/18/19. Monitor labs and urine output. Avoid nephrotoxins. Dose medications as per eGFR Pt. with KESHAV in the setting of ADHF. On review of previous records in [facility]/[place], patient with normal Scr levels from 4/26/16 to 9/24/18. Pt. noted to have an episode of KESHAV during previous/recent hospitalization at [facility] (12/7/18 to 12/27/18). On this admission, Scr was elevated at 2.15 on 1/16/19, increased to 4 on 1/25/19 and stable to 3.9 today (1/26/19). Pt. on milrinone ggt and off bumex per CCU team. Pt. non-oliguric. US Kidney showed increased bilateral renal cortical echogenicity, no hydronephrosis on 1/18/19. Monitor labs and urine output. Avoid nephrotoxins. Dose medications as per eGFR

## 2019-01-26 NOTE — PROGRESS NOTE ADULT - SUBJECTIVE AND OBJECTIVE BOX
Keith Burns, PGY1  Pager: [phone]      Patient is a 68y old  Male who presents with a chief complaint of Dyspnea x 2 days (2019 08:31)      SUBJECTIVE / OVERNIGHT EVENTS:    REVIEW OF SYSTEMS:    CONSTITUTIONAL: No weakness, fevers or chills  EYES/ENT: No visual changes;  No vertigo or throat pain   NECK: No pain or stiffness  RESPIRATORY: No cough, wheezing, hemoptysis; No shortness of breath  CARDIOVASCULAR: No chest pain or palpitations  GASTROINTESTINAL: No abdominal or epigastric pain. No nausea, vomiting, or hematemesis; No diarrhea or constipation. No melena or hematochezia.  GENITOURINARY: No dysuria, frequency or hematuria  NEUROLOGICAL: No numbness or weakness  SKIN: No itching, rashes    MEDICATIONS  (STANDING):  aspirin enteric coated 81 milliGRAM(s) Oral daily  chlorhexidine 4% Liquid 1 Application(s) Topical <User Schedule>  dextrose 5%. 1000 milliLiter(s) (50 mL/Hr) IV Continuous <Continuous>  dextrose 50% Injectable 12.5 Gram(s) IV Push once  dextrose 50% Injectable 25 Gram(s) IV Push once  dextrose 50% Injectable 25 Gram(s) IV Push once  docusate sodium 100 milliGRAM(s) Oral daily  heparin  Injectable 5000 Unit(s) SubCutaneous every 12 hours  insulin glargine Injectable (LANTUS) 25 Unit(s) SubCutaneous at bedtime  insulin lispro (HumaLOG) corrective regimen sliding scale   SubCutaneous three times a day before meals  insulin lispro (HumaLOG) corrective regimen sliding scale   SubCutaneous at bedtime  milrinone Infusion 0.25 MICROgram(s)/kG/Min (6 mL/Hr) IV Continuous <Continuous>  senna 2 Tablet(s) Oral at bedtime  sevelamer hydrochloride 800 milliGRAM(s) Oral three times a day  simethicone 80 milliGRAM(s) Chew three times a day  sodium chloride 0.9% lock flush 3 milliLiter(s) IV Push every 8 hours  tamsulosin 0.4 milliGRAM(s) Oral at bedtime    MEDICATIONS  (PRN):  calamine Lotion 1 Application(s) Topical daily PRN Rash and/or Itching  dextrose 40% Gel 15 Gram(s) Oral once PRN Blood Glucose LESS THAN 70 milliGRAM(s)/deciliter  fluticasone propionate 50 MICROgram(s)/spray Nasal Spray 1 Spray(s) Both Nostrils two times a day PRN nasal congestion  glucagon  Injectable 1 milliGRAM(s) IntraMuscular once PRN Glucose LESS THAN 70 milligrams/deciliter  guaiFENesin   Syrup  (Sugar-Free) 200 milliGRAM(s) Oral every 6 hours PRN Cough  oxyCODONE    IR 5 milliGRAM(s) Oral every 6 hours PRN Moderate Pain (4 - 6)  sodium chloride 0.65% Nasal 1 Spray(s) Both Nostrils three times a day PRN Nasal Congestion      Vital Signs Last 24 Hrs  T(C): 36.5 (2019 04:00), Max: 36.6 (2019 20:00)  T(F): 97.7 (2019 04:00), Max: 97.9 (2019 20:00)  HR: 89 (2019 07:00) (76 - 97)  BP: 131/76 (2019 07:00) (111/57 - 138/72)  BP(mean): 88 (2019 07:00) (70 - 109)  RR: 12 (2019 07:00) (10 - 19)  SpO2: 100% (2019 07:00) (93% - 100%)  CAPILLARY BLOOD GLUCOSE      POCT Blood Glucose.: 117 mg/dL (2019 08:12)  POCT Blood Glucose.: 215 mg/dL (2019 21:31)  POCT Blood Glucose.: 210 mg/dL (2019 17:12)  POCT Blood Glucose.: 126 mg/dL (2019 11:47)    I&O's Summary    2019 07:01  -  2019 07:00  --------------------------------------------------------  IN: 1047 mL / OUT: 3325 mL / NET: -2278 mL        PHYSICAL EXAM:  GENERAL: NAD, well-developed  EYES: EOMI, PERRLA, conjunctiva and sclera clear  NECK:  No JVD  CHEST/LUNG: CTABL ; No wheeze  HEART: RRR; No murmurs  ABDOMEN: Soft, Nontender, Nondistended; Bowel sounds present  : No [place]  EXTREMITIES:  2+ Peripheral Pulses, No edema  PSYCH: AAOx3  NEUROLOGY: non-focal  SKIN: No rashes or lesions. No sacral ulcer    LABS:                        8.6    6.88  )-----------( 140      ( 2019 06:27 )             27.2         135  |  89<L>  |  95<H>  ----------------------------<  102<H>  4.3   |  30  |  3.90<H>    Ca    9.6      2019 06:27  Phos  6.6       Mg     2.5         TPro  7.7  /  Alb  3.7  /  TBili  1.4<H>  /  DBili  x   /  AST  26  /  ALT  20  /  AlkPhos  408<H>            Urinalysis Basic - ( 2019 12:10 )    Color: LIGHT YELLOW / Appearance: CLEAR / S.008 / pH: 5.0  Gluc: NEGATIVE / Ketone: NEGATIVE  / Bili: NEGATIVE / Urobili: NORMAL   Blood: NEGATIVE / Protein: NEGATIVE / Nitrite: NEGATIVE   Leuk Esterase: NEGATIVE / RBC: x / WBC x   Sq Epi: x / Non Sq Epi: x / Bacteria: x        Microbiology;        RADIOLOGY & ADDITIONAL TESTS:    Imaging Personally Reviewed:          Consultant(s) Notes Reviewed:      Care Discussed with Consultants/Other Providers:

## 2019-01-26 NOTE — PROGRESS NOTE ADULT - SUBJECTIVE AND OBJECTIVE BOX
[facility] DIVISION OF KIDNEY DISEASES AND HYPERTENSION -- FOLLOW UP NOTE  --------------------------------------------------------------------------------  HPI: 68-year-old male with medical history of b/L LE bypass and b/L LE multiple toe amputations, DM2, HFrEF (EF 20%), HTN, Dyslipidemia, CAD, CABG, PVD, groin  sartorius flap, and vac placement, LLE with surgical scar wound admitted for worsening SOB. Nephrology consulted for KESHAV. Currently been treated for ADHF. On review of previous records in [facility]/[place], patient had normal Scr levels from 4/26/16 to 9/24/18. Pt. noted to have an episode of KESHAV during previous/recent hospitalization at [facility] (12/7/18 to 12/27/18). Transfer to CCU on 1/20/19. On this admission, Scr slowly uptrending, latest Scr elevated at 4.0 on 1/25/19 and stable at 3.90 today (1/26/19.    Pt. was seen and examined in CCU. Pt. patient feels better, still dyspneic however slowly. Continues to be IV milrinone infusions, off bumex. Denies dysuria or difficulty urinating. Denies CP, N/V/F/C. Non-oliguric UOP increase in the past 24 hrs.     PAST HISTORY  --------------------------------------------------------------------------------  No significant changes to PMH, PSH, FHx, SHx, unless otherwise noted    ALLERGIES & MEDICATIONS  --------------------------------------------------------------------------------  Allergies    No Known Allergies    Intolerances      Standing Inpatient Medications  aspirin enteric coated 81 milliGRAM(s) Oral daily  chlorhexidine 4% Liquid 1 Application(s) Topical <User Schedule>  dextrose 5%. 1000 milliLiter(s) IV Continuous <Continuous>  dextrose 50% Injectable 12.5 Gram(s) IV Push once  dextrose 50% Injectable 25 Gram(s) IV Push once  dextrose 50% Injectable 25 Gram(s) IV Push once  docusate sodium 100 milliGRAM(s) Oral daily  heparin  Injectable 5000 Unit(s) SubCutaneous every 12 hours  insulin glargine Injectable (LANTUS) 25 Unit(s) SubCutaneous at bedtime  insulin lispro (HumaLOG) corrective regimen sliding scale   SubCutaneous three times a day before meals  insulin lispro (HumaLOG) corrective regimen sliding scale   SubCutaneous at bedtime  milrinone Infusion 0.25 MICROgram(s)/kG/Min IV Continuous <Continuous>  senna 2 Tablet(s) Oral at bedtime  sevelamer hydrochloride 800 milliGRAM(s) Oral three times a day  simethicone 80 milliGRAM(s) Chew three times a day  sodium chloride 0.9% lock flush 3 milliLiter(s) IV Push every 8 hours  tamsulosin 0.4 milliGRAM(s) Oral at bedtime    REVIEW OF SYSTEMS  --------------------------------------------------------------------------------  Gen: No fever  Respiratory: + dyspnea (improving)  CV: No chest pain  GI: No abdominal pain  : No dysuria, hematuria  MSK: + edema (improving)    All other systems were reviewed and are negative, except as noted.    VITALS/PHYSICAL EXAM  --------------------------------------------------------------------------------  T(C): 36.5 (01-26-19 @ 04:00), Max: 36.6 (01-25-19 @ 20:00)  HR: 89 (01-26-19 @ 07:00) (75 - 97)  BP: 131/76 (01-26-19 @ 07:00) (111/57 - 138/72)  RR: 12 (01-26-19 @ 07:00) (10 - 19)  SpO2: 100% (01-26-19 @ 07:00) (93% - 100%)  Wt(kg): --    01-25-19 @ 07:01  -  01-26-19 @ 07:00  --------------------------------------------------------  IN: 1047 mL / OUT: 3325 mL / NET: -2278 mL    Physical Exam:  	Gen: resting, NAD  	HEENT: No JVD  	Pulm: Fair air entry B/L  	CV: S1S2  	Abd: Soft, +BS   	Ext: +pitting b/l LE edema (improved)  	Neuro: Awake  	Skin: Warm and dry    LABS/STUDIES  --------------------------------------------------------------------------------              8.6    6.88  >-----------<  140      [01-26-19 @ 06:27]              27.2     135  |  89  |  95  ----------------------------<  102      [01-26-19 @ 06:27]  4.3   |  30  |  3.90        Ca     9.6     [01-26-19 @ 06:27]      Mg     2.5     [01-26-19 @ 06:27]      Phos  6.6     [01-26-19 @ 06:27]    TPro  7.7  /  Alb  3.7  /  TBili  1.4  /  DBili  x   /  AST  26  /  ALT  20  /  AlkPhos  408  [01-26-19 @ 06:27]    Creatinine Trend:  SCr 3.90 [01-26 @ 06:27]  SCr 3.88 [01-25 @ 17:00]  SCr 4.00 [01-25 @ 06:10]  SCr 3.69 [01-24 @ 06:35]  SCr 3.30 [01-23 @ 04:30]

## 2019-01-27 LAB
ANION GAP SERPL CALC-SCNC: 19 MMO/L — HIGH (ref 7–14)
BASE EXCESS BLDV CALC-SCNC: 8.7 MMOL/L — SIGNIFICANT CHANGE UP
BLOOD GAS VENOUS - CREATININE: 3.71 MG/DL — HIGH (ref 0.5–1.3)
BUN SERPL-MCNC: 98 MG/DL — HIGH (ref 7–23)
CALCIUM SERPL-MCNC: 9.4 MG/DL — SIGNIFICANT CHANGE UP (ref 8.4–10.5)
CHLORIDE BLDV-SCNC: 93 MMOL/L — LOW (ref 96–108)
CHLORIDE SERPL-SCNC: 87 MMOL/L — LOW (ref 98–107)
CO2 SERPL-SCNC: 30 MMOL/L — SIGNIFICANT CHANGE UP (ref 22–31)
CREAT SERPL-MCNC: 3.9 MG/DL — HIGH (ref 0.5–1.3)
GAS PNL BLDV: 132 MMOL/L — LOW (ref 136–146)
GLUCOSE BLDC GLUCOMTR-MCNC: 117 MG/DL — HIGH (ref 70–99)
GLUCOSE BLDC GLUCOMTR-MCNC: 217 MG/DL — HIGH (ref 70–99)
GLUCOSE BLDC GLUCOMTR-MCNC: 231 MG/DL — HIGH (ref 70–99)
GLUCOSE BLDC GLUCOMTR-MCNC: 321 MG/DL — HIGH (ref 70–99)
GLUCOSE BLDC GLUCOMTR-MCNC: 334 MG/DL — HIGH (ref 70–99)
GLUCOSE BLDV-MCNC: 108 — HIGH (ref 70–99)
GLUCOSE SERPL-MCNC: 107 MG/DL — HIGH (ref 70–99)
HCO3 BLDV-SCNC: 31 MMOL/L — HIGH (ref 20–27)
HCT VFR BLD CALC: 25.5 % — LOW (ref 39–50)
HCT VFR BLDV CALC: 26.1 % — LOW (ref 39–51)
HGB BLD-MCNC: 8.1 G/DL — LOW (ref 13–17)
HGB BLDV-MCNC: 8.4 G/DL — LOW (ref 13–17)
LACTATE BLDV-MCNC: 1.3 MMOL/L — SIGNIFICANT CHANGE UP (ref 0.5–2)
MAGNESIUM SERPL-MCNC: 2.3 MG/DL — SIGNIFICANT CHANGE UP (ref 1.6–2.6)
MCHC RBC-ENTMCNC: 22.6 PG — LOW (ref 27–34)
MCHC RBC-ENTMCNC: 31.8 % — LOW (ref 32–36)
MCV RBC AUTO: 71.2 FL — LOW (ref 80–100)
NRBC # FLD: 0 K/UL — LOW (ref 25–125)
PCO2 BLDV: 51 MMHG — SIGNIFICANT CHANGE UP (ref 41–51)
PH BLDV: 7.43 PH — SIGNIFICANT CHANGE UP (ref 7.32–7.43)
PHOSPHATE SERPL-MCNC: 6 MG/DL — HIGH (ref 2.5–4.5)
PLATELET # BLD AUTO: 133 K/UL — LOW (ref 150–400)
PMV BLD: SIGNIFICANT CHANGE UP FL (ref 7–13)
PO2 BLDV: 32 MMHG — LOW (ref 35–40)
POTASSIUM BLDV-SCNC: 3.5 MMOL/L — SIGNIFICANT CHANGE UP (ref 3.4–4.5)
POTASSIUM SERPL-MCNC: 3.7 MMOL/L — SIGNIFICANT CHANGE UP (ref 3.5–5.3)
POTASSIUM SERPL-SCNC: 3.7 MMOL/L — SIGNIFICANT CHANGE UP (ref 3.5–5.3)
RBC # BLD: 3.58 M/UL — LOW (ref 4.2–5.8)
RBC # FLD: 25.1 % — HIGH (ref 10.3–14.5)
SAO2 % BLDV: 51.7 % — LOW (ref 60–85)
SODIUM SERPL-SCNC: 136 MMOL/L — SIGNIFICANT CHANGE UP (ref 135–145)
WBC # BLD: 6.63 K/UL — SIGNIFICANT CHANGE UP (ref 3.8–10.5)
WBC # FLD AUTO: 6.63 K/UL — SIGNIFICANT CHANGE UP (ref 3.8–10.5)

## 2019-01-27 PROCEDURE — 71045 X-RAY EXAM CHEST 1 VIEW: CPT | Mod: 26

## 2019-01-27 PROCEDURE — 99232 SBSQ HOSP IP/OBS MODERATE 35: CPT | Mod: GC

## 2019-01-27 PROCEDURE — 99233 SBSQ HOSP IP/OBS HIGH 50: CPT | Mod: GC

## 2019-01-27 RX ADMIN — OXYMETAZOLINE HYDROCHLORIDE 1 SPRAY(S): 0.5 SPRAY NASAL at 18:06

## 2019-01-27 RX ADMIN — Medication 100 MILLIGRAM(S): at 12:17

## 2019-01-27 RX ADMIN — INSULIN GLARGINE 25 UNIT(S): 100 INJECTION, SOLUTION SUBCUTANEOUS at 22:23

## 2019-01-27 RX ADMIN — MILRINONE LACTATE 6 MICROGRAM(S)/KG/MIN: 1 INJECTION, SOLUTION INTRAVENOUS at 22:25

## 2019-01-27 RX ADMIN — SODIUM CHLORIDE 3 MILLILITER(S): 9 INJECTION INTRAMUSCULAR; INTRAVENOUS; SUBCUTANEOUS at 06:37

## 2019-01-27 RX ADMIN — SEVELAMER CARBONATE 800 MILLIGRAM(S): 2400 POWDER, FOR SUSPENSION ORAL at 18:06

## 2019-01-27 RX ADMIN — SIMETHICONE 80 MILLIGRAM(S): 80 TABLET, CHEWABLE ORAL at 14:17

## 2019-01-27 RX ADMIN — CHLORHEXIDINE GLUCONATE 1 APPLICATION(S): 213 SOLUTION TOPICAL at 12:16

## 2019-01-27 RX ADMIN — OXYCODONE HYDROCHLORIDE 5 MILLIGRAM(S): 5 TABLET ORAL at 18:57

## 2019-01-27 RX ADMIN — Medication 4: at 12:15

## 2019-01-27 RX ADMIN — SODIUM CHLORIDE 3 MILLILITER(S): 9 INJECTION INTRAMUSCULAR; INTRAVENOUS; SUBCUTANEOUS at 14:16

## 2019-01-27 RX ADMIN — SEVELAMER CARBONATE 800 MILLIGRAM(S): 2400 POWDER, FOR SUSPENSION ORAL at 12:17

## 2019-01-27 RX ADMIN — Medication 81 MILLIGRAM(S): at 12:16

## 2019-01-27 RX ADMIN — BUMETANIDE 3 MILLIGRAM(S): 0.25 INJECTION INTRAMUSCULAR; INTRAVENOUS at 18:05

## 2019-01-27 RX ADMIN — SIMETHICONE 80 MILLIGRAM(S): 80 TABLET, CHEWABLE ORAL at 06:36

## 2019-01-27 RX ADMIN — OXYMETAZOLINE HYDROCHLORIDE 1 SPRAY(S): 0.5 SPRAY NASAL at 06:37

## 2019-01-27 RX ADMIN — TAMSULOSIN HYDROCHLORIDE 0.4 MILLIGRAM(S): 0.4 CAPSULE ORAL at 22:24

## 2019-01-27 RX ADMIN — HEPARIN SODIUM 5000 UNIT(S): 5000 INJECTION INTRAVENOUS; SUBCUTANEOUS at 06:37

## 2019-01-27 RX ADMIN — SIMETHICONE 80 MILLIGRAM(S): 80 TABLET, CHEWABLE ORAL at 22:24

## 2019-01-27 RX ADMIN — BUMETANIDE 3 MILLIGRAM(S): 0.25 INJECTION INTRAMUSCULAR; INTRAVENOUS at 06:36

## 2019-01-27 RX ADMIN — HEPARIN SODIUM 5000 UNIT(S): 5000 INJECTION INTRAVENOUS; SUBCUTANEOUS at 18:05

## 2019-01-27 RX ADMIN — Medication 8: at 18:04

## 2019-01-27 RX ADMIN — SEVELAMER CARBONATE 800 MILLIGRAM(S): 2400 POWDER, FOR SUSPENSION ORAL at 08:49

## 2019-01-27 RX ADMIN — SODIUM CHLORIDE 3 MILLILITER(S): 9 INJECTION INTRAMUSCULAR; INTRAVENOUS; SUBCUTANEOUS at 22:12

## 2019-01-27 RX ADMIN — MILRINONE LACTATE 6 MICROGRAM(S)/KG/MIN: 1 INJECTION, SOLUTION INTRAVENOUS at 08:50

## 2019-01-27 RX ADMIN — OXYCODONE HYDROCHLORIDE 5 MILLIGRAM(S): 5 TABLET ORAL at 19:27

## 2019-01-27 NOTE — PROGRESS NOTE ADULT - PROBLEM SELECTOR PLAN 3
Patient with hypervolemia in the setting of ADHF. Patient received treatment with Bumex gtt until 1/25/19. Pt. with 3.0 L of UOP in last 24 hours. Weight decrease by 8.8 kg since admission on (1/26/19). Diuretic and ADHF management per CCU team. Monitor daily weights. Low salt diet

## 2019-01-27 NOTE — PROGRESS NOTE ADULT - SUBJECTIVE AND OBJECTIVE BOX
[facility] DIVISION OF KIDNEY DISEASES AND HYPERTENSION -- FOLLOW UP NOTE  --------------------------------------------------------------------------------  HPI: 68-year-old male with medical history of b/L LE bypass and b/L LE multiple toe amputations, DM2, HFrEF (EF 20%), HTN, Dyslipidemia, CAD, CABG, PVD, groin  sartorius flap, and vac placement, LLE with surgical scar wound admitted for worsening SOB. Nephrology consulted for KESHAV. Currently been treated for ADHF. On review of previous records in [facility], patient had normal Scr levels from 4/26/16 to 9/24/18. Pt. noted to have an episode of KESHAV during previous/recent hospitalization at [facility] (12/7/18 to 12/27/18). Transfer to CCU on 1/20/19. On this admission, Scr slowly uptrending, latest Scr elevated at 4.0 on 1/25/19 and stable at 3.90 today (1/27/19).    Pt. was seen and examined in CCU. Pt. patient feels better, had episode of epistaxis yesterday now resolved. Continues to be IV milrinone infusions and transition to bumex PO yesterday. Denies dysuria or difficulty urinating. Denies CP, N/V/F/C. Non-oliguric UOP stable in the past 24 hrs.     PAST HISTORY  --------------------------------------------------------------------------------  No significant changes to PMH, PSH, FHx, SHx, unless otherwise noted    ALLERGIES & MEDICATIONS  --------------------------------------------------------------------------------  Allergies    No Known Allergies    Intolerances    Standing Inpatient Medications  aspirin enteric coated 81 milliGRAM(s) Oral daily  buMETAnide 3 milliGRAM(s) Oral two times a day  chlorhexidine 4% Liquid 1 Application(s) Topical <User Schedule>  dextrose 5%. 1000 milliLiter(s) IV Continuous <Continuous>  dextrose 50% Injectable 12.5 Gram(s) IV Push once  dextrose 50% Injectable 25 Gram(s) IV Push once  dextrose 50% Injectable 25 Gram(s) IV Push once  docusate sodium 100 milliGRAM(s) Oral daily  heparin  Injectable 5000 Unit(s) SubCutaneous every 12 hours  insulin glargine Injectable (LANTUS) 25 Unit(s) SubCutaneous at bedtime  insulin lispro (HumaLOG) corrective regimen sliding scale   SubCutaneous three times a day before meals  insulin lispro (HumaLOG) corrective regimen sliding scale   SubCutaneous at bedtime  milrinone Infusion 0.25 MICROgram(s)/kG/Min IV Continuous <Continuous>  oxymetazoline 0.05% Nasal Spray 1 Spray(s) Left Nostril two times a day  senna 2 Tablet(s) Oral at bedtime  sevelamer hydrochloride 800 milliGRAM(s) Oral three times a day with meals  simethicone 80 milliGRAM(s) Chew three times a day  sodium chloride 0.9% lock flush 3 milliLiter(s) IV Push every 8 hours  tamsulosin 0.4 milliGRAM(s) Oral at bedtime    REVIEW OF SYSTEMS  --------------------------------------------------------------------------------  Gen: No fever  Respiratory: + dyspnea (improving)  CV: No chest pain  GI: No abdominal pain  : No dysuria, hematuria  MSK: + edema (improving)    All other systems were reviewed and are negative, except as noted.    VITALS/PHYSICAL EXAM  --------------------------------------------------------------------------------  T(C): 36.7 (01-27-19 @ 04:00), Max: 37 (01-26-19 @ 20:00)  HR: 90 (01-27-19 @ 06:00) (83 - 102)  BP: 105/58 (01-27-19 @ 06:00) (87/74 - 125/65)  RR: 17 (01-27-19 @ 06:00) (10 - 25)  SpO2: 97% (01-27-19 @ 06:00) (90% - 99%)  Wt(kg): --    01-26-19 @ 07:01  -  01-27-19 @ 07:00  --------------------------------------------------------  IN: 1198 mL / OUT: 3050 mL / NET: -1852 mL    Physical Exam:              Gen: resting, NAD  	HEENT: No JVD  	Pulm: Fair air entry B/L  	CV: S1S2  	Abd: Soft, +BS   	Ext: trace b/l LE edema (improved)  	Neuro: Awake  	Skin: Warm and dry    LABS/STUDIES  --------------------------------------------------------------------------------              8.1    6.63  >-----------<  133      [01-27-19 @ 06:30]              25.5     136  |  87  |  98  ----------------------------<  107      [01-27-19 @ 06:30]  3.7   |  30  |  3.90        Ca     9.4     [01-27-19 @ 06:30]      Mg     2.3     [01-27-19 @ 06:30]      Phos  6.0     [01-27-19 @ 06:30]    TPro  7.7  /  Alb  3.7  /  TBili  1.4  /  DBili  x   /  AST  26  /  ALT  20  /  AlkPhos  408  [01-26-19 @ 06:27]    Creatinine Trend:  SCr 3.90 [01-27 @ 06:30]  SCr 3.90 [01-26 @ 06:27]  SCr 3.88 [01-25 @ 17:00]  SCr 4.00 [01-25 @ 06:10]  SCr 3.69 [01-24 @ 06:35]

## 2019-01-27 NOTE — PROGRESS NOTE ADULT - ATTENDING COMMENTS
Jesse Gonzalez is a 68 year old man with history of ischemic cardiomyopathy and HFrEF (EF 10-15%, LVEDD 5.4 cm), mod-severe MR, severe RV dysfunction and PAD s/p L fem-pop bypass and recent L common iliac artery stent 9/18, recent soft tissue infection w/ washout/muscle flap 10/18, gangrene of toe s/p amputation. He presented 12/8/18 with high output from VAC dressings with bilateral LE edema. He was markedly volume overloaded and improved with Lasix gtt. He now p/w  worsening SOB, with  2 pillow orthopnea, PND and ORTA after 1/2 block or walking up 5 steps. There was no associated CP, palpitations, or dizziness. On telemetry floor there was insufficient diuresis. He was admitted to CCU for inotropic therapy, with milrinone (Primacor) i and aggressive IV diuresis, initially with furosemide (Lasix) gtt, and then bumetanide (Bumex )drip. On 1/26/19, SG noted CI 2.5 by Jas  and 2.2 L/min/sqm by thermodilution. PA 70/28 mm Hg, CVP 12 mm Hg, MVO2 51%.  There is acute on chronic renal failure (CKD 3-4), with ultrasound showing renal parenchymal disease. Current standing regimen: aspirin 81 mg daily, docusate sodium 100 mg daily, heparin 5000 Unit(s) subcutaneously every 12 hours,  insulin glargine Injectable (Lantus) 25 Unit(s) subcutaneously at bedtime, insulin lispro (HumaLOG) corrective regimen sliding scale, milrinone 0.25 mCg / kG /Min (6 mL/Hr) IV, senna 2 tabs daily at bedtime, sevelamer  800 mg 3X daily, simethicone 80 mg chew 3X daily and tamsulosin 0.4 mg daily at bedtime. Watch for orthostatic hypotension on this regimen. As of 1/27/19, the patient has reconsidered and may agree for ICD

## 2019-01-27 NOTE — PROGRESS NOTE ADULT - PROBLEM SELECTOR PLAN 1
Pt. with KESHAV in the setting of ADHF. On review of previous records in [facility]/[place], patient with normal Scr levels from 4/26/16 to 9/24/18. Pt. noted to have an episode of KESHAV during previous/recent hospitalization at [facility] (12/7/18 to 12/27/18). On this admission, Scr was elevated at 2.15 on 1/16/19, increased to 4 on 1/25/19 and stable to 3.9 today (1/27/19). Pt. on milrinone ggt and transition to bumex PO per CCU team. Pt. non-oliguric. US Kidney showed increased bilateral renal cortical echogenicity, no hydronephrosis on 1/18/19. Monitor labs and urine output. Avoid nephrotoxins. Dose medications as per eGFR

## 2019-01-27 NOTE — PROGRESS NOTE ADULT - ATTENDING COMMENTS
Patient examined and ROS reviewed. A case of KESHAV with ADHF. Patient is being diuresed. Serum creatinine is stable.

## 2019-01-27 NOTE — PROGRESS NOTE ADULT - SUBJECTIVE AND OBJECTIVE BOX
Date of Admission:  1/17/19  24 hour events:  Patient with left nostril epistaxis. ENT consulted, silver nitrate cauterization and dissolvable packing placed    Vital Signs Last 24 Hrs  T(C): 36.7 (27 Jan 2019 04:00), Max: 37 (26 Jan 2019 20:00)  T(F): 98.1 (27 Jan 2019 04:00), Max: 98.6 (26 Jan 2019 20:00)  HR: 90 (27 Jan 2019 06:00) (83 - 102)  BP: 105/58 (27 Jan 2019 06:00) (87/74 - 125/65)  BP(mean): 69 (27 Jan 2019 06:00) (50 - 99)  RR: 17 (27 Jan 2019 06:00) (10 - 25)  SpO2: 97% (27 Jan 2019 06:00) (90% - 99%)  I&O's Summary    26 Jan 2019 07:01  -  27 Jan 2019 07:00  --------------------------------------------------------  IN: 1198 mL / OUT: 3050 mL / NET: -1852 mL        MEDICATIONS:  aspirin enteric coated 81 milliGRAM(s) Oral daily  buMETAnide 3 milliGRAM(s) Oral two times a day  heparin  Injectable 5000 Unit(s) SubCutaneous every 12 hours  milrinone Infusion 0.25 MICROgram(s)/kG/Min IV Continuous <Continuous>  tamsulosin 0.4 milliGRAM(s) Oral at bedtime  guaiFENesin   Syrup  (Sugar-Free) 200 milliGRAM(s) Oral every 6 hours PRN    oxyCODONE    IR 5 milliGRAM(s) Oral every 6 hours PRN    docusate sodium 100 milliGRAM(s) Oral daily  senna 2 Tablet(s) Oral at bedtime  simethicone 80 milliGRAM(s) Chew three times a day    dextrose 40% Gel 15 Gram(s) Oral once PRN  dextrose 50% Injectable 12.5 Gram(s) IV Push once  dextrose 50% Injectable 25 Gram(s) IV Push once  dextrose 50% Injectable 25 Gram(s) IV Push once  glucagon  Injectable 1 milliGRAM(s) IntraMuscular once PRN  insulin glargine Injectable (LANTUS) 25 Unit(s) SubCutaneous at bedtime  insulin lispro (HumaLOG) corrective regimen sliding scale   SubCutaneous three times a day before meals  insulin lispro (HumaLOG) corrective regimen sliding scale   SubCutaneous at bedtime    calamine Lotion 1 Application(s) Topical daily PRN  chlorhexidine 4% Liquid 1 Application(s) Topical <User Schedule>  dextrose 5%. 1000 milliLiter(s) IV Continuous <Continuous>  oxymetazoline 0.05% Nasal Spray 1 Spray(s) Left Nostril two times a day  sodium chloride 0.65% Nasal 1 Spray(s) Both Nostrils three times a day PRN  sodium chloride 0.9% lock flush 3 milliLiter(s) IV Push every 8 hours    REVIEW OF SYSTEMS:    CONSTITUTIONAL: No weakness, fevers or chills  EYES/ENT: left nostril no further bleeding overnight, No visual changes;  No vertigo or throat pain   NECK: No pain or stiffness  RESPIRATORY: No cough, wheezing, hemoptysis; No shortness of breath  CARDIOVASCULAR: No chest pain or palpitations  GASTROINTESTINAL: No abdominal or epigastric pain. No nausea, vomiting, or hematemesis; No diarrhea or constipation. No melena or hematochezia.  GENITOURINARY: No dysuria, frequency or hematuria  NEUROLOGICAL: No numbness or weakness  SKIN: No itching, rashes    PHYSICAL EXAM:    GENERAL: NAD, well-developed  HEAD:  Atraumatic, Normocephalic  EYES: EOMI, PERRLA, conjunctiva and sclera clear  NECK: Supple, No JVD  CHEST/LUNG: crackles bilateral  HEART: Regular rate and rhythm; S1, S2; No murmurs, rubs, or gallops  ABDOMEN: softly distended  EXTREMITIES:  2+ Peripheral Pulses, No clubbing, cyanosis, or edema  PSYCH: A&Ox3  NEUROLOGY: non-focal  SKIN: No rashes or lesions  LABS:	 	    CBC Full  -  ( 27 Jan 2019 06:30 )  WBC Count : 6.63 K/uL  Hemoglobin : 8.1 g/dL  Hematocrit : 25.5 %  Platelet Count - Automated : 133 K/uL  Mean Cell Volume : 71.2 fL  Mean Cell Hemoglobin : 22.6 pg  Mean Cell Hemoglobin Concentration : 31.8 %  Auto Neutrophil # : x  Auto Lymphocyte # : x  Auto Monocyte # : x  Auto Eosinophil # : x  Auto Basophil # : x  Auto Neutrophil % : x  Auto Lymphocyte % : x  Auto Monocyte % : x  Auto Eosinophil % : x  Auto Basophil % : x    01-27    136  |  87<L>  |  98<H>  ----------------------------<  107<H>  3.7   |  30  |  3.90<H>  01-26    135  |  89<L>  |  95<H>  ----------------------------<  102<H>  4.3   |  30  |  3.90<H>    Ca    9.4      27 Jan 2019 06:30  Ca    9.6      26 Jan 2019 06:27  Phos  6.0     01-27  Phos  6.6     01-26  Mg     2.3     01-27  Mg     2.5     01-26    TPro  7.7  /  Alb  3.7  /  TBili  1.4<H>  /  DBili  x   /  AST  26  /  ALT  20  /  AlkPhos  408<H>  01-26

## 2019-01-27 NOTE — PROGRESS NOTE ADULT - ASSESSMENT
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 His last admission from 12/8-12/27 required CCU admission and placement on dobutamine. He has had 3 admissions in 2018, for various complications from DM.  He comes in this time due to worsening SOB. He admits to 2 pillow orthopnea, frequent PND and ORTA after 1/2 block and walking up 5 steps. No CP, palpitations, dizziness. Patient's son by bedside. On tele, patient not diuresing well and condition guarded. Admitted to CCU for inotropic therapy, initiation of primacor infusion and IV diuresis with lasix gtt, now on Bumex drip.       PLAN:  ENT: s/p left nostril epistaxis, now with packing  -no further bleeding  -if bleeding is to occur, will apply pressure and call ENT for further managment  -continue nasal saline spray q4       Cardiovascular:   NYHA class IV due to ischemic cardiomyopathy. On physical exam, patient is volume overloaded although lungs are relatively clear  -milrinone to continue at  .25mck/kg/mkn, continue mixed venous gas every 6 hours  -Echo with increased EF and diffuse LV hypokinesis   - bumex  to stay at 3mg IV BID tonight -   -Strict I/O and daily standing weights. Fluid restriction to 1500mL  -holding all BP meds for now in the setting of rising creatinine to try to increase preload  -Once hemodynamically optimized, consider ICD placement, given low EF and ischemic cardiomyopathy  -to discuss with heart failure team regarding long term goals - pt has previously refused invasive procedures     GI/:  +ascites and volume overload  Distended abdomen with dullness to percussion, suspect abdominal fluid overload with possible ascites  -pt with baseline elevated Tbili and Alk phos  -abdominal xray showing diffuse bowel air, ultrasound pending  -Currently no N/V/D. 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 Elevated today. Ultrasound shows renal parenchymal disease.   -Likely secondary to renal venous congestion from cardiorenal syndrome. Diuresis and continue to monitor.     Heme:   Anemia to low 9s with low MCV  iron studies inconclusive  reticulocyte index indicating hypoproliferation  Aspirin and Hep Subq    ID:   No issues    Endo:  DM, on 25 Lantus and ISS.   Fingerstick stable, continue to monitor.     DVT: Heparin  Diet: Regular, DASH/TLC, Carb restricted, fluid 1000mL restrict     Code status: Full code.

## 2019-01-27 NOTE — PROGRESS NOTE ADULT - PROBLEM SELECTOR PLAN 2
Hyperkalemia resolved with medical management. Serum potassium WNL (3.7) today (1/27/19). Continue to hold spironolactone. Low potassium diet. Monitor serum potassium

## 2019-01-28 DIAGNOSIS — E11.9 TYPE 2 DIABETES MELLITUS WITHOUT COMPLICATIONS: ICD-10-CM

## 2019-01-28 DIAGNOSIS — R04.0 EPISTAXIS: ICD-10-CM

## 2019-01-28 LAB
ANION GAP SERPL CALC-SCNC: 19 MMO/L — HIGH (ref 7–14)
ANION GAP SERPL CALC-SCNC: 20 MMO/L — HIGH (ref 7–14)
ANION GAP SERPL CALC-SCNC: 21 MMO/L — HIGH (ref 7–14)
BASE EXCESS BLDV CALC-SCNC: 6.6 MMOL/L — SIGNIFICANT CHANGE UP
BASE EXCESS BLDV CALC-SCNC: 7.6 MMOL/L — SIGNIFICANT CHANGE UP
BASE EXCESS BLDV CALC-SCNC: 7.7 MMOL/L — SIGNIFICANT CHANGE UP
BLOOD GAS VENOUS - CREATININE: 3.57 MG/DL — HIGH (ref 0.5–1.3)
BLOOD GAS VENOUS - CREATININE: 3.67 MG/DL — HIGH (ref 0.5–1.3)
BUN SERPL-MCNC: 102 MG/DL — HIGH (ref 7–23)
BUN SERPL-MCNC: 106 MG/DL — HIGH (ref 7–23)
BUN SERPL-MCNC: 108 MG/DL — HIGH (ref 7–23)
CALCIUM SERPL-MCNC: 9 MG/DL — SIGNIFICANT CHANGE UP (ref 8.4–10.5)
CALCIUM SERPL-MCNC: 9.3 MG/DL — SIGNIFICANT CHANGE UP (ref 8.4–10.5)
CALCIUM SERPL-MCNC: 9.3 MG/DL — SIGNIFICANT CHANGE UP (ref 8.4–10.5)
CHLORIDE BLDV-SCNC: 88 MMOL/L — LOW (ref 96–108)
CHLORIDE BLDV-SCNC: 88 MMOL/L — LOW (ref 96–108)
CHLORIDE SERPL-SCNC: 86 MMOL/L — LOW (ref 98–107)
CO2 SERPL-SCNC: 25 MMOL/L — SIGNIFICANT CHANGE UP (ref 22–31)
CO2 SERPL-SCNC: 27 MMOL/L — SIGNIFICANT CHANGE UP (ref 22–31)
CO2 SERPL-SCNC: 27 MMOL/L — SIGNIFICANT CHANGE UP (ref 22–31)
CREAT SERPL-MCNC: 3.66 MG/DL — HIGH (ref 0.5–1.3)
CREAT SERPL-MCNC: 3.73 MG/DL — HIGH (ref 0.5–1.3)
CREAT SERPL-MCNC: 3.8 MG/DL — HIGH (ref 0.5–1.3)
GAS PNL BLDV: 132 MMOL/L — LOW (ref 136–146)
GAS PNL BLDV: 134 MMOL/L — LOW (ref 136–146)
GAS PNL BLDV: 134 MMOL/L — LOW (ref 136–146)
GLUCOSE BLDC GLUCOMTR-MCNC: 199 MG/DL — HIGH (ref 70–99)
GLUCOSE BLDC GLUCOMTR-MCNC: 213 MG/DL — HIGH (ref 70–99)
GLUCOSE BLDC GLUCOMTR-MCNC: 223 MG/DL — HIGH (ref 70–99)
GLUCOSE BLDC GLUCOMTR-MCNC: 240 MG/DL — HIGH (ref 70–99)
GLUCOSE BLDV-MCNC: 148 — HIGH (ref 70–99)
GLUCOSE BLDV-MCNC: 194 — HIGH (ref 70–99)
GLUCOSE BLDV-MCNC: 209 — HIGH (ref 70–99)
GLUCOSE SERPL-MCNC: 145 MG/DL — HIGH (ref 70–99)
GLUCOSE SERPL-MCNC: 198 MG/DL — HIGH (ref 70–99)
GLUCOSE SERPL-MCNC: 209 MG/DL — HIGH (ref 70–99)
HCO3 BLDV-SCNC: 30 MMOL/L — HIGH (ref 20–27)
HCO3 BLDV-SCNC: 31 MMOL/L — HIGH (ref 20–27)
HCO3 BLDV-SCNC: 31 MMOL/L — HIGH (ref 20–27)
HCT VFR BLD CALC: 26.6 % — LOW (ref 39–50)
HCT VFR BLDV CALC: 25 % — LOW (ref 39–51)
HCT VFR BLDV CALC: 26.6 % — LOW (ref 39–51)
HCT VFR BLDV CALC: 27.8 % — LOW (ref 39–51)
HGB BLD-MCNC: 8.4 G/DL — LOW (ref 13–17)
HGB BLDV-MCNC: 8 G/DL — LOW (ref 13–17)
HGB BLDV-MCNC: 8.6 G/DL — LOW (ref 13–17)
HGB BLDV-MCNC: 9 G/DL — LOW (ref 13–17)
LACTATE BLDV-MCNC: 1.4 MMOL/L — SIGNIFICANT CHANGE UP (ref 0.5–2)
LACTATE BLDV-MCNC: 1.8 MMOL/L — SIGNIFICANT CHANGE UP (ref 0.5–2)
LACTATE SERPL-SCNC: 1.4 MMOL/L — SIGNIFICANT CHANGE UP (ref 0.5–2)
MAGNESIUM SERPL-MCNC: 2.2 MG/DL — SIGNIFICANT CHANGE UP (ref 1.6–2.6)
MAGNESIUM SERPL-MCNC: 2.3 MG/DL — SIGNIFICANT CHANGE UP (ref 1.6–2.6)
MAGNESIUM SERPL-MCNC: 2.3 MG/DL — SIGNIFICANT CHANGE UP (ref 1.6–2.6)
MCHC RBC-ENTMCNC: 23 PG — LOW (ref 27–34)
MCHC RBC-ENTMCNC: 31.6 % — LOW (ref 32–36)
MCV RBC AUTO: 72.9 FL — LOW (ref 80–100)
NRBC # FLD: 0 K/UL — LOW (ref 25–125)
PCO2 BLDV: 45 MMHG — SIGNIFICANT CHANGE UP (ref 41–51)
PCO2 BLDV: 46 MMHG — SIGNIFICANT CHANGE UP (ref 41–51)
PCO2 BLDV: 49 MMHG — SIGNIFICANT CHANGE UP (ref 41–51)
PH BLDV: 7.44 PH — HIGH (ref 7.32–7.43)
PH BLDV: 7.45 PH — HIGH (ref 7.32–7.43)
PH BLDV: 7.46 PH — HIGH (ref 7.32–7.43)
PHOSPHATE SERPL-MCNC: 4.8 MG/DL — HIGH (ref 2.5–4.5)
PHOSPHATE SERPL-MCNC: 5.3 MG/DL — HIGH (ref 2.5–4.5)
PHOSPHATE SERPL-MCNC: 5.3 MG/DL — HIGH (ref 2.5–4.5)
PLATELET # BLD AUTO: 150 K/UL — SIGNIFICANT CHANGE UP (ref 150–400)
PMV BLD: SIGNIFICANT CHANGE UP FL (ref 7–13)
PO2 BLDV: 36 MMHG — SIGNIFICANT CHANGE UP (ref 35–40)
PO2 BLDV: 38 MMHG — SIGNIFICANT CHANGE UP (ref 35–40)
PO2 BLDV: 87 MMHG — HIGH (ref 35–40)
POTASSIUM BLDV-SCNC: 3.3 MMOL/L — LOW (ref 3.4–4.5)
POTASSIUM SERPL-MCNC: 3.4 MMOL/L — LOW (ref 3.5–5.3)
POTASSIUM SERPL-MCNC: 3.5 MMOL/L — SIGNIFICANT CHANGE UP (ref 3.5–5.3)
POTASSIUM SERPL-MCNC: 3.9 MMOL/L — SIGNIFICANT CHANGE UP (ref 3.5–5.3)
POTASSIUM SERPL-SCNC: 3.4 MMOL/L — LOW (ref 3.5–5.3)
POTASSIUM SERPL-SCNC: 3.5 MMOL/L — SIGNIFICANT CHANGE UP (ref 3.5–5.3)
POTASSIUM SERPL-SCNC: 3.9 MMOL/L — SIGNIFICANT CHANGE UP (ref 3.5–5.3)
RBC # BLD: 3.65 M/UL — LOW (ref 4.2–5.8)
RBC # FLD: 25.4 % — HIGH (ref 10.3–14.5)
SAO2 % BLDV: 61 % — SIGNIFICANT CHANGE UP (ref 60–85)
SAO2 % BLDV: 62.7 % — SIGNIFICANT CHANGE UP (ref 60–85)
SAO2 % BLDV: 96.3 % — HIGH (ref 60–85)
SODIUM SERPL-SCNC: 132 MMOL/L — LOW (ref 135–145)
SODIUM SERPL-SCNC: 132 MMOL/L — LOW (ref 135–145)
SODIUM SERPL-SCNC: 133 MMOL/L — LOW (ref 135–145)
WBC # BLD: 7.37 K/UL — SIGNIFICANT CHANGE UP (ref 3.8–10.5)
WBC # FLD AUTO: 7.37 K/UL — SIGNIFICANT CHANGE UP (ref 3.8–10.5)

## 2019-01-28 PROCEDURE — 99232 SBSQ HOSP IP/OBS MODERATE 35: CPT | Mod: GC

## 2019-01-28 PROCEDURE — 99233 SBSQ HOSP IP/OBS HIGH 50: CPT

## 2019-01-28 RX ORDER — POTASSIUM CHLORIDE 20 MEQ
20 PACKET (EA) ORAL ONCE
Qty: 0 | Refills: 0 | Status: COMPLETED | OUTPATIENT
Start: 2019-01-28 | End: 2019-01-28

## 2019-01-28 RX ORDER — ISOSORBIDE DINITRATE 5 MG/1
10 TABLET ORAL THREE TIMES A DAY
Qty: 0 | Refills: 0 | Status: DISCONTINUED | OUTPATIENT
Start: 2019-01-28 | End: 2019-02-01

## 2019-01-28 RX ORDER — POTASSIUM CHLORIDE 20 MEQ
40 PACKET (EA) ORAL ONCE
Qty: 0 | Refills: 0 | Status: DISCONTINUED | OUTPATIENT
Start: 2019-01-28 | End: 2019-01-28

## 2019-01-28 RX ORDER — HYDRALAZINE HCL 50 MG
10 TABLET ORAL THREE TIMES A DAY
Qty: 0 | Refills: 0 | Status: DISCONTINUED | OUTPATIENT
Start: 2019-01-28 | End: 2019-01-29

## 2019-01-28 RX ORDER — OXYCODONE HYDROCHLORIDE 5 MG/1
5 TABLET ORAL EVERY 6 HOURS
Qty: 0 | Refills: 0 | Status: DISCONTINUED | OUTPATIENT
Start: 2019-01-28 | End: 2019-02-04

## 2019-01-28 RX ADMIN — Medication 4: at 11:28

## 2019-01-28 RX ADMIN — SEVELAMER CARBONATE 800 MILLIGRAM(S): 2400 POWDER, FOR SUSPENSION ORAL at 12:04

## 2019-01-28 RX ADMIN — OXYCODONE HYDROCHLORIDE 5 MILLIGRAM(S): 5 TABLET ORAL at 14:24

## 2019-01-28 RX ADMIN — SIMETHICONE 80 MILLIGRAM(S): 80 TABLET, CHEWABLE ORAL at 14:07

## 2019-01-28 RX ADMIN — SODIUM CHLORIDE 3 MILLILITER(S): 9 INJECTION INTRAMUSCULAR; INTRAVENOUS; SUBCUTANEOUS at 22:07

## 2019-01-28 RX ADMIN — Medication 100 MILLIGRAM(S): at 22:34

## 2019-01-28 RX ADMIN — SODIUM CHLORIDE 3 MILLILITER(S): 9 INJECTION INTRAMUSCULAR; INTRAVENOUS; SUBCUTANEOUS at 13:22

## 2019-01-28 RX ADMIN — Medication 4: at 08:21

## 2019-01-28 RX ADMIN — TAMSULOSIN HYDROCHLORIDE 0.4 MILLIGRAM(S): 0.4 CAPSULE ORAL at 22:34

## 2019-01-28 RX ADMIN — Medication 10 MILLIGRAM(S): at 22:34

## 2019-01-28 RX ADMIN — SIMETHICONE 80 MILLIGRAM(S): 80 TABLET, CHEWABLE ORAL at 06:45

## 2019-01-28 RX ADMIN — SEVELAMER CARBONATE 800 MILLIGRAM(S): 2400 POWDER, FOR SUSPENSION ORAL at 08:57

## 2019-01-28 RX ADMIN — Medication 20 MILLIEQUIVALENT(S): at 16:55

## 2019-01-28 RX ADMIN — MILRINONE LACTATE 6 MICROGRAM(S)/KG/MIN: 1 INJECTION, SOLUTION INTRAVENOUS at 08:57

## 2019-01-28 RX ADMIN — ISOSORBIDE DINITRATE 10 MILLIGRAM(S): 5 TABLET ORAL at 22:34

## 2019-01-28 RX ADMIN — Medication 81 MILLIGRAM(S): at 12:04

## 2019-01-28 RX ADMIN — ISOSORBIDE DINITRATE 10 MILLIGRAM(S): 5 TABLET ORAL at 14:07

## 2019-01-28 RX ADMIN — OXYMETAZOLINE HYDROCHLORIDE 1 SPRAY(S): 0.5 SPRAY NASAL at 06:45

## 2019-01-28 RX ADMIN — Medication 20 MILLIEQUIVALENT(S): at 08:57

## 2019-01-28 RX ADMIN — SODIUM CHLORIDE 3 MILLILITER(S): 9 INJECTION INTRAMUSCULAR; INTRAVENOUS; SUBCUTANEOUS at 06:34

## 2019-01-28 RX ADMIN — Medication 10 MILLIGRAM(S): at 14:07

## 2019-01-28 RX ADMIN — SEVELAMER CARBONATE 800 MILLIGRAM(S): 2400 POWDER, FOR SUSPENSION ORAL at 18:09

## 2019-01-28 RX ADMIN — Medication 1 SPRAY(S): at 18:10

## 2019-01-28 RX ADMIN — Medication 2: at 17:14

## 2019-01-28 RX ADMIN — OXYMETAZOLINE HYDROCHLORIDE 1 SPRAY(S): 0.5 SPRAY NASAL at 18:11

## 2019-01-28 RX ADMIN — BUMETANIDE 3 MILLIGRAM(S): 0.25 INJECTION INTRAMUSCULAR; INTRAVENOUS at 06:45

## 2019-01-28 RX ADMIN — HEPARIN SODIUM 5000 UNIT(S): 5000 INJECTION INTRAVENOUS; SUBCUTANEOUS at 06:45

## 2019-01-28 RX ADMIN — INSULIN GLARGINE 25 UNIT(S): 100 INJECTION, SOLUTION SUBCUTANEOUS at 22:34

## 2019-01-28 RX ADMIN — MILRINONE LACTATE 3 MICROGRAM(S)/KG/MIN: 1 INJECTION, SOLUTION INTRAVENOUS at 10:59

## 2019-01-28 RX ADMIN — HEPARIN SODIUM 5000 UNIT(S): 5000 INJECTION INTRAVENOUS; SUBCUTANEOUS at 18:09

## 2019-01-28 RX ADMIN — OXYCODONE HYDROCHLORIDE 5 MILLIGRAM(S): 5 TABLET ORAL at 14:56

## 2019-01-28 RX ADMIN — CHLORHEXIDINE GLUCONATE 1 APPLICATION(S): 213 SOLUTION TOPICAL at 10:07

## 2019-01-28 RX ADMIN — BUMETANIDE 3 MILLIGRAM(S): 0.25 INJECTION INTRAMUSCULAR; INTRAVENOUS at 18:09

## 2019-01-28 RX ADMIN — MILRINONE LACTATE 6 MICROGRAM(S)/KG/MIN: 1 INJECTION, SOLUTION INTRAVENOUS at 18:12

## 2019-01-28 RX ADMIN — SIMETHICONE 80 MILLIGRAM(S): 80 TABLET, CHEWABLE ORAL at 22:34

## 2019-01-28 NOTE — PROGRESS NOTE ADULT - ASSESSMENT
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 His last admission from 12/8-12/27 required CCU admission and placement on dobutamine. He has had 3 admissions in 2018, for various complications from DM.  He comes in this time due to worsening SOB. He admits to 2 pillow orthopnea, frequent PND and ORTA after 1/2 block and walking up 5 steps. No CP, palpitations, dizziness. Patient's son by bedside. On tele, patient not diuresing well and condition guarded. Admitted to CCU for inotropic therapy, initiation of primacor infusion and IV diuresis with lasix gtt, now on Bumex drip. 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 His last admission from 12/8-12/27 required CCU admission and placement on dobutamine. He has had 3 admissions in 2018, for various complications from DM.  He comes in this time due to worsening SOB. He admits to 2 pillow orthopnea, frequent PND and ORTA after 1/2 block and walking up 5 steps. No CP, palpitations, dizziness. Patient's son by bedside. On tele, patient not diuresing well and condition guarded. Admitted to CCU for inotropic therapy, initiation of primacor infusion and IV diuresis with bumex drip. Off bumex gtt and titrating off primacor.

## 2019-01-28 NOTE — PROGRESS NOTE ADULT - PROBLEM SELECTOR PLAN 1
NYHA class IV due to ischemic cardiomyopathy.   -milrinone to continue at .25mck/kg/mkn, continue mixed venous gas every 6 hours  -Echo with increased EF and diffuse LV hypokinesis   -bumex 3mg PO BID  -Strict I/O and daily standing weights. Fluid restriction to 1500mL  -holding all BP meds for now in the setting of rising creatinine to try to increase preload  -Once hemodynamically optimized, consider ICD placement, given low EF and ischemic cardiomyopathy  - to discuss with heart failure team regarding long term goals - pt has previously refused invasive procedures NYHA class IV due to ischemic cardiomyopathy.   -milrinone decrease to 0.125, will get mixed venous gas in 1 hr, and then get BMP and venous gas at 2:30PM.   -Echo with increased EF and diffuse LV hypokinesis   -bumex 3mg PO BID  -Strict I/O and daily standing weights. Fluid restriction to 1500mL  -holding all BP meds for now in the setting of rising creatinine to try to increase preload  -Once hemodynamically optimized, consider ICD placement, given low EF and ischemic cardiomyopathy  - to discuss with heart failure team regarding long term goals - pt has previously refused invasive procedures

## 2019-01-28 NOTE — PROGRESS NOTE ADULT - SUBJECTIVE AND OBJECTIVE BOX
PATIENT:  ERICKA GUILLERMO  [id number]    CHIEF COMPLAINT:  Patient is a 68y old  Male who presents with a chief complaint of Dyspnea x 2 days (2019 07:31)      INTERVAL HISTORYOVERNIGHT EVENTS:      REVIEW OF SYSTEMS:    Constitutional:     [ ] negative [ ] fevers [ ] chills [ ] weight loss [ ] weight gain  HEENT:                  [ ] negative [ ] dry eyes [ ] eye irritation [ ] postnasal drip [ ] nasal congestion  CV:                         [ ] negative  [ ] chest pain [ ] orthopnea [ ] palpitations [ ] murmur  Resp:                     [ ] negative [ ] cough [ ] shortness of breath [ ] dyspnea [ ] wheezing [ ] sputum [ ] hemoptysis  GI:                          [ ] negative [ ] nausea [ ] vomiting [ ] diarrhea [ ] constipation [ ] abd pain [ ] dysphagia   :                        [ ] negative [ ] dysuria [ ] nocturia [ ] hematuria [ ] increased urinary frequency  Musculoskeletal: [ ] negative [ ] back pain [ ] myalgias [ ] arthralgias [ ] fracture  Skin:                       [ ] negative [ ] rash [ ] itch  Neurological:        [ ] negative [ ] headache [ ] dizziness [ ] syncope [ ] weakness [ ] numbness  Psychiatric:           [ ] negative [ ] anxiety [ ] depression  Endocrine:            [ ] negative [ ] diabetes [ ] thyroid problem  Heme/Lymph:      [ ] negative [ ] anemia [ ] bleeding problem  Allergic/Immune: [ ] negative [ ] itchy eyes [ ] nasal discharge [ ] hives [ ] angioedema    [ ] All other systems negative  [ ] Unable to assess ROS because ________.    MEDICATIONS:  MEDICATIONS  (STANDING):  aspirin enteric coated 81 milliGRAM(s) Oral daily  buMETAnide 3 milliGRAM(s) Oral two times a day  chlorhexidine 4% Liquid 1 Application(s) Topical <User Schedule>  dextrose 5%. 1000 milliLiter(s) (50 mL/Hr) IV Continuous <Continuous>  dextrose 50% Injectable 12.5 Gram(s) IV Push once  dextrose 50% Injectable 25 Gram(s) IV Push once  dextrose 50% Injectable 25 Gram(s) IV Push once  docusate sodium 100 milliGRAM(s) Oral daily  heparin  Injectable 5000 Unit(s) SubCutaneous every 12 hours  insulin glargine Injectable (LANTUS) 25 Unit(s) SubCutaneous at bedtime  insulin lispro (HumaLOG) corrective regimen sliding scale   SubCutaneous three times a day before meals  insulin lispro (HumaLOG) corrective regimen sliding scale   SubCutaneous at bedtime  milrinone Infusion 0.25 MICROgram(s)/kG/Min (6 mL/Hr) IV Continuous <Continuous>  oxymetazoline 0.05% Nasal Spray 1 Spray(s) Left Nostril two times a day  potassium chloride    Tablet ER 20 milliEquivalent(s) Oral once  senna 2 Tablet(s) Oral at bedtime  sevelamer hydrochloride 800 milliGRAM(s) Oral three times a day with meals  simethicone 80 milliGRAM(s) Chew three times a day  sodium chloride 0.9% lock flush 3 milliLiter(s) IV Push every 8 hours  tamsulosin 0.4 milliGRAM(s) Oral at bedtime    MEDICATIONS  (PRN):  calamine Lotion 1 Application(s) Topical daily PRN Rash and/or Itching  dextrose 40% Gel 15 Gram(s) Oral once PRN Blood Glucose LESS THAN 70 milliGRAM(s)/deciliter  glucagon  Injectable 1 milliGRAM(s) IntraMuscular once PRN Glucose LESS THAN 70 milligrams/deciliter  guaiFENesin   Syrup  (Sugar-Free) 200 milliGRAM(s) Oral every 6 hours PRN Cough  sodium chloride 0.65% Nasal 1 Spray(s) Both Nostrils three times a day PRN Nasal Congestion      ALLERGIES:  Allergies    No Known Allergies    Intolerances        OBJECTIVE:  ICU Vital Signs Last 24 Hrs  T(C): 36.8 (2019 04:00), Max: 36.8 (2019 00:00)  T(F): 98.3 (2019 04:00), Max: 98.3 (2019 04:00)  HR: 104 (2019 07:00) (84 - 104)  BP: 135/66 (2019 07:00) (96/51 - 144/74)  BP(mean): 82 (2019 07:00) (63 - 89)  ABP: --  ABP(mean): --  RR: 18 (2019 07:00) (10 - 21)  SpO2: 99% (2019 07:00) (89% - 100%)      Adult Advanced Hemodynamics Last 24 Hrs  CVP(mm Hg): 16 (2019 15:36) (13 - 34)  CVP(cm H2O): --  CO: 4 (2019 15:36) (4 - 4)  CI: 2.1 (2019 15:36) (2.1 - 2.1)  PA: 74/34 (2019 15:36) (64/24 - 85/44)  PA(mean): 51 (2019 15:36) (40 - 60)  PCWP: --  SVR: --  SVRI: --  PVR: --  PVRI: --  CAPILLARY BLOOD GLUCOSE      POCT Blood Glucose.: 217 mg/dL (2019 22:21)  POCT Blood Glucose.: 321 mg/dL (2019 17:17)  POCT Blood Glucose.: 334 mg/dL (2019 17:15)  POCT Blood Glucose.: 231 mg/dL (2019 12:03)  POCT Blood Glucose.: 117 mg/dL (2019 08:02)    CAPILLARY BLOOD GLUCOSE      POCT Blood Glucose.: 217 mg/dL (2019 22:21)    I&O's Summary    2019 07:01  -  2019 07:00  --------------------------------------------------------  IN: 1224 mL / OUT: 2165 mL / NET: -941 mL      Daily     Daily Weight in k.6 (2019 06:00)    PHYSICAL EXAMINATION:  General: WN/WD NAD  HEENT: PERRLA, EOMI, moist mucous membranes  Neurology: A&Ox3, nonfocal, WHALEY x 4  Respiratory: CTA B/L, normal respiratory effort, no wheezes, crackles, rales  CV: RRR, S1S2, no murmurs, rubs or gallops  Abdominal: Soft, NT, ND +BS, Last BM  Extremities: No edema, + peripheral pulses  Incisions:   Tubes:    LABS:                          8.4    7.37  )-----------( 150      ( 2019 06:20 )             26.6         133<L>  |  86<L>  |  102<H>  ----------------------------<  145<H>  3.4<L>   |  27  |  3.66<H>    Ca    9.3      2019 06:20  Phos  5.3       Mg     2.3                         TELEMETRY:     EKG:     IMAGING: PATIENT:  ERICKA GUILLERMO  [id number]    CHIEF COMPLAINT:  Patient is a 68y old  Male who presents with a chief complaint of Dyspnea x 2 days (2019 07:31)      INTERVAL HISTORY OVERNIGHT EVENTS: No events, swan d/anna yesterday, no SOB or CP. Swelling is much improved      REVIEW OF SYSTEMS:    Constitutional:     [ ] negative [ ] fevers [ ] chills [ ] weight loss [ ] weight gain  HEENT:                  [ ] negative [ ] dry eyes [ ] eye irritation [ ] postnasal drip [ ] nasal congestion  CV:                         [ ] negative  [ ] chest pain [ ] orthopnea [ ] palpitations [ ] murmur  Resp:                     [ ] negative [ ] cough [ ] shortness of breath [ ] dyspnea [ ] wheezing [ ] sputum [ ] hemoptysis  GI:                          [ ] negative [ ] nausea [ ] vomiting [ ] diarrhea [ ] constipation [ ] abd pain [ ] dysphagia   :                        [ ] negative [ ] dysuria [ ] nocturia [ ] hematuria [ ] increased urinary frequency  Musculoskeletal: [ ] negative [ ] back pain [ ] myalgias [ ] arthralgias [ ] fracture  Skin:                       [ ] negative [ ] rash [ ] itch  Neurological:        [ ] negative [ ] headache [ ] dizziness [ ] syncope [ ] weakness [ ] numbness  Psychiatric:           [ ] negative [ ] anxiety [ ] depression  Endocrine:            [ ] negative [ ] diabetes [ ] thyroid problem  Heme/Lymph:      [ ] negative [ ] anemia [ ] bleeding problem  Allergic/Immune: [ ] negative [ ] itchy eyes [ ] nasal discharge [ ] hives [ ] angioedema    [x ] All other systems negative  [ ] Unable to assess ROS because ________.    MEDICATIONS:  MEDICATIONS  (STANDING):  aspirin enteric coated 81 milliGRAM(s) Oral daily  buMETAnide 3 milliGRAM(s) Oral two times a day  chlorhexidine 4% Liquid 1 Application(s) Topical <User Schedule>  dextrose 5%. 1000 milliLiter(s) (50 mL/Hr) IV Continuous <Continuous>  dextrose 50% Injectable 12.5 Gram(s) IV Push once  dextrose 50% Injectable 25 Gram(s) IV Push once  dextrose 50% Injectable 25 Gram(s) IV Push once  docusate sodium 100 milliGRAM(s) Oral daily  heparin  Injectable 5000 Unit(s) SubCutaneous every 12 hours  insulin glargine Injectable (LANTUS) 25 Unit(s) SubCutaneous at bedtime  insulin lispro (HumaLOG) corrective regimen sliding scale   SubCutaneous three times a day before meals  insulin lispro (HumaLOG) corrective regimen sliding scale   SubCutaneous at bedtime  milrinone Infusion 0.25 MICROgram(s)/kG/Min (6 mL/Hr) IV Continuous <Continuous>  oxymetazoline 0.05% Nasal Spray 1 Spray(s) Left Nostril two times a day  potassium chloride    Tablet ER 20 milliEquivalent(s) Oral once  senna 2 Tablet(s) Oral at bedtime  sevelamer hydrochloride 800 milliGRAM(s) Oral three times a day with meals  simethicone 80 milliGRAM(s) Chew three times a day  sodium chloride 0.9% lock flush 3 milliLiter(s) IV Push every 8 hours  tamsulosin 0.4 milliGRAM(s) Oral at bedtime    MEDICATIONS  (PRN):  calamine Lotion 1 Application(s) Topical daily PRN Rash and/or Itching  dextrose 40% Gel 15 Gram(s) Oral once PRN Blood Glucose LESS THAN 70 milliGRAM(s)/deciliter  glucagon  Injectable 1 milliGRAM(s) IntraMuscular once PRN Glucose LESS THAN 70 milligrams/deciliter  guaiFENesin   Syrup  (Sugar-Free) 200 milliGRAM(s) Oral every 6 hours PRN Cough  sodium chloride 0.65% Nasal 1 Spray(s) Both Nostrils three times a day PRN Nasal Congestion      ALLERGIES:  Allergies    No Known Allergies    Intolerances        OBJECTIVE:  ICU Vital Signs Last 24 Hrs  T(C): 36.8 (2019 04:00), Max: 36.8 (2019 00:00)  T(F): 98.3 (2019 04:00), Max: 98.3 (2019 04:00)  HR: 104 (2019 07:00) (84 - 104)  BP: 135/66 (2019 07:00) (96/51 - 144/74)  BP(mean): 82 (2019 07:00) (63 - 89)  ABP: --  ABP(mean): --  RR: 18 (2019 07:00) (10 - 21)  SpO2: 99% (2019 07:00) (89% - 100%)      Adult Advanced Hemodynamics Last 24 Hrs  CVP(mm Hg): 16 (2019 15:36) (13 - 34)  CVP(cm H2O): --  CO: 4 (2019 15:36) (4 - 4)  CI: 2.1 (2019 15:36) (2.1 - 2.1)  PA: 74/34 (2019 15:36) (64/24 - 85/44)  PA(mean): 51 (2019 15:36) (40 - 60)  PCWP: --  SVR: --  SVRI: --  PVR: --  PVRI: --  CAPILLARY BLOOD GLUCOSE      POCT Blood Glucose.: 217 mg/dL (2019 22:21)  POCT Blood Glucose.: 321 mg/dL (2019 17:17)  POCT Blood Glucose.: 334 mg/dL (2019 17:15)  POCT Blood Glucose.: 231 mg/dL (2019 12:03)  POCT Blood Glucose.: 117 mg/dL (2019 08:02)    CAPILLARY BLOOD GLUCOSE      POCT Blood Glucose.: 217 mg/dL (2019 22:21)    I&O's Summary    2019 07:01  -  2019 07:00  --------------------------------------------------------  IN: 1224 mL / OUT: 2165 mL / NET: -941 mL      Daily     Daily Weight in k.6 (2019 06:00)    PHYSICAL EXAMINATION:  General: WN/WD NAD, on milirinone drip  HEENT: PERRLA, EOMI, moist mucous membranes  Neurology: A&Ox3, nonfocal, WHALEY x 4  Respiratory: CTA B/L, normal respiratory effort, no wheezes, crackles, rales  CV: RRR, S1S2, no murmurs, rubs or gallops  Abdominal: Soft, NT, ND +BS, Last BM  Extremities: No edema, + peripheral pulses  Incisions:   Tubes:    LABS:                          8.4    7.37  )-----------( 150      ( 2019 06:20 )             26.6         133<L>  |  86<L>  |  102<H>  ----------------------------<  145<H>  3.4<L>   |  27  |  3.66<H>    Ca    9.3      2019 06:20  Phos  5.3       Mg     2.3                         TELEMETRY:     EKG:     IMAGING:

## 2019-01-28 NOTE — PROGRESS NOTE ADULT - PROBLEM SELECTOR PLAN 2
Patient with hypervolemia in the setting of ADHF. Patient received treatment with Bumex gtt until 1/25/19, currently transition to PO bumex. Pt. with 2.1 L of UOP in last 24 hours. Weight decrease by 8.8 kg since admission on (1/26/19). Diuretic and ADHF management per CCU team. Monitor daily weights. Low salt diet Patient with hypervolemia in the setting of ADHF. Patient received treatment with Bumex gtt until 1/25/19, currently on oral Bumex. Pt. with 2.1 L of UOP in last 24 hours. Weight decreased by 8.8 kg since admission on 1/26/19. Diuretic and ADHF management per CCU team. Monitor daily weights. Low salt diet

## 2019-01-28 NOTE — PROGRESS NOTE ADULT - ASSESSMENT
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 His last admission from 12/8-12/27 required CCU admission and placement on dobutamine. He has had 3 admissions in 2018, for various complications from DM.  He comes in this time due to worsening SOB. He admits to 2 pillow orthopnea, frequent PND and ORTA after 1/2 block and walking up 5 steps. No CP, palpitations, dizziness. Patient's son by bedside.  His dry weight s/p last hospitalization was 132 lbs.  NYHA class IV. Moderately hypervolemic. 1/22/18 PCWP was 23, RA 18 in AM.  CVP 1/24/19 was 14.

## 2019-01-28 NOTE — PROGRESS NOTE ADULT - SUBJECTIVE AND OBJECTIVE BOX
[facility] DIVISION OF KIDNEY DISEASES AND HYPERTENSION -- FOLLOW UP NOTE  --------------------------------------------------------------------------------  HPI: 68-year-old male with medical history of b/L LE bypass and b/L LE multiple toe amputations, DM2, HFrEF (EF 20%), HTN, Dyslipidemia, CAD, CABG, PVD, groin sartorius flap, and vac placement, LLE with surgical scar wound admitted for worsening SOB. Nephrology consulted for KESHAV. Pt. was admitted with ADHF. On review of previous records in [facility]/[place], patient had normal Scr levels from 4/26/16 to 9/24/18. Pt. noted to have an episode of KESHAV during previous/recent hospitalization at [facility] (12/7/18 to 12/27/18). Transfer to CCU on 1/20/19. On this admission, Scr peaked at 4.0 on 1/25/19 and downtrending at 3.66 today (1/28/19).    Pt. was seen and examined in CCU. Pt. patient feels better, clinically improved. Continues to be IV milrinone infusion and transition to bumex PO on 1/26/19. Denies dysuria or difficulty urinating. Denies CP, N/V/F/C. Non-oliguric UOP stable in the past 24 hrs.     PAST HISTORY  --------------------------------------------------------------------------------  No significant changes to PMH, PSH, FHx, SHx, unless otherwise noted    ALLERGIES & MEDICATIONS  --------------------------------------------------------------------------------  Allergies    No Known Allergies    Intolerances      Standing Inpatient Medications  aspirin enteric coated 81 milliGRAM(s) Oral daily  buMETAnide 3 milliGRAM(s) Oral two times a day  chlorhexidine 4% Liquid 1 Application(s) Topical <User Schedule>  dextrose 5%. 1000 milliLiter(s) IV Continuous <Continuous>  dextrose 50% Injectable 12.5 Gram(s) IV Push once  dextrose 50% Injectable 25 Gram(s) IV Push once  dextrose 50% Injectable 25 Gram(s) IV Push once  docusate sodium 100 milliGRAM(s) Oral daily  heparin  Injectable 5000 Unit(s) SubCutaneous every 12 hours  insulin glargine Injectable (LANTUS) 25 Unit(s) SubCutaneous at bedtime  insulin lispro (HumaLOG) corrective regimen sliding scale   SubCutaneous three times a day before meals  insulin lispro (HumaLOG) corrective regimen sliding scale   SubCutaneous at bedtime  milrinone Infusion 0.25 MICROgram(s)/kG/Min IV Continuous <Continuous>  oxymetazoline 0.05% Nasal Spray 1 Spray(s) Left Nostril two times a day  potassium chloride    Tablet ER 20 milliEquivalent(s) Oral once  senna 2 Tablet(s) Oral at bedtime  sevelamer hydrochloride 800 milliGRAM(s) Oral three times a day with meals  simethicone 80 milliGRAM(s) Chew three times a day  sodium chloride 0.9% lock flush 3 milliLiter(s) IV Push every 8 hours  tamsulosin 0.4 milliGRAM(s) Oral at bedtime    REVIEW OF SYSTEMS  --------------------------------------------------------------------------------  Gen: No fever  Respiratory: + dyspnea (improved)  CV: No chest pain  GI: No abdominal pain  : No dysuria, hematuria  MSK: + edema (improved)    All other systems were reviewed and are negative, except as noted.    VITALS/PHYSICAL EXAM  --------------------------------------------------------------------------------  T(C): 36.8 (01-28-19 @ 04:00), Max: 36.8 (01-28-19 @ 00:00)  HR: 104 (01-28-19 @ 07:00) (84 - 104)  BP: 135/66 (01-28-19 @ 07:00) (96/51 - 144/74)  RR: 18 (01-28-19 @ 07:00) (10 - 21)  SpO2: 99% (01-28-19 @ 07:00) (89% - 100%)  Wt(kg): --        01-27-19 @ 07:01  -  01-28-19 @ 07:00  --------------------------------------------------------  IN: 1224 mL / OUT: 2165 mL / NET: -941 mL        Physical Exam:  	   Gen: resting, NAD  	HEENT: No JVD  	Pulm: CTA B/L  	CV: S1S2  	Abd: Soft, +BS   	Ext: trace b/l LE edema (improved)  	Neuro: Awake  	Skin: Warm and dry    LABS/STUDIES  --------------------------------------------------------------------------------              8.1    6.63  >-----------<  133      [01-27-19 @ 06:30]              25.5     133  |  86  |  102  ----------------------------<  145      [01-28-19 @ 06:20]  3.4   |  27  |  3.66        Ca     9.3     [01-28-19 @ 06:20]      Mg     2.3     [01-28-19 @ 06:20]      Phos  5.3     [01-28-19 @ 06:20]    Creatinine Trend:  SCr 3.66 [01-28 @ 06:20]  SCr 3.90 [01-27 @ 06:30]  SCr 3.90 [01-26 @ 06:27]  SCr 3.88 [01-25 @ 17:00]  SCr 4.00 [01-25 @ 06:10] [facility] DIVISION OF KIDNEY DISEASES AND HYPERTENSION -- FOLLOW UP NOTE  --------------------------------------------------------------------------------  HPI: 68-year-old male with medical history of b/L LE bypass and b/L LE multiple toe amputations, DM2, HFrEF (EF 20%), HTN, Dyslipidemia, CAD, CABG, PVD, groin sartorius flap, and vac placement, LLE with surgical scar wound admitted for worsening SOB. Nephrology consulted for KESHAV. Pt. was admitted with ADHF. On review of previous records in [facility]/[place], patient had normal Scr levels from 4/26/16 to 9/24/18. Pt. noted to have an episode of KESHAV during previous/recent hospitalization at [facility] (12/7/18 to 12/27/18). Transfer to CCU on 1/20/19. On this admission, Scr peaked at 4.0 on 1/25/19 and downtrending at 3.66 today (1/28/19).    Pt. was seen and examined in CCU. Pt. patient feels better, clinically improved. Continues to be IV milrinone infusion and transition to bumex PO on 1/26/19. Denies dysuria or difficulty urinating. Denies CP, N/V/F/C. Non-oliguric UOP stable in the past 24 hrs.     PAST HISTORY  --------------------------------------------------------------------------------  No significant changes to PMH, PSH, FHx, SHx, unless otherwise noted    ALLERGIES & MEDICATIONS  --------------------------------------------------------------------------------  Allergies    No Known Allergies    Intolerances    Standing Inpatient Medications  aspirin enteric coated 81 milliGRAM(s) Oral daily  buMETAnide 3 milliGRAM(s) Oral two times a day  chlorhexidine 4% Liquid 1 Application(s) Topical <User Schedule>  dextrose 5%. 1000 milliLiter(s) IV Continuous <Continuous>  dextrose 50% Injectable 12.5 Gram(s) IV Push once  dextrose 50% Injectable 25 Gram(s) IV Push once  dextrose 50% Injectable 25 Gram(s) IV Push once  docusate sodium 100 milliGRAM(s) Oral daily  heparin  Injectable 5000 Unit(s) SubCutaneous every 12 hours  insulin glargine Injectable (LANTUS) 25 Unit(s) SubCutaneous at bedtime  insulin lispro (HumaLOG) corrective regimen sliding scale   SubCutaneous three times a day before meals  insulin lispro (HumaLOG) corrective regimen sliding scale   SubCutaneous at bedtime  milrinone Infusion 0.25 MICROgram(s)/kG/Min IV Continuous <Continuous>  oxymetazoline 0.05% Nasal Spray 1 Spray(s) Left Nostril two times a day  potassium chloride    Tablet ER 20 milliEquivalent(s) Oral once  senna 2 Tablet(s) Oral at bedtime  sevelamer hydrochloride 800 milliGRAM(s) Oral three times a day with meals  simethicone 80 milliGRAM(s) Chew three times a day  sodium chloride 0.9% lock flush 3 milliLiter(s) IV Push every 8 hours  tamsulosin 0.4 milliGRAM(s) Oral at bedtime    REVIEW OF SYSTEMS  --------------------------------------------------------------------------------  Gen: No fever  Respiratory: + dyspnea (improved)  CV: No chest pain  GI: No abdominal pain  : No dysuria, hematuria  MSK: + edema (improved)    All other systems were reviewed and are negative, except as noted.    VITALS/PHYSICAL EXAM  --------------------------------------------------------------------------------  T(C): 36.8 (01-28-19 @ 04:00), Max: 36.8 (01-28-19 @ 00:00)  HR: 104 (01-28-19 @ 07:00) (84 - 104)  BP: 135/66 (01-28-19 @ 07:00) (96/51 - 144/74)  RR: 18 (01-28-19 @ 07:00) (10 - 21)  SpO2: 99% (01-28-19 @ 07:00) (89% - 100%)  Wt(kg): --    01-27-19 @ 07:01  -  01-28-19 @ 07:00  --------------------------------------------------------  IN: 1224 mL / OUT: 2165 mL / NET: -941 mL    Physical Exam:  	Gen: resting, NAD  	HEENT: No JVD  	Pulm: CTA B/L  	CV: S1S2  	Abd: Soft, +BS   	Ext: trace b/l LE edema (improved)  	Neuro: Awake  	Skin: Warm and dry    LABS/STUDIES  --------------------------------------------------------------------------------              8.1    6.63  >-----------<  133      [01-27-19 @ 06:30]              25.5     133  |  86  |  102  ----------------------------<  145      [01-28-19 @ 06:20]  3.4   |  27  |  3.66        Ca     9.3     [01-28-19 @ 06:20]      Mg     2.3     [01-28-19 @ 06:20]      Phos  5.3     [01-28-19 @ 06:20]    Creatinine Trend:  SCr 3.66 [01-28 @ 06:20]  SCr 3.90 [01-27 @ 06:30]  SCr 3.90 [01-26 @ 06:27]  SCr 3.88 [01-25 @ 17:00]  SCr 4.00 [01-25 @ 06:10]

## 2019-01-28 NOTE — PROGRESS NOTE ADULT - PROBLEM SELECTOR PLAN 2
Acute on chronic renal failure (CKD 3-4). Elevated today. Ultrasound shows renal parenchymal disease.   -Likely secondary to renal venous congestion from cardiorenal syndrome. Diuresis and continue to monitor.

## 2019-01-28 NOTE — PROGRESS NOTE ADULT - PROBLEM SELECTOR PLAN 3
ENT: s/p left nostril epistaxis, now with packing  -no further bleeding  -if bleeding is to occur, will apply pressure and call ENT for further managment  -continue nasal saline spray q4

## 2019-01-28 NOTE — PROGRESS NOTE ADULT - PROBLEM SELECTOR PLAN 1
Pt. with KESHAV in the setting of ADHF. On review of previous records in [facility]/[place], patient with normal Scr levels from 4/26/16 to 9/24/18. Pt. noted to have an episode of KESHAV during previous/recent hospitalization at [facility] (12/7/18 to 12/27/18). On this admission, Scr was elevated at 2.15 on 1/16/19, On this admission, Scr peaked at 4.0 on 1/25/19 and downtrending at 3.66 today (1/28/19). Pt. on milrinone ggt and transition to bumex PO per CCU team. Pt. non-oliguric. US Kidney showed increased bilateral renal cortical echogenicity, no hydronephrosis on 1/18/19. Monitor labs and urine output. Avoid nephrotoxins. Dose medications as per eGFR Pt. with KESHAV in the setting of ADHF. On review of previous records in [facility]/[place], patient with normal Scr levels from 4/26/16 to 9/24/18. Pt. noted to have an episode of KESHAV during previous/recent hospitalization at [facility] (12/7/18 to 12/27/18). On this admission, Scr was elevated at 2.15 on 1/16/19, On this admission, Scr peaked at 4.0 on 1/25/19 and downtrending at 3.66 today (1/28/19). Pt. on milrinone infusion and oral Bumex as per CCU team. Pt. non-oliguric. US Kidney showed increased bilateral renal cortical echogenicity, no hydronephrosis on 1/18/19. Monitor labs and urine output. Avoid nephrotoxins. Dose medications as per eGFR

## 2019-01-28 NOTE — PROGRESS NOTE ADULT - ATTENDING COMMENTS
Patient seen and examined with CCU team. Patient with ischemic cardiomyopathy, significant PAD, and CKD/KESHAV presents to CCU with acute on chronic systolic heart failure. Clinically improving with inotropic support (milrinone) and diuretics. Patient with trace LE edema and gallop on cardiac exam (S3 and S4). Clinically, patient likely in mild volume overload. Continue diuresis and keep I/Os negative 1-1.5 L overnight.

## 2019-01-28 NOTE — PROGRESS NOTE ADULT - SUBJECTIVE AND OBJECTIVE BOX
Patient seen and examined. He states he feels better. No SOB, orthopnea, PND.       Medications:  aspirin enteric coated 81 milliGRAM(s) Oral daily  buMETAnide 3 milliGRAM(s) Oral two times a day  calamine Lotion 1 Application(s) Topical daily PRN  chlorhexidine 4% Liquid 1 Application(s) Topical <User Schedule>  dextrose 40% Gel 15 Gram(s) Oral once PRN  dextrose 5%. 1000 milliLiter(s) IV Continuous <Continuous>  dextrose 50% Injectable 12.5 Gram(s) IV Push once  dextrose 50% Injectable 25 Gram(s) IV Push once  dextrose 50% Injectable 25 Gram(s) IV Push once  docusate sodium 100 milliGRAM(s) Oral daily  glucagon  Injectable 1 milliGRAM(s) IntraMuscular once PRN  guaiFENesin   Syrup  (Sugar-Free) 200 milliGRAM(s) Oral every 6 hours PRN  heparin  Injectable 5000 Unit(s) SubCutaneous every 12 hours  hydrALAZINE 10 milliGRAM(s) Oral three times a day  insulin glargine Injectable (LANTUS) 25 Unit(s) SubCutaneous at bedtime  insulin lispro (HumaLOG) corrective regimen sliding scale   SubCutaneous three times a day before meals  insulin lispro (HumaLOG) corrective regimen sliding scale   SubCutaneous at bedtime  isosorbide   dinitrate Tablet (ISORDIL) 10 milliGRAM(s) Oral three times a day  milrinone Infusion 0.25 MICROgram(s)/kG/Min IV Continuous <Continuous>  oxyCODONE    IR 5 milliGRAM(s) Oral every 6 hours PRN  oxymetazoline 0.05% Nasal Spray 1 Spray(s) Left Nostril two times a day  senna 2 Tablet(s) Oral at bedtime  sevelamer hydrochloride 800 milliGRAM(s) Oral three times a day with meals  simethicone 80 milliGRAM(s) Chew three times a day  sodium chloride 0.65% Nasal 1 Spray(s) Both Nostrils three times a day PRN  sodium chloride 0.9% lock flush 3 milliLiter(s) IV Push every 8 hours  tamsulosin 0.4 milliGRAM(s) Oral at bedtime      Vitals:  Vital Signs Last 24 Hrs  T(C): 36.7 (2019 12:00), Max: 36.8 (2019 00:00)  T(F): 98 (2019 12:00), Max: 98.3 (2019 04:00)  HR: 92 (2019 15:00) (84 - 104)  BP: 120/66 (2019 15:00) (96/51 - 144/74)  BP(mean): 78 (2019 15:00) (63 - 89)  RR: 14 (2019 15:00) (10 - 21)  SpO2: 100% (2019 15:00) (93% - 100%)    Daily     Daily Weight in k.6 (2019 06:00)    I&O's Detail    2019 07:01  -  2019 07:00  --------------------------------------------------------  IN:    milrinone  Infusion: 144 mL    Oral Fluid: 1080 mL  Total IN: 1224 mL    OUT:    Voided: 2165 mL  Total OUT: 2165 mL    Total NET: -941 mL      2019 07:01  -  2019 16:17  --------------------------------------------------------  IN:    milrinone  Infusion: 18 mL    milrinone  Infusion: 12 mL    Oral Fluid: 480 mL  Total IN: 510 mL    OUT:    Voided: 900 mL  Total OUT: 900 mL    Total NET: -390 mL          Physical Exam:     General: No distress. Comfortable.  HEENT: EOM intact.  Neck: Neck supple. JVP not elevated. No masses  Chest: Clear to auscultation bilaterally  CV: Normal S1 and S2. No murmurs, rub, or gallops. Radial pulses normal.  No LE edema b/l.   Abdomen: Soft, non-distended, non-tender  Skin: No rashes or skin breakdown  Neurology: Alert and oriented times three. Sensation intact  Psych: Affect normal    Labs:                        8.4    7.37  )-----------( 150      ( 2019 06:20 )             26.6         132<L>  |  86<L>  |  108<H>  ----------------------------<  209<H>  3.5   |  27  |  3.80<H>    Ca    9.0      2019 14:15  Phos  5.3       Mg     2.3         < from: TTE with Doppler (w/Cont) (19 @ 13:36) >  DIMENSIONS:  Dimensions:     Normal Values:  LA:       ---     2.0 - 4.0 cm  Ao:     2.7 cm    2.0 - 3.8 cm  SEPTUM: 0.7 cm    0.6 - 1.2 cm  PWT:    1.0 cm    0.6 - 1.1 cm  LVIDd:  5.5 cm    3.0 - 5.6 cm  LVIDs:    ---     1.8 - 4.0 cm  Derived Variables:  LVMI: 92 g/m2  RWT: 0.36  Ejection Fraction (Modified Mon Rule): 35 %  ------------------------------------------------------------------------  OBSERVATIONS:  Mitral Valve: Tethered mitral valve leaflets. Moderate  mitral regurgitation.  Aortic Root: Normal aortic root.  Aortic Valve: Calcified trileaflet aortic valve with mildly  decreased opening.  Left Atrium: Normal left atrium.  LA volume index = 23  cc/m2.  Left Ventricle: Moderate to severe segmental left  ventricular systolic dysfunction. The basal to mid septum,  basal to mid inferior, basal to mid anterolateral walls are  hypokinetic. Normal left ventricular internal dimensions  and wall thicknesses.  Right Heart: Normal right atrium. Right ventricular  enlargement with decreased right ventricular systolic  function. Normal tricuspid valve. Mild tricuspid  regurgitation. Normal pulmonic valve.  Pericardium/PleuraNormal pericardium with no pericardial  effusion.  Hemodynamic: Estimated right ventricular systolic pressure  equals 43 mm Hg, assuming right atrial pressure equals 10  mm Hg, consistent with mild pulmonary hypertension.    < end of copied text >

## 2019-01-28 NOTE — PROGRESS NOTE ADULT - PROBLEM SELECTOR PLAN 1
Continue Bumex 3 mg po BID  Continue milrinone 0.125 mcg/kg/min for now.  Add hydral 10 mg po TID, Isordil 10 mg po TID. Wean off milrinone tomorrow.  Strict I/O. Please get standing weight.   Likely add Coreg tomorrow   Further discussions about ICD per primary CCU team. Continue Bumex 3 mg po BID  Continue milrinone 0.125 mcg/kg/min for now. Check Lactate level.  Add hydral 10 mg po TID, Isordil 10 mg po TID. Wean off milrinone tomorrow.  Strict I/O. Please get standing weight.   Likely add Coreg tomorrow   Further discussions about ICD per primary CCU team.

## 2019-01-29 ENCOUNTER — TRANSCRIPTION ENCOUNTER (OUTPATIENT)
Age: 69
End: 2019-01-29

## 2019-01-29 LAB
ANION GAP SERPL CALC-SCNC: 16 MMO/L — HIGH (ref 7–14)
ANION GAP SERPL CALC-SCNC: 19 MMO/L — HIGH (ref 7–14)
BUN SERPL-MCNC: 105 MG/DL — HIGH (ref 7–23)
BUN SERPL-MCNC: 106 MG/DL — HIGH (ref 7–23)
CALCIUM SERPL-MCNC: 9.2 MG/DL — SIGNIFICANT CHANGE UP (ref 8.4–10.5)
CALCIUM SERPL-MCNC: 9.6 MG/DL — SIGNIFICANT CHANGE UP (ref 8.4–10.5)
CHLORIDE SERPL-SCNC: 85 MMOL/L — LOW (ref 98–107)
CHLORIDE SERPL-SCNC: 86 MMOL/L — LOW (ref 98–107)
CO2 SERPL-SCNC: 29 MMOL/L — SIGNIFICANT CHANGE UP (ref 22–31)
CO2 SERPL-SCNC: 29 MMOL/L — SIGNIFICANT CHANGE UP (ref 22–31)
CREAT SERPL-MCNC: 3.64 MG/DL — HIGH (ref 0.5–1.3)
CREAT SERPL-MCNC: 3.71 MG/DL — HIGH (ref 0.5–1.3)
GLUCOSE BLDC GLUCOMTR-MCNC: 178 MG/DL — HIGH (ref 70–99)
GLUCOSE BLDC GLUCOMTR-MCNC: 206 MG/DL — HIGH (ref 70–99)
GLUCOSE BLDC GLUCOMTR-MCNC: 227 MG/DL — HIGH (ref 70–99)
GLUCOSE BLDC GLUCOMTR-MCNC: 229 MG/DL — HIGH (ref 70–99)
GLUCOSE SERPL-MCNC: 150 MG/DL — HIGH (ref 70–99)
GLUCOSE SERPL-MCNC: 204 MG/DL — HIGH (ref 70–99)
HCT VFR BLD CALC: 26 % — LOW (ref 39–50)
HGB BLD-MCNC: 8.2 G/DL — LOW (ref 13–17)
LACTATE SERPL-SCNC: 1.4 MMOL/L — SIGNIFICANT CHANGE UP (ref 0.5–2)
LACTATE SERPL-SCNC: 1.5 MMOL/L — SIGNIFICANT CHANGE UP (ref 0.5–2)
MAGNESIUM SERPL-MCNC: 2.2 MG/DL — SIGNIFICANT CHANGE UP (ref 1.6–2.6)
MAGNESIUM SERPL-MCNC: 2.3 MG/DL — SIGNIFICANT CHANGE UP (ref 1.6–2.6)
MCHC RBC-ENTMCNC: 22.8 PG — LOW (ref 27–34)
MCHC RBC-ENTMCNC: 31.5 % — LOW (ref 32–36)
MCV RBC AUTO: 72.4 FL — LOW (ref 80–100)
NRBC # FLD: 0 K/UL — LOW (ref 25–125)
PHOSPHATE SERPL-MCNC: 4.8 MG/DL — HIGH (ref 2.5–4.5)
PHOSPHATE SERPL-MCNC: 4.8 MG/DL — HIGH (ref 2.5–4.5)
PLATELET # BLD AUTO: 155 K/UL — SIGNIFICANT CHANGE UP (ref 150–400)
PMV BLD: SIGNIFICANT CHANGE UP FL (ref 7–13)
POTASSIUM SERPL-MCNC: 3.5 MMOL/L — SIGNIFICANT CHANGE UP (ref 3.5–5.3)
POTASSIUM SERPL-MCNC: 3.9 MMOL/L — SIGNIFICANT CHANGE UP (ref 3.5–5.3)
POTASSIUM SERPL-SCNC: 3.5 MMOL/L — SIGNIFICANT CHANGE UP (ref 3.5–5.3)
POTASSIUM SERPL-SCNC: 3.9 MMOL/L — SIGNIFICANT CHANGE UP (ref 3.5–5.3)
RBC # BLD: 3.59 M/UL — LOW (ref 4.2–5.8)
RBC # FLD: 25.3 % — HIGH (ref 10.3–14.5)
SODIUM SERPL-SCNC: 130 MMOL/L — LOW (ref 135–145)
SODIUM SERPL-SCNC: 134 MMOL/L — LOW (ref 135–145)
WBC # BLD: 6.09 K/UL — SIGNIFICANT CHANGE UP (ref 3.8–10.5)
WBC # FLD AUTO: 6.09 K/UL — SIGNIFICANT CHANGE UP (ref 3.8–10.5)

## 2019-01-29 PROCEDURE — 99232 SBSQ HOSP IP/OBS MODERATE 35: CPT | Mod: GC

## 2019-01-29 PROCEDURE — 99233 SBSQ HOSP IP/OBS HIGH 50: CPT

## 2019-01-29 RX ORDER — POTASSIUM CHLORIDE 20 MEQ
20 PACKET (EA) ORAL ONCE
Qty: 0 | Refills: 0 | Status: COMPLETED | OUTPATIENT
Start: 2019-01-29 | End: 2019-01-29

## 2019-01-29 RX ORDER — BUMETANIDE 0.25 MG/ML
2 INJECTION INTRAMUSCULAR; INTRAVENOUS
Qty: 0 | Refills: 0 | Status: DISCONTINUED | OUTPATIENT
Start: 2019-01-29 | End: 2019-02-01

## 2019-01-29 RX ORDER — HYDRALAZINE HCL 50 MG
25 TABLET ORAL THREE TIMES A DAY
Qty: 0 | Refills: 0 | Status: DISCONTINUED | OUTPATIENT
Start: 2019-01-29 | End: 2019-01-30

## 2019-01-29 RX ORDER — METOPROLOL TARTRATE 50 MG
12.5 TABLET ORAL DAILY
Qty: 0 | Refills: 0 | Status: DISCONTINUED | OUTPATIENT
Start: 2019-01-29 | End: 2019-02-02

## 2019-01-29 RX ADMIN — TAMSULOSIN HYDROCHLORIDE 0.4 MILLIGRAM(S): 0.4 CAPSULE ORAL at 22:15

## 2019-01-29 RX ADMIN — OXYCODONE HYDROCHLORIDE 5 MILLIGRAM(S): 5 TABLET ORAL at 22:56

## 2019-01-29 RX ADMIN — SEVELAMER CARBONATE 800 MILLIGRAM(S): 2400 POWDER, FOR SUSPENSION ORAL at 17:14

## 2019-01-29 RX ADMIN — SODIUM CHLORIDE 3 MILLILITER(S): 9 INJECTION INTRAMUSCULAR; INTRAVENOUS; SUBCUTANEOUS at 13:44

## 2019-01-29 RX ADMIN — OXYCODONE HYDROCHLORIDE 5 MILLIGRAM(S): 5 TABLET ORAL at 14:15

## 2019-01-29 RX ADMIN — Medication 100 MILLIGRAM(S): at 13:43

## 2019-01-29 RX ADMIN — Medication 2: at 12:19

## 2019-01-29 RX ADMIN — ISOSORBIDE DINITRATE 10 MILLIGRAM(S): 5 TABLET ORAL at 22:15

## 2019-01-29 RX ADMIN — BUMETANIDE 3 MILLIGRAM(S): 0.25 INJECTION INTRAMUSCULAR; INTRAVENOUS at 06:26

## 2019-01-29 RX ADMIN — OXYMETAZOLINE HYDROCHLORIDE 1 SPRAY(S): 0.5 SPRAY NASAL at 06:26

## 2019-01-29 RX ADMIN — Medication 100 MILLIGRAM(S): at 06:25

## 2019-01-29 RX ADMIN — Medication 100 MILLIGRAM(S): at 22:16

## 2019-01-29 RX ADMIN — SIMETHICONE 80 MILLIGRAM(S): 80 TABLET, CHEWABLE ORAL at 13:43

## 2019-01-29 RX ADMIN — Medication 10 MILLIGRAM(S): at 06:27

## 2019-01-29 RX ADMIN — MILRINONE LACTATE 6 MICROGRAM(S)/KG/MIN: 1 INJECTION, SOLUTION INTRAVENOUS at 07:40

## 2019-01-29 RX ADMIN — INSULIN GLARGINE 25 UNIT(S): 100 INJECTION, SOLUTION SUBCUTANEOUS at 22:16

## 2019-01-29 RX ADMIN — SEVELAMER CARBONATE 800 MILLIGRAM(S): 2400 POWDER, FOR SUSPENSION ORAL at 12:15

## 2019-01-29 RX ADMIN — Medication 4: at 17:14

## 2019-01-29 RX ADMIN — OXYCODONE HYDROCHLORIDE 5 MILLIGRAM(S): 5 TABLET ORAL at 23:15

## 2019-01-29 RX ADMIN — Medication 81 MILLIGRAM(S): at 12:15

## 2019-01-29 RX ADMIN — OXYCODONE HYDROCHLORIDE 5 MILLIGRAM(S): 5 TABLET ORAL at 13:43

## 2019-01-29 RX ADMIN — HEPARIN SODIUM 5000 UNIT(S): 5000 INJECTION INTRAVENOUS; SUBCUTANEOUS at 18:22

## 2019-01-29 RX ADMIN — HEPARIN SODIUM 5000 UNIT(S): 5000 INJECTION INTRAVENOUS; SUBCUTANEOUS at 06:25

## 2019-01-29 RX ADMIN — CHLORHEXIDINE GLUCONATE 1 APPLICATION(S): 213 SOLUTION TOPICAL at 07:40

## 2019-01-29 RX ADMIN — ISOSORBIDE DINITRATE 10 MILLIGRAM(S): 5 TABLET ORAL at 06:26

## 2019-01-29 RX ADMIN — Medication 20 MILLIEQUIVALENT(S): at 02:26

## 2019-01-29 RX ADMIN — SIMETHICONE 80 MILLIGRAM(S): 80 TABLET, CHEWABLE ORAL at 06:26

## 2019-01-29 RX ADMIN — Medication 10 MILLIGRAM(S): at 13:43

## 2019-01-29 RX ADMIN — SODIUM CHLORIDE 3 MILLILITER(S): 9 INJECTION INTRAMUSCULAR; INTRAVENOUS; SUBCUTANEOUS at 06:31

## 2019-01-29 RX ADMIN — SIMETHICONE 80 MILLIGRAM(S): 80 TABLET, CHEWABLE ORAL at 22:16

## 2019-01-29 RX ADMIN — SODIUM CHLORIDE 3 MILLILITER(S): 9 INJECTION INTRAMUSCULAR; INTRAVENOUS; SUBCUTANEOUS at 22:02

## 2019-01-29 RX ADMIN — BUMETANIDE 2 MILLIGRAM(S): 0.25 INJECTION INTRAMUSCULAR; INTRAVENOUS at 18:28

## 2019-01-29 RX ADMIN — Medication 4: at 07:47

## 2019-01-29 RX ADMIN — OXYMETAZOLINE HYDROCHLORIDE 1 SPRAY(S): 0.5 SPRAY NASAL at 18:21

## 2019-01-29 RX ADMIN — ISOSORBIDE DINITRATE 10 MILLIGRAM(S): 5 TABLET ORAL at 13:43

## 2019-01-29 RX ADMIN — Medication 25 MILLIGRAM(S): at 22:16

## 2019-01-29 RX ADMIN — SEVELAMER CARBONATE 800 MILLIGRAM(S): 2400 POWDER, FOR SUSPENSION ORAL at 09:17

## 2019-01-29 RX ADMIN — Medication 12.5 MILLIGRAM(S): at 12:14

## 2019-01-29 NOTE — PROGRESS NOTE ADULT - SUBJECTIVE AND OBJECTIVE BOX
[facility] DIVISION OF KIDNEY DISEASES AND HYPERTENSION -- FOLLOW UP NOTE  --------------------------------------------------------------------------------  HPI: 68-year-old male with medical history of b/L LE bypass and b/L LE multiple toe amputations, DM2, HFrEF (EF 20%), HTN, Dyslipidemia, CAD, CABG, PVD, groin sartorius flap, and vac placement, LLE with surgical scar wound admitted for worsening SOB. Nephrology consulted for KESHAV. Pt. was admitted with ADHF. On review of previous records in [facility], patient had normal Scr levels from 4/26/16 to 9/24/18. Pt. noted to have an episode of KESHAV during previous/recent hospitalization at [facility] (12/7/18 to 12/27/18). Transfer to CCU on 1/20/19. On this admission, Scr peaked at 4.0 on 1/25/19 and downtrending at 3.66 on (1/28/19) and stable at 3.71 today (1/29/19).    Pt. was seen and examined in CCU. Pt. patient feels well. Continues to be IV milrinone infusion and bumex PO. Denies dysuria or difficulty urinating. Denies CP, N/V/F/C. Non-oliguric UOP stable in the past 24 hrs.     PAST HISTORY  --------------------------------------------------------------------------------  No significant changes to PMH, PSH, FHx, SHx, unless otherwise noted    ALLERGIES & MEDICATIONS  --------------------------------------------------------------------------------  Allergies    No Known Allergies    Intolerances      Standing Inpatient Medications  aspirin enteric coated 81 milliGRAM(s) Oral daily  benzonatate 100 milliGRAM(s) Oral three times a day  buMETAnide 3 milliGRAM(s) Oral two times a day  chlorhexidine 4% Liquid 1 Application(s) Topical <User Schedule>  dextrose 5%. 1000 milliLiter(s) IV Continuous <Continuous>  dextrose 50% Injectable 12.5 Gram(s) IV Push once  dextrose 50% Injectable 25 Gram(s) IV Push once  dextrose 50% Injectable 25 Gram(s) IV Push once  docusate sodium 100 milliGRAM(s) Oral daily  heparin  Injectable 5000 Unit(s) SubCutaneous every 12 hours  hydrALAZINE 10 milliGRAM(s) Oral three times a day  insulin glargine Injectable (LANTUS) 25 Unit(s) SubCutaneous at bedtime  insulin lispro (HumaLOG) corrective regimen sliding scale   SubCutaneous three times a day before meals  insulin lispro (HumaLOG) corrective regimen sliding scale   SubCutaneous at bedtime  isosorbide   dinitrate Tablet (ISORDIL) 10 milliGRAM(s) Oral three times a day  milrinone Infusion 0.25 MICROgram(s)/kG/Min IV Continuous <Continuous>  oxymetazoline 0.05% Nasal Spray 1 Spray(s) Left Nostril two times a day  senna 2 Tablet(s) Oral at bedtime  sevelamer hydrochloride 800 milliGRAM(s) Oral three times a day with meals  simethicone 80 milliGRAM(s) Chew three times a day  sodium chloride 0.9% lock flush 3 milliLiter(s) IV Push every 8 hours  tamsulosin 0.4 milliGRAM(s) Oral at bedtime    REVIEW OF SYSTEMS  --------------------------------------------------------------------------------  Gen: No fever  Respiratory: + dyspnea (improved)  CV: No chest pain  GI: No abdominal pain  : No dysuria, hematuria  MSK: + edema (improved)    All other systems were reviewed and are negative, except as noted.    VITALS/PHYSICAL EXAM  --------------------------------------------------------------------------------  T(C): 36.8 (01-29-19 @ 07:38), Max: 36.8 (01-29-19 @ 04:00)  HR: 101 (01-29-19 @ 07:00) (88 - 101)  BP: 106/55 (01-29-19 @ 07:00) (94/45 - 135/68)  RR: 16 (01-29-19 @ 07:00) (9 - 25)  SpO2: 94% (01-29-19 @ 07:00) (94% - 100%)  Wt(kg): --    Weight (kg): 68.9 (01-29-19 @ 06:00)      01-28-19 @ 07:01  -  01-29-19 @ 07:00  --------------------------------------------------------  IN: 840 mL / OUT: 2250 mL / NET: -1410 mL    Physical Exam:  	Gen: resting, NAD  	HEENT: No JVD  	Pulm: CTA B/L  	CV: S1S2  	Abd: Soft, +BS   	Ext: trace b/l LE edema (improved)  	Neuro: Awake  	Skin: Warm and dry    LABS/STUDIES  --------------------------------------------------------------------------------              8.2    6.09  >-----------<  155      [01-29-19 @ 04:55]              26.0     134  |  86  |  106  ----------------------------<  150      [01-29-19 @ 04:55]  3.9   |  29  |  3.71        Ca     9.6     [01-29-19 @ 04:55]      Mg     2.3     [01-29-19 @ 04:55]      Phos  4.8     [01-29-19 @ 04:55]    Creatinine Trend:  SCr 3.71 [01-29 @ 04:55]  SCr 3.73 [01-28 @ 22:30]  SCr 3.80 [01-28 @ 14:15]  SCr 3.66 [01-28 @ 06:20]  SCr 3.90 [01-27 @ 06:30]] [facility] DIVISION OF KIDNEY DISEASES AND HYPERTENSION -- FOLLOW UP NOTE  --------------------------------------------------------------------------------  HPI: 68-year-old male with medical history of b/L LE bypass and b/L LE multiple toe amputations, DM2, HFrEF (EF 20%), HTN, Dyslipidemia, CAD, CABG, PVD, groin sartorius flap, and vac placement, LLE with surgical scar wound admitted for worsening SOB. Nephrology consulted for KESHAV. Pt. was admitted with ADHF. On review of previous records in [facility], patient had normal Scr levels from 4/26/16 to 9/24/18. Pt. noted to have an episode of KESHAV during previous/recent hospitalization at [facility] (12/7/18 to 12/27/18). Transfer to CCU on 1/20/19. On this admission, Scr peaked at 4.0 on 1/25/19 and downtrending at 3.66 on (1/28/19) and stable at 3.71 today (1/29/19).    Pt. was seen and examined in CCU. Pt. patient feels well. Continues to be IV milrinone infusion and bumex PO. Denies dysuria or difficulty urinating. Denies CP, N/V/F/C. Non-oliguric UOP stable in the past 24 hrs.     PAST HISTORY  --------------------------------------------------------------------------------  No significant changes to PMH, PSH, FHx, SHx, unless otherwise noted    ALLERGIES & MEDICATIONS  --------------------------------------------------------------------------------  Allergies    No Known Allergies    Intolerances    Standing Inpatient Medications  aspirin enteric coated 81 milliGRAM(s) Oral daily  benzonatate 100 milliGRAM(s) Oral three times a day  buMETAnide 3 milliGRAM(s) Oral two times a day  chlorhexidine 4% Liquid 1 Application(s) Topical <User Schedule>  dextrose 5%. 1000 milliLiter(s) IV Continuous <Continuous>  dextrose 50% Injectable 12.5 Gram(s) IV Push once  dextrose 50% Injectable 25 Gram(s) IV Push once  dextrose 50% Injectable 25 Gram(s) IV Push once  docusate sodium 100 milliGRAM(s) Oral daily  heparin  Injectable 5000 Unit(s) SubCutaneous every 12 hours  hydrALAZINE 10 milliGRAM(s) Oral three times a day  insulin glargine Injectable (LANTUS) 25 Unit(s) SubCutaneous at bedtime  insulin lispro (HumaLOG) corrective regimen sliding scale   SubCutaneous three times a day before meals  insulin lispro (HumaLOG) corrective regimen sliding scale   SubCutaneous at bedtime  isosorbide   dinitrate Tablet (ISORDIL) 10 milliGRAM(s) Oral three times a day  milrinone Infusion 0.25 MICROgram(s)/kG/Min IV Continuous <Continuous>  oxymetazoline 0.05% Nasal Spray 1 Spray(s) Left Nostril two times a day  senna 2 Tablet(s) Oral at bedtime  sevelamer hydrochloride 800 milliGRAM(s) Oral three times a day with meals  simethicone 80 milliGRAM(s) Chew three times a day  sodium chloride 0.9% lock flush 3 milliLiter(s) IV Push every 8 hours  tamsulosin 0.4 milliGRAM(s) Oral at bedtime    REVIEW OF SYSTEMS  --------------------------------------------------------------------------------  Gen: No fever  Respiratory: + dyspnea (improved)  CV: No chest pain  GI: No abdominal pain  : No dysuria, hematuria  MSK: + edema (improved)    All other systems were reviewed and are negative, except as noted.    VITALS/PHYSICAL EXAM  --------------------------------------------------------------------------------  T(C): 36.8 (01-29-19 @ 07:38), Max: 36.8 (01-29-19 @ 04:00)  HR: 101 (01-29-19 @ 07:00) (88 - 101)  BP: 106/55 (01-29-19 @ 07:00) (94/45 - 135/68)  RR: 16 (01-29-19 @ 07:00) (9 - 25)  SpO2: 94% (01-29-19 @ 07:00) (94% - 100%)  Wt(kg): --    Weight (kg): 68.9 (01-29-19 @ 06:00)    01-28-19 @ 07:01  -  01-29-19 @ 07:00  --------------------------------------------------------  IN: 840 mL / OUT: 2250 mL / NET: -1410 mL    Physical Exam:  	Gen: resting, NAD  	HEENT: No JVD  	Pulm: CTA B/L  	CV: S1S2  	Abd: Soft, +BS   	Ext: trace b/l LE edema (improved)  	Neuro: Awake  	Skin: Warm and dry    LABS/STUDIES  --------------------------------------------------------------------------------              8.2    6.09  >-----------<  155      [01-29-19 @ 04:55]              26.0     134  |  86  |  106  ----------------------------<  150      [01-29-19 @ 04:55]  3.9   |  29  |  3.71        Ca     9.6     [01-29-19 @ 04:55]      Mg     2.3     [01-29-19 @ 04:55]      Phos  4.8     [01-29-19 @ 04:55]    Creatinine Trend:  SCr 3.71 [01-29 @ 04:55]  SCr 3.73 [01-28 @ 22:30]  SCr 3.80 [01-28 @ 14:15]  SCr 3.66 [01-28 @ 06:20]  SCr 3.90 [01-27 @ 06:30]

## 2019-01-29 NOTE — DISCHARGE NOTE ADULT - HOME CARE AGENCY
[facility] [phone]. Initial visit will be day after discharge home. A nurse will call prior to home visit [facility] [phone] for RN and PT visits; RN to call to schedule the visit for day after discharge.

## 2019-01-29 NOTE — DISCHARGE NOTE ADULT - PLAN OF CARE
You were admitted for worsening heart failure requiring aggressive diuresis. Clinically you have improved with all the treatment but symptoms may recur. Heart failure is a condition in which the heart does not pump well which causes the heart to lag behind in its job of moving blood throughout the body. As a result, fluid backs up in the body, and the organs in the body do not get as much blood as they need. This can lead to symptoms, such as swelling, trouble breathing, and feeling tired.   You should follow a low-salt, low cholesterol heart healthy diet. Weigh yourself every day and keep a record; call your doctor if you gain 2 pounds over one to two days or 5 pounds over three days. Get to or maintain a healthy weight; ask your heart failure team for referrals to a registered dietitian if needed. Avoid alcohol. Be active (check with your physician or cardiologist first). Find healthy ways to deal with stress, such as deep breathing, meditation, exercise, and doing hobbies that you enjoy. If you smoke, quit. (A resource to help you stop smoking is the [facility] Tobacco Control – phone number 613-514-3484.). Call your Health Care Provider if you have any swelling or increased swelling in your feet, ankles, and/or stomach. Take all of your medication as directed.  If you become dizzy call your Health Care Provider. To prevent HF exacerbations : Pt with KESHAV on CKD, unknown etiology. We consulted our house nephrology team to see you. They recommended that we take the water out to see if your kidneys improved. Your kidney numbers stayed stable at around 3. Please follow up with nephrologist after discharge. Avoid NSAIDs, ACEI/ARBS, RCA and nephrotoxins. You have diabetes history and we had you on insulin in the hospital. Your sugars were in the 100-200s in the hospital, which is acceptable. Please take your medications as directed and follow up with your PMD. You have coronary artery disease with stenosis. We made sure you are on your medications in the hospital. Please follow up with cardiology as outpatient. You were admitted for worsening heart failure requiring aggressive diuresis. Clinically you have improved with all the treatment but symptoms may recur. Continue Bumex, Hydralazine, and Imdur, as prescribed. Follow up with Dr. Alexander in 1-2 weeks; call [phone] for appointment. Heart failure is a condition in which the heart does not pump well which causes the heart to lag behind in its job of moving blood throughout the body. As a result, fluid backs up in the body, and the organs in the body do not get as much blood as they need. This can lead to symptoms, such as swelling, trouble breathing, and feeling tired.   You should follow a low-salt, low cholesterol heart healthy diet. Weigh yourself every day and keep a record; call your doctor if you gain 2 pounds over one to two days or 5 pounds over three days. Get to or maintain a healthy weight; ask your heart failure team for referrals to a registered dietitian if needed. Avoid alcohol. Be active (check with your physician or cardiologist first). Find healthy ways to deal with stress, such as deep breathing, meditation, exercise, and doing hobbies that you enjoy. If you smoke, quit. (A resource to help you stop smoking is the [facility] Tobacco [facility] – phone number 201-483-2215.). Call your Health Care Provider if you have any swelling or increased swelling in your feet, ankles, and/or stomach. Take all of your medication as directed.  If you become dizzy call your Health Care Provider. We consulted our house nephrology team to see you. They recommended that we take the water out to see if your kidneys improved. Your kidney numbers stayed stable at around 3. Please follow up with your nephrologist, Dr. Dubon, in 1-2 weeks; call [phone] for appointment. Avoid NSAIDs, ACEI/ARBS, RCA and nephrotoxins. You have diabetes history and we had you on insulin in the hospital. Your sugars were in the 100-200s in the hospital, which is acceptable. Please take your medications as directed and follow up with your Primary care physician within 1-2 weeks. Improving. Follow up with your primary care physician within 1-2 weeks to monitor your sodium level. You were admitted for worsening heart failure requiring aggressive diuresis. Clinically you have improved with all the treatment but symptoms may recur. Continue Bumex, Hydralazine, and Imdur, as prescribed. Continue Metolazone 2.5mg oral 1-2 times per week as needed. Follow up with Dr. Alexander in 1-2 weeks; call [phone] for appointment. Heart failure is a condition in which the heart does not pump well which causes the heart to lag behind in its job of moving blood throughout the body. As a result, fluid backs up in the body, and the organs in the body do not get as much blood as they need. This can lead to symptoms, such as swelling, trouble breathing, and feeling tired.   You should follow a low-salt, low cholesterol heart healthy diet. Weigh yourself every day and keep a record; call your doctor if you gain 2 pounds over one to two days or 5 pounds over three days. Get to or maintain a healthy weight; ask your heart failure team for referrals to a registered dietitian if needed. Avoid alcohol. Be active (check with your physician or cardiologist first). Find healthy ways to deal with stress, such as deep breathing, meditation, exercise, and doing hobbies that you enjoy. If you smoke, quit. (A resource to help you stop smoking is the [facility] Tobacco Control – phone number 649-811-1038.). Call your Health Care Provider if you have any swelling or increased swelling in your feet, ankles, and/or stomach. Take all of your medication as directed.  If you become dizzy call your Health Care Provider. You were admitted for worsening heart failure requiring aggressive diuresis. Clinically you have improved with all the treatment but symptoms may recur. Continue Bumex, Hydralazine, and Imdur, as prescribed. Continue Metolazone 2.5mg oral 1-2 times per week as needed (if you feel that you require Metolazone more frequently, contact Dr. Alexander). Follow up with Dr. Alexander in 1-2 weeks; call [phone] for appointment. Heart failure is a condition in which the heart does not pump well which causes the heart to lag behind in its job of moving blood throughout the body. As a result, fluid backs up in the body, and the organs in the body do not get as much blood as they need. This can lead to symptoms, such as swelling, trouble breathing, and feeling tired.   You should follow a low-salt, low cholesterol heart healthy diet. Weigh yourself every day and keep a record; call your doctor if you gain 2 pounds over one to two days or 5 pounds over three days. Get to or maintain a healthy weight; ask your heart failure team for referrals to a registered dietitian if needed. Avoid alcohol. Be active (check with your physician or cardiologist first). Find healthy ways to deal with stress, such as deep breathing, meditation, exercise, and doing hobbies that you enjoy. If you smoke, quit. (A resource to help you stop smoking is the [facility] for Tobacco Control – phone number 513-036-3707.). Call your Health Care Provider if you have any swelling or increased swelling in your feet, ankles, and/or stomach. Take all of your medication as directed.  If you become dizzy call your Health Care Provider.

## 2019-01-29 NOTE — DISCHARGE NOTE ADULT - CARE PROVIDER_API CALL
Gamal Dubon (MD)  Nephrology  [address] [facility], [address] [place] [address]  Phone: [phone]  Fax: [phone]    Zaid Alexander)  Adv Heart Fail [facility] Cardio  [address] [place] [address]  Phone: [phone]  Fax: [phone]    Joseph Danielle (MD)  Medicine  [address] [place] [address]  Phone: [phone]  Fax: [phone]

## 2019-01-29 NOTE — DISCHARGE NOTE ADULT - NS AS DC FOLLOWUP STROKE INST
Influenza vaccination (VIS Pub Date: August 7, 2015)/Smoking Cessation 15-Feb-2018 11:05 Heart Failure/Influenza vaccination (VIS Pub Date: August 7, 2015)/Smoking Cessation

## 2019-01-29 NOTE — DISCHARGE NOTE ADULT - HOSPITAL COURSE
67 y/o Male, with a PmHx of CHF (EF 20%), CKD, DM, HTN, HLD, CAD, CABG x 3, LLE CFA to below-knee bypass with PTFE for long-segment SFA occlusion, L 2nd toe amputation /R toe amputation, c/b groin soft tissue infection requiring washout, sartorius flap, and vac placement admitted to telemetry for acute on chronic systolic HF and Hyperkalemia.     noncompliant refusing ICD     milirinone, bumex. overdiuresed friday stopped. CVP 8-10, transitioned PO bumex. ?Overdiuresed   RHC later on milirinone, if ok then send home 67 y/o Male, with a PmHx of CHF (EF 20%), CKD, DM, HTN, HLD, CAD, CABG x 3, LLE CFA to below-knee bypass with PTFE for long-segment SFA occlusion, L 2nd toe amputation /R toe amputation, c/b groin soft tissue infection requiring washout, sartorius flap, and vac placement admitted to telemetry for acute on chronic systolic HF and Hyperkalemia.      In the Ed, the pt is found to be in fluid overload with a ProBNP= 4578, CXR  preliminary revealing pulmonary edema, Hyperkalemia @ 5.7 not hemolyzed and treated with repeat K+= 4.7, elevated Troponin  83-->76 in the setting of KESHAV on CKD = Creatinine 1.85 --> 2.15.  The pt was given Lasix 40 mg IV with positive urine out put..  HF was consulted. Patient got a swan and received bumex drip and diuril. He was placed on milirinone. Milirinone was weaned off multiple times, and his creatinine increased so he had to be placed back on it. We started him on bumex po and was able to wean down the milirinone drip. Renal was following for the elevated Cr, and they did an ultrasound that showed inc bilateral renal cortical echogenicity. Patient refused ICD and we had nothing else to offer him. 69 y/o Male, with a PmHx of CHF (EF 20%), CKD, DM, HTN, HLD, CAD, CABG x 3, LLE CFA to below-knee bypass with PTFE for long-segment SFA occlusion, L 2nd toe amputation /R toe amputation, c/b groin soft tissue infection requiring washout, sartorius flap, and vac placement admitted to telemetry for acute on chronic systolic HF and Hyperkalemia.      In the Ed, the pt is found to be in fluid overload with a ProBNP= 4578, CXR  preliminary revealing pulmonary edema, Hyperkalemia @ 5.7 not hemolyzed and treated with repeat K+= 4.7, elevated Troponin  83-->76 in the setting of KESHAV on CKD = Creatinine 1.85 --> 2.15.  The pt was given Lasix 40 mg IV with positive urine out put..  HF was consulted. Patient got a swan and received bumex drip and diuril. He was placed on milirinone. Milirinone was weaned off multiple times, and his creatinine increased so he had to be placed back on it. We started him on bumex po and was able to wean down the milirinone drip. Renal was following for the elevated Cr, and they did an ultrasound that showed inc bilateral renal cortical echogenicity. Patient refused ICD. Patient cleared for discharge home with outpatient follow up with Nephrologist and Cardiologist as outpatient. 69 y/o Male, with a PmHx of CHF (EF 20%), CKD, DM, HTN, HLD, CAD, CABG x 3, LLE CFA to below-knee bypass with PTFE for long-segment SFA occlusion, L 2nd toe amputation /R toe amputation, c/b groin soft tissue infection requiring washout, sartorius flap, and vac placement admitted to telemetry for acute on chronic systolic HF and Hyperkalemia.      In the Ed, the pt is found to be in fluid overload with a ProBNP= 4578, CXR  preliminary revealing pulmonary edema, Hyperkalemia @ 5.7 not hemolyzed and treated with repeat K+= 4.7, elevated Troponin  83-->76 in the setting of KESHAV on CKD = Creatinine 1.85 --> 2.15.  The pt was given Lasix 40 mg IV with positive urine output. HF was consulted. Patient got a swan and received bumex drip and diuril. He was placed on milirinone. Milirinone was weaned off multiple times, and his creatinine increased so he had to be placed back on it. We started him on bumex po and was able to wean down the milirinone drip. Renal was following for the elevated Cr, and they did an ultrasound that showed increased bilateral renal cortical echogenicity. Patient refused ICD. Patient also complained of left thigh pain and weakness. Arterial and venous duplexes showed graft patent, no DVT, no collection. Patient underwent WBC scan to rule out infected graft which showed no scan evidence of infected vascular graft, no evidence of other infection. Blood cultures NGTD. No further intervention as per Vascular surgery.  Patient cleared for discharge home with outpatient follow up with Nephrologist and Cardiologist as outpatient as per Cardiology.

## 2019-01-29 NOTE — DISCHARGE NOTE ADULT - NS AS DC HF EDUCATION INSTRUCTIONS
Report weight gain of 2 or more pounds in one day or 3 or more pounds in one week, worsening shortness of breath, fatigue, weakness, increased swelling of hands and feet to primary care provider/Call Primary Care Provider for follow-up after discharge/Activities as tolerated/Low salt diet/Monitor Weight Daily

## 2019-01-29 NOTE — PROGRESS NOTE ADULT - PROBLEM SELECTOR PLAN 2
Patient with hypervolemia in the setting of ADHF. Patient received treatment with Bumex gtt until 1/25/19, currently on oral Bumex. Pt. with 2.2 L of UOP in last 24 hours. Weight decreased by 8.8 kg since admission on 1/26/19. Diuretic and ADHF management per CCU team. Monitor daily weights. Low salt diet

## 2019-01-29 NOTE — DISCHARGE NOTE ADULT - MEDICATION SUMMARY - MEDICATIONS TO CHANGE
I will SWITCH the dose or number of times a day I take the medications listed below when I get home from the hospital:    Lantus Solostar Pen 100 units/mL subcutaneous solution  -- 30 unit(s) subcutaneous once a day (at bedtime)    hydrALAZINE 25 mg oral tablet  -- 1 tab(s) by mouth every 8 hours

## 2019-01-29 NOTE — PROGRESS NOTE ADULT - ASSESSMENT
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 His last admission from 12/8-12/27 required CCU admission and placement on dobutamine. He has had 3 admissions in 2018, for various complications from DM.  He comes in this time due to worsening SOB. He admits to 2 pillow orthopnea, frequent PND and ORTA after 1/2 block and walking up 5 steps. No CP, palpitations, dizziness. Patient's son by bedside.  His dry weight s/p last hospitalization was 132 lbs.  NYHA class IV. Moderately hypervolemic. 1/22/18 PCWP was 23, RA 18 in AM.  CVP 1/25 was 10    I believe he is euvolemic, he is a stage D patient and at this time ICD is not indicated, he has refused in the past; his CO was borderline prior to milrinone ; i would suggest to stop milrinone and monitor renal function as palliative inotropes pose a high risk for arrhythmogenic events.  I have asked the patient about goals of care, he would like a trial of resuscitation; i have explained his prognosis is poor.

## 2019-01-29 NOTE — DISCHARGE NOTE ADULT - CARE PROVIDERS DIRECT ADDRESSES
[email or web address]

## 2019-01-29 NOTE — CHART NOTE - NSCHARTNOTEFT_GEN_A_CORE
Source: Patient     Diet : Regular - Consistent Carbohydrate DASH / TLC 1500ml Fluid Restriction Low Sodium with PO supplement Glucerna Shake 1 daily     Patient reports fair to good appetite, PO intake , taking PO supplement , no issues with nutrition.     Current Weight: 68. 9 KG on 1/29/19 Admit wt. -78 kg ; wt. decrease 2/2 fluid loss.    Pertinent Medications: MEDICATIONS  (STANDING):  aspirin enteric coated 81 milliGRAM(s) Oral daily  benzonatate 100 milliGRAM(s) Oral three times a day  buMETAnide 2 milliGRAM(s) Oral two times a day  chlorhexidine 4% Liquid 1 Application(s) Topical <User Schedule>  dextrose 5%. 1000 milliLiter(s) (50 mL/Hr) IV Continuous <Continuous>  dextrose 50% Injectable 12.5 Gram(s) IV Push once  dextrose 50% Injectable 25 Gram(s) IV Push once  dextrose 50% Injectable 25 Gram(s) IV Push once  docusate sodium 100 milliGRAM(s) Oral daily  heparin  Injectable 5000 Unit(s) SubCutaneous every 12 hours  hydrALAZINE 25 milliGRAM(s) Oral three times a day  insulin glargine Injectable (LANTUS) 25 Unit(s) SubCutaneous at bedtime  insulin lispro (HumaLOG) corrective regimen sliding scale   SubCutaneous three times a day before meals  insulin lispro (HumaLOG) corrective regimen sliding scale   SubCutaneous at bedtime  isosorbide   dinitrate Tablet (ISORDIL) 10 milliGRAM(s) Oral three times a day  metoprolol succinate ER 12.5 milliGRAM(s) Oral daily  milrinone Infusion 0.25 MICROgram(s)/kG/Min (6 mL/Hr) IV Continuous <Continuous>  oxymetazoline 0.05% Nasal Spray 1 Spray(s) Left Nostril two times a day  senna 2 Tablet(s) Oral at bedtime  sevelamer hydrochloride 800 milliGRAM(s) Oral three times a day with meals  simethicone 80 milliGRAM(s) Chew three times a day  sodium chloride 0.9% lock flush 3 milliLiter(s) IV Push every 8 hours  tamsulosin 0.4 milliGRAM(s) Oral at bedtime    MEDICATIONS  (PRN):  calamine Lotion 1 Application(s) Topical daily PRN Rash and/or Itching  dextrose 40% Gel 15 Gram(s) Oral once PRN Blood Glucose LESS THAN 70 milliGRAM(s)/deciliter  glucagon  Injectable 1 milliGRAM(s) IntraMuscular once PRN Glucose LESS THAN 70 milligrams/deciliter  guaiFENesin   Syrup  (Sugar-Free) 200 milliGRAM(s) Oral every 6 hours PRN Cough  oxyCODONE    IR 5 milliGRAM(s) Oral every 6 hours PRN Moderate Pain (4 - 6)  sodium chloride 0.65% Nasal 1 Spray(s) Both Nostrils three times a day PRN Nasal Congestion    Pertinent Labs:  01-29 Na134 mmol/L<L> Glu 150 mg/dL<H> K+ 3.9 mmol/L Cr  3.71 mg/dL<H>  mg/dL<H> 01-29 Phos 4.8 mg/dL<H> 01-26 Alb 3.7 g/dL 01-17 [id number] 7.8 %<H> 01-17 Chol 95 mg/dL<L> LDL 59 mg/dL HDL 33 mg/dL<L> Trig 47 mg/dL      Skin: wounds     Estimated Needs:   no change since previous assessment     Recommend to continue current diet     Monitoring and Evaluation:  PO intake , Tolerance to diet prescription , weights and follow up per protocol

## 2019-01-29 NOTE — PROGRESS NOTE ADULT - PROBLEM SELECTOR PLAN 1
NYHA class IV due to ischemic cardiomyopathy.   -milrinone 0.25 as yesterday when trying to wean off pt's urine output dropped, and Cr went up.   -Echo with increased EF and diffuse LV hypokinesis   -bumex 3mg PO BID  -Strict I/O and daily standing weights. Fluid restriction to 1500mL  -Once hemodynamically optimized, ideally would consider ICD placement, given low EF and ischemic cardiomyopathy - pt has previously refused invasive procedures including ICD NYHA class IV due to ischemic cardiomyopathy.   -milrinone 0.25 as yesterday when trying to wean off pt's urine output dropped, and Cr went up.   -Echo with increased EF and diffuse LV hypokinesis   - changed bumex to 2mg PO BID  -Strict I/O and daily standing weights. Fluid restriction to 1500mL  -Once hemodynamically optimized, ideally would consider ICD placement, given low EF and ischemic cardiomyopathy - pt has previously refused invasive procedures including ICD  - will stay on milirinone today for now.

## 2019-01-29 NOTE — PROGRESS NOTE ADULT - PROBLEM SELECTOR PLAN 1
Pt. with KESHAV in the setting of ADHF. On review of previous records in [facility]/[place], patient with normal Scr levels from 4/26/16 to 9/24/18. Pt. noted to have an episode of KESHAV during previous/recent hospitalization at [facility] (12/7/18 to 12/27/18). On this admission, Scr was elevated at 2.15 on 1/16/19, On this admission, Scr peaked at 4.0 on 1/25/19 and downtrending at 3.66 on (1/28/19) and stable at 3.71 today (1/29/19). Pt. on milrinone infusion and oral Bumex as per CCU team. Pt. non-oliguric. US Kidney showed increased bilateral renal cortical echogenicity, no hydronephrosis on 1/18/19. Monitor labs and urine output. Avoid nephrotoxins. Dose medications as per eGFR Pt. with KESHAV in the setting of ADHF. On review of previous records in [facility]/[place], patient with normal Scr levels from 4/26/16 to 9/24/18. Pt. noted to have an episode of KESHAV during previous/recent hospitalization at [facility] (12/7/18 to 12/27/18). On this admission, Scr was elevated at 2.15 on 1/16/19, peaked to 4.0 on 1/25/19 and is stable at 3.71 today (1/29/19). Pt. on milrinone infusion and oral Bumex as per CCU team. Pt. non-oliguric. US Kidney showed increased bilateral renal cortical echogenicity, no hydronephrosis on 1/18/19. Monitor labs and urine output. Avoid nephrotoxins. Dose medications as per eGFR

## 2019-01-29 NOTE — DISCHARGE NOTE ADULT - MEDICATION SUMMARY - MEDICATIONS TO STOP TAKING
I will STOP taking the medications listed below when I get home from the hospital:    spironolactone 25 mg oral tablet  -- 1 tab(s) by mouth once a day    isosorbide dinitrate 10 mg oral tablet  -- 1 tab(s) by mouth 3 times a day    atorvastatin 40 mg oral tablet  -- 1 tab(s) by mouth once a day (at bedtime)    carvedilol 6.25 mg oral tablet  -- 1 tab(s) by mouth every 12 hours    torsemide 20 mg oral tablet  -- 1 tab(s) by mouth once a day

## 2019-01-29 NOTE — PROGRESS NOTE ADULT - ATTENDING COMMENTS
Agree with above assessment and plan  Patient with milrinone increased yesterday  Will decrease Bumex to 2 mg BID  Cont milrinone and attempt to wean later  HF input appreciated

## 2019-01-29 NOTE — PROGRESS NOTE ADULT - ASSESSMENT
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 His last admission from 12/8-12/27 required CCU admission and placement on dobutamine. He has had 3 admissions in 2018, for various complications from DM.  He comes in this time due to worsening SOB. He admits to 2 pillow orthopnea, frequent PND and ORTA after 1/2 block and walking up 5 steps. No CP, palpitations, dizziness. Patient's son by bedside. On tele, patient not diuresing well and condition guarded. Admitted to CCU for inotropic therapy, initiation of primacor infusion and IV diuresis with bumex drip. Off bumex gtt and titrating off primacor. 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 His last admission from 12/8-12/27 required CCU admission and placement on dobutamine. He has had 3 admissions in 2018, for various complications from DM.  He comes in this time due to worsening SOB. He admits to 2 pillow orthopnea, frequent PND and ORTA after 1/2 block and walking up 5 steps. No CP, palpitations, dizziness. Patient's son by bedside. On tele, patient not diuresing well and condition guarded. Admitted to CCU for inotropic therapy, initiation of primacor infusion and IV diuresis with bumex drip. Off bumex gtt

## 2019-01-29 NOTE — PROGRESS NOTE ADULT - SUBJECTIVE AND OBJECTIVE BOX
Date of Admission:    24 hour events:    Vital Signs Last 24 Hrs  T(C): 36.8 (29 Jan 2019 07:38), Max: 36.8 (29 Jan 2019 04:00)  T(F): 98.3 (29 Jan 2019 07:38), Max: 98.3 (29 Jan 2019 07:38)  HR: 101 (29 Jan 2019 07:00) (88 - 101)  BP: 106/55 (29 Jan 2019 07:00) (94/45 - 135/68)  BP(mean): 67 (29 Jan 2019 07:00) (56 - 89)  RR: 16 (29 Jan 2019 07:00) (9 - 25)  SpO2: 94% (29 Jan 2019 07:00) (94% - 100%)  I&O's Summary    28 Jan 2019 07:01  -  29 Jan 2019 07:00  --------------------------------------------------------  IN: 840 mL / OUT: 2250 mL / NET: -1410 mL        MEDICATIONS:  aspirin enteric coated 81 milliGRAM(s) Oral daily  buMETAnide 3 milliGRAM(s) Oral two times a day  heparin  Injectable 5000 Unit(s) SubCutaneous every 12 hours  hydrALAZINE 10 milliGRAM(s) Oral three times a day  isosorbide   dinitrate Tablet (ISORDIL) 10 milliGRAM(s) Oral three times a day  milrinone Infusion 0.25 MICROgram(s)/kG/Min IV Continuous <Continuous>  tamsulosin 0.4 milliGRAM(s) Oral at bedtime      benzonatate 100 milliGRAM(s) Oral three times a day  guaiFENesin   Syrup  (Sugar-Free) 200 milliGRAM(s) Oral every 6 hours PRN    oxyCODONE    IR 5 milliGRAM(s) Oral every 6 hours PRN    docusate sodium 100 milliGRAM(s) Oral daily  senna 2 Tablet(s) Oral at bedtime  simethicone 80 milliGRAM(s) Chew three times a day    dextrose 40% Gel 15 Gram(s) Oral once PRN  dextrose 50% Injectable 12.5 Gram(s) IV Push once  dextrose 50% Injectable 25 Gram(s) IV Push once  dextrose 50% Injectable 25 Gram(s) IV Push once  glucagon  Injectable 1 milliGRAM(s) IntraMuscular once PRN  insulin glargine Injectable (LANTUS) 25 Unit(s) SubCutaneous at bedtime  insulin lispro (HumaLOG) corrective regimen sliding scale   SubCutaneous three times a day before meals  insulin lispro (HumaLOG) corrective regimen sliding scale   SubCutaneous at bedtime    calamine Lotion 1 Application(s) Topical daily PRN  chlorhexidine 4% Liquid 1 Application(s) Topical <User Schedule>  dextrose 5%. 1000 milliLiter(s) IV Continuous <Continuous>  oxymetazoline 0.05% Nasal Spray 1 Spray(s) Left Nostril two times a day  sodium chloride 0.65% Nasal 1 Spray(s) Both Nostrils three times a day PRN  sodium chloride 0.9% lock flush 3 milliLiter(s) IV Push every 8 hours      REVIEW OF SYSTEMS:  Complete 10point ROS negative.    PHYSICAL EXAM:  General: NAD  Cardiovascular: Normal S1 S2, No JVD, No murmurs, No edema  Respiratory: Lungs clear to auscultation	  Gastrointestinal:  Soft, Non-tender, + BS	  Skin: warm and dry, No rashes, No ecchymoses, No cyanosis	  Extremities: Normal range of motion, No clubbing, cyanosis or edema  Vascular: Peripheral pulses palpable 2+ bilaterally    LABS:	 	    CBC Full  -  ( 29 Jan 2019 04:55 )  WBC Count : 6.09 K/uL  Hemoglobin : 8.2 g/dL  Hematocrit : 26.0 %  Platelet Count - Automated : 155 K/uL  Mean Cell Volume : 72.4 fL  Mean Cell Hemoglobin : 22.8 pg  Mean Cell Hemoglobin Concentration : 31.5 %  Auto Neutrophil # : x  Auto Lymphocyte # : x  Auto Monocyte # : x  Auto Eosinophil # : x  Auto Basophil # : x  Auto Neutrophil % : x  Auto Lymphocyte % : x  Auto Monocyte % : x  Auto Eosinophil % : x  Auto Basophil % : x    01-29    134<L>  |  86<L>  |  106<H>  ----------------------------<  150<H>  3.9   |  29  |  3.71<H>  01-28    132<L>  |  86<L>  |  106<H>  ----------------------------<  198<H>  3.9   |  25  |  3.73<H>    Ca    9.6      29 Jan 2019 04:55  Ca    9.3      28 Jan 2019 22:30  Phos  4.8     01-29  Phos  4.8     01-28  Mg     2.3     01-29  Mg     2.2     01-28        proBNP:   Lipid Profile:   HgA1c:   TSH:       CARDIAC MARKERS:            TELEMETRY: 	    ECG:  	  RADIOLOGY:  ECHO:  OTHER: 	    PREVIOUS DIAGNOSTIC TESTING:    [ ] Echocardiogram:  [ ]  Catheterization:  [ ] Stress Test: 24 hour events: No events, No SOB, no issues    Vital Signs Last 24 Hrs  T(C): 36.8 (29 Jan 2019 07:38), Max: 36.8 (29 Jan 2019 04:00)  T(F): 98.3 (29 Jan 2019 07:38), Max: 98.3 (29 Jan 2019 07:38)  HR: 101 (29 Jan 2019 07:00) (88 - 101)  BP: 106/55 (29 Jan 2019 07:00) (94/45 - 135/68)  BP(mean): 67 (29 Jan 2019 07:00) (56 - 89)  RR: 16 (29 Jan 2019 07:00) (9 - 25)  SpO2: 94% (29 Jan 2019 07:00) (94% - 100%)  I&O's Summary    28 Jan 2019 07:01  -  29 Jan 2019 07:00  --------------------------------------------------------  IN: 840 mL / OUT: 2250 mL / NET: -1410 mL        MEDICATIONS:  aspirin enteric coated 81 milliGRAM(s) Oral daily  buMETAnide 3 milliGRAM(s) Oral two times a day  heparin  Injectable 5000 Unit(s) SubCutaneous every 12 hours  hydrALAZINE 10 milliGRAM(s) Oral three times a day  isosorbide   dinitrate Tablet (ISORDIL) 10 milliGRAM(s) Oral three times a day  milrinone Infusion 0.25 MICROgram(s)/kG/Min IV Continuous <Continuous>  tamsulosin 0.4 milliGRAM(s) Oral at bedtime      benzonatate 100 milliGRAM(s) Oral three times a day  guaiFENesin   Syrup  (Sugar-Free) 200 milliGRAM(s) Oral every 6 hours PRN    oxyCODONE    IR 5 milliGRAM(s) Oral every 6 hours PRN    docusate sodium 100 milliGRAM(s) Oral daily  senna 2 Tablet(s) Oral at bedtime  simethicone 80 milliGRAM(s) Chew three times a day    dextrose 40% Gel 15 Gram(s) Oral once PRN  dextrose 50% Injectable 12.5 Gram(s) IV Push once  dextrose 50% Injectable 25 Gram(s) IV Push once  dextrose 50% Injectable 25 Gram(s) IV Push once  glucagon  Injectable 1 milliGRAM(s) IntraMuscular once PRN  insulin glargine Injectable (LANTUS) 25 Unit(s) SubCutaneous at bedtime  insulin lispro (HumaLOG) corrective regimen sliding scale   SubCutaneous three times a day before meals  insulin lispro (HumaLOG) corrective regimen sliding scale   SubCutaneous at bedtime    calamine Lotion 1 Application(s) Topical daily PRN  chlorhexidine 4% Liquid 1 Application(s) Topical <User Schedule>  dextrose 5%. 1000 milliLiter(s) IV Continuous <Continuous>  oxymetazoline 0.05% Nasal Spray 1 Spray(s) Left Nostril two times a day  sodium chloride 0.65% Nasal 1 Spray(s) Both Nostrils three times a day PRN  sodium chloride 0.9% lock flush 3 milliLiter(s) IV Push every 8 hours      REVIEW OF SYSTEMS:  Complete 10point ROS negative.    PHYSICAL EXAM:  General: NAD  Cardiovascular: Normal S1 S2, No JVD, No murmurs, No edema  Respiratory: Lungs clear to auscultation	  Gastrointestinal:  Soft, Non-tender, + BS	  Skin: warm and dry, No rashes, No ecchymoses, No cyanosis	  Extremities: Normal range of motion, No clubbing, cyanosis or edema  Vascular: Peripheral pulses palpable 2+ bilaterally    LABS:	 	    CBC Full  -  ( 29 Jan 2019 04:55 )  WBC Count : 6.09 K/uL  Hemoglobin : 8.2 g/dL  Hematocrit : 26.0 %  Platelet Count - Automated : 155 K/uL  Mean Cell Volume : 72.4 fL  Mean Cell Hemoglobin : 22.8 pg  Mean Cell Hemoglobin Concentration : 31.5 %  Auto Neutrophil # : x  Auto Lymphocyte # : x  Auto Monocyte # : x  Auto Eosinophil # : x  Auto Basophil # : x  Auto Neutrophil % : x  Auto Lymphocyte % : x  Auto Monocyte % : x  Auto Eosinophil % : x  Auto Basophil % : x    01-29    134<L>  |  86<L>  |  106<H>  ----------------------------<  150<H>  3.9   |  29  |  3.71<H>  01-28    132<L>  |  86<L>  |  106<H>  ----------------------------<  198<H>  3.9   |  25  |  3.73<H>    Ca    9.6      29 Jan 2019 04:55  Ca    9.3      28 Jan 2019 22:30  Phos  4.8     01-29  Phos  4.8     01-28  Mg     2.3     01-29  Mg     2.2     01-28        proBNP: Serum Pro-Brain Natriuretic Peptide: 4995: ACUTE CONGESTIVE HEART FAILURE is UNLIKELY if NT-proBNP is < 300 pg/mL.    CONSIDER ACUTE CONGESTIVE HEART FAILURE if:    AGE                NT-proBNP RESULT  ---                -------------  < 50 YEARS      >  450 pg/mL  50 - 75 YEARS      >  900 pg/mL  > 75 YEARS      > 1800 pg/mL    All results require clinical correlation.  Consider obtaining a  baseline or "dry" NT-proBNP level when the patient is stabilized,  so that subsequent levels can be compared to that value.  Patients  with recurrent CHF may have elevated NT-proBNP levels.  Acute  failure episodes generally produce levels at least 25% greater  than baseline levels.  The above values are derived from a large  multi-center international study, "KAYLEN Enrique, et al, European  Heart Journal, 2006; 27:330-337."   pg/mL (01.18.19 @ 06:00)      Lipid Profile: Lipid Profile (01.17.19 @ 06:34)    Cholesterol, Serum: 95 mg/dL    Triglycerides, Serum: 47 mg/dL    HDL Cholesterol, Serum: 33 mg/dL    Direct LDL: 59:   RESULT (mg/dL)   (For adults 18 years and older)  ----------------------------------------------------------  Below 70         Ideal for people at very high risk of  heart disease  Below 100        Ideal for people at risk of heart disease  100 - 129        [place]  130 - 159        Borderline high  160 - 189        High  190 and above    Very high mg/dL      HgA1c: Hemoglobin A1C, Whole Blood: 7.8: High Risk (prediabetic)    5.7 - 6.4 %  Diabetic, diagnostic           > 6.5 %  ADA diabetic treatment goal    < 7.0 %    HbA1C values may not accurately reflect mean blood glucose  in patients with Hb variants.  Suggest clinical correlation. % (01.17.19 @ 06:34)      TSH: Thyroid Stimulating Hormone, Serum: 3.72 uIU/mL (01.17.19 @ 06:34)          CARDIAC MARKERS:            TELEMETRY: 	    ECG:  	  RADIOLOGY:  ECHO:  OTHER: 	    PREVIOUS DIAGNOSTIC TESTING:    [ ] Echocardiogram:  [ ]  Catheterization:  [ ] Stress Test:

## 2019-01-29 NOTE — PROGRESS NOTE ADULT - PROBLEM SELECTOR PLAN 1
decrease bumex 2/2  reattempt milrinone wean  hydralazine/isordil, increase as tolerated, hold for spb <90  start metoprolol succinate 12.5mg po qd  Strict I/O. Please get standing weight.   would suggest palliative care consult

## 2019-01-29 NOTE — DISCHARGE NOTE ADULT - CARE PLAN
Principal Discharge DX:	Acute on chronic systolic heart failure  Assessment and plan of treatment:	You were admitted for worsening heart failure requiring aggressive diuresis. Clinically you have improved with all the treatment but symptoms may recur. Heart failure is a condition in which the heart does not pump well which causes the heart to lag behind in its job of moving blood throughout the body. As a result, fluid backs up in the body, and the organs in the body do not get as much blood as they need. This can lead to symptoms, such as swelling, trouble breathing, and feeling tired.   You should follow a low-salt, low cholesterol heart healthy diet. Weigh yourself every day and keep a record; call your doctor if you gain 2 pounds over one to two days or 5 pounds over three days. Get to or maintain a healthy weight; ask your heart failure team for referrals to a registered dietitian if needed. Avoid alcohol. Be active (check with your physician or cardiologist first). Find healthy ways to deal with stress, such as deep breathing, meditation, exercise, and doing hobbies that you enjoy. If you smoke, quit. (A resource to help you stop smoking is the [facility] – phone number [phone].). Call your Health Care Provider if you have any swelling or increased swelling in your feet, ankles, and/or stomach. Take all of your medication as directed.  If you become dizzy call your Health Care Provider.  Secondary Diagnosis:	Acute on chronic renal failure  Secondary Diagnosis:	Diabetes mellitus, type 2  Secondary Diagnosis:	CAD (coronary artery disease) Principal Discharge DX:	Acute on chronic systolic heart failure  Goal:	To prevent HF exacerbations  Assessment and plan of treatment:	You were admitted for worsening heart failure requiring aggressive diuresis. Clinically you have improved with all the treatment but symptoms may recur. Heart failure is a condition in which the heart does not pump well which causes the heart to lag behind in its job of moving blood throughout the body. As a result, fluid backs up in the body, and the organs in the body do not get as much blood as they need. This can lead to symptoms, such as swelling, trouble breathing, and feeling tired.   You should follow a low-salt, low cholesterol heart healthy diet. Weigh yourself every day and keep a record; call your doctor if you gain 2 pounds over one to two days or 5 pounds over three days. Get to or maintain a healthy weight; ask your heart failure team for referrals to a registered dietitian if needed. Avoid alcohol. Be active (check with your physician or cardiologist first). Find healthy ways to deal with stress, such as deep breathing, meditation, exercise, and doing hobbies that you enjoy. If you smoke, quit. (A resource to help you stop smoking is the [facility] – phone number [phone].). Call your Health Care Provider if you have any swelling or increased swelling in your feet, ankles, and/or stomach. Take all of your medication as directed.  If you become dizzy call your Health Care Provider.  Secondary Diagnosis:	Acute on chronic renal failure  Assessment and plan of treatment:	: Pt with KESHAV on CKD, unknown etiology. We consulted our house nephrology team to see you. They recommended that we take the water out to see if your kidneys improved. Your kidney numbers stayed stable at around 3. Please follow up with nephrologist after discharge. Avoid NSAIDs, ACEI/ARBS, RCA and nephrotoxins.  Secondary Diagnosis:	Diabetes mellitus, type 2  Assessment and plan of treatment:	You have diabetes history and we had you on insulin in the hospital. Your sugars were in the 100-200s in the hospital, which is acceptable. Please take your medications as directed and follow up with your PMD.  Secondary Diagnosis:	CAD (coronary artery disease)  Assessment and plan of treatment:	You have coronary artery disease with stenosis. We made sure you are on your medications in the hospital. Please follow up with cardiology as outpatient. Principal Discharge DX:	Acute on chronic systolic heart failure  Goal:	To prevent HF exacerbations  Assessment and plan of treatment:	You were admitted for worsening heart failure requiring aggressive diuresis. Clinically you have improved with all the treatment but symptoms may recur. Continue Bumex, Hydralazine, and Imdur, as prescribed. Follow up with Dr. Alexander in 1-2 weeks; call [phone] for appointment. Heart failure is a condition in which the heart does not pump well which causes the heart to lag behind in its job of moving blood throughout the body. As a result, fluid backs up in the body, and the organs in the body do not get as much blood as they need. This can lead to symptoms, such as swelling, trouble breathing, and feeling tired.   You should follow a low-salt, low cholesterol heart healthy diet. Weigh yourself every day and keep a record; call your doctor if you gain 2 pounds over one to two days or 5 pounds over three days. Get to or maintain a healthy weight; ask your heart failure team for referrals to a registered dietitian if needed. Avoid alcohol. Be active (check with your physician or cardiologist first). Find healthy ways to deal with stress, such as deep breathing, meditation, exercise, and doing hobbies that you enjoy. If you smoke, quit. (A resource to help you stop smoking is the [facility] – phone number [phone].). Call your Health Care Provider if you have any swelling or increased swelling in your feet, ankles, and/or stomach. Take all of your medication as directed.  If you become dizzy call your Health Care Provider.  Secondary Diagnosis:	Acute on chronic renal failure  Assessment and plan of treatment:	We consulted our house nephrology team to see you. They recommended that we take the water out to see if your kidneys improved. Your kidney numbers stayed stable at around 3. Please follow up with your nephrologist, Dr. Dubon, in 1-2 weeks; call [phone] for appointment. Avoid NSAIDs, ACEI/ARBS, RCA and nephrotoxins.  Secondary Diagnosis:	Diabetes mellitus, type 2  Assessment and plan of treatment:	You have diabetes history and we had you on insulin in the hospital. Your sugars were in the 100-200s in the hospital, which is acceptable. Please take your medications as directed and follow up with your Primary care physician within 1-2 weeks.  Secondary Diagnosis:	CAD (coronary artery disease)  Assessment and plan of treatment:	You have coronary artery disease with stenosis. We made sure you are on your medications in the hospital. Please follow up with cardiology as outpatient.  Secondary Diagnosis:	Hyponatremia  Assessment and plan of treatment:	Improving. Follow up with your primary care physician within 1-2 weeks to monitor your sodium level. Principal Discharge DX:	Acute on chronic systolic heart failure  Goal:	To prevent HF exacerbations  Assessment and plan of treatment:	You were admitted for worsening heart failure requiring aggressive diuresis. Clinically you have improved with all the treatment but symptoms may recur. Continue Bumex, Hydralazine, and Imdur, as prescribed. Continue Metolazone 2.5mg oral 1-2 times per week as needed. Follow up with Dr. Alexander in 1-2 weeks; call [phone] for appointment. Heart failure is a condition in which the heart does not pump well which causes the heart to lag behind in its job of moving blood throughout the body. As a result, fluid backs up in the body, and the organs in the body do not get as much blood as they need. This can lead to symptoms, such as swelling, trouble breathing, and feeling tired.   You should follow a low-salt, low cholesterol heart healthy diet. Weigh yourself every day and keep a record; call your doctor if you gain 2 pounds over one to two days or 5 pounds over three days. Get to or maintain a healthy weight; ask your heart failure team for referrals to a registered dietitian if needed. Avoid alcohol. Be active (check with your physician or cardiologist first). Find healthy ways to deal with stress, such as deep breathing, meditation, exercise, and doing hobbies that you enjoy. If you smoke, quit. (A resource to help you stop smoking is the [facility] – phone number [phone].). Call your Health Care Provider if you have any swelling or increased swelling in your feet, ankles, and/or stomach. Take all of your medication as directed.  If you become dizzy call your Health Care Provider.  Secondary Diagnosis:	Acute on chronic renal failure  Assessment and plan of treatment:	We consulted our house nephrology team to see you. They recommended that we take the water out to see if your kidneys improved. Your kidney numbers stayed stable at around 3. Please follow up with your nephrologist, Dr. Dubon, in 1-2 weeks; call [phone] for appointment. Avoid NSAIDs, ACEI/ARBS, RCA and nephrotoxins.  Secondary Diagnosis:	Diabetes mellitus, type 2  Assessment and plan of treatment:	You have diabetes history and we had you on insulin in the hospital. Your sugars were in the 100-200s in the hospital, which is acceptable. Please take your medications as directed and follow up with your Primary care physician within 1-2 weeks.  Secondary Diagnosis:	CAD (coronary artery disease)  Assessment and plan of treatment:	You have coronary artery disease with stenosis. We made sure you are on your medications in the hospital. Please follow up with cardiology as outpatient.  Secondary Diagnosis:	Hyponatremia  Assessment and plan of treatment:	Improving. Follow up with your primary care physician within 1-2 weeks to monitor your sodium level. Principal Discharge DX:	Acute on chronic systolic heart failure  Goal:	To prevent HF exacerbations  Assessment and plan of treatment:	You were admitted for worsening heart failure requiring aggressive diuresis. Clinically you have improved with all the treatment but symptoms may recur. Continue Bumex, Hydralazine, and Imdur, as prescribed. Continue Metolazone 2.5mg oral 1-2 times per week as needed (if you feel that you require Metolazone more frequently, contact Dr. Alexander). Follow up with Dr. Alexander in 1-2 weeks; call [phone] for appointment. Heart failure is a condition in which the heart does not pump well which causes the heart to lag behind in its job of moving blood throughout the body. As a result, fluid backs up in the body, and the organs in the body do not get as much blood as they need. This can lead to symptoms, such as swelling, trouble breathing, and feeling tired.   You should follow a low-salt, low cholesterol heart healthy diet. Weigh yourself every day and keep a record; call your doctor if you gain 2 pounds over one to two days or 5 pounds over three days. Get to or maintain a healthy weight; ask your heart failure team for referrals to a registered dietitian if needed. Avoid alcohol. Be active (check with your physician or cardiologist first). Find healthy ways to deal with stress, such as deep breathing, meditation, exercise, and doing hobbies that you enjoy. If you smoke, quit. (A resource to help you stop smoking is the [facility] – phone number 579-215-5999.). Call your Health Care Provider if you have any swelling or increased swelling in your feet, ankles, and/or stomach. Take all of your medication as directed.  If you become dizzy call your Health Care Provider.  Secondary Diagnosis:	Acute on chronic renal failure  Assessment and plan of treatment:	We consulted our house nephrology team to see you. They recommended that we take the water out to see if your kidneys improved. Your kidney numbers stayed stable at around 3. Please follow up with your nephrologist, Dr. Dubon, in 1-2 weeks; call [phone] for appointment. Avoid NSAIDs, ACEI/ARBS, RCA and nephrotoxins.  Secondary Diagnosis:	Diabetes mellitus, type 2  Assessment and plan of treatment:	You have diabetes history and we had you on insulin in the hospital. Your sugars were in the 100-200s in the hospital, which is acceptable. Please take your medications as directed and follow up with your Primary care physician within 1-2 weeks.  Secondary Diagnosis:	CAD (coronary artery disease)  Assessment and plan of treatment:	You have coronary artery disease with stenosis. We made sure you are on your medications in the hospital. Please follow up with cardiology as outpatient.  Secondary Diagnosis:	Hyponatremia  Assessment and plan of treatment:	Improving. Follow up with your primary care physician within 1-2 weeks to monitor your sodium level.

## 2019-01-29 NOTE — DISCHARGE NOTE ADULT - ADDITIONAL INSTRUCTIONS
follow up with Dr. Alexander Heart Failure Specialist in 1-2 weeks   follow up with PCP Dr Danielle Joseph [phone] in 1-2 weeks   follow up with Nephrologist Dr. Dubon in 1-2 weeks Follow up with Dr. Alexander Heart Failure Specialist in 1-2 weeks; call [phone] for appointment.   Follow up with PCP Joseph Ho in 1-2 weeks; call [phone] for appointment.  Follow up with Nephrologist Dr. Dubon in 1-2 weeks; call [phone] for appointment.

## 2019-01-29 NOTE — DISCHARGE NOTE ADULT - MEDICATION SUMMARY - MEDICATIONS TO TAKE
I will START or STAY ON the medications listed below when I get home from the hospital:    oxyCODONE 5 mg oral tablet  -- 1 tab(s) by mouth every 4 hours, As needed, Moderate to Severe Pain (4 - 6) MDD:4 tabs  -- Indication: For PAin    acetaminophen 325 mg oral tablet  -- 1 tab(s) by mouth every 4 hours, As needed, Mild Pain (1 - 3)  -- Indication: For PAin    aspirin 81 mg oral delayed release tablet  -- 1 tab(s) by mouth once a day  -- Indication: For CAD (coronary artery disease)    tamsulosin 0.4 mg oral capsule  -- 1 cap(s) by mouth once a day (at bedtime)  -- Indication: For BPH    isosorbide mononitrate 60 mg oral tablet, extended release  -- 1.5 tab(s) by mouth once a day   -- Do not drink alcoholic beverages when taking this medication.  It is very important that you take or use this exactly as directed.  Do not skip doses or discontinue unless directed by your doctor.  Swallow whole.  Do not crush.    -- Indication: For Angina    Lantus Solostar Pen 100 units/mL subcutaneous solution  -- 15 unit(s) subcutaneous once a day (at bedtime)   -- Indication: For Diabetes    calamine topical lotion  -- 1 application on skin once a day, As needed, Rash and/or Itching  -- Indication: For Rash / Itching    bumetanide 2 mg oral tablet  -- 2 tab(s) by mouth every 8 hours  -- Indication: For Acute on chronic systolic congestive heart failure    metOLazone 2.5 mg oral tablet  -- 1 tab(s) by mouth 1-2 times a week as needed  -- Avoid prolonged or excessive exposure to direct and/or artificial sunlight while taking this medication.  It is very important that you take or use this exactly as directed.  Do not skip doses or discontinue unless directed by your doctor.  It may be advisable to drink a full glass orange juice or eat a banana daily while taking this medication.    -- Indication: For Acute on chronic systolic congestive heart failure    docusate sodium 100 mg oral capsule  -- 1 cap(s) by mouth once a day  -- Indication: For Constipation    senna oral tablet  -- 2 tab(s) by mouth once a day (at bedtime)  -- Indication: For Constipation    simethicone 80 mg oral tablet  -- 1 tab(s) by mouth 3 times a day   -- Indication: For Dyspepsia    sevelamer hydrochloride 800 mg oral tablet  -- 1 tab(s) by mouth 3 times a day  -- Indication: For Acute on chronic renal failure    hydrALAZINE 100 mg oral tablet  -- 1 tab(s) by mouth every 8 hours  -- Indication: For Essential hypertension    cholecalciferol 400 intl units oral tablet  -- 1 tab(s) by mouth once a day   -- Indication: For Supplement

## 2019-01-29 NOTE — DISCHARGE NOTE ADULT - PATIENT PORTAL LINK FT
You can access the [facility] Patient Portal, offered by [facility], by registering with the following website: [email or web address]

## 2019-01-30 LAB
ALBUMIN SERPL ELPH-MCNC: 3.4 G/DL — SIGNIFICANT CHANGE UP (ref 3.3–5)
ALP SERPL-CCNC: 365 U/L — HIGH (ref 40–120)
ALT FLD-CCNC: 16 U/L — SIGNIFICANT CHANGE UP (ref 4–41)
ANION GAP SERPL CALC-SCNC: 16 MMO/L — HIGH (ref 7–14)
ANION GAP SERPL CALC-SCNC: 17 MMO/L — HIGH (ref 7–14)
ANION GAP SERPL CALC-SCNC: 18 MMO/L — HIGH (ref 7–14)
AST SERPL-CCNC: 24 U/L — SIGNIFICANT CHANGE UP (ref 4–40)
BILIRUB SERPL-MCNC: 1.8 MG/DL — HIGH (ref 0.2–1.2)
BUN SERPL-MCNC: 104 MG/DL — HIGH (ref 7–23)
BUN SERPL-MCNC: 106 MG/DL — HIGH (ref 7–23)
BUN SERPL-MCNC: 99 MG/DL — HIGH (ref 7–23)
CALCIUM SERPL-MCNC: 9.5 MG/DL — SIGNIFICANT CHANGE UP (ref 8.4–10.5)
CALCIUM SERPL-MCNC: 9.5 MG/DL — SIGNIFICANT CHANGE UP (ref 8.4–10.5)
CALCIUM SERPL-MCNC: 9.7 MG/DL — SIGNIFICANT CHANGE UP (ref 8.4–10.5)
CHLORIDE SERPL-SCNC: 85 MMOL/L — LOW (ref 98–107)
CO2 SERPL-SCNC: 29 MMOL/L — SIGNIFICANT CHANGE UP (ref 22–31)
CO2 SERPL-SCNC: 30 MMOL/L — SIGNIFICANT CHANGE UP (ref 22–31)
CO2 SERPL-SCNC: 31 MMOL/L — SIGNIFICANT CHANGE UP (ref 22–31)
CREAT SERPL-MCNC: 3.29 MG/DL — HIGH (ref 0.5–1.3)
CREAT SERPL-MCNC: 3.43 MG/DL — HIGH (ref 0.5–1.3)
CREAT SERPL-MCNC: 3.49 MG/DL — HIGH (ref 0.5–1.3)
GLUCOSE BLDC GLUCOMTR-MCNC: 154 MG/DL — HIGH (ref 70–99)
GLUCOSE BLDC GLUCOMTR-MCNC: 186 MG/DL — HIGH (ref 70–99)
GLUCOSE BLDC GLUCOMTR-MCNC: 193 MG/DL — HIGH (ref 70–99)
GLUCOSE BLDC GLUCOMTR-MCNC: 225 MG/DL — HIGH (ref 70–99)
GLUCOSE SERPL-MCNC: 142 MG/DL — HIGH (ref 70–99)
GLUCOSE SERPL-MCNC: 147 MG/DL — HIGH (ref 70–99)
GLUCOSE SERPL-MCNC: 191 MG/DL — HIGH (ref 70–99)
HCT VFR BLD CALC: 26.1 % — LOW (ref 39–50)
HGB BLD-MCNC: 8.3 G/DL — LOW (ref 13–17)
LACTATE SERPL-SCNC: 1 MMOL/L — SIGNIFICANT CHANGE UP (ref 0.5–2)
LACTATE SERPL-SCNC: 1.1 MMOL/L — SIGNIFICANT CHANGE UP (ref 0.5–2)
MAGNESIUM SERPL-MCNC: 2.2 MG/DL — SIGNIFICANT CHANGE UP (ref 1.6–2.6)
MCHC RBC-ENTMCNC: 23 PG — LOW (ref 27–34)
MCHC RBC-ENTMCNC: 31.8 % — LOW (ref 32–36)
MCV RBC AUTO: 72.3 FL — LOW (ref 80–100)
NRBC # FLD: 0 K/UL — LOW (ref 25–125)
PHOSPHATE SERPL-MCNC: 4 MG/DL — SIGNIFICANT CHANGE UP (ref 2.5–4.5)
PHOSPHATE SERPL-MCNC: 4.2 MG/DL — SIGNIFICANT CHANGE UP (ref 2.5–4.5)
PHOSPHATE SERPL-MCNC: 4.4 MG/DL — SIGNIFICANT CHANGE UP (ref 2.5–4.5)
PLATELET # BLD AUTO: 188 K/UL — SIGNIFICANT CHANGE UP (ref 150–400)
PMV BLD: SIGNIFICANT CHANGE UP FL (ref 7–13)
POTASSIUM SERPL-MCNC: 3.3 MMOL/L — LOW (ref 3.5–5.3)
POTASSIUM SERPL-MCNC: 4.2 MMOL/L — SIGNIFICANT CHANGE UP (ref 3.5–5.3)
POTASSIUM SERPL-MCNC: 4.3 MMOL/L — SIGNIFICANT CHANGE UP (ref 3.5–5.3)
POTASSIUM SERPL-SCNC: 3.3 MMOL/L — LOW (ref 3.5–5.3)
POTASSIUM SERPL-SCNC: 4.2 MMOL/L — SIGNIFICANT CHANGE UP (ref 3.5–5.3)
POTASSIUM SERPL-SCNC: 4.3 MMOL/L — SIGNIFICANT CHANGE UP (ref 3.5–5.3)
PROT SERPL-MCNC: 7.6 G/DL — SIGNIFICANT CHANGE UP (ref 6–8.3)
RBC # BLD: 3.61 M/UL — LOW (ref 4.2–5.8)
RBC # FLD: 25.9 % — HIGH (ref 10.3–14.5)
SODIUM SERPL-SCNC: 131 MMOL/L — LOW (ref 135–145)
SODIUM SERPL-SCNC: 132 MMOL/L — LOW (ref 135–145)
SODIUM SERPL-SCNC: 133 MMOL/L — LOW (ref 135–145)
WBC # BLD: 7.41 K/UL — SIGNIFICANT CHANGE UP (ref 3.8–10.5)
WBC # FLD AUTO: 7.41 K/UL — SIGNIFICANT CHANGE UP (ref 3.8–10.5)

## 2019-01-30 PROCEDURE — 99232 SBSQ HOSP IP/OBS MODERATE 35: CPT | Mod: GC

## 2019-01-30 PROCEDURE — 99233 SBSQ HOSP IP/OBS HIGH 50: CPT

## 2019-01-30 RX ORDER — HYDRALAZINE HCL 50 MG
37.5 TABLET ORAL THREE TIMES A DAY
Qty: 0 | Refills: 0 | Status: DISCONTINUED | OUTPATIENT
Start: 2019-01-30 | End: 2019-02-02

## 2019-01-30 RX ORDER — POTASSIUM CHLORIDE 20 MEQ
40 PACKET (EA) ORAL EVERY 4 HOURS
Qty: 0 | Refills: 0 | Status: COMPLETED | OUTPATIENT
Start: 2019-01-30 | End: 2019-01-30

## 2019-01-30 RX ADMIN — MILRINONE LACTATE 3 MICROGRAM(S)/KG/MIN: 1 INJECTION, SOLUTION INTRAVENOUS at 22:34

## 2019-01-30 RX ADMIN — Medication 40 MILLIEQUIVALENT(S): at 10:27

## 2019-01-30 RX ADMIN — SODIUM CHLORIDE 3 MILLILITER(S): 9 INJECTION INTRAMUSCULAR; INTRAVENOUS; SUBCUTANEOUS at 06:16

## 2019-01-30 RX ADMIN — Medication 81 MILLIGRAM(S): at 12:06

## 2019-01-30 RX ADMIN — OXYMETAZOLINE HYDROCHLORIDE 1 SPRAY(S): 0.5 SPRAY NASAL at 06:48

## 2019-01-30 RX ADMIN — INSULIN GLARGINE 25 UNIT(S): 100 INJECTION, SOLUTION SUBCUTANEOUS at 22:32

## 2019-01-30 RX ADMIN — Medication 37.5 MILLIGRAM(S): at 14:16

## 2019-01-30 RX ADMIN — TAMSULOSIN HYDROCHLORIDE 0.4 MILLIGRAM(S): 0.4 CAPSULE ORAL at 22:32

## 2019-01-30 RX ADMIN — BUMETANIDE 2 MILLIGRAM(S): 0.25 INJECTION INTRAMUSCULAR; INTRAVENOUS at 06:37

## 2019-01-30 RX ADMIN — SEVELAMER CARBONATE 800 MILLIGRAM(S): 2400 POWDER, FOR SUSPENSION ORAL at 07:32

## 2019-01-30 RX ADMIN — HEPARIN SODIUM 5000 UNIT(S): 5000 INJECTION INTRAVENOUS; SUBCUTANEOUS at 17:54

## 2019-01-30 RX ADMIN — Medication 100 MILLIGRAM(S): at 14:17

## 2019-01-30 RX ADMIN — Medication 25 MILLIGRAM(S): at 06:39

## 2019-01-30 RX ADMIN — ISOSORBIDE DINITRATE 10 MILLIGRAM(S): 5 TABLET ORAL at 14:17

## 2019-01-30 RX ADMIN — SIMETHICONE 80 MILLIGRAM(S): 80 TABLET, CHEWABLE ORAL at 06:37

## 2019-01-30 RX ADMIN — SIMETHICONE 80 MILLIGRAM(S): 80 TABLET, CHEWABLE ORAL at 14:17

## 2019-01-30 RX ADMIN — HEPARIN SODIUM 5000 UNIT(S): 5000 INJECTION INTRAVENOUS; SUBCUTANEOUS at 06:40

## 2019-01-30 RX ADMIN — SEVELAMER CARBONATE 800 MILLIGRAM(S): 2400 POWDER, FOR SUSPENSION ORAL at 16:57

## 2019-01-30 RX ADMIN — OXYCODONE HYDROCHLORIDE 5 MILLIGRAM(S): 5 TABLET ORAL at 15:31

## 2019-01-30 RX ADMIN — SODIUM CHLORIDE 3 MILLILITER(S): 9 INJECTION INTRAMUSCULAR; INTRAVENOUS; SUBCUTANEOUS at 21:47

## 2019-01-30 RX ADMIN — Medication 2: at 12:11

## 2019-01-30 RX ADMIN — SEVELAMER CARBONATE 800 MILLIGRAM(S): 2400 POWDER, FOR SUSPENSION ORAL at 12:07

## 2019-01-30 RX ADMIN — CHLORHEXIDINE GLUCONATE 1 APPLICATION(S): 213 SOLUTION TOPICAL at 07:43

## 2019-01-30 RX ADMIN — OXYCODONE HYDROCHLORIDE 5 MILLIGRAM(S): 5 TABLET ORAL at 16:07

## 2019-01-30 RX ADMIN — Medication 100 MILLIGRAM(S): at 22:31

## 2019-01-30 RX ADMIN — MILRINONE LACTATE 6 MICROGRAM(S)/KG/MIN: 1 INJECTION, SOLUTION INTRAVENOUS at 07:45

## 2019-01-30 RX ADMIN — ISOSORBIDE DINITRATE 10 MILLIGRAM(S): 5 TABLET ORAL at 06:38

## 2019-01-30 RX ADMIN — Medication 2: at 07:43

## 2019-01-30 RX ADMIN — Medication 4: at 16:56

## 2019-01-30 RX ADMIN — Medication 100 MILLIGRAM(S): at 06:38

## 2019-01-30 RX ADMIN — ISOSORBIDE DINITRATE 10 MILLIGRAM(S): 5 TABLET ORAL at 22:32

## 2019-01-30 RX ADMIN — Medication 40 MILLIEQUIVALENT(S): at 07:39

## 2019-01-30 RX ADMIN — OXYMETAZOLINE HYDROCHLORIDE 1 SPRAY(S): 0.5 SPRAY NASAL at 17:55

## 2019-01-30 RX ADMIN — Medication 12.5 MILLIGRAM(S): at 06:38

## 2019-01-30 RX ADMIN — Medication 37.5 MILLIGRAM(S): at 22:31

## 2019-01-30 RX ADMIN — BUMETANIDE 2 MILLIGRAM(S): 0.25 INJECTION INTRAMUSCULAR; INTRAVENOUS at 17:55

## 2019-01-30 RX ADMIN — MILRINONE LACTATE 3 MICROGRAM(S)/KG/MIN: 1 INJECTION, SOLUTION INTRAVENOUS at 16:07

## 2019-01-30 RX ADMIN — SIMETHICONE 80 MILLIGRAM(S): 80 TABLET, CHEWABLE ORAL at 22:32

## 2019-01-30 RX ADMIN — SODIUM CHLORIDE 3 MILLILITER(S): 9 INJECTION INTRAMUSCULAR; INTRAVENOUS; SUBCUTANEOUS at 14:17

## 2019-01-30 NOTE — PROGRESS NOTE ADULT - ATTENDING COMMENTS
Patient feels well. Denies SOB. Exam still consistent with volume overload with elevated JVD despite no evidence of pitting edema. Labs reviewed - BUN/Cr 104/3.4 (slight improvement).   - reduce milrinone to 0.125 mcg/kg/min  - on bumex 2 mg po q12; if weight unchanged tomorrow or poor response, would escalate  - increase hydral to 37.5 mg q8h (hold for SBP <90)  - continue isordil 10 mg q8h (hold for SBP<90)  - standing weights daily

## 2019-01-30 NOTE — PROGRESS NOTE ADULT - PROBLEM SELECTOR PLAN 1
Pt. with KESHAV in the setting of ADHF. On review of previous records in [facility]/[place], patient with normal Scr levels from 4/26/16 to 9/24/18. Pt. noted to have an episode of KESHAV during previous/recent hospitalization at [facility] (12/7/18 to 12/27/18). On this admission, Scr was elevated at 2.15 on 1/16/19, peaked to 4.0 on 1/25/19  stable at 3.71 on 1/29/19 and downtrending at 3.43 today (1/30/19). Pt. on milrinone infusion and oral Bumex as per CCU team. Pt. non-oliguric. US Kidney showed increased bilateral renal cortical echogenicity, no hydronephrosis on 1/18/19. Monitor labs and urine output. Avoid nephrotoxins. Dose medications as per eGFR

## 2019-01-30 NOTE — PROGRESS NOTE ADULT - ASSESSMENT
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 His last admission from 12/8-12/27 required CCU admission and placement on dobutamine. He has had 3 admissions in 2018, for various complications from DM.  He comes in this time due to worsening SOB. He admits to 2 pillow orthopnea, frequent PND and ORTA after 1/2 block and walking up 5 steps. No CP, palpitations, dizziness. Patient's son by bedside. On tele, patient not diuresing well and condition guarded. Admitted to CCU for inotropic therapy, initiation of primacor infusion and IV diuresis with bumex drip. Off bumex gtt

## 2019-01-30 NOTE — PROGRESS NOTE ADULT - PROBLEM SELECTOR PLAN 2
Patient with hypervolemia in the setting of ADHF. Patient received treatment with Bumex gtt until 1/25/19, currently on oral Bumex. Pt. with 2 L of UOP in last 24 hours. Weight decreased by 12.8 kg since admission until today (1/30/19). Diuretic and ADHF management per CCU team. Monitor daily weights. Low salt diet

## 2019-01-30 NOTE — PROGRESS NOTE ADULT - PROBLEM SELECTOR PLAN 2
Acute on chronic renal failure (CKD 3-4). Elevated today. Ultrasound shows renal parenchymal disease.   -Likely secondary to renal venous congestion from cardiorenal syndrome. Diuresis and continue to monitor. Acute on chronic renal failure (CKD 3-4). Elevated today. Ultrasound shows renal parenchymal disease.   -Likely secondary to renal venous congestion from cardiorenal syndrome. Diuresis and continue to monitor.  - Cr Improving

## 2019-01-30 NOTE — PROGRESS NOTE ADULT - PROBLEM SELECTOR PLAN 1
Continue Bumex 2 mg po BID for now.  Decrease milrinone to 0.125 mcg/kg/min.  Increase hydral to 37.5 mg po TID, continue Isordil 10 mg po TID.   Strict I/O. Please get standing weight.   Continue Toprol 12.5 mg po qd.  Further discussions about ICD per primary CCU team.  Trend lactate.

## 2019-01-30 NOTE — PROGRESS NOTE ADULT - SUBJECTIVE AND OBJECTIVE BOX
[facility] DIVISION OF KIDNEY DISEASES AND HYPERTENSION -- FOLLOW UP NOTE  --------------------------------------------------------------------------------  HPI: 68-year-old male with medical history of b/L LE bypass and b/L LE multiple toe amputations, DM2, HFrEF (EF 20%), HTN, Dyslipidemia, CAD, CABG, PVD, groin sartorius flap, and vac placement, LLE with surgical scar wound admitted for worsening SOB. Nephrology consulted for KESHAV. Pt. was admitted with ADHF. On review of previous records in [facility]/[place], patient had normal Scr levels from 4/26/16 to 9/24/18. Pt. noted to have an episode of KESHAV during previous/recent hospitalization at [facility] (12/7/18 to 12/27/18). Transfer to CCU on 1/20/19. On this admission, Scr peaked at 4.0 on 1/25/19 stable at 3.71 on 1/29/19 and downtrending at 3.43 today (1/30/19)    Pt. was seen and examined in CCU. Pt. patient feels better however states he is still dyspneic. Continues to be IV milrinone infusion and bumex PO. Denies dysuria or difficulty urinating. Denies CP, N/V/F/C. Non-oliguric UOP stable in the past 24 hrs.     PAST HISTORY  --------------------------------------------------------------------------------  No significant changes to PMH, PSH, FHx, SHx, unless otherwise noted    ALLERGIES & MEDICATIONS  --------------------------------------------------------------------------------  Allergies    No Known Allergies    Intolerances      Standing Inpatient Medications  aspirin enteric coated 81 milliGRAM(s) Oral daily  benzonatate 100 milliGRAM(s) Oral three times a day  buMETAnide 2 milliGRAM(s) Oral two times a day  chlorhexidine 4% Liquid 1 Application(s) Topical <User Schedule>  dextrose 5%. 1000 milliLiter(s) IV Continuous <Continuous>  dextrose 50% Injectable 12.5 Gram(s) IV Push once  dextrose 50% Injectable 25 Gram(s) IV Push once  dextrose 50% Injectable 25 Gram(s) IV Push once  docusate sodium 100 milliGRAM(s) Oral daily  heparin  Injectable 5000 Unit(s) SubCutaneous every 12 hours  hydrALAZINE 25 milliGRAM(s) Oral three times a day  insulin glargine Injectable (LANTUS) 25 Unit(s) SubCutaneous at bedtime  insulin lispro (HumaLOG) corrective regimen sliding scale   SubCutaneous three times a day before meals  insulin lispro (HumaLOG) corrective regimen sliding scale   SubCutaneous at bedtime  isosorbide   dinitrate Tablet (ISORDIL) 10 milliGRAM(s) Oral three times a day  metoprolol succinate ER 12.5 milliGRAM(s) Oral daily  milrinone Infusion 0.25 MICROgram(s)/kG/Min IV Continuous <Continuous>  oxymetazoline 0.05% Nasal Spray 1 Spray(s) Left Nostril two times a day  potassium chloride    Tablet ER 40 milliEquivalent(s) Oral every 4 hours  senna 2 Tablet(s) Oral at bedtime  sevelamer hydrochloride 800 milliGRAM(s) Oral three times a day with meals  simethicone 80 milliGRAM(s) Chew three times a day  sodium chloride 0.9% lock flush 3 milliLiter(s) IV Push every 8 hours  tamsulosin 0.4 milliGRAM(s) Oral at bedtime    PRN Inpatient Medications  calamine Lotion 1 Application(s) Topical daily PRN  dextrose 40% Gel 15 Gram(s) Oral once PRN  glucagon  Injectable 1 milliGRAM(s) IntraMuscular once PRN  guaiFENesin   Syrup  (Sugar-Free) 200 milliGRAM(s) Oral every 6 hours PRN  oxyCODONE    IR 5 milliGRAM(s) Oral every 6 hours PRN  sodium chloride 0.65% Nasal 1 Spray(s) Both Nostrils three times a day PRN      REVIEW OF SYSTEMS  --------------------------------------------------------------------------------  Gen: No fever  Respiratory: + dyspnea (improved)  CV: No chest pain  GI: No abdominal pain  : No dysuria, hematuria  MSK: + edema (improved)    All other systems were reviewed and are negative, except as noted.    VITALS/PHYSICAL EXAM  --------------------------------------------------------------------------------  T(C): 36.5 (01-30-19 @ 04:00), Max: 36.8 (01-29-19 @ 07:38)  HR: 87 (01-30-19 @ 06:00) (85 - 101)  BP: 116/71 (01-30-19 @ 06:00) (106/45 - 130/108)  RR: 16 (01-30-19 @ 06:00) (9 - 26)  SpO2: 98% (01-30-19 @ 06:00) (90% - 100%)  Wt(kg): --  Height (cm): 167.6 (01-29-19 @ 12:27)  Weight (kg): 78 (01-29-19 @ 12:27)  BMI (kg/m2): 27.8 (01-29-19 @ 12:27)  BSA (m2): 1.88 (01-29-19 @ 12:27)      01-29-19 @ 07:01  -  01-30-19 @ 07:00  --------------------------------------------------------  IN: 1198 mL / OUT: 2065 mL / NET: -867 mL    Physical Exam:  	Gen: resting, NAD  	HEENT: No JVD  	Pulm: CTA B/L  	CV: S1S2  	Abd: Soft, +BS   	Ext: trace b/l LE edema (improved)  	Neuro: Awake  	Skin: Warm and dry    LABS/STUDIES  --------------------------------------------------------------------------------              8.3    7.41  >-----------<  188      [01-30-19 @ 05:00]              26.1     132  |  85  |  104  ----------------------------<  142      [01-30-19 @ 05:00]  3.3   |  29  |  3.43        Ca     9.5     [01-30-19 @ 05:00]      Mg     2.2     [01-30-19 @ 05:00]      Phos  4.2     [01-30-19 @ 05:00]    TPro  7.6  /  Alb  3.4  /  TBili  1.8  /  DBili  x   /  AST  24  /  ALT  16  /  AlkPhos  365  [01-30-19 @ 05:00]    Creatinine Trend:  SCr 3.43 [01-30 @ 05:00]  SCr 3.64 [01-29 @ 14:36]  SCr 3.71 [01-29 @ 04:55]  SCr 3.73 [01-28 @ 22:30]  SCr 3.80 [01-28 @ 14:15] [facility] DIVISION OF KIDNEY DISEASES AND HYPERTENSION -- FOLLOW UP NOTE  --------------------------------------------------------------------------------  HPI: 68-year-old male with medical history of b/L LE bypass and b/L LE multiple toe amputations, DM2, HFrEF (EF 20%), HTN, Dyslipidemia, CAD, CABG, PVD, groin sartorius flap, and vac placement, LLE with surgical scar wound admitted for worsening SOB. Nephrology consulted for KESHAV. Pt. was admitted with ADHF. On review of previous records in [facility]/[place], patient had normal Scr levels from 4/26/16 to 9/24/18. Pt. noted to have an episode of KESHAV during previous/recent hospitalization at [facility] (12/7/18 to 12/27/18). Transfer to CCU on 1/20/19. On this admission, Scr peaked at 4.0 on 1/25/19 stable at 3.71 on 1/29/19 and downtrending at 3.43 today (1/30/19)    Pt. was seen and examined in CCU. Pt. patient feels better however states he is still dyspneic. Continues to be IV milrinone infusion and bumex PO. Denies dysuria or difficulty urinating. Denies CP, N/V/F/C. Non-oliguric UOP stable in the past 24 hrs.     PAST HISTORY  --------------------------------------------------------------------------------  No significant changes to PMH, PSH, FHx, SHx, unless otherwise noted    ALLERGIES & MEDICATIONS  --------------------------------------------------------------------------------  Allergies    No Known Allergies    Intolerances    Standing Inpatient Medications  aspirin enteric coated 81 milliGRAM(s) Oral daily  benzonatate 100 milliGRAM(s) Oral three times a day  buMETAnide 2 milliGRAM(s) Oral two times a day  chlorhexidine 4% Liquid 1 Application(s) Topical <User Schedule>  dextrose 5%. 1000 milliLiter(s) IV Continuous <Continuous>  dextrose 50% Injectable 12.5 Gram(s) IV Push once  dextrose 50% Injectable 25 Gram(s) IV Push once  dextrose 50% Injectable 25 Gram(s) IV Push once  docusate sodium 100 milliGRAM(s) Oral daily  heparin  Injectable 5000 Unit(s) SubCutaneous every 12 hours  hydrALAZINE 25 milliGRAM(s) Oral three times a day  insulin glargine Injectable (LANTUS) 25 Unit(s) SubCutaneous at bedtime  insulin lispro (HumaLOG) corrective regimen sliding scale   SubCutaneous three times a day before meals  insulin lispro (HumaLOG) corrective regimen sliding scale   SubCutaneous at bedtime  isosorbide   dinitrate Tablet (ISORDIL) 10 milliGRAM(s) Oral three times a day  metoprolol succinate ER 12.5 milliGRAM(s) Oral daily  milrinone Infusion 0.25 MICROgram(s)/kG/Min IV Continuous <Continuous>  oxymetazoline 0.05% Nasal Spray 1 Spray(s) Left Nostril two times a day  potassium chloride    Tablet ER 40 milliEquivalent(s) Oral every 4 hours  senna 2 Tablet(s) Oral at bedtime  sevelamer hydrochloride 800 milliGRAM(s) Oral three times a day with meals  simethicone 80 milliGRAM(s) Chew three times a day  sodium chloride 0.9% lock flush 3 milliLiter(s) IV Push every 8 hours  tamsulosin 0.4 milliGRAM(s) Oral at bedtime    PRN Inpatient Medications  calamine Lotion 1 Application(s) Topical daily PRN  dextrose 40% Gel 15 Gram(s) Oral once PRN  glucagon  Injectable 1 milliGRAM(s) IntraMuscular once PRN  guaiFENesin   Syrup  (Sugar-Free) 200 milliGRAM(s) Oral every 6 hours PRN  oxyCODONE    IR 5 milliGRAM(s) Oral every 6 hours PRN  sodium chloride 0.65% Nasal 1 Spray(s) Both Nostrils three times a day PRN    REVIEW OF SYSTEMS  --------------------------------------------------------------------------------  Gen: No fever  Respiratory: + dyspnea (improved)  CV: No chest pain  GI: No abdominal pain  : No dysuria, hematuria  MSK: + edema (improved)    All other systems were reviewed and are negative, except as noted.    VITALS/PHYSICAL EXAM  --------------------------------------------------------------------------------  T(C): 36.5 (01-30-19 @ 04:00), Max: 36.8 (01-29-19 @ 07:38)  HR: 87 (01-30-19 @ 06:00) (85 - 101)  BP: 116/71 (01-30-19 @ 06:00) (106/45 - 130/108)  RR: 16 (01-30-19 @ 06:00) (9 - 26)  SpO2: 98% (01-30-19 @ 06:00) (90% - 100%)  Wt(kg): --  Height (cm): 167.6 (01-29-19 @ 12:27)  Weight (kg): 78 (01-29-19 @ 12:27)  BMI (kg/m2): 27.8 (01-29-19 @ 12:27)  BSA (m2): 1.88 (01-29-19 @ 12:27)    01-29-19 @ 07:01  -  01-30-19 @ 07:00  --------------------------------------------------------  IN: 1198 mL / OUT: 2065 mL / NET: -867 mL    Physical Exam:  	Gen: resting, NAD  	HEENT: No JVD  	Pulm: CTA B/L  	CV: S1S2  	Abd: Soft, +BS   	Ext: trace b/l LE edema (improved)  	Neuro: Awake  	Skin: Warm and dry    LABS/STUDIES  --------------------------------------------------------------------------------              8.3    7.41  >-----------<  188      [01-30-19 @ 05:00]              26.1     132  |  85  |  104  ----------------------------<  142      [01-30-19 @ 05:00]  3.3   |  29  |  3.43        Ca     9.5     [01-30-19 @ 05:00]      Mg     2.2     [01-30-19 @ 05:00]      Phos  4.2     [01-30-19 @ 05:00]    TPro  7.6  /  Alb  3.4  /  TBili  1.8  /  DBili  x   /  AST  24  /  ALT  16  /  AlkPhos  365  [01-30-19 @ 05:00]    Creatinine Trend:  SCr 3.43 [01-30 @ 05:00]  SCr 3.64 [01-29 @ 14:36]  SCr 3.71 [01-29 @ 04:55]  SCr 3.73 [01-28 @ 22:30]  SCr 3.80 [01-28 @ 14:15]

## 2019-01-30 NOTE — PROGRESS NOTE ADULT - SUBJECTIVE AND OBJECTIVE BOX
Date of Admission:    24 hour events:    Vital Signs Last 24 Hrs  T(C): 36.6 (30 Jan 2019 07:34), Max: 36.7 (29 Jan 2019 12:00)  T(F): 97.9 (30 Jan 2019 07:34), Max: 98.1 (30 Jan 2019 00:00)  HR: 98 (30 Jan 2019 08:00) (85 - 101)  BP: 125/72 (30 Jan 2019 08:00) (106/45 - 130/108)  BP(mean): 84 (30 Jan 2019 08:00) (48 - 113)  RR: 14 (30 Jan 2019 08:00) (9 - 26)  SpO2: 98% (30 Jan 2019 08:00) (90% - 100%)  I&O's Summary    29 Jan 2019 07:01  -  30 Jan 2019 07:00  --------------------------------------------------------  IN: 1198 mL / OUT: 2065 mL / NET: -867 mL        MEDICATIONS:  aspirin enteric coated 81 milliGRAM(s) Oral daily  buMETAnide 2 milliGRAM(s) Oral two times a day  heparin  Injectable 5000 Unit(s) SubCutaneous every 12 hours  hydrALAZINE 25 milliGRAM(s) Oral three times a day  isosorbide   dinitrate Tablet (ISORDIL) 10 milliGRAM(s) Oral three times a day  metoprolol succinate ER 12.5 milliGRAM(s) Oral daily  milrinone Infusion 0.25 MICROgram(s)/kG/Min IV Continuous <Continuous>  tamsulosin 0.4 milliGRAM(s) Oral at bedtime      benzonatate 100 milliGRAM(s) Oral three times a day  guaiFENesin   Syrup  (Sugar-Free) 200 milliGRAM(s) Oral every 6 hours PRN    oxyCODONE    IR 5 milliGRAM(s) Oral every 6 hours PRN    docusate sodium 100 milliGRAM(s) Oral daily  senna 2 Tablet(s) Oral at bedtime  simethicone 80 milliGRAM(s) Chew three times a day    dextrose 40% Gel 15 Gram(s) Oral once PRN  dextrose 50% Injectable 12.5 Gram(s) IV Push once  dextrose 50% Injectable 25 Gram(s) IV Push once  dextrose 50% Injectable 25 Gram(s) IV Push once  glucagon  Injectable 1 milliGRAM(s) IntraMuscular once PRN  insulin glargine Injectable (LANTUS) 25 Unit(s) SubCutaneous at bedtime  insulin lispro (HumaLOG) corrective regimen sliding scale   SubCutaneous three times a day before meals  insulin lispro (HumaLOG) corrective regimen sliding scale   SubCutaneous at bedtime    calamine Lotion 1 Application(s) Topical daily PRN  chlorhexidine 4% Liquid 1 Application(s) Topical <User Schedule>  dextrose 5%. 1000 milliLiter(s) IV Continuous <Continuous>  oxymetazoline 0.05% Nasal Spray 1 Spray(s) Left Nostril two times a day  potassium chloride    Tablet ER 40 milliEquivalent(s) Oral every 4 hours  sodium chloride 0.65% Nasal 1 Spray(s) Both Nostrils three times a day PRN  sodium chloride 0.9% lock flush 3 milliLiter(s) IV Push every 8 hours      REVIEW OF SYSTEMS:  Complete 10point ROS negative.    PHYSICAL EXAM:  General: NAD  Cardiovascular: Normal S1 S2, No JVD, No murmurs, No edema  Respiratory: Lungs clear to auscultation	  Gastrointestinal:  Soft, Non-tender, + BS	  Skin: warm and dry, No rashes, No ecchymoses, No cyanosis	  Extremities: Normal range of motion, No clubbing, cyanosis or edema  Vascular: Peripheral pulses palpable 2+ bilaterally    LABS:	 	    CBC Full  -  ( 30 Jan 2019 05:00 )  WBC Count : 7.41 K/uL  Hemoglobin : 8.3 g/dL  Hematocrit : 26.1 %  Platelet Count - Automated : 188 K/uL  Mean Cell Volume : 72.3 fL  Mean Cell Hemoglobin : 23.0 pg  Mean Cell Hemoglobin Concentration : 31.8 %  Auto Neutrophil # : x  Auto Lymphocyte # : x  Auto Monocyte # : x  Auto Eosinophil # : x  Auto Basophil # : x  Auto Neutrophil % : x  Auto Lymphocyte % : x  Auto Monocyte % : x  Auto Eosinophil % : x  Auto Basophil % : x    01-30    132<L>  |  85<L>  |  104<H>  ----------------------------<  142<H>  3.3<L>   |  29  |  3.43<H>  01-29    130<L>  |  85<L>  |  105<H>  ----------------------------<  204<H>  3.5   |  29  |  3.64<H>    Ca    9.5      30 Jan 2019 05:00  Ca    9.2      29 Jan 2019 14:36  Phos  4.2     01-30  Phos  4.8     01-29  Mg     2.2     01-30  Mg     2.2     01-29    TPro  7.6  /  Alb  3.4  /  TBili  1.8<H>  /  DBili  x   /  AST  24  /  ALT  16  /  AlkPhos  365<H>  01-30      proBNP:   Lipid Profile:   HgA1c:   TSH:       CARDIAC MARKERS:            TELEMETRY: 	    ECG:  	  RADIOLOGY:  ECHO:  OTHER: 	    PREVIOUS DIAGNOSTIC TESTING:    [ ] Echocardiogram:  [ ]  Catheterization:  [ ] Stress Test: 24 hour events: feeling well, urinating well, no sob, no cp.     Vital Signs Last 24 Hrs  T(C): 36.6 (30 Jan 2019 07:34), Max: 36.7 (29 Jan 2019 12:00)  T(F): 97.9 (30 Jan 2019 07:34), Max: 98.1 (30 Jan 2019 00:00)  HR: 98 (30 Jan 2019 08:00) (85 - 101)  BP: 125/72 (30 Jan 2019 08:00) (106/45 - 130/108)  BP(mean): 84 (30 Jan 2019 08:00) (48 - 113)  RR: 14 (30 Jan 2019 08:00) (9 - 26)  SpO2: 98% (30 Jan 2019 08:00) (90% - 100%)  I&O's Summary    29 Jan 2019 07:01  -  30 Jan 2019 07:00  --------------------------------------------------------  IN: 1198 mL / OUT: 2065 mL / NET: -867 mL        MEDICATIONS:  aspirin enteric coated 81 milliGRAM(s) Oral daily  buMETAnide 2 milliGRAM(s) Oral two times a day  heparin  Injectable 5000 Unit(s) SubCutaneous every 12 hours  hydrALAZINE 25 milliGRAM(s) Oral three times a day  isosorbide   dinitrate Tablet (ISORDIL) 10 milliGRAM(s) Oral three times a day  metoprolol succinate ER 12.5 milliGRAM(s) Oral daily  milrinone Infusion 0.25 MICROgram(s)/kG/Min IV Continuous <Continuous>  tamsulosin 0.4 milliGRAM(s) Oral at bedtime      benzonatate 100 milliGRAM(s) Oral three times a day  guaiFENesin   Syrup  (Sugar-Free) 200 milliGRAM(s) Oral every 6 hours PRN    oxyCODONE    IR 5 milliGRAM(s) Oral every 6 hours PRN    docusate sodium 100 milliGRAM(s) Oral daily  senna 2 Tablet(s) Oral at bedtime  simethicone 80 milliGRAM(s) Chew three times a day    dextrose 40% Gel 15 Gram(s) Oral once PRN  dextrose 50% Injectable 12.5 Gram(s) IV Push once  dextrose 50% Injectable 25 Gram(s) IV Push once  dextrose 50% Injectable 25 Gram(s) IV Push once  glucagon  Injectable 1 milliGRAM(s) IntraMuscular once PRN  insulin glargine Injectable (LANTUS) 25 Unit(s) SubCutaneous at bedtime  insulin lispro (HumaLOG) corrective regimen sliding scale   SubCutaneous three times a day before meals  insulin lispro (HumaLOG) corrective regimen sliding scale   SubCutaneous at bedtime    calamine Lotion 1 Application(s) Topical daily PRN  chlorhexidine 4% Liquid 1 Application(s) Topical <User Schedule>  dextrose 5%. 1000 milliLiter(s) IV Continuous <Continuous>  oxymetazoline 0.05% Nasal Spray 1 Spray(s) Left Nostril two times a day  potassium chloride    Tablet ER 40 milliEquivalent(s) Oral every 4 hours  sodium chloride 0.65% Nasal 1 Spray(s) Both Nostrils three times a day PRN  sodium chloride 0.9% lock flush 3 milliLiter(s) IV Push every 8 hours      REVIEW OF SYSTEMS:  Complete 10point ROS negative.    PHYSICAL EXAM:  General: NAD  Cardiovascular: Normal S1 S2, s3, No murmurs, No edema  Respiratory: Lungs clear to auscultation	  Gastrointestinal:  Soft, Non-tender, + BS	  Skin: warm and dry, No rashes, No ecchymoses, No cyanosis	  Extremities: Normal range of motion, No clubbing, cyanosis or edema  Vascular: Peripheral pulses palpable 2+ bilaterally    LABS:	 	    CBC Full  -  ( 30 Jan 2019 05:00 )  WBC Count : 7.41 K/uL  Hemoglobin : 8.3 g/dL  Hematocrit : 26.1 %  Platelet Count - Automated : 188 K/uL  Mean Cell Volume : 72.3 fL  Mean Cell Hemoglobin : 23.0 pg  Mean Cell Hemoglobin Concentration : 31.8 %  Auto Neutrophil # : x  Auto Lymphocyte # : x  Auto Monocyte # : x  Auto Eosinophil # : x  Auto Basophil # : x  Auto Neutrophil % : x  Auto Lymphocyte % : x  Auto Monocyte % : x  Auto Eosinophil % : x  Auto Basophil % : x    01-30    132<L>  |  85<L>  |  104<H>  ----------------------------<  142<H>  3.3<L>   |  29  |  3.43<H>  01-29    130<L>  |  85<L>  |  105<H>  ----------------------------<  204<H>  3.5   |  29  |  3.64<H>    Ca    9.5      30 Jan 2019 05:00  Ca    9.2      29 Jan 2019 14:36  Phos  4.2     01-30  Phos  4.8     01-29  Mg     2.2     01-30  Mg     2.2     01-29    TPro  7.6  /  Alb  3.4  /  TBili  1.8<H>  /  DBili  x   /  AST  24  /  ALT  16  /  AlkPhos  365<H>  01-30      PREVIOUS DIAGNOSTIC TESTING:    [ ] Echocardiogram: < from: TTE with Doppler (w/Cont) (01.20.19 @ 13:36) >  1. Tethered mitral valve leaflets. Moderate mitral  regurgitation.  2. Calcified trileaflet aortic valve with mildly decreased  opening.  3. Normal left ventricular internal dimensions and wall  thicknesses.  4. Moderate to severe segmental left ventricular systolic  dysfunction. The basal to mid septum, basal to mid  inferior, basal to mid anterolateral walls are hypokinetic.  5. Right ventricular enlargement with decreased right  ventricular systolic function.  6. Estimated pulmonary artery systolic pressure equals 43  mmHg, assuming right atrial pressure equals 10  mm Hg,  consistent with mild pulmonary hypertension.    < end of copied text >

## 2019-01-30 NOTE — PROGRESS NOTE ADULT - ATTENDING COMMENTS
Agree with above assessment and plan  Patient with acute on chronic systolic heart failure-on milrinone- decreased earlier today and will reassess CMP and lactate  Continue with Bumex BID  HF recs appreciated

## 2019-01-30 NOTE — PROGRESS NOTE ADULT - SUBJECTIVE AND OBJECTIVE BOX
Patient seen and examined. He states he feels well- no acute SOB.       Medications:  aspirin enteric coated 81 milliGRAM(s) Oral daily  benzonatate 100 milliGRAM(s) Oral three times a day  buMETAnide 2 milliGRAM(s) Oral two times a day  calamine Lotion 1 Application(s) Topical daily PRN  chlorhexidine 4% Liquid 1 Application(s) Topical <User Schedule>  dextrose 40% Gel 15 Gram(s) Oral once PRN  dextrose 5%. 1000 milliLiter(s) IV Continuous <Continuous>  dextrose 50% Injectable 12.5 Gram(s) IV Push once  dextrose 50% Injectable 25 Gram(s) IV Push once  dextrose 50% Injectable 25 Gram(s) IV Push once  docusate sodium 100 milliGRAM(s) Oral daily  glucagon  Injectable 1 milliGRAM(s) IntraMuscular once PRN  guaiFENesin   Syrup  (Sugar-Free) 200 milliGRAM(s) Oral every 6 hours PRN  heparin  Injectable 5000 Unit(s) SubCutaneous every 12 hours  hydrALAZINE 37.5 milliGRAM(s) Oral three times a day  insulin glargine Injectable (LANTUS) 25 Unit(s) SubCutaneous at bedtime  insulin lispro (HumaLOG) corrective regimen sliding scale   SubCutaneous three times a day before meals  insulin lispro (HumaLOG) corrective regimen sliding scale   SubCutaneous at bedtime  isosorbide   dinitrate Tablet (ISORDIL) 10 milliGRAM(s) Oral three times a day  metoprolol succinate ER 12.5 milliGRAM(s) Oral daily  milrinone Infusion 0.125 MICROgram(s)/kG/Min IV Continuous <Continuous>  oxyCODONE    IR 5 milliGRAM(s) Oral every 6 hours PRN  oxymetazoline 0.05% Nasal Spray 1 Spray(s) Left Nostril two times a day  senna 2 Tablet(s) Oral at bedtime  sevelamer hydrochloride 800 milliGRAM(s) Oral three times a day with meals  simethicone 80 milliGRAM(s) Chew three times a day  sodium chloride 0.65% Nasal 1 Spray(s) Both Nostrils three times a day PRN  sodium chloride 0.9% lock flush 3 milliLiter(s) IV Push every 8 hours  tamsulosin 0.4 milliGRAM(s) Oral at bedtime      Vitals:  Vital Signs Last 24 Hrs  T(C): 36.7 (2019 16:00), Max: 36.7 (2019 20:03)  T(F): 98.1 (2019 16:00), Max: 98.1 (2019 00:00)  HR: 93 (2019 18:00) (85 - 100)  BP: 97/52 (2019 18:00) (97/52 - 130/108)  BP(mean): 60 (2019 18:00) (48 - 113)  RR: 13 (2019 18:00) (9 - 31)  SpO2: 98% (2019 18:00) (90% - 99%)    Daily     Daily Weight in k.7 (2019 06:00)    I&O's Detail    2019 07:01  -  2019 07:00  --------------------------------------------------------  IN:    milrinone  Infusion: 138 mL    Oral Fluid: 1060 mL  Total IN: 1198 mL    OUT:    Voided: 2065 mL  Total OUT: 2065 mL    Total NET: -867 mL      2019 07:01  -  2019 18:07  --------------------------------------------------------  IN:    milrinone  Infusion: 18 mL    milrinone  Infusion: 18 mL    Oral Fluid: 840 mL  Total IN: 876 mL    OUT:    Voided: 1220 mL  Total OUT: 1220 mL    Total NET: -344 mL          Physical Exam:     General: No distress. Comfortable.  HEENT: EOM intact.  Neck: Neck supple. JVP mildly elevated. No masses  Chest: Clear to auscultation bilaterally  CV: Normal S1 and S2. No murmurs, rub, or gallops. Radial pulses normal. No LE edema b/l.   Abdomen: Soft, non-distended, non-tender  Skin: No rashes or skin breakdown  Neurology: Alert and oriented times three. Sensation intact  Psych: Affect normal    Labs:                        8.3    7.41  )-----------( 188      ( 2019 05:00 )             26.1         133<L>  |  85<L>  |  106<H>  ----------------------------<  147<H>  4.2   |  31  |  3.49<H>    Ca    9.7      2019 14:30  Phos  4.4       Mg     2.2         TPro  7.6  /  Alb  3.4  /  TBili  1.8<H>  /  DBili  x   /  AST  24  /  ALT  16  /  AlkPhos  365<H>      < from: TTE with Doppler (w/Cont) (19 @ 13:36) >  DIMENSIONS:  Dimensions:     Normal Values:  LA:       ---     2.0 - 4.0 cm  Ao:     2.7 cm    2.0 - 3.8 cm  SEPTUM: 0.7 cm    0.6 - 1.2 cm  PWT:    1.0 cm    0.6 - 1.1 cm  LVIDd:  5.5 cm    3.0 - 5.6 cm  LVIDs:    ---     1.8 - 4.0 cm  Derived Variables:  LVMI: 92 g/m2  RWT: 0.36  Ejection Fraction (Modified Mon Rule): 35 %  ------------------------------------------------------------------------  OBSERVATIONS:  Mitral Valve: Tethered mitral valve leaflets. Moderate  mitral regurgitation.  Aortic Root: Normal aortic root.  Aortic Valve: Calcified trileaflet aortic valve with mildly  decreased opening.  Left Atrium: Normal left atrium.  LA volume index = 23  cc/m2.  Left Ventricle: Moderate to severe segmental left  ventricular systolic dysfunction. The basal to mid septum,  basal to mid inferior, basal to mid anterolateral walls are  hypokinetic. Normal left ventricular internal dimensions  and wall thicknesses.  Right Heart: Normal right atrium. Right ventricular  enlargement with decreased right ventricular systolic  function. Normal tricuspid valve. Mild tricuspid  regurgitation. Normal pulmonic valve.  Pericardium/PleuraNormal pericardium with no pericardial  effusion.  Hemodynamic: Estimated right ventricular systolic pressure  equals 43 mm Hg, assuming right atrial pressure equals 10  mm Hg, consistent with mild pulmonary hypertension.  ------------------------------------------------------------------------    < end of copied text >

## 2019-01-31 LAB
ANION GAP SERPL CALC-SCNC: 16 MMO/L — HIGH (ref 7–14)
ANION GAP SERPL CALC-SCNC: 17 MMO/L — HIGH (ref 7–14)
BUN SERPL-MCNC: 106 MG/DL — HIGH (ref 7–23)
BUN SERPL-MCNC: 99 MG/DL — HIGH (ref 7–23)
CALCIUM SERPL-MCNC: 9.4 MG/DL — SIGNIFICANT CHANGE UP (ref 8.4–10.5)
CALCIUM SERPL-MCNC: 9.7 MG/DL — SIGNIFICANT CHANGE UP (ref 8.4–10.5)
CHLORIDE SERPL-SCNC: 87 MMOL/L — LOW (ref 98–107)
CHLORIDE SERPL-SCNC: 87 MMOL/L — LOW (ref 98–107)
CO2 SERPL-SCNC: 29 MMOL/L — SIGNIFICANT CHANGE UP (ref 22–31)
CO2 SERPL-SCNC: 30 MMOL/L — SIGNIFICANT CHANGE UP (ref 22–31)
CREAT SERPL-MCNC: 3.31 MG/DL — HIGH (ref 0.5–1.3)
CREAT SERPL-MCNC: 3.44 MG/DL — HIGH (ref 0.5–1.3)
GLUCOSE BLDC GLUCOMTR-MCNC: 121 MG/DL — HIGH (ref 70–99)
GLUCOSE BLDC GLUCOMTR-MCNC: 134 MG/DL — HIGH (ref 70–99)
GLUCOSE BLDC GLUCOMTR-MCNC: 138 MG/DL — HIGH (ref 70–99)
GLUCOSE BLDC GLUCOMTR-MCNC: 141 MG/DL — HIGH (ref 70–99)
GLUCOSE BLDC GLUCOMTR-MCNC: 229 MG/DL — HIGH (ref 70–99)
GLUCOSE SERPL-MCNC: 112 MG/DL — HIGH (ref 70–99)
GLUCOSE SERPL-MCNC: 134 MG/DL — HIGH (ref 70–99)
HCT VFR BLD CALC: 26.8 % — LOW (ref 39–50)
HGB BLD-MCNC: 8.5 G/DL — LOW (ref 13–17)
LACTATE SERPL-SCNC: 0.8 MMOL/L — SIGNIFICANT CHANGE UP (ref 0.5–2)
LACTATE SERPL-SCNC: 1.2 MMOL/L — SIGNIFICANT CHANGE UP (ref 0.5–2)
MAGNESIUM SERPL-MCNC: 2.3 MG/DL — SIGNIFICANT CHANGE UP (ref 1.6–2.6)
MAGNESIUM SERPL-MCNC: 2.3 MG/DL — SIGNIFICANT CHANGE UP (ref 1.6–2.6)
MCHC RBC-ENTMCNC: 23.1 PG — LOW (ref 27–34)
MCHC RBC-ENTMCNC: 31.7 % — LOW (ref 32–36)
MCV RBC AUTO: 72.8 FL — LOW (ref 80–100)
NRBC # FLD: 0 K/UL — LOW (ref 25–125)
PHOSPHATE SERPL-MCNC: 3.9 MG/DL — SIGNIFICANT CHANGE UP (ref 2.5–4.5)
PHOSPHATE SERPL-MCNC: 4.4 MG/DL — SIGNIFICANT CHANGE UP (ref 2.5–4.5)
PLATELET # BLD AUTO: 194 K/UL — SIGNIFICANT CHANGE UP (ref 150–400)
PMV BLD: SIGNIFICANT CHANGE UP FL (ref 7–13)
POTASSIUM SERPL-MCNC: 3.7 MMOL/L — SIGNIFICANT CHANGE UP (ref 3.5–5.3)
POTASSIUM SERPL-MCNC: 4.5 MMOL/L — SIGNIFICANT CHANGE UP (ref 3.5–5.3)
POTASSIUM SERPL-SCNC: 3.7 MMOL/L — SIGNIFICANT CHANGE UP (ref 3.5–5.3)
POTASSIUM SERPL-SCNC: 4.5 MMOL/L — SIGNIFICANT CHANGE UP (ref 3.5–5.3)
RBC # BLD: 3.68 M/UL — LOW (ref 4.2–5.8)
RBC # FLD: 25.7 % — HIGH (ref 10.3–14.5)
SODIUM SERPL-SCNC: 133 MMOL/L — LOW (ref 135–145)
SODIUM SERPL-SCNC: 133 MMOL/L — LOW (ref 135–145)
WBC # BLD: 7.57 K/UL — SIGNIFICANT CHANGE UP (ref 3.8–10.5)
WBC # FLD AUTO: 7.57 K/UL — SIGNIFICANT CHANGE UP (ref 3.8–10.5)

## 2019-01-31 PROCEDURE — 99233 SBSQ HOSP IP/OBS HIGH 50: CPT

## 2019-01-31 PROCEDURE — 93010 ELECTROCARDIOGRAM REPORT: CPT

## 2019-01-31 PROCEDURE — 99232 SBSQ HOSP IP/OBS MODERATE 35: CPT | Mod: GC

## 2019-01-31 RX ORDER — POTASSIUM CHLORIDE 20 MEQ
40 PACKET (EA) ORAL ONCE
Qty: 0 | Refills: 0 | Status: COMPLETED | OUTPATIENT
Start: 2019-01-31 | End: 2019-01-31

## 2019-01-31 RX ADMIN — SODIUM CHLORIDE 3 MILLILITER(S): 9 INJECTION INTRAMUSCULAR; INTRAVENOUS; SUBCUTANEOUS at 22:11

## 2019-01-31 RX ADMIN — Medication 37.5 MILLIGRAM(S): at 06:53

## 2019-01-31 RX ADMIN — SODIUM CHLORIDE 3 MILLILITER(S): 9 INJECTION INTRAMUSCULAR; INTRAVENOUS; SUBCUTANEOUS at 14:04

## 2019-01-31 RX ADMIN — OXYCODONE HYDROCHLORIDE 5 MILLIGRAM(S): 5 TABLET ORAL at 22:04

## 2019-01-31 RX ADMIN — Medication 40 MILLIEQUIVALENT(S): at 06:52

## 2019-01-31 RX ADMIN — Medication 37.5 MILLIGRAM(S): at 22:11

## 2019-01-31 RX ADMIN — BUMETANIDE 2 MILLIGRAM(S): 0.25 INJECTION INTRAMUSCULAR; INTRAVENOUS at 17:17

## 2019-01-31 RX ADMIN — SEVELAMER CARBONATE 800 MILLIGRAM(S): 2400 POWDER, FOR SUSPENSION ORAL at 17:17

## 2019-01-31 RX ADMIN — SEVELAMER CARBONATE 800 MILLIGRAM(S): 2400 POWDER, FOR SUSPENSION ORAL at 08:21

## 2019-01-31 RX ADMIN — Medication 100 MILLIGRAM(S): at 06:52

## 2019-01-31 RX ADMIN — Medication 37.5 MILLIGRAM(S): at 14:02

## 2019-01-31 RX ADMIN — ISOSORBIDE DINITRATE 10 MILLIGRAM(S): 5 TABLET ORAL at 22:12

## 2019-01-31 RX ADMIN — SIMETHICONE 80 MILLIGRAM(S): 80 TABLET, CHEWABLE ORAL at 22:11

## 2019-01-31 RX ADMIN — Medication 12.5 MILLIGRAM(S): at 06:53

## 2019-01-31 RX ADMIN — ISOSORBIDE DINITRATE 10 MILLIGRAM(S): 5 TABLET ORAL at 14:02

## 2019-01-31 RX ADMIN — Medication 81 MILLIGRAM(S): at 11:58

## 2019-01-31 RX ADMIN — SIMETHICONE 80 MILLIGRAM(S): 80 TABLET, CHEWABLE ORAL at 14:02

## 2019-01-31 RX ADMIN — SEVELAMER CARBONATE 800 MILLIGRAM(S): 2400 POWDER, FOR SUSPENSION ORAL at 11:58

## 2019-01-31 RX ADMIN — TAMSULOSIN HYDROCHLORIDE 0.4 MILLIGRAM(S): 0.4 CAPSULE ORAL at 22:11

## 2019-01-31 RX ADMIN — OXYCODONE HYDROCHLORIDE 5 MILLIGRAM(S): 5 TABLET ORAL at 22:37

## 2019-01-31 RX ADMIN — Medication 4: at 17:33

## 2019-01-31 RX ADMIN — SIMETHICONE 80 MILLIGRAM(S): 80 TABLET, CHEWABLE ORAL at 06:54

## 2019-01-31 RX ADMIN — HEPARIN SODIUM 5000 UNIT(S): 5000 INJECTION INTRAVENOUS; SUBCUTANEOUS at 17:17

## 2019-01-31 RX ADMIN — SODIUM CHLORIDE 3 MILLILITER(S): 9 INJECTION INTRAMUSCULAR; INTRAVENOUS; SUBCUTANEOUS at 06:54

## 2019-01-31 RX ADMIN — ISOSORBIDE DINITRATE 10 MILLIGRAM(S): 5 TABLET ORAL at 06:53

## 2019-01-31 RX ADMIN — INSULIN GLARGINE 25 UNIT(S): 100 INJECTION, SOLUTION SUBCUTANEOUS at 22:12

## 2019-01-31 RX ADMIN — Medication 100 MILLIGRAM(S): at 22:11

## 2019-01-31 RX ADMIN — BUMETANIDE 2 MILLIGRAM(S): 0.25 INJECTION INTRAMUSCULAR; INTRAVENOUS at 06:52

## 2019-01-31 RX ADMIN — Medication 100 MILLIGRAM(S): at 14:03

## 2019-01-31 RX ADMIN — CHLORHEXIDINE GLUCONATE 1 APPLICATION(S): 213 SOLUTION TOPICAL at 11:57

## 2019-01-31 NOTE — PROGRESS NOTE ADULT - SUBJECTIVE AND OBJECTIVE BOX
Date of Admission:    24 hour events:    Vital Signs Last 24 Hrs  T(C): 36.3 (31 Jan 2019 05:00), Max: 36.8 (31 Jan 2019 00:00)  T(F): 97.4 (31 Jan 2019 05:00), Max: 98.3 (31 Jan 2019 00:00)  HR: 85 (31 Jan 2019 08:00) (85 - 102)  BP: 103/51 (31 Jan 2019 08:00) (97/52 - 123/55)  BP(mean): 63 (31 Jan 2019 08:00) (60 - 84)  RR: 12 (31 Jan 2019 08:00) (12 - 31)  SpO2: 98% (31 Jan 2019 08:00) (93% - 100%)  I&O's Summary    30 Jan 2019 07:01  -  31 Jan 2019 07:00  --------------------------------------------------------  IN: 1278 mL / OUT: 2220 mL / NET: -942 mL        MEDICATIONS:  aspirin enteric coated 81 milliGRAM(s) Oral daily  buMETAnide 2 milliGRAM(s) Oral two times a day  heparin  Injectable 5000 Unit(s) SubCutaneous every 12 hours  hydrALAZINE 37.5 milliGRAM(s) Oral three times a day  isosorbide   dinitrate Tablet (ISORDIL) 10 milliGRAM(s) Oral three times a day  metoprolol succinate ER 12.5 milliGRAM(s) Oral daily  milrinone Infusion 0.125 MICROgram(s)/kG/Min IV Continuous <Continuous>  tamsulosin 0.4 milliGRAM(s) Oral at bedtime      benzonatate 100 milliGRAM(s) Oral three times a day  guaiFENesin   Syrup  (Sugar-Free) 200 milliGRAM(s) Oral every 6 hours PRN    oxyCODONE    IR 5 milliGRAM(s) Oral every 6 hours PRN    docusate sodium 100 milliGRAM(s) Oral daily  senna 2 Tablet(s) Oral at bedtime  simethicone 80 milliGRAM(s) Chew three times a day    dextrose 40% Gel 15 Gram(s) Oral once PRN  dextrose 50% Injectable 12.5 Gram(s) IV Push once  dextrose 50% Injectable 25 Gram(s) IV Push once  dextrose 50% Injectable 25 Gram(s) IV Push once  glucagon  Injectable 1 milliGRAM(s) IntraMuscular once PRN  insulin glargine Injectable (LANTUS) 25 Unit(s) SubCutaneous at bedtime  insulin lispro (HumaLOG) corrective regimen sliding scale   SubCutaneous three times a day before meals  insulin lispro (HumaLOG) corrective regimen sliding scale   SubCutaneous at bedtime    calamine Lotion 1 Application(s) Topical daily PRN  chlorhexidine 4% Liquid 1 Application(s) Topical <User Schedule>  dextrose 5%. 1000 milliLiter(s) IV Continuous <Continuous>  oxymetazoline 0.05% Nasal Spray 1 Spray(s) Left Nostril two times a day  sodium chloride 0.65% Nasal 1 Spray(s) Both Nostrils three times a day PRN  sodium chloride 0.9% lock flush 3 milliLiter(s) IV Push every 8 hours      REVIEW OF SYSTEMS:  Complete 10point ROS negative.    PHYSICAL EXAM:  General: NAD  Cardiovascular: Normal S1 S2, No JVD, No murmurs, No edema  Respiratory: Lungs clear to auscultation	  Gastrointestinal:  Soft, Non-tender, + BS	  Skin: warm and dry, No rashes, No ecchymoses, No cyanosis	  Extremities: Normal range of motion, No clubbing, cyanosis or edema  Vascular: Peripheral pulses palpable 2+ bilaterally    LABS:	 	    CBC Full  -  ( 31 Jan 2019 05:20 )  WBC Count : 7.57 K/uL  Hemoglobin : 8.5 g/dL  Hematocrit : 26.8 %  Platelet Count - Automated : 194 K/uL  Mean Cell Volume : 72.8 fL  Mean Cell Hemoglobin : 23.1 pg  Mean Cell Hemoglobin Concentration : 31.7 %  Auto Neutrophil # : x  Auto Lymphocyte # : x  Auto Monocyte # : x  Auto Eosinophil # : x  Auto Basophil # : x  Auto Neutrophil % : x  Auto Lymphocyte % : x  Auto Monocyte % : x  Auto Eosinophil % : x  Auto Basophil % : x    01-31    133<L>  |  87<L>  |  99<H>  ----------------------------<  112<H>  3.7   |  29  |  3.31<H>  01-30    131<L>  |  85<L>  |  99<H>  ----------------------------<  191<H>  4.3   |  30  |  3.29<H>    Ca    9.4      31 Jan 2019 05:20  Ca    9.5      30 Jan 2019 20:10  Phos  3.9     01-31  Phos  4.0     01-30  Mg     2.3     01-31  Mg     2.2     01-30    TPro  7.6  /  Alb  3.4  /  TBili  1.8<H>  /  DBili  x   /  AST  24  /  ALT  16  /  AlkPhos  365<H>  01-30      proBNP:   Lipid Profile:   HgA1c:   TSH:       CARDIAC MARKERS:            TELEMETRY: 	    ECG:  	  RADIOLOGY:  ECHO:  OTHER: 	    PREVIOUS DIAGNOSTIC TESTING:    [ ] Echocardiogram:  [ ]  Catheterization:  [ ] Stress Test: 24 hour events: No events, feeling well    Vital Signs Last 24 Hrs  T(C): 36.3 (31 Jan 2019 05:00), Max: 36.8 (31 Jan 2019 00:00)  T(F): 97.4 (31 Jan 2019 05:00), Max: 98.3 (31 Jan 2019 00:00)  HR: 85 (31 Jan 2019 08:00) (85 - 102)  BP: 103/51 (31 Jan 2019 08:00) (97/52 - 123/55)  BP(mean): 63 (31 Jan 2019 08:00) (60 - 84)  RR: 12 (31 Jan 2019 08:00) (12 - 31)  SpO2: 98% (31 Jan 2019 08:00) (93% - 100%)  I&O's Summary    30 Jan 2019 07:01  -  31 Jan 2019 07:00  --------------------------------------------------------  IN: 1278 mL / OUT: 2220 mL / NET: -942 mL        MEDICATIONS:  aspirin enteric coated 81 milliGRAM(s) Oral daily  buMETAnide 2 milliGRAM(s) Oral two times a day  heparin  Injectable 5000 Unit(s) SubCutaneous every 12 hours  hydrALAZINE 37.5 milliGRAM(s) Oral three times a day  isosorbide   dinitrate Tablet (ISORDIL) 10 milliGRAM(s) Oral three times a day  metoprolol succinate ER 12.5 milliGRAM(s) Oral daily  milrinone Infusion 0.125 MICROgram(s)/kG/Min IV Continuous <Continuous>  tamsulosin 0.4 milliGRAM(s) Oral at bedtime      benzonatate 100 milliGRAM(s) Oral three times a day  guaiFENesin   Syrup  (Sugar-Free) 200 milliGRAM(s) Oral every 6 hours PRN    oxyCODONE    IR 5 milliGRAM(s) Oral every 6 hours PRN    docusate sodium 100 milliGRAM(s) Oral daily  senna 2 Tablet(s) Oral at bedtime  simethicone 80 milliGRAM(s) Chew three times a day    dextrose 40% Gel 15 Gram(s) Oral once PRN  dextrose 50% Injectable 12.5 Gram(s) IV Push once  dextrose 50% Injectable 25 Gram(s) IV Push once  dextrose 50% Injectable 25 Gram(s) IV Push once  glucagon  Injectable 1 milliGRAM(s) IntraMuscular once PRN  insulin glargine Injectable (LANTUS) 25 Unit(s) SubCutaneous at bedtime  insulin lispro (HumaLOG) corrective regimen sliding scale   SubCutaneous three times a day before meals  insulin lispro (HumaLOG) corrective regimen sliding scale   SubCutaneous at bedtime    calamine Lotion 1 Application(s) Topical daily PRN  chlorhexidine 4% Liquid 1 Application(s) Topical <User Schedule>  dextrose 5%. 1000 milliLiter(s) IV Continuous <Continuous>  oxymetazoline 0.05% Nasal Spray 1 Spray(s) Left Nostril two times a day  sodium chloride 0.65% Nasal 1 Spray(s) Both Nostrils three times a day PRN  sodium chloride 0.9% lock flush 3 milliLiter(s) IV Push every 8 hours      REVIEW OF SYSTEMS:  Complete 10point ROS negative.    PHYSICAL EXAM:  General: NAD  Cardiovascular: Normal S1 S2, No JVD, No murmurs, No edema  Respiratory: Lungs clear to auscultation	  Gastrointestinal:  Soft, Non-tender, + BS	  Skin: warm and dry, No rashes, No ecchymoses, No cyanosis	  Extremities: Normal range of motion, No clubbing, cyanosis or edema  Vascular: Peripheral pulses palpable 2+ bilaterally    LABS:	 	    CBC Full  -  ( 31 Jan 2019 05:20 )  WBC Count : 7.57 K/uL  Hemoglobin : 8.5 g/dL  Hematocrit : 26.8 %  Platelet Count - Automated : 194 K/uL  Mean Cell Volume : 72.8 fL  Mean Cell Hemoglobin : 23.1 pg  Mean Cell Hemoglobin Concentration : 31.7 %  Auto Neutrophil # : x  Auto Lymphocyte # : x  Auto Monocyte # : x  Auto Eosinophil # : x  Auto Basophil # : x  Auto Neutrophil % : x  Auto Lymphocyte % : x  Auto Monocyte % : x  Auto Eosinophil % : x  Auto Basophil % : x    01-31    133<L>  |  87<L>  |  99<H>  ----------------------------<  112<H>  3.7   |  29  |  3.31<H>  01-30    131<L>  |  85<L>  |  99<H>  ----------------------------<  191<H>  4.3   |  30  |  3.29<H>    Ca    9.4      31 Jan 2019 05:20  Ca    9.5      30 Jan 2019 20:10  Phos  3.9     01-31  Phos  4.0     01-30  Mg     2.3     01-31  Mg     2.2     01-30    TPro  7.6  /  Alb  3.4  /  TBili  1.8<H>  /  DBili  x   /  AST  24  /  ALT  16  /  AlkPhos  365<H>  01-30      proBNP:   Lipid Profile:   HgA1c:   TSH:       CARDIAC MARKERS:            TELEMETRY: 	    ECG:  	  RADIOLOGY:  ECHO:  OTHER: 	    PREVIOUS DIAGNOSTIC TESTING:    [ ] Echocardiogram:  [ ]  Catheterization:  [ ] Stress Test:

## 2019-01-31 NOTE — PROGRESS NOTE ADULT - PROBLEM SELECTOR PLAN 1
Pt. with KESHAV in the setting of ADHF. On review of previous records in [facility] [employer] [facility], patient with normal Scr levels from 4/26/16 to 9/24/18. Pt. noted to have an episode of KESHAV during previous/recent hospitalization at [facility] (12/7/18 to 12/27/18). On this admission, Scr was elevated at 2.15 on 1/16/19, peaked to 4.0 on 1/25/19  stable at 3.71 on 1/29/19 and downtrending at 3.31 today (1/31/19). Pt. on milrinone infusion and oral Bumex as per CCU team. Pt. non-oliguric. US Kidney showed increased bilateral renal cortical echogenicity, no hydronephrosis on 1/18/19. Monitor labs and urine output. Avoid nephrotoxins. Dose medications as per eGFR Pt. with KESHAV in the setting of ADHF. On review of previous records in [facility], patient with normal Scr levels from 4/26/16 to 9/24/18. Pt. noted to have an episode of KESHAV during previous/recent hospitalization at [facility] (12/7/18 to 12/27/18). On this admission, Scr was elevated at 2.15 on 1/16/19, peaked to 4.0 on 1/25/19, stable at 3.31 today (1/31/19). Pt. on milrinone infusion and oral Bumex as per CCU team. Pt. non-oliguric. US Kidney showed increased bilateral renal cortical echogenicity, no hydronephrosis on 1/18/19. Monitor labs and urine output. Avoid nephrotoxins. Dose medications as per eGFR

## 2019-01-31 NOTE — PROGRESS NOTE ADULT - ATTENDING COMMENTS
wean milrinone to off  continue bumex dose  monitor lactic acid  escalate beta blockers once off milrinone for 24 hs

## 2019-01-31 NOTE — PROGRESS NOTE ADULT - PROBLEM SELECTOR PLAN 2
Patient with hypervolemia in the setting of ADHF. Patient received treatment with Bumex gtt until 1/25/19, currently on oral Bumex. Pt. with 1.9 L of UOP in last 24 hours. Weight decreased by 12.8 kg since admission until 1/30/19. Diuretic and ADHF management per CCU team. Monitor daily weights. Low salt diet

## 2019-01-31 NOTE — PROGRESS NOTE ADULT - ATTENDING COMMENTS
Patient seen and examined  Agree with above assessment and plan  Patient with acute on chronic systolic heart failure  Tolerated decrease dose of milrinone  Await HF recs but would consider dc'ing today  Cont other meds

## 2019-01-31 NOTE — PROGRESS NOTE ADULT - PROBLEM SELECTOR PLAN 2
Acute on chronic renal failure (CKD 3-4). Elevated today. Ultrasound shows renal parenchymal disease.   -Likely secondary to renal venous congestion from cardiorenal syndrome. Diuresis and continue to monitor.  - Cr Improving

## 2019-01-31 NOTE — PROGRESS NOTE ADULT - PROBLEM SELECTOR PLAN 1
Continue Bumex 2 mg po BID.  D/c milrinone gtt.  Continue hydral  37.5 mg po TID, continue Isordil 10 mg po TID.   Strict I/O. Please get standing weight.   Increase Toprol to 25 mg po qd.  Further discussions about ICD per primary CCU team.  Trend lactate. Diuresed -942 ml. Renal function improving.   Continue Bumex 2 mg po BID.  D/c milrinone gtt.  Continue hydral  37.5 mg po TID, continue Isordil 10 mg po TID.   Strict I/O. Please get standing weight.   Increase Toprol to 25 mg po qd.  Further discussions about ICD per primary CCU team.  Trend lactate.

## 2019-01-31 NOTE — PROGRESS NOTE ADULT - PROBLEM SELECTOR PLAN 1
NYHA class IV due to ischemic cardiomyopathy.   - d/c milirinone  -Echo with increased EF and diffuse LV hypokinesis   - bumex 2mg PO BID  - Strict I/O and daily standing weights. Fluid restriction to 1500mL  - Once hemodynamically optimized, ideally would consider ICD placement, given low EF and ischemic cardiomyopathy - pt has previously refused invasive procedures including ICD  - will check BMP and lactate at 2pm as we stop milirinone today. NYHA class IV due to ischemic cardiomyopathy.   - d/c milirinone  - Echo with increased EF and diffuse LV hypokinesis   - bumex 2mg PO BID  - Strict I/O and daily standing weights. Fluid restriction to 1500mL  - Once hemodynamically optimized, ideally would consider ICD placement, given low EF and ischemic cardiomyopathy - pt has previously refused invasive procedures including ICD  - will check BMP and lactate at 2pm as we stop milirinone today.

## 2019-01-31 NOTE — PROGRESS NOTE ADULT - SUBJECTIVE AND OBJECTIVE BOX
[facility] DIVISION OF KIDNEY DISEASES AND HYPERTENSION -- FOLLOW UP NOTE  --------------------------------------------------------------------------------  Chief Complaint:    24 hour events/subjective:        PAST HISTORY  --------------------------------------------------------------------------------  No significant changes to PMH, PSH, FHx, SHx, unless otherwise noted    ALLERGIES & MEDICATIONS  --------------------------------------------------------------------------------  Allergies    No Known Allergies    Intolerances      Standing Inpatient Medications  aspirin enteric coated 81 milliGRAM(s) Oral daily  benzonatate 100 milliGRAM(s) Oral three times a day  buMETAnide 2 milliGRAM(s) Oral two times a day  chlorhexidine 4% Liquid 1 Application(s) Topical <User Schedule>  dextrose 5%. 1000 milliLiter(s) IV Continuous <Continuous>  dextrose 50% Injectable 12.5 Gram(s) IV Push once  dextrose 50% Injectable 25 Gram(s) IV Push once  dextrose 50% Injectable 25 Gram(s) IV Push once  docusate sodium 100 milliGRAM(s) Oral daily  heparin  Injectable 5000 Unit(s) SubCutaneous every 12 hours  hydrALAZINE 37.5 milliGRAM(s) Oral three times a day  insulin glargine Injectable (LANTUS) 25 Unit(s) SubCutaneous at bedtime  insulin lispro (HumaLOG) corrective regimen sliding scale   SubCutaneous three times a day before meals  insulin lispro (HumaLOG) corrective regimen sliding scale   SubCutaneous at bedtime  isosorbide   dinitrate Tablet (ISORDIL) 10 milliGRAM(s) Oral three times a day  metoprolol succinate ER 12.5 milliGRAM(s) Oral daily  milrinone Infusion 0.125 MICROgram(s)/kG/Min IV Continuous <Continuous>  oxymetazoline 0.05% Nasal Spray 1 Spray(s) Left Nostril two times a day  senna 2 Tablet(s) Oral at bedtime  sevelamer hydrochloride 800 milliGRAM(s) Oral three times a day with meals  simethicone 80 milliGRAM(s) Chew three times a day  sodium chloride 0.9% lock flush 3 milliLiter(s) IV Push every 8 hours  tamsulosin 0.4 milliGRAM(s) Oral at bedtime    PRN Inpatient Medications  calamine Lotion 1 Application(s) Topical daily PRN  dextrose 40% Gel 15 Gram(s) Oral once PRN  glucagon  Injectable 1 milliGRAM(s) IntraMuscular once PRN  guaiFENesin   Syrup  (Sugar-Free) 200 milliGRAM(s) Oral every 6 hours PRN  oxyCODONE    IR 5 milliGRAM(s) Oral every 6 hours PRN  sodium chloride 0.65% Nasal 1 Spray(s) Both Nostrils three times a day PRN      REVIEW OF SYSTEMS  --------------------------------------------------------------------------------  Gen: No fevers/chills  Skin: No rashes  Head/Eyes/Ears: Normal hearing,   Respiratory: No dyspnea, cough  CV: No chest pain  GI: No abdominal pain, diarrhea  : No dysuria, hematuria  MSK: No  edema  Heme: No easy bruising or bleeding  Psych: No significant depression      All other systems were reviewed and are negative, except as noted.    VITALS/PHYSICAL EXAM  --------------------------------------------------------------------------------  T(C): 36.3 (01-31-19 @ 05:00), Max: 36.8 (01-31-19 @ 00:00)  HR: 96 (01-31-19 @ 07:00) (87 - 102)  BP: 119/66 (01-31-19 @ 07:00) (97/52 - 125/72)  RR: 16 (01-31-19 @ 07:00) (12 - 31)  SpO2: 99% (01-31-19 @ 07:00) (93% - 100%)  Wt(kg): --  Height (cm): 167.6 (01-29-19 @ 12:27)  Weight (kg): 78 (01-29-19 @ 12:27)  BMI (kg/m2): 27.8 (01-29-19 @ 12:27)  BSA (m2): 1.88 (01-29-19 @ 12:27)      01-30-19 @ 07:01  -  01-31-19 @ 07:00  --------------------------------------------------------  IN: 1278 mL / OUT: 2220 mL / NET: -942 mL        Physical Exam:  	Gen: NAD  	HEENT: MMM  	Pulm: CTA B/L  	CV: S1S2  	Abd: Soft, +BS   	Ext: No LE edema B/L  	Neuro: Awake  	Skin: Warm and dry  	Vascular access:      LABS/STUDIES  --------------------------------------------------------------------------------              8.5    7.57  >-----------<  194      [01-31-19 @ 05:20]              26.8     133  |  87  |  99  ----------------------------<  112      [01-31-19 @ 05:20]  3.7   |  29  |  3.31        Ca     9.4     [01-31-19 @ 05:20]      Mg     2.3     [01-31-19 @ 05:20]      Phos  3.9     [01-31-19 @ 05:20]    TPro  7.6  /  Alb  3.4  /  TBili  1.8  /  DBili  x   /  AST  24  /  ALT  16  /  AlkPhos  365  [01-30-19 @ 05:00]          Creatinine Trend:  SCr 3.31 [01-31 @ 05:20]  SCr 3.29 [01-30 @ 20:10]  SCr 3.49 [01-30 @ 14:30]  SCr 3.43 [01-30 @ 05:00]  SCr 3.64 [01-29 @ 14:36]    Urinalysis - [01-24-19 @ 12:10]      Color LIGHT YELLOW / Appearance CLEAR / SG 1.008 / pH 5.0      Gluc NEGATIVE / Ketone NEGATIVE  / Bili NEGATIVE / Urobili NORMAL       Blood NEGATIVE / Protein NEGATIVE / Leuk Est NEGATIVE / Nitrite NEGATIVE      RBC  / WBC  / Hyaline  / Gran  / Sq Epi  / Non Sq Epi  / Bacteria     Urine Creatinine 21.70      [01-24-19 @ 12:10]  Urine Urea Nitrogen 162.0      [01-24-19 @ 12:10]    Iron 35, TIBC 339, %sat --      [01-21-19 @ 06:30]  Ferritin 114.3      [01-21-19 @ 06:30]  Vitamin D (25OH) 22.8      [01-18-19 @ 06:00]  HbA1c 7.8      [01-17-19 @ 06:34]  TSH 3.72      [01-17-19 @ 06:34]  Lipid: chol 95, TG 47, HDL 33, LDL 59      [01-17-19 @ 06:34] [facility] DIVISION OF KIDNEY DISEASES AND HYPERTENSION -- FOLLOW UP NOTE  --------------------------------------------------------------------------------  HPI: 68-year-old male with medical history of b/L LE bypass and b/L LE multiple toe amputations, DM2, HFrEF (EF 20%), HTN, Dyslipidemia, CAD, CABG, PVD, groin sartorius flap, and vac placement, LLE with surgical scar wound admitted for worsening SOB. Nephrology consulted for KESHAV. Pt. was admitted with ADHF. On review of previous records in [facility]/[place], patient had normal Scr levels from 4/26/16 to 9/24/18. Pt. noted to have an episode of KESHAV during previous/recent hospitalization at [facility] (12/7/18 to 12/27/18). Transfer to CCU on 1/20/19. On this admission, Scr peaked at 4.0 on 1/25/19 stable at 3.71 on 1/29/19 and downtrending at 3.31 today (1/31/19)    Pt. was seen and examined in CCU. Pt. patient feels better with dyspnea after mild exertion. Continues to be IV milrinone infusion and bumex PO. Denies dysuria or difficulty urinating. Denies CP, N/V/F/C. Non-oliguric UOP stable in the past 24 hrs.     PAST HISTORY  --------------------------------------------------------------------------------  No significant changes to PMH, PSH, FHx, SHx, unless otherwise noted    ALLERGIES & MEDICATIONS  --------------------------------------------------------------------------------  Allergies    No Known Allergies    Intolerances      Standing Inpatient Medications  aspirin enteric coated 81 milliGRAM(s) Oral daily  benzonatate 100 milliGRAM(s) Oral three times a day  buMETAnide 2 milliGRAM(s) Oral two times a day  chlorhexidine 4% Liquid 1 Application(s) Topical <User Schedule>  dextrose 5%. 1000 milliLiter(s) IV Continuous <Continuous>  dextrose 50% Injectable 12.5 Gram(s) IV Push once  dextrose 50% Injectable 25 Gram(s) IV Push once  dextrose 50% Injectable 25 Gram(s) IV Push once  docusate sodium 100 milliGRAM(s) Oral daily  heparin  Injectable 5000 Unit(s) SubCutaneous every 12 hours  hydrALAZINE 37.5 milliGRAM(s) Oral three times a day  insulin glargine Injectable (LANTUS) 25 Unit(s) SubCutaneous at bedtime  insulin lispro (HumaLOG) corrective regimen sliding scale   SubCutaneous three times a day before meals  insulin lispro (HumaLOG) corrective regimen sliding scale   SubCutaneous at bedtime  isosorbide   dinitrate Tablet (ISORDIL) 10 milliGRAM(s) Oral three times a day  metoprolol succinate ER 12.5 milliGRAM(s) Oral daily  milrinone Infusion 0.125 MICROgram(s)/kG/Min IV Continuous <Continuous>  oxymetazoline 0.05% Nasal Spray 1 Spray(s) Left Nostril two times a day  senna 2 Tablet(s) Oral at bedtime  sevelamer hydrochloride 800 milliGRAM(s) Oral three times a day with meals  simethicone 80 milliGRAM(s) Chew three times a day  sodium chloride 0.9% lock flush 3 milliLiter(s) IV Push every 8 hours  tamsulosin 0.4 milliGRAM(s) Oral at bedtime    REVIEW OF SYSTEMS  --------------------------------------------------------------------------------  Gen: No fever  Respiratory: + dyspnea (improved)  CV: No chest pain  GI: No abdominal pain  : No dysuria, hematuria  MSK: + edema (improved)    All other systems were reviewed and are negative, except as noted.    VITALS/PHYSICAL EXAM  --------------------------------------------------------------------------------  T(C): 36.3 (01-31-19 @ 05:00), Max: 36.8 (01-31-19 @ 00:00)  HR: 96 (01-31-19 @ 07:00) (87 - 102)  BP: 119/66 (01-31-19 @ 07:00) (97/52 - 125/72)  RR: 16 (01-31-19 @ 07:00) (12 - 31)  SpO2: 99% (01-31-19 @ 07:00) (93% - 100%)  Wt(kg): --  Height (cm): 167.6 (01-29-19 @ 12:27)  Weight (kg): 78 (01-29-19 @ 12:27)  BMI (kg/m2): 27.8 (01-29-19 @ 12:27)  BSA (m2): 1.88 (01-29-19 @ 12:27)      01-30-19 @ 07:01  -  01-31-19 @ 07:00  --------------------------------------------------------  IN: 1278 mL / OUT: 2220 mL / NET: -942 mL    Physical Exam:  	Gen: resting, NAD  	HEENT: No JVD  	Pulm: CTA B/L  	CV: S1S2  	Abd: Soft, +BS   	Ext: trace b/l LE edema (improved)  	Neuro: Awake  	Skin: Warm and dry    LABS/STUDIES  --------------------------------------------------------------------------------              8.5    7.57  >-----------<  194      [01-31-19 @ 05:20]              26.8     133  |  87  |  99  ----------------------------<  112      [01-31-19 @ 05:20]  3.7   |  29  |  3.31        Ca     9.4     [01-31-19 @ 05:20]      Mg     2.3     [01-31-19 @ 05:20]      Phos  3.9     [01-31-19 @ 05:20]    TPro  7.6  /  Alb  3.4  /  TBili  1.8  /  DBili  x   /  AST  24  /  ALT  16  /  AlkPhos  365  [01-30-19 @ 05:00]          Creatinine Trend:  SCr 3.31 [01-31 @ 05:20]  SCr 3.29 [01-30 @ 20:10]  SCr 3.49 [01-30 @ 14:30]  SCr 3.43 [01-30 @ 05:00]  SCr 3.64 [01-29 @ 14:36]    Urinalysis - [01-24-19 @ 12:10]      Color LIGHT YELLOW / Appearance CLEAR / SG 1.008 / pH 5.0      Gluc NEGATIVE / Ketone NEGATIVE  / Bili NEGATIVE / Urobili NORMAL       Blood NEGATIVE / Protein NEGATIVE / Leuk Est NEGATIVE / Nitrite NEGATIVE      RBC  / WBC  / Hyaline  / Gran  / Sq Epi  / Non Sq Epi  / Bacteria     Urine Creatinine 21.70      [01-24-19 @ 12:10]  Urine Urea Nitrogen 162.0      [01-24-19 @ 12:10]    Iron 35, TIBC 339, %sat --      [01-21-19 @ 06:30]  Ferritin 114.3      [01-21-19 @ 06:30]  Vitamin D (25OH) 22.8      [01-18-19 @ 06:00]  HbA1c 7.8      [01-17-19 @ 06:34]  TSH 3.72      [01-17-19 @ 06:34]  Lipid: chol 95, TG 47, HDL 33, LDL 59      [01-17-19 @ 06:34] [facility] DIVISION OF KIDNEY DISEASES AND HYPERTENSION -- FOLLOW UP NOTE  --------------------------------------------------------------------------------  HPI: 68-year-old male with medical history of b/L LE bypass and b/L LE multiple toe amputations, DM2, HFrEF (EF 20%), HTN, Dyslipidemia, CAD, CABG, PVD, groin sartorius flap, and vac placement, LLE with surgical scar wound admitted for worsening SOB. Nephrology consulted for KESHAV. Pt. was admitted with ADHF. On review of previous records in [facility], patient had normal Scr levels from 4/26/16 to 9/24/18. Pt. noted to have an episode of KESHAV during previous/recent hospitalization at [facility] (12/7/18 to 12/27/18). Transfer to CCU on 1/20/19. On this admission, Scr peaked at 4.0 on 1/25/19 stable at 3.71 on 1/29/19 and downtrending at 3.31 today (1/31/19)    Pt. was seen and examined in CCU. Pt. patient feels better with dyspnea after mild exertion. Continues to be IV milrinone infusion and bumex PO. Denies dysuria or difficulty urinating. Denies CP, N/V/F/C. Non-oliguric UOP stable in the past 24 hrs.     PAST HISTORY  --------------------------------------------------------------------------------  No significant changes to PMH, PSH, FHx, SHx, unless otherwise noted    ALLERGIES & MEDICATIONS  --------------------------------------------------------------------------------  Allergies    No Known Allergies    Intolerances    Standing Inpatient Medications  aspirin enteric coated 81 milliGRAM(s) Oral daily  benzonatate 100 milliGRAM(s) Oral three times a day  buMETAnide 2 milliGRAM(s) Oral two times a day  chlorhexidine 4% Liquid 1 Application(s) Topical <User Schedule>  dextrose 5%. 1000 milliLiter(s) IV Continuous <Continuous>  dextrose 50% Injectable 12.5 Gram(s) IV Push once  dextrose 50% Injectable 25 Gram(s) IV Push once  dextrose 50% Injectable 25 Gram(s) IV Push once  docusate sodium 100 milliGRAM(s) Oral daily  heparin  Injectable 5000 Unit(s) SubCutaneous every 12 hours  hydrALAZINE 37.5 milliGRAM(s) Oral three times a day  insulin glargine Injectable (LANTUS) 25 Unit(s) SubCutaneous at bedtime  insulin lispro (HumaLOG) corrective regimen sliding scale   SubCutaneous three times a day before meals  insulin lispro (HumaLOG) corrective regimen sliding scale   SubCutaneous at bedtime  isosorbide   dinitrate Tablet (ISORDIL) 10 milliGRAM(s) Oral three times a day  metoprolol succinate ER 12.5 milliGRAM(s) Oral daily  milrinone Infusion 0.125 MICROgram(s)/kG/Min IV Continuous <Continuous>  oxymetazoline 0.05% Nasal Spray 1 Spray(s) Left Nostril two times a day  senna 2 Tablet(s) Oral at bedtime  sevelamer hydrochloride 800 milliGRAM(s) Oral three times a day with meals  simethicone 80 milliGRAM(s) Chew three times a day  sodium chloride 0.9% lock flush 3 milliLiter(s) IV Push every 8 hours  tamsulosin 0.4 milliGRAM(s) Oral at bedtime    REVIEW OF SYSTEMS  --------------------------------------------------------------------------------  Gen: No fever  Respiratory: + dyspnea (improved)  CV: No chest pain  GI: No abdominal pain  : No dysuria, hematuria  MSK: + edema (improved)    All other systems were reviewed and are negative, except as noted.    VITALS/PHYSICAL EXAM  --------------------------------------------------------------------------------  T(C): 36.3 (01-31-19 @ 05:00), Max: 36.8 (01-31-19 @ 00:00)  HR: 96 (01-31-19 @ 07:00) (87 - 102)  BP: 119/66 (01-31-19 @ 07:00) (97/52 - 125/72)  RR: 16 (01-31-19 @ 07:00) (12 - 31)  SpO2: 99% (01-31-19 @ 07:00) (93% - 100%)  Wt(kg): --  Height (cm): 167.6 (01-29-19 @ 12:27)  Weight (kg): 78 (01-29-19 @ 12:27)  BMI (kg/m2): 27.8 (01-29-19 @ 12:27)  BSA (m2): 1.88 (01-29-19 @ 12:27)    01-30-19 @ 07:01  -  01-31-19 @ 07:00  --------------------------------------------------------  IN: 1278 mL / OUT: 2220 mL / NET: -942 mL    Physical Exam:  	Gen: resting, NAD  	HEENT: No JVD  	Pulm: CTA B/L  	CV: S1S2  	Abd: Soft, +BS   	Ext: trace b/l LE edema (improved)  	Neuro: Awake  	Skin: Warm and dry    LABS/STUDIES  --------------------------------------------------------------------------------              8.5    7.57  >-----------<  194      [01-31-19 @ 05:20]              26.8     133  |  87  |  99  ----------------------------<  112      [01-31-19 @ 05:20]  3.7   |  29  |  3.31        Ca     9.4     [01-31-19 @ 05:20]      Mg     2.3     [01-31-19 @ 05:20]      Phos  3.9     [01-31-19 @ 05:20]    TPro  7.6  /  Alb  3.4  /  TBili  1.8  /  DBili  x   /  AST  24  /  ALT  16  /  AlkPhos  365  [01-30-19 @ 05:00]    Creatinine Trend:  SCr 3.31 [01-31 @ 05:20]  SCr 3.29 [01-30 @ 20:10]  SCr 3.49 [01-30 @ 14:30]  SCr 3.43 [01-30 @ 05:00]  SCr 3.64 [01-29 @ 14:36]

## 2019-02-01 LAB
ANION GAP SERPL CALC-SCNC: 15 MMO/L — HIGH (ref 7–14)
BUN SERPL-MCNC: 98 MG/DL — HIGH (ref 7–23)
CALCIUM SERPL-MCNC: 9.6 MG/DL — SIGNIFICANT CHANGE UP (ref 8.4–10.5)
CHLORIDE SERPL-SCNC: 88 MMOL/L — LOW (ref 98–107)
CO2 SERPL-SCNC: 29 MMOL/L — SIGNIFICANT CHANGE UP (ref 22–31)
CREAT SERPL-MCNC: 3.33 MG/DL — HIGH (ref 0.5–1.3)
GLUCOSE BLDC GLUCOMTR-MCNC: 103 MG/DL — HIGH (ref 70–99)
GLUCOSE BLDC GLUCOMTR-MCNC: 180 MG/DL — HIGH (ref 70–99)
GLUCOSE BLDC GLUCOMTR-MCNC: 193 MG/DL — HIGH (ref 70–99)
GLUCOSE BLDC GLUCOMTR-MCNC: 60 MG/DL — LOW (ref 70–99)
GLUCOSE SERPL-MCNC: 54 MG/DL — LOW (ref 70–99)
HCT VFR BLD CALC: 27.7 % — LOW (ref 39–50)
HGB BLD-MCNC: 9.1 G/DL — LOW (ref 13–17)
LACTATE SERPL-SCNC: 2 MMOL/L — SIGNIFICANT CHANGE UP (ref 0.5–2)
LACTATE SERPL-SCNC: 2.3 MMOL/L — HIGH (ref 0.5–2)
MAGNESIUM SERPL-MCNC: 2.4 MG/DL — SIGNIFICANT CHANGE UP (ref 1.6–2.6)
MCHC RBC-ENTMCNC: 23.7 PG — LOW (ref 27–34)
MCHC RBC-ENTMCNC: 32.9 % — SIGNIFICANT CHANGE UP (ref 32–36)
MCV RBC AUTO: 72.1 FL — LOW (ref 80–100)
NRBC # FLD: 0 K/UL — LOW (ref 25–125)
PHOSPHATE SERPL-MCNC: 4.4 MG/DL — SIGNIFICANT CHANGE UP (ref 2.5–4.5)
PLATELET # BLD AUTO: 208 K/UL — SIGNIFICANT CHANGE UP (ref 150–400)
PMV BLD: SIGNIFICANT CHANGE UP FL (ref 7–13)
POTASSIUM SERPL-MCNC: 4 MMOL/L — SIGNIFICANT CHANGE UP (ref 3.5–5.3)
POTASSIUM SERPL-SCNC: 4 MMOL/L — SIGNIFICANT CHANGE UP (ref 3.5–5.3)
RBC # BLD: 3.84 M/UL — LOW (ref 4.2–5.8)
RBC # FLD: 26 % — HIGH (ref 10.3–14.5)
SODIUM SERPL-SCNC: 132 MMOL/L — LOW (ref 135–145)
WBC # BLD: 7.01 K/UL — SIGNIFICANT CHANGE UP (ref 3.8–10.5)
WBC # FLD AUTO: 7.01 K/UL — SIGNIFICANT CHANGE UP (ref 3.8–10.5)

## 2019-02-01 PROCEDURE — 99233 SBSQ HOSP IP/OBS HIGH 50: CPT

## 2019-02-01 PROCEDURE — 99232 SBSQ HOSP IP/OBS MODERATE 35: CPT | Mod: GC

## 2019-02-01 RX ORDER — ISOSORBIDE DINITRATE 5 MG/1
20 TABLET ORAL THREE TIMES A DAY
Qty: 0 | Refills: 0 | Status: DISCONTINUED | OUTPATIENT
Start: 2019-02-01 | End: 2019-02-08

## 2019-02-01 RX ORDER — SODIUM CHLORIDE 9 MG/ML
250 INJECTION INTRAMUSCULAR; INTRAVENOUS; SUBCUTANEOUS
Qty: 0 | Refills: 0 | Status: DISCONTINUED | OUTPATIENT
Start: 2019-02-01 | End: 2019-02-03

## 2019-02-01 RX ORDER — INSULIN GLARGINE 100 [IU]/ML
20 INJECTION, SOLUTION SUBCUTANEOUS AT BEDTIME
Qty: 0 | Refills: 0 | Status: DISCONTINUED | OUTPATIENT
Start: 2019-02-01 | End: 2019-02-11

## 2019-02-01 RX ADMIN — TAMSULOSIN HYDROCHLORIDE 0.4 MILLIGRAM(S): 0.4 CAPSULE ORAL at 22:18

## 2019-02-01 RX ADMIN — SEVELAMER CARBONATE 800 MILLIGRAM(S): 2400 POWDER, FOR SUSPENSION ORAL at 12:49

## 2019-02-01 RX ADMIN — Medication 37.5 MILLIGRAM(S): at 14:15

## 2019-02-01 RX ADMIN — OXYCODONE HYDROCHLORIDE 5 MILLIGRAM(S): 5 TABLET ORAL at 18:25

## 2019-02-01 RX ADMIN — HEPARIN SODIUM 5000 UNIT(S): 5000 INJECTION INTRAVENOUS; SUBCUTANEOUS at 18:07

## 2019-02-01 RX ADMIN — Medication 2: at 18:08

## 2019-02-01 RX ADMIN — INSULIN GLARGINE 20 UNIT(S): 100 INJECTION, SOLUTION SUBCUTANEOUS at 22:19

## 2019-02-01 RX ADMIN — SODIUM CHLORIDE 3 MILLILITER(S): 9 INJECTION INTRAMUSCULAR; INTRAVENOUS; SUBCUTANEOUS at 12:50

## 2019-02-01 RX ADMIN — Medication 12.5 MILLIGRAM(S): at 06:04

## 2019-02-01 RX ADMIN — SODIUM CHLORIDE 3 MILLILITER(S): 9 INJECTION INTRAMUSCULAR; INTRAVENOUS; SUBCUTANEOUS at 05:58

## 2019-02-01 RX ADMIN — ISOSORBIDE DINITRATE 20 MILLIGRAM(S): 5 TABLET ORAL at 22:18

## 2019-02-01 RX ADMIN — BUMETANIDE 2 MILLIGRAM(S): 0.25 INJECTION INTRAMUSCULAR; INTRAVENOUS at 18:08

## 2019-02-01 RX ADMIN — BUMETANIDE 2 MILLIGRAM(S): 0.25 INJECTION INTRAMUSCULAR; INTRAVENOUS at 06:04

## 2019-02-01 RX ADMIN — ISOSORBIDE DINITRATE 10 MILLIGRAM(S): 5 TABLET ORAL at 06:05

## 2019-02-01 RX ADMIN — SIMETHICONE 80 MILLIGRAM(S): 80 TABLET, CHEWABLE ORAL at 06:08

## 2019-02-01 RX ADMIN — ISOSORBIDE DINITRATE 20 MILLIGRAM(S): 5 TABLET ORAL at 14:11

## 2019-02-01 RX ADMIN — SEVELAMER CARBONATE 800 MILLIGRAM(S): 2400 POWDER, FOR SUSPENSION ORAL at 12:48

## 2019-02-01 RX ADMIN — Medication 100 MILLIGRAM(S): at 06:10

## 2019-02-01 RX ADMIN — SIMETHICONE 80 MILLIGRAM(S): 80 TABLET, CHEWABLE ORAL at 18:26

## 2019-02-01 RX ADMIN — SODIUM CHLORIDE 50 MILLILITER(S): 9 INJECTION INTRAMUSCULAR; INTRAVENOUS; SUBCUTANEOUS at 21:44

## 2019-02-01 RX ADMIN — SEVELAMER CARBONATE 800 MILLIGRAM(S): 2400 POWDER, FOR SUSPENSION ORAL at 18:11

## 2019-02-01 RX ADMIN — SODIUM CHLORIDE 3 MILLILITER(S): 9 INJECTION INTRAMUSCULAR; INTRAVENOUS; SUBCUTANEOUS at 21:45

## 2019-02-01 RX ADMIN — SIMETHICONE 80 MILLIGRAM(S): 80 TABLET, CHEWABLE ORAL at 22:17

## 2019-02-01 RX ADMIN — Medication 100 MILLIGRAM(S): at 12:48

## 2019-02-01 RX ADMIN — Medication 100 MILLIGRAM(S): at 18:15

## 2019-02-01 RX ADMIN — Medication 37.5 MILLIGRAM(S): at 22:18

## 2019-02-01 RX ADMIN — Medication 81 MILLIGRAM(S): at 12:48

## 2019-02-01 RX ADMIN — Medication 100 MILLIGRAM(S): at 22:18

## 2019-02-01 RX ADMIN — Medication 37.5 MILLIGRAM(S): at 06:05

## 2019-02-01 NOTE — PROGRESS NOTE ADULT - PROBLEM SELECTOR PLAN 1
Diuresed -488 ml. Renal function improving.   Continue Bumex 2 mg po BID.  Now off milrinone. Lactate is higher today, repeating.   Continue hydral  37.5 mg po TID, continue Isordil 10 mg po TID.   Strict I/O. Please get standing weight.   Continue Toprol 25 mg po qd.  Pt refused ICD.

## 2019-02-01 NOTE — PROGRESS NOTE ADULT - SUBJECTIVE AND OBJECTIVE BOX
Tele: NSR    Overnight: No complaints     MEDICATIONS  (STANDING):  aspirin enteric coated 81 milliGRAM(s) Oral daily  benzonatate 100 milliGRAM(s) Oral three times a day  buMETAnide 2 milliGRAM(s) Oral two times a day  docusate sodium 100 milliGRAM(s) Oral daily  heparin  Injectable 5000 Unit(s) SubCutaneous every 12 hours  hydrALAZINE 37.5 milliGRAM(s) Oral three times a day  insulin glargine Injectable (LANTUS) 25 Unit(s) SubCutaneous at bedtime  insulin lispro (HumaLOG) corrective regimen sliding scale   SubCutaneous three times a day before meals  insulin lispro (HumaLOG) corrective regimen sliding scale   SubCutaneous at bedtime  isosorbide   dinitrate Tablet (ISORDIL) 20 milliGRAM(s) Oral three times a day  metoprolol succinate ER 12.5 milliGRAM(s) Oral daily  senna 2 Tablet(s) Oral at bedtime  sevelamer hydrochloride 800 milliGRAM(s) Oral three times a day with meals  simethicone 80 milliGRAM(s) Chew three times a day  sodium chloride 0.9% lock flush 3 milliLiter(s) IV Push every 8 hours  tamsulosin 0.4 milliGRAM(s) Oral at bedtime    MEDICATIONS  (PRN):  calamine Lotion 1 Application(s) Topical daily PRN Rash and/or Itching  dextrose 40% Gel 15 Gram(s) Oral once PRN Blood Glucose LESS THAN 70 milliGRAM(s)/deciliter  glucagon  Injectable 1 milliGRAM(s) IntraMuscular once PRN Glucose LESS THAN 70 milligrams/deciliter  guaiFENesin   Syrup  (Sugar-Free) 200 milliGRAM(s) Oral every 6 hours PRN Cough  oxyCODONE    IR 5 milliGRAM(s) Oral every 6 hours PRN Moderate Pain (4 - 6)  sodium chloride 0.65% Nasal 1 Spray(s) Both Nostrils three times a day PRN Nasal Congestion          Vital Signs Last 24 Hrs  T(C): 36.7 (01 Feb 2019 09:01), Max: 37.1 (31 Jan 2019 21:22)  T(F): 98.1 (01 Feb 2019 09:01), Max: 98.7 (31 Jan 2019 21:22)  HR: 86 (01 Feb 2019 09:01) (86 - 95)  BP: 111/59 (01 Feb 2019 09:01) (98/56 - 150/79)  BP(mean): 69 (31 Jan 2019 19:00) (66 - 108)  RR: 18 (01 Feb 2019 09:01) (13 - 19)  SpO2: 99% (01 Feb 2019 09:01) (98% - 100%)  I&O's Detail    31 Jan 2019 07:01  -  01 Feb 2019 07:00  --------------------------------------------------------  IN:    milrinone  Infusion: 12 mL    Oral Fluid: 600 mL  Total IN: 612 mL    OUT:    Voided: 1100 mL  Total OUT: 1100 mL    Total NET: -488 mL    Physical exam:   Gen- NAD  Resp- Dec BS @ bases b/l   CV- S1S2 RRR  ABD- +BSx4 ND/NT  EXT- No edema   Neuro- A&Ox3                             9.1    7.01  )-----------( 208      ( 01 Feb 2019 06:30 )             27.7       01 Feb 2019 06:30    132<L>  |  88<L>  |  98<H>  ----------------------------<  54<L>  4.0     |  29     |  3.33<H>  31 Jan 2019 14:50    133<L>  |  87<L>  |  106<H>  ----------------------------<  134<H>  4.5     |  30     |  3.44<H>    Ca    9.6        01 Feb 2019 06:30  Ca    9.7        31 Jan 2019 14:50  Phos  4.4       01 Feb 2019 06:30  Phos  4.4       31 Jan 2019 14:50  Mg     2.4       01 Feb 2019 06:30  Mg     2.3       31 Jan 2019 14:50

## 2019-02-01 NOTE — PROGRESS NOTE ADULT - SUBJECTIVE AND OBJECTIVE BOX
Patient seen and examined. He states he feels well- no acute SOB, CP, palpitations.       Medications:  aspirin enteric coated 81 milliGRAM(s) Oral daily  benzonatate 100 milliGRAM(s) Oral three times a day  buMETAnide 2 milliGRAM(s) Oral two times a day  calamine Lotion 1 Application(s) Topical daily PRN  chlorhexidine 4% Liquid 1 Application(s) Topical <User Schedule>  dextrose 40% Gel 15 Gram(s) Oral once PRN  dextrose 5%. 1000 milliLiter(s) IV Continuous <Continuous>  dextrose 50% Injectable 12.5 Gram(s) IV Push once  dextrose 50% Injectable 25 Gram(s) IV Push once  dextrose 50% Injectable 25 Gram(s) IV Push once  docusate sodium 100 milliGRAM(s) Oral daily  glucagon  Injectable 1 milliGRAM(s) IntraMuscular once PRN  guaiFENesin   Syrup  (Sugar-Free) 200 milliGRAM(s) Oral every 6 hours PRN  heparin  Injectable 5000 Unit(s) SubCutaneous every 12 hours  hydrALAZINE 37.5 milliGRAM(s) Oral three times a day  insulin glargine Injectable (LANTUS) 25 Unit(s) SubCutaneous at bedtime  insulin lispro (HumaLOG) corrective regimen sliding scale   SubCutaneous three times a day before meals  insulin lispro (HumaLOG) corrective regimen sliding scale   SubCutaneous at bedtime  isosorbide   dinitrate Tablet (ISORDIL) 20 milliGRAM(s) Oral three times a day  metoprolol succinate ER 12.5 milliGRAM(s) Oral daily  oxyCODONE    IR 5 milliGRAM(s) Oral every 6 hours PRN  senna 2 Tablet(s) Oral at bedtime  sevelamer hydrochloride 800 milliGRAM(s) Oral three times a day with meals  simethicone 80 milliGRAM(s) Chew three times a day  sodium chloride 0.65% Nasal 1 Spray(s) Both Nostrils three times a day PRN  sodium chloride 0.9% lock flush 3 milliLiter(s) IV Push every 8 hours  tamsulosin 0.4 milliGRAM(s) Oral at bedtime      Vitals:  Vital Signs Last 24 Hrs  T(C): 36.3 (2019 14:15), Max: 37.1 (2019 21:22)  T(F): 97.4 (2019 14:15), Max: 98.7 (2019 21:22)  HR: 78 (2019 14:15) (78 - 93)  BP: 98/73 (2019 14:15) (98/56 - 118/77)  BP(mean): 69 (2019 19:00) (66 - 80)  RR: 17 (2019 14:15) (13 - 18)  SpO2: 100% (2019 14:15) (98% - 100%)    Daily     Daily Weight in k.2 (2019 15:13)    I&O's Detail    2019 07:01  -  2019 07:00  --------------------------------------------------------  IN:    milrinone  Infusion: 12 mL    Oral Fluid: 600 mL  Total IN: 612 mL    OUT:    Voided: 1100 mL  Total OUT: 1100 mL    Total NET: -488 mL          Physical Exam:     General: No distress. Comfortable.  HEENT: EOM intact.  Neck: Neck supple. JVP not elevated. No masses  Chest: Clear to auscultation bilaterally  CV:  S1 and S2 RRR. No murmurs, rub, or gallops. Radial pulses normal. No LE edema b/l.   Abdomen: Soft, non-distended, non-tender  Skin: No rashes or skin breakdown  Neurology: Alert and oriented times three. Sensation intact  Psych: Affect normal    Labs:                        9.1    7.01  )-----------( 208      ( 2019 06:30 )             27.7     02-01    132<L>  |  88<L>  |  98<H>  ----------------------------<  54<L>  4.0   |  29  |  3.33<H>    Ca    9.6      2019 06:30  Phos  4.4     02-01  Mg     2.4     02-01      < from: TTE with Doppler (w/Cont) (19 @ 13:36) >  DIMENSIONS:  Dimensions:     Normal Values:  LA:       ---     2.0 - 4.0 cm  Ao:     2.7 cm    2.0 - 3.8 cm  SEPTUM: 0.7 cm    0.6 - 1.2 cm  PWT:    1.0 cm    0.6 - 1.1 cm  LVIDd:  5.5 cm    3.0 - 5.6 cm  LVIDs:    ---     1.8 - 4.0 cm  Derived Variables:  LVMI: 92 g/m2  RWT: 0.36  Ejection Fraction (Modified Mon Rule): 35 %  ------------------------------------------------------------------------  OBSERVATIONS:  Mitral Valve: Tethered mitral valve leaflets. Moderate  mitral regurgitation.  Aortic Root: Normal aortic root.  Aortic Valve: Calcified trileaflet aortic valve with mildly  decreased opening.  Left Atrium: Normal left atrium.  LA volume index = 23  cc/m2.  Left Ventricle: Moderate to severe segmental left  ventricular systolic dysfunction. The basal to mid septum,  basal to mid inferior, basal to mid anterolateral walls are  hypokinetic. Normal left ventricular internal dimensions  and wall thicknesses.  Right Heart: Normal right atrium. Right ventricular  enlargement with decreased right ventricular systolic  function. Normal tricuspid valve. Mild tricuspid  regurgitation. Normal pulmonic valve.  Pericardium/PleuraNormal pericardium with no pericardial  effusion.  Hemodynamic: Estimated right ventricular systolic pressure  equals 43 mm Hg, assuming right atrial pressure equals 10  mm Hg, consistent with mild pulmonary hypertension.    < end of copied text >

## 2019-02-01 NOTE — PROGRESS NOTE ADULT - SUBJECTIVE AND OBJECTIVE BOX
[facility] DIVISION OF KIDNEY DISEASES AND HYPERTENSION -- FOLLOW UP NOTE  --------------------------------------------------------------------------------  HPI: 68-year-old male with medical history of b/L LE bypass and b/L LE multiple toe amputations, DM2, HFrEF (EF 20%), HTN, Dyslipidemia, CAD, CABG, PVD, groin sartorius flap, and vac placement, LLE with surgical scar wound admitted for worsening SOB. Nephrology consulted for KESHAV. Pt. was admitted with ADHF. On review of previous records in [facility]/[place], patient had normal Scr levels from 4/26/16 to 9/24/18. Pt. noted to have an episode of KESHAV during previous/recent hospitalization at [facility] (12/7/18 to 12/27/18). Transfer to CCU on 1/20/19. On this admission, Scr peaked at 4.0 on 1/25/19 stable at 3.71 on 1/29/19 and decrease/stable at 3.33 today (2/1/19)    Pt. was seen and examined at bedside. Transfer to medical floor yesterday. Pt. patient feels still has dyspnea and feels weak. Off IV milrinone infusion and currently on bumex PO. Denies dysuria or difficulty urinating. Denies CP, N/V/F/C. Non-oliguric UOP stable in the past 24 hrs.     PAST HISTORY  --------------------------------------------------------------------------------  No significant changes to PMH, PSH, FHx, SHx, unless otherwise noted    ALLERGIES & MEDICATIONS  --------------------------------------------------------------------------------  Allergies    No Known Allergies    Intolerances      Standing Inpatient Medications  aspirin enteric coated 81 milliGRAM(s) Oral daily  benzonatate 100 milliGRAM(s) Oral three times a day  buMETAnide 2 milliGRAM(s) Oral two times a day  chlorhexidine 4% Liquid 1 Application(s) Topical <User Schedule>  dextrose 5%. 1000 milliLiter(s) IV Continuous <Continuous>  dextrose 50% Injectable 12.5 Gram(s) IV Push once  dextrose 50% Injectable 25 Gram(s) IV Push once  dextrose 50% Injectable 25 Gram(s) IV Push once  docusate sodium 100 milliGRAM(s) Oral daily  heparin  Injectable 5000 Unit(s) SubCutaneous every 12 hours  hydrALAZINE 37.5 milliGRAM(s) Oral three times a day  insulin glargine Injectable (LANTUS) 25 Unit(s) SubCutaneous at bedtime  insulin lispro (HumaLOG) corrective regimen sliding scale   SubCutaneous three times a day before meals  insulin lispro (HumaLOG) corrective regimen sliding scale   SubCutaneous at bedtime  isosorbide   dinitrate Tablet (ISORDIL) 20 milliGRAM(s) Oral three times a day  metoprolol succinate ER 12.5 milliGRAM(s) Oral daily  senna 2 Tablet(s) Oral at bedtime  sevelamer hydrochloride 800 milliGRAM(s) Oral three times a day with meals  simethicone 80 milliGRAM(s) Chew three times a day  sodium chloride 0.9% lock flush 3 milliLiter(s) IV Push every 8 hours  tamsulosin 0.4 milliGRAM(s) Oral at bedtime    REVIEW OF SYSTEMS  --------------------------------------------------------------------------------  Gen: No fever  Respiratory: + dyspnea (improved)  CV: No chest pain  GI: No abdominal pain  : No dysuria, hematuria  MSK: + edema (improved)    All other systems were reviewed and are negative, except as noted.    VITALS/PHYSICAL EXAM  --------------------------------------------------------------------------------  T(C): 36.7 (02-01-19 @ 09:01), Max: 37.1 (01-31-19 @ 21:22)  HR: 86 (02-01-19 @ 09:01) (86 - 95)  BP: 111/59 (02-01-19 @ 09:01) (98/56 - 150/79)  RR: 18 (02-01-19 @ 09:01) (13 - 19)  SpO2: 99% (02-01-19 @ 09:01) (98% - 100%)  Wt(kg): --    01-31-19 @ 07:01  -  02-01-19 @ 07:00  --------------------------------------------------------  IN: 612 mL / OUT: 1100 mL / NET: -488 mL    Physical Exam:  	Gen: resting, NAD  	HEENT: No JVD  	Pulm: CTA B/L  	CV: S1S2  	Abd: Soft, +BS   	Ext: trace b/l LE edema (improved)  	Neuro: Awake  	Skin: Warm and dry    LABS/STUDIES  --------------------------------------------------------------------------------              9.1    7.01  >-----------<  208      [02-01-19 @ 06:30]              27.7     132  |  88  |  98  ----------------------------<  54      [02-01-19 @ 06:30]  4.0   |  29  |  3.33        Ca     9.6     [02-01-19 @ 06:30]      Mg     2.4     [02-01-19 @ 06:30]      Phos  4.4     [02-01-19 @ 06:30]    Creatinine Trend:  SCr 3.33 [02-01 @ 06:30]  SCr 3.44 [01-31 @ 14:50]  SCr 3.31 [01-31 @ 05:20]  SCr 3.29 [01-30 @ 20:10]  SCr 3.49 [01-30 @ 14:30] [facility] DIVISION OF KIDNEY DISEASES AND HYPERTENSION -- FOLLOW UP NOTE  --------------------------------------------------------------------------------  HPI: 68-year-old male with medical history of b/L LE bypass and b/L LE multiple toe amputations, DM2, HFrEF (EF 20%), HTN, Dyslipidemia, CAD, CABG, PVD, groin sartorius flap, and vac placement, LLE with surgical scar wound admitted for worsening SOB. Nephrology consulted for KESHAV. Pt. was admitted with ADHF. On review of previous records in [facility]/[place], patient had normal Scr levels from 4/26/16 to 9/24/18. Pt. noted to have an episode of KESHAV during previous/recent hospitalization at [facility] (12/7/18 to 12/27/18). Transfer to CCU on 1/20/19. On this admission, Scr peaked at 4.0 on 1/25/19 stable at 3.71 on 1/29/19 and decrease/stable at 3.33 today (2/1/19)    Pt. was seen and examined at bedside. Transfer to medical floor yesterday. Pt. patient feels still has dyspnea and feels weak. Off IV milrinone infusion and currently on bumex PO. Denies dysuria or difficulty urinating. Denies CP, N/V/F/C. Non-oliguric UOP stable in the past 24 hrs.     PAST HISTORY  --------------------------------------------------------------------------------  No significant changes to PMH, PSH, FHx, SHx, unless otherwise noted    ALLERGIES & MEDICATIONS  --------------------------------------------------------------------------------  Allergies    No Known Allergies    Intolerances    Standing Inpatient Medications  aspirin enteric coated 81 milliGRAM(s) Oral daily  benzonatate 100 milliGRAM(s) Oral three times a day  buMETAnide 2 milliGRAM(s) Oral two times a day  chlorhexidine 4% Liquid 1 Application(s) Topical <User Schedule>  dextrose 5%. 1000 milliLiter(s) IV Continuous <Continuous>  dextrose 50% Injectable 12.5 Gram(s) IV Push once  dextrose 50% Injectable 25 Gram(s) IV Push once  dextrose 50% Injectable 25 Gram(s) IV Push once  docusate sodium 100 milliGRAM(s) Oral daily  heparin  Injectable 5000 Unit(s) SubCutaneous every 12 hours  hydrALAZINE 37.5 milliGRAM(s) Oral three times a day  insulin glargine Injectable (LANTUS) 25 Unit(s) SubCutaneous at bedtime  insulin lispro (HumaLOG) corrective regimen sliding scale   SubCutaneous three times a day before meals  insulin lispro (HumaLOG) corrective regimen sliding scale   SubCutaneous at bedtime  isosorbide   dinitrate Tablet (ISORDIL) 20 milliGRAM(s) Oral three times a day  metoprolol succinate ER 12.5 milliGRAM(s) Oral daily  senna 2 Tablet(s) Oral at bedtime  sevelamer hydrochloride 800 milliGRAM(s) Oral three times a day with meals  simethicone 80 milliGRAM(s) Chew three times a day  sodium chloride 0.9% lock flush 3 milliLiter(s) IV Push every 8 hours  tamsulosin 0.4 milliGRAM(s) Oral at bedtime    REVIEW OF SYSTEMS  --------------------------------------------------------------------------------  Gen: No fever  Respiratory: + dyspnea (improved)  CV: No chest pain  GI: No abdominal pain  : No dysuria, hematuria  MSK: + edema (improved)    All other systems were reviewed and are negative, except as noted.    VITALS/PHYSICAL EXAM  --------------------------------------------------------------------------------  T(C): 36.7 (02-01-19 @ 09:01), Max: 37.1 (01-31-19 @ 21:22)  HR: 86 (02-01-19 @ 09:01) (86 - 95)  BP: 111/59 (02-01-19 @ 09:01) (98/56 - 150/79)  RR: 18 (02-01-19 @ 09:01) (13 - 19)  SpO2: 99% (02-01-19 @ 09:01) (98% - 100%)  Wt(kg): --    01-31-19 @ 07:01  -  02-01-19 @ 07:00  --------------------------------------------------------  IN: 612 mL / OUT: 1100 mL / NET: -488 mL    Physical Exam:  	Gen: resting, NAD  	HEENT: No JVD  	Pulm: CTA B/L  	CV: S1S2  	Abd: Soft, +BS   	Ext: trace b/l LE edema (improved)  	Neuro: Awake  	Skin: Warm and dry    LABS/STUDIES  --------------------------------------------------------------------------------              9.1    7.01  >-----------<  208      [02-01-19 @ 06:30]              27.7     132  |  88  |  98  ----------------------------<  54      [02-01-19 @ 06:30]  4.0   |  29  |  3.33        Ca     9.6     [02-01-19 @ 06:30]      Mg     2.4     [02-01-19 @ 06:30]      Phos  4.4     [02-01-19 @ 06:30]    Creatinine Trend:  SCr 3.33 [02-01 @ 06:30]  SCr 3.44 [01-31 @ 14:50]  SCr 3.31 [01-31 @ 05:20]  SCr 3.29 [01-30 @ 20:10]  SCr 3.49 [01-30 @ 14:30]

## 2019-02-01 NOTE — PROGRESS NOTE ADULT - PROBLEM SELECTOR PLAN 2
Patient with hypervolemia in the setting of ADHF. Patient received treatment with Bumex gtt until 1/25/19, currently on oral Bumex. Pt. with 1.1 L of UOP in last 24 hours. Weight decreased by 12.8 kg since admission until 1/30/19. Diuretic and ADHF management per cardiology. Monitor daily weights. Low salt diet

## 2019-02-01 NOTE — PROGRESS NOTE ADULT - PROBLEM SELECTOR PLAN 1
Pt. with KESHAV in the setting of ADHF. On review of previous records in [facility]/[place], patient with normal Scr levels from 4/26/16 to 9/24/18. Pt. noted to have an episode of KESHAV during previous/recent hospitalization at [facility] (12/7/18 to 12/27/18). On this admission, Scr was elevated at 2.15 on 1/16/19, peaked to 4.0 on 1/25/19, stable at 3.33 today (2/1/19). Pt. off milrinone infusion and on oral Bumex as per cardiology. Pt. non-oliguric. US Kidney showed increased bilateral renal cortical echogenicity, no hydronephrosis on 1/18/19. Monitor labs and urine output. Avoid nephrotoxins. Dose medications as per eGFR

## 2019-02-01 NOTE — PROGRESS NOTE ADULT - PROBLEM SELECTOR PLAN 1
NYHA class IV due to ischemic cardiomyopathy.   - d/c milirinone  - Echo with increased EF and diffuse LV hypokinesis   - bumex 2mg PO BID, increased Isordil 20mg TID  - Strict I/O and daily standing weights. Fluid restriction to 1500mL  - Once hemodynamically optimized, ideally would consider ICD placement, given low EF and ischemic cardiomyopathy - pt has previously refused invasive procedures including ICD NYHA class IV due to ischemic cardiomyopathy.   - d/c milirinone  - repeat lactate today  - Echo with increased EF and diffuse LV hypokinesis   - bumex 2mg PO BID, increased Isordil 20mg TID  - Strict I/O and daily standing weights. Fluid restriction to 1500mL  - Once hemodynamically optimized, ideally would consider ICD placement, given low EF and ischemic cardiomyopathy - pt has previously refused invasive procedures including ICD

## 2019-02-01 NOTE — PROGRESS NOTE ADULT - ASSESSMENT
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 His last admission from 12/8-12/27 required CCU admission and placement on dobutamine. He has had 3 admissions in 2018, for various complications from DM.  He comes in this time due to worsening SOB. He admits to 2 pillow orthopnea, frequent PND and ORTA after 1/2 block and walking up 5 steps. No CP, palpitations, dizziness. Patient's son by bedside. On tele, patient not diuresing well and condition guarded. Admitted to CCU for inotropic therapy, initiation of primacor infusion and IV diuresis with bumex drip. Off bumex gtt and off Milirinone        Dispo: Patient refused to go home today. Agreeable to D/C home Saturday with home PT, and Nurse

## 2019-02-01 NOTE — PROGRESS NOTE ADULT - PROBLEM SELECTOR PLAN 2
KESHAV on CKD  Acute on chronic renal failure (CKD 3-4). Elevated today. Ultrasound shows renal parenchymal disease.   -Likely secondary to renal venous congestion from cardiorenal syndrome. Diuresis and continue to monitor.  - Cr stable

## 2019-02-02 DIAGNOSIS — Z02.9 ENCOUNTER FOR ADMINISTRATIVE EXAMINATIONS, UNSPECIFIED: ICD-10-CM

## 2019-02-02 LAB
ANION GAP SERPL CALC-SCNC: 16 MMO/L — HIGH (ref 7–14)
BUN SERPL-MCNC: 107 MG/DL — HIGH (ref 7–23)
CALCIUM SERPL-MCNC: 10.1 MG/DL — SIGNIFICANT CHANGE UP (ref 8.4–10.5)
CHLORIDE SERPL-SCNC: 89 MMOL/L — LOW (ref 98–107)
CO2 SERPL-SCNC: 27 MMOL/L — SIGNIFICANT CHANGE UP (ref 22–31)
CREAT SERPL-MCNC: 3.52 MG/DL — HIGH (ref 0.5–1.3)
GLUCOSE BLDC GLUCOMTR-MCNC: 114 MG/DL — HIGH (ref 70–99)
GLUCOSE BLDC GLUCOMTR-MCNC: 131 MG/DL — HIGH (ref 70–99)
GLUCOSE BLDC GLUCOMTR-MCNC: 135 MG/DL — HIGH (ref 70–99)
GLUCOSE BLDC GLUCOMTR-MCNC: 140 MG/DL — HIGH (ref 70–99)
GLUCOSE SERPL-MCNC: 114 MG/DL — HIGH (ref 70–99)
HCT VFR BLD CALC: 28.5 % — LOW (ref 39–50)
HGB BLD-MCNC: 9.1 G/DL — LOW (ref 13–17)
MAGNESIUM SERPL-MCNC: 2.4 MG/DL — SIGNIFICANT CHANGE UP (ref 1.6–2.6)
MCHC RBC-ENTMCNC: 23 PG — LOW (ref 27–34)
MCHC RBC-ENTMCNC: 31.9 % — LOW (ref 32–36)
MCV RBC AUTO: 72.2 FL — LOW (ref 80–100)
NRBC # FLD: 0 K/UL — LOW (ref 25–125)
PLATELET # BLD AUTO: 227 K/UL — SIGNIFICANT CHANGE UP (ref 150–400)
PMV BLD: SIGNIFICANT CHANGE UP FL (ref 7–13)
POTASSIUM SERPL-MCNC: 3.9 MMOL/L — SIGNIFICANT CHANGE UP (ref 3.5–5.3)
POTASSIUM SERPL-SCNC: 3.9 MMOL/L — SIGNIFICANT CHANGE UP (ref 3.5–5.3)
RBC # BLD: 3.95 M/UL — LOW (ref 4.2–5.8)
RBC # FLD: 26.1 % — HIGH (ref 10.3–14.5)
SODIUM SERPL-SCNC: 132 MMOL/L — LOW (ref 135–145)
WBC # BLD: 6.18 K/UL — SIGNIFICANT CHANGE UP (ref 3.8–10.5)
WBC # FLD AUTO: 6.18 K/UL — SIGNIFICANT CHANGE UP (ref 3.8–10.5)

## 2019-02-02 PROCEDURE — 99233 SBSQ HOSP IP/OBS HIGH 50: CPT | Mod: GC

## 2019-02-02 RX ORDER — HYDRALAZINE HCL 50 MG
50 TABLET ORAL EVERY 8 HOURS
Qty: 0 | Refills: 0 | Status: DISCONTINUED | OUTPATIENT
Start: 2019-02-02 | End: 2019-02-04

## 2019-02-02 RX ORDER — HYDRALAZINE HCL 50 MG
1 TABLET ORAL
Qty: 0 | Refills: 0 | COMMUNITY

## 2019-02-02 RX ORDER — METOPROLOL TARTRATE 50 MG
25 TABLET ORAL DAILY
Qty: 0 | Refills: 0 | Status: DISCONTINUED | OUTPATIENT
Start: 2019-02-02 | End: 2019-02-04

## 2019-02-02 RX ORDER — ENOXAPARIN SODIUM 100 MG/ML
30 INJECTION SUBCUTANEOUS
Qty: 0 | Refills: 0 | COMMUNITY

## 2019-02-02 RX ORDER — ISOSORBIDE DINITRATE 5 MG/1
1 TABLET ORAL
Qty: 90 | Refills: 0 | OUTPATIENT
Start: 2019-02-02 | End: 2019-03-03

## 2019-02-02 RX ORDER — BUMETANIDE 0.25 MG/ML
3 INJECTION INTRAMUSCULAR; INTRAVENOUS EVERY 12 HOURS
Qty: 0 | Refills: 0 | Status: DISCONTINUED | OUTPATIENT
Start: 2019-02-02 | End: 2019-02-03

## 2019-02-02 RX ORDER — TAMSULOSIN HYDROCHLORIDE 0.4 MG/1
1 CAPSULE ORAL
Qty: 30 | Refills: 0 | OUTPATIENT
Start: 2019-02-02 | End: 2019-03-03

## 2019-02-02 RX ORDER — HYDRALAZINE HCL 50 MG
1.5 TABLET ORAL
Qty: 135 | Refills: 0 | OUTPATIENT
Start: 2019-02-02 | End: 2019-03-03

## 2019-02-02 RX ADMIN — SODIUM CHLORIDE 50 MILLILITER(S): 9 INJECTION INTRAMUSCULAR; INTRAVENOUS; SUBCUTANEOUS at 21:54

## 2019-02-02 RX ADMIN — SIMETHICONE 80 MILLIGRAM(S): 80 TABLET, CHEWABLE ORAL at 06:04

## 2019-02-02 RX ADMIN — SEVELAMER CARBONATE 800 MILLIGRAM(S): 2400 POWDER, FOR SUSPENSION ORAL at 12:53

## 2019-02-02 RX ADMIN — BUMETANIDE 3 MILLIGRAM(S): 0.25 INJECTION INTRAMUSCULAR; INTRAVENOUS at 18:11

## 2019-02-02 RX ADMIN — SODIUM CHLORIDE 3 MILLILITER(S): 9 INJECTION INTRAMUSCULAR; INTRAVENOUS; SUBCUTANEOUS at 21:55

## 2019-02-02 RX ADMIN — ISOSORBIDE DINITRATE 20 MILLIGRAM(S): 5 TABLET ORAL at 12:54

## 2019-02-02 RX ADMIN — Medication 37.5 MILLIGRAM(S): at 06:05

## 2019-02-02 RX ADMIN — INSULIN GLARGINE 20 UNIT(S): 100 INJECTION, SOLUTION SUBCUTANEOUS at 21:54

## 2019-02-02 RX ADMIN — SODIUM CHLORIDE 3 MILLILITER(S): 9 INJECTION INTRAMUSCULAR; INTRAVENOUS; SUBCUTANEOUS at 13:18

## 2019-02-02 RX ADMIN — Medication 81 MILLIGRAM(S): at 12:53

## 2019-02-02 RX ADMIN — Medication 100 MILLIGRAM(S): at 06:04

## 2019-02-02 RX ADMIN — OXYCODONE HYDROCHLORIDE 5 MILLIGRAM(S): 5 TABLET ORAL at 18:13

## 2019-02-02 RX ADMIN — SIMETHICONE 80 MILLIGRAM(S): 80 TABLET, CHEWABLE ORAL at 21:54

## 2019-02-02 RX ADMIN — TAMSULOSIN HYDROCHLORIDE 0.4 MILLIGRAM(S): 0.4 CAPSULE ORAL at 21:54

## 2019-02-02 RX ADMIN — Medication 12.5 MILLIGRAM(S): at 12:53

## 2019-02-02 RX ADMIN — ISOSORBIDE DINITRATE 20 MILLIGRAM(S): 5 TABLET ORAL at 21:54

## 2019-02-02 RX ADMIN — SEVELAMER CARBONATE 800 MILLIGRAM(S): 2400 POWDER, FOR SUSPENSION ORAL at 18:09

## 2019-02-02 RX ADMIN — SEVELAMER CARBONATE 800 MILLIGRAM(S): 2400 POWDER, FOR SUSPENSION ORAL at 09:58

## 2019-02-02 RX ADMIN — SIMETHICONE 80 MILLIGRAM(S): 80 TABLET, CHEWABLE ORAL at 12:56

## 2019-02-02 RX ADMIN — OXYCODONE HYDROCHLORIDE 5 MILLIGRAM(S): 5 TABLET ORAL at 18:53

## 2019-02-02 RX ADMIN — Medication 100 MILLIGRAM(S): at 12:56

## 2019-02-02 RX ADMIN — CHLORHEXIDINE GLUCONATE 1 APPLICATION(S): 213 SOLUTION TOPICAL at 12:23

## 2019-02-02 RX ADMIN — Medication 100 MILLIGRAM(S): at 21:54

## 2019-02-02 RX ADMIN — Medication 50 MILLIGRAM(S): at 21:54

## 2019-02-02 RX ADMIN — ISOSORBIDE DINITRATE 20 MILLIGRAM(S): 5 TABLET ORAL at 09:58

## 2019-02-02 RX ADMIN — SODIUM CHLORIDE 3 MILLILITER(S): 9 INJECTION INTRAMUSCULAR; INTRAVENOUS; SUBCUTANEOUS at 06:03

## 2019-02-02 RX ADMIN — Medication 25 MILLIGRAM(S): at 18:09

## 2019-02-02 RX ADMIN — Medication 37.5 MILLIGRAM(S): at 12:55

## 2019-02-02 RX ADMIN — HEPARIN SODIUM 5000 UNIT(S): 5000 INJECTION INTRAVENOUS; SUBCUTANEOUS at 18:10

## 2019-02-02 NOTE — PROGRESS NOTE ADULT - SUBJECTIVE AND OBJECTIVE BOX
CC:  Acute Decompensated Heart failure  24H hour events:  No acute events, the patient feels well, improved since admission.    MEDICATIONS:  aspirin enteric coated 81 milliGRAM(s) Oral daily  heparin  Injectable 5000 Unit(s) SubCutaneous every 12 hours  hydrALAZINE 37.5 milliGRAM(s) Oral three times a day  isosorbide   dinitrate Tablet (ISORDIL) 20 milliGRAM(s) Oral three times a day  metoprolol succinate ER 12.5 milliGRAM(s) Oral daily  tamsulosin 0.4 milliGRAM(s) Oral at bedtime      benzonatate 100 milliGRAM(s) Oral three times a day  guaiFENesin   Syrup  (Sugar-Free) 200 milliGRAM(s) Oral every 6 hours PRN    oxyCODONE    IR 5 milliGRAM(s) Oral every 6 hours PRN    docusate sodium 100 milliGRAM(s) Oral daily  senna 2 Tablet(s) Oral at bedtime  simethicone 80 milliGRAM(s) Chew three times a day    dextrose 40% Gel 15 Gram(s) Oral once PRN  dextrose 50% Injectable 12.5 Gram(s) IV Push once  dextrose 50% Injectable 25 Gram(s) IV Push once  dextrose 50% Injectable 25 Gram(s) IV Push once  glucagon  Injectable 1 milliGRAM(s) IntraMuscular once PRN  insulin glargine Injectable (LANTUS) 20 Unit(s) SubCutaneous at bedtime  insulin lispro (HumaLOG) corrective regimen sliding scale   SubCutaneous three times a day before meals  insulin lispro (HumaLOG) corrective regimen sliding scale   SubCutaneous at bedtime    calamine Lotion 1 Application(s) Topical daily PRN  chlorhexidine 4% Liquid 1 Application(s) Topical <User Schedule>  dextrose 5%. 1000 milliLiter(s) IV Continuous <Continuous>  sodium chloride 0.65% Nasal 1 Spray(s) Both Nostrils three times a day PRN  sodium chloride 0.9% lock flush 3 milliLiter(s) IV Push every 8 hours  sodium chloride 0.9%. 250 milliLiter(s) IV Continuous <Continuous>        PHYSICAL EXAM:  T(C): 36.9 (02-02-19 @ 05:39), Max: 36.9 (02-02-19 @ 05:39)  HR: 81 (02-02-19 @ 05:39) (73 - 87)  BP: 103/67 (02-02-19 @ 05:39) (98/73 - 112/63)  RR: 17 (02-02-19 @ 05:39) (17 - 18)  SpO2: 100% (02-02-19 @ 05:39) (99% - 100%)  I&O's Summary    01 Feb 2019 07:01  -  02 Feb 2019 07:00  --------------------------------------------------------  IN: 0 mL / OUT: 550 mL / NET: -550 mL        Appearance: Normal	  HEENT:   Normal oral mucosa  Lymphatic: No lymphadenopathy  Cardiovascular: Normal S1 S2,         No JVD,      No murmurs,      No edema  Respiratory: trace bibasilar crackles  Psychiatry: Mood & affect appropriate  Gastrointestinal:  Soft, Non-tender	  Skin: No rashes, No ecchymoses, No cyanosis	  Neurologic: Non-focal  Extremities: Normal range of motion, No clubbing, cyanosis   Vascular: warm extremities        LABS:	 	    CBC Full  -  ( 02 Feb 2019 07:25 )  WBC Count : 6.18 K/uL  Hemoglobin : 9.1 g/dL  Hematocrit : 28.5 %  Platelet Count - Automated : 227 K/uL  Mean Cell Volume : 72.2 fL  Mean Cell Hemoglobin : 23.0 pg  Mean Cell Hemoglobin Concentration : 31.9 %  Auto Neutrophil # : x  Auto Lymphocyte # : x  Auto Monocyte # : x  Auto Eosinophil # : x  Auto Basophil # : x  Auto Neutrophil % : x  Auto Lymphocyte % : x  Auto Monocyte % : x  Auto Eosinophil % : x  Auto Basophil % : x    02-02    132<L>  |  89<L>  |  107<H>  ----------------------------<  114<H>  3.9   |  27  |  3.52<H>  02-01    132<L>  |  88<L>  |  98<H>  ----------------------------<  54<L>  4.0   |  29  |  3.33<H>    Ca    10.1      02 Feb 2019 07:25  Ca    9.6      01 Feb 2019 06:30  Phos  4.4     02-01  Phos  4.4     01-31  Mg     2.4     02-02  Mg     2.4     02-01        proBNP:     Hemodynamics:  CVP:  PAS:  PAD:  MV Sat:  Jas CO:  Jas CI:      TELEMETRY:

## 2019-02-02 NOTE — PROGRESS NOTE ADULT - ASSESSMENT
68M with CAD,s/p CABG/PCI, HFrEF with biventricular HF (refusing ICD), DM, PAD now s/p recent vascular surgery and admitted for worsening SOB, orthopnea, PND, and ORTA, treated in CCU with diuresis and inotrope therapy with milrinone. Now euvolemic and no longer requiring inotrope support.

## 2019-02-02 NOTE — PROGRESS NOTE ADULT - SUBJECTIVE AND OBJECTIVE BOX
Subjective/Objective: Patient feels well and is without complaints.    MEDICATIONS  (STANDING):  aspirin enteric coated 81 milliGRAM(s) Oral daily  benzonatate 100 milliGRAM(s) Oral three times a day  chlorhexidine 4% Liquid 1 Application(s) Topical <User Schedule>  dextrose 5%. 1000 milliLiter(s) (50 mL/Hr) IV Continuous <Continuous>  dextrose 50% Injectable 12.5 Gram(s) IV Push once  dextrose 50% Injectable 25 Gram(s) IV Push once  dextrose 50% Injectable 25 Gram(s) IV Push once  docusate sodium 100 milliGRAM(s) Oral daily  heparin  Injectable 5000 Unit(s) SubCutaneous every 12 hours  hydrALAZINE 37.5 milliGRAM(s) Oral three times a day  insulin glargine Injectable (LANTUS) 20 Unit(s) SubCutaneous at bedtime  insulin lispro (HumaLOG) corrective regimen sliding scale   SubCutaneous three times a day before meals  insulin lispro (HumaLOG) corrective regimen sliding scale   SubCutaneous at bedtime  isosorbide   dinitrate Tablet (ISORDIL) 20 milliGRAM(s) Oral three times a day  metoprolol succinate ER 12.5 milliGRAM(s) Oral daily  senna 2 Tablet(s) Oral at bedtime  sevelamer hydrochloride 800 milliGRAM(s) Oral three times a day with meals  simethicone 80 milliGRAM(s) Chew three times a day  sodium chloride 0.9% lock flush 3 milliLiter(s) IV Push every 8 hours  sodium chloride 0.9%. 250 milliLiter(s) (50 mL/Hr) IV Continuous <Continuous>  tamsulosin 0.4 milliGRAM(s) Oral at bedtime    MEDICATIONS  (PRN):  calamine Lotion 1 Application(s) Topical daily PRN Rash and/or Itching  dextrose 40% Gel 15 Gram(s) Oral once PRN Blood Glucose LESS THAN 70 milliGRAM(s)/deciliter  glucagon  Injectable 1 milliGRAM(s) IntraMuscular once PRN Glucose LESS THAN 70 milligrams/deciliter  guaiFENesin   Syrup  (Sugar-Free) 200 milliGRAM(s) Oral every 6 hours PRN Cough  oxyCODONE    IR 5 milliGRAM(s) Oral every 6 hours PRN Moderate Pain (4 - 6)  sodium chloride 0.65% Nasal 1 Spray(s) Both Nostrils three times a day PRN Nasal Congestion          Vital Signs Last 24 Hrs  T(C): 36.9 (02 Feb 2019 05:39), Max: 36.9 (02 Feb 2019 05:39)  T(F): 98.4 (02 Feb 2019 05:39), Max: 98.4 (02 Feb 2019 05:39)  HR: 82 (02 Feb 2019 09:56) (73 - 87)  BP: 108/69 (02 Feb 2019 09:56) (98/73 - 112/63)  BP(mean): --  RR: 18 (02 Feb 2019 09:56) (17 - 18)  SpO2: 99% (02 Feb 2019 09:56) (99% - 100%)  I&O's Detail    01 Feb 2019 07:01  -  02 Feb 2019 07:00  --------------------------------------------------------  IN:  Total IN: 0 mL    OUT:    Voided: 550 mL  Total OUT: 550 mL    Total NET: -550 mL      PHYSICAL EXAM  GEN: NAD, skin W & D  RESP: minimal crackles  CV: nl S1S2  GI: soft, NT/ND  EXT: no C/C/E  NEURO: A & O X 3    EKG/ TELEM: NSR    LABS:                          9.1    6.18  )-----------( 227      ( 02 Feb 2019 07:25 )             28.5       02 Feb 2019 07:25    132<L>  |  89<L>  |  107<H>  ----------------------------<  114<H>  3.9     |  27     |  3.52<H>    01 Feb 2019 06:30    132<L>  |  88<L>  |  98<H>  ----------------------------<  54<L>  4.0     |  29     |  3.33<H>    Ca    10.1       02 Feb 2019 07:25  Ca    9.6        01 Feb 2019 06:30  Phos  4.4       01 Feb 2019 06:30  Phos  4.4       31 Jan 2019 14:50  Mg     2.4       02 Feb 2019 07:25  Mg     2.4       01 Feb 2019 06:30

## 2019-02-02 NOTE — PROGRESS NOTE ADULT - PROBLEM SELECTOR PLAN 1
Renal function improving.   Continue Bumex 2 mg po BID.  Now off milrinone.   Continue hydral  37.5 mg po TID, continue Isordil 10 mg po TID.   Strict I/O. Please get standing weight.   Continue Toprol 25 mg po qd.  Pt refused ICD.

## 2019-02-02 NOTE — PROGRESS NOTE ADULT - ATTENDING COMMENTS
Increase hydralazine to 50 po tid.  Increase Toprol to 25 mg po qd.  Continue to monitor off diuretic.  If renal function does not significantly improve, RHC on Monday. Increase hydralazine to 50 po tid.  Increase Toprol to 25 mg po qd.  More SOB today. Start Bumex 3 mg po bid.  If renal function does not significantly improve, RHC on Monday.

## 2019-02-02 NOTE — PROGRESS NOTE ADULT - PROBLEM SELECTOR PLAN 1
Remains off milrinone, lactate trending down. Continue isordil and hydralazine. Currently off diuretics. Scr mildly elevated but stable.

## 2019-02-03 LAB
ANION GAP SERPL CALC-SCNC: 15 MMO/L — HIGH (ref 7–14)
BUN SERPL-MCNC: 102 MG/DL — HIGH (ref 7–23)
CALCIUM SERPL-MCNC: 9.2 MG/DL — SIGNIFICANT CHANGE UP (ref 8.4–10.5)
CHLORIDE SERPL-SCNC: 87 MMOL/L — LOW (ref 98–107)
CO2 SERPL-SCNC: 30 MMOL/L — SIGNIFICANT CHANGE UP (ref 22–31)
CREAT SERPL-MCNC: 3.42 MG/DL — HIGH (ref 0.5–1.3)
GLUCOSE BLDC GLUCOMTR-MCNC: 111 MG/DL — HIGH (ref 70–99)
GLUCOSE BLDC GLUCOMTR-MCNC: 129 MG/DL — HIGH (ref 70–99)
GLUCOSE BLDC GLUCOMTR-MCNC: 208 MG/DL — HIGH (ref 70–99)
GLUCOSE BLDC GLUCOMTR-MCNC: 232 MG/DL — HIGH (ref 70–99)
GLUCOSE SERPL-MCNC: 133 MG/DL — HIGH (ref 70–99)
MAGNESIUM SERPL-MCNC: 2.4 MG/DL — SIGNIFICANT CHANGE UP (ref 1.6–2.6)
PHOSPHATE SERPL-MCNC: 4.7 MG/DL — HIGH (ref 2.5–4.5)
POTASSIUM SERPL-MCNC: 3.9 MMOL/L — SIGNIFICANT CHANGE UP (ref 3.5–5.3)
POTASSIUM SERPL-SCNC: 3.9 MMOL/L — SIGNIFICANT CHANGE UP (ref 3.5–5.3)
SODIUM SERPL-SCNC: 132 MMOL/L — LOW (ref 135–145)

## 2019-02-03 PROCEDURE — 99233 SBSQ HOSP IP/OBS HIGH 50: CPT | Mod: GC

## 2019-02-03 RX ORDER — BUMETANIDE 0.25 MG/ML
4 INJECTION INTRAMUSCULAR; INTRAVENOUS EVERY 12 HOURS
Qty: 0 | Refills: 0 | Status: DISCONTINUED | OUTPATIENT
Start: 2019-02-03 | End: 2019-02-04

## 2019-02-03 RX ADMIN — Medication 100 MILLIGRAM(S): at 06:19

## 2019-02-03 RX ADMIN — OXYCODONE HYDROCHLORIDE 5 MILLIGRAM(S): 5 TABLET ORAL at 19:59

## 2019-02-03 RX ADMIN — INSULIN GLARGINE 20 UNIT(S): 100 INJECTION, SOLUTION SUBCUTANEOUS at 22:22

## 2019-02-03 RX ADMIN — ISOSORBIDE DINITRATE 20 MILLIGRAM(S): 5 TABLET ORAL at 06:19

## 2019-02-03 RX ADMIN — Medication 25 MILLIGRAM(S): at 17:52

## 2019-02-03 RX ADMIN — SODIUM CHLORIDE 3 MILLILITER(S): 9 INJECTION INTRAMUSCULAR; INTRAVENOUS; SUBCUTANEOUS at 14:49

## 2019-02-03 RX ADMIN — Medication 100 MILLIGRAM(S): at 22:22

## 2019-02-03 RX ADMIN — SEVELAMER CARBONATE 800 MILLIGRAM(S): 2400 POWDER, FOR SUSPENSION ORAL at 14:49

## 2019-02-03 RX ADMIN — ISOSORBIDE DINITRATE 20 MILLIGRAM(S): 5 TABLET ORAL at 22:22

## 2019-02-03 RX ADMIN — TAMSULOSIN HYDROCHLORIDE 0.4 MILLIGRAM(S): 0.4 CAPSULE ORAL at 22:22

## 2019-02-03 RX ADMIN — SEVELAMER CARBONATE 800 MILLIGRAM(S): 2400 POWDER, FOR SUSPENSION ORAL at 08:30

## 2019-02-03 RX ADMIN — SIMETHICONE 80 MILLIGRAM(S): 80 TABLET, CHEWABLE ORAL at 22:22

## 2019-02-03 RX ADMIN — Medication 100 MILLIGRAM(S): at 14:54

## 2019-02-03 RX ADMIN — Medication 50 MILLIGRAM(S): at 22:22

## 2019-02-03 RX ADMIN — Medication 50 MILLIGRAM(S): at 14:53

## 2019-02-03 RX ADMIN — ISOSORBIDE DINITRATE 20 MILLIGRAM(S): 5 TABLET ORAL at 14:53

## 2019-02-03 RX ADMIN — OXYCODONE HYDROCHLORIDE 5 MILLIGRAM(S): 5 TABLET ORAL at 08:59

## 2019-02-03 RX ADMIN — SIMETHICONE 80 MILLIGRAM(S): 80 TABLET, CHEWABLE ORAL at 14:54

## 2019-02-03 RX ADMIN — Medication 50 MILLIGRAM(S): at 06:19

## 2019-02-03 RX ADMIN — SEVELAMER CARBONATE 800 MILLIGRAM(S): 2400 POWDER, FOR SUSPENSION ORAL at 17:52

## 2019-02-03 RX ADMIN — OXYCODONE HYDROCHLORIDE 5 MILLIGRAM(S): 5 TABLET ORAL at 21:49

## 2019-02-03 RX ADMIN — Medication 81 MILLIGRAM(S): at 14:55

## 2019-02-03 RX ADMIN — BUMETANIDE 3 MILLIGRAM(S): 0.25 INJECTION INTRAMUSCULAR; INTRAVENOUS at 06:19

## 2019-02-03 RX ADMIN — SODIUM CHLORIDE 3 MILLILITER(S): 9 INJECTION INTRAMUSCULAR; INTRAVENOUS; SUBCUTANEOUS at 06:18

## 2019-02-03 RX ADMIN — SODIUM CHLORIDE 3 MILLILITER(S): 9 INJECTION INTRAMUSCULAR; INTRAVENOUS; SUBCUTANEOUS at 22:23

## 2019-02-03 RX ADMIN — SIMETHICONE 80 MILLIGRAM(S): 80 TABLET, CHEWABLE ORAL at 06:19

## 2019-02-03 RX ADMIN — BUMETANIDE 3 MILLIGRAM(S): 0.25 INJECTION INTRAMUSCULAR; INTRAVENOUS at 17:54

## 2019-02-03 RX ADMIN — OXYCODONE HYDROCHLORIDE 5 MILLIGRAM(S): 5 TABLET ORAL at 08:29

## 2019-02-03 RX ADMIN — Medication 4: at 17:59

## 2019-02-03 RX ADMIN — HEPARIN SODIUM 5000 UNIT(S): 5000 INJECTION INTRAVENOUS; SUBCUTANEOUS at 17:55

## 2019-02-03 RX ADMIN — Medication 100 MILLIGRAM(S): at 14:53

## 2019-02-03 NOTE — PROGRESS NOTE ADULT - ASSESSMENT
68M with CAD,s/p CABG/PCI, HFrEF with biventricular HF (refusing ICD), DM, PAD now s/p recent vascular surgery and admitted for worsening SOB, orthopnea, PND, and ORTA, treated in CCU with diuresis and inotrope therapy with milrinone. No longer requiring inotrope support but remains with mild to moderate SOB.

## 2019-02-03 NOTE — PROGRESS NOTE ADULT - SUBJECTIVE AND OBJECTIVE BOX
Subjective/Objective: Patient states he feels better today, minimal SOB. Bumex restarted and hydralazine and Toprol increased. Total U/O 775cc  but only net negative 25 cc. Standing weight unchanged.     MEDICATIONS  (STANDING):  aspirin enteric coated 81 milliGRAM(s) Oral daily  benzonatate 100 milliGRAM(s) Oral three times a day  buMETAnide 3 milliGRAM(s) Oral every 12 hours  chlorhexidine 4% Liquid 1 Application(s) Topical <User Schedule>  dextrose 5%. 1000 milliLiter(s) (50 mL/Hr) IV Continuous <Continuous>  dextrose 50% Injectable 12.5 Gram(s) IV Push once  dextrose 50% Injectable 25 Gram(s) IV Push once  dextrose 50% Injectable 25 Gram(s) IV Push once  docusate sodium 100 milliGRAM(s) Oral daily  heparin  Injectable 5000 Unit(s) SubCutaneous every 12 hours  hydrALAZINE 50 milliGRAM(s) Oral every 8 hours  insulin glargine Injectable (LANTUS) 20 Unit(s) SubCutaneous at bedtime  insulin lispro (HumaLOG) corrective regimen sliding scale   SubCutaneous three times a day before meals  insulin lispro (HumaLOG) corrective regimen sliding scale   SubCutaneous at bedtime  isosorbide   dinitrate Tablet (ISORDIL) 20 milliGRAM(s) Oral three times a day  metoprolol succinate ER 25 milliGRAM(s) Oral daily  senna 2 Tablet(s) Oral at bedtime  sevelamer hydrochloride 800 milliGRAM(s) Oral three times a day with meals  simethicone 80 milliGRAM(s) Chew three times a day  sodium chloride 0.9% lock flush 3 milliLiter(s) IV Push every 8 hours  tamsulosin 0.4 milliGRAM(s) Oral at bedtime    MEDICATIONS  (PRN):  calamine Lotion 1 Application(s) Topical daily PRN Rash and/or Itching  dextrose 40% Gel 15 Gram(s) Oral once PRN Blood Glucose LESS THAN 70 milliGRAM(s)/deciliter  glucagon  Injectable 1 milliGRAM(s) IntraMuscular once PRN Glucose LESS THAN 70 milligrams/deciliter  guaiFENesin   Syrup  (Sugar-Free) 200 milliGRAM(s) Oral every 6 hours PRN Cough  oxyCODONE    IR 5 milliGRAM(s) Oral every 6 hours PRN Moderate Pain (4 - 6)  sodium chloride 0.65% Nasal 1 Spray(s) Both Nostrils three times a day PRN Nasal Congestion          Vital Signs Last 24 Hrs  T(C): 36.6 (03 Feb 2019 05:43), Max: 36.7 (02 Feb 2019 12:51)  T(F): 97.9 (03 Feb 2019 05:43), Max: 98 (02 Feb 2019 12:51)  HR: 73 (03 Feb 2019 05:43) (73 - 88)  BP: 96/52 (03 Feb 2019 05:43) (96/52 - 128/70)  BP(mean): --  RR: 18 (03 Feb 2019 05:43) (18 - 18)  SpO2: 100% (03 Feb 2019 05:43) (96% - 100%)  I&O's Detail    02 Feb 2019 07:01  -  03 Feb 2019 07:00  --------------------------------------------------------  IN:    Oral Fluid: 750 mL  Total IN: 750 mL    OUT:    Voided: 775 mL  Total OUT: 775 mL    Total NET: -25 mL      PHYSICAL EXAM  GEN: NAD, skin W & D  RESP: minimal crackles at bases, otherwise clear  CV: nl S1S2  GI: soft, NT/ND  EXT: no C/C/E  NEURO: A & O X 3    EKG/ TELEM: NSR    LABS:                          9.1    6.18  )-----------( 227      ( 02 Feb 2019 07:25 )             28.5       03 Feb 2019 10:04    132<L>  |  87<L>  |  102<H>  ----------------------------<  133<H>  3.9     |  30     |  3.42<H>    02 Feb 2019 07:25    132<L>  |  89<L>  |  107<H>  ----------------------------<  114<H>  3.9     |  27     |  3.52<H>    Ca    9.2        03 Feb 2019 10:04  Ca    10.1       02 Feb 2019 07:25  Phos  4.7<H>     03 Feb 2019 10:04  Mg     2.4       03 Feb 2019 10:04  Mg     2.4       02 Feb 2019 07:25

## 2019-02-03 NOTE — PROGRESS NOTE ADULT - SUBJECTIVE AND OBJECTIVE BOX
CC:  Acute Decompensated Heart failure  24H hour events:  No acute events  He complains of hip pain, which he says he had for two months and is the reason he came to the hospital.  He denies chest pain, dyspnea, dizziness, syncope, presyncope, orthopnea, edema, PND.     MEDICATIONS:  aspirin enteric coated 81 milliGRAM(s) Oral daily  buMETAnide 3 milliGRAM(s) Oral every 12 hours  heparin  Injectable 5000 Unit(s) SubCutaneous every 12 hours  hydrALAZINE 50 milliGRAM(s) Oral every 8 hours  isosorbide   dinitrate Tablet (ISORDIL) 20 milliGRAM(s) Oral three times a day  metoprolol succinate ER 25 milliGRAM(s) Oral daily  tamsulosin 0.4 milliGRAM(s) Oral at bedtime      benzonatate 100 milliGRAM(s) Oral three times a day  guaiFENesin   Syrup  (Sugar-Free) 200 milliGRAM(s) Oral every 6 hours PRN    oxyCODONE    IR 5 milliGRAM(s) Oral every 6 hours PRN    docusate sodium 100 milliGRAM(s) Oral daily  senna 2 Tablet(s) Oral at bedtime  simethicone 80 milliGRAM(s) Chew three times a day    dextrose 40% Gel 15 Gram(s) Oral once PRN  dextrose 50% Injectable 12.5 Gram(s) IV Push once  dextrose 50% Injectable 25 Gram(s) IV Push once  dextrose 50% Injectable 25 Gram(s) IV Push once  glucagon  Injectable 1 milliGRAM(s) IntraMuscular once PRN  insulin glargine Injectable (LANTUS) 20 Unit(s) SubCutaneous at bedtime  insulin lispro (HumaLOG) corrective regimen sliding scale   SubCutaneous three times a day before meals  insulin lispro (HumaLOG) corrective regimen sliding scale   SubCutaneous at bedtime    calamine Lotion 1 Application(s) Topical daily PRN  chlorhexidine 4% Liquid 1 Application(s) Topical <User Schedule>  dextrose 5%. 1000 milliLiter(s) IV Continuous <Continuous>  sodium chloride 0.65% Nasal 1 Spray(s) Both Nostrils three times a day PRN  sodium chloride 0.9% lock flush 3 milliLiter(s) IV Push every 8 hours        PHYSICAL EXAM:  T(C): 36.6 (02-03-19 @ 05:43), Max: 36.7 (02-02-19 @ 12:51)  HR: 73 (02-03-19 @ 05:43) (73 - 88)  BP: 96/52 (02-03-19 @ 05:43) (96/52 - 128/70)  RR: 18 (02-03-19 @ 05:43) (18 - 18)  SpO2: 100% (02-03-19 @ 05:43) (96% - 100%)  I&O's Summary    02 Feb 2019 07:01  -  03 Feb 2019 07:00  --------------------------------------------------------  IN: 750 mL / OUT: 775 mL / NET: -25 mL        Appearance: Normal	  HEENT:   Normal oral mucosa  Lymphatic: No lymphadenopathy  Cardiovascular: Normal S1 S2,  jvd to lower neck.   Respiratory: b/lcrackles   Psychiatry: Mood & affect appropriate  Gastrointestinal:  Soft, Non-tender	  Skin: No rashes, No ecchymoses, No cyanosis	  Neurologic: Non-focal  Extremities: Normal range of motion, No clubbing, cyanosis   Vascular: warm extremities        LABS:	 	    CBC Full  -  ( 02 Feb 2019 07:25 )  WBC Count : 6.18 K/uL  Hemoglobin : 9.1 g/dL  Hematocrit : 28.5 %  Platelet Count - Automated : 227 K/uL  Mean Cell Volume : 72.2 fL  Mean Cell Hemoglobin : 23.0 pg  Mean Cell Hemoglobin Concentration : 31.9 %  Auto Neutrophil # : x  Auto Lymphocyte # : x  Auto Monocyte # : x  Auto Eosinophil # : x  Auto Basophil # : x  Auto Neutrophil % : x  Auto Lymphocyte % : x  Auto Monocyte % : x  Auto Eosinophil % : x  Auto Basophil % : x    02-02    132<L>  |  89<L>  |  107<H>  ----------------------------<  114<H>  3.9   |  27  |  3.52<H>    Ca    10.1      02 Feb 2019 07:25  Mg     2.4     02-02        TELEMETRY: 	   s70

## 2019-02-03 NOTE — PROGRESS NOTE ADULT - ASSESSMENT
68M w  DM, PAD (L-ICA stend, multiple LE interventions, wounds)  CAD s/p CABG x 3 and PCI, HFrEF (LVEF 24%, LVEDD 5.4), mod/sev MR, refused ICD.  Admitted for ADHF.   Dry weight 132lb  1/22/18 PCWP was 23, RA 18 in AM.  CVP 1/24/19 was 14.    ADHF  - bumex 3mg PO BID  std weight 144.8 lb today, from 144.1 yesterday.  i/o 750/750 about even. -130mmHg  - off milrinone  -Continue hydral  37.5 mg po TID, continue Isordil 10 mg po TID.   -Continue Toprol 25 mg po qd.  - please check chemistry today  - although he feels well, he is not improving, and high risk for worsening heart failure and readmission.

## 2019-02-03 NOTE — PROGRESS NOTE ADULT - PROBLEM SELECTOR PLAN 1
Remains off inotrope therapy. Bumex 3 mg po BID restarted and hydralazine and Toprol increased. Continue isordil. Strict I & O please with daily standing weights. Based on response to diuretics patient may need RHC, to be determined by HF service.

## 2019-02-04 LAB
ANION GAP SERPL CALC-SCNC: 18 MMO/L — HIGH (ref 7–14)
BUN SERPL-MCNC: 106 MG/DL — HIGH (ref 7–23)
CALCIUM SERPL-MCNC: 9.3 MG/DL — SIGNIFICANT CHANGE UP (ref 8.4–10.5)
CHLORIDE SERPL-SCNC: 88 MMOL/L — LOW (ref 98–107)
CO2 SERPL-SCNC: 25 MMOL/L — SIGNIFICANT CHANGE UP (ref 22–31)
CREAT SERPL-MCNC: 3.52 MG/DL — HIGH (ref 0.5–1.3)
GLUCOSE BLDC GLUCOMTR-MCNC: 108 MG/DL — HIGH (ref 70–99)
GLUCOSE BLDC GLUCOMTR-MCNC: 134 MG/DL — HIGH (ref 70–99)
GLUCOSE BLDC GLUCOMTR-MCNC: 150 MG/DL — HIGH (ref 70–99)
GLUCOSE BLDC GLUCOMTR-MCNC: 150 MG/DL — HIGH (ref 70–99)
GLUCOSE BLDC GLUCOMTR-MCNC: 225 MG/DL — HIGH (ref 70–99)
GLUCOSE BLDC GLUCOMTR-MCNC: 53 MG/DL — LOW (ref 70–99)
GLUCOSE BLDC GLUCOMTR-MCNC: 60 MG/DL — LOW (ref 70–99)
GLUCOSE BLDC GLUCOMTR-MCNC: 85 MG/DL — SIGNIFICANT CHANGE UP (ref 70–99)
GLUCOSE SERPL-MCNC: 78 MG/DL — SIGNIFICANT CHANGE UP (ref 70–99)
HCT VFR BLD CALC: 29.4 % — LOW (ref 39–50)
HGB BLD-MCNC: 9.2 G/DL — LOW (ref 13–17)
MAGNESIUM SERPL-MCNC: 2.5 MG/DL — SIGNIFICANT CHANGE UP (ref 1.6–2.6)
MCHC RBC-ENTMCNC: 23.2 PG — LOW (ref 27–34)
MCHC RBC-ENTMCNC: 31.3 % — LOW (ref 32–36)
MCV RBC AUTO: 74.1 FL — LOW (ref 80–100)
NRBC # FLD: 0 K/UL — LOW (ref 25–125)
PHOSPHATE SERPL-MCNC: 4.6 MG/DL — HIGH (ref 2.5–4.5)
PLATELET # BLD AUTO: 260 K/UL — SIGNIFICANT CHANGE UP (ref 150–400)
PMV BLD: SIGNIFICANT CHANGE UP FL (ref 7–13)
POTASSIUM SERPL-MCNC: 3.9 MMOL/L — SIGNIFICANT CHANGE UP (ref 3.5–5.3)
POTASSIUM SERPL-SCNC: 3.9 MMOL/L — SIGNIFICANT CHANGE UP (ref 3.5–5.3)
RBC # BLD: 3.97 M/UL — LOW (ref 4.2–5.8)
RBC # FLD: 26.1 % — HIGH (ref 10.3–14.5)
SODIUM SERPL-SCNC: 131 MMOL/L — LOW (ref 135–145)
WBC # BLD: 7.63 K/UL — SIGNIFICANT CHANGE UP (ref 3.8–10.5)
WBC # FLD AUTO: 7.63 K/UL — SIGNIFICANT CHANGE UP (ref 3.8–10.5)

## 2019-02-04 PROCEDURE — 99233 SBSQ HOSP IP/OBS HIGH 50: CPT

## 2019-02-04 PROCEDURE — 99233 SBSQ HOSP IP/OBS HIGH 50: CPT | Mod: GC

## 2019-02-04 PROCEDURE — 93451 RIGHT HEART CATH: CPT | Mod: 26

## 2019-02-04 PROCEDURE — 76937 US GUIDE VASCULAR ACCESS: CPT | Mod: 26

## 2019-02-04 RX ORDER — DEXTROSE 50 % IN WATER 50 %
12.5 SYRINGE (ML) INTRAVENOUS ONCE
Qty: 0 | Refills: 0 | Status: COMPLETED | OUTPATIENT
Start: 2019-02-04 | End: 2019-02-04

## 2019-02-04 RX ORDER — HYDRALAZINE HCL 50 MG
20 TABLET ORAL EVERY 8 HOURS
Qty: 0 | Refills: 0 | Status: DISCONTINUED | OUTPATIENT
Start: 2019-02-04 | End: 2019-02-04

## 2019-02-04 RX ORDER — DOBUTAMINE HCL 250MG/20ML
2.5 VIAL (ML) INTRAVENOUS
Qty: 500 | Refills: 0 | Status: DISCONTINUED | OUTPATIENT
Start: 2019-02-04 | End: 2019-02-04

## 2019-02-04 RX ORDER — DOBUTAMINE HCL 250MG/20ML
2.5 VIAL (ML) INTRAVENOUS
Qty: 500 | Refills: 0 | Status: DISCONTINUED | OUTPATIENT
Start: 2019-02-04 | End: 2019-02-11

## 2019-02-04 RX ORDER — FUROSEMIDE 40 MG
10 TABLET ORAL
Qty: 500 | Refills: 0 | Status: DISCONTINUED | OUTPATIENT
Start: 2019-02-04 | End: 2019-02-05

## 2019-02-04 RX ORDER — HYDRALAZINE HCL 50 MG
50 TABLET ORAL EVERY 8 HOURS
Qty: 0 | Refills: 0 | Status: DISCONTINUED | OUTPATIENT
Start: 2019-02-04 | End: 2019-02-05

## 2019-02-04 RX ADMIN — Medication 50 MILLIGRAM(S): at 06:31

## 2019-02-04 RX ADMIN — SENNA PLUS 2 TABLET(S): 8.6 TABLET ORAL at 21:40

## 2019-02-04 RX ADMIN — SODIUM CHLORIDE 3 MILLILITER(S): 9 INJECTION INTRAMUSCULAR; INTRAVENOUS; SUBCUTANEOUS at 21:41

## 2019-02-04 RX ADMIN — OXYCODONE HYDROCHLORIDE 5 MILLIGRAM(S): 5 TABLET ORAL at 20:52

## 2019-02-04 RX ADMIN — SEVELAMER CARBONATE 800 MILLIGRAM(S): 2400 POWDER, FOR SUSPENSION ORAL at 09:07

## 2019-02-04 RX ADMIN — SIMETHICONE 80 MILLIGRAM(S): 80 TABLET, CHEWABLE ORAL at 06:32

## 2019-02-04 RX ADMIN — OXYCODONE HYDROCHLORIDE 5 MILLIGRAM(S): 5 TABLET ORAL at 20:24

## 2019-02-04 RX ADMIN — BUMETANIDE 4 MILLIGRAM(S): 0.25 INJECTION INTRAMUSCULAR; INTRAVENOUS at 06:32

## 2019-02-04 RX ADMIN — Medication 4: at 09:04

## 2019-02-04 RX ADMIN — Medication 100 MILLIGRAM(S): at 21:40

## 2019-02-04 RX ADMIN — INSULIN GLARGINE 20 UNIT(S): 100 INJECTION, SOLUTION SUBCUTANEOUS at 22:45

## 2019-02-04 RX ADMIN — ISOSORBIDE DINITRATE 20 MILLIGRAM(S): 5 TABLET ORAL at 06:32

## 2019-02-04 RX ADMIN — Medication 4.96 MICROGRAM(S)/KG/MIN: at 21:40

## 2019-02-04 RX ADMIN — Medication 5 MG/HR: at 21:39

## 2019-02-04 RX ADMIN — Medication 81 MILLIGRAM(S): at 11:59

## 2019-02-04 RX ADMIN — ISOSORBIDE DINITRATE 20 MILLIGRAM(S): 5 TABLET ORAL at 21:40

## 2019-02-04 RX ADMIN — Medication 12.5 GRAM(S): at 11:55

## 2019-02-04 RX ADMIN — SODIUM CHLORIDE 3 MILLILITER(S): 9 INJECTION INTRAMUSCULAR; INTRAVENOUS; SUBCUTANEOUS at 06:31

## 2019-02-04 RX ADMIN — Medication 100 MILLIGRAM(S): at 06:31

## 2019-02-04 RX ADMIN — TAMSULOSIN HYDROCHLORIDE 0.4 MILLIGRAM(S): 0.4 CAPSULE ORAL at 21:40

## 2019-02-04 RX ADMIN — SIMETHICONE 80 MILLIGRAM(S): 80 TABLET, CHEWABLE ORAL at 21:40

## 2019-02-04 RX ADMIN — Medication 50 MILLIGRAM(S): at 21:40

## 2019-02-04 NOTE — PROGRESS NOTE ADULT - SUBJECTIVE AND OBJECTIVE BOX
Tele: NSR w/ PVCs     Overnight: Still SOB    MEDICATIONS  (STANDING):  aspirin enteric coated 81 milliGRAM(s) Oral daily  benzonatate 100 milliGRAM(s) Oral three times a day  buMETAnide 4 milliGRAM(s) Oral every 12 hours  docusate sodium 100 milliGRAM(s) Oral daily  heparin  Injectable 5000 Unit(s) SubCutaneous every 12 hours  hydrALAZINE 50 milliGRAM(s) Oral every 8 hours  insulin glargine Injectable (LANTUS) 20 Unit(s) SubCutaneous at bedtime  insulin lispro (HumaLOG) corrective regimen sliding scale   SubCutaneous three times a day before meals  insulin lispro (HumaLOG) corrective regimen sliding scale   SubCutaneous at bedtime  isosorbide   dinitrate Tablet (ISORDIL) 20 milliGRAM(s) Oral three times a day  metoprolol succinate ER 25 milliGRAM(s) Oral daily  senna 2 Tablet(s) Oral at bedtime  sevelamer hydrochloride 800 milliGRAM(s) Oral three times a day with meals  simethicone 80 milliGRAM(s) Chew three times a day  sodium chloride 0.9% lock flush 3 milliLiter(s) IV Push every 8 hours  tamsulosin 0.4 milliGRAM(s) Oral at bedtime    MEDICATIONS  (PRN):  calamine Lotion 1 Application(s) Topical daily PRN Rash and/or Itching  dextrose 40% Gel 15 Gram(s) Oral once PRN Blood Glucose LESS THAN 70 milliGRAM(s)/deciliter  glucagon  Injectable 1 milliGRAM(s) IntraMuscular once PRN Glucose LESS THAN 70 milligrams/deciliter  guaiFENesin   Syrup  (Sugar-Free) 200 milliGRAM(s) Oral every 6 hours PRN Cough  oxyCODONE    IR 5 milliGRAM(s) Oral every 6 hours PRN Moderate Pain (4 - 6)  sodium chloride 0.65% Nasal 1 Spray(s) Both Nostrils three times a day PRN Nasal Congestion          Vital Signs Last 24 Hrs  T(C): 36.3 (04 Feb 2019 06:28), Max: 36.6 (03 Feb 2019 12:46)  T(F): 97.4 (04 Feb 2019 06:28), Max: 97.8 (03 Feb 2019 12:46)  HR: 74 (04 Feb 2019 06:28) (72 - 78)  BP: 103/56 (04 Feb 2019 06:28) (103/55 - 109/65)  RR: 18 (04 Feb 2019 06:28) (18 - 18)  SpO2: 96% (04 Feb 2019 06:28) (96% - 100%)  I&O's Detail    03 Feb 2019 07:01  -  04 Feb 2019 07:00  --------------------------------------------------------  IN:    Oral Fluid: 120 mL  Total IN: 120 mL    OUT:    Voided: 1875 mL  Total OUT: 1875 mL    Total NET: -1755 mL    Physical exam:   Gen- NAD  Resp- Clear   CV- S1s2 RRR   ABD- +BSx4 ND/NT  EXT- +edema   Neuro- A&Ox3                            9.2    7.63  )-----------( 260      ( 04 Feb 2019 06:40 )             29.4       04 Feb 2019 06:40    131<L>  |  88<L>  |  106<H>  ----------------------------<  78     3.9     |  25     |  3.52<H>  03 Feb 2019 10:04    132<L>  |  87<L>  |  102<H>  ----------------------------<  133<H>  3.9     |  30     |  3.42<H>    Ca    9.3        04 Feb 2019 06:40  Ca    9.2        03 Feb 2019 10:04  Phos  4.6<H>     04 Feb 2019 06:40  Phos  4.7<H>     03 Feb 2019 10:04  Mg     2.5       04 Feb 2019 06:40  Mg     2.4       03 Feb 2019 10:04

## 2019-02-04 NOTE — PROGRESS NOTE ADULT - SUBJECTIVE AND OBJECTIVE BOX
Patient seen and examined. He states he feels well, awaiting his RHC. No acute SOB, CP, palpitations.       Medications:  aspirin enteric coated 81 milliGRAM(s) Oral daily  benzonatate 100 milliGRAM(s) Oral three times a day  buMETAnide 4 milliGRAM(s) Oral every 12 hours  calamine Lotion 1 Application(s) Topical daily PRN  chlorhexidine 4% Liquid 1 Application(s) Topical <User Schedule>  dextrose 40% Gel 15 Gram(s) Oral once PRN  dextrose 5%. 1000 milliLiter(s) IV Continuous <Continuous>  dextrose 50% Injectable 12.5 Gram(s) IV Push once  dextrose 50% Injectable 25 Gram(s) IV Push once  dextrose 50% Injectable 25 Gram(s) IV Push once  docusate sodium 100 milliGRAM(s) Oral daily  glucagon  Injectable 1 milliGRAM(s) IntraMuscular once PRN  guaiFENesin   Syrup  (Sugar-Free) 200 milliGRAM(s) Oral every 6 hours PRN  heparin  Injectable 5000 Unit(s) SubCutaneous every 12 hours  hydrALAZINE 50 milliGRAM(s) Oral every 8 hours  insulin glargine Injectable (LANTUS) 20 Unit(s) SubCutaneous at bedtime  insulin lispro (HumaLOG) corrective regimen sliding scale   SubCutaneous three times a day before meals  insulin lispro (HumaLOG) corrective regimen sliding scale   SubCutaneous at bedtime  isosorbide   dinitrate Tablet (ISORDIL) 20 milliGRAM(s) Oral three times a day  metoprolol succinate ER 25 milliGRAM(s) Oral daily  oxyCODONE    IR 5 milliGRAM(s) Oral every 6 hours PRN  senna 2 Tablet(s) Oral at bedtime  sevelamer hydrochloride 800 milliGRAM(s) Oral three times a day with meals  simethicone 80 milliGRAM(s) Chew three times a day  sodium chloride 0.65% Nasal 1 Spray(s) Both Nostrils three times a day PRN  sodium chloride 0.9% lock flush 3 milliLiter(s) IV Push every 8 hours  tamsulosin 0.4 milliGRAM(s) Oral at bedtime      Vitals:  Vital Signs Last 24 Hrs  T(C): 36.3 (2019 06:28), Max: 36.5 (2019 21:52)  T(F): 97.4 (2019 06:28), Max: 97.7 (2019 21:52)  HR: 74 (2019 06:28) (74 - 78)  BP: 103/56 (2019 06:28) (103/55 - 104/58)  BP(mean): --  RR: 18 (2019 06:28) (18 - 18)  SpO2: 96% (2019 06:28) (96% - 100%)    Daily     Daily Weight in k.1 (2019 07:15)    I&O's Detail    2019 07:01  -  2019 07:00  --------------------------------------------------------  IN:    Oral Fluid: 120 mL  Total IN: 120 mL    OUT:    Voided: 1875 mL  Total OUT: 1875 mL    Total NET: -1755 mL          Physical Exam:     General: No distress. Comfortable.  HEENT: EOM intact.  Neck: Neck supple. JVP elevated. No masses  Chest: Clear to auscultation bilaterally  CV: Normal S1 and S2. No murmurs, rub, or gallops. Radial pulses normal. No LE edema b/l.   Abdomen: Soft, non-distended, non-tender  Skin: No rashes or skin breakdown  Neurology: Alert and oriented times three. Sensation intact  Psych: Affect normal    Labs:                        9.2    7.63  )-----------( 260      ( 2019 06:40 )             29.4     02-04    131<L>  |  88<L>  |  106<H>  ----------------------------<  78  3.9   |  25  |  3.52<H>    Ca    9.3      2019 06:40  Phos  4.6     02-04  Mg     2.5     02-04    < from: TTE with Doppler (w/Cont) (19 @ 13:36) >  DIMENSIONS:  Dimensions:     Normal Values:  LA:       ---     2.0 - 4.0 cm  Ao:     2.7 cm    2.0 - 3.8 cm  SEPTUM: 0.7 cm    0.6 - 1.2 cm  PWT:    1.0 cm    0.6 - 1.1 cm  LVIDd:  5.5 cm    3.0 - 5.6 cm  LVIDs:    ---     1.8 - 4.0 cm  Derived Variables:  LVMI: 92 g/m2  RWT: 0.36  Ejection Fraction (Modified Mon Rule): 35 %  ------------------------------------------------------------------------  OBSERVATIONS:  Mitral Valve: Tethered mitral valve leaflets. Moderate  mitral regurgitation.  Aortic Root: Normal aortic root.  Aortic Valve: Calcified trileaflet aortic valve with mildly  decreased opening.  Left Atrium: Normal left atrium.  LA volume index = 23  cc/m2.  Left Ventricle: Moderate to severe segmental left  ventricular systolic dysfunction. The basal to mid septum,  basal to mid inferior, basal to mid anterolateral walls are  hypokinetic. Normal left ventricular internal dimensions  and wall thicknesses.  Right Heart: Normal right atrium. Right ventricular  enlargement with decreased right ventricular systolic  function. Normal tricuspid valve. Mild tricuspid  regurgitation. Normal pulmonic valve.  Pericardium/PleuraNormal pericardium with no pericardial  effusion.  Hemodynamic: Estimated right ventricular systolic pressure  equals 43 mm Hg, assuming right atrial pressure equals 10  mm Hg, consistent with mild pulmonary hypertension.    < end of copied text >

## 2019-02-04 NOTE — PROGRESS NOTE ADULT - PROBLEM SELECTOR PLAN 1
Remains off inotrope therapy. Bumex 4 mg po BID, hydralazine, Isordil, and Toprol. Strict I & O please with daily standing weights. Plan for RHC today

## 2019-02-04 NOTE — CHART NOTE - NSCHARTNOTEFT_GEN_A_CORE
Called by nurse for FS 60  Pt is asymptomatic and was NPO all day  NPO Ordered discontinued  Diet resumed  Post Prandial -->150  Will continue to monitor

## 2019-02-04 NOTE — PROGRESS NOTE ADULT - PROBLEM SELECTOR PLAN 1
Diuresed -1.7 in 24 hrs.   Continue Bumex 4 mg po BID. Await RHC today.  Continue hydral  50 mg po TID, continue Isordil 20 mg po TID.   Strict I/O. Please get standing weight.   Continue Toprol 25 mg po qd.  Pt refused ICD.

## 2019-02-04 NOTE — PROGRESS NOTE ADULT - PROBLEM SELECTOR PLAN 1
Pt. with KESHAV in the setting of ADHF. On review of previous records in [facility], patient with normal Scr levels from 4/26/16 to 9/24/18. Pt. noted to have an episode of KESHAV during previous/recent hospitalization at [facility] (12/7/18 to 12/27/18). On this admission, Scr was elevated at 2.15 on 1/16/19, peaked to 4.0 on 1/25/19 and remains stable at 3.52 today. Pt. on oral Bumex as per cardiology. Pt. non-oliguric with good urine output.   US Kidney showed increased bilateral renal cortical echogenicity, no hydronephrosis on 1/18/19. Monitor labs and urine output. Avoid nephrotoxins. Dose medications as per eGFR

## 2019-02-04 NOTE — PROGRESS NOTE ADULT - SUBJECTIVE AND OBJECTIVE BOX
[facility] DIVISION OF KIDNEY DISEASES AND HYPERTENSION -- FOLLOW UP NOTE  --------------------------------------------------------------------------------    HPI: 68-year-old male with medical history of b/L LE bypass and b/L LE multiple toe amputations, DM2, HFrEF (EF 20%), HTN, Dyslipidemia, CAD, CABG, PVD, groin sartorius flap, and vac placement, LLE with surgical scar wound admitted for worsening SOB. Nephrology consulted for KESHAV. Pt. was admitted with ADHF. On review of previous records in [facility], patient had normal Scr levels from 4/26/16 to 9/24/18. Pt. noted to have an episode of KESHAV during previous/recent hospitalization at [facility] (12/7/18 to 12/27/18). Transfer to CCU on 1/20/19. On this admission, Scr peaked at 4.0 on 1/25/19 stable at 3.71 on 1/29/19 and decrease/stable at 3.52 today    Pt. was seen and examined at bedside. Pt. admits to dyspnea and feels weak. Pt non-oliguric currently on bumex PO.     PAST HISTORY  --------------------------------------------------------------------------------  No significant changes to PMH, PSH, FHx, SHx, unless otherwise noted    ALLERGIES & MEDICATIONS  --------------------------------------------------------------------------------  Allergies    No Known Allergies    Intolerances      Standing Inpatient Medications  aspirin enteric coated 81 milliGRAM(s) Oral daily  benzonatate 100 milliGRAM(s) Oral three times a day  buMETAnide 4 milliGRAM(s) Oral every 12 hours  chlorhexidine 4% Liquid 1 Application(s) Topical <User Schedule>  dextrose 5%. 1000 milliLiter(s) IV Continuous <Continuous>  dextrose 50% Injectable 12.5 Gram(s) IV Push once  dextrose 50% Injectable 25 Gram(s) IV Push once  dextrose 50% Injectable 25 Gram(s) IV Push once  docusate sodium 100 milliGRAM(s) Oral daily  heparin  Injectable 5000 Unit(s) SubCutaneous every 12 hours  hydrALAZINE 50 milliGRAM(s) Oral every 8 hours  insulin glargine Injectable (LANTUS) 20 Unit(s) SubCutaneous at bedtime  insulin lispro (HumaLOG) corrective regimen sliding scale   SubCutaneous three times a day before meals  insulin lispro (HumaLOG) corrective regimen sliding scale   SubCutaneous at bedtime  isosorbide   dinitrate Tablet (ISORDIL) 20 milliGRAM(s) Oral three times a day  metoprolol succinate ER 25 milliGRAM(s) Oral daily  senna 2 Tablet(s) Oral at bedtime  sevelamer hydrochloride 800 milliGRAM(s) Oral three times a day with meals  simethicone 80 milliGRAM(s) Chew three times a day  sodium chloride 0.9% lock flush 3 milliLiter(s) IV Push every 8 hours  tamsulosin 0.4 milliGRAM(s) Oral at bedtime    PRN Inpatient Medications  calamine Lotion 1 Application(s) Topical daily PRN  dextrose 40% Gel 15 Gram(s) Oral once PRN  glucagon  Injectable 1 milliGRAM(s) IntraMuscular once PRN  guaiFENesin   Syrup  (Sugar-Free) 200 milliGRAM(s) Oral every 6 hours PRN  oxyCODONE    IR 5 milliGRAM(s) Oral every 6 hours PRN  sodium chloride 0.65% Nasal 1 Spray(s) Both Nostrils three times a day PRN      REVIEW OF SYSTEMS  --------------------------------------------------------------------------------  Gen: No fever  Respiratory: + dyspnea (improved)  CV: No chest pain  GI: No abdominal pain  : No dysuria, hematuria  MSK: + edema (improved)    All other systems were reviewed and are negative, except as noted.    VITALS/PHYSICAL EXAM  --------------------------------------------------------------------------------  T(C): 36.3 (02-04-19 @ 06:28), Max: 36.5 (02-03-19 @ 21:52)  HR: 74 (02-04-19 @ 06:28) (74 - 78)  BP: 103/56 (02-04-19 @ 06:28) (103/55 - 104/58)  RR: 18 (02-04-19 @ 06:28) (18 - 18)  SpO2: 96% (02-04-19 @ 06:28) (96% - 100%)  Wt(kg): --    02-03-19 @ 07:01  -  02-04-19 @ 07:00  --------------------------------------------------------  IN: 120 mL / OUT: 1875 mL / NET: -1755 mL    Physical Exam:  	Gen: resting, NAD  	HEENT: No JVD  	Pulm: CTA B/L  	CV: S1S2  	Abd: Soft, +BS   	Ext: trace b/l LE edema (improved)  	Neuro: Awake  	Skin: Warm and dry    LABS/STUDIES  --------------------------------------------------------------------------------              9.2    7.63  >-----------<  260      [02-04-19 @ 06:40]              29.4     131  |  88  |  106  ----------------------------<  78      [02-04-19 @ 06:40]  3.9   |  25  |  3.52        Ca     9.3     [02-04-19 @ 06:40]      Mg     2.5     [02-04-19 @ 06:40]      Phos  4.6     [02-04-19 @ 06:40]    Creatinine Trend:  SCr 3.52 [02-04 @ 06:40]  SCr 3.42 [02-03 @ 10:04]  SCr 3.52 [02-02 @ 07:25]  SCr 3.33 [02-01 @ 06:30]  SCr 3.44 [01-31 @ 14:50]    Urinalysis - [01-24-19 @ 12:10]      Color LIGHT YELLOW / Appearance CLEAR / SG 1.008 / pH 5.0      Gluc NEGATIVE / Ketone NEGATIVE  / Bili NEGATIVE / Urobili NORMAL       Blood NEGATIVE / Protein NEGATIVE / Leuk Est NEGATIVE / Nitrite NEGATIVE      RBC  / WBC  / Hyaline  / Gran  / Sq Epi  / Non Sq Epi  / Bacteria       Iron 35, TIBC 339, %sat --      [01-21-19 @ 06:30]  Ferritin 114.3      [01-21-19 @ 06:30]  Vitamin D (25OH) 22.8      [01-18-19 @ 06:00]  HbA1c 7.8      [01-17-19 @ 06:34]  TSH 3.72      [01-17-19 @ 06:34]  Lipid: chol 95, TG 47, HDL 33, LDL 59      [01-17-19 @ 06:34]

## 2019-02-04 NOTE — PROGRESS NOTE ADULT - PROBLEM SELECTOR PLAN 2
Patient with hypervolemia in the setting of ADHF. Patient received treatment with Bumex gtt until 1/25/19, currently on oral Bumex. Pt. with 1.8 L of UOP in last 24 hours. Weight decreased since admission. Diuretic and ADHF management per cardiology. Monitor daily weights. Low salt diet

## 2019-02-04 NOTE — CHART NOTE - NSCHARTNOTEFT_GEN_A_CORE
Status post Cath, Right brachial site without bleeding or hematoma.  Dressing has dried, scanty blood from earlier. no changes.  Positive Pulses.  Will continue to monitor.                                                                                                                                                  PREM [place], RPA-C [id number]

## 2019-02-04 NOTE — PROGRESS NOTE ADULT - ATTENDING COMMENTS
Patient seen and examined  Agree with above assessment and plan  Patient with acute on chronic systolic heart failure  Patient with increased edema and off inotrope at this point  For RHC today  Will followup results

## 2019-02-04 NOTE — CHART NOTE - NSCHARTNOTEFT_GEN_A_CORE
Alerted by RN, Pt with hypoglycemia while NPO for RHC today. Pt seen and examined, admits to feeling very weak, sweating. FS 53. Juice administered immediately and Pt reported starting to feel resolution of symptoms. Hypoglycemia protocol followed and 1/2 amp D50 given, repeat . Will continue to monitor closely and repeat FS. Cardiology prioritized RHC and Pt called down for procedure shortly after resolution of symptoms and repeat FS stable.

## 2019-02-05 DIAGNOSIS — Z51.5 ENCOUNTER FOR PALLIATIVE CARE: ICD-10-CM

## 2019-02-05 DIAGNOSIS — R06.00 DYSPNEA, UNSPECIFIED: ICD-10-CM

## 2019-02-05 LAB
ANION GAP SERPL CALC-SCNC: 16 MMO/L — HIGH (ref 7–14)
ANION GAP SERPL CALC-SCNC: 17 MMO/L — HIGH (ref 7–14)
BUN SERPL-MCNC: 98 MG/DL — HIGH (ref 7–23)
BUN SERPL-MCNC: 98 MG/DL — HIGH (ref 7–23)
CALCIUM SERPL-MCNC: 9.2 MG/DL — SIGNIFICANT CHANGE UP (ref 8.4–10.5)
CALCIUM SERPL-MCNC: 9.4 MG/DL — SIGNIFICANT CHANGE UP (ref 8.4–10.5)
CHLORIDE SERPL-SCNC: 87 MMOL/L — LOW (ref 98–107)
CHLORIDE SERPL-SCNC: 88 MMOL/L — LOW (ref 98–107)
CO2 SERPL-SCNC: 25 MMOL/L — SIGNIFICANT CHANGE UP (ref 22–31)
CO2 SERPL-SCNC: 26 MMOL/L — SIGNIFICANT CHANGE UP (ref 22–31)
CREAT SERPL-MCNC: 3.3 MG/DL — HIGH (ref 0.5–1.3)
CREAT SERPL-MCNC: 3.31 MG/DL — HIGH (ref 0.5–1.3)
GLUCOSE BLDC GLUCOMTR-MCNC: 111 MG/DL — HIGH (ref 70–99)
GLUCOSE BLDC GLUCOMTR-MCNC: 188 MG/DL — HIGH (ref 70–99)
GLUCOSE BLDC GLUCOMTR-MCNC: 213 MG/DL — HIGH (ref 70–99)
GLUCOSE BLDC GLUCOMTR-MCNC: 216 MG/DL — HIGH (ref 70–99)
GLUCOSE SERPL-MCNC: 160 MG/DL — HIGH (ref 70–99)
GLUCOSE SERPL-MCNC: 218 MG/DL — HIGH (ref 70–99)
HCT VFR BLD CALC: 27.6 % — LOW (ref 39–50)
HGB BLD-MCNC: 8.7 G/DL — LOW (ref 13–17)
MAGNESIUM SERPL-MCNC: 2.3 MG/DL — SIGNIFICANT CHANGE UP (ref 1.6–2.6)
MAGNESIUM SERPL-MCNC: 2.4 MG/DL — SIGNIFICANT CHANGE UP (ref 1.6–2.6)
MCHC RBC-ENTMCNC: 23.3 PG — LOW (ref 27–34)
MCHC RBC-ENTMCNC: 31.5 % — LOW (ref 32–36)
MCV RBC AUTO: 74 FL — LOW (ref 80–100)
NRBC # FLD: 0 K/UL — LOW (ref 25–125)
PHOSPHATE SERPL-MCNC: 4.5 MG/DL — SIGNIFICANT CHANGE UP (ref 2.5–4.5)
PLATELET # BLD AUTO: 264 K/UL — SIGNIFICANT CHANGE UP (ref 150–400)
PMV BLD: SIGNIFICANT CHANGE UP FL (ref 7–13)
POTASSIUM SERPL-MCNC: 3.6 MMOL/L — SIGNIFICANT CHANGE UP (ref 3.5–5.3)
POTASSIUM SERPL-MCNC: 3.9 MMOL/L — SIGNIFICANT CHANGE UP (ref 3.5–5.3)
POTASSIUM SERPL-SCNC: 3.6 MMOL/L — SIGNIFICANT CHANGE UP (ref 3.5–5.3)
POTASSIUM SERPL-SCNC: 3.9 MMOL/L — SIGNIFICANT CHANGE UP (ref 3.5–5.3)
RBC # BLD: 3.73 M/UL — LOW (ref 4.2–5.8)
RBC # FLD: 26.3 % — HIGH (ref 10.3–14.5)
SODIUM SERPL-SCNC: 129 MMOL/L — LOW (ref 135–145)
SODIUM SERPL-SCNC: 130 MMOL/L — LOW (ref 135–145)
WBC # BLD: 7 K/UL — SIGNIFICANT CHANGE UP (ref 3.8–10.5)
WBC # FLD AUTO: 7 K/UL — SIGNIFICANT CHANGE UP (ref 3.8–10.5)

## 2019-02-05 PROCEDURE — 99223 1ST HOSP IP/OBS HIGH 75: CPT | Mod: GC

## 2019-02-05 PROCEDURE — 99233 SBSQ HOSP IP/OBS HIGH 50: CPT

## 2019-02-05 RX ORDER — POTASSIUM CHLORIDE 20 MEQ
20 PACKET (EA) ORAL ONCE
Qty: 0 | Refills: 0 | Status: COMPLETED | OUTPATIENT
Start: 2019-02-05 | End: 2019-02-05

## 2019-02-05 RX ORDER — OXYCODONE HYDROCHLORIDE 5 MG/1
5 TABLET ORAL EVERY 6 HOURS
Qty: 0 | Refills: 0 | Status: DISCONTINUED | OUTPATIENT
Start: 2019-02-05 | End: 2019-02-10

## 2019-02-05 RX ORDER — HYDRALAZINE HCL 50 MG
75 TABLET ORAL EVERY 8 HOURS
Qty: 0 | Refills: 0 | Status: DISCONTINUED | OUTPATIENT
Start: 2019-02-05 | End: 2019-02-08

## 2019-02-05 RX ORDER — BUMETANIDE 0.25 MG/ML
4 INJECTION INTRAMUSCULAR; INTRAVENOUS ONCE
Qty: 0 | Refills: 0 | Status: COMPLETED | OUTPATIENT
Start: 2019-02-05 | End: 2019-02-05

## 2019-02-05 RX ORDER — BUMETANIDE 0.25 MG/ML
1 INJECTION INTRAMUSCULAR; INTRAVENOUS
Qty: 20 | Refills: 0 | Status: DISCONTINUED | OUTPATIENT
Start: 2019-02-05 | End: 2019-02-12

## 2019-02-05 RX ADMIN — SIMETHICONE 80 MILLIGRAM(S): 80 TABLET, CHEWABLE ORAL at 05:52

## 2019-02-05 RX ADMIN — Medication 20 MILLIEQUIVALENT(S): at 22:10

## 2019-02-05 RX ADMIN — BUMETANIDE 132 MILLIGRAM(S): 0.25 INJECTION INTRAMUSCULAR; INTRAVENOUS at 14:31

## 2019-02-05 RX ADMIN — Medication 100 MILLIGRAM(S): at 22:11

## 2019-02-05 RX ADMIN — SEVELAMER CARBONATE 800 MILLIGRAM(S): 2400 POWDER, FOR SUSPENSION ORAL at 12:41

## 2019-02-05 RX ADMIN — SODIUM CHLORIDE 3 MILLILITER(S): 9 INJECTION INTRAMUSCULAR; INTRAVENOUS; SUBCUTANEOUS at 12:40

## 2019-02-05 RX ADMIN — INSULIN GLARGINE 20 UNIT(S): 100 INJECTION, SOLUTION SUBCUTANEOUS at 22:41

## 2019-02-05 RX ADMIN — Medication 100 MILLIGRAM(S): at 05:52

## 2019-02-05 RX ADMIN — ISOSORBIDE DINITRATE 20 MILLIGRAM(S): 5 TABLET ORAL at 22:11

## 2019-02-05 RX ADMIN — Medication 4: at 09:07

## 2019-02-05 RX ADMIN — Medication 75 MILLIGRAM(S): at 22:11

## 2019-02-05 RX ADMIN — SODIUM CHLORIDE 3 MILLILITER(S): 9 INJECTION INTRAMUSCULAR; INTRAVENOUS; SUBCUTANEOUS at 21:56

## 2019-02-05 RX ADMIN — Medication 81 MILLIGRAM(S): at 12:41

## 2019-02-05 RX ADMIN — OXYCODONE HYDROCHLORIDE 5 MILLIGRAM(S): 5 TABLET ORAL at 06:22

## 2019-02-05 RX ADMIN — Medication 50 MILLIGRAM(S): at 05:52

## 2019-02-05 RX ADMIN — OXYCODONE HYDROCHLORIDE 5 MILLIGRAM(S): 5 TABLET ORAL at 22:09

## 2019-02-05 RX ADMIN — SODIUM CHLORIDE 3 MILLILITER(S): 9 INJECTION INTRAMUSCULAR; INTRAVENOUS; SUBCUTANEOUS at 05:56

## 2019-02-05 RX ADMIN — BUMETANIDE 5 MG/HR: 0.25 INJECTION INTRAMUSCULAR; INTRAVENOUS at 15:11

## 2019-02-05 RX ADMIN — ISOSORBIDE DINITRATE 20 MILLIGRAM(S): 5 TABLET ORAL at 05:52

## 2019-02-05 RX ADMIN — SIMETHICONE 80 MILLIGRAM(S): 80 TABLET, CHEWABLE ORAL at 14:32

## 2019-02-05 RX ADMIN — ISOSORBIDE DINITRATE 20 MILLIGRAM(S): 5 TABLET ORAL at 14:32

## 2019-02-05 RX ADMIN — SEVELAMER CARBONATE 800 MILLIGRAM(S): 2400 POWDER, FOR SUSPENSION ORAL at 09:07

## 2019-02-05 RX ADMIN — Medication 4: at 18:27

## 2019-02-05 RX ADMIN — SIMETHICONE 80 MILLIGRAM(S): 80 TABLET, CHEWABLE ORAL at 21:39

## 2019-02-05 RX ADMIN — Medication 20 MILLIEQUIVALENT(S): at 09:07

## 2019-02-05 RX ADMIN — Medication 100 MILLIGRAM(S): at 14:32

## 2019-02-05 RX ADMIN — SEVELAMER CARBONATE 800 MILLIGRAM(S): 2400 POWDER, FOR SUSPENSION ORAL at 18:51

## 2019-02-05 RX ADMIN — OXYCODONE HYDROCHLORIDE 5 MILLIGRAM(S): 5 TABLET ORAL at 06:56

## 2019-02-05 RX ADMIN — Medication 75 MILLIGRAM(S): at 14:35

## 2019-02-05 RX ADMIN — TAMSULOSIN HYDROCHLORIDE 0.4 MILLIGRAM(S): 0.4 CAPSULE ORAL at 21:39

## 2019-02-05 RX ADMIN — OXYCODONE HYDROCHLORIDE 5 MILLIGRAM(S): 5 TABLET ORAL at 21:39

## 2019-02-05 NOTE — CONSULT NOTE ADULT - SUBJECTIVE AND OBJECTIVE BOX
HPI:  68M, ambulates with a walker, due to b/L LE bypass and b/L LE multiple toe amputations, history of DM2, S HF with EF= 20%, HTN, Dyslipidemia, CAD, 3v CABG, PVD, groin  sartorius flap, and vac placement, L LE with surgical scar wounds, being treated and followed by [facility]. The pt  was discharged from [facility] on 12/27/18 after a 20 day extensive stay for KESHAV on CKD in setting of diuretics use and s/p IV contrast, with course c/b  worsening sob, LE edema and increasing abdominal girth concerning for ADHD. Pt also became hypotensive SBP 90'  and  started on Lasix gtt and dobutamine gtt and transferred to CCU for further management.. The pt comes to the ED due to dyspnea felt while laying flat, dry cough, increased abdominal girth and 30 pound weight gain since discharge from the hospital and chills. The pt denies dietary indiscretions with salt and has been complaint with his mediations.  Denies dysuria, diaphoresis,  palpitations, nausea, vomit body aches.   In the Ed, the pt is found to be in fluid overload with a ProBNP= 4578, CXR  preliminary revealing pulmonary edema, Hyperkalemia @ 5.7 not hemolyzed and treated with repeat K+= 4.7, elevated Troponin  83-->76 in the setting of KESHAV on CKD = Creatinine 1.85 --> 2.15.  The pt was given Lasix 40 mg IV with positive urine out put..  The pt says the treatment help to alleviate  his symptoms. But still with a dry cough and requesting cough suppressant. (17 Jan 2019 04:44)    PERTINENT PM/SXH:   Chronic congestive heart failure, unspecified congestive heart failure type  PAD (peripheral artery disease)  Stented coronary artery  Hyperlipidemia  Diabetes  Hypertension  CAD (coronary artery disease)    S/P bypass graft of extremity  S/P amputation  S/P bypass graft of extremity  S/P angioplasty with stent  S/P CABG x 3    FAMILY HISTORY:  No family history of cancer (Father)  Coronary artery disease (Mother)    ITEMS NOT CHECKED ARE NOT PRESENT    SOCIAL HISTORY:   Significant other/partner:  [x]  Children:  [x]  Mosque/Spirituality: None  Substance hx:  [ ]   Tobacco hx:  [x]   Alcohol hx: [ ]   Home Opioid hx:  [ ] I-Stop Reference No:  Living Situation: [x]Home  [ ]Long term care  [ ]Rehab [ ]Other    ADVANCE DIRECTIVES:    DNR  MOLST  [ ]  Living Will  [ ]   DECISION MAKER(s):  [ ] Health Care Proxy(s)  [x] Surrogate(s)  [ ] Guardian           Name(s): Lawanda Gonzalez (Wife) Phone Number(s): [phone]    BASELINE (I)ADL(s) (prior to admission):  [place]: [ ]Total  [x] Moderate [ ]Dependent    Allergies  No Known Allergies    MEDICATIONS  (STANDING):  aspirin enteric coated 81 milliGRAM(s) Oral daily  benzonatate 100 milliGRAM(s) Oral three times a day  buMETAnide Infusion 1 mG/Hr (5 mL/Hr) IV Continuous <Continuous>  buMETAnide IVPB 4 milliGRAM(s) IV Intermittent once  chlorhexidine 4% Liquid 1 Application(s) Topical <User Schedule>  dextrose 5%. 1000 milliLiter(s) (50 mL/Hr) IV Continuous <Continuous>  dextrose 50% Injectable 12.5 Gram(s) IV Push once  dextrose 50% Injectable 25 Gram(s) IV Push once  dextrose 50% Injectable 25 Gram(s) IV Push once  DOBUTamine Infusion 2.5 MICROgram(s)/kG/Min (4.957 mL/Hr) IV Continuous <Continuous>  docusate sodium 100 milliGRAM(s) Oral daily  heparin  Injectable 5000 Unit(s) SubCutaneous every 12 hours  hydrALAZINE 75 milliGRAM(s) Oral every 8 hours  insulin glargine Injectable (LANTUS) 20 Unit(s) SubCutaneous at bedtime  insulin lispro (HumaLOG) corrective regimen sliding scale   SubCutaneous three times a day before meals  insulin lispro (HumaLOG) corrective regimen sliding scale   SubCutaneous at bedtime  isosorbide   dinitrate Tablet (ISORDIL) 20 milliGRAM(s) Oral three times a day  senna 2 Tablet(s) Oral at bedtime  sevelamer hydrochloride 800 milliGRAM(s) Oral three times a day with meals  simethicone 80 milliGRAM(s) Chew three times a day  sodium chloride 0.9% lock flush 3 milliLiter(s) IV Push every 8 hours  tamsulosin 0.4 milliGRAM(s) Oral at bedtime    MEDICATIONS  (PRN):  calamine Lotion 1 Application(s) Topical daily PRN Rash and/or Itching  dextrose 40% Gel 15 Gram(s) Oral once PRN Blood Glucose LESS THAN 70 milliGRAM(s)/deciliter  glucagon  Injectable 1 milliGRAM(s) IntraMuscular once PRN Glucose LESS THAN 70 milligrams/deciliter  guaiFENesin   Syrup  (Sugar-Free) 200 milliGRAM(s) Oral every 6 hours PRN Cough  oxyCODONE    IR 5 milliGRAM(s) Oral every 6 hours PRN Moderate Pain (4 - 6)  sodium chloride 0.65% Nasal 1 Spray(s) Both Nostrils three times a day PRN Nasal Congestion    PRESENT SYMPTOMS: [ ]Unable to obtain due to poor mentation   Source if other than patient:  [ ]Family   [ ]Team     Pain (Impact on QOL): None  Location -         Minimal acceptable level (0-10 scale):                    Aggravating factors -  Quality -  Radiation -  Severity (0-10 scale) -    Timing -    Dyspnea:                           [x]Mild [ ]Moderate [ ]Severe  Anxiety:                             [ ]Mild [ ]Moderate [ ]Severe  Fatigue:                             [ ]Mild [ ]Moderate [ ]Severe  Nausea:                             [ ]Mild [ ]Moderate [ ]Severe  Loss of appetite:              [x]Mild [ ]Moderate [ ]Severe  Constipation:                    [ ]Mild [ ]Moderate [ ]Severe    Other Symptoms:  [x]All other review of systems negative     Karnofsky Performance Score/Palliative Performance Status Version 2:       50%    http://palliative.info/resource_material/PPSv2.pdf  PHYSICAL EXAM:  Vital Signs Last 24 Hrs  T(C): 36.6 (05 Feb 2019 05:38), Max: 36.6 (05 Feb 2019 05:38)  T(F): 97.8 (05 Feb 2019 05:38), Max: 97.8 (05 Feb 2019 05:38)  HR: 88 (05 Feb 2019 05:38) (79 - 88)  BP: 115/60 (05 Feb 2019 05:38) (108/63 - 119/74)  BP(mean): --  RR: 18 (05 Feb 2019 05:38) (18 - 18)  SpO2: 96% (05 Feb 2019 05:38) (96% - 100%) I&O's Summary    04 Feb 2019 07:01  -  05 Feb 2019 07:00  --------------------------------------------------------  IN: 240 mL / OUT: 950 mL / NET: -710 mL    GENERAL:  [x]Alert  [x]Oriented x3   [ ]Lethargic  [ ]Cachexia  [ ]Unarousable  [x]Verbal  [ ]Non-Verbal  Behavioral:   [ ] Anxiety  [ ] Delirium [ ] Agitation [ ] Other  HEENT:  [x]Normal   [ ]Dry mouth   [ ]ET Tube/Trach  [ ]Oral lesions  PULMONARY:   [ ]Clear [ ]Tachypnea  [ ]Audible excessive secretions   [ ]Rhonchi        [ ]Right [ ]Left [ ]Bilateral  [x]Crackles        [ ]Right [ ]Left [x]Bilateral  [ ]Wheezing     [ ]Right [ ]Left [ ]Bilateral  CARDIOVASCULAR:    [x]Regular [ ]Irregular [ ]Tachy  [ ]Jerson [ ]Murmur [ ]Other  GASTROINTESTINAL:  [x]Soft  [ ]Distended   [x]+BS  [x]Non tender [ ]Tender  [ ]PEG [ ]OGT/ NGT  Last BM:   GENITOURINARY:  [x]Normal [ ] Incontinent   [ ]Oliguria/Anuria   [ ]Palacios  MUSCULOSKELETAL:   [x]Normal   [ ]Weakness  [ ]Bed/Wheelchair bound [ ]Edema  NEUROLOGIC:   [x]No focal deficits  [ ] Cognitive impairment  [ ] Dysphagia [ ]Dysarthria [ ] Paresis [ ]Other   SKIN:   [x]Normal   [ ]Pressure ulcer(s)  [ ]Rash    CRITICAL CARE:  [ ] Shock Present  [ ]Septic [ ]Cardiogenic [ ]Neurologic [ ]Hypovolemic  [ ]  Vasopressors [ ]  Inotropes   [ ] Respiratory failure present  [ ] Acute  [ ] Chronic [ ] Hypoxic  [ ] Hypercarbic [ ] Other  [ ] Other organ failure     LABS:                        8.7    7.00  )-----------( 264      ( 05 Feb 2019 04:41 )             27.6     129<L>  |  87<L>  |  98<H>  ----------------------------<  160<H>  3.6   |  26  |  3.31<H>    Ca    9.2      05 Feb 2019 04:41  Phos  4.5     02-05  Mg     2.4     02-05    RADIOLOGY & ADDITIONAL STUDIES:    PROTEIN CALORIE MALNUTRITION PRESENT: [ ] Yes [x] No  [ ] PPSV2 < or = to 30% [ ] significant weight loss  [ ] poor nutritional intake [ ] catabolic state [ ] anasarca     Albumin, Serum: 3.4 g/dL (01-30-19 @ 05:00)  Artificial Nutrition [ ]     REFERRALS:   [ ]Chaplaincy  [ ] Hospice  [ ]Child Life  [ ]Social Work  [ ]Case management [ ]Holistic Therapy   Goals of Care Discussion Document:       Mo Enrique, PGY-3 Pager#[phone]    ***To Be Reviewed with Attending HPI:  69yo M with PMH of Severe Systolic Heart Failure (EF = 35%), CAD/MI (s/p 3V CABG), HTN, HLD, T2DM, and extensive PVD w/ bilateral LE bypass/wound complications/multiple toe amputations p/w Dyspnea/Orthopnea with associated weight gain and fluid retention. Patient was transferred to the CCU for prolonged inpatient course requiring inotrope and diuretics drips. Patient was weaned off inotropes but continued to have significant SOB. He underwent RHC yesterday showing elevated wedge press and decreased cardiac index. Patient was transferred out of the CCU after being started on Dobutamine and Bumex gtts. Patient has been offered AICD due to the severity of his heart failure but he has routinely refused the device placement. He currently states his SOB is much improved. He denies pain, nausea/vomiting. He has been able to walk around the bethea.                                     Patient with multiple lengthy readmissions for ADHF/KESHAV.   PERTINENT PM/SXH:   Chronic congestive heart failure, unspecified congestive heart failure type  PAD (peripheral artery disease)  Stented coronary artery  Hyperlipidemia  Diabetes  Hypertension  CAD (coronary artery disease)    S/P bypass graft of extremity  S/P amputation  S/P bypass graft of extremity  S/P angioplasty with stent  S/P CABG x 3    FAMILY HISTORY:  No family history of cancer (Father)  Coronary artery disease (Mother)    ITEMS NOT CHECKED ARE NOT PRESENT    SOCIAL HISTORY:   Significant other/partner:  [x]  Children:  [x]  Mormonism/Spirituality: None  Substance hx:  [ ]   Tobacco hx:  [x]   Alcohol hx: [ ]   Home Opioid hx:  [ ] I-Stop Reference No:  Living Situation: [x]Home  [ ]Long term care  [ ]Rehab [ ]Other    ADVANCE DIRECTIVES:    DNR  MOLST  [ ]  Living Will  [ ]   DECISION MAKER(s):  [ ] Health Care Proxy(s)  [x] Surrogate(s)  [ ] Guardian           Name(s): Lawanda Gonzalez (Wife) Phone Number(s): [phone]    BASELINE (I)ADL(s) (prior to admission):  [place]: [ ]Total  [x] Moderate [ ]Dependent    Allergies  No Known Allergies    MEDICATIONS  (STANDING):  aspirin enteric coated 81 milliGRAM(s) Oral daily  benzonatate 100 milliGRAM(s) Oral three times a day  buMETAnide Infusion 1 mG/Hr (5 mL/Hr) IV Continuous <Continuous>  buMETAnide IVPB 4 milliGRAM(s) IV Intermittent once  chlorhexidine 4% Liquid 1 Application(s) Topical <User Schedule>  dextrose 5%. 1000 milliLiter(s) (50 mL/Hr) IV Continuous <Continuous>  dextrose 50% Injectable 12.5 Gram(s) IV Push once  dextrose 50% Injectable 25 Gram(s) IV Push once  dextrose 50% Injectable 25 Gram(s) IV Push once  DOBUTamine Infusion 2.5 MICROgram(s)/kG/Min (4.957 mL/Hr) IV Continuous <Continuous>  docusate sodium 100 milliGRAM(s) Oral daily  heparin  Injectable 5000 Unit(s) SubCutaneous every 12 hours  hydrALAZINE 75 milliGRAM(s) Oral every 8 hours  insulin glargine Injectable (LANTUS) 20 Unit(s) SubCutaneous at bedtime  insulin lispro (HumaLOG) corrective regimen sliding scale   SubCutaneous three times a day before meals  insulin lispro (HumaLOG) corrective regimen sliding scale   SubCutaneous at bedtime  isosorbide   dinitrate Tablet (ISORDIL) 20 milliGRAM(s) Oral three times a day  senna 2 Tablet(s) Oral at bedtime  sevelamer hydrochloride 800 milliGRAM(s) Oral three times a day with meals  simethicone 80 milliGRAM(s) Chew three times a day  sodium chloride 0.9% lock flush 3 milliLiter(s) IV Push every 8 hours  tamsulosin 0.4 milliGRAM(s) Oral at bedtime    MEDICATIONS  (PRN):  calamine Lotion 1 Application(s) Topical daily PRN Rash and/or Itching  dextrose 40% Gel 15 Gram(s) Oral once PRN Blood Glucose LESS THAN 70 milliGRAM(s)/deciliter  glucagon  Injectable 1 milliGRAM(s) IntraMuscular once PRN Glucose LESS THAN 70 milligrams/deciliter  guaiFENesin   Syrup  (Sugar-Free) 200 milliGRAM(s) Oral every 6 hours PRN Cough  oxyCODONE    IR 5 milliGRAM(s) Oral every 6 hours PRN Moderate Pain (4 - 6)  sodium chloride 0.65% Nasal 1 Spray(s) Both Nostrils three times a day PRN Nasal Congestion    PRESENT SYMPTOMS: [ ]Unable to obtain due to poor mentation   Source if other than patient:  [ ]Family   [ ]Team     Pain (Impact on QOL): None  Location -         Minimal acceptable level (0-10 scale):                    Aggravating factors -  Quality -  Radiation -  Severity (0-10 scale) -    Timing -    Dyspnea:                           [x]Mild [ ]Moderate [ ]Severe  Anxiety:                             [ ]Mild [ ]Moderate [ ]Severe  Fatigue:                             [ ]Mild [ ]Moderate [ ]Severe  Nausea:                             [ ]Mild [ ]Moderate [ ]Severe  Loss of appetite:              [x]Mild [ ]Moderate [ ]Severe  Constipation:                    [ ]Mild [ ]Moderate [ ]Severe    Other Symptoms:  [x]All other review of systems negative     Karnofsky Performance Score/Palliative Performance Status Version 2:       50%    http://palliative.info/resource_material/PPSv2.pdf  PHYSICAL EXAM:  Vital Signs Last 24 Hrs  T(C): 36.6 (05 Feb 2019 05:38), Max: 36.6 (05 Feb 2019 05:38)  T(F): 97.8 (05 Feb 2019 05:38), Max: 97.8 (05 Feb 2019 05:38)  HR: 88 (05 Feb 2019 05:38) (79 - 88)  BP: 115/60 (05 Feb 2019 05:38) (108/63 - 119/74)  BP(mean): --  RR: 18 (05 Feb 2019 05:38) (18 - 18)  SpO2: 96% (05 Feb 2019 05:38) (96% - 100%) I&O's Summary    04 Feb 2019 07:01  -  05 Feb 2019 07:00  --------------------------------------------------------  IN: 240 mL / OUT: 950 mL / NET: -710 mL    GENERAL:  [x]Alert  [x]Oriented x3   [ ]Lethargic  [ ]Cachexia  [ ]Unarousable  [x]Verbal  [ ]Non-Verbal  Behavioral:   [ ] Anxiety  [ ] Delirium [ ] Agitation [ ] Other  HEENT:  [x]Normal   [ ]Dry mouth   [ ]ET Tube/Trach  [ ]Oral lesions  PULMONARY:   [ ]Clear [ ]Tachypnea  [ ]Audible excessive secretions   [ ]Rhonchi        [ ]Right [ ]Left [ ]Bilateral  [x]Crackles        [ ]Right [ ]Left [x]Bilateral  [ ]Wheezing     [ ]Right [ ]Left [ ]Bilateral  CARDIOVASCULAR:    [x]Regular [ ]Irregular [ ]Tachy  [ ]Jerson [ ]Murmur [ ]Other  GASTROINTESTINAL:  [x]Soft  [ ]Distended   [x]+BS  [x]Non tender [ ]Tender  [ ]PEG [ ]OGT/ NGT  Last BM:   GENITOURINARY:  [x]Normal [ ] Incontinent   [ ]Oliguria/Anuria   [ ]Palacios  MUSCULOSKELETAL:   [x]Normal   [ ]Weakness  [ ]Bed/Wheelchair bound [ ]Edema  NEUROLOGIC:   [x]No focal deficits  [ ] Cognitive impairment  [ ] Dysphagia [ ]Dysarthria [ ] Paresis [ ]Other   SKIN:   [x]Normal   [ ]Pressure ulcer(s)  [ ]Rash    CRITICAL CARE:  [ ] Shock Present  [ ]Septic [ ]Cardiogenic [ ]Neurologic [ ]Hypovolemic  [ ]  Vasopressors [ ]  Inotropes   [ ] Respiratory failure present  [ ] Acute  [ ] Chronic [ ] Hypoxic  [ ] Hypercarbic [ ] Other  [ ] Other organ failure     LABS:                        8.7    7.00  )-----------( 264      ( 05 Feb 2019 04:41 )             27.6     129<L>  |  87<L>  |  98<H>  ----------------------------<  160<H>  3.6   |  26  |  3.31<H>    Ca    9.2      05 Feb 2019 04:41  Phos  4.5     02-05  Mg     2.4     02-05    RADIOLOGY & ADDITIONAL STUDIES:    PROTEIN CALORIE MALNUTRITION PRESENT: [ ] Yes [x] No  [ ] PPSV2 < or = to 30% [ ] significant weight loss  [ ] poor nutritional intake [ ] catabolic state [ ] anasarca     Albumin, Serum: 3.4 g/dL (01-30-19 @ 05:00)  Artificial Nutrition [ ]     REFERRALS:   [ ]Chaplaincy  [ ] Hospice  [ ]Child Life  [ ]Social Work  [ ]Case management [ ]Holistic Therapy   Goals of Care Discussion Document:       Mo Enrique, PGY-3 Pager#[phone]    ***To Be Reviewed with Attending HPI:  67yo M with PMH of Ischemic Cardiomyopathy/Severe Systolic Heart Failure w/ severe RV Dysfunction, CAD/MI (s/p 3V CABG), HTN, HLD, T2DM, and extensive PVD w/ LE bypass/wound complications/multiple toe amputations p/w Dyspnea/Orthopnea with associated weight gain and fluid retention. Patient was transferred to the CCU for prolonged inpatient course requiring inotrope and diuretics drips. Patient was weaned off inotropes but continued to have significant SOB. He underwent RHC yesterday showing elevated wedge press and decreased cardiac index. Patient was transferred out of the CCU after being started on Dobutamine and Bumex gtts. Patient has been offered AICD due to the severity of his heart failure but he has routinely refused the device placement. He currently states his SOB is much improved. He denies pain, nausea/vomiting. He has been able to walk around the [place].                                     Patient with multiple lengthy readmissions for ADHF/KESHAV.   PERTINENT PM/SXH:   Chronic congestive heart failure, unspecified congestive heart failure type  PAD (peripheral artery disease)  Stented coronary artery  Hyperlipidemia  Diabetes  Hypertension  CAD (coronary artery disease)    S/P bypass graft of extremity  S/P amputation  S/P bypass graft of extremity  S/P angioplasty with stent  S/P CABG x 3    FAMILY HISTORY:  No family history of cancer (Father)  Coronary artery disease (Mother)    ITEMS NOT CHECKED ARE NOT PRESENT    SOCIAL HISTORY:   Significant other/partner:  [x]  Children:  [x]  Zoroastrian/Spirituality: None  Substance hx:  [ ]   Tobacco hx:  [x]   Alcohol hx: [ ]   Home Opioid hx:  [ ] I-Stop Reference No:  Living Situation: [x]Home  [ ]Long term care  [ ]Rehab [ ]Other    ADVANCE DIRECTIVES:    DNR  MOLST  [ ]  Living Will  [ ]   DECISION MAKER(s):  [ ] Health Care Proxy(s)  [x] Surrogate(s)  [ ] Guardian           Name(s): Lawanda Gonzalez (Wife) Phone Number(s): [phone]    BASELINE (I)ADL(s) (prior to admission):  [place]: [ ]Total  [x] Moderate [ ]Dependent    Allergies  No Known Allergies    MEDICATIONS  (STANDING):  aspirin enteric coated 81 milliGRAM(s) Oral daily  benzonatate 100 milliGRAM(s) Oral three times a day  buMETAnide Infusion 1 mG/Hr (5 mL/Hr) IV Continuous <Continuous>  buMETAnide IVPB 4 milliGRAM(s) IV Intermittent once  chlorhexidine 4% Liquid 1 Application(s) Topical <User Schedule>  dextrose 5%. 1000 milliLiter(s) (50 mL/Hr) IV Continuous <Continuous>  dextrose 50% Injectable 12.5 Gram(s) IV Push once  dextrose 50% Injectable 25 Gram(s) IV Push once  dextrose 50% Injectable 25 Gram(s) IV Push once  DOBUTamine Infusion 2.5 MICROgram(s)/kG/Min (4.957 mL/Hr) IV Continuous <Continuous>  docusate sodium 100 milliGRAM(s) Oral daily  heparin  Injectable 5000 Unit(s) SubCutaneous every 12 hours  hydrALAZINE 75 milliGRAM(s) Oral every 8 hours  insulin glargine Injectable (LANTUS) 20 Unit(s) SubCutaneous at bedtime  insulin lispro (HumaLOG) corrective regimen sliding scale   SubCutaneous three times a day before meals  insulin lispro (HumaLOG) corrective regimen sliding scale   SubCutaneous at bedtime  isosorbide   dinitrate Tablet (ISORDIL) 20 milliGRAM(s) Oral three times a day  senna 2 Tablet(s) Oral at bedtime  sevelamer hydrochloride 800 milliGRAM(s) Oral three times a day with meals  simethicone 80 milliGRAM(s) Chew three times a day  sodium chloride 0.9% lock flush 3 milliLiter(s) IV Push every 8 hours  tamsulosin 0.4 milliGRAM(s) Oral at bedtime    MEDICATIONS  (PRN):  calamine Lotion 1 Application(s) Topical daily PRN Rash and/or Itching  dextrose 40% Gel 15 Gram(s) Oral once PRN Blood Glucose LESS THAN 70 milliGRAM(s)/deciliter  glucagon  Injectable 1 milliGRAM(s) IntraMuscular once PRN Glucose LESS THAN 70 milligrams/deciliter  guaiFENesin   Syrup  (Sugar-Free) 200 milliGRAM(s) Oral every 6 hours PRN Cough  oxyCODONE    IR 5 milliGRAM(s) Oral every 6 hours PRN Moderate Pain (4 - 6)  sodium chloride 0.65% Nasal 1 Spray(s) Both Nostrils three times a day PRN Nasal Congestion    PRESENT SYMPTOMS: [ ]Unable to obtain due to poor mentation   Source if other than patient:  [ ]Family   [ ]Team     Pain (Impact on QOL): None  Location -         Minimal acceptable level (0-10 scale):                    Aggravating factors -  Quality -  Radiation -  Severity (0-10 scale) -    Timing -    Dyspnea:                           [x]Mild [ ]Moderate [ ]Severe  Anxiety:                             [ ]Mild [ ]Moderate [ ]Severe  Fatigue:                             [ ]Mild [ ]Moderate [ ]Severe  Nausea:                             [ ]Mild [ ]Moderate [ ]Severe  Loss of appetite:              [x]Mild [ ]Moderate [ ]Severe  Constipation:                    [ ]Mild [ ]Moderate [ ]Severe    Other Symptoms:  [x]All other review of systems negative     Karnofsky Performance Score/Palliative Performance Status Version 2:       50%    http://palliative.info/resource_material/PPSv2.pdf  PHYSICAL EXAM:  Vital Signs Last 24 Hrs  T(C): 36.6 (05 Feb 2019 05:38), Max: 36.6 (05 Feb 2019 05:38)  T(F): 97.8 (05 Feb 2019 05:38), Max: 97.8 (05 Feb 2019 05:38)  HR: 88 (05 Feb 2019 05:38) (79 - 88)  BP: 115/60 (05 Feb 2019 05:38) (108/63 - 119/74)  BP(mean): --  RR: 18 (05 Feb 2019 05:38) (18 - 18)  SpO2: 96% (05 Feb 2019 05:38) (96% - 100%) I&O's Summary    04 Feb 2019 07:01  -  05 Feb 2019 07:00  --------------------------------------------------------  IN: 240 mL / OUT: 950 mL / NET: -710 mL    GENERAL:  [x]Alert  [x]Oriented x3   [ ]Lethargic  [ ]Cachexia  [ ]Unarousable  [x]Verbal  [ ]Non-Verbal  Behavioral:   [ ] Anxiety  [ ] Delirium [ ] Agitation [ ] Other  HEENT:  [x]Normal   [ ]Dry mouth   [ ]ET Tube/Trach  [ ]Oral lesions  PULMONARY:   [ ]Clear [ ]Tachypnea  [ ]Audible excessive secretions   [ ]Rhonchi        [ ]Right [ ]Left [ ]Bilateral  [x]Crackles        [ ]Right [ ]Left [x]Bilateral  [ ]Wheezing     [ ]Right [ ]Left [ ]Bilateral  CARDIOVASCULAR:    [x]Regular [ ]Irregular [ ]Tachy  [ ]Jerson [ ]Murmur [ ]Other  GASTROINTESTINAL:  [x]Soft  [ ]Distended   [x]+BS  [x]Non tender [ ]Tender  [ ]PEG [ ]OGT/ NGT  Last BM:   GENITOURINARY:  [x]Normal [ ] Incontinent   [ ]Oliguria/Anuria   [ ]Palacios  MUSCULOSKELETAL:   [x]Normal   [ ]Weakness  [ ]Bed/Wheelchair bound [ ]Edema  NEUROLOGIC:   [x]No focal deficits  [ ] Cognitive impairment  [ ] Dysphagia [ ]Dysarthria [ ] Paresis [ ]Other   SKIN:   [x]Normal   [ ]Pressure ulcer(s)  [ ]Rash    CRITICAL CARE:  [ ] Shock Present  [ ]Septic [ ]Cardiogenic [ ]Neurologic [ ]Hypovolemic  [ ]  Vasopressors [ ]  Inotropes   [ ] Respiratory failure present  [ ] Acute  [ ] Chronic [ ] Hypoxic  [ ] Hypercarbic [ ] Other  [ ] Other organ failure     LABS:                        8.7    7.00  )-----------( 264      ( 05 Feb 2019 04:41 )             27.6     129<L>  |  87<L>  |  98<H>  ----------------------------<  160<H>  3.6   |  26  |  3.31<H>    Ca    9.2      05 Feb 2019 04:41  Phos  4.5     02-05  Mg     2.4     02-05    RADIOLOGY & ADDITIONAL STUDIES:    PROTEIN CALORIE MALNUTRITION PRESENT: [ ] Yes [x] No  [ ] PPSV2 < or = to 30% [ ] significant weight loss  [ ] poor nutritional intake [ ] catabolic state [ ] anasarca     Albumin, Serum: 3.4 g/dL (01-30-19 @ 05:00)  Artificial Nutrition [ ]     REFERRALS:   [ ]Chaplaincy  [ ] Hospice  [ ]Child Life  [ ]Social Work  [ ]Case management [ ]Holistic Therapy   Goals of Care Discussion Document:       Mo Enrique, PGY-3 Pager#[phone]    ***To Be Reviewed with Attending

## 2019-02-05 NOTE — PROGRESS NOTE ADULT - SUBJECTIVE AND OBJECTIVE BOX
Date of Admission:19  24 hour events:  Right heart cath done yesterday. Bumex 4mg po bid changed to lasix gtt at 10mg/hr  at 1800 hours. Dobutamine gtt restarted.  Vital Signs Last 24 Hrs  T(C): 36.6 (2019 05:38), Max: 36.6 (2019 05:38)  T(F): 97.8 (2019 05:38), Max: 97.8 (2019 05:38)  HR: 88 (2019 05:38) (79 - 88)  BP: 115/60 (2019 05:38) (108/63 - 119/74)  BP(mean): --  RR: 18 (2019 05:38) (18 - 18)  SpO2: 96% (2019 05:38) (96% - 100%)  I&O's Summary    2019 07:01  -  2019 07:00  --------------------------------------------------------  IN: 240 mL / OUT: 950 mL / NET: -710 mL        MEDICATIONS:  aspirin enteric coated 81 milliGRAM(s) Oral daily  DOBUTamine Infusion 2.5 MICROgram(s)/kG/Min IV Continuous <Continuous>  furosemide Infusion 10 mG/Hr IV Continuous <Continuous>  heparin  Injectable 5000 Unit(s) SubCutaneous every 12 hours  hydrALAZINE 50 milliGRAM(s) Oral every 8 hours  isosorbide   dinitrate Tablet (ISORDIL) 20 milliGRAM(s) Oral three times a day  tamsulosin 0.4 milliGRAM(s) Oral at bedtime      benzonatate 100 milliGRAM(s) Oral three times a day  guaiFENesin   Syrup  (Sugar-Free) 200 milliGRAM(s) Oral every 6 hours PRN    oxyCODONE    IR 5 milliGRAM(s) Oral every 6 hours PRN    docusate sodium 100 milliGRAM(s) Oral daily  senna 2 Tablet(s) Oral at bedtime  simethicone 80 milliGRAM(s) Chew three times a day    dextrose 40% Gel 15 Gram(s) Oral once PRN  dextrose 50% Injectable 12.5 Gram(s) IV Push once  dextrose 50% Injectable 25 Gram(s) IV Push once  dextrose 50% Injectable 25 Gram(s) IV Push once  glucagon  Injectable 1 milliGRAM(s) IntraMuscular once PRN  insulin glargine Injectable (LANTUS) 20 Unit(s) SubCutaneous at bedtime  insulin lispro (HumaLOG) corrective regimen sliding scale   SubCutaneous three times a day before meals  insulin lispro (HumaLOG) corrective regimen sliding scale   SubCutaneous at bedtime    calamine Lotion 1 Application(s) Topical daily PRN  chlorhexidine 4% Liquid 1 Application(s) Topical <User Schedule>  dextrose 5%. 1000 milliLiter(s) IV Continuous <Continuous>  potassium chloride    Tablet ER 20 milliEquivalent(s) Oral once  sodium chloride 0.65% Nasal 1 Spray(s) Both Nostrils three times a day PRN  sodium chloride 0.9% lock flush 3 milliLiter(s) IV Push every 8 hours      REVIEW OF SYSTEMS:  Complete 10point ROS negative.    PHYSICAL EXAM:  General: NAD  Cardiovascular: Normal S1 S2, +JVD, No murmurs, No edema  Respiratory: Lungs clear to auscultation	  Gastrointestinal:  + abdominal bloating, Soft, Non-tender, + BS	  Skin: warm and dry, No rashes, No ecchymoses, No cyanosis	  Extremities: +1 edema  Vascular: Peripheral pulses palpable 2+ bilaterally    LABS:	 	    CBC Full  -  ( 2019 04:41 )  WBC Count : 7.00 K/uL  Hemoglobin : 8.7 g/dL  Hematocrit : 27.6 %  Platelet Count - Automated : 264 K/uL  Mean Cell Volume : 74.0 fL  Mean Cell Hemoglobin : 23.3 pg  Mean Cell Hemoglobin Concentration : 31.5 %  Auto Neutrophil # : x  Auto Lymphocyte # : x  Auto Monocyte # : x  Auto Eosinophil # : x  Auto Basophil # : x  Auto Neutrophil % : x  Auto Lymphocyte % : x  Auto Monocyte % : x  Auto Eosinophil % : x  Auto Basophil % : x    -    129<L>  |  87<L>  |  98<H>  ----------------------------<  160<H>  3.6   |  26  |  3.31<H>  02-    131<L>  |  88<L>  |  106<H>  ----------------------------<  78  3.9   |  25  |  3.52<H>    Ca    9.2      2019 04:41  Ca    9.3      2019 06:40  Phos  4.5     02-05  Phos  4.6     02-04  Mg     2.4     02-05  Mg     2.5     02-04        TELEMETRY: normal sinus rhythm	    < from: Cardiac Cath Lab - Adult (19 @ 16:35) >  HEMODYNAMIC TABLES  Pressures:  Baseline  Pressures:  - HR: 82  Pressures:  - Rhythm:  Pressures:  -- Aortic Pressure (S/D/M): 126/76/94  Pressures:  -- Pulmonary Artery (S/D/M): 72/29/48  Pressures:  -- Pulmonary Capillary Wedge: 36/39/32  Pressures:  -- Right Atrium (a/v/M): 26/26/22  Pressures:  -- Right Ventricle (s/edp): 74/23/--  O2 Sats:  Baseline  O2 Sats:  - HR: 82  O2 Sats:  - Rhythm:  O2 Sats:  -- AO: 8/100/10.88  O2 Sats:  -- PA: 8/39.3/4.28  Outputs:  Baseline  Outputs:  -- CALCULATIONS[age]  Outputs:  -- CALCULATIONS: Body Surface Area: 1.87  Outputs:  -- CALCULATIONS: Height in cm: 167.00  Outputs:  -- CALCULATIONS: Sex: Male  Outputs:  -- CALCULATIONS: Weight in k.00  Outputs:  -- OUTPUTS: CO by Jas: 3.54  Outputs:  -- OUTPUTS: Jas cardiac index: 1.89  Outputs:  -- OUTPUTS: O2 consumption: 233.82  Outputs:  -- OUTPUTS: Vo2 Indexed: 125.00  Outputs:  -- RESISTANCES: Left ventricular stroke work: 39.81  Outputs:  -- RESISTANCES: Left Ventricular Stroke Work index: 21.28  Outputs:  -- RESISTANCES: Pulmonary vascular index (dsc): 676.10  Outputs:  -- RESISTANCES: Pulmonary vascular index (Wood Units): 8.45  Outputs:  -- RESISTANCES: Pulmonary vascular resistance (dsc): 361.44  Outputs:  -- RESISTANCES: Pulmonary vascular resistance (Wood Units): 4.52  Outputs:  -- RESISTANCES: PVR_SVR Ratio: 0.22  Outputs:  -- RESISTANCES: Right ventricular stroke work: 16.92  Outputs:  -- RESISTANCES: Right ventricular stroke work index: 9.04  Outputs:  -- RESISTANCES: Systemic vascular index (dsc): 3042.44  Outputs:  -- RESISTANCES: Systemic vascular index (Wood Units): 38.04  Outputs:  -- RESISTANCES: Systemic vascular resistance (dsc): 1626.47  Outputs:  -- RESISTANCES: Systemic vascular resistance (Wood Units): 20.34  Outputs:  -- RESISTANCES: Total pulmonary index (dsc): 2028.29  Outputs:  -- RESISTANCES: Total pulmonary index (Wood Units): 25.36  Outputs:  -- RESISTANCES: Total pulmonary resistance (dsc): 1084.31  Outputs:  -- RESISTANCES: Total pulmonary resistance (Wood Units): 13.56  Outputs:  -- RESISTANCES: Total vascular index (Wood Units): 49.66  Outputs:  -- RESISTANCES: Total vascular resistance (dsc): 2123.44  Outputs:  -- RESISTANCES: Total vascular resistance (Wood Units): 26.55  Outputs:  -- RESISTANCES: Total vascular resistance index (dsc): 3972.07  Outputs:  -- RESISTANCES: TPR_TVR Ratio: 0.51  Outputs:  -- SHUNTS: Pulmonary flow: 3.54  Outputs:  -- SHUNTS: Qp Indexed: 1.89  Outputs:  -- SHUNTS: Qs Indexed: 1.89  Outputs:  -- SHUNTS: Systemic flow: 3.54    < end of copied text >

## 2019-02-05 NOTE — PROGRESS NOTE ADULT - PROBLEM SELECTOR PLAN 1
Pt. with KESHAV in the setting of ADHF. On review of previous records in [facility]/[place], patient with normal Scr levels from 4/26/16 to 9/24/18. Pt. noted to have an episode of KESHAV during previous/recent hospitalization at [facility] (12/7/18 to 12/27/18). On this admission, Scr was elevated at 2.15 on 1/16/19, peaked to 4.0 on 1/25/19 and remains stable at 3.3 today. Diuretic management as per cardiology. Pt. non-oliguric with good urine output.   US Kidney showed increased bilateral renal cortical echogenicity, no hydronephrosis on 1/18/19. Monitor labs and urine output. Avoid nephrotoxins. Dose medications as per eGFR

## 2019-02-05 NOTE — PROGRESS NOTE ADULT - PROBLEM SELECTOR PLAN 2
BUN and creatinine remain elevated, slightly downtrending. Urine output is improving. Would continue diuresis for now. Will follow up with nephrology recs

## 2019-02-05 NOTE — PROGRESS NOTE ADULT - PROBLEM SELECTOR PLAN 1
Right heart cath revealed elevated filling pressures wedge of 36, RA pressure of 22, CO of 3.54 and index of 1.89. Lasix gtt initiated at 10mg/hr and dobutamine gtt initiated. Heart failure is following, will follow up with their further recommendations. Intake and output for the last 24 hours reveals net negative 1.9 liters. BUN and creat are slightly downtrending from yesterday, but still elevated, BUN of 98, creat of 3.31. Afterload reduction with hydral 50 tid and isordil 20tid,

## 2019-02-05 NOTE — CONSULT NOTE ADULT - PROBLEM SELECTOR RECOMMENDATION 2
Longstanding Ischemic Cardiomyopathy, EF stable to improved on TTE but end-stage heart failure.  -patient adamant about refusing AICD and "heart pump," overtly understanding of the benefits of these interventions Longstanding Ischemic Cardiomyopathy, EF stable to improved on TTE but end-stage heart failure.  -patient adamant about refusing AICD and "heart pump," overtly understanding of the benefits of these interventions  -management as per Cards/Primary Team Longstanding Ischemic Cardiomyopathy, EF variable on various TTE (10-35) but end-stage heart failure.  -patient adamant about refusing AICD and "heart pump," overtly understanding of the benefits of these interventions  -management as per Cards/Primary Team

## 2019-02-05 NOTE — PROGRESS NOTE ADULT - SUBJECTIVE AND OBJECTIVE BOX
[facility] DIVISION OF KIDNEY DISEASES AND HYPERTENSION -- PROGRESS NOTE    Chief complaint: KESHAV    24 hour events/subjective: feels better        PAST HISTORY  --------------------------------------------------------------------------------  No significant changes to PMH, PSH, FHx, SHx, unless otherwise noted    ALLERGIES & MEDICATIONS  --------------------------------------------------------------------------------  Allergies    No Known Allergies    Intolerances      Standing Inpatient Medications  aspirin enteric coated 81 milliGRAM(s) Oral daily  benzonatate 100 milliGRAM(s) Oral three times a day  buMETAnide Infusion 1 mG/Hr IV Continuous <Continuous>  buMETAnide IVPB 4 milliGRAM(s) IV Intermittent once  chlorhexidine 4% Liquid 1 Application(s) Topical <User Schedule>  dextrose 5%. 1000 milliLiter(s) IV Continuous <Continuous>  dextrose 50% Injectable 12.5 Gram(s) IV Push once  dextrose 50% Injectable 25 Gram(s) IV Push once  dextrose 50% Injectable 25 Gram(s) IV Push once  DOBUTamine Infusion 2.5 MICROgram(s)/kG/Min IV Continuous <Continuous>  docusate sodium 100 milliGRAM(s) Oral daily  heparin  Injectable 5000 Unit(s) SubCutaneous every 12 hours  hydrALAZINE 75 milliGRAM(s) Oral every 8 hours  insulin glargine Injectable (LANTUS) 20 Unit(s) SubCutaneous at bedtime  insulin lispro (HumaLOG) corrective regimen sliding scale   SubCutaneous three times a day before meals  insulin lispro (HumaLOG) corrective regimen sliding scale   SubCutaneous at bedtime  isosorbide   dinitrate Tablet (ISORDIL) 20 milliGRAM(s) Oral three times a day  senna 2 Tablet(s) Oral at bedtime  sevelamer hydrochloride 800 milliGRAM(s) Oral three times a day with meals  simethicone 80 milliGRAM(s) Chew three times a day  sodium chloride 0.9% lock flush 3 milliLiter(s) IV Push every 8 hours  tamsulosin 0.4 milliGRAM(s) Oral at bedtime    PRN Inpatient Medications  calamine Lotion 1 Application(s) Topical daily PRN  dextrose 40% Gel 15 Gram(s) Oral once PRN  glucagon  Injectable 1 milliGRAM(s) IntraMuscular once PRN  guaiFENesin   Syrup  (Sugar-Free) 200 milliGRAM(s) Oral every 6 hours PRN  oxyCODONE    IR 5 milliGRAM(s) Oral every 6 hours PRN  sodium chloride 0.65% Nasal 1 Spray(s) Both Nostrils three times a day PRN      REVIEW OF SYSTEMS  --------------------------------------------------------------------------------  Constitutional: [ ] Fever [ ] Chills [ ] Fatigue [ ] Weight change   HEENT: [ ] Blurred vision [ ] Eye Pain [ ] Headache [ ] Runny nose [ ] Sore Throat   Respiratory: [ ] Cough [ ] Wheezing [ ] Shortness of breath  Cardiovascular: [ ] Chest Pain [ ] Palpitations [ ] ORTA [ ] PND [ ] Orthopnea  Gastrointestinal: [ ] Abdominal Pain [ ] Diarrhea [ ] Constipation [ ] Hemorrhoids [ ] Nausea [ ] Vomiting  Genitourinary: [ ] Nocturia [ ] Dysuria [ ] Incontinence  Extremities: [ ] Swelling [ ] Joint Pain  Neurologic: [ ] Focal deficit [ ] Paresthesias [ ] Syncope  Lymphatic: [ ] Swelling [ ] Lymphadenopathy   Skin: [ ] Rash [ ] Ecchymoses [ ] Wounds [ ] Lesions  Psychiatry: [ ] Depression [ ] Suicidal/Homicidal Ideation [ ] Anxiety [ ] Sleep Disturbances  [x ] 10 point review of systems is otherwise negative except as mentioned above              [ ]Unable to obtain due to   All other systems were reviewed and are negative, except as noted.    VITALS/PHYSICAL EXAM  --------------------------------------------------------------------------------  T(C): 36.6 (02-05-19 @ 05:38), Max: 36.6 (02-05-19 @ 05:38)  HR: 88 (02-05-19 @ 05:38) (79 - 88)  BP: 115/60 (02-05-19 @ 05:38) (108/63 - 119/74)  RR: 18 (02-05-19 @ 05:38) (18 - 18)  SpO2: 96% (02-05-19 @ 05:38) (96% - 100%)  Wt(kg): --    Weight (kg): 65.2 (02-04-19 @ 19:25)      02-04-19 @ 07:01  -  02-05-19 @ 07:00  --------------------------------------------------------  IN: 240 mL / OUT: 950 mL / NET: -710 mL      Physical Exam:  	Gen: NAD, well-appearing  	HEENT: on room air  	Pulm: CTA B/L  	CV: normal S1S2; no rub  	Abd: soft                      Back : No sacral edema  	: No mikayla  	LE: no edema  	Skin: Warm, without rashes  	    LABS/STUDIES  --------------------------------------------------------------------------------              8.7    7.00  >-----------<  264      [02-05-19 @ 04:41]              27.6     129  |  87  |  98  ----------------------------<  160      [02-05-19 @ 04:41]  3.6   |  26  |  3.31        Ca     9.2     [02-05-19 @ 04:41]      Mg     2.4     [02-05-19 @ 04:41]      Phos  4.5     [02-05-19 @ 04:41]            Creatinine Trend:  SCr 3.31 [02-05 @ 04:41]  SCr 3.52 [02-04 @ 06:40]  SCr 3.42 [02-03 @ 10:04]  SCr 3.52 [02-02 @ 07:25]  SCr 3.33 [02-01 @ 06:30]    Urinalysis - [01-24-19 @ 12:10]      Color LIGHT YELLOW / Appearance CLEAR / SG 1.008 / pH 5.0      Gluc NEGATIVE / Ketone NEGATIVE  / Bili NEGATIVE / Urobili NORMAL       Blood NEGATIVE / Protein NEGATIVE / Leuk Est NEGATIVE / Nitrite NEGATIVE      RBC  / WBC  / Hyaline  / Gran  / Sq Epi  / Non Sq Epi  / Bacteria       Iron 35, TIBC 339, %sat --      [01-21-19 @ 06:30]  Ferritin 114.3      [01-21-19 @ 06:30]  Vitamin D (25OH) 22.8      [01-18-19 @ 06:00]  HbA1c 7.8      [01-17-19 @ 06:34]  TSH 3.72      [01-17-19 @ 06:34]  Lipid: chol 95, TG 47, HDL 33, LDL 59      [01-17-19 @ 06:34]

## 2019-02-05 NOTE — PROGRESS NOTE ADULT - PROBLEM SELECTOR PLAN 2
Patient with hypervolemia in the setting of ADHF. Patient received treatment with Bumex gtt until 1/25/19, currently on oral Bumex. Pt. non oliguric. Weight decreased since admission. Diuretic and ADHF management per cardiology. Monitor daily weights. Low salt diet

## 2019-02-05 NOTE — CHART NOTE - NSCHARTNOTEFT_GEN_A_CORE
Repeat BMP shows K+ 3.9 will give dose 20mEq to keep K+ above 4.    02-05    130<L>  |  88<L>  |  98<H>  ----------------------------<  218<H>  3.9   |  25  |  3.30<H>    Ca    9.4      05 Feb 2019 19:34  Phos  4.5     02-05  Mg     2.3     02-05

## 2019-02-05 NOTE — CONSULT NOTE ADULT - PROBLEM SELECTOR RECOMMENDATION 9
Related to hypervolemia and severe heart failure. Significantly improved with current inotrope.  -inotrope-assisted diuresis as per Cards  -continue to encourage OOB as tolerated

## 2019-02-05 NOTE — PROGRESS NOTE ADULT - ATTENDING COMMENTS
Events of overnight noted.patient sp RHC yesterday that showed PCWP of 36 with CI 1.89  Started on dobutamine at 2.5 mcg/kg/min and lasix gtt at 10/hr per HF recommendations  Remains on Hydralazine, Isordil  Will followup HF recommendations

## 2019-02-05 NOTE — CONSULT NOTE ADULT - ASSESSMENT
69yo M with PMH of Severe Systolic Heart Failure (EF = 35%), CAD/MI (s/p 3V CABG), HTN, HLD, T2DM, and extensive PVD w/ bilateral LE bypass/wound complications/multiple toe amputations p/w acute decompensated heart failure requiring prolonged CCU stay, now back on inotrope with improved symptoms. 67yo Man with PMH of Ischemic Cardiomyopathy/Severe Systolic Heart Failure w/ severe RV Dysfunction, CAD/MI (s/p 3V CABG), HTN, HLD, T2DM, and extensive PVD w/ LE bypass/wound complications/multiple toe amputations p/w acute decompensated heart failure requiring prolonged CCU stay, now back on inotrope with improved symptoms.

## 2019-02-05 NOTE — CONSULT NOTE ADULT - PROBLEM SELECTOR RECOMMENDATION 3
Discussed Advanced Directives briefly during initial encounter. Patient has not thought about code status/resuscitation, will think about it.  -would want wife making decisions for him, will fill out HCP form  -will continue building rapport to facilitate GOC discussion Discussed Advanced Directives briefly during initial encounter. Patient has not thought about code status/resuscitation, will think about it.  -would want wife making decisions for him, will fill out HCP form  -will continue building rapport to facilitate GOC discussion, can plan for family meeting

## 2019-02-05 NOTE — PROGRESS NOTE ADULT - ASSESSMENT
Briefly, 69 y/o M w/ h/o ICM/HFrEF (EF 10-15%, LVEDD 5.4 cm), mod-severe MR, severe RV dysfunction (refused ICD), PAD s/p L fem-pop bypass and recent L common iliac artery stent 9/18, recent soft tissue infection w/ washout/muscle flap 10/18, gangrene of toe s/p amputation, CKD (b/l Cr 1.5-2) who presented on 1/17 with significantly volume overload. Has had prior admissions requiring inotropes. Hospital course c/b acute on chronic renal failure with persistent volume overload state. RHC 2/4 reviewed - RA 22, RV 74/23, PA 72/29/48, PCWP 39, CO/CI 3.5/1.9 (PA sat 39%). Currently stage D HF, NYHA class III-IV with persistent volume overload.    1. HFrEF -   - switch lasix gtt to bumex gtt; give 4 mg IV x1, then 1 mg/hr; goal weight 133 pounds  -  on fluid and sodium restriction   - increase hydralazine to 75 mg q8h (hold for BP<90)  - continue isordil 20 mg q8h  - BB on hold due to dobutamine  - patient continues to refuse ICD despite benefits/risks  - had discussed disease course with patient and introduced LVAD as an option (although pt likely would be poor candidate given renal dysfunction, prior nonadherence and poor circulation); patient adamant about not wanting evaluation for this  - would c/s palliative care to help discuss GOC; would benefit from family meeting

## 2019-02-05 NOTE — PROGRESS NOTE ADULT - SUBJECTIVE AND OBJECTIVE BOX
Interval History:  - s/p RHC yesterday; started on  2.5 and lasix gtt at 10 mg/hr  - feels improved since yesterday    Medications:  aspirin enteric coated 81 milliGRAM(s) Oral daily  benzonatate 100 milliGRAM(s) Oral three times a day  buMETAnide Infusion 1 mG/Hr IV Continuous <Continuous>  buMETAnide IVPB 4 milliGRAM(s) IV Intermittent once  calamine Lotion 1 Application(s) Topical daily PRN  chlorhexidine 4% Liquid 1 Application(s) Topical <User Schedule>  dextrose 40% Gel 15 Gram(s) Oral once PRN  dextrose 5%. 1000 milliLiter(s) IV Continuous <Continuous>  dextrose 50% Injectable 12.5 Gram(s) IV Push once  dextrose 50% Injectable 25 Gram(s) IV Push once  dextrose 50% Injectable 25 Gram(s) IV Push once  DOBUTamine Infusion 2.5 MICROgram(s)/kG/Min IV Continuous <Continuous>  docusate sodium 100 milliGRAM(s) Oral daily  glucagon  Injectable 1 milliGRAM(s) IntraMuscular once PRN  guaiFENesin   Syrup  (Sugar-Free) 200 milliGRAM(s) Oral every 6 hours PRN  heparin  Injectable 5000 Unit(s) SubCutaneous every 12 hours  hydrALAZINE 75 milliGRAM(s) Oral every 8 hours  insulin glargine Injectable (LANTUS) 20 Unit(s) SubCutaneous at bedtime  insulin lispro (HumaLOG) corrective regimen sliding scale   SubCutaneous three times a day before meals  insulin lispro (HumaLOG) corrective regimen sliding scale   SubCutaneous at bedtime  isosorbide   dinitrate Tablet (ISORDIL) 20 milliGRAM(s) Oral three times a day  oxyCODONE    IR 5 milliGRAM(s) Oral every 6 hours PRN  senna 2 Tablet(s) Oral at bedtime  sevelamer hydrochloride 800 milliGRAM(s) Oral three times a day with meals  simethicone 80 milliGRAM(s) Chew three times a day  sodium chloride 0.65% Nasal 1 Spray(s) Both Nostrils three times a day PRN  sodium chloride 0.9% lock flush 3 milliLiter(s) IV Push every 8 hours  tamsulosin 0.4 milliGRAM(s) Oral at bedtime    Vitals:  T(C): 36.6 (19 @ 05:38), Max: 36.6 (19 @ 05:38)  HR: 88 (19 @ 05:38) (79 - 88)  BP: 115/60 (19 @ 05:38) (108/63 - 119/74)  BP(mean): --  RR: 18 (19 @ 05:38) (18 - 18)  SpO2: 96% (19 @ 05:38) (96% - 100%)    Daily     Daily Weight in k.1 (2019 07:33)    Weight (kg): 65.2 ( @ 19:25)    I&O's Summary    2019 07:01  -  2019 07:00  --------------------------------------------------------  IN: 240 mL / OUT: 950 mL / NET: -710 mL    Physical Exam:  Appearance: frail elderly male  HEENT: PERRL  Neck: JVD approx 18 cm with HJR sitting upright  Cardiovascular: Normal S1 S2, No murmurs/rubs/gallops  Respiratory: Clear to auscultation bilaterally  Gastrointestinal: Soft, Non-tender	  Skin: No cyanosis	  Neurologic: Non-focal  Extremities: No LE edema; healing wounds nondraining   Psychiatry: A & O x 3, Mood & affect appropriate    Labs:                        8.7    7.00  )-----------( 264      ( 2019 04:41 )             27.6     02-05    129<L>  |  87<L>  |  98<H>  ----------------------------<  160<H>  3.6   |  26  |  3.31<H>    Ca    9.2      2019 04:41  Phos  4.5     02-05  Mg     2.4     02-05    TELEMETRY:  PVCs, NSR

## 2019-02-06 LAB
ANION GAP SERPL CALC-SCNC: 16 MMO/L — HIGH (ref 7–14)
ANION GAP SERPL CALC-SCNC: 17 MMO/L — HIGH (ref 7–14)
BUN SERPL-MCNC: 84 MG/DL — HIGH (ref 7–23)
BUN SERPL-MCNC: 92 MG/DL — HIGH (ref 7–23)
CALCIUM SERPL-MCNC: 9 MG/DL — SIGNIFICANT CHANGE UP (ref 8.4–10.5)
CALCIUM SERPL-MCNC: 9.3 MG/DL — SIGNIFICANT CHANGE UP (ref 8.4–10.5)
CHLORIDE SERPL-SCNC: 89 MMOL/L — LOW (ref 98–107)
CHLORIDE SERPL-SCNC: 89 MMOL/L — LOW (ref 98–107)
CO2 SERPL-SCNC: 25 MMOL/L — SIGNIFICANT CHANGE UP (ref 22–31)
CO2 SERPL-SCNC: 26 MMOL/L — SIGNIFICANT CHANGE UP (ref 22–31)
CREAT SERPL-MCNC: 3.02 MG/DL — HIGH (ref 0.5–1.3)
CREAT SERPL-MCNC: 3.07 MG/DL — HIGH (ref 0.5–1.3)
GLUCOSE BLDC GLUCOMTR-MCNC: 140 MG/DL — HIGH (ref 70–99)
GLUCOSE BLDC GLUCOMTR-MCNC: 144 MG/DL — HIGH (ref 70–99)
GLUCOSE BLDC GLUCOMTR-MCNC: 159 MG/DL — HIGH (ref 70–99)
GLUCOSE BLDC GLUCOMTR-MCNC: 252 MG/DL — HIGH (ref 70–99)
GLUCOSE SERPL-MCNC: 146 MG/DL — HIGH (ref 70–99)
GLUCOSE SERPL-MCNC: 93 MG/DL — SIGNIFICANT CHANGE UP (ref 70–99)
HCT VFR BLD CALC: 27.4 % — LOW (ref 39–50)
HGB BLD-MCNC: 8.6 G/DL — LOW (ref 13–17)
MAGNESIUM SERPL-MCNC: 2.3 MG/DL — SIGNIFICANT CHANGE UP (ref 1.6–2.6)
MAGNESIUM SERPL-MCNC: 2.3 MG/DL — SIGNIFICANT CHANGE UP (ref 1.6–2.6)
MCHC RBC-ENTMCNC: 23 PG — LOW (ref 27–34)
MCHC RBC-ENTMCNC: 31.4 % — LOW (ref 32–36)
MCV RBC AUTO: 73.3 FL — LOW (ref 80–100)
NRBC # FLD: 0 K/UL — LOW (ref 25–125)
PLATELET # BLD AUTO: 284 K/UL — SIGNIFICANT CHANGE UP (ref 150–400)
PMV BLD: SIGNIFICANT CHANGE UP FL (ref 7–13)
POTASSIUM SERPL-MCNC: 3.8 MMOL/L — SIGNIFICANT CHANGE UP (ref 3.5–5.3)
POTASSIUM SERPL-MCNC: 4.1 MMOL/L — SIGNIFICANT CHANGE UP (ref 3.5–5.3)
POTASSIUM SERPL-SCNC: 3.8 MMOL/L — SIGNIFICANT CHANGE UP (ref 3.5–5.3)
POTASSIUM SERPL-SCNC: 4.1 MMOL/L — SIGNIFICANT CHANGE UP (ref 3.5–5.3)
RBC # BLD: 3.74 M/UL — LOW (ref 4.2–5.8)
RBC # FLD: 26 % — HIGH (ref 10.3–14.5)
SODIUM SERPL-SCNC: 131 MMOL/L — LOW (ref 135–145)
SODIUM SERPL-SCNC: 131 MMOL/L — LOW (ref 135–145)
WBC # BLD: 6.78 K/UL — SIGNIFICANT CHANGE UP (ref 3.8–10.5)
WBC # FLD AUTO: 6.78 K/UL — SIGNIFICANT CHANGE UP (ref 3.8–10.5)

## 2019-02-06 PROCEDURE — 99233 SBSQ HOSP IP/OBS HIGH 50: CPT

## 2019-02-06 PROCEDURE — 99232 SBSQ HOSP IP/OBS MODERATE 35: CPT | Mod: GC

## 2019-02-06 RX ADMIN — ISOSORBIDE DINITRATE 20 MILLIGRAM(S): 5 TABLET ORAL at 21:13

## 2019-02-06 RX ADMIN — Medication 81 MILLIGRAM(S): at 12:27

## 2019-02-06 RX ADMIN — BUMETANIDE 5 MG/HR: 0.25 INJECTION INTRAMUSCULAR; INTRAVENOUS at 12:26

## 2019-02-06 RX ADMIN — SEVELAMER CARBONATE 800 MILLIGRAM(S): 2400 POWDER, FOR SUSPENSION ORAL at 18:03

## 2019-02-06 RX ADMIN — Medication 75 MILLIGRAM(S): at 13:12

## 2019-02-06 RX ADMIN — SEVELAMER CARBONATE 800 MILLIGRAM(S): 2400 POWDER, FOR SUSPENSION ORAL at 09:45

## 2019-02-06 RX ADMIN — Medication 75 MILLIGRAM(S): at 06:00

## 2019-02-06 RX ADMIN — ISOSORBIDE DINITRATE 20 MILLIGRAM(S): 5 TABLET ORAL at 06:00

## 2019-02-06 RX ADMIN — Medication 100 MILLIGRAM(S): at 13:12

## 2019-02-06 RX ADMIN — ISOSORBIDE DINITRATE 20 MILLIGRAM(S): 5 TABLET ORAL at 13:12

## 2019-02-06 RX ADMIN — Medication 75 MILLIGRAM(S): at 21:13

## 2019-02-06 RX ADMIN — Medication 100 MILLIGRAM(S): at 21:13

## 2019-02-06 RX ADMIN — SIMETHICONE 80 MILLIGRAM(S): 80 TABLET, CHEWABLE ORAL at 13:12

## 2019-02-06 RX ADMIN — Medication 2: at 18:01

## 2019-02-06 RX ADMIN — OXYCODONE HYDROCHLORIDE 5 MILLIGRAM(S): 5 TABLET ORAL at 09:44

## 2019-02-06 RX ADMIN — OXYCODONE HYDROCHLORIDE 5 MILLIGRAM(S): 5 TABLET ORAL at 10:40

## 2019-02-06 RX ADMIN — Medication 100 MILLIGRAM(S): at 06:00

## 2019-02-06 RX ADMIN — SIMETHICONE 80 MILLIGRAM(S): 80 TABLET, CHEWABLE ORAL at 06:00

## 2019-02-06 RX ADMIN — SIMETHICONE 80 MILLIGRAM(S): 80 TABLET, CHEWABLE ORAL at 21:13

## 2019-02-06 RX ADMIN — SODIUM CHLORIDE 3 MILLILITER(S): 9 INJECTION INTRAMUSCULAR; INTRAVENOUS; SUBCUTANEOUS at 21:13

## 2019-02-06 RX ADMIN — Medication 6: at 13:12

## 2019-02-06 RX ADMIN — OXYCODONE HYDROCHLORIDE 5 MILLIGRAM(S): 5 TABLET ORAL at 21:12

## 2019-02-06 RX ADMIN — SEVELAMER CARBONATE 800 MILLIGRAM(S): 2400 POWDER, FOR SUSPENSION ORAL at 13:12

## 2019-02-06 RX ADMIN — INSULIN GLARGINE 20 UNIT(S): 100 INJECTION, SOLUTION SUBCUTANEOUS at 22:18

## 2019-02-06 RX ADMIN — OXYCODONE HYDROCHLORIDE 5 MILLIGRAM(S): 5 TABLET ORAL at 21:42

## 2019-02-06 RX ADMIN — SODIUM CHLORIDE 3 MILLILITER(S): 9 INJECTION INTRAMUSCULAR; INTRAVENOUS; SUBCUTANEOUS at 13:13

## 2019-02-06 RX ADMIN — TAMSULOSIN HYDROCHLORIDE 0.4 MILLIGRAM(S): 0.4 CAPSULE ORAL at 21:13

## 2019-02-06 RX ADMIN — SODIUM CHLORIDE 3 MILLILITER(S): 9 INJECTION INTRAMUSCULAR; INTRAVENOUS; SUBCUTANEOUS at 06:02

## 2019-02-06 RX ADMIN — HEPARIN SODIUM 5000 UNIT(S): 5000 INJECTION INTRAVENOUS; SUBCUTANEOUS at 18:02

## 2019-02-06 NOTE — PROGRESS NOTE ADULT - PROBLEM SELECTOR PLAN 3
Discussed Advanced Directives, reviewed MOLST form with patient.  -will return later this afternoon to answer patient's questions and potentially complete MOLST  -would want wife making decisions for him, will fill out HCP form

## 2019-02-06 NOTE — PROGRESS NOTE ADULT - ATTENDING COMMENTS
Pt seen with resident.  Agree with above.  Medical management as per cardiology.  MOLST provided to pt to guide decision making on AD

## 2019-02-06 NOTE — PROGRESS NOTE ADULT - PROBLEM SELECTOR PLAN 1
Right heart cath revealed elevated filling pressures wedge of 36, RA pressure of 22, CO of 3.54 and index of 1.89. Lasix gtt initiated at 10mg/hr and dobutamine gtt initiated. Heart failure is following, will follow up with their further recommendations. Intake and output for the last 24 hours reveals net negative 1.5 liters. BUN and creat are slightly downtrending from yesterday, but still elevated, BUN of 92, creat of 3.0. Afterload reduction with hydral 75 tid and isordil 20tid,

## 2019-02-06 NOTE — PROGRESS NOTE ADULT - SUBJECTIVE AND OBJECTIVE BOX
Tele: NSR w NSVT 3 beats     Overnight: No CP or SOB this morning     MEDICATIONS  (STANDING):  aspirin enteric coated 81 milliGRAM(s) Oral daily  benzonatate 100 milliGRAM(s) Oral three times a day  buMETAnide Infusion 1 mG/Hr (5 mL/Hr) IV Continuous <Continuous>  DOBUTamine Infusion 2.5 MICROgram(s)/kG/Min (4.957 mL/Hr) IV Continuous <Continuous>  docusate sodium 100 milliGRAM(s) Oral daily  heparin  Injectable 5000 Unit(s) SubCutaneous every 12 hours  hydrALAZINE 75 milliGRAM(s) Oral every 8 hours  insulin glargine Injectable (LANTUS) 20 Unit(s) SubCutaneous at bedtime  insulin lispro (HumaLOG) corrective regimen sliding scale   SubCutaneous three times a day before meals  insulin lispro (HumaLOG) corrective regimen sliding scale   SubCutaneous at bedtime  isosorbide   dinitrate Tablet (ISORDIL) 20 milliGRAM(s) Oral three times a day  senna 2 Tablet(s) Oral at bedtime  sevelamer hydrochloride 800 milliGRAM(s) Oral three times a day with meals  simethicone 80 milliGRAM(s) Chew three times a day  tamsulosin 0.4 milliGRAM(s) Oral at bedtime    MEDICATIONS  (PRN):  calamine Lotion 1 Application(s) Topical daily PRN Rash and/or Itching  dextrose 40% Gel 15 Gram(s) Oral once PRN Blood Glucose LESS THAN 70 milliGRAM(s)/deciliter  glucagon  Injectable 1 milliGRAM(s) IntraMuscular once PRN Glucose LESS THAN 70 milligrams/deciliter  guaiFENesin   Syrup  (Sugar-Free) 200 milliGRAM(s) Oral every 6 hours PRN Cough  oxyCODONE    IR 5 milliGRAM(s) Oral every 6 hours PRN Moderate Pain (4 - 6)  sodium chloride 0.65% Nasal 1 Spray(s) Both Nostrils three times a day PRN Nasal Congestion          Vital Signs Last 24 Hrs  T(C): 36.8 (06 Feb 2019 05:50), Max: 36.8 (06 Feb 2019 05:50)  T(F): 98.3 (06 Feb 2019 05:50), Max: 98.3 (06 Feb 2019 05:50)  HR: 91 (06 Feb 2019 05:50) (81 - 94)  BP: 114/65 (06 Feb 2019 05:50) (114/65 - 121/76)  RR: 18 (06 Feb 2019 05:50) (17 - 18)  SpO2: 98% (06 Feb 2019 05:50) (98% - 100%)  I&O's Detail    05 Feb 2019 07:01  -  06 Feb 2019 07:00  --------------------------------------------------------  IN:    Oral Fluid: 900 mL  Total IN: 900 mL    OUT:    Voided: 2450 mL  Total OUT: 2450 mL    Total NET: -1550 mL      06 Feb 2019 07:01  -  06 Feb 2019 10:26  --------------------------------------------------------  IN:    Oral Fluid: 200 mL  Total IN: 200 mL    OUT:  Total OUT: 0 mL    Total NET: 200 mL      Physical exam:   Gen- NAD  Resp- Clear   CV- S1S2 RRR  ABD- +BSx4 ND/NT  EXT- + edema   Neuro- A&Ox3                             8.6    6.78  )-----------( 284      ( 06 Feb 2019 07:20 )             27.4       06 Feb 2019 07:20    131<L>  |  89<L>  |  92<H>  ----------------------------<  146<H>  4.1     |  25     |  3.07<H>  05 Feb 2019 19:34    130<L>  |  88<L>  |  98<H>  ----------------------------<  218<H>  3.9     |  25     |  3.30<H>    Ca    9.0        06 Feb 2019 07:20  Ca    9.4        05 Feb 2019 19:34  Phos  4.5       05 Feb 2019 04:41  Mg     2.3       06 Feb 2019 07:20  Mg     2.3       05 Feb 2019 19:34

## 2019-02-06 NOTE — PROGRESS NOTE ADULT - SUBJECTIVE AND OBJECTIVE BOX
Patient seen and examined.  Right heart cath explained to patient by Dr. Alexander. Patient is not interested in advanced HF therapies, ie LVAD.   No acute SOB, orthopnea, PND, CP, palpitations.       Medications:  aspirin enteric coated 81 milliGRAM(s) Oral daily  benzonatate 100 milliGRAM(s) Oral three times a day  buMETAnide Infusion 1 mG/Hr IV Continuous <Continuous>  calamine Lotion 1 Application(s) Topical daily PRN  dextrose 40% Gel 15 Gram(s) Oral once PRN  dextrose 5%. 1000 milliLiter(s) IV Continuous <Continuous>  dextrose 50% Injectable 12.5 Gram(s) IV Push once  dextrose 50% Injectable 25 Gram(s) IV Push once  dextrose 50% Injectable 25 Gram(s) IV Push once  DOBUTamine Infusion 2.5 MICROgram(s)/kG/Min IV Continuous <Continuous>  docusate sodium 100 milliGRAM(s) Oral daily  glucagon  Injectable 1 milliGRAM(s) IntraMuscular once PRN  guaiFENesin   Syrup  (Sugar-Free) 200 milliGRAM(s) Oral every 6 hours PRN  heparin  Injectable 5000 Unit(s) SubCutaneous every 12 hours  hydrALAZINE 75 milliGRAM(s) Oral every 8 hours  insulin glargine Injectable (LANTUS) 20 Unit(s) SubCutaneous at bedtime  insulin lispro (HumaLOG) corrective regimen sliding scale   SubCutaneous three times a day before meals  insulin lispro (HumaLOG) corrective regimen sliding scale   SubCutaneous at bedtime  isosorbide   dinitrate Tablet (ISORDIL) 20 milliGRAM(s) Oral three times a day  oxyCODONE    IR 5 milliGRAM(s) Oral every 6 hours PRN  senna 2 Tablet(s) Oral at bedtime  sevelamer hydrochloride 800 milliGRAM(s) Oral three times a day with meals  simethicone 80 milliGRAM(s) Chew three times a day  sodium chloride 0.65% Nasal 1 Spray(s) Both Nostrils three times a day PRN  sodium chloride 0.9% lock flush 3 milliLiter(s) IV Push every 8 hours  tamsulosin 0.4 milliGRAM(s) Oral at bedtime      Vitals:  Vital Signs Last 24 Hrs  T(C): 36.4 (2019 13:10), Max: 36.8 (2019 05:50)  T(F): 97.5 (2019 13:10), Max: 98.3 (2019 05:50)  HR: 97 (2019 13:10) (91 - 97)  BP: 110/67 (2019 13:10) (110/67 - 121/76)  BP(mean): --  RR: 16 (2019 13:10) (16 - 18)  SpO2: 98% (2019 13:10) (98% - 100%)    Daily     Daily Weight in k.8 (2019 08:06)    I&O's Detail    2019 07:01  -  2019 07:00  --------------------------------------------------------  IN:    Oral Fluid: 900 mL  Total IN: 900 mL    OUT:    Voided: 2450 mL  Total OUT: 2450 mL    Total NET: -1550 mL      2019 07:01  -  2019 17:27  --------------------------------------------------------  IN:    Oral Fluid: 200 mL  Total IN: 200 mL    OUT:    Voided: 275 mL  Total OUT: 275 mL    Total NET: -75 mL          Physical Exam:     General: No distress. Comfortable.  HEENT: EOM intact.  Neck: Neck supple. JVP elevated. No masses  Chest: Clear to auscultation bilaterally  CV: Normal S1 and S2. No murmurs, rub, or gallops. Radial pulses normal. No LE edema b/l.   Abdomen: Soft, non-distended, non-tender  Skin: No rashes or skin breakdown  Neurology: Alert and oriented times three. Sensation intact  Psych: Affect normal    Labs:                        8.6    6.78  )-----------( 284      ( 2019 07:20 )             27.4     02-06    131<L>  |  89<L>  |  92<H>  ----------------------------<  146<H>  4.1   |  25  |  3.07<H>    Ca    9.0      2019 07:20  Phos  4.5     02-05  Mg     2.3     02-06    < from: TTE with Doppler (w/Cont) (19 @ 13:36) >  DIMENSIONS:  Dimensions:     Normal Values:  LA:       ---     2.0 - 4.0 cm  Ao:     2.7 cm    2.0 - 3.8 cm  SEPTUM: 0.7 cm    0.6 - 1.2 cm  PWT:    1.0 cm    0.6 - 1.1 cm  LVIDd:  5.5 cm    3.0 - 5.6 cm  LVIDs:    ---     1.8 - 4.0 cm  Derived Variables:  LVMI: 92 g/m2  RWT: 0.36  Ejection Fraction (Modified Mon Rule): 35 %  ------------------------------------------------------------------------  OBSERVATIONS:  Mitral Valve: Tethered mitral valve leaflets. Moderate  mitral regurgitation.  Aortic Root: Normal aortic root.  Aortic Valve: Calcified trileaflet aortic valve with mildly  decreased opening.  Left Atrium: Normal left atrium.  LA volume index = 23  cc/m2.  Left Ventricle: Moderate to severe segmental left  ventricular systolic dysfunction. The basal to mid septum,  basal to mid inferior, basal to mid anterolateral walls are  hypokinetic. Normal left ventricular internal dimensions  and wall thicknesses.  Right Heart: Normal right atrium. Right ventricular  enlargement with decreased right ventricular systolic  function. Normal tricuspid valve. Mild tricuspid  regurgitation. Normal pulmonic valve.  Pericardium/PleuraNormal pericardium with no pericardial  effusion.  Hemodynamic: Estimated right ventricular systolic pressure  equals 43 mm Hg, assuming right atrial pressure equals 10  mm Hg, consistent with mild pulmonary hypertension.    < end of copied text >

## 2019-02-06 NOTE — PROGRESS NOTE ADULT - PROBLEM SELECTOR PLAN 2
Longstanding Ischemic Cardiomyopathy, EF variable on various TTE (10-35) but end-stage heart failure.  -patient again refusing AICD and LVAD, overtly understanding of the benefits of these interventions  -management as per Cards/Primary Team

## 2019-02-06 NOTE — PROGRESS NOTE ADULT - ATTENDING COMMENTS
Patient seen and examined  Discussed plan with HF team  Patient for palliative care evaluation since refusing advanced HF therapy and ICD  Cont current meds for now  HF followup appreciated

## 2019-02-06 NOTE — PROGRESS NOTE ADULT - PROBLEM SELECTOR PLAN 1
Diuresed -1.5 in 24 hrs.   Continue Bumex gtt 1 mg/hr and dobutamine gtt 2.5 mcg/kg/min   Continue hydral  75 mg po TID, continue Isordil 20 mg po TID.   No BB while on dobutamine.  Strict I/O. Please get standing weight.   Pt refused ICD and does not want an evaluation for LVAD as well.

## 2019-02-06 NOTE — PROGRESS NOTE ADULT - SUBJECTIVE AND OBJECTIVE BOX
SUBJECTIVE AND OBJECTIVE  INTERVAL HPI/OVERNIGHT EVENTS:    DNR on chart:     Allergies    No Known Allergies    Intolerances    MEDICATIONS  (STANDING):  aspirin enteric coated 81 milliGRAM(s) Oral daily  benzonatate 100 milliGRAM(s) Oral three times a day  buMETAnide Infusion 1 mG/Hr (5 mL/Hr) IV Continuous <Continuous>  dextrose 5%. 1000 milliLiter(s) (50 mL/Hr) IV Continuous <Continuous>  dextrose 50% Injectable 12.5 Gram(s) IV Push once  dextrose 50% Injectable 25 Gram(s) IV Push once  dextrose 50% Injectable 25 Gram(s) IV Push once  DOBUTamine Infusion 2.5 MICROgram(s)/kG/Min (4.957 mL/Hr) IV Continuous <Continuous>  docusate sodium 100 milliGRAM(s) Oral daily  heparin  Injectable 5000 Unit(s) SubCutaneous every 12 hours  hydrALAZINE 75 milliGRAM(s) Oral every 8 hours  insulin glargine Injectable (LANTUS) 20 Unit(s) SubCutaneous at bedtime  insulin lispro (HumaLOG) corrective regimen sliding scale   SubCutaneous three times a day before meals  insulin lispro (HumaLOG) corrective regimen sliding scale   SubCutaneous at bedtime  isosorbide   dinitrate Tablet (ISORDIL) 20 milliGRAM(s) Oral three times a day  senna 2 Tablet(s) Oral at bedtime  sevelamer hydrochloride 800 milliGRAM(s) Oral three times a day with meals  simethicone 80 milliGRAM(s) Chew three times a day  sodium chloride 0.9% lock flush 3 milliLiter(s) IV Push every 8 hours  tamsulosin 0.4 milliGRAM(s) Oral at bedtime    MEDICATIONS  (PRN):  calamine Lotion 1 Application(s) Topical daily PRN Rash and/or Itching  dextrose 40% Gel 15 Gram(s) Oral once PRN Blood Glucose LESS THAN 70 milliGRAM(s)/deciliter  glucagon  Injectable 1 milliGRAM(s) IntraMuscular once PRN Glucose LESS THAN 70 milligrams/deciliter  guaiFENesin   Syrup  (Sugar-Free) 200 milliGRAM(s) Oral every 6 hours PRN Cough  oxyCODONE    IR 5 milliGRAM(s) Oral every 6 hours PRN Moderate Pain (4 - 6)  sodium chloride 0.65% Nasal 1 Spray(s) Both Nostrils three times a day PRN Nasal Congestion      ITEMS UNCHECKED ARE NOT PRESENT    PRESENT SYMPTOMS: [ ]Unable to obtain due to poor mentation   Source if other than patient:  [ ]Family   [ ]Team     Pain (Impact on QOL):    Location:  Minimal acceptable level (0-10 scale):            Aggravating factors:  Quality:  Radiation:  Severity (0-10 scale):    Timing:    Dyspnea:                           [ ]Mild [ ]Moderate [ ]Severe  Anxiety:                             [ ]Mild [ ]Moderate [ ]Severe  Fatigue:                             [ ]Mild [ ]Moderate [ ]Severe  Nausea:                             [ ]Mild [ ]Moderate [ ]Severe  Loss of appetite:              [ ]Mild [ ]Moderate [ ]Severe  Constipation:                    [ ]Mild [ ]Moderate [ ]Severe    PAIN AD Score:	  [email or web address] (Ctrl + left click to view)    Other Symptoms:  [ ]All other review of systems negative     Karnofsky Performance Score/Palliative Performance Status Version 2:         %    http://palliative.info/resource_material/PPSv2.pdf    PHYSICAL EXAM:  Vital Signs Last 24 Hrs  T(C): 36.8 (06 Feb 2019 05:50), Max: 36.8 (06 Feb 2019 05:50)  T(F): 98.3 (06 Feb 2019 05:50), Max: 98.3 (06 Feb 2019 05:50)  HR: 91 (06 Feb 2019 05:50) (81 - 94)  BP: 114/65 (06 Feb 2019 05:50) (114/65 - 121/76)  BP(mean): --  RR: 18 (06 Feb 2019 05:50) (17 - 18)  SpO2: 98% (06 Feb 2019 05:50) (98% - 100%) I&O's Summary    05 Feb 2019 07:01  -  06 Feb 2019 07:00  --------------------------------------------------------  IN: 900 mL / OUT: 2450 mL / NET: -1550 mL    06 Feb 2019 07:01  -  06 Feb 2019 11:18  --------------------------------------------------------  IN: 200 mL / OUT: 0 mL / NET: 200 mL     GENERAL:  [ ]Alert  [ ]Oriented x   [ ]Lethargic  [ ]Cachexia  [ ]Unarousable  [ ]Verbal  [ ]Non-Verbal    Behavioral:   [ ] Anxiety  [ ] Delirium [ ] Agitation [ ] Other    HEENT:  [ ]Normal   [ ]Dry mouth   [ ]ET Tube/Trach  [ ]Oral lesions    PULMONARY:   [ ]Clear [ ]Tachypnea  [ ]Audible excessive secretions   [ ]Rhonchi        [ ]Right [ ]Left [ ]Bilateral  [ ]Crackles        [ ]Right [ ]Left [ ]Bilateral  [ ]Wheezing     [ ]Right [ ]Left [ ]Bilateral    CARDIOVASCULAR:    [ ]Regular [ ]Irregular [ ]Tachy  [ ]Jerson [ ]Murmur [ ]Other    GASTROINTESTINAL:  [ ]Soft  [ ]Distended   [ ]+BS  [ ]Non tender [ ]Tender  [ ]PEG [ ]OGT/ NGT   Last BM:    GENITOURINARY:  [ ]Normal [ ] Incontinent   [ ]Oliguria/Anuria   [ ]Palacios    MUSCULOSKELETAL:   [ ]Normal   [ ]Weakness  [ ]Bed/Wheelchair bound [ ]Edema    NEUROLOGIC:   [ ]No focal deficits  [ ] Cognitive impairment  [ ] Dysphagia [ ]Dysarthria [ ] Paresis [ ]Other     SKIN:   [ ]Normal   [ ]Pressure ulcer(s)  [ ]Rash    CRITICAL CARE:  [ ] Shock Present  [ ] Septic [ ] Cardiogenic [ ] Neurologic [ ] Hypovolemic [ ] Vasopressors [ ] Inotropes   [ ] Respiratory failure present  [ ] Acute [ ] Chronic [ ] Hypoxic [ ] Hypercarbic [ ] Other  [ ] Other organ failure     LABS:                        8.6    6.78  )-----------( 284      ( 06 Feb 2019 07:20 )             27.4   02-06    131<L>  |  89<L>  |  92<H>  ----------------------------<  146<H>  4.1   |  25  |  3.07<H>    Ca    9.0      06 Feb 2019 07:20  Phos  4.5     02-05  Mg     2.3     02-06          RADIOLOGY & ADDITIONAL STUDIES:    Protein Calorie Malnutrition Present: [ ] yes [ ] no  [ ] PPSV2 < or = 30%  [ ] significant weight loss [ ] poor nutritional intake [ ] anasarca [ ] catabolic state Albumin, Serum: 3.4 g/dL (01-30-19 @ 05:00)  Artificial Nutrition [ ]     REFERRALS:   [ ]Chaplaincy  [ ] Hospice  [ ]Child Life  [ ]Social Work  [ ]Case management [ ]Holistic Therapy   Goals of Care Document:     Mo Enrique, PGY-3 Pager#[phone]  ***To Be Reviewed with Attending SUBJECTIVE AND OBJECTIVE  INTERVAL HPI/OVERNIGHT EVENTS:  no acute events overnight. took an Oxy IR 5mg earlier this AM for intermittent leg pain. denies SOB/CP. ambulating within room. rediscussed cardiac modalities proposed by Cards, continues to be uninterested.    DNR on chart:  X    Allergies  No Known Allergies    MEDICATIONS  (STANDING):  aspirin enteric coated 81 milliGRAM(s) Oral daily  benzonatate 100 milliGRAM(s) Oral three times a day  buMETAnide Infusion 1 mG/Hr (5 mL/Hr) IV Continuous <Continuous>  dextrose 5%. 1000 milliLiter(s) (50 mL/Hr) IV Continuous <Continuous>  dextrose 50% Injectable 12.5 Gram(s) IV Push once  dextrose 50% Injectable 25 Gram(s) IV Push once  dextrose 50% Injectable 25 Gram(s) IV Push once  DOBUTamine Infusion 2.5 MICROgram(s)/kG/Min (4.957 mL/Hr) IV Continuous <Continuous>  docusate sodium 100 milliGRAM(s) Oral daily  heparin  Injectable 5000 Unit(s) SubCutaneous every 12 hours  hydrALAZINE 75 milliGRAM(s) Oral every 8 hours  insulin glargine Injectable (LANTUS) 20 Unit(s) SubCutaneous at bedtime  insulin lispro (HumaLOG) corrective regimen sliding scale   SubCutaneous three times a day before meals  insulin lispro (HumaLOG) corrective regimen sliding scale   SubCutaneous at bedtime  isosorbide   dinitrate Tablet (ISORDIL) 20 milliGRAM(s) Oral three times a day  senna 2 Tablet(s) Oral at bedtime  sevelamer hydrochloride 800 milliGRAM(s) Oral three times a day with meals  simethicone 80 milliGRAM(s) Chew three times a day  sodium chloride 0.9% lock flush 3 milliLiter(s) IV Push every 8 hours  tamsulosin 0.4 milliGRAM(s) Oral at bedtime    MEDICATIONS  (PRN):  calamine Lotion 1 Application(s) Topical daily PRN Rash and/or Itching  dextrose 40% Gel 15 Gram(s) Oral once PRN Blood Glucose LESS THAN 70 milliGRAM(s)/deciliter  glucagon  Injectable 1 milliGRAM(s) IntraMuscular once PRN Glucose LESS THAN 70 milligrams/deciliter  guaiFENesin   Syrup  (Sugar-Free) 200 milliGRAM(s) Oral every 6 hours PRN Cough  oxyCODONE    IR 5 milliGRAM(s) Oral every 6 hours PRN Moderate Pain (4 - 6)  sodium chloride 0.65% Nasal 1 Spray(s) Both Nostrils three times a day PRN Nasal Congestion      ITEMS UNCHECKED ARE NOT PRESENT    PRESENT SYMPTOMS: [ ]Unable to obtain due to poor mentation   Source if other than patient:  [ ]Family   [ ]Team     Pain (Impact on QOL): Mild  Location: L Leg Pain  Minimal acceptable level (0-10 scale): 2           Aggravating factors: certain movements  Quality: stabbing  Radiation: along incisions  Severity (0-10 scale):  6  Timing: intermittent    Dyspnea:                           [ ]Mild [ ]Moderate [ ]Severe  Anxiety:                             [ ]Mild [ ]Moderate [ ]Severe  Fatigue:                             [ ]Mild [ ]Moderate [ ]Severe  Nausea:                             [ ]Mild [ ]Moderate [ ]Severe  Loss of appetite:              [ ]Mild [ ]Moderate [ ]Severe  Constipation:                    [ ]Mild [ ]Moderate [ ]Severe    Other Symptoms:  [x]All other review of systems negative     Karnofsky Performance Score/Palliative Performance Status Version 2:      50%    http://palliative.info/resource_material/PPSv2.pdf    PHYSICAL EXAM:  Vital Signs Last 24 Hrs  T(C): 36.8 (06 Feb 2019 05:50), Max: 36.8 (06 Feb 2019 05:50)  T(F): 98.3 (06 Feb 2019 05:50), Max: 98.3 (06 Feb 2019 05:50)  HR: 91 (06 Feb 2019 05:50) (81 - 94)  BP: 114/65 (06 Feb 2019 05:50) (114/65 - 121/76)  BP(mean): --  RR: 18 (06 Feb 2019 05:50) (17 - 18)  SpO2: 98% (06 Feb 2019 05:50) (98% - 100%) I&O's Summary    05 Feb 2019 07:01  -  06 Feb 2019 07:00  --------------------------------------------------------  IN: 900 mL / OUT: 2450 mL / NET: -1550 mL    06 Feb 2019 07:01  -  06 Feb 2019 11:18  --------------------------------------------------------  IN: 200 mL / OUT: 0 mL / NET: 200 mL    GENERAL:  [x]Alert  [x]Oriented x3   [ ]Lethargic  [ ]Cachexia  [ ]Unarousable  [x]Verbal  [ ]Non-Verbal  Behavioral:   [ ] Anxiety  [ ] Delirium [ ] Agitation [ ] Other  HEENT:  [x]Normal   [ ]Dry mouth   [ ]ET Tube/Trach  [ ]Oral lesions  PULMONARY:   [ ]Clear [ ]Tachypnea  [ ]Audible excessive secretions   [ ]Rhonchi        [ ]Right [ ]Left [ ]Bilateral  [x]Crackles        [ ]Right [ ]Left [x]Bilateral  [ ]Wheezing     [ ]Right [ ]Left [ ]Bilateral  CARDIOVASCULAR:    [x]Regular [ ]Irregular [ ]Tachy  [ ]Jerson [ ]Murmur [ ]Other  GASTROINTESTINAL:  [x]Soft  [ ]Distended   [x]+BS  [x]Non tender [ ]Tender  [ ]PEG [ ]OGT/ NGT  Last BM:   GENITOURINARY:  [x]Normal [ ] Incontinent   [ ]Oliguria/Anuria   [ ]Palacios  MUSCULOSKELETAL:   [x]Normal   [ ]Weakness  [ ]Bed/Wheelchair bound [ ]Edema  NEUROLOGIC:   [x]No focal deficits  [ ] Cognitive impairment  [ ] Dysphagia [ ]Dysarthria [ ] Paresis [ ]Other   SKIN:   [x]Normal   [ ]Pressure ulcer(s)  [ ]Rash    CRITICAL CARE:  [ ] Shock Present  [ ] Septic [ ] Cardiogenic [ ] Neurologic [ ] Hypovolemic [ ] Vasopressors [ ] Inotropes   [ ] Respiratory failure present  [ ] Acute [ ] Chronic [ ] Hypoxic [ ] Hypercarbic [ ] Other  [ ] Other organ failure     LABS:                        8.6    6.78  )-----------( 284      ( 06 Feb 2019 07:20 )             27.4   02-06    131<L>  |  89<L>  |  92<H>  ----------------------------<  146<H>  4.1   |  25  |  3.07<H>    Ca    9.0      06 Feb 2019 07:20  Phos  4.5     02-05  Mg     2.3     02-06    RADIOLOGY & ADDITIONAL STUDIES:    Protein Calorie Malnutrition Present: [ ] yes [x] no  [ ] PPSV2 < or = 30%  [ ] significant weight loss [ ] poor nutritional intake [ ] anasarca [ ] catabolic state Albumin, Serum: 3.4 g/dL (01-30-19 @ 05:00)  Artificial Nutrition [ ]     REFERRALS:   [ ]Chaplaincy  [ ] Hospice  [ ]Child Life  [ ]Social Work  [ ]Case management [ ]Holistic Therapy   Goals of Care Document:     Mo Enrique, PGY-3 Pager#[phone]  ***To Be Reviewed with Attending

## 2019-02-06 NOTE — PROGRESS NOTE ADULT - PROBLEM SELECTOR PLAN 3
- GOC issues, palliative refusing AICD, LVAD  - C/W physical therapy for now, not ready for discharge.

## 2019-02-06 NOTE — PROGRESS NOTE ADULT - PROBLEM SELECTOR PLAN 1
Related to hypervolemia and severe heart failure. Significantly improved with current inotrope use.  -inotrope-assisted diuresis as per Cards  -continue to encourage OOB as tolerated

## 2019-02-06 NOTE — PROGRESS NOTE ADULT - PROBLEM SELECTOR PLAN 2
BUN and creatinine remain elevated, slightly downtrending. Urine output is improving. Would continue diuresis.

## 2019-02-06 NOTE — PROGRESS NOTE ADULT - ASSESSMENT
67yo Man with PMH of Ischemic Cardiomyopathy/Severe Systolic Heart Failure w/ severe RV Dysfunction, CAD/MI (s/p 3V CABG), HTN, HLD, T2DM, and extensive PVD w/ LE bypass/wound complications/multiple toe amputations p/w acute decompensated heart failure requiring prolonged CCU stay, now back on inotrope with improved symptoms.

## 2019-02-07 DIAGNOSIS — Z71.89 OTHER SPECIFIED COUNSELING: ICD-10-CM

## 2019-02-07 LAB
ANION GAP SERPL CALC-SCNC: 17 MMO/L — HIGH (ref 7–14)
BUN SERPL-MCNC: 82 MG/DL — HIGH (ref 7–23)
CALCIUM SERPL-MCNC: 9.1 MG/DL — SIGNIFICANT CHANGE UP (ref 8.4–10.5)
CHLORIDE SERPL-SCNC: 88 MMOL/L — LOW (ref 98–107)
CO2 SERPL-SCNC: 26 MMOL/L — SIGNIFICANT CHANGE UP (ref 22–31)
CREAT SERPL-MCNC: 2.94 MG/DL — HIGH (ref 0.5–1.3)
GLUCOSE BLDC GLUCOMTR-MCNC: 108 MG/DL — HIGH (ref 70–99)
GLUCOSE BLDC GLUCOMTR-MCNC: 119 MG/DL — HIGH (ref 70–99)
GLUCOSE BLDC GLUCOMTR-MCNC: 152 MG/DL — HIGH (ref 70–99)
GLUCOSE BLDC GLUCOMTR-MCNC: 195 MG/DL — HIGH (ref 70–99)
GLUCOSE SERPL-MCNC: 92 MG/DL — SIGNIFICANT CHANGE UP (ref 70–99)
HCT VFR BLD CALC: 28.5 % — LOW (ref 39–50)
HGB BLD-MCNC: 8.8 G/DL — LOW (ref 13–17)
MAGNESIUM SERPL-MCNC: 2.2 MG/DL — SIGNIFICANT CHANGE UP (ref 1.6–2.6)
MCHC RBC-ENTMCNC: 23.2 PG — LOW (ref 27–34)
MCHC RBC-ENTMCNC: 30.9 % — LOW (ref 32–36)
MCV RBC AUTO: 75.2 FL — LOW (ref 80–100)
NRBC # FLD: 0 K/UL — LOW (ref 25–125)
PLATELET # BLD AUTO: 276 K/UL — SIGNIFICANT CHANGE UP (ref 150–400)
PMV BLD: SIGNIFICANT CHANGE UP FL (ref 7–13)
POTASSIUM SERPL-MCNC: 4 MMOL/L — SIGNIFICANT CHANGE UP (ref 3.5–5.3)
POTASSIUM SERPL-SCNC: 4 MMOL/L — SIGNIFICANT CHANGE UP (ref 3.5–5.3)
RBC # BLD: 3.79 M/UL — LOW (ref 4.2–5.8)
RBC # FLD: 26.3 % — HIGH (ref 10.3–14.5)
SODIUM SERPL-SCNC: 131 MMOL/L — LOW (ref 135–145)
WBC # BLD: 8.1 K/UL — SIGNIFICANT CHANGE UP (ref 3.8–10.5)
WBC # FLD AUTO: 8.1 K/UL — SIGNIFICANT CHANGE UP (ref 3.8–10.5)

## 2019-02-07 PROCEDURE — 99233 SBSQ HOSP IP/OBS HIGH 50: CPT

## 2019-02-07 PROCEDURE — 99232 SBSQ HOSP IP/OBS MODERATE 35: CPT | Mod: GC

## 2019-02-07 RX ORDER — POTASSIUM CHLORIDE 20 MEQ
40 PACKET (EA) ORAL ONCE
Qty: 0 | Refills: 0 | Status: COMPLETED | OUTPATIENT
Start: 2019-02-07 | End: 2019-02-07

## 2019-02-07 RX ADMIN — SODIUM CHLORIDE 3 MILLILITER(S): 9 INJECTION INTRAMUSCULAR; INTRAVENOUS; SUBCUTANEOUS at 06:51

## 2019-02-07 RX ADMIN — OXYCODONE HYDROCHLORIDE 5 MILLIGRAM(S): 5 TABLET ORAL at 23:02

## 2019-02-07 RX ADMIN — Medication 75 MILLIGRAM(S): at 06:50

## 2019-02-07 RX ADMIN — ISOSORBIDE DINITRATE 20 MILLIGRAM(S): 5 TABLET ORAL at 06:51

## 2019-02-07 RX ADMIN — SEVELAMER CARBONATE 800 MILLIGRAM(S): 2400 POWDER, FOR SUSPENSION ORAL at 14:00

## 2019-02-07 RX ADMIN — Medication 100 MILLIGRAM(S): at 12:32

## 2019-02-07 RX ADMIN — SEVELAMER CARBONATE 800 MILLIGRAM(S): 2400 POWDER, FOR SUSPENSION ORAL at 09:39

## 2019-02-07 RX ADMIN — Medication 81 MILLIGRAM(S): at 12:32

## 2019-02-07 RX ADMIN — Medication 75 MILLIGRAM(S): at 22:53

## 2019-02-07 RX ADMIN — SIMETHICONE 80 MILLIGRAM(S): 80 TABLET, CHEWABLE ORAL at 06:51

## 2019-02-07 RX ADMIN — Medication 100 MILLIGRAM(S): at 22:53

## 2019-02-07 RX ADMIN — ISOSORBIDE DINITRATE 20 MILLIGRAM(S): 5 TABLET ORAL at 22:53

## 2019-02-07 RX ADMIN — SODIUM CHLORIDE 3 MILLILITER(S): 9 INJECTION INTRAMUSCULAR; INTRAVENOUS; SUBCUTANEOUS at 22:54

## 2019-02-07 RX ADMIN — Medication 100 MILLIGRAM(S): at 13:59

## 2019-02-07 RX ADMIN — SIMETHICONE 80 MILLIGRAM(S): 80 TABLET, CHEWABLE ORAL at 14:00

## 2019-02-07 RX ADMIN — Medication 75 MILLIGRAM(S): at 14:01

## 2019-02-07 RX ADMIN — HEPARIN SODIUM 5000 UNIT(S): 5000 INJECTION INTRAVENOUS; SUBCUTANEOUS at 06:50

## 2019-02-07 RX ADMIN — SODIUM CHLORIDE 3 MILLILITER(S): 9 INJECTION INTRAMUSCULAR; INTRAVENOUS; SUBCUTANEOUS at 14:42

## 2019-02-07 RX ADMIN — INSULIN GLARGINE 20 UNIT(S): 100 INJECTION, SOLUTION SUBCUTANEOUS at 22:53

## 2019-02-07 RX ADMIN — ISOSORBIDE DINITRATE 20 MILLIGRAM(S): 5 TABLET ORAL at 13:59

## 2019-02-07 RX ADMIN — Medication 100 MILLIGRAM(S): at 06:50

## 2019-02-07 RX ADMIN — BUMETANIDE 5 MG/HR: 0.25 INJECTION INTRAMUSCULAR; INTRAVENOUS at 13:57

## 2019-02-07 RX ADMIN — SIMETHICONE 80 MILLIGRAM(S): 80 TABLET, CHEWABLE ORAL at 22:53

## 2019-02-07 RX ADMIN — OXYCODONE HYDROCHLORIDE 5 MILLIGRAM(S): 5 TABLET ORAL at 23:32

## 2019-02-07 RX ADMIN — Medication 40 MILLIEQUIVALENT(S): at 13:59

## 2019-02-07 RX ADMIN — HEPARIN SODIUM 5000 UNIT(S): 5000 INJECTION INTRAVENOUS; SUBCUTANEOUS at 18:50

## 2019-02-07 RX ADMIN — TAMSULOSIN HYDROCHLORIDE 0.4 MILLIGRAM(S): 0.4 CAPSULE ORAL at 22:53

## 2019-02-07 NOTE — PROGRESS NOTE ADULT - ASSESSMENT
68M with CAD,s/p CABG/PCI, HFrEF with biventricular HF (refusing ICD), DM, PAD now s/p recent vascular surgery and admitted for worsening SOB, orthopnea, PND, and ORTA, treated in CCU with diuresis and inotrope therapy with milrinone. Patient worsened off milrinone and is now restarted on Bumex gtt and Dobutamine gtt, s/p repeat RHC.

## 2019-02-07 NOTE — PROGRESS NOTE ADULT - ATTENDING COMMENTS
Patient with acute on chronic systolic heart failure  Patient refusing advanced therapies and now undergoing palliative eval  Cont current treatment  HF followup appreciated

## 2019-02-07 NOTE — PROGRESS NOTE ADULT - PROBLEM SELECTOR PLAN 1
Continue Bumex gtt and Dobutamine gtt at present doses. Strict I & O. Continue hydralazine and isordil. Patient continues to refuse ICD or advanced HF therapy evaluation such as LVAD. Await further HF recs.

## 2019-02-07 NOTE — PROGRESS NOTE ADULT - ATTENDING COMMENTS
Pt seen with resident.  Agree with above.  HCP form placed in chart.  Goal is to continue medical management.  No LVAD, no AICD.  Pt and family not ready to discuss AD.  Will sign off.  IF goals or symptoms change, please call us back.

## 2019-02-07 NOTE — PROGRESS NOTE ADULT - PROBLEM SELECTOR PLAN 1
Related to hypervolemia and severe heart failure. Significantly improved with current inotrope use.  -inotrope-assisted diuresis as per Cards  -continue to encourage OOB as tolerated -inotrope-assisted diuresis as per Cards/HF  -continue to encourage OOB as tolerated

## 2019-02-07 NOTE — PROGRESS NOTE ADULT - SUBJECTIVE AND OBJECTIVE BOX
SUBJECTIVE AND OBJECTIVE  INTERVAL HPI/OVERNIGHT EVENTS:  no acute events overnight. patient states he was feeling fine this AM but after he took all of his PO meds he started to feel unwell. Denies CP/SOB. He gave MOLST to son yesterday. Called son while at bedside to discuss plan.    DNR on chart:     Allergies    No Known Allergies    Intolerances    MEDICATIONS  (STANDING):  aspirin enteric coated 81 milliGRAM(s) Oral daily  benzonatate 100 milliGRAM(s) Oral three times a day  buMETAnide Infusion 1 mG/Hr (5 mL/Hr) IV Continuous <Continuous>  dextrose 5%. 1000 milliLiter(s) (50 mL/Hr) IV Continuous <Continuous>  dextrose 50% Injectable 12.5 Gram(s) IV Push once  dextrose 50% Injectable 25 Gram(s) IV Push once  dextrose 50% Injectable 25 Gram(s) IV Push once  DOBUTamine Infusion 2.5 MICROgram(s)/kG/Min (4.957 mL/Hr) IV Continuous <Continuous>  docusate sodium 100 milliGRAM(s) Oral daily  heparin  Injectable 5000 Unit(s) SubCutaneous every 12 hours  hydrALAZINE 75 milliGRAM(s) Oral every 8 hours  insulin glargine Injectable (LANTUS) 20 Unit(s) SubCutaneous at bedtime  insulin lispro (HumaLOG) corrective regimen sliding scale   SubCutaneous three times a day before meals  insulin lispro (HumaLOG) corrective regimen sliding scale   SubCutaneous at bedtime  isosorbide   dinitrate Tablet (ISORDIL) 20 milliGRAM(s) Oral three times a day  potassium chloride    Tablet ER 40 milliEquivalent(s) Oral once  senna 2 Tablet(s) Oral at bedtime  sevelamer hydrochloride 800 milliGRAM(s) Oral three times a day with meals  simethicone 80 milliGRAM(s) Chew three times a day  sodium chloride 0.9% lock flush 3 milliLiter(s) IV Push every 8 hours  tamsulosin 0.4 milliGRAM(s) Oral at bedtime    MEDICATIONS  (PRN):  calamine Lotion 1 Application(s) Topical daily PRN Rash and/or Itching  dextrose 40% Gel 15 Gram(s) Oral once PRN Blood Glucose LESS THAN 70 milliGRAM(s)/deciliter  glucagon  Injectable 1 milliGRAM(s) IntraMuscular once PRN Glucose LESS THAN 70 milligrams/deciliter  guaiFENesin   Syrup  (Sugar-Free) 200 milliGRAM(s) Oral every 6 hours PRN Cough  oxyCODONE    IR 5 milliGRAM(s) Oral every 6 hours PRN Moderate Pain (4 - 6)  sodium chloride 0.65% Nasal 1 Spray(s) Both Nostrils three times a day PRN Nasal Congestion      ITEMS UNCHECKED ARE NOT PRESENT    PRESENT SYMPTOMS: [ ]Unable to obtain due to poor mentation   Source if other than patient:  [ ]Family   [ ]Team     Pain (Impact on QOL): Mild  Location: L Leg Pain  Minimal acceptable level (0-10 scale): 2           Aggravating factors: certain movements  Quality: stabbing  Radiation: along incisions  Severity (0-10 scale):  6  Timing: intermittent    Dyspnea:                           [x]Mild [ ]Moderate [ ]Severe  Anxiety:                             [ ]Mild [ ]Moderate [ ]Severe  Fatigue:                             [x]Mild [ ]Moderate [ ]Severe  Nausea:                             [ ]Mild [ ]Moderate [ ]Severe  Loss of appetite:              [ ]Mild [ ]Moderate [ ]Severe  Constipation:                    [ ]Mild [ ]Moderate [ ]Severe    PAIN AD Score:	  [email or web address]/[email or web address] (Ctrl + left click to view)    Other Symptoms:  [x]All other review of systems negative     Karnofsky Performance Score/Palliative Performance Status Version 2:         50%    http://palliative.info/resource_material/[email or web address].pdf    PHYSICAL EXAM:  Vital Signs Last 24 Hrs  T(C): 36.8 (07 Feb 2019 06:47), Max: 36.8 (07 Feb 2019 05:04)  T(F): 98.2 (07 Feb 2019 06:47), Max: 98.2 (07 Feb 2019 05:04)  HR: 80 (07 Feb 2019 06:47) (80 - 99)  BP: 121/72 (07 Feb 2019 06:47) (108/70 - 121/72)  BP(mean): --  RR: 20 (07 Feb 2019 06:47) (18 - 20)  SpO2: 100% (07 Feb 2019 06:47) (99% - 100%) I&O's Summary    06 Feb 2019 07:01  -  07 Feb 2019 07:00  --------------------------------------------------------  IN: 318.8 mL / OUT: 1275 mL / NET: -956.2 mL    GENERAL:  [x]Alert  [x]Oriented x3   [ ]Lethargic  [ ]Cachexia  [ ]Unarousable  [x]Verbal  [ ]Non-Verbal  Behavioral:   [ ] Anxiety  [ ] Delirium [ ] Agitation [ ] Other  HEENT:  [x]Normal   [ ]Dry mouth   [ ]ET Tube/Trach  [ ]Oral lesions  PULMONARY:   [ ]Clear [ ]Tachypnea  [ ]Audible excessive secretions   [ ]Rhonchi        [ ]Right [ ]Left [ ]Bilateral  [x]Crackles        [ ]Right [ ]Left [x]Bilateral  [ ]Wheezing     [ ]Right [ ]Left [ ]Bilateral  CARDIOVASCULAR:    [x]Regular [ ]Irregular [ ]Tachy  [ ]Jerson [ ]Murmur [ ]Other  GASTROINTESTINAL:  [x]Soft  [ ]Distended   [x]+BS  [x]Non tender [ ]Tender  [ ]PEG [ ]OGT/ NGT  Last BM:   GENITOURINARY:  [x]Normal [ ] Incontinent   [ ]Oliguria/Anuria   [ ]Palacios  MUSCULOSKELETAL:   [x]Normal   [ ]Weakness  [ ]Bed/Wheelchair bound [ ]Edema  NEUROLOGIC:   [x]No focal deficits  [ ] Cognitive impairment  [ ] Dysphagia [ ]Dysarthria [ ] Paresis [ ]Other   SKIN:   [x]Normal   [ ]Pressure ulcer(s)  [ ]Rash    CRITICAL CARE:  [ ] Shock Present  [ ] Septic [ ] Cardiogenic [ ] Neurologic [ ] Hypovolemic [ ] Vasopressors [ ] Inotropes   [ ] Respiratory failure present  [ ] Acute [ ] Chronic [ ] Hypoxic [ ] Hypercarbic [ ] Other  [ ] Other organ failure     LABS:                        8.8    8.10  )-----------( 276      ( 07 Feb 2019 07:00 )             28.5   02-07    131<L>  |  88<L>  |  82<H>  ----------------------------<  92  4.0   |  26  |  2.94<H>    Ca    9.1      07 Feb 2019 07:00  Mg     2.2     02-07    RADIOLOGY & ADDITIONAL STUDIES:    Protein Calorie Malnutrition Present: [ ] yes [x] no  [ ] PPSV2 < or = 30%  [ ] significant weight loss [ ] poor nutritional intake [ ] anasarca [ ] catabolic state Albumin, Serum: 3.4 g/dL (01-30-19 @ 05:00)  Artificial Nutrition [ ]     REFERRALS:   [ ]Chaplaincy  [ ] Hospice  [ ]Child Life  [ ]Social Work  [ ]Case management [ ]Holistic Therapy   Goals of Care Document:     Mo Enrique, PGY-3 Pager#[phone]  ***To Be Reviewed with Attending

## 2019-02-07 NOTE — PROGRESS NOTE ADULT - PROBLEM SELECTOR PLAN 1
Diuresed -1L in 24 hrs.   Continue Bumex gtt 1 mg/hr and dobutamine gtt 2.5 mcg/kg/min   Continue hydral  75 mg po TID, continue Isordil 20 mg po TID.   No BB while on dobutamine.  Strict I/O. Please get standing weight.   Pt refused ICD and does not want an evaluation for LVAD as well.  He is agreeable to PICC line w/ dobutamine gtt at home. Diuresed -1L in 24 hrs.   Continue Bumex gtt 1 mg/hr and dobutamine gtt 2.5 mcg/kg/min   Continue hydral  75 mg po TID, continue Isordil 20 mg po TID.   No BB while on dobutamine.  Strict I/O. Please get standing weight.   Pt refused ICD and does not want an evaluation for LVAD as well.  He is agreeable to PICC line w/ dobutamine gtt at home.  As d/w Dr. Alexander will give metolazone 5 mg po x 1 now.

## 2019-02-07 NOTE — PROGRESS NOTE ADULT - SUBJECTIVE AND OBJECTIVE BOX
Subjective/Objective: Patient resting comfortably at present, no overnight events noted.    MEDICATIONS  (STANDING):  aspirin enteric coated 81 milliGRAM(s) Oral daily  benzonatate 100 milliGRAM(s) Oral three times a day  buMETAnide Infusion 1 mG/Hr (5 mL/Hr) IV Continuous <Continuous>  dextrose 5%. 1000 milliLiter(s) (50 mL/Hr) IV Continuous <Continuous>  dextrose 50% Injectable 12.5 Gram(s) IV Push once  dextrose 50% Injectable 25 Gram(s) IV Push once  dextrose 50% Injectable 25 Gram(s) IV Push once  DOBUTamine Infusion 2.5 MICROgram(s)/kG/Min (4.957 mL/Hr) IV Continuous <Continuous>  docusate sodium 100 milliGRAM(s) Oral daily  heparin  Injectable 5000 Unit(s) SubCutaneous every 12 hours  hydrALAZINE 75 milliGRAM(s) Oral every 8 hours  insulin glargine Injectable (LANTUS) 20 Unit(s) SubCutaneous at bedtime  insulin lispro (HumaLOG) corrective regimen sliding scale   SubCutaneous three times a day before meals  insulin lispro (HumaLOG) corrective regimen sliding scale   SubCutaneous at bedtime  isosorbide   dinitrate Tablet (ISORDIL) 20 milliGRAM(s) Oral three times a day  senna 2 Tablet(s) Oral at bedtime  sevelamer hydrochloride 800 milliGRAM(s) Oral three times a day with meals  simethicone 80 milliGRAM(s) Chew three times a day  sodium chloride 0.9% lock flush 3 milliLiter(s) IV Push every 8 hours  tamsulosin 0.4 milliGRAM(s) Oral at bedtime    MEDICATIONS  (PRN):  calamine Lotion 1 Application(s) Topical daily PRN Rash and/or Itching  dextrose 40% Gel 15 Gram(s) Oral once PRN Blood Glucose LESS THAN 70 milliGRAM(s)/deciliter  glucagon  Injectable 1 milliGRAM(s) IntraMuscular once PRN Glucose LESS THAN 70 milligrams/deciliter  guaiFENesin   Syrup  (Sugar-Free) 200 milliGRAM(s) Oral every 6 hours PRN Cough  oxyCODONE    IR 5 milliGRAM(s) Oral every 6 hours PRN Moderate Pain (4 - 6)  sodium chloride 0.65% Nasal 1 Spray(s) Both Nostrils three times a day PRN Nasal Congestion          Vital Signs Last 24 Hrs  T(C): 36.8 (07 Feb 2019 06:47), Max: 36.8 (07 Feb 2019 05:04)  T(F): 98.2 (07 Feb 2019 06:47), Max: 98.2 (07 Feb 2019 05:04)  HR: 80 (07 Feb 2019 06:47) (80 - 99)  BP: 121/72 (07 Feb 2019 06:47) (108/70 - 121/72)  BP(mean): --  RR: 20 (07 Feb 2019 06:47) (16 - 20)  SpO2: 100% (07 Feb 2019 06:47) (98% - 100%)  I&O's Detail    06 Feb 2019 07:01  -  07 Feb 2019 07:00  --------------------------------------------------------  IN:    bumetanide Infusion: 60 mL    DOBUTamine Infusion: 58.8 mL    Oral Fluid: 200 mL  Total IN: 318.8 mL    OUT:    Voided: 1275 mL  Total OUT: 1275 mL    Total NET: -956.2 mL    PHYSICAL EXAM  GEN: NAD, skin W & D  RESP: CTA ant  CV: nl S1S2, no M/R/C  GI: soft, NT/ND  EXT: no C/C/E  NEURO: awake, responsive    EKG/ TELEM: NSR    LABS:                          8.8    8.10  )-----------( 276      ( 07 Feb 2019 07:00 )             28.5       07 Feb 2019 07:00    131<L>  |  88<L>  |  82<H>  ----------------------------<  92     4.0     |  26     |  2.94<H>    06 Feb 2019 23:02    131<L>  |  89<L>  |  84<H>  ----------------------------<  93     3.8     |  26     |  3.02<H>    Ca    9.1        07 Feb 2019 07:00  Ca    9.3        06 Feb 2019 23:02  Mg     2.2       07 Feb 2019 07:00  Mg     2.3       06 Feb 2019 23:02

## 2019-02-07 NOTE — PROGRESS NOTE ADULT - PROBLEM SELECTOR PLAN 3
Discussed Advanced Directives, reviewed MOLST form with patient.  -discussed patient's hospital course and plan with son. He feels that sending the patient home w/ PICC is "sending him home to die," if the patient isn't tried on PO meds first. I explained that patient had been difficult to wean off gtt's but sending patient with PICC is never a first resort. Reviewed the various procedures that had been discussed (AICD, LVAD) but patient continues to refuse.  -patient is understanding of the severity of his heart disease

## 2019-02-07 NOTE — PROGRESS NOTE ADULT - SUBJECTIVE AND OBJECTIVE BOX
Patient seen and examined. He states he still has some SOB, but has more energy today. No CP.       Medications:  aspirin enteric coated 81 milliGRAM(s) Oral daily  benzonatate 100 milliGRAM(s) Oral three times a day  buMETAnide Infusion 1 mG/Hr IV Continuous <Continuous>  calamine Lotion 1 Application(s) Topical daily PRN  dextrose 40% Gel 15 Gram(s) Oral once PRN  dextrose 5%. 1000 milliLiter(s) IV Continuous <Continuous>  dextrose 50% Injectable 12.5 Gram(s) IV Push once  dextrose 50% Injectable 25 Gram(s) IV Push once  dextrose 50% Injectable 25 Gram(s) IV Push once  DOBUTamine Infusion 2.5 MICROgram(s)/kG/Min IV Continuous <Continuous>  docusate sodium 100 milliGRAM(s) Oral daily  glucagon  Injectable 1 milliGRAM(s) IntraMuscular once PRN  guaiFENesin   Syrup  (Sugar-Free) 200 milliGRAM(s) Oral every 6 hours PRN  heparin  Injectable 5000 Unit(s) SubCutaneous every 12 hours  hydrALAZINE 75 milliGRAM(s) Oral every 8 hours  insulin glargine Injectable (LANTUS) 20 Unit(s) SubCutaneous at bedtime  insulin lispro (HumaLOG) corrective regimen sliding scale   SubCutaneous three times a day before meals  insulin lispro (HumaLOG) corrective regimen sliding scale   SubCutaneous at bedtime  isosorbide   dinitrate Tablet (ISORDIL) 20 milliGRAM(s) Oral three times a day  oxyCODONE    IR 5 milliGRAM(s) Oral every 6 hours PRN  senna 2 Tablet(s) Oral at bedtime  sevelamer hydrochloride 800 milliGRAM(s) Oral three times a day with meals  simethicone 80 milliGRAM(s) Chew three times a day  sodium chloride 0.65% Nasal 1 Spray(s) Both Nostrils three times a day PRN  sodium chloride 0.9% lock flush 3 milliLiter(s) IV Push every 8 hours  tamsulosin 0.4 milliGRAM(s) Oral at bedtime      Vitals:  Vital Signs Last 24 Hrs  T(C): 36.7 (2019 16:14), Max: 36.8 (2019 05:04)  T(F): 98.1 (2019 16:14), Max: 98.2 (2019 05:04)  HR: 100 (2019 16:14) (80 - 101)  BP: 119/72 (2019 16:14) (108/70 - 121/72)  BP(mean): --  RR: 18 (2019 16:14) (18 - 20)  SpO2: 100% (2019 16:14) (99% - 100%)    Daily     Daily Weight in k.6 (2019 07:53)    I&O's Detail    2019 07:01  -  2019 07:00  --------------------------------------------------------  IN:    bumetanide Infusion: 60 mL    DOBUTamine Infusion: 58.8 mL    Oral Fluid: 200 mL  Total IN: 318.8 mL    OUT:    Voided: 1275 mL  Total OUT: 1275 mL    Total NET: -956.2 mL          Physical Exam:     General: No distress. Comfortable.  HEENT: EOM intact.  Neck: Neck supple. JVP mildly elevated. No masses  Chest: Clear to auscultation bilaterally  CV: S1 and S2. No murmurs, rub, or gallops. Radial pulses normal. No LE edema b/l.   Abdomen: Soft, non-distended, non-tender  Skin: No rashes or skin breakdown  Neurology: Alert and oriented times three. Sensation intact  Psych: Affect normal    Labs:                        8.8    8.10  )-----------( 276      ( 2019 07:00 )             28.5     02    131<L>  |  88<L>  |  82<H>  ----------------------------<  92  4.0   |  26  |  2.94<H>    Ca    9.1      2019 07:00  Mg     2.2

## 2019-02-07 NOTE — PROGRESS NOTE ADULT - PROBLEM SELECTOR PLAN 2
Longstanding Ischemic Cardiomyopathy, EF variable on various TTE (10-35) but end-stage heart failure.  -patient again refusing AICD and LVAD, overtly understanding of the benefits of these interventions  -management as per Cards/Primary Team -patient again refusing AICD and LVAD, overtly understanding of the benefits of these interventions  -management as per Cards/Primary Team

## 2019-02-08 LAB
ANION GAP SERPL CALC-SCNC: 16 MMO/L — HIGH (ref 7–14)
ANION GAP SERPL CALC-SCNC: 17 MMO/L — HIGH (ref 7–14)
BUN SERPL-MCNC: 77 MG/DL — HIGH (ref 7–23)
BUN SERPL-MCNC: 77 MG/DL — HIGH (ref 7–23)
CALCIUM SERPL-MCNC: 9.4 MG/DL — SIGNIFICANT CHANGE UP (ref 8.4–10.5)
CALCIUM SERPL-MCNC: 9.5 MG/DL — SIGNIFICANT CHANGE UP (ref 8.4–10.5)
CHLORIDE SERPL-SCNC: 84 MMOL/L — LOW (ref 98–107)
CHLORIDE SERPL-SCNC: 85 MMOL/L — LOW (ref 98–107)
CO2 SERPL-SCNC: 28 MMOL/L — SIGNIFICANT CHANGE UP (ref 22–31)
CO2 SERPL-SCNC: 29 MMOL/L — SIGNIFICANT CHANGE UP (ref 22–31)
CREAT SERPL-MCNC: 2.98 MG/DL — HIGH (ref 0.5–1.3)
CREAT SERPL-MCNC: 3.16 MG/DL — HIGH (ref 0.5–1.3)
GLUCOSE BLDC GLUCOMTR-MCNC: 117 MG/DL — HIGH (ref 70–99)
GLUCOSE BLDC GLUCOMTR-MCNC: 134 MG/DL — HIGH (ref 70–99)
GLUCOSE BLDC GLUCOMTR-MCNC: 179 MG/DL — HIGH (ref 70–99)
GLUCOSE BLDC GLUCOMTR-MCNC: 226 MG/DL — HIGH (ref 70–99)
GLUCOSE SERPL-MCNC: 143 MG/DL — HIGH (ref 70–99)
GLUCOSE SERPL-MCNC: 159 MG/DL — HIGH (ref 70–99)
HCT VFR BLD CALC: 26.3 % — LOW (ref 39–50)
HGB BLD-MCNC: 8.4 G/DL — LOW (ref 13–17)
MAGNESIUM SERPL-MCNC: 2.2 MG/DL — SIGNIFICANT CHANGE UP (ref 1.6–2.6)
MAGNESIUM SERPL-MCNC: 2.3 MG/DL — SIGNIFICANT CHANGE UP (ref 1.6–2.6)
MCHC RBC-ENTMCNC: 23.1 PG — LOW (ref 27–34)
MCHC RBC-ENTMCNC: 31.9 % — LOW (ref 32–36)
MCV RBC AUTO: 72.3 FL — LOW (ref 80–100)
NRBC # FLD: 0 K/UL — LOW (ref 25–125)
PHOSPHATE SERPL-MCNC: 3.8 MG/DL — SIGNIFICANT CHANGE UP (ref 2.5–4.5)
PLATELET # BLD AUTO: 278 K/UL — SIGNIFICANT CHANGE UP (ref 150–400)
PMV BLD: SIGNIFICANT CHANGE UP FL (ref 7–13)
POTASSIUM SERPL-MCNC: 3.8 MMOL/L — SIGNIFICANT CHANGE UP (ref 3.5–5.3)
POTASSIUM SERPL-MCNC: 4.7 MMOL/L — SIGNIFICANT CHANGE UP (ref 3.5–5.3)
POTASSIUM SERPL-SCNC: 3.8 MMOL/L — SIGNIFICANT CHANGE UP (ref 3.5–5.3)
POTASSIUM SERPL-SCNC: 4.7 MMOL/L — SIGNIFICANT CHANGE UP (ref 3.5–5.3)
RBC # BLD: 3.64 M/UL — LOW (ref 4.2–5.8)
RBC # FLD: 25.2 % — HIGH (ref 10.3–14.5)
SODIUM SERPL-SCNC: 129 MMOL/L — LOW (ref 135–145)
SODIUM SERPL-SCNC: 130 MMOL/L — LOW (ref 135–145)
WBC # BLD: 7.84 K/UL — SIGNIFICANT CHANGE UP (ref 3.8–10.5)
WBC # FLD AUTO: 7.84 K/UL — SIGNIFICANT CHANGE UP (ref 3.8–10.5)

## 2019-02-08 PROCEDURE — 99233 SBSQ HOSP IP/OBS HIGH 50: CPT

## 2019-02-08 RX ORDER — ISOSORBIDE DINITRATE 5 MG/1
30 TABLET ORAL THREE TIMES A DAY
Qty: 0 | Refills: 0 | Status: DISCONTINUED | OUTPATIENT
Start: 2019-02-08 | End: 2019-02-25

## 2019-02-08 RX ORDER — HYDRALAZINE HCL 50 MG
100 TABLET ORAL EVERY 8 HOURS
Qty: 0 | Refills: 0 | Status: DISCONTINUED | OUTPATIENT
Start: 2019-02-08 | End: 2019-02-25

## 2019-02-08 RX ADMIN — OXYCODONE HYDROCHLORIDE 5 MILLIGRAM(S): 5 TABLET ORAL at 22:51

## 2019-02-08 RX ADMIN — BUMETANIDE 5 MG/HR: 0.25 INJECTION INTRAMUSCULAR; INTRAVENOUS at 13:14

## 2019-02-08 RX ADMIN — INSULIN GLARGINE 20 UNIT(S): 100 INJECTION, SOLUTION SUBCUTANEOUS at 22:35

## 2019-02-08 RX ADMIN — ISOSORBIDE DINITRATE 30 MILLIGRAM(S): 5 TABLET ORAL at 22:34

## 2019-02-08 RX ADMIN — Medication 100 MILLIGRAM(S): at 13:14

## 2019-02-08 RX ADMIN — TAMSULOSIN HYDROCHLORIDE 0.4 MILLIGRAM(S): 0.4 CAPSULE ORAL at 22:34

## 2019-02-08 RX ADMIN — SODIUM CHLORIDE 3 MILLILITER(S): 9 INJECTION INTRAMUSCULAR; INTRAVENOUS; SUBCUTANEOUS at 06:13

## 2019-02-08 RX ADMIN — Medication 81 MILLIGRAM(S): at 13:13

## 2019-02-08 RX ADMIN — Medication 2: at 09:06

## 2019-02-08 RX ADMIN — SODIUM CHLORIDE 3 MILLILITER(S): 9 INJECTION INTRAMUSCULAR; INTRAVENOUS; SUBCUTANEOUS at 22:36

## 2019-02-08 RX ADMIN — Medication 100 MILLIGRAM(S): at 22:32

## 2019-02-08 RX ADMIN — SIMETHICONE 80 MILLIGRAM(S): 80 TABLET, CHEWABLE ORAL at 22:32

## 2019-02-08 RX ADMIN — Medication 100 MILLIGRAM(S): at 13:12

## 2019-02-08 RX ADMIN — SIMETHICONE 80 MILLIGRAM(S): 80 TABLET, CHEWABLE ORAL at 06:12

## 2019-02-08 RX ADMIN — HEPARIN SODIUM 5000 UNIT(S): 5000 INJECTION INTRAVENOUS; SUBCUTANEOUS at 17:10

## 2019-02-08 RX ADMIN — OXYCODONE HYDROCHLORIDE 5 MILLIGRAM(S): 5 TABLET ORAL at 23:50

## 2019-02-08 RX ADMIN — HEPARIN SODIUM 5000 UNIT(S): 5000 INJECTION INTRAVENOUS; SUBCUTANEOUS at 06:13

## 2019-02-08 RX ADMIN — ISOSORBIDE DINITRATE 20 MILLIGRAM(S): 5 TABLET ORAL at 13:13

## 2019-02-08 RX ADMIN — Medication 4.96 MICROGRAM(S)/KG/MIN: at 13:14

## 2019-02-08 RX ADMIN — Medication 100 MILLIGRAM(S): at 22:43

## 2019-02-08 RX ADMIN — SODIUM CHLORIDE 3 MILLILITER(S): 9 INJECTION INTRAMUSCULAR; INTRAVENOUS; SUBCUTANEOUS at 13:10

## 2019-02-08 RX ADMIN — ISOSORBIDE DINITRATE 20 MILLIGRAM(S): 5 TABLET ORAL at 06:12

## 2019-02-08 RX ADMIN — SIMETHICONE 80 MILLIGRAM(S): 80 TABLET, CHEWABLE ORAL at 13:12

## 2019-02-08 RX ADMIN — Medication 75 MILLIGRAM(S): at 06:12

## 2019-02-08 RX ADMIN — Medication 100 MILLIGRAM(S): at 06:12

## 2019-02-08 RX ADMIN — Medication 75 MILLIGRAM(S): at 13:13

## 2019-02-08 NOTE — PROGRESS NOTE ADULT - ASSESSMENT
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 His last admission from 12/8-12/27 required CCU admission and placement on dobutamine. He has had 3 admissions in 2018, for various complications from DM.  He comes in this time due to worsening SOB. He admits to 2 pillow orthopnea, frequent PND and ORTA after 1/2 block and walking up 5 steps. No CP, palpitations, dizziness. Patient's son by bedside.  His dry weight s/p last hospitalization was 132 lbs.  NYHA class IV. Moderately hypervolemic. 1/22/18 PCWP was 23, RA 18 in AM.  CVP 1/24/19 was 14. RHC was repeated, CI 1.89 patient was started on dobutamine 2.5 mcg/kg/min and Bumex gtt 1 mg/hr.

## 2019-02-08 NOTE — PROGRESS NOTE ADULT - ASSESSMENT
68M with CAD,s/p CABG/PCI, HFrEF with biventricular HF (refusing ICD), DM, PAD now s/p recent vascular surgery and admitted for worsening SOB, orthopnea, PND, and ORTA, treated in CCU with diuresis and inotrope therapy with milrinone. Patient worsened off milrinone and is now restarted on Bumex gtt and Dobutamine gtt, s/p repeat RHC and is now agreeable to PICC for home Dobutamine infusion.

## 2019-02-08 NOTE — PROGRESS NOTE ADULT - ATTENDING COMMENTS
Patient seen and examined. Agree with above assessment and plan  For PICC placement today  HF followup thereafter

## 2019-02-08 NOTE — PROGRESS NOTE ADULT - SUBJECTIVE AND OBJECTIVE BOX
Medications:  aspirin enteric coated 81 milliGRAM(s) Oral daily  benzonatate 100 milliGRAM(s) Oral three times a day  buMETAnide Infusion 1 mG/Hr IV Continuous <Continuous>  calamine Lotion 1 Application(s) Topical daily PRN  dextrose 40% Gel 15 Gram(s) Oral once PRN  dextrose 5%. 1000 milliLiter(s) IV Continuous <Continuous>  dextrose 50% Injectable 12.5 Gram(s) IV Push once  dextrose 50% Injectable 25 Gram(s) IV Push once  dextrose 50% Injectable 25 Gram(s) IV Push once  DOBUTamine Infusion 2.5 MICROgram(s)/kG/Min IV Continuous <Continuous>  docusate sodium 100 milliGRAM(s) Oral daily  glucagon  Injectable 1 milliGRAM(s) IntraMuscular once PRN  guaiFENesin   Syrup  (Sugar-Free) 200 milliGRAM(s) Oral every 6 hours PRN  heparin  Injectable 5000 Unit(s) SubCutaneous every 12 hours  hydrALAZINE 75 milliGRAM(s) Oral every 8 hours  insulin glargine Injectable (LANTUS) 20 Unit(s) SubCutaneous at bedtime  insulin lispro (HumaLOG) corrective regimen sliding scale   SubCutaneous three times a day before meals  insulin lispro (HumaLOG) corrective regimen sliding scale   SubCutaneous at bedtime  isosorbide   dinitrate Tablet (ISORDIL) 20 milliGRAM(s) Oral three times a day  oxyCODONE    IR 5 milliGRAM(s) Oral every 6 hours PRN  senna 2 Tablet(s) Oral at bedtime  sevelamer hydrochloride 800 milliGRAM(s) Oral three times a day with meals  simethicone 80 milliGRAM(s) Chew three times a day  sodium chloride 0.65% Nasal 1 Spray(s) Both Nostrils three times a day PRN  sodium chloride 0.9% lock flush 3 milliLiter(s) IV Push every 8 hours  tamsulosin 0.4 milliGRAM(s) Oral at bedtime      Vitals:  Vital Signs Last 24 Hrs  T(C): 36.6 (2019 10:30), Max: 37.1 (2019 00:24)  T(F): 97.8 (2019 10:30), Max: 98.7 (2019 00:24)  HR: 99 (2019 10:30) (75 - 101)  BP: 107/65 (2019 10:30) (105/58 - 132/76)  BP(mean): --  RR: 18 (2019 10:30) (18 - 20)  SpO2: 99% (2019 10:30) (97% - 100%)    Daily     Daily Weight in k.3 (2019 02:32)    I&O's Detail    2019 07:01  -  2019 07:00  --------------------------------------------------------  IN:    Oral Fluid: 683 mL  Total IN: 683 mL    OUT:    Voided: 1150 mL  Total OUT: 1150 mL    Total NET: -467 mL          Physical Exam:     General: No distress. Comfortable.  HEENT: EOM intact.  Neck: Neck supple. JVP not elevated. No masses  Chest: Clear to auscultation bilaterally  CV: Normal S1 and S2. No murmurs, rub, or gallops. Radial pulses normal.  Abdomen: Soft, non-distended, non-tender  Skin: No rashes or skin breakdown  Neurology: Alert and oriented times three. Sensation intact  Psych: Affect normal    Labs:                        8.4    7.84  )-----------( 278      ( 2019 08:00 )             26.3     02-08    129<L>  |  84<L>  |  77<H>  ----------------------------<  143<H>  3.8   |  28  |  2.98<H>    Ca    9.4      2019 10:30  Phos  3.8     02-08  Mg     2.2     02-08 Patient seen and examined. He feels better today. No acute SOB, orthopnea, PND.  Has not ambulated today to assess dyspnea on exertion.     Medications:  aspirin enteric coated 81 milliGRAM(s) Oral daily  benzonatate 100 milliGRAM(s) Oral three times a day  buMETAnide Infusion 1 mG/Hr IV Continuous <Continuous>  calamine Lotion 1 Application(s) Topical daily PRN  dextrose 40% Gel 15 Gram(s) Oral once PRN  dextrose 5%. 1000 milliLiter(s) IV Continuous <Continuous>  dextrose 50% Injectable 12.5 Gram(s) IV Push once  dextrose 50% Injectable 25 Gram(s) IV Push once  dextrose 50% Injectable 25 Gram(s) IV Push once  DOBUTamine Infusion 2.5 MICROgram(s)/kG/Min IV Continuous <Continuous>  docusate sodium 100 milliGRAM(s) Oral daily  glucagon  Injectable 1 milliGRAM(s) IntraMuscular once PRN  guaiFENesin   Syrup  (Sugar-Free) 200 milliGRAM(s) Oral every 6 hours PRN  heparin  Injectable 5000 Unit(s) SubCutaneous every 12 hours  hydrALAZINE 75 milliGRAM(s) Oral every 8 hours  insulin glargine Injectable (LANTUS) 20 Unit(s) SubCutaneous at bedtime  insulin lispro (HumaLOG) corrective regimen sliding scale   SubCutaneous three times a day before meals  insulin lispro (HumaLOG) corrective regimen sliding scale   SubCutaneous at bedtime  isosorbide   dinitrate Tablet (ISORDIL) 20 milliGRAM(s) Oral three times a day  oxyCODONE    IR 5 milliGRAM(s) Oral every 6 hours PRN  senna 2 Tablet(s) Oral at bedtime  sevelamer hydrochloride 800 milliGRAM(s) Oral three times a day with meals  simethicone 80 milliGRAM(s) Chew three times a day  sodium chloride 0.65% Nasal 1 Spray(s) Both Nostrils three times a day PRN  sodium chloride 0.9% lock flush 3 milliLiter(s) IV Push every 8 hours  tamsulosin 0.4 milliGRAM(s) Oral at bedtime      Vitals:  Vital Signs Last 24 Hrs  T(C): 36.6 (2019 10:30), Max: 37.1 (2019 00:24)  T(F): 97.8 (2019 10:30), Max: 98.7 (2019 00:24)  HR: 99 (2019 10:30) (75 - 101)  BP: 107/65 (2019 10:30) (105/58 - 132/76)  BP(mean): --  RR: 18 (2019 10:30) (18 - 20)  SpO2: 99% (2019 10:30) (97% - 100%)    Daily     Daily Weight in k.3 (2019 02:32)    I&O's Detail    2019 07:01  -  2019 07:00  --------------------------------------------------------  IN:    Oral Fluid: 683 mL  Total IN: 683 mL    OUT:    Voided: 1150 mL  Total OUT: 1150 mL    Total NET: -467 mL          Physical Exam:     General: No distress. Comfortable.  HEENT: EOM intact.  Neck: Neck supple. JVP midlly elevated. No masses  Chest: Clear to auscultation bilaterally  CV: Normal S1 and S2. No murmurs, rub, or gallops. Radial pulses normal. 1+ b/l LE edema.   Abdomen: Soft, non-distended, non-tender  Skin: No rashes or skin breakdown  Neurology: Alert and oriented times three. Sensation intact  Psych: Affect normal    Labs:                        8.4    7.84  )-----------( 278      ( 2019 08:00 )             26.3     02-08    129<L>  |  84<L>  |  77<H>  ----------------------------<  143<H>  3.8   |  28  |  2.98<H>    Ca    9.4      2019 10:30  Phos  3.8     02-08  Mg     2.2     02-08      < from: TTE with Doppler (w/Cont) (19 @ 13:36) >  DIMENSIONS:  Dimensions:     Normal Values:  LA:       ---     2.0 - 4.0 cm  Ao:     2.7 cm    2.0 - 3.8 cm  SEPTUM: 0.7 cm    0.6 - 1.2 cm  PWT:    1.0 cm    0.6 - 1.1 cm  LVIDd:  5.5 cm    3.0 - 5.6 cm  LVIDs:    ---     1.8 - 4.0 cm  Derived Variables:  LVMI: 92 g/m2  RWT: 0.36  Ejection Fraction (Modified Mon Rule): 35 %  ------------------------------------------------------------------------  OBSERVATIONS:  Mitral Valve: Tethered mitral valve leaflets. Moderate  mitral regurgitation.  Aortic Root: Normal aortic root.  Aortic Valve: Calcified trileaflet aortic valve with mildly  decreased opening.  Left Atrium: Normal left atrium.  LA volume index = 23  cc/m2.  Left Ventricle: Moderate to severe segmental left  ventricular systolic dysfunction. The basal to mid septum,  basal to mid inferior, basal to mid anterolateral walls are  hypokinetic. Normal left ventricular internal dimensions  and wall thicknesses.  Right Heart: Normal right atrium. Right ventricular  enlargement with decreased right ventricular systolic  function. Normal tricuspid valve. Mild tricuspid  regurgitation. Normal pulmonic valve.  Pericardium/PleuraNormal pericardium with no pericardial  effusion.  Hemodynamic: Estimated right ventricular systolic pressure  equals 43 mm Hg, assuming right atrial pressure equals 10  mm Hg, consistent with mild pulmonary hypertension.    < end of copied text >

## 2019-02-08 NOTE — PROGRESS NOTE ADULT - PROBLEM SELECTOR PLAN 1
Continues on bumex and dobutamine gtts. Metolazone X 1 added last evening. 1150 out but net neg 467. Strict I & O please. Continue hydralazine and isordil. Patient continues to refuse ICD or advanced HF therapy evaluation however per HF notes patient is now agreeable to PICC insertion and home Dobutamine infusion. Await any further HF recs.

## 2019-02-08 NOTE — PROGRESS NOTE ADULT - SUBJECTIVE AND OBJECTIVE BOX
Subjective/Objective: Sitting up in bed, states he feels a little better today. Continues on Bumex and Dobutamine infusions.    MEDICATIONS  (STANDING):  aspirin enteric coated 81 milliGRAM(s) Oral daily  benzonatate 100 milliGRAM(s) Oral three times a day  buMETAnide Infusion 1 mG/Hr (5 mL/Hr) IV Continuous <Continuous>  dextrose 5%. 1000 milliLiter(s) (50 mL/Hr) IV Continuous <Continuous>  dextrose 50% Injectable 12.5 Gram(s) IV Push once  dextrose 50% Injectable 25 Gram(s) IV Push once  dextrose 50% Injectable 25 Gram(s) IV Push once  DOBUTamine Infusion 2.5 MICROgram(s)/kG/Min (4.957 mL/Hr) IV Continuous <Continuous>  docusate sodium 100 milliGRAM(s) Oral daily  heparin  Injectable 5000 Unit(s) SubCutaneous every 12 hours  hydrALAZINE 75 milliGRAM(s) Oral every 8 hours  insulin glargine Injectable (LANTUS) 20 Unit(s) SubCutaneous at bedtime  insulin lispro (HumaLOG) corrective regimen sliding scale   SubCutaneous three times a day before meals  insulin lispro (HumaLOG) corrective regimen sliding scale   SubCutaneous at bedtime  isosorbide   dinitrate Tablet (ISORDIL) 20 milliGRAM(s) Oral three times a day  senna 2 Tablet(s) Oral at bedtime  sevelamer hydrochloride 800 milliGRAM(s) Oral three times a day with meals  simethicone 80 milliGRAM(s) Chew three times a day  sodium chloride 0.9% lock flush 3 milliLiter(s) IV Push every 8 hours  tamsulosin 0.4 milliGRAM(s) Oral at bedtime    MEDICATIONS  (PRN):  calamine Lotion 1 Application(s) Topical daily PRN Rash and/or Itching  dextrose 40% Gel 15 Gram(s) Oral once PRN Blood Glucose LESS THAN 70 milliGRAM(s)/deciliter  glucagon  Injectable 1 milliGRAM(s) IntraMuscular once PRN Glucose LESS THAN 70 milligrams/deciliter  guaiFENesin   Syrup  (Sugar-Free) 200 milliGRAM(s) Oral every 6 hours PRN Cough  oxyCODONE    IR 5 milliGRAM(s) Oral every 6 hours PRN Moderate Pain (4 - 6)  sodium chloride 0.65% Nasal 1 Spray(s) Both Nostrils three times a day PRN Nasal Congestion          Vital Signs Last 24 Hrs  T(C): 36.7 (08 Feb 2019 06:10), Max: 37.1 (08 Feb 2019 00:24)  T(F): 98.1 (08 Feb 2019 06:10), Max: 98.7 (08 Feb 2019 00:24)  HR: 98 (08 Feb 2019 06:10) (75 - 101)  BP: 105/58 (08 Feb 2019 06:10) (105/58 - 132/76)  BP(mean): --  RR: 20 (08 Feb 2019 06:10) (18 - 20)  SpO2: 98% (08 Feb 2019 06:10) (97% - 100%)  I&O's Detail    07 Feb 2019 07:01  -  08 Feb 2019 07:00  --------------------------------------------------------  IN:    Oral Fluid: 683 mL  Total IN: 683 mL    OUT:    Voided: 1150 mL  Total OUT: 1150 mL    Total NET: -467 mL    PHYSICAL EXAM  GEN: NAD, skin W & D  RESP: few crackles at R base  CV: nl S1S2  GI: sift, NT/ND  EXT: no C/C/E  NEURO: A & O X 3    EKG/ TELEM: NSR    LABS: 2/8/19 labs pending                          8.4    7.84  )-----------( 278      ( 08 Feb 2019 08:00 )             26.3       07 Feb 2019 07:00    131<L>  |  88<L>  |  82<H>  ----------------------------<  92     4.0     |  26     |  2.94<H>      06 Feb 2019 23:02    131<L>  |  89<L>  |  84<H>  ----------------------------<  93     3.8     |  26     |  3.02<H>    Ca    9.1        07 Feb 2019 07:00  Ca    9.3        06 Feb 2019 23:02  Mg     2.2       07 Feb 2019 07:00  Mg     2.3       06 Feb 2019 23:02

## 2019-02-08 NOTE — PROGRESS NOTE ADULT - PROBLEM SELECTOR PLAN 1
Diuresed 1 L, but net negative 500 ml.   Continue Bumex gtt 1 mg/hr and dobutamine gtt 2.5 mcg/kg/min.  Per family requests will try to wean off dobutamine and see if patient able to be hemodynamically stable.   Will stop Dobutamine gtt on Monday.   Increased hydral  to 100 mg po TID, and increase Isordil to 30 mg po TID.   No BB while on dobutamine.  Strict I/O. Please get standing weight.   Pt refused ICD and does not want an evaluation for LVAD as well.  He is agreeable to PICC line w/ dobutamine gtt at home, IF he fails the wean.

## 2019-02-08 NOTE — PROGRESS NOTE ADULT - ATTENDING COMMENTS
D/w son, who is uneasy with /PICC at home.  We will attempt to stop  on Monday AM and see how he does.  Increase Isordil to 30 tid.  Continue Bumex gtt.

## 2019-02-09 LAB
ANION GAP SERPL CALC-SCNC: 17 MMO/L — HIGH (ref 7–14)
BUN SERPL-MCNC: 75 MG/DL — HIGH (ref 7–23)
CALCIUM SERPL-MCNC: 9.2 MG/DL — SIGNIFICANT CHANGE UP (ref 8.4–10.5)
CHLORIDE SERPL-SCNC: 84 MMOL/L — LOW (ref 98–107)
CO2 SERPL-SCNC: 30 MMOL/L — SIGNIFICANT CHANGE UP (ref 22–31)
CREAT SERPL-MCNC: 3.02 MG/DL — HIGH (ref 0.5–1.3)
GLUCOSE BLDC GLUCOMTR-MCNC: 117 MG/DL — HIGH (ref 70–99)
GLUCOSE BLDC GLUCOMTR-MCNC: 131 MG/DL — HIGH (ref 70–99)
GLUCOSE BLDC GLUCOMTR-MCNC: 134 MG/DL — HIGH (ref 70–99)
GLUCOSE BLDC GLUCOMTR-MCNC: 158 MG/DL — HIGH (ref 70–99)
GLUCOSE BLDC GLUCOMTR-MCNC: 71 MG/DL — SIGNIFICANT CHANGE UP (ref 70–99)
GLUCOSE SERPL-MCNC: 66 MG/DL — LOW (ref 70–99)
HCT VFR BLD CALC: 27.2 % — LOW (ref 39–50)
HGB BLD-MCNC: 8.6 G/DL — LOW (ref 13–17)
MAGNESIUM SERPL-MCNC: 2.1 MG/DL — SIGNIFICANT CHANGE UP (ref 1.6–2.6)
MCHC RBC-ENTMCNC: 23.2 PG — LOW (ref 27–34)
MCHC RBC-ENTMCNC: 31.6 % — LOW (ref 32–36)
MCV RBC AUTO: 73.5 FL — LOW (ref 80–100)
NRBC # FLD: 0 K/UL — LOW (ref 25–125)
PLATELET # BLD AUTO: 267 K/UL — SIGNIFICANT CHANGE UP (ref 150–400)
PMV BLD: SIGNIFICANT CHANGE UP FL (ref 7–13)
POTASSIUM SERPL-MCNC: 3.1 MMOL/L — LOW (ref 3.5–5.3)
POTASSIUM SERPL-SCNC: 3.1 MMOL/L — LOW (ref 3.5–5.3)
RBC # BLD: 3.7 M/UL — LOW (ref 4.2–5.8)
RBC # FLD: 25.5 % — HIGH (ref 10.3–14.5)
SODIUM SERPL-SCNC: 131 MMOL/L — LOW (ref 135–145)
WBC # BLD: 8.71 K/UL — SIGNIFICANT CHANGE UP (ref 3.8–10.5)
WBC # FLD AUTO: 8.71 K/UL — SIGNIFICANT CHANGE UP (ref 3.8–10.5)

## 2019-02-09 PROCEDURE — 74019 RADEX ABDOMEN 2 VIEWS: CPT | Mod: 26

## 2019-02-09 PROCEDURE — 99233 SBSQ HOSP IP/OBS HIGH 50: CPT

## 2019-02-09 RX ORDER — ONDANSETRON 8 MG/1
4 TABLET, FILM COATED ORAL ONCE
Qty: 0 | Refills: 0 | Status: COMPLETED | OUTPATIENT
Start: 2019-02-09 | End: 2019-02-09

## 2019-02-09 RX ORDER — LACTULOSE 10 G/15ML
15 SOLUTION ORAL ONCE
Qty: 0 | Refills: 0 | Status: COMPLETED | OUTPATIENT
Start: 2019-02-09 | End: 2019-02-09

## 2019-02-09 RX ORDER — POTASSIUM CHLORIDE 20 MEQ
40 PACKET (EA) ORAL EVERY 4 HOURS
Qty: 0 | Refills: 0 | Status: COMPLETED | OUTPATIENT
Start: 2019-02-09 | End: 2019-02-10

## 2019-02-09 RX ADMIN — ISOSORBIDE DINITRATE 30 MILLIGRAM(S): 5 TABLET ORAL at 12:27

## 2019-02-09 RX ADMIN — Medication 81 MILLIGRAM(S): at 12:28

## 2019-02-09 RX ADMIN — SODIUM CHLORIDE 3 MILLILITER(S): 9 INJECTION INTRAMUSCULAR; INTRAVENOUS; SUBCUTANEOUS at 21:25

## 2019-02-09 RX ADMIN — SENNA PLUS 2 TABLET(S): 8.6 TABLET ORAL at 21:26

## 2019-02-09 RX ADMIN — SIMETHICONE 80 MILLIGRAM(S): 80 TABLET, CHEWABLE ORAL at 12:28

## 2019-02-09 RX ADMIN — ISOSORBIDE DINITRATE 30 MILLIGRAM(S): 5 TABLET ORAL at 21:26

## 2019-02-09 RX ADMIN — OXYCODONE HYDROCHLORIDE 5 MILLIGRAM(S): 5 TABLET ORAL at 13:24

## 2019-02-09 RX ADMIN — ONDANSETRON 4 MILLIGRAM(S): 8 TABLET, FILM COATED ORAL at 19:41

## 2019-02-09 RX ADMIN — Medication 100 MILLIGRAM(S): at 12:28

## 2019-02-09 RX ADMIN — SODIUM CHLORIDE 3 MILLILITER(S): 9 INJECTION INTRAMUSCULAR; INTRAVENOUS; SUBCUTANEOUS at 12:21

## 2019-02-09 RX ADMIN — OXYCODONE HYDROCHLORIDE 5 MILLIGRAM(S): 5 TABLET ORAL at 12:52

## 2019-02-09 RX ADMIN — SIMETHICONE 80 MILLIGRAM(S): 80 TABLET, CHEWABLE ORAL at 21:25

## 2019-02-09 RX ADMIN — SIMETHICONE 80 MILLIGRAM(S): 80 TABLET, CHEWABLE ORAL at 06:28

## 2019-02-09 RX ADMIN — Medication 100 MILLIGRAM(S): at 21:25

## 2019-02-09 RX ADMIN — TAMSULOSIN HYDROCHLORIDE 0.4 MILLIGRAM(S): 0.4 CAPSULE ORAL at 21:26

## 2019-02-09 RX ADMIN — INSULIN GLARGINE 20 UNIT(S): 100 INJECTION, SOLUTION SUBCUTANEOUS at 22:53

## 2019-02-09 RX ADMIN — Medication 40 MILLIEQUIVALENT(S): at 22:53

## 2019-02-09 RX ADMIN — Medication 100 MILLIGRAM(S): at 06:28

## 2019-02-09 RX ADMIN — SODIUM CHLORIDE 3 MILLILITER(S): 9 INJECTION INTRAMUSCULAR; INTRAVENOUS; SUBCUTANEOUS at 06:26

## 2019-02-09 RX ADMIN — LACTULOSE 15 GRAM(S): 10 SOLUTION ORAL at 20:38

## 2019-02-09 RX ADMIN — Medication 100 MILLIGRAM(S): at 06:27

## 2019-02-09 RX ADMIN — ISOSORBIDE DINITRATE 30 MILLIGRAM(S): 5 TABLET ORAL at 06:27

## 2019-02-09 RX ADMIN — HEPARIN SODIUM 5000 UNIT(S): 5000 INJECTION INTRAVENOUS; SUBCUTANEOUS at 06:28

## 2019-02-09 NOTE — PROGRESS NOTE ADULT - PROBLEM SELECTOR PLAN 1
Diuresed 1 L, but net negative 500 ml.   Continue Bumex gtt 1 mg/hr and dobutamine gtt 2.5 mcg/kg/min.  Per family requests will try to wean off dobutamine and see if patient able to be hemodynamically stable.   Will stop Dobutamine gtt on Monday.   -hydral  to 100 mg po TID, and Isordil to 30 mg po TID.   No BB while on dobutamine.  Strict I/O. Please get standing weight.   Pt refused ICD and does not want an evaluation for LVAD as well.  He is agreeable to PICC line w/ dobutamine gtt at home, IF he fails the wean.

## 2019-02-09 NOTE — PROGRESS NOTE ADULT - SUBJECTIVE AND OBJECTIVE BOX
24H hour events: No acute events.    MEDICATIONS:  aspirin enteric coated 81 milliGRAM(s) Oral daily  buMETAnide Infusion 1 mG/Hr IV Continuous <Continuous>  DOBUTamine Infusion 2.5 MICROgram(s)/kG/Min IV Continuous <Continuous>  heparin  Injectable 5000 Unit(s) SubCutaneous every 12 hours  hydrALAZINE 100 milliGRAM(s) Oral every 8 hours  isosorbide   dinitrate Tablet (ISORDIL) 30 milliGRAM(s) Oral three times a day  tamsulosin 0.4 milliGRAM(s) Oral at bedtime  benzonatate 100 milliGRAM(s) Oral three times a day  guaiFENesin   Syrup  (Sugar-Free) 200 milliGRAM(s) Oral every 6 hours PRN  oxyCODONE    IR 5 milliGRAM(s) Oral every 6 hours PRN  docusate sodium 100 milliGRAM(s) Oral daily  senna 2 Tablet(s) Oral at bedtime  simethicone 80 milliGRAM(s) Chew three times a day  dextrose 40% Gel 15 Gram(s) Oral once PRN  dextrose 50% Injectable 12.5 Gram(s) IV Push once  dextrose 50% Injectable 25 Gram(s) IV Push once  dextrose 50% Injectable 25 Gram(s) IV Push once  glucagon  Injectable 1 milliGRAM(s) IntraMuscular once PRN  insulin glargine Injectable (LANTUS) 20 Unit(s) SubCutaneous at bedtime  insulin lispro (HumaLOG) corrective regimen sliding scale   SubCutaneous three times a day before meals  insulin lispro (HumaLOG) corrective regimen sliding scale   SubCutaneous at bedtime  calamine Lotion 1 Application(s) Topical daily PRN  dextrose 5%. 1000 milliLiter(s) IV Continuous <Continuous>  sodium chloride 0.65% Nasal 1 Spray(s) Both Nostrils three times a day PRN  sodium chloride 0.9% lock flush 3 milliLiter(s) IV Push every 8 hours      REVIEW OF SYSTEMS:  Complete 10point ROS negative. Denies cardiac symptoms. Feels well.    PHYSICAL EXAM:  T(C): 36.4 (02-09-19 @ 06:20), Max: 36.6 (02-08-19 @ 17:15)  HR: 74 (02-09-19 @ 06:20) (74 - 100)  BP: 135/77 (02-09-19 @ 06:20) (110/71 - 135/77)  RR: 18 (02-09-19 @ 06:20) (18 - 18)  SpO2: 97% (02-09-19 @ 06:20) (97% - 99%)  Wt(kg): --  I&O's Summary    08 Feb 2019 07:01  -  09 Feb 2019 07:00  --------------------------------------------------------  IN: 0 mL / OUT: 975 mL / NET: -975 mL    09 Feb 2019 07:01  -  09 Feb 2019 11:36  --------------------------------------------------------  IN: 0 mL / OUT: 450 mL / NET: -450 mL    Appearance: NAd  HEENT:   Normal oral mucosa, PERRL, EOMI	  Cardiovascular: Normal S1 S2, No JVD, No murmurs, No edema  Respiratory: Lungs clear to auscultation	  Gastrointestinal:  Soft, Non-tender, + BS		  Neurologic: Non-focal  Vascular: Peripheral pulses palpable 2+ bilaterally    LABS:	 	    CBC Full  -  ( 09 Feb 2019 07:27 )  WBC Count : 8.71 K/uL  Hemoglobin : 8.6 g/dL  Hematocrit : 27.2 %  Platelet Count - Automated : 267 K/uL  Mean Cell Volume : 73.5 fL  Mean Cell Hemoglobin : 23.2 pg  Mean Cell Hemoglobin Concentration : 31.6 %    02-09    131<L>  |  84<L>  |  75<H>  ----------------------------<  66<L>  3.1<L>   |  30  |  3.02<H>  02-08    130<L>  |  85<L>  |  77<H>  ----------------------------<  159<H>  4.7   |  29  |  3.16<H>    Ca    9.2      09 Feb 2019 07:27  Ca    9.5      08 Feb 2019 17:43  Phos  3.8     02-08  Mg     2.1     02-09  Mg     2.3     02-08      TELEMETRY: sinus tach low 100s overnight, today normal sinus, PVCs

## 2019-02-09 NOTE — CHART NOTE - NSCHARTNOTEFT_GEN_A_CORE
68 year old male complains of abdominal pain and distention that started today with nausea. Pt states last moved bowels yesterday small amount and states is passing gas. Pt states nausea feels better s/p zofran. No chest pain, no dizziness, no other complaints. Eating  Vital Signs Last 24 Hrs  T(C): 36.2 (09 Feb 2019 12:22), Max: 36.5 (08 Feb 2019 21:30)  T(F): 97.1 (09 Feb 2019 12:22), Max: 97.7 (08 Feb 2019 21:30)  HR: 87 (09 Feb 2019 12:22) (74 - 100)  BP: 125/77 (09 Feb 2019 12:22) (113/71 - 135/77)  BP(mean): --  RR: 17 (09 Feb 2019 12:22) (17 - 18)  SpO2: 98% (09 Feb 2019 12:22) (97% - 99%)    PE: AOx 3  Lungs: clear to auscultation bilaterally  Abdomen: Distended and generalized pain on palpation to abdomen; generalized  Percussion: hyper tympanic.    A/P r/o Constipation vs obstruction  Ordered Abdominal xray  Pt given zofran 4mg IVP  Colace and senna 68 year old male complains of abdominal pain and distention that started today with nausea. Pt states last moved bowels yesterday small amount and states is passing gas. Pt states nausea feels better s/p zofran. No chest pain, no dizziness, no other complaints. Eating  Vital Signs Last 24 Hrs  T(C): 36.2 (09 Feb 2019 12:22), Max: 36.5 (08 Feb 2019 21:30)  T(F): 97.1 (09 Feb 2019 12:22), Max: 97.7 (08 Feb 2019 21:30)  HR: 87 (09 Feb 2019 12:22) (74 - 100)  BP: 125/77 (09 Feb 2019 12:22) (113/71 - 135/77)  BP(mean): --  RR: 17 (09 Feb 2019 12:22) (17 - 18)  SpO2: 98% (09 Feb 2019 12:22) (97% - 99%)    PE: AOx 3  Lungs: clear to auscultation bilaterally  Abdomen: Distended and generalized pain on palpation to abdomen; generalized  Percussion: hyper tympanic.    A/P r/o Constipation vs obstruction  Ordered Abdominal xray  Pt given zofran 4mg IVP  Pt already receiving Colace and senna  Lactulose 15g

## 2019-02-10 LAB
ANION GAP SERPL CALC-SCNC: 19 MMO/L — HIGH (ref 7–14)
BUN SERPL-MCNC: 70 MG/DL — HIGH (ref 7–23)
CALCIUM SERPL-MCNC: 9.4 MG/DL — SIGNIFICANT CHANGE UP (ref 8.4–10.5)
CHLORIDE SERPL-SCNC: 84 MMOL/L — LOW (ref 98–107)
CO2 SERPL-SCNC: 29 MMOL/L — SIGNIFICANT CHANGE UP (ref 22–31)
CREAT SERPL-MCNC: 3 MG/DL — HIGH (ref 0.5–1.3)
GLUCOSE BLDC GLUCOMTR-MCNC: 100 MG/DL — HIGH (ref 70–99)
GLUCOSE BLDC GLUCOMTR-MCNC: 110 MG/DL — HIGH (ref 70–99)
GLUCOSE BLDC GLUCOMTR-MCNC: 134 MG/DL — HIGH (ref 70–99)
GLUCOSE BLDC GLUCOMTR-MCNC: 226 MG/DL — HIGH (ref 70–99)
GLUCOSE SERPL-MCNC: 62 MG/DL — LOW (ref 70–99)
HCT VFR BLD CALC: 28.1 % — LOW (ref 39–50)
HGB BLD-MCNC: 8.8 G/DL — LOW (ref 13–17)
MAGNESIUM SERPL-MCNC: 2.3 MG/DL — SIGNIFICANT CHANGE UP (ref 1.6–2.6)
MCHC RBC-ENTMCNC: 23.3 PG — LOW (ref 27–34)
MCHC RBC-ENTMCNC: 31.3 % — LOW (ref 32–36)
MCV RBC AUTO: 74.3 FL — LOW (ref 80–100)
NRBC # FLD: 0 K/UL — LOW (ref 25–125)
PLATELET # BLD AUTO: 252 K/UL — SIGNIFICANT CHANGE UP (ref 150–400)
PMV BLD: SIGNIFICANT CHANGE UP FL (ref 7–13)
POTASSIUM SERPL-MCNC: 4 MMOL/L — SIGNIFICANT CHANGE UP (ref 3.5–5.3)
POTASSIUM SERPL-SCNC: 4 MMOL/L — SIGNIFICANT CHANGE UP (ref 3.5–5.3)
RBC # BLD: 3.78 M/UL — LOW (ref 4.2–5.8)
RBC # FLD: 25.3 % — HIGH (ref 10.3–14.5)
SODIUM SERPL-SCNC: 132 MMOL/L — LOW (ref 135–145)
WBC # BLD: 7.58 K/UL — SIGNIFICANT CHANGE UP (ref 3.8–10.5)
WBC # FLD AUTO: 7.58 K/UL — SIGNIFICANT CHANGE UP (ref 3.8–10.5)

## 2019-02-10 PROCEDURE — 99233 SBSQ HOSP IP/OBS HIGH 50: CPT

## 2019-02-10 RX ADMIN — Medication 100 MILLIGRAM(S): at 12:22

## 2019-02-10 RX ADMIN — HEPARIN SODIUM 5000 UNIT(S): 5000 INJECTION INTRAVENOUS; SUBCUTANEOUS at 17:36

## 2019-02-10 RX ADMIN — SODIUM CHLORIDE 3 MILLILITER(S): 9 INJECTION INTRAMUSCULAR; INTRAVENOUS; SUBCUTANEOUS at 06:01

## 2019-02-10 RX ADMIN — Medication 100 MILLIGRAM(S): at 06:01

## 2019-02-10 RX ADMIN — SODIUM CHLORIDE 3 MILLILITER(S): 9 INJECTION INTRAMUSCULAR; INTRAVENOUS; SUBCUTANEOUS at 12:24

## 2019-02-10 RX ADMIN — Medication 81 MILLIGRAM(S): at 12:22

## 2019-02-10 RX ADMIN — INSULIN GLARGINE 20 UNIT(S): 100 INJECTION, SOLUTION SUBCUTANEOUS at 22:04

## 2019-02-10 RX ADMIN — SIMETHICONE 80 MILLIGRAM(S): 80 TABLET, CHEWABLE ORAL at 22:05

## 2019-02-10 RX ADMIN — OXYCODONE HYDROCHLORIDE 5 MILLIGRAM(S): 5 TABLET ORAL at 13:11

## 2019-02-10 RX ADMIN — ISOSORBIDE DINITRATE 30 MILLIGRAM(S): 5 TABLET ORAL at 12:22

## 2019-02-10 RX ADMIN — SIMETHICONE 80 MILLIGRAM(S): 80 TABLET, CHEWABLE ORAL at 12:22

## 2019-02-10 RX ADMIN — Medication 4: at 13:09

## 2019-02-10 RX ADMIN — OXYCODONE HYDROCHLORIDE 5 MILLIGRAM(S): 5 TABLET ORAL at 12:37

## 2019-02-10 RX ADMIN — HEPARIN SODIUM 5000 UNIT(S): 5000 INJECTION INTRAVENOUS; SUBCUTANEOUS at 06:01

## 2019-02-10 RX ADMIN — ISOSORBIDE DINITRATE 30 MILLIGRAM(S): 5 TABLET ORAL at 06:02

## 2019-02-10 RX ADMIN — Medication 100 MILLIGRAM(S): at 22:05

## 2019-02-10 RX ADMIN — SODIUM CHLORIDE 3 MILLILITER(S): 9 INJECTION INTRAMUSCULAR; INTRAVENOUS; SUBCUTANEOUS at 22:05

## 2019-02-10 RX ADMIN — Medication 100 MILLIGRAM(S): at 22:04

## 2019-02-10 RX ADMIN — SIMETHICONE 80 MILLIGRAM(S): 80 TABLET, CHEWABLE ORAL at 06:02

## 2019-02-10 RX ADMIN — TAMSULOSIN HYDROCHLORIDE 0.4 MILLIGRAM(S): 0.4 CAPSULE ORAL at 22:05

## 2019-02-10 RX ADMIN — ISOSORBIDE DINITRATE 30 MILLIGRAM(S): 5 TABLET ORAL at 22:05

## 2019-02-10 NOTE — PROGRESS NOTE ADULT - ASSESSMENT
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 His last admission from 12/8-12/27 required CCU admission and placement on dobutamine. He has had 3 admissions in 2018, for various complications from DM.  He comes in this time due to worsening SOB. He admits to 2 pillow orthopnea, frequent PND and ORTA after 1/2 block and walking up 5 steps. No CP, palpitations, dizziness. Patient's son by bedside.  His dry weight s/p last hospitalization was 132 lbs.  NYHA class IV. Moderately hypervolemic. 1/22/18 PCWP was 23, RA 18 in AM.  CVP 1/24/19 was 14. RHC was repeated, CI 1.89 patient was started on dobutamine 2.5 mcg/kg/min and Bumex gtt 1 mg/hr.     I/Os intake not recorded

## 2019-02-10 NOTE — PROGRESS NOTE ADULT - PROBLEM SELECTOR PLAN 1
Continue Bumex gtt 1 mg/hr and dobutamine gtt 2.5 mcg/kg/min.  Per family requests will try to wean off dobutamine and see if patient able to be hemodynamically stable.   Will stop Dobutamine gtt on Monday.   -hydral 100 mg po TID, and Isordil 30 mg po TID.   No BB while on dobutamine.  Strict I/O. Please get standing weight.   Pt refused ICD and does not want an evaluation for LVAD as well.  He is agreeable to PICC line w/ dobutamine gtt at home, IF he fails the wean.

## 2019-02-10 NOTE — PROGRESS NOTE ADULT - SUBJECTIVE AND OBJECTIVE BOX
24H hour events: No acute events.    MEDICATIONS:  aspirin enteric coated 81 milliGRAM(s) Oral daily  buMETAnide Infusion 1 mG/Hr IV Continuous <Continuous>  DOBUTamine Infusion 2.5 MICROgram(s)/kG/Min IV Continuous <Continuous>  heparin  Injectable 5000 Unit(s) SubCutaneous every 12 hours  hydrALAZINE 100 milliGRAM(s) Oral every 8 hours  isosorbide   dinitrate Tablet (ISORDIL) 30 milliGRAM(s) Oral three times a day  tamsulosin 0.4 milliGRAM(s) Oral at bedtime  benzonatate 100 milliGRAM(s) Oral three times a day  guaiFENesin   Syrup  (Sugar-Free) 200 milliGRAM(s) Oral every 6 hours PRN  oxyCODONE    IR 5 milliGRAM(s) Oral every 6 hours PRN  docusate sodium 100 milliGRAM(s) Oral daily  senna 2 Tablet(s) Oral at bedtime  simethicone 80 milliGRAM(s) Chew three times a day  dextrose 40% Gel 15 Gram(s) Oral once PRN  dextrose 50% Injectable 12.5 Gram(s) IV Push once  dextrose 50% Injectable 25 Gram(s) IV Push once  dextrose 50% Injectable 25 Gram(s) IV Push once  glucagon  Injectable 1 milliGRAM(s) IntraMuscular once PRN  insulin glargine Injectable (LANTUS) 20 Unit(s) SubCutaneous at bedtime  insulin lispro (HumaLOG) corrective regimen sliding scale   SubCutaneous three times a day before meals  insulin lispro (HumaLOG) corrective regimen sliding scale   SubCutaneous at bedtime  calamine Lotion 1 Application(s) Topical daily PRN  dextrose 5%. 1000 milliLiter(s) IV Continuous <Continuous>  sodium chloride 0.65% Nasal 1 Spray(s) Both Nostrils three times a day PRN  sodium chloride 0.9% lock flush 3 milliLiter(s) IV Push every 8 hours    REVIEW OF SYSTEMS:  Complete 10point ROS negative other than LE pain he attributes to muscle contractions.    PHYSICAL EXAM:  T(C): 36.4 (02-10-19 @ 05:55), Max: 36.4 (02-10-19 @ 05:55)  HR: 93 (02-10-19 @ 05:55) (87 - 97)  BP: 121/78 (02-10-19 @ 05:55) (113/64 - 125/77)  RR: 18 (02-10-19 @ 05:55) (17 - 18)  SpO2: 100% (02-10-19 @ 05:55) (98% - 100%)  Wt(kg): --  I&O's Summary    09 Feb 2019 07:01  -  10 Feb 2019 07:00  --------------------------------------------------------  IN: 0 mL / OUT: 1450 mL / NET: -1450 mL    Appearance: NAd  HEENT:   Normal oral mucosa, PERRL, EOMI	  Cardiovascular: Normal S1 S2, No JVD, No murmurs, No edema  Respiratory: Lungs clear to auscultation	  Gastrointestinal:  Soft, Non-tender, + BS		  Neurologic: Non-focal  Vascular: Peripheral pulses palpable 2+ bilaterally    LABS:	 	    CBC Full  -  ( 10 Feb 2019 06:01 )  WBC Count : 7.58 K/uL  Hemoglobin : 8.8 g/dL  Hematocrit : 28.1 %  Platelet Count - Automated : 252 K/uL  Mean Cell Volume : 74.3 fL  Mean Cell Hemoglobin : 23.3 pg  Mean Cell Hemoglobin Concentration : 31.3 %    02-10    132<L>  |  84<L>  |  70<H>  ----------------------------<  62<L>  4.0   |  29  |  3.00<H>  02-09    131<L>  |  84<L>  |  75<H>  ----------------------------<  66<L>  3.1<L>   |  30  |  3.02<H>    Ca    9.4      10 Feb 2019 06:01  Ca    9.2      09 Feb 2019 07:27  Mg     2.3     02-10  Mg     2.1     02-09    TELEMETRY: normal sinus

## 2019-02-11 LAB
ANION GAP SERPL CALC-SCNC: 16 MMO/L — HIGH (ref 7–14)
BUN SERPL-MCNC: 76 MG/DL — HIGH (ref 7–23)
CALCIUM SERPL-MCNC: 9.4 MG/DL — SIGNIFICANT CHANGE UP (ref 8.4–10.5)
CHLORIDE SERPL-SCNC: 83 MMOL/L — LOW (ref 98–107)
CO2 SERPL-SCNC: 32 MMOL/L — HIGH (ref 22–31)
CREAT SERPL-MCNC: 3.16 MG/DL — HIGH (ref 0.5–1.3)
GLUCOSE BLDC GLUCOMTR-MCNC: 107 MG/DL — HIGH (ref 70–99)
GLUCOSE BLDC GLUCOMTR-MCNC: 110 MG/DL — HIGH (ref 70–99)
GLUCOSE BLDC GLUCOMTR-MCNC: 113 MG/DL — HIGH (ref 70–99)
GLUCOSE BLDC GLUCOMTR-MCNC: 196 MG/DL — HIGH (ref 70–99)
GLUCOSE BLDC GLUCOMTR-MCNC: 291 MG/DL — HIGH (ref 70–99)
GLUCOSE BLDC GLUCOMTR-MCNC: 66 MG/DL — LOW (ref 70–99)
GLUCOSE BLDC GLUCOMTR-MCNC: 81 MG/DL — SIGNIFICANT CHANGE UP (ref 70–99)
GLUCOSE SERPL-MCNC: 40 MG/DL — CRITICAL LOW (ref 70–99)
HCT VFR BLD CALC: 28.3 % — LOW (ref 39–50)
HGB BLD-MCNC: 9.1 G/DL — LOW (ref 13–17)
LACTATE SERPL-SCNC: 1.6 MMOL/L — SIGNIFICANT CHANGE UP (ref 0.5–2)
MAGNESIUM SERPL-MCNC: 2.3 MG/DL — SIGNIFICANT CHANGE UP (ref 1.6–2.6)
MCHC RBC-ENTMCNC: 23.3 PG — LOW (ref 27–34)
MCHC RBC-ENTMCNC: 32.2 % — SIGNIFICANT CHANGE UP (ref 32–36)
MCV RBC AUTO: 72.4 FL — LOW (ref 80–100)
NRBC # FLD: 0 K/UL — LOW (ref 25–125)
PLATELET # BLD AUTO: 248 K/UL — SIGNIFICANT CHANGE UP (ref 150–400)
PMV BLD: SIGNIFICANT CHANGE UP FL (ref 7–13)
POTASSIUM SERPL-MCNC: 3.3 MMOL/L — LOW (ref 3.5–5.3)
POTASSIUM SERPL-SCNC: 3.3 MMOL/L — LOW (ref 3.5–5.3)
RBC # BLD: 3.91 M/UL — LOW (ref 4.2–5.8)
RBC # FLD: 25.3 % — HIGH (ref 10.3–14.5)
SODIUM SERPL-SCNC: 131 MMOL/L — LOW (ref 135–145)
WBC # BLD: 7.21 K/UL — SIGNIFICANT CHANGE UP (ref 3.8–10.5)
WBC # FLD AUTO: 7.21 K/UL — SIGNIFICANT CHANGE UP (ref 3.8–10.5)

## 2019-02-11 PROCEDURE — 99233 SBSQ HOSP IP/OBS HIGH 50: CPT

## 2019-02-11 RX ORDER — LACTULOSE 10 G/15ML
200 SOLUTION ORAL ONCE
Refills: 0 | Status: DISCONTINUED | OUTPATIENT
Start: 2019-02-11 | End: 2019-02-11

## 2019-02-11 RX ORDER — POTASSIUM CHLORIDE 20 MEQ
20 PACKET (EA) ORAL ONCE
Qty: 0 | Refills: 0 | Status: COMPLETED | OUTPATIENT
Start: 2019-02-11 | End: 2019-02-11

## 2019-02-11 RX ORDER — POLYETHYLENE GLYCOL 3350 17 G/17G
17 POWDER, FOR SOLUTION ORAL DAILY
Refills: 0 | Status: DISCONTINUED | OUTPATIENT
Start: 2019-02-11 | End: 2019-02-25

## 2019-02-11 RX ORDER — SODIUM CHLORIDE 9 MG/ML
3 INJECTION INTRAMUSCULAR; INTRAVENOUS; SUBCUTANEOUS EVERY 8 HOURS
Qty: 0 | Refills: 0 | Status: DISCONTINUED | OUTPATIENT
Start: 2019-02-11 | End: 2019-02-25

## 2019-02-11 RX ORDER — INSULIN GLARGINE 100 [IU]/ML
15 INJECTION, SOLUTION SUBCUTANEOUS AT BEDTIME
Qty: 0 | Refills: 0 | Status: DISCONTINUED | OUTPATIENT
Start: 2019-02-11 | End: 2019-02-25

## 2019-02-11 RX ORDER — DOCUSATE SODIUM 100 MG
100 CAPSULE ORAL THREE TIMES A DAY
Refills: 0 | Status: DISCONTINUED | OUTPATIENT
Start: 2019-02-11 | End: 2019-02-25

## 2019-02-11 RX ORDER — DOBUTAMINE HCL 250MG/20ML
1.25 VIAL (ML) INTRAVENOUS
Qty: 500 | Refills: 0 | Status: DISCONTINUED | OUTPATIENT
Start: 2019-02-11 | End: 2019-02-12

## 2019-02-11 RX ADMIN — Medication 20 MILLIEQUIVALENT(S): at 12:12

## 2019-02-11 RX ADMIN — SODIUM CHLORIDE 3 MILLILITER(S): 9 INJECTION INTRAMUSCULAR; INTRAVENOUS; SUBCUTANEOUS at 06:09

## 2019-02-11 RX ADMIN — Medication 100 MILLIGRAM(S): at 22:31

## 2019-02-11 RX ADMIN — Medication 1 ENEMA: at 18:27

## 2019-02-11 RX ADMIN — Medication 2: at 18:27

## 2019-02-11 RX ADMIN — ISOSORBIDE DINITRATE 30 MILLIGRAM(S): 5 TABLET ORAL at 06:09

## 2019-02-11 RX ADMIN — BUMETANIDE 5 MG/HR: 0.25 INJECTION INTRAMUSCULAR; INTRAVENOUS at 08:01

## 2019-02-11 RX ADMIN — INSULIN GLARGINE 15 UNIT(S): 100 INJECTION, SOLUTION SUBCUTANEOUS at 22:31

## 2019-02-11 RX ADMIN — Medication 100 MILLIGRAM(S): at 14:20

## 2019-02-11 RX ADMIN — SODIUM CHLORIDE 3 MILLILITER(S): 9 INJECTION INTRAMUSCULAR; INTRAVENOUS; SUBCUTANEOUS at 22:53

## 2019-02-11 RX ADMIN — TAMSULOSIN HYDROCHLORIDE 0.4 MILLIGRAM(S): 0.4 CAPSULE ORAL at 22:31

## 2019-02-11 RX ADMIN — SODIUM CHLORIDE 3 MILLILITER(S): 9 INJECTION INTRAMUSCULAR; INTRAVENOUS; SUBCUTANEOUS at 14:17

## 2019-02-11 RX ADMIN — SIMETHICONE 80 MILLIGRAM(S): 80 TABLET, CHEWABLE ORAL at 14:21

## 2019-02-11 RX ADMIN — ISOSORBIDE DINITRATE 30 MILLIGRAM(S): 5 TABLET ORAL at 22:32

## 2019-02-11 RX ADMIN — Medication 2.44 MICROGRAM(S)/KG/MIN: at 14:21

## 2019-02-11 RX ADMIN — Medication 100 MILLIGRAM(S): at 22:33

## 2019-02-11 RX ADMIN — ISOSORBIDE DINITRATE 30 MILLIGRAM(S): 5 TABLET ORAL at 14:19

## 2019-02-11 RX ADMIN — HEPARIN SODIUM 5000 UNIT(S): 5000 INJECTION INTRAVENOUS; SUBCUTANEOUS at 06:08

## 2019-02-11 RX ADMIN — SIMETHICONE 80 MILLIGRAM(S): 80 TABLET, CHEWABLE ORAL at 06:09

## 2019-02-11 RX ADMIN — Medication 100 MILLIGRAM(S): at 06:09

## 2019-02-11 RX ADMIN — Medication 100 MILLIGRAM(S): at 12:13

## 2019-02-11 RX ADMIN — BUMETANIDE 5 MG/HR: 0.25 INJECTION INTRAMUSCULAR; INTRAVENOUS at 12:09

## 2019-02-11 RX ADMIN — Medication 4.96 MICROGRAM(S)/KG/MIN: at 08:01

## 2019-02-11 RX ADMIN — Medication 81 MILLIGRAM(S): at 12:13

## 2019-02-11 RX ADMIN — SIMETHICONE 80 MILLIGRAM(S): 80 TABLET, CHEWABLE ORAL at 22:31

## 2019-02-11 RX ADMIN — SENNA PLUS 2 TABLET(S): 8.6 TABLET ORAL at 22:31

## 2019-02-11 NOTE — PROGRESS NOTE ADULT - SUBJECTIVE AND OBJECTIVE BOX
Date of Admission:  1/17/19  24 hour events:  No overnight events  Vital Signs Last 24 Hrs  T(C): 36.2 (11 Feb 2019 05:40), Max: 36.5 (10 Feb 2019 12:24)  T(F): 97.2 (11 Feb 2019 05:40), Max: 97.7 (10 Feb 2019 12:24)  HR: 90 (11 Feb 2019 05:40) (90 - 101)  BP: 109/62 (11 Feb 2019 05:40) (109/62 - 113/68)  BP(mean): --  RR: 18 (11 Feb 2019 05:40) (17 - 18)  SpO2: 100% (11 Feb 2019 05:40) (97% - 100%)  I&O's Summary    10 Feb 2019 07:01  -  11 Feb 2019 07:00  --------------------------------------------------------  IN: 0 mL / OUT: 500 mL / NET: -500 mL        MEDICATIONS:  aspirin enteric coated 81 milliGRAM(s) Oral daily  buMETAnide Infusion 1 mG/Hr IV Continuous <Continuous>  DOBUTamine Infusion 2.5 MICROgram(s)/kG/Min IV Continuous <Continuous>  heparin  Injectable 5000 Unit(s) SubCutaneous every 12 hours  hydrALAZINE 100 milliGRAM(s) Oral every 8 hours  isosorbide   dinitrate Tablet (ISORDIL) 30 milliGRAM(s) Oral three times a day  tamsulosin 0.4 milliGRAM(s) Oral at bedtime      benzonatate 100 milliGRAM(s) Oral three times a day  guaiFENesin   Syrup  (Sugar-Free) 200 milliGRAM(s) Oral every 6 hours PRN    oxyCODONE    IR 5 milliGRAM(s) Oral every 6 hours PRN    docusate sodium 100 milliGRAM(s) Oral daily  senna 2 Tablet(s) Oral at bedtime  simethicone 80 milliGRAM(s) Chew three times a day    dextrose 40% Gel 15 Gram(s) Oral once PRN  dextrose 50% Injectable 12.5 Gram(s) IV Push once  dextrose 50% Injectable 25 Gram(s) IV Push once  dextrose 50% Injectable 25 Gram(s) IV Push once  glucagon  Injectable 1 milliGRAM(s) IntraMuscular once PRN  insulin glargine Injectable (LANTUS) 20 Unit(s) SubCutaneous at bedtime  insulin lispro (HumaLOG) corrective regimen sliding scale   SubCutaneous three times a day before meals  insulin lispro (HumaLOG) corrective regimen sliding scale   SubCutaneous at bedtime    calamine Lotion 1 Application(s) Topical daily PRN  dextrose 5%. 1000 milliLiter(s) IV Continuous <Continuous>  sodium chloride 0.65% Nasal 1 Spray(s) Both Nostrils three times a day PRN  sodium chloride 0.9% lock flush 3 milliLiter(s) IV Push every 8 hours  sodium chloride 0.9% lock flush 3 milliLiter(s) IV Push every 8 hours      REVIEW OF SYSTEMS:  Complete 10point ROS negative.    PHYSICAL EXAM:  General: NAD  Cardiovascular: Normal S1 S2, No JVD, No murmurs, No edema  Respiratory: Lungs clear to auscultation	  Gastrointestinal:  Soft, Non-tender, + BS	  Skin: warm and dry, No rashes, No ecchymoses, No cyanosis	  Extremities: Normal range of motion, No clubbing, cyanosis or edema  Vascular: Peripheral pulses palpable 2+ bilaterally    LABS:	 	    CBC Full  -  ( 11 Feb 2019 05:05 )  WBC Count : 7.21 K/uL  Hemoglobin : 9.1 g/dL  Hematocrit : 28.3 %  Platelet Count - Automated : 248 K/uL  Mean Cell Volume : 72.4 fL  Mean Cell Hemoglobin : 23.3 pg  Mean Cell Hemoglobin Concentration : 32.2 %  Auto Neutrophil # : x  Auto Lymphocyte # : x  Auto Monocyte # : x  Auto Eosinophil # : x  Auto Basophil # : x  Auto Neutrophil % : x  Auto Lymphocyte % : x  Auto Monocyte % : x  Auto Eosinophil % : x  Auto Basophil % : x    02-10    132<L>  |  84<L>  |  70<H>  ----------------------------<  62<L>  4.0   |  29  |  3.00<H>    Ca    9.4      10 Feb 2019 06:01  Mg     2.3     02-10

## 2019-02-11 NOTE — PROGRESS NOTE ADULT - ASSESSMENT
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 Presents on this admission with acute on chronic systolic heart failure requiring inotropic support and aggressive diuresis.

## 2019-02-11 NOTE — PROGRESS NOTE ADULT - PROBLEM SELECTOR PLAN 1
-As per HF plan,  and with respect to the wishes of the patient and his family, d/c dobutamine gtt today and evalaute off inotropic support.  -obtain a venous blood gas and lactate  prior to stopping the inotrope and then 2 hours after it has been discontinued, repeat the venous gas and lactate. Results to be discussed with heart failure team.  -The patient was told he may need a PICC line for home infusion of dobutamine. At this time, the patient and family are uneasy with that, and would like to try before committing to a PICC line.  -If patient does not tolerate being off  dobutamine gtt, he will need to be set up for a PICC line for home infusion of dobutamine.   -Patient has refused ICD placement   -continue isordil 30 and hydralazine 100 tid, he was off BB due to dobutamine gtt, would hold off for now and see how he does off the dobutamine.   -continue to diurese with bumex gtt 1mg/hr   -continue to monitor urine output every 4 hours.   -daily standing weight.  -repeat a venous blood gas 2 hours after stopping the dobutamine gtt

## 2019-02-11 NOTE — PROGRESS NOTE ADULT - PROBLEM SELECTOR PLAN 1
Continue Bumex gtt 1 mg/hr and dobutamine gtt 2.5 mcg/kg/min.  Per family requests will try to wean off dobutamine and see if patient able to be hemodynamically stable.   Will stop Dobutamine gtt on Monday.   -hydral 100 mg po TID, and Isordil 30 mg po TID.   No BB while on dobutamine.  Strict I/O. Please get standing weight.   Pt refused ICD and does not want an evaluation for LVAD as well.  He is agreeable to PICC line w/ dobutamine gtt at home, IF he fails the wean. Continue Bumex gtt 1 mg/hr and dobutamine gtt cut to 1.25 mcg/kg/min.  Per family requests will try to wean off dobutamine and see if patient able to be hemodynamically stable.   Will stop Dobutamine gtt on Monday.   -hydral 100 mg po TID, and Isordil 30 mg po TID.   No BB while on dobutamine.  Strict I/O. Please get standing weight.   Pt refused ICD and does not want an evaluation for LVAD as well.  He is agreeable to PICC line w/ dobutamine gtt at home, IF he fails the wean.

## 2019-02-11 NOTE — PROGRESS NOTE ADULT - SUBJECTIVE AND OBJECTIVE BOX
Subjective:    Medications:  aspirin enteric coated 81 milliGRAM(s) Oral daily  benzonatate 100 milliGRAM(s) Oral three times a day  buMETAnide Infusion 1 mG/Hr IV Continuous <Continuous>  calamine Lotion 1 Application(s) Topical daily PRN  dextrose 40% Gel 15 Gram(s) Oral once PRN  dextrose 5%. 1000 milliLiter(s) IV Continuous <Continuous>  dextrose 50% Injectable 12.5 Gram(s) IV Push once  dextrose 50% Injectable 25 Gram(s) IV Push once  dextrose 50% Injectable 25 Gram(s) IV Push once  DOBUTamine Infusion 1.25 MICROgram(s)/kG/Min IV Continuous <Continuous>  docusate sodium 100 milliGRAM(s) Oral three times a day  glucagon  Injectable 1 milliGRAM(s) IntraMuscular once PRN  guaiFENesin   Syrup  (Sugar-Free) 200 milliGRAM(s) Oral every 6 hours PRN  heparin  Injectable 5000 Unit(s) SubCutaneous every 12 hours  hydrALAZINE 100 milliGRAM(s) Oral every 8 hours  insulin glargine Injectable (LANTUS) 15 Unit(s) SubCutaneous at bedtime  insulin lispro (HumaLOG) corrective regimen sliding scale   SubCutaneous three times a day before meals  insulin lispro (HumaLOG) corrective regimen sliding scale   SubCutaneous at bedtime  isosorbide   dinitrate Tablet (ISORDIL) 30 milliGRAM(s) Oral three times a day  lactulose Retention Enema 200 Gram(s) Rectal once  oxyCODONE    IR 5 milliGRAM(s) Oral every 6 hours PRN  polyethylene glycol 3350 17 Gram(s) Oral daily  senna 2 Tablet(s) Oral at bedtime  sevelamer hydrochloride 800 milliGRAM(s) Oral three times a day with meals  simethicone 80 milliGRAM(s) Chew three times a day  sodium chloride 0.65% Nasal 1 Spray(s) Both Nostrils three times a day PRN  sodium chloride 0.9% lock flush 3 milliLiter(s) IV Push every 8 hours  sodium chloride 0.9% lock flush 3 milliLiter(s) IV Push every 8 hours  tamsulosin 0.4 milliGRAM(s) Oral at bedtime      Physical Exam:      Vital Signs Last 24 Hrs  T(C): 36.6 (2019 14:15), Max: 36.7 (2019 12:15)  T(F): 97.9 (2019 14:15), Max: 98 (2019 12:15)  HR: 91 (2019 14:15) (90 - 101)  BP: 108/64 (2019 14:15) (108/64 - 112/70)  BP(mean): --  RR: 19 (:15) (18 - 19)  SpO2: 99% (:15) (97% - 100%)    Daily     Daily Weight in k.1 (2019 05:40)    I&O's Summary    10 Feb 2019 07:  -  2019 07:00  --------------------------------------------------------  IN: 0 mL / OUT: 500 mL / NET: -500 mL    2019 07:01  -  2019 17:57  --------------------------------------------------------  IN: 354 mL / OUT: 650 mL / NET: -296 mL        General: No distress. Comfortable.  HEENT: EOM intact.  Neck: Neck supple. JVP not elevated. No masses  Chest: Clear to auscultation bilaterally  CV: Normal S1 and S2. No murmurs, rub, or gallops. Radial pulses normal.  Abdomen: Soft, non-distended, non-tender  Skin: No rashes or skin breakdown  Neurology: Alert and oriented times three. Sensation intact  Psych: Affect normal    Labs:                        9.1    7.21  )-----------( 248      ( 2019 05:05 )             28.3     02-11    131<L>  |  83<L>  |  76<H>  ----------------------------<  40<LL>  3.3<L>   |  32<H>  |  3.16<H>    Ca    9.4      2019 05:05  Mg     2.3         Lactate, Blood: 1.6 mmol/L ( @ 12:50) Subjective: Some belly pain, which he attributes to constipation. Otherwise feeling much better.    Medications:  aspirin enteric coated 81 milliGRAM(s) Oral daily  benzonatate 100 milliGRAM(s) Oral three times a day  buMETAnide Infusion 1 mG/Hr IV Continuous <Continuous>  calamine Lotion 1 Application(s) Topical daily PRN  dextrose 40% Gel 15 Gram(s) Oral once PRN  dextrose 5%. 1000 milliLiter(s) IV Continuous <Continuous>  dextrose 50% Injectable 12.5 Gram(s) IV Push once  dextrose 50% Injectable 25 Gram(s) IV Push once  dextrose 50% Injectable 25 Gram(s) IV Push once  DOBUTamine Infusion 1.25 MICROgram(s)/kG/Min IV Continuous <Continuous>  docusate sodium 100 milliGRAM(s) Oral three times a day  glucagon  Injectable 1 milliGRAM(s) IntraMuscular once PRN  guaiFENesin   Syrup  (Sugar-Free) 200 milliGRAM(s) Oral every 6 hours PRN  heparin  Injectable 5000 Unit(s) SubCutaneous every 12 hours  hydrALAZINE 100 milliGRAM(s) Oral every 8 hours  insulin glargine Injectable (LANTUS) 15 Unit(s) SubCutaneous at bedtime  insulin lispro (HumaLOG) corrective regimen sliding scale   SubCutaneous three times a day before meals  insulin lispro (HumaLOG) corrective regimen sliding scale   SubCutaneous at bedtime  isosorbide   dinitrate Tablet (ISORDIL) 30 milliGRAM(s) Oral three times a day  lactulose Retention Enema 200 Gram(s) Rectal once  oxyCODONE    IR 5 milliGRAM(s) Oral every 6 hours PRN  polyethylene glycol 3350 17 Gram(s) Oral daily  senna 2 Tablet(s) Oral at bedtime  sevelamer hydrochloride 800 milliGRAM(s) Oral three times a day with meals  simethicone 80 milliGRAM(s) Chew three times a day  sodium chloride 0.65% Nasal 1 Spray(s) Both Nostrils three times a day PRN  sodium chloride 0.9% lock flush 3 milliLiter(s) IV Push every 8 hours  sodium chloride 0.9% lock flush 3 milliLiter(s) IV Push every 8 hours  tamsulosin 0.4 milliGRAM(s) Oral at bedtime      Physical Exam:      Vital Signs Last 24 Hrs  T(C): 36.6 (2019 14:15), Max: 36.7 (2019 12:15)  T(F): 97.9 (:15), Max: 98 (2019 12:15)  HR: 91 (:15) (90 - 101)  BP: 108/64 (2019 14:15) (108/64 - 112/70)  BP(mean): --  RR: 19 (:15) (18 - 19)  SpO2: 99% (:15) (97% - 100%)    Daily     Daily Weight in k.1 (2019 05:40)    I&O's Summary    10 Feb 2019 07:01  -  2019 07:00  --------------------------------------------------------  IN: 0 mL / OUT: 500 mL / NET: -500 mL    2019 07:01  -  2019 17:57  --------------------------------------------------------  IN: 354 mL / OUT: 650 mL / NET: -296 mL        General: No distress. Comfortable.  HEENT: EOM intact.  Neck: Neck supple. JVP not elevated. No masses  Chest: Mostly clear to auscultation bilaterally  CV: Normal S1 and S2. No murmurs, rub, or gallops. Occasional ectopy. 1+ tight leg edema.  Abdomen: Soft, non-distended, non-tender  Skin: No rashes or skin breakdown  Neurology: Alert and oriented times three. Sensation intact  Psych: Affect normal    Labs:                        9.1    7.21  )-----------( 248      ( 2019 05:05 )             28.3     02-11    131<L>  |  83<L>  |  76<H>  ----------------------------<  40<LL>  3.3<L>   |  32<H>  |  3.16<H>    Ca    9.4      2019 05:05  Mg     2.3         Lactate, Blood: 1.6 mmol/L ( @ 12:50)

## 2019-02-12 DIAGNOSIS — E87.3 ALKALOSIS: ICD-10-CM

## 2019-02-12 DIAGNOSIS — E83.39 OTHER DISORDERS OF PHOSPHORUS METABOLISM: ICD-10-CM

## 2019-02-12 LAB
ANION GAP SERPL CALC-SCNC: 17 MMO/L — HIGH (ref 7–14)
BUN SERPL-MCNC: 75 MG/DL — HIGH (ref 7–23)
CALCIUM SERPL-MCNC: 9 MG/DL — SIGNIFICANT CHANGE UP (ref 8.4–10.5)
CHLORIDE SERPL-SCNC: 81 MMOL/L — LOW (ref 98–107)
CO2 SERPL-SCNC: 31 MMOL/L — SIGNIFICANT CHANGE UP (ref 22–31)
CREAT SERPL-MCNC: 3.21 MG/DL — HIGH (ref 0.5–1.3)
GLUCOSE BLDC GLUCOMTR-MCNC: 127 MG/DL — HIGH (ref 70–99)
GLUCOSE BLDC GLUCOMTR-MCNC: 193 MG/DL — HIGH (ref 70–99)
GLUCOSE BLDC GLUCOMTR-MCNC: 83 MG/DL — SIGNIFICANT CHANGE UP (ref 70–99)
GLUCOSE BLDC GLUCOMTR-MCNC: 93 MG/DL — SIGNIFICANT CHANGE UP (ref 70–99)
GLUCOSE SERPL-MCNC: 47 MG/DL — LOW (ref 70–99)
HCT VFR BLD CALC: 26.7 % — LOW (ref 39–50)
HGB BLD-MCNC: 8.6 G/DL — LOW (ref 13–17)
LACTATE SERPL-SCNC: 1.1 MMOL/L — SIGNIFICANT CHANGE UP (ref 0.5–2)
MAGNESIUM SERPL-MCNC: 2.2 MG/DL — SIGNIFICANT CHANGE UP (ref 1.6–2.6)
MCHC RBC-ENTMCNC: 23.4 PG — LOW (ref 27–34)
MCHC RBC-ENTMCNC: 32.2 % — SIGNIFICANT CHANGE UP (ref 32–36)
MCV RBC AUTO: 72.6 FL — LOW (ref 80–100)
NRBC # FLD: 0 K/UL — LOW (ref 25–125)
PLATELET # BLD AUTO: 232 K/UL — SIGNIFICANT CHANGE UP (ref 150–400)
PMV BLD: SIGNIFICANT CHANGE UP FL (ref 7–13)
POTASSIUM SERPL-MCNC: 3.2 MMOL/L — LOW (ref 3.5–5.3)
POTASSIUM SERPL-SCNC: 3.2 MMOL/L — LOW (ref 3.5–5.3)
RBC # BLD: 3.68 M/UL — LOW (ref 4.2–5.8)
RBC # FLD: 24.7 % — HIGH (ref 10.3–14.5)
SODIUM SERPL-SCNC: 129 MMOL/L — LOW (ref 135–145)
WBC # BLD: 7.28 K/UL — SIGNIFICANT CHANGE UP (ref 3.8–10.5)
WBC # FLD AUTO: 7.28 K/UL — SIGNIFICANT CHANGE UP (ref 3.8–10.5)

## 2019-02-12 PROCEDURE — 74018 RADEX ABDOMEN 1 VIEW: CPT | Mod: 26

## 2019-02-12 PROCEDURE — 99233 SBSQ HOSP IP/OBS HIGH 50: CPT

## 2019-02-12 RX ORDER — BUMETANIDE 0.25 MG/ML
0.5 INJECTION INTRAMUSCULAR; INTRAVENOUS
Qty: 20 | Refills: 0 | Status: DISCONTINUED | OUTPATIENT
Start: 2019-02-12 | End: 2019-02-13

## 2019-02-12 RX ORDER — BUMETANIDE 0.25 MG/ML
1 INJECTION INTRAMUSCULAR; INTRAVENOUS EVERY 12 HOURS
Qty: 0 | Refills: 0 | Status: DISCONTINUED | OUTPATIENT
Start: 2019-02-12 | End: 2019-02-12

## 2019-02-12 RX ORDER — POTASSIUM CHLORIDE 20 MEQ
40 PACKET (EA) ORAL EVERY 4 HOURS
Qty: 0 | Refills: 0 | Status: COMPLETED | OUTPATIENT
Start: 2019-02-12 | End: 2019-02-12

## 2019-02-12 RX ADMIN — INSULIN GLARGINE 15 UNIT(S): 100 INJECTION, SOLUTION SUBCUTANEOUS at 22:55

## 2019-02-12 RX ADMIN — SODIUM CHLORIDE 3 MILLILITER(S): 9 INJECTION INTRAMUSCULAR; INTRAVENOUS; SUBCUTANEOUS at 13:19

## 2019-02-12 RX ADMIN — Medication 2: at 18:15

## 2019-02-12 RX ADMIN — Medication 100 MILLIGRAM(S): at 13:18

## 2019-02-12 RX ADMIN — SIMETHICONE 80 MILLIGRAM(S): 80 TABLET, CHEWABLE ORAL at 13:20

## 2019-02-12 RX ADMIN — Medication 40 MILLIEQUIVALENT(S): at 11:25

## 2019-02-12 RX ADMIN — Medication 100 MILLIGRAM(S): at 21:45

## 2019-02-12 RX ADMIN — SODIUM CHLORIDE 3 MILLILITER(S): 9 INJECTION INTRAMUSCULAR; INTRAVENOUS; SUBCUTANEOUS at 06:28

## 2019-02-12 RX ADMIN — Medication 100 MILLIGRAM(S): at 06:28

## 2019-02-12 RX ADMIN — SODIUM CHLORIDE 3 MILLILITER(S): 9 INJECTION INTRAMUSCULAR; INTRAVENOUS; SUBCUTANEOUS at 21:46

## 2019-02-12 RX ADMIN — ISOSORBIDE DINITRATE 30 MILLIGRAM(S): 5 TABLET ORAL at 21:45

## 2019-02-12 RX ADMIN — Medication 81 MILLIGRAM(S): at 11:26

## 2019-02-12 RX ADMIN — TAMSULOSIN HYDROCHLORIDE 0.4 MILLIGRAM(S): 0.4 CAPSULE ORAL at 21:45

## 2019-02-12 RX ADMIN — Medication 40 MILLIEQUIVALENT(S): at 15:36

## 2019-02-12 RX ADMIN — ISOSORBIDE DINITRATE 30 MILLIGRAM(S): 5 TABLET ORAL at 13:17

## 2019-02-12 RX ADMIN — SIMETHICONE 80 MILLIGRAM(S): 80 TABLET, CHEWABLE ORAL at 21:45

## 2019-02-12 RX ADMIN — ISOSORBIDE DINITRATE 30 MILLIGRAM(S): 5 TABLET ORAL at 06:28

## 2019-02-12 RX ADMIN — SIMETHICONE 80 MILLIGRAM(S): 80 TABLET, CHEWABLE ORAL at 06:28

## 2019-02-12 RX ADMIN — POLYETHYLENE GLYCOL 3350 17 GRAM(S): 17 POWDER, FOR SOLUTION ORAL at 11:26

## 2019-02-12 RX ADMIN — SODIUM CHLORIDE 3 MILLILITER(S): 9 INJECTION INTRAMUSCULAR; INTRAVENOUS; SUBCUTANEOUS at 21:47

## 2019-02-12 NOTE — PROGRESS NOTE ADULT - SUBJECTIVE AND OBJECTIVE BOX
Date of Admission:  1/17/19  24 hour events:  No overnight events   Vital Signs Last 24 Hrs  T(C): 36.5 (12 Feb 2019 06:20), Max: 36.7 (11 Feb 2019 12:15)  T(F): 97.7 (12 Feb 2019 06:20), Max: 98.1 (11 Feb 2019 22:22)  HR: 89 (12 Feb 2019 06:20) (89 - 95)  BP: 112/63 (12 Feb 2019 06:20) (108/64 - 112/63)  BP(mean): --  RR: 18 (12 Feb 2019 06:20) (18 - 19)  SpO2: 100% (12 Feb 2019 06:20) (98% - 100%)  I&O's Summary    11 Feb 2019 07:01  -  12 Feb 2019 07:00  --------------------------------------------------------  IN: 614 mL / OUT: 1050 mL / NET: -436 mL        MEDICATIONS:  aspirin enteric coated 81 milliGRAM(s) Oral daily  buMETAnide 1 milliGRAM(s) Oral every 12 hours  DOBUTamine Infusion 1.25 MICROgram(s)/kG/Min IV Continuous <Continuous>  heparin  Injectable 5000 Unit(s) SubCutaneous every 12 hours  hydrALAZINE 100 milliGRAM(s) Oral every 8 hours  isosorbide   dinitrate Tablet (ISORDIL) 30 milliGRAM(s) Oral three times a day  tamsulosin 0.4 milliGRAM(s) Oral at bedtime      benzonatate 100 milliGRAM(s) Oral three times a day  guaiFENesin   Syrup  (Sugar-Free) 200 milliGRAM(s) Oral every 6 hours PRN    oxyCODONE    IR 5 milliGRAM(s) Oral every 6 hours PRN    docusate sodium 100 milliGRAM(s) Oral three times a day  polyethylene glycol 3350 17 Gram(s) Oral daily  senna 2 Tablet(s) Oral at bedtime  simethicone 80 milliGRAM(s) Chew three times a day    dextrose 40% Gel 15 Gram(s) Oral once PRN  dextrose 50% Injectable 12.5 Gram(s) IV Push once  dextrose 50% Injectable 25 Gram(s) IV Push once  dextrose 50% Injectable 25 Gram(s) IV Push once  glucagon  Injectable 1 milliGRAM(s) IntraMuscular once PRN  insulin glargine Injectable (LANTUS) 15 Unit(s) SubCutaneous at bedtime  insulin lispro (HumaLOG) corrective regimen sliding scale   SubCutaneous three times a day before meals  insulin lispro (HumaLOG) corrective regimen sliding scale   SubCutaneous at bedtime    calamine Lotion 1 Application(s) Topical daily PRN  dextrose 5%. 1000 milliLiter(s) IV Continuous <Continuous>  potassium chloride    Tablet ER 40 milliEquivalent(s) Oral every 4 hours  sodium chloride 0.65% Nasal 1 Spray(s) Both Nostrils three times a day PRN  sodium chloride 0.9% lock flush 3 milliLiter(s) IV Push every 8 hours  sodium chloride 0.9% lock flush 3 milliLiter(s) IV Push every 8 hours      REVIEW OF SYSTEMS:  Complete 10point ROS negative.    PHYSICAL EXAM:  General: NAD  Cardiovascular: Normal S1 S2, No JVD, No murmurs, No edema  Respiratory: Lungs clear to auscultation	  Gastrointestinal:  Soft, Non-tender, + BS	  Skin: warm and dry, No rashes, No ecchymoses, No cyanosis	  Extremities: Normal range of motion, No clubbing, cyanosis or edema  Vascular: Peripheral pulses palpable 2+ bilaterally    LABS:	 	    CBC Full  -  ( 12 Feb 2019 06:10 )  WBC Count : 7.28 K/uL  Hemoglobin : 8.6 g/dL  Hematocrit : 26.7 %  Platelet Count - Automated : 232 K/uL  Mean Cell Volume : 72.6 fL  Mean Cell Hemoglobin : 23.4 pg  Mean Cell Hemoglobin Concentration : 32.2 %  Auto Neutrophil # : x  Auto Lymphocyte # : x  Auto Monocyte # : x  Auto Eosinophil # : x  Auto Basophil # : x  Auto Neutrophil % : x  Auto Lymphocyte % : x  Auto Monocyte % : x  Auto Eosinophil % : x  Auto Basophil % : x    02-12    129<L>  |  81<L>  |  75<H>  ----------------------------<  47<L>  3.2<L>   |  31  |  3.21<H>  02-11    131<L>  |  83<L>  |  76<H>  ----------------------------<  40<LL>  3.3<L>   |  32<H>  |  3.16<H>    Ca    9.0      12 Feb 2019 06:10  Ca    9.4      11 Feb 2019 05:05  Mg     2.2     02-12  Mg     2.3     02-11        proBNP:   Lipid Profile:   HgA1c:   TSH:       CARDIAC MARKERS:            TELEMETRY: 	    ECG:  	  RADIOLOGY:  ECHO:  OTHER: 	    PREVIOUS DIAGNOSTIC TESTING:    [ ] Echocardiogram:  [ ]  Catheterization:  [ ] Stress Test:

## 2019-02-12 NOTE — PROGRESS NOTE ADULT - SUBJECTIVE AND OBJECTIVE BOX
[facility] DIVISION OF KIDNEY DISEASES AND HYPERTENSION -- PROGRESS NOTE    Chief complaint: KESHAV    24 hour events/subjective: feels better        PAST HISTORY  --------------------------------------------------------------------------------  No significant changes to PMH, PSH, FHx, SHx, unless otherwise noted    ALLERGIES & MEDICATIONS  --------------------------------------------------------------------------------  Allergies    No Known Allergies    Intolerances      Standing Inpatient Medications  aspirin enteric coated 81 milliGRAM(s) Oral daily  benzonatate 100 milliGRAM(s) Oral three times a day  buMETAnide Infusion 0.5 mG/Hr IV Continuous <Continuous>  dextrose 5%. 1000 milliLiter(s) IV Continuous <Continuous>  dextrose 50% Injectable 12.5 Gram(s) IV Push once  dextrose 50% Injectable 25 Gram(s) IV Push once  dextrose 50% Injectable 25 Gram(s) IV Push once  docusate sodium 100 milliGRAM(s) Oral three times a day  heparin  Injectable 5000 Unit(s) SubCutaneous every 12 hours  hydrALAZINE 100 milliGRAM(s) Oral every 8 hours  insulin glargine Injectable (LANTUS) 15 Unit(s) SubCutaneous at bedtime  insulin lispro (HumaLOG) corrective regimen sliding scale   SubCutaneous three times a day before meals  insulin lispro (HumaLOG) corrective regimen sliding scale   SubCutaneous at bedtime  isosorbide   dinitrate Tablet (ISORDIL) 30 milliGRAM(s) Oral three times a day  polyethylene glycol 3350 17 Gram(s) Oral daily  potassium chloride    Tablet ER 40 milliEquivalent(s) Oral every 4 hours  senna 2 Tablet(s) Oral at bedtime  sevelamer hydrochloride 800 milliGRAM(s) Oral three times a day with meals  simethicone 80 milliGRAM(s) Chew three times a day  sodium chloride 0.9% lock flush 3 milliLiter(s) IV Push every 8 hours  sodium chloride 0.9% lock flush 3 milliLiter(s) IV Push every 8 hours  tamsulosin 0.4 milliGRAM(s) Oral at bedtime    PRN Inpatient Medications  calamine Lotion 1 Application(s) Topical daily PRN  dextrose 40% Gel 15 Gram(s) Oral once PRN  glucagon  Injectable 1 milliGRAM(s) IntraMuscular once PRN  guaiFENesin   Syrup  (Sugar-Free) 200 milliGRAM(s) Oral every 6 hours PRN  oxyCODONE    IR 5 milliGRAM(s) Oral every 6 hours PRN  sodium chloride 0.65% Nasal 1 Spray(s) Both Nostrils three times a day PRN      REVIEW OF SYSTEMS  --------------------------------------------------------------------------------  Constitutional: [ ] Fever [ ] Chills [ ] Fatigue [ ] Weight change   HEENT: [ ] Blurred vision [ ] Eye Pain [ ] Headache [ ] Runny nose [ ] Sore Throat   Respiratory: [ ] Cough [ ] Wheezing [ ] Shortness of breath  Cardiovascular: [ ] Chest Pain [ ] Palpitations [ ] ORTA [ ] PND [ ] Orthopnea  Gastrointestinal: [ ] Abdominal Pain [ ] Diarrhea [ ] Constipation [ ] Hemorrhoids [ ] Nausea [ ] Vomiting  Genitourinary: [ ] Nocturia [ ] Dysuria [ ] Incontinence  Extremities: [ ] Swelling [ ] Joint Pain  Neurologic: [ ] Focal deficit [ ] Paresthesias [ ] Syncope  Lymphatic: [ ] Swelling [ ] Lymphadenopathy   Skin: [ ] Rash [ ] Ecchymoses [ ] Wounds [ ] Lesions  Psychiatry: [ ] Depression [ ] Suicidal/Homicidal Ideation [ ] Anxiety [ ] Sleep Disturbances  [x ] 10 point review of systems is otherwise negative except as mentioned above              [ ]Unable to obtain due to   All other systems were reviewed and are negative, except as noted.    VITALS/PHYSICAL EXAM  --------------------------------------------------------------------------------  T(C): 36.6 (02-12-19 @ 13:20), Max: 36.7 (02-11-19 @ 22:22)  HR: 90 (02-12-19 @ 13:20) (89 - 95)  BP: 126/66 (02-12-19 @ 13:20) (108/64 - 126/66)  RR: 17 (02-12-19 @ 13:20) (17 - 19)  SpO2: 100% (02-12-19 @ 13:20) (99% - 100%)  Wt(kg): --        02-11-19 @ 07:01  -  02-12-19 @ 07:00  --------------------------------------------------------  IN: 614 mL / OUT: 1050 mL / NET: -436 mL      Physical Exam:  	Gen: NAD, well-appearing  	HEENT: on room air  	Pulm: CTA B/L  	CV: normal S1S2; no rub  	Abd: soft                      Back : No sacral edema  	: No degroot  	LE: no edema  	Skin: Warm, without rashes  	    LABS/STUDIES  --------------------------------------------------------------------------------              8.6    7.28  >-----------<  232      [02-12-19 @ 06:10]              26.7     129  |  81  |  75  ----------------------------<  47      [02-12-19 @ 06:10]  3.2   |  31  |  3.21        Ca     9.0     [02-12-19 @ 06:10]      Mg     2.2     [02-12-19 @ 06:10]            Creatinine Trend:  SCr 3.21 [02-12 @ 06:10]  SCr 3.16 [02-11 @ 05:05]  SCr 3.00 [02-10 @ 06:01]  SCr 3.02 [02-09 @ 07:27]  SCr 3.16 [02-08 @ 17:43]    Urinalysis - [01-24-19 @ 12:10]      Color LIGHT YELLOW / Appearance CLEAR / SG 1.008 / pH 5.0      Gluc NEGATIVE / Ketone NEGATIVE  / Bili NEGATIVE / Urobili NORMAL       Blood NEGATIVE / Protein NEGATIVE / Leuk Est NEGATIVE / Nitrite NEGATIVE      RBC  / WBC  / Hyaline  / Gran  / Sq Epi  / Non Sq Epi  / Bacteria       Iron 35, TIBC 339, %sat --      [01-21-19 @ 06:30]  Ferritin 114.3      [01-21-19 @ 06:30]  Vitamin D (25OH) 22.8      [01-18-19 @ 06:00]  HbA1c 7.8      [01-17-19 @ 06:34]  TSH 3.72      [01-17-19 @ 06:34]  Lipid: chol 95, TG 47, HDL 33, LDL 59      [01-17-19 @ 06:34]

## 2019-02-12 NOTE — PROGRESS NOTE ADULT - SUBJECTIVE AND OBJECTIVE BOX
Medications:  aspirin enteric coated 81 milliGRAM(s) Oral daily  benzonatate 100 milliGRAM(s) Oral three times a day  buMETAnide Infusion 0.5 mG/Hr IV Continuous <Continuous>  calamine Lotion 1 Application(s) Topical daily PRN  dextrose 40% Gel 15 Gram(s) Oral once PRN  dextrose 5%. 1000 milliLiter(s) IV Continuous <Continuous>  dextrose 50% Injectable 12.5 Gram(s) IV Push once  dextrose 50% Injectable 25 Gram(s) IV Push once  dextrose 50% Injectable 25 Gram(s) IV Push once  docusate sodium 100 milliGRAM(s) Oral three times a day  glucagon  Injectable 1 milliGRAM(s) IntraMuscular once PRN  guaiFENesin   Syrup  (Sugar-Free) 200 milliGRAM(s) Oral every 6 hours PRN  heparin  Injectable 5000 Unit(s) SubCutaneous every 12 hours  hydrALAZINE 100 milliGRAM(s) Oral every 8 hours  insulin glargine Injectable (LANTUS) 15 Unit(s) SubCutaneous at bedtime  insulin lispro (HumaLOG) corrective regimen sliding scale   SubCutaneous three times a day before meals  insulin lispro (HumaLOG) corrective regimen sliding scale   SubCutaneous at bedtime  isosorbide   dinitrate Tablet (ISORDIL) 30 milliGRAM(s) Oral three times a day  oxyCODONE    IR 5 milliGRAM(s) Oral every 6 hours PRN  polyethylene glycol 3350 17 Gram(s) Oral daily  potassium chloride    Tablet ER 40 milliEquivalent(s) Oral every 4 hours  senna 2 Tablet(s) Oral at bedtime  sevelamer hydrochloride 800 milliGRAM(s) Oral three times a day with meals  simethicone 80 milliGRAM(s) Chew three times a day  sodium chloride 0.65% Nasal 1 Spray(s) Both Nostrils three times a day PRN  sodium chloride 0.9% lock flush 3 milliLiter(s) IV Push every 8 hours  sodium chloride 0.9% lock flush 3 milliLiter(s) IV Push every 8 hours  tamsulosin 0.4 milliGRAM(s) Oral at bedtime      Vitals:  Vital Signs Last 24 Hrs  T(C): 36.6 (2019 13:20), Max: 36.7 (2019 22:22)  T(F): 97.9 (2019 13:20), Max: 98.1 (2019 22:22)  HR: 90 (:20) (89 - 95)  BP: 126/66 (2019 13:20) (111/80 - 126/66)  BP(mean): --  RR: 17 (2019 13:20) (17 - 18)  SpO2: 100% (:20) (100% - 100%)    Daily     Daily Weight in k.1 (2019 07:22)    I&O's Detail    2019 07:01  -  2019 07:00  --------------------------------------------------------  IN:    Oral Fluid: 614 mL  Total IN: 614 mL    OUT:    Voided: 1050 mL  Total OUT: 1050 mL    Total NET: -436 mL          Physical Exam:     General: No distress. Comfortable.  HEENT: EOM intact.  Neck: Neck supple. JVP not elevated. No masses  Chest: Clear to auscultation bilaterally  CV: Normal S1 and S2. No murmurs, rub, or gallops. Radial pulses normal.  Abdomen: Soft, non-distended, non-tender  Skin: No rashes or skin breakdown  Neurology: Alert and oriented times three. Sensation intact  Psych: Affect normal    Labs:                        8.6    7.28  )-----------( 232      ( 2019 06:10 )             26.7     0212    129<L>  |  81<L>  |  75<H>  ----------------------------<  47<L>  3.2<L>   |  31  |  3.21<H>    Ca    9.0      2019 06:10  Mg     2.2     0212

## 2019-02-12 NOTE — PROGRESS NOTE ADULT - ATTENDING COMMENTS
stopped. Bumex gtt cut to 0.5 mg/hr.  Will monitor.  stopped. Bumex gtt cut to 0.5 mg/hr.  Will monitor.  Abdominal US in AM to r/o ascites.

## 2019-02-12 NOTE — CHART NOTE - NSCHARTNOTEFT_GEN_A_CORE
Abd xray prelim -->  Scattered loops of air filled bowel, which are not  dilated,  Follow-up official report. OK to give enema as ordered

## 2019-02-12 NOTE — PROGRESS NOTE ADULT - PROBLEM SELECTOR PLAN 1
-d/c dobutamine gtt today and evalaute off inotropic support.  -continue to follow heart failure recommendations  -Creatinine slight trend up, decrease bumex to 1mg po bid  -continue current meds isordil 30 tid and hydral 100 tid  -if patient does not tolerate being off dobutamine, will need to set up for PICC line.

## 2019-02-12 NOTE — PROGRESS NOTE ADULT - PROBLEM SELECTOR PLAN 2
Patient with hypervolemia in the setting of ADHF. Patient currently on Bumex. Diuretic and ADHF management per cardiology. Monitor daily weights. Low salt diet

## 2019-02-12 NOTE — CHART NOTE - NSCHARTNOTEFT_GEN_A_CORE
pt seen at bedside continue to complaint of constipation. Per pt only had small BM. No N/V but abdominal discomfort due to hard to pass stool and feeling bloated. Abdomen exam + BS. Soft. nonspecific tenderness.  fluid wave. ? ascities. will check ABD Xray if no obstructive pattern will give smog enema. Abdomen US ordered to assess ascities pt seen at bedside continue to complaint of constipation. Per pt only had small BM. No N/V but abdominal discomfort due to hard to pass stool and feeling bloated. + passing gas. Abdomen exam + BS. Soft. nonspecific tenderness.  fluid wave. ? ascities. will check ABD Xray if no obstructive pattern will give smog enema. Abdomen US ordered to assess ascities

## 2019-02-12 NOTE — PROGRESS NOTE ADULT - PROBLEM SELECTOR PLAN 1
Pt. with KESHAV in the setting of ADHF. On review of previous records in [facility], patient with normal Scr levels from 4/26/16 to 9/24/18. Pt. noted to have an episode of KESHAV during previous/recent hospitalization at [facility] (12/7/18 to 12/27/18). On this admission, Scr was elevated at 2.15 on 1/16/19, peaked to 4.0 on 1/25/19 and remains stable today. Pt. on Bumex as per cardiology. Pt. non-oliguric with good urine output.   US Kidney showed increased bilateral renal cortical echogenicity, no hydronephrosis on 1/18/19. Monitor labs and urine output. Avoid nephrotoxins. Dose medications as per eGFR

## 2019-02-12 NOTE — PROGRESS NOTE ADULT - ATTENDING COMMENTS
Agree with above assessment and plan  Bumex to 1 mg PO BID  Continue to watch off dobutamine  HF recommendations appreciated  Will follow

## 2019-02-13 DIAGNOSIS — E87.1 HYPO-OSMOLALITY AND HYPONATREMIA: ICD-10-CM

## 2019-02-13 LAB
ANION GAP SERPL CALC-SCNC: 17 MMO/L — HIGH (ref 7–14)
BUN SERPL-MCNC: 78 MG/DL — HIGH (ref 7–23)
CALCIUM SERPL-MCNC: 8.9 MG/DL — SIGNIFICANT CHANGE UP (ref 8.4–10.5)
CHLORIDE SERPL-SCNC: 79 MMOL/L — LOW (ref 98–107)
CO2 SERPL-SCNC: 28 MMOL/L — SIGNIFICANT CHANGE UP (ref 22–31)
CORTIS SERPL-MCNC: 28 UG/DL — HIGH (ref 2.7–18.4)
CREAT SERPL-MCNC: 3.09 MG/DL — HIGH (ref 0.5–1.3)
GLUCOSE BLDC GLUCOMTR-MCNC: 105 MG/DL — HIGH (ref 70–99)
GLUCOSE BLDC GLUCOMTR-MCNC: 128 MG/DL — HIGH (ref 70–99)
GLUCOSE BLDC GLUCOMTR-MCNC: 164 MG/DL — HIGH (ref 70–99)
GLUCOSE BLDC GLUCOMTR-MCNC: 191 MG/DL — HIGH (ref 70–99)
GLUCOSE BLDC GLUCOMTR-MCNC: 86 MG/DL — SIGNIFICANT CHANGE UP (ref 70–99)
GLUCOSE BLDC GLUCOMTR-MCNC: 94 MG/DL — SIGNIFICANT CHANGE UP (ref 70–99)
GLUCOSE SERPL-MCNC: 44 MG/DL — CRITICAL LOW (ref 70–99)
HCT VFR BLD CALC: 28.4 % — LOW (ref 39–50)
HGB BLD-MCNC: 9.2 G/DL — LOW (ref 13–17)
LACTATE SERPL-SCNC: 1.3 MMOL/L — SIGNIFICANT CHANGE UP (ref 0.5–2)
MAGNESIUM SERPL-MCNC: 2.2 MG/DL — SIGNIFICANT CHANGE UP (ref 1.6–2.6)
MCHC RBC-ENTMCNC: 23.2 PG — LOW (ref 27–34)
MCHC RBC-ENTMCNC: 32.4 % — SIGNIFICANT CHANGE UP (ref 32–36)
MCV RBC AUTO: 71.5 FL — LOW (ref 80–100)
NRBC # FLD: 0 K/UL — LOW (ref 25–125)
OSMOLALITY SERPL: 289 MOSMO/KG — SIGNIFICANT CHANGE UP (ref 275–295)
OSMOLALITY UR: 288 MOSMO/KG — SIGNIFICANT CHANGE UP (ref 50–1200)
PHOSPHATE SERPL-MCNC: 4.3 MG/DL — SIGNIFICANT CHANGE UP (ref 2.5–4.5)
PLATELET # BLD AUTO: 289 K/UL — SIGNIFICANT CHANGE UP (ref 150–400)
PMV BLD: SIGNIFICANT CHANGE UP FL (ref 7–13)
POTASSIUM SERPL-MCNC: 3.7 MMOL/L — SIGNIFICANT CHANGE UP (ref 3.5–5.3)
POTASSIUM SERPL-SCNC: 3.7 MMOL/L — SIGNIFICANT CHANGE UP (ref 3.5–5.3)
RBC # BLD: 3.97 M/UL — LOW (ref 4.2–5.8)
RBC # FLD: 24.5 % — HIGH (ref 10.3–14.5)
SODIUM SERPL-SCNC: 123 MMOL/L — LOW (ref 135–145)
SODIUM SERPL-SCNC: 124 MMOL/L — LOW (ref 135–145)
SODIUM UR-SCNC: < 20 MMOL/L — SIGNIFICANT CHANGE UP
TSH SERPL-MCNC: 3.27 UIU/ML — SIGNIFICANT CHANGE UP (ref 0.27–4.2)
URATE SERPL-MCNC: 13.3 MG/DL — HIGH (ref 3.4–8.8)
WBC # BLD: 8.31 K/UL — SIGNIFICANT CHANGE UP (ref 3.8–10.5)
WBC # FLD AUTO: 8.31 K/UL — SIGNIFICANT CHANGE UP (ref 3.8–10.5)

## 2019-02-13 PROCEDURE — 99232 SBSQ HOSP IP/OBS MODERATE 35: CPT

## 2019-02-13 PROCEDURE — 76705 ECHO EXAM OF ABDOMEN: CPT | Mod: 26

## 2019-02-13 PROCEDURE — 99233 SBSQ HOSP IP/OBS HIGH 50: CPT

## 2019-02-13 RX ORDER — BUMETANIDE 0.25 MG/ML
1 INJECTION INTRAMUSCULAR; INTRAVENOUS
Qty: 20 | Refills: 0 | Status: DISCONTINUED | OUTPATIENT
Start: 2019-02-13 | End: 2019-02-17

## 2019-02-13 RX ORDER — POTASSIUM CHLORIDE 20 MEQ
20 PACKET (EA) ORAL ONCE
Qty: 0 | Refills: 0 | Status: COMPLETED | OUTPATIENT
Start: 2019-02-13 | End: 2019-02-13

## 2019-02-13 RX ADMIN — Medication 100 MILLIGRAM(S): at 06:03

## 2019-02-13 RX ADMIN — Medication 100 MILLIGRAM(S): at 21:36

## 2019-02-13 RX ADMIN — Medication 100 MILLIGRAM(S): at 13:48

## 2019-02-13 RX ADMIN — BUMETANIDE 5 MG/HR: 0.25 INJECTION INTRAMUSCULAR; INTRAVENOUS at 17:37

## 2019-02-13 RX ADMIN — SODIUM CHLORIDE 3 MILLILITER(S): 9 INJECTION INTRAMUSCULAR; INTRAVENOUS; SUBCUTANEOUS at 06:05

## 2019-02-13 RX ADMIN — SIMETHICONE 80 MILLIGRAM(S): 80 TABLET, CHEWABLE ORAL at 13:26

## 2019-02-13 RX ADMIN — SODIUM CHLORIDE 3 MILLILITER(S): 9 INJECTION INTRAMUSCULAR; INTRAVENOUS; SUBCUTANEOUS at 13:57

## 2019-02-13 RX ADMIN — INSULIN GLARGINE 15 UNIT(S): 100 INJECTION, SOLUTION SUBCUTANEOUS at 21:37

## 2019-02-13 RX ADMIN — Medication 2: at 13:25

## 2019-02-13 RX ADMIN — SIMETHICONE 80 MILLIGRAM(S): 80 TABLET, CHEWABLE ORAL at 21:36

## 2019-02-13 RX ADMIN — ISOSORBIDE DINITRATE 30 MILLIGRAM(S): 5 TABLET ORAL at 13:48

## 2019-02-13 RX ADMIN — SENNA PLUS 2 TABLET(S): 8.6 TABLET ORAL at 21:36

## 2019-02-13 RX ADMIN — ISOSORBIDE DINITRATE 30 MILLIGRAM(S): 5 TABLET ORAL at 06:03

## 2019-02-13 RX ADMIN — Medication 100 MILLIGRAM(S): at 13:25

## 2019-02-13 RX ADMIN — SODIUM CHLORIDE 3 MILLILITER(S): 9 INJECTION INTRAMUSCULAR; INTRAVENOUS; SUBCUTANEOUS at 13:58

## 2019-02-13 RX ADMIN — SIMETHICONE 80 MILLIGRAM(S): 80 TABLET, CHEWABLE ORAL at 06:03

## 2019-02-13 RX ADMIN — SODIUM CHLORIDE 3 MILLILITER(S): 9 INJECTION INTRAMUSCULAR; INTRAVENOUS; SUBCUTANEOUS at 21:41

## 2019-02-13 RX ADMIN — Medication 81 MILLIGRAM(S): at 11:03

## 2019-02-13 RX ADMIN — SODIUM CHLORIDE 3 MILLILITER(S): 9 INJECTION INTRAMUSCULAR; INTRAVENOUS; SUBCUTANEOUS at 21:40

## 2019-02-13 RX ADMIN — Medication 20 MILLIEQUIVALENT(S): at 17:41

## 2019-02-13 RX ADMIN — TAMSULOSIN HYDROCHLORIDE 0.4 MILLIGRAM(S): 0.4 CAPSULE ORAL at 21:36

## 2019-02-13 RX ADMIN — ISOSORBIDE DINITRATE 30 MILLIGRAM(S): 5 TABLET ORAL at 21:36

## 2019-02-13 NOTE — PROGRESS NOTE ADULT - PROBLEM SELECTOR PLAN 1
Pt not tolerating being off dobutamine- he is tachypnic, abdomen w/ ascites, feels weak, no appetite.   Will d/w Dr. Alexander about restarting dobutamine today.   Continue Bumex gtt 0.5 mg/hr.   Will stop Dobutamine gtt on Monday.   -hydral 100 mg po TID, and Isordil 30 mg po TID.   No BB while on dobutamine.  Strict I/O. Please get standing weight.   Pt refused ICD and does not want an evaluation for LVAD as well.  He is agreeable to PICC line w/ dobutamine gtt at home. Pt not tolerating being off dobutamine- he is tachypnic, abdomen w/ ascites, feels weak, no appetite.   Will d/w Dr. Alexander about restarting dobutamine today.   Continue Bumex gtt 0.5 mg/hr.   -hydral 100 mg po TID, and Isordil 30 mg po TID.   No BB while on dobutamine.  Strict I/O. Please get standing weight.   Pt refused ICD and does not want an evaluation for LVAD as well.  He is agreeable to PICC line w/ dobutamine gtt at home.

## 2019-02-13 NOTE — PROGRESS NOTE ADULT - SUBJECTIVE AND OBJECTIVE BOX
[facility] DIVISION OF KIDNEY DISEASES AND HYPERTENSION -- PROGRESS NOTE    Chief complaint: KESHAV    24 hour events/subjective: dose of bumex decreases yesterday        PAST HISTORY  --------------------------------------------------------------------------------  No significant changes to PMH, PSH, FHx, SHx, unless otherwise noted    ALLERGIES & MEDICATIONS  --------------------------------------------------------------------------------  Allergies    No Known Allergies    Intolerances      Standing Inpatient Medications  aspirin enteric coated 81 milliGRAM(s) Oral daily  benzonatate 100 milliGRAM(s) Oral three times a day  buMETAnide Infusion 0.5 mG/Hr IV Continuous <Continuous>  dextrose 5%. 1000 milliLiter(s) IV Continuous <Continuous>  dextrose 50% Injectable 12.5 Gram(s) IV Push once  dextrose 50% Injectable 25 Gram(s) IV Push once  dextrose 50% Injectable 25 Gram(s) IV Push once  docusate sodium 100 milliGRAM(s) Oral three times a day  heparin  Injectable 5000 Unit(s) SubCutaneous every 12 hours  hydrALAZINE 100 milliGRAM(s) Oral every 8 hours  insulin glargine Injectable (LANTUS) 15 Unit(s) SubCutaneous at bedtime  insulin lispro (HumaLOG) corrective regimen sliding scale   SubCutaneous three times a day before meals  insulin lispro (HumaLOG) corrective regimen sliding scale   SubCutaneous at bedtime  isosorbide   dinitrate Tablet (ISORDIL) 30 milliGRAM(s) Oral three times a day  polyethylene glycol 3350 17 Gram(s) Oral daily  senna 2 Tablet(s) Oral at bedtime  sevelamer hydrochloride 800 milliGRAM(s) Oral three times a day with meals  simethicone 80 milliGRAM(s) Chew three times a day  sodium chloride 0.9% lock flush 3 milliLiter(s) IV Push every 8 hours  sodium chloride 0.9% lock flush 3 milliLiter(s) IV Push every 8 hours  tamsulosin 0.4 milliGRAM(s) Oral at bedtime    PRN Inpatient Medications  calamine Lotion 1 Application(s) Topical daily PRN  dextrose 40% Gel 15 Gram(s) Oral once PRN  glucagon  Injectable 1 milliGRAM(s) IntraMuscular once PRN  guaiFENesin   Syrup  (Sugar-Free) 200 milliGRAM(s) Oral every 6 hours PRN  sodium chloride 0.65% Nasal 1 Spray(s) Both Nostrils three times a day PRN      REVIEW OF SYSTEMS : complaining of feeling light headed  --------------------------------------------------------------------------------  Constitutional: [ ] Fever [ ] Chills [ ] Fatigue [ ] Weight change   HEENT: [ ] Blurred vision [ ] Eye Pain [ ] Headache [ ] Runny nose [ ] Sore Throat   Respiratory: [ ] Cough [ ] Wheezing [ ] Shortness of breath  Cardiovascular: [ ] Chest Pain [ ] Palpitations [ ] ORTA [ ] PND [ ] Orthopnea  Gastrointestinal: [ ] Abdominal Pain [ ] Diarrhea [ ] Constipation [ ] Hemorrhoids [ ] Nausea [ ] Vomiting  Genitourinary: [ ] Nocturia [ ] Dysuria [ ] Incontinence  Extremities: [ ] Swelling [ ] Joint Pain  Neurologic: [ ] Focal deficit [ ] Paresthesias [ ] Syncope  Lymphatic: [ ] Swelling [ ] Lymphadenopathy   Skin: [ ] Rash [ ] Ecchymoses [ ] Wounds [ ] Lesions  Psychiatry: [ ] Depression [ ] Suicidal/Homicidal Ideation [ ] Anxiety [ ] Sleep Disturbances  [x ] 10 point review of systems is otherwise negative except as mentioned above              [ ]Unable to obtain due to   All other systems were reviewed and are negative, except as noted.    VITALS/PHYSICAL EXAM  --------------------------------------------------------------------------------  T(C): 36.4 (02-13-19 @ 06:01), Max: 36.6 (02-12-19 @ 13:20)  HR: 85 (02-13-19 @ 06:01) (85 - 92)  BP: 122/77 (02-13-19 @ 06:01) (105/65 - 126/66)  RR: 18 (02-13-19 @ 06:01) (17 - 18)  SpO2: 100% (02-13-19 @ 06:01) (100% - 100%)  Wt(kg): --        02-12-19 @ 07:01  -  02-13-19 @ 07:00  --------------------------------------------------------  IN: 0 mL / OUT: 400 mL / NET: -400 mL    02-13-19 @ 07:01  -  02-13-19 @ 11:43  --------------------------------------------------------  IN: 0 mL / OUT: 400 mL / NET: -400 mL      Physical Exam:  	Gen: appears lethargic  	HEENT: on room air  	Pulm: CTA B/L  	CV: normal S1S2; no rub  	Abd: soft                      Back : No sacral edema  	: No degroot  	LE: bilateral LE pitting edema  	Skin: Warm, without rashes      LABS/STUDIES  --------------------------------------------------------------------------------              9.2    8.31  >-----------<  289      [02-13-19 @ 06:30]              28.4     124  |  79  |  78  ----------------------------<  44      [02-13-19 @ 06:30]  3.7   |  28  |  3.09        Ca     8.9     [02-13-19 @ 06:30]      Mg     2.2     [02-13-19 @ 06:30]      Phos  4.3     [02-13-19 @ 06:30]            Creatinine Trend:  SCr 3.09 [02-13 @ 06:30]  SCr 3.21 [02-12 @ 06:10]  SCr 3.16 [02-11 @ 05:05]  SCr 3.00 [02-10 @ 06:01]  SCr 3.02 [02-09 @ 07:27]    Urinalysis - [01-24-19 @ 12:10]      Color LIGHT YELLOW / Appearance CLEAR / SG 1.008 / pH 5.0      Gluc NEGATIVE / Ketone NEGATIVE  / Bili NEGATIVE / Urobili NORMAL       Blood NEGATIVE / Protein NEGATIVE / Leuk Est NEGATIVE / Nitrite NEGATIVE      RBC  / WBC  / Hyaline  / Gran  / Sq Epi  / Non Sq Epi  / Bacteria       Iron 35, TIBC 339, %sat --      [01-21-19 @ 06:30]  Ferritin 114.3      [01-21-19 @ 06:30]  Vitamin D (25OH) 22.8      [01-18-19 @ 06:00]  HbA1c 7.8      [01-17-19 @ 06:34]  TSH 3.72      [01-17-19 @ 06:34]  Lipid: chol 95, TG 47, HDL 33, LDL 59      [01-17-19 @ 06:34]

## 2019-02-13 NOTE — PROGRESS NOTE ADULT - PROBLEM SELECTOR PLAN 5
Pt. with chronic, ?symptomatic hyponatremia. Appears hypervolemic on exam. Repeat sodium level and Check urine sodium, uosm, serum osm, uric acid, cortisol and TSH levels. Will consider starting on 1.5% saline if repeat sodium not improving. Continue diuretics.

## 2019-02-13 NOTE — PROGRESS NOTE ADULT - ATTENDING COMMENTS
Feeling more SOB. Na down to 123.  Will resume  at 2.5 mcg/kg/min.  PICC tomorrow. Feeling more SOB. Na down to 123. Lactate still nl.  Pt and his son want to defer restarting . If evidence of worsening sxs or end-organ dysfunction, will resume  at 2.5 mcg/kg/min tomorrow and get PICC after that.  Increase Bumex gtt to 1 mg/hr.

## 2019-02-13 NOTE — PROGRESS NOTE ADULT - SUBJECTIVE AND OBJECTIVE BOX
Patient seen and examined. He is diaphoretic, tachypnic, states he is more SOB today.  Abd US with moderate ascites    Medications:  aspirin enteric coated 81 milliGRAM(s) Oral daily  benzonatate 100 milliGRAM(s) Oral three times a day  buMETAnide Infusion 0.5 mG/Hr IV Continuous <Continuous>  calamine Lotion 1 Application(s) Topical daily PRN  dextrose 40% Gel 15 Gram(s) Oral once PRN  dextrose 5%. 1000 milliLiter(s) IV Continuous <Continuous>  dextrose 50% Injectable 12.5 Gram(s) IV Push once  dextrose 50% Injectable 25 Gram(s) IV Push once  dextrose 50% Injectable 25 Gram(s) IV Push once  docusate sodium 100 milliGRAM(s) Oral three times a day  glucagon  Injectable 1 milliGRAM(s) IntraMuscular once PRN  guaiFENesin   Syrup  (Sugar-Free) 200 milliGRAM(s) Oral every 6 hours PRN  heparin  Injectable 5000 Unit(s) SubCutaneous every 12 hours  hydrALAZINE 100 milliGRAM(s) Oral every 8 hours  insulin glargine Injectable (LANTUS) 15 Unit(s) SubCutaneous at bedtime  insulin lispro (HumaLOG) corrective regimen sliding scale   SubCutaneous three times a day before meals  insulin lispro (HumaLOG) corrective regimen sliding scale   SubCutaneous at bedtime  isosorbide   dinitrate Tablet (ISORDIL) 30 milliGRAM(s) Oral three times a day  polyethylene glycol 3350 17 Gram(s) Oral daily  senna 2 Tablet(s) Oral at bedtime  sevelamer hydrochloride 800 milliGRAM(s) Oral three times a day with meals  simethicone 80 milliGRAM(s) Chew three times a day  sodium chloride 0.65% Nasal 1 Spray(s) Both Nostrils three times a day PRN  sodium chloride 0.9% lock flush 3 milliLiter(s) IV Push every 8 hours  sodium chloride 0.9% lock flush 3 milliLiter(s) IV Push every 8 hours  tamsulosin 0.4 milliGRAM(s) Oral at bedtime      Vitals:  Vital Signs Last 24 Hrs  T(C): 36.8 (2019 13:46), Max: 36.8 (2019 13:46)  T(F): 98.3 (2019 13:46), Max: 98.3 (2019 13:46)  HR: 97 (2019 13:46) (85 - 97)  BP: 113/67 (2019 13:46) (105/65 - 122/77)  BP(mean): --  RR: 18 (2019 13:46) (17 - 18)  SpO2: 100% (2019 13:46) (100% - 100%)    Daily     Daily Weight in k.4 (2019 07:41)    I&O's Detail    2019 07:01  -  2019 07:00  --------------------------------------------------------  IN:  Total IN: 0 mL    OUT:    Voided: 400 mL  Total OUT: 400 mL    Total NET: -400 mL      2019 07:01  -  2019 15:02  --------------------------------------------------------  IN:  Total IN: 0 mL    OUT:    Voided: 400 mL  Total OUT: 400 mL    Total NET: -400 mL          Physical Exam:     General: No distress. Comfortable.  HEENT: EOM intact.  Neck: Neck supple. JVP mildly elevated. No masses  Chest: Clear to auscultation bilaterally  CV: Normal S1 and S2. No murmurs, rub, or gallops. Radial pulses normal. No LE edema b/l.   Abdomen: Soft, non-distended, non-tender  Skin: No rashes or skin breakdown  Neurology: Alert and oriented times three. Sensation intact  Psych: Affect normal    Labs:                        9.2    8.31  )-----------( 289      ( 2019 06:30 )             28.4         124<L>  |  79<L>  |  78<H>  ----------------------------<  44<LL>  3.7   |  28  |  3.09<H>    Ca    8.9      2019 06:30  Phos  4.3     02-13  Mg     2.2     02-

## 2019-02-13 NOTE — PROGRESS NOTE ADULT - ASSESSMENT
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 His last admission from 12/8-12/27 required CCU admission and placement on dobutamine. He has had 3 admissions in 2018, for various complications from DM.  He comes in this time due to worsening SOB. He admits to 2 pillow orthopnea, frequent PND and ORTA after 1/2 block and walking up 5 steps. No CP, palpitations, dizziness. Patient's son by bedside.  His dry weight s/p last hospitalization was 132 lbs.  NYHA class IV. Moderately hypervolemic. 1/22/18 PCWP was 23, RA 18 in AM.  CVP 1/24/19 was 14. RHC was repeated, CI 1.89 patient was started on dobutamine 2.5 mcg/kg/min and Bumex gtt 1 mg/hr.     I/Os intake not recorded 68 male w/ PMHX of CAD s/p CABG x 3 and PCI, HFrEF (LVEF 24%, LVEDD 5.4) mod-severe MR, severe diastolic dysfunction, RV systolic failure, no AICD (has refused it in past) DM II, PAD s/p L KEI stent, s/p LLE CFA to below-knee bypass with PTFE for long-segment SFA occlusion, L 2nd toe amputation /R toe amputation, c/b groin soft tissue infection requiring washout, sartorius flap, and vac placement. His last admission from 12/8-12/27 required CCU admission and placement on dobutamine. He has had 3 admissions in 2018, for various complications from DM.  He comes in this time due to worsening SOB. He admits to 2 pillow orthopnea, frequent PND and ORTA after 1/2 block and walking up 5 steps. No CP, palpitations, dizziness. Patient's son by bedside.  His dry weight s/p last hospitalization was 132 lbs.  NYHA class IV. Moderately hypervolemic. 1/22/18 PCWP was 23, RA 18 in AM.  CVP 1/24/19 was 14. RHC was repeated, CI 1.89 patient was started on dobutamine 2.5 mcg/kg/min and Bumex gtt 1 mg/hr.

## 2019-02-13 NOTE — PROGRESS NOTE ADULT - PROBLEM SELECTOR PLAN 1
-dobutamine is off x 24 hours, consider RHC tomorrow  -continue to follow heart failure recommendations  -bumex at 0.5mg/hr, consider decreasing to PO dosing as per HF recommendations  -continue current meds isordil 30 tid and hydral 100 tid  -pending RHC, may need a PICC line if it is deemed patient is inotrope dependent

## 2019-02-13 NOTE — PROGRESS NOTE ADULT - SUBJECTIVE AND OBJECTIVE BOX
Date of Admission:  1/17/19  24 hour events:  Complaints of constipation overnight. Abd Xray revealed air filled loops. Enema given and patient had 1 soft BM  Vital Signs Last 24 Hrs  T(C): 36.4 (13 Feb 2019 06:01), Max: 36.6 (12 Feb 2019 13:20)  T(F): 97.5 (13 Feb 2019 06:01), Max: 97.9 (12 Feb 2019 13:20)  HR: 85 (13 Feb 2019 06:01) (85 - 92)  BP: 122/77 (13 Feb 2019 06:01) (105/65 - 126/66)  BP(mean): --  RR: 18 (13 Feb 2019 06:01) (17 - 18)  SpO2: 100% (13 Feb 2019 06:01) (100% - 100%)  I&O's Summary    12 Feb 2019 07:01  -  13 Feb 2019 07:00  --------------------------------------------------------  IN: 0 mL / OUT: 400 mL / NET: -400 mL        MEDICATIONS:  aspirin enteric coated 81 milliGRAM(s) Oral daily  buMETAnide Infusion 0.5 mG/Hr IV Continuous <Continuous>  heparin  Injectable 5000 Unit(s) SubCutaneous every 12 hours  hydrALAZINE 100 milliGRAM(s) Oral every 8 hours  isosorbide   dinitrate Tablet (ISORDIL) 30 milliGRAM(s) Oral three times a day  tamsulosin 0.4 milliGRAM(s) Oral at bedtime      benzonatate 100 milliGRAM(s) Oral three times a day  guaiFENesin   Syrup  (Sugar-Free) 200 milliGRAM(s) Oral every 6 hours PRN      docusate sodium 100 milliGRAM(s) Oral three times a day  polyethylene glycol 3350 17 Gram(s) Oral daily  senna 2 Tablet(s) Oral at bedtime  simethicone 80 milliGRAM(s) Chew three times a day    dextrose 40% Gel 15 Gram(s) Oral once PRN  dextrose 50% Injectable 12.5 Gram(s) IV Push once  dextrose 50% Injectable 25 Gram(s) IV Push once  dextrose 50% Injectable 25 Gram(s) IV Push once  glucagon  Injectable 1 milliGRAM(s) IntraMuscular once PRN  insulin glargine Injectable (LANTUS) 15 Unit(s) SubCutaneous at bedtime  insulin lispro (HumaLOG) corrective regimen sliding scale   SubCutaneous three times a day before meals  insulin lispro (HumaLOG) corrective regimen sliding scale   SubCutaneous at bedtime    calamine Lotion 1 Application(s) Topical daily PRN  dextrose 5%. 1000 milliLiter(s) IV Continuous <Continuous>  sodium chloride 0.65% Nasal 1 Spray(s) Both Nostrils three times a day PRN  sodium chloride 0.9% lock flush 3 milliLiter(s) IV Push every 8 hours  sodium chloride 0.9% lock flush 3 milliLiter(s) IV Push every 8 hours      REVIEW OF SYSTEMS:    CONSTITUTIONAL: No weakness, fevers or chills  EYES/ENT: No visual changes;  No vertigo or throat pain   NECK: No pain or stiffness  RESPIRATORY: No cough, wheezing, hemoptysis; No shortness of breath  CARDIOVASCULAR: No chest pain or palpitations  GASTROINTESTINAL: +abdominal pain and bloating  GENITOURINARY: No dysuria, frequency or hematuria  NEUROLOGICAL: No numbness or weakness  SKIN: No itching, rashes      PHYSICAL EXAM:  General: NAD  Cardiovascular: Normal S1 S2, No JVD, No murmurs, No edema  Respiratory: Lungs clear to auscultation	  Gastrointestinal:  Soft, Non-tender, + BS	  Skin: warm and dry, No rashes, No ecchymoses, No cyanosis	  Extremities: Normal range of motion, No clubbing, cyanosis or edema  Vascular: Peripheral pulses palpable 2+ bilaterally    LABS:	 	    CBC Full  -  ( 13 Feb 2019 06:30 )  WBC Count : 8.31 K/uL  Hemoglobin : 9.2 g/dL  Hematocrit : 28.4 %  Platelet Count - Automated : 289 K/uL  Mean Cell Volume : 71.5 fL  Mean Cell Hemoglobin : 23.2 pg  Mean Cell Hemoglobin Concentration : 32.4 %  Auto Neutrophil # : x  Auto Lymphocyte # : x  Auto Monocyte # : x  Auto Eosinophil # : x  Auto Basophil # : x  Auto Neutrophil % : x  Auto Lymphocyte % : x  Auto Monocyte % : x  Auto Eosinophil % : x  Auto Basophil % : x    02-13    124<L>  |  79<L>  |  78<H>  ----------------------------<  44<LL>  3.7   |  28  |  3.09<H>  02-12    129<L>  |  81<L>  |  75<H>  ----------------------------<  47<L>  3.2<L>   |  31  |  3.21<H>    Ca    8.9      13 Feb 2019 06:30  Ca    9.0      12 Feb 2019 06:10  Phos  4.3     02-13  Mg     2.2     02-13  Mg     2.2     02-12    < from: Xray Abdomen 1 View PORTABLE -Urgent (02.12.19 @ 18:57) >  INTERPRETATION:  Scattered loops of air filled bowel, which are not   dilated.  Follow-up official report.      < end of copied text >

## 2019-02-13 NOTE — PROGRESS NOTE ADULT - PROBLEM SELECTOR PLAN 1
Pt. with KESHAV in the setting of ADHF. On review of previous records in [facility], patient with normal Scr levels from 4/26/16 to 9/24/18. Pt. noted to have an episode of KESHAV during previous/recent hospitalization at [facility] (12/7/18 to 12/27/18). On this admission, Scr was elevated at 2.15 on 1/16/19, peaked to 4.0 on 1/25/19 and remains stable at 3.09 today. Pt. on Bumex as per cardiology. Pt. non-oliguric. US Kidney showed increased bilateral renal cortical echogenicity, no hydronephrosis on 1/18/19. Monitor labs and urine output. Avoid nephrotoxins. Dose medications as per eGFR

## 2019-02-14 LAB
ANION GAP SERPL CALC-SCNC: 17 MMO/L — HIGH (ref 7–14)
BASOPHILS # BLD AUTO: 0.06 K/UL — SIGNIFICANT CHANGE UP (ref 0–0.2)
BASOPHILS NFR BLD AUTO: 0.7 % — SIGNIFICANT CHANGE UP (ref 0–2)
BUN SERPL-MCNC: 76 MG/DL — HIGH (ref 7–23)
CALCIUM SERPL-MCNC: 9.1 MG/DL — SIGNIFICANT CHANGE UP (ref 8.4–10.5)
CHLORIDE SERPL-SCNC: 80 MMOL/L — LOW (ref 98–107)
CO2 SERPL-SCNC: 27 MMOL/L — SIGNIFICANT CHANGE UP (ref 22–31)
CREAT SERPL-MCNC: 3.09 MG/DL — HIGH (ref 0.5–1.3)
EOSINOPHIL # BLD AUTO: 0.13 K/UL — SIGNIFICANT CHANGE UP (ref 0–0.5)
EOSINOPHIL NFR BLD AUTO: 1.4 % — SIGNIFICANT CHANGE UP (ref 0–6)
GLUCOSE BLDC GLUCOMTR-MCNC: 115 MG/DL — HIGH (ref 70–99)
GLUCOSE BLDC GLUCOMTR-MCNC: 124 MG/DL — HIGH (ref 70–99)
GLUCOSE BLDC GLUCOMTR-MCNC: 133 MG/DL — HIGH (ref 70–99)
GLUCOSE BLDC GLUCOMTR-MCNC: 178 MG/DL — HIGH (ref 70–99)
GLUCOSE SERPL-MCNC: 30 MG/DL — CRITICAL LOW (ref 70–99)
HCT VFR BLD CALC: 29.1 % — LOW (ref 39–50)
HGB BLD-MCNC: 9.3 G/DL — LOW (ref 13–17)
IMM GRANULOCYTES NFR BLD AUTO: 0.3 % — SIGNIFICANT CHANGE UP (ref 0–1.5)
LACTATE SERPL-SCNC: 1.4 MMOL/L — SIGNIFICANT CHANGE UP (ref 0.5–2)
LYMPHOCYTES # BLD AUTO: 0.76 K/UL — LOW (ref 1–3.3)
LYMPHOCYTES # BLD AUTO: 8.3 % — LOW (ref 13–44)
MAGNESIUM SERPL-MCNC: 2.2 MG/DL — SIGNIFICANT CHANGE UP (ref 1.6–2.6)
MCHC RBC-ENTMCNC: 23.3 PG — LOW (ref 27–34)
MCHC RBC-ENTMCNC: 32 % — SIGNIFICANT CHANGE UP (ref 32–36)
MCV RBC AUTO: 72.8 FL — LOW (ref 80–100)
MONOCYTES # BLD AUTO: 0.94 K/UL — HIGH (ref 0–0.9)
MONOCYTES NFR BLD AUTO: 10.3 % — SIGNIFICANT CHANGE UP (ref 2–14)
NEUTROPHILS # BLD AUTO: 7.21 K/UL — SIGNIFICANT CHANGE UP (ref 1.8–7.4)
NEUTROPHILS NFR BLD AUTO: 79 % — HIGH (ref 43–77)
NRBC # FLD: 0 K/UL — LOW (ref 25–125)
PLATELET # BLD AUTO: 272 K/UL — SIGNIFICANT CHANGE UP (ref 150–400)
PMV BLD: SIGNIFICANT CHANGE UP FL (ref 7–13)
POTASSIUM SERPL-MCNC: 3.5 MMOL/L — SIGNIFICANT CHANGE UP (ref 3.5–5.3)
POTASSIUM SERPL-SCNC: 3.5 MMOL/L — SIGNIFICANT CHANGE UP (ref 3.5–5.3)
RBC # BLD: 4 M/UL — LOW (ref 4.2–5.8)
RBC # FLD: 24.1 % — HIGH (ref 10.3–14.5)
SODIUM SERPL-SCNC: 124 MMOL/L — LOW (ref 135–145)
WBC # BLD: 9.13 K/UL — SIGNIFICANT CHANGE UP (ref 3.8–10.5)
WBC # FLD AUTO: 9.13 K/UL — SIGNIFICANT CHANGE UP (ref 3.8–10.5)

## 2019-02-14 PROCEDURE — 99233 SBSQ HOSP IP/OBS HIGH 50: CPT

## 2019-02-14 RX ORDER — POTASSIUM CHLORIDE 20 MEQ
40 PACKET (EA) ORAL ONCE
Qty: 0 | Refills: 0 | Status: COMPLETED | OUTPATIENT
Start: 2019-02-14 | End: 2019-02-14

## 2019-02-14 RX ORDER — CHOLECALCIFEROL (VITAMIN D3) 125 MCG
400 CAPSULE ORAL DAILY
Qty: 0 | Refills: 0 | Status: DISCONTINUED | OUTPATIENT
Start: 2019-02-14 | End: 2019-02-25

## 2019-02-14 RX ADMIN — BUMETANIDE 5 MG/HR: 0.25 INJECTION INTRAMUSCULAR; INTRAVENOUS at 17:08

## 2019-02-14 RX ADMIN — Medication 100 MILLIGRAM(S): at 22:21

## 2019-02-14 RX ADMIN — ISOSORBIDE DINITRATE 30 MILLIGRAM(S): 5 TABLET ORAL at 14:26

## 2019-02-14 RX ADMIN — TAMSULOSIN HYDROCHLORIDE 0.4 MILLIGRAM(S): 0.4 CAPSULE ORAL at 22:21

## 2019-02-14 RX ADMIN — SODIUM CHLORIDE 3 MILLILITER(S): 9 INJECTION INTRAMUSCULAR; INTRAVENOUS; SUBCUTANEOUS at 13:58

## 2019-02-14 RX ADMIN — SIMETHICONE 80 MILLIGRAM(S): 80 TABLET, CHEWABLE ORAL at 22:22

## 2019-02-14 RX ADMIN — SIMETHICONE 80 MILLIGRAM(S): 80 TABLET, CHEWABLE ORAL at 14:26

## 2019-02-14 RX ADMIN — Medication 81 MILLIGRAM(S): at 11:17

## 2019-02-14 RX ADMIN — Medication 100 MILLIGRAM(S): at 22:22

## 2019-02-14 RX ADMIN — ISOSORBIDE DINITRATE 30 MILLIGRAM(S): 5 TABLET ORAL at 05:48

## 2019-02-14 RX ADMIN — ISOSORBIDE DINITRATE 30 MILLIGRAM(S): 5 TABLET ORAL at 22:21

## 2019-02-14 RX ADMIN — Medication 100 MILLIGRAM(S): at 05:48

## 2019-02-14 RX ADMIN — SODIUM CHLORIDE 3 MILLILITER(S): 9 INJECTION INTRAMUSCULAR; INTRAVENOUS; SUBCUTANEOUS at 22:23

## 2019-02-14 RX ADMIN — Medication 100 MILLIGRAM(S): at 14:26

## 2019-02-14 RX ADMIN — POLYETHYLENE GLYCOL 3350 17 GRAM(S): 17 POWDER, FOR SOLUTION ORAL at 11:17

## 2019-02-14 RX ADMIN — SIMETHICONE 80 MILLIGRAM(S): 80 TABLET, CHEWABLE ORAL at 05:48

## 2019-02-14 RX ADMIN — Medication 40 MILLIEQUIVALENT(S): at 17:08

## 2019-02-14 RX ADMIN — SODIUM CHLORIDE 3 MILLILITER(S): 9 INJECTION INTRAMUSCULAR; INTRAVENOUS; SUBCUTANEOUS at 05:50

## 2019-02-14 RX ADMIN — INSULIN GLARGINE 15 UNIT(S): 100 INJECTION, SOLUTION SUBCUTANEOUS at 22:22

## 2019-02-14 RX ADMIN — Medication 2: at 18:13

## 2019-02-14 NOTE — PROGRESS NOTE ADULT - PROBLEM SELECTOR PLAN 1
-dobutamine is off 48 hrs, RHC?  -bumex at 0.1mg/hr, f/u HF recommendations  -continue current meds isordil 30 tid and hydral 100 tid  - Add beta blocker if off of   -May need a PICC line if it is deemed patient is inotrope dependent  -No LV thrombus on TTE

## 2019-02-14 NOTE — PROGRESS NOTE ADULT - ATTENDING COMMENTS
Continue Bumex gtt.  I believe the hyponatremia is still associated with increased volume. Would consider tolvaptan. Would avoid hypertonic saline.  Switching to an oral diuretic regimen will be a challenge, given rapid decompensation, probably mainly because of low CO, when diuretic lowered. Will likely require both a loop and a thiazide diuretic as an outpatient.

## 2019-02-14 NOTE — PROGRESS NOTE ADULT - SUBJECTIVE AND OBJECTIVE BOX
Tele: NSR    Overnight: No complaints     MEDICATIONS  (STANDING):  aspirin enteric coated 81 milliGRAM(s) Oral daily  benzonatate 100 milliGRAM(s) Oral three times a day  buMETAnide Infusion 1 mG/Hr (5 mL/Hr) IV Continuous <Continuous>  docusate sodium 100 milliGRAM(s) Oral three times a day  heparin  Injectable 5000 Unit(s) SubCutaneous every 12 hours  hydrALAZINE 100 milliGRAM(s) Oral every 8 hours  insulin glargine Injectable (LANTUS) 15 Unit(s) SubCutaneous at bedtime  insulin lispro (HumaLOG) corrective regimen sliding scale   SubCutaneous three times a day before meals  insulin lispro (HumaLOG) corrective regimen sliding scale   SubCutaneous at bedtime  isosorbide   dinitrate Tablet (ISORDIL) 30 milliGRAM(s) Oral three times a day  polyethylene glycol 3350 17 Gram(s) Oral daily  senna 2 Tablet(s) Oral at bedtime  sevelamer hydrochloride 800 milliGRAM(s) Oral three times a day with meals  simethicone 80 milliGRAM(s) Chew three times a day  tamsulosin 0.4 milliGRAM(s) Oral at bedtime    MEDICATIONS  (PRN):  calamine Lotion 1 Application(s) Topical daily PRN Rash and/or Itching  dextrose 40% Gel 15 Gram(s) Oral once PRN Blood Glucose LESS THAN 70 milliGRAM(s)/deciliter  glucagon  Injectable 1 milliGRAM(s) IntraMuscular once PRN Glucose LESS THAN 70 milligrams/deciliter  guaiFENesin   Syrup  (Sugar-Free) 200 milliGRAM(s) Oral every 6 hours PRN Cough  sodium chloride 0.65% Nasal 1 Spray(s) Both Nostrils three times a day PRN Nasal Congestion          Vital Signs Last 24 Hrs  T(C): 36.7 (14 Feb 2019 05:46), Max: 36.8 (13 Feb 2019 13:46)  T(F): 98 (14 Feb 2019 05:46), Max: 98.3 (13 Feb 2019 13:46)  HR: 89 (14 Feb 2019 05:46) (89 - 97)  BP: 106/67 (14 Feb 2019 05:46) (102/63 - 113/67)  RR: 18 (14 Feb 2019 05:46) (18 - 18)  SpO2: 100% (14 Feb 2019 05:46) (100% - 100%)  I&O's Detail    13 Feb 2019 07:01  -  14 Feb 2019 07:00  --------------------------------------------------------  IN:    Oral Fluid: 100 mL  Total IN: 100 mL    OUT:    Voided: 1500 mL  Total OUT: 1500 mL    Total NET: -1400 mL      14 Feb 2019 07:01  -  14 Feb 2019 08:18  --------------------------------------------------------  IN:  Total IN: 0 mL    OUT:    Voided: 500 mL  Total OUT: 500 mL    Total NET: -500 mL      Physical exam:   Gen- NAD  Resp- Clear   CV- S1S2 RRR mild elevated JVD  ABD- +BSx4 ND/NT  EXT- + trace edema   Neuro- A&Ox3                             9.2    8.31  )-----------( 289      ( 13 Feb 2019 06:30 )             28.4       13 Feb 2019 14:15    123<L>  |  x      |  x      ----------------------------<  x      x       |  x      |  x      13 Feb 2019 06:30    124<L>  |  79<L>  |  78<H>  ----------------------------<  44<LL>  3.7     |  28     |  3.09<H>    Ca    8.9        13 Feb 2019 06:30  Phos  4.3       13 Feb 2019 06:30  Mg     2.2       13 Feb 2019 06:30

## 2019-02-14 NOTE — PROGRESS NOTE ADULT - SUBJECTIVE AND OBJECTIVE BOX
[facility] DIVISION OF KIDNEY DISEASES AND HYPERTENSION -- PROGRESS NOTE    Chief complaint: KESHAV    24 hour events/subjective: overall feels better, but has intermittent dizziness        PAST HISTORY  --------------------------------------------------------------------------------  No significant changes to PMH, PSH, FHx, SHx, unless otherwise noted    ALLERGIES & MEDICATIONS  --------------------------------------------------------------------------------  Allergies    No Known Allergies    Intolerances      Standing Inpatient Medications  aspirin enteric coated 81 milliGRAM(s) Oral daily  benzonatate 100 milliGRAM(s) Oral three times a day  buMETAnide Infusion 1 mG/Hr IV Continuous <Continuous>  dextrose 5%. 1000 milliLiter(s) IV Continuous <Continuous>  dextrose 50% Injectable 12.5 Gram(s) IV Push once  dextrose 50% Injectable 25 Gram(s) IV Push once  dextrose 50% Injectable 25 Gram(s) IV Push once  docusate sodium 100 milliGRAM(s) Oral three times a day  heparin  Injectable 5000 Unit(s) SubCutaneous every 12 hours  hydrALAZINE 100 milliGRAM(s) Oral every 8 hours  insulin glargine Injectable (LANTUS) 15 Unit(s) SubCutaneous at bedtime  insulin lispro (HumaLOG) corrective regimen sliding scale   SubCutaneous three times a day before meals  insulin lispro (HumaLOG) corrective regimen sliding scale   SubCutaneous at bedtime  isosorbide   dinitrate Tablet (ISORDIL) 30 milliGRAM(s) Oral three times a day  polyethylene glycol 3350 17 Gram(s) Oral daily  senna 2 Tablet(s) Oral at bedtime  sevelamer hydrochloride 800 milliGRAM(s) Oral three times a day with meals  simethicone 80 milliGRAM(s) Chew three times a day  sodium chloride 0.9% lock flush 3 milliLiter(s) IV Push every 8 hours  sodium chloride 0.9% lock flush 3 milliLiter(s) IV Push every 8 hours  tamsulosin 0.4 milliGRAM(s) Oral at bedtime    PRN Inpatient Medications  calamine Lotion 1 Application(s) Topical daily PRN  dextrose 40% Gel 15 Gram(s) Oral once PRN  glucagon  Injectable 1 milliGRAM(s) IntraMuscular once PRN  guaiFENesin   Syrup  (Sugar-Free) 200 milliGRAM(s) Oral every 6 hours PRN  sodium chloride 0.65% Nasal 1 Spray(s) Both Nostrils three times a day PRN      REVIEW OF SYSTEMS  --------------------------------------------------------------------------------  Constitutional: [ ] Fever [ ] Chills [ ] Fatigue [ ] Weight change   HEENT: [ ] Blurred vision [ ] Eye Pain [ ] Headache [ ] Runny nose [ ] Sore Throat   Respiratory: [ ] Cough [ ] Wheezing [ ] Shortness of breath  Cardiovascular: [ ] Chest Pain [ ] Palpitations [ ] ORTA [ ] PND [ ] Orthopnea  Gastrointestinal: [ ] Abdominal Pain [ ] Diarrhea [ ] Constipation [ ] Hemorrhoids [ ] Nausea [ ] Vomiting  Genitourinary: [ ] Nocturia [ ] Dysuria [ ] Incontinence  Extremities: [ ] Swelling [ ] Joint Pain  Neurologic: [ ] Focal deficit [ ] Paresthesias [ ] Syncope  Lymphatic: [ ] Swelling [ ] Lymphadenopathy   Skin: [ ] Rash [ ] Ecchymoses [ ] Wounds [ ] Lesions  Psychiatry: [ ] Depression [ ] Suicidal/Homicidal Ideation [ ] Anxiety [ ] Sleep Disturbances  [x ] 10 point review of systems is otherwise negative except as mentioned above              [ ]Unable to obtain due to   All other systems were reviewed and are negative, except as noted.    VITALS/PHYSICAL EXAM  --------------------------------------------------------------------------------  T(C): 36.7 (02-14-19 @ 05:46), Max: 36.8 (02-13-19 @ 13:46)  HR: 89 (02-14-19 @ 05:46) (89 - 97)  BP: 106/67 (02-14-19 @ 05:46) (102/63 - 113/67)  RR: 18 (02-14-19 @ 05:46) (18 - 18)  SpO2: 100% (02-14-19 @ 05:46) (100% - 100%)  Wt(kg): --        02-13-19 @ 07:01  -  02-14-19 @ 07:00  --------------------------------------------------------  IN: 100 mL / OUT: 1500 mL / NET: -1400 mL    02-14-19 @ 07:01  -  02-14-19 @ 12:00  --------------------------------------------------------  IN: 0 mL / OUT: 500 mL / NET: -500 mL      Physical Exam:  	Gen: NAD,   	HEENT: on room air  	Pulm: CTA B/L  	CV: normal S1S2; no rub  	Abd: soft                      Back : No sacral edema  	: No degroot  	LE: no edema  	Skin: Warm, without rashes      LABS/STUDIES  --------------------------------------------------------------------------------              9.3    9.13  >-----------<  272      [02-14-19 @ 06:50]              29.1     124  |  80  |  76  ----------------------------<  30      [02-14-19 @ 06:50]  3.5   |  27  |  3.09        Ca     9.1     [02-14-19 @ 06:50]      Mg     2.2     [02-14-19 @ 06:50]      Phos  4.3     [02-13-19 @ 06:30]          Uric acid 13.3      [02-13-19 @ 14:15]  Serum Osmolality 289      [02-13-19 @ 14:15]    Creatinine Trend:  SCr 3.09 [02-14 @ 06:50]  SCr 3.09 [02-13 @ 06:30]  SCr 3.21 [02-12 @ 06:10]  SCr 3.16 [02-11 @ 05:05]  SCr 3.00 [02-10 @ 06:01]    Urinalysis - [01-24-19 @ 12:10]      Color LIGHT YELLOW / Appearance CLEAR / SG 1.008 / pH 5.0      Gluc NEGATIVE / Ketone NEGATIVE  / Bili NEGATIVE / Urobili NORMAL       Blood NEGATIVE / Protein NEGATIVE / Leuk Est NEGATIVE / Nitrite NEGATIVE      RBC  / WBC  / Hyaline  / Gran  / Sq Epi  / Non Sq Epi  / Bacteria     Urine Sodium < 20      [02-13-19 @ 14:22]  Urine Osmolality 288      [02-13-19 @ 14:22]    Iron 35, TIBC 339, %sat --      [01-21-19 @ 06:30]  Ferritin 114.3      [01-21-19 @ 06:30]  Vitamin D (25OH) 22.8      [01-18-19 @ 06:00]  HbA1c 7.8      [01-17-19 @ 06:34]  TSH 3.27      [02-13-19 @ 14:15]  Lipid: chol 95, TG 47, HDL 33, LDL 59      [01-17-19 @ 06:34]

## 2019-02-14 NOTE — PROGRESS NOTE ADULT - SUBJECTIVE AND OBJECTIVE BOX
Patient seen and examined. He feels much better today s/p increase in Bumex. No longer having ORTA (walked with PT today), no SOB at rest.   Abdominal distension improving, per pt. No CP, palpitations.     Medications:  aspirin enteric coated 81 milliGRAM(s) Oral daily  benzonatate 100 milliGRAM(s) Oral three times a day  buMETAnide Infusion 1 mG/Hr IV Continuous <Continuous>  calamine Lotion 1 Application(s) Topical daily PRN  dextrose 40% Gel 15 Gram(s) Oral once PRN  dextrose 5%. 1000 milliLiter(s) IV Continuous <Continuous>  dextrose 50% Injectable 12.5 Gram(s) IV Push once  dextrose 50% Injectable 25 Gram(s) IV Push once  dextrose 50% Injectable 25 Gram(s) IV Push once  docusate sodium 100 milliGRAM(s) Oral three times a day  glucagon  Injectable 1 milliGRAM(s) IntraMuscular once PRN  guaiFENesin   Syrup  (Sugar-Free) 200 milliGRAM(s) Oral every 6 hours PRN  heparin  Injectable 5000 Unit(s) SubCutaneous every 12 hours  hydrALAZINE 100 milliGRAM(s) Oral every 8 hours  insulin glargine Injectable (LANTUS) 15 Unit(s) SubCutaneous at bedtime  insulin lispro (HumaLOG) corrective regimen sliding scale   SubCutaneous three times a day before meals  insulin lispro (HumaLOG) corrective regimen sliding scale   SubCutaneous at bedtime  isosorbide   dinitrate Tablet (ISORDIL) 30 milliGRAM(s) Oral three times a day  polyethylene glycol 3350 17 Gram(s) Oral daily  potassium chloride    Tablet ER 40 milliEquivalent(s) Oral once  senna 2 Tablet(s) Oral at bedtime  sevelamer hydrochloride 800 milliGRAM(s) Oral three times a day with meals  simethicone 80 milliGRAM(s) Chew three times a day  sodium chloride 0.65% Nasal 1 Spray(s) Both Nostrils three times a day PRN  sodium chloride 0.9% lock flush 3 milliLiter(s) IV Push every 8 hours  sodium chloride 0.9% lock flush 3 milliLiter(s) IV Push every 8 hours  tamsulosin 0.4 milliGRAM(s) Oral at bedtime      Vitals:  Vital Signs Last 24 Hrs  T(C): 36.7 (2019 05:46), Max: 36.8 (2019 13:46)  T(F): 98 (2019 05:46), Max: 98.3 (2019 13:46)  HR: 89 (2019 05:46) (89 - 97)  BP: 106/67 (2019 05:46) (102/63 - 113/67)  BP(mean): --  RR: 18 (2019 05:46) (18 - 18)  SpO2: 100% (2019 05:46) (100% - 100%)    Daily     Daily Weight in k.5 (2019 07:32)    I&O's Detail    2019 07:01  -  2019 07:00  --------------------------------------------------------  IN:    Oral Fluid: 100 mL  Total IN: 100 mL    OUT:    Voided: 1500 mL  Total OUT: 1500 mL    Total NET: -1400 mL      2019 07:01  -  2019 13:28  --------------------------------------------------------  IN:  Total IN: 0 mL    OUT:    Voided: 500 mL  Total OUT: 500 mL    Total NET: -500 mL          Physical Exam:     General: No distress. Comfortable.  HEENT: EOM intact.  Neck: Neck supple. JVP appears normal?. No masses  Chest: Clear to auscultation bilaterally  CV: S1 and S2. No murmurs, rub, or gallops. Radial pulses normal. No LE edema b/l.   Abdomen: Soft, non-distended, non-tender  Skin: No rashes or skin breakdown  Neurology: Alert and oriented times three. Sensation intact  Psych: Affect normal    Labs:                        9.3    9.13  )-----------( 272      ( 2019 06:50 )             29.1     02-14    124<L>  |  80<L>  |  76<H>  ----------------------------<  30<LL>  3.5   |  27  |  3.09<H>    Ca    9.1      2019 06:50  Phos  4.3     02-13  Mg     2.2     02-14    < from: TTE with Doppler (w/Cont) (19 @ 13:36) >  DIMENSIONS:  Dimensions:     Normal Values:  LA:       ---     2.0 - 4.0 cm  Ao:     2.7 cm    2.0 - 3.8 cm  SEPTUM: 0.7 cm    0.6 - 1.2 cm  PWT:    1.0 cm    0.6 - 1.1 cm  LVIDd:  5.5 cm    3.0 - 5.6 cm  LVIDs:    ---     1.8 - 4.0 cm  Derived Variables:  LVMI: 92 g/m2  RWT: 0.36  Ejection Fraction (Modified Mon Rule): 35 %  ------------------------------------------------------------------------  OBSERVATIONS:  Mitral Valve: Tethered mitral valve leaflets. Moderate  mitral regurgitation.  Aortic Root: Normal aortic root.  Aortic Valve: Calcified trileaflet aortic valve with mildly  decreased opening.  Left Atrium: Normal left atrium.  LA volume index = 23  cc/m2.  Left Ventricle: Moderate to severe segmental left  ventricular systolic dysfunction. The basal to mid septum,  basal to mid inferior, basal to mid anterolateral walls are  hypokinetic. Normal left ventricular internal dimensions  and wall thicknesses.  Right Heart: Normal right atrium. Right ventricular  enlargement with decreased right ventricular systolic  function. Normal tricuspid valve. Mild tricuspid  regurgitation. Normal pulmonic valve.  Pericardium/PleuraNormal pericardium with no pericardial  effusion.  Hemodynamic: Estimated right ventricular systolic pressure  equals 43 mm Hg, assuming right atrial pressure equals 10  mm Hg, consistent with mild pulmonary hypertension.    < end of copied text >

## 2019-02-14 NOTE — PROGRESS NOTE ADULT - PROBLEM SELECTOR PLAN 5
Pt. with chronic, ?symptomatic hyponatremia as has intermittent complains of dizziness. Appears hypervolemic on exam. Repeat sodium level and Check urine sodium, uosm. Will consider starting on 1.5% saline if repeat sodium not improving. Continue diuretics.

## 2019-02-14 NOTE — PROVIDER CONTACT NOTE (CRITICAL VALUE NOTIFICATION) - SITUATION
BILLIE Su notified that the BG using the glucometer was 115. Sharlene was also recently in the patients room and pt is asymptomatic.

## 2019-02-14 NOTE — PROGRESS NOTE ADULT - PROBLEM SELECTOR PLAN 1
Pt. with KESHAV in the setting of ADHF. On review of previous records in [facility]/[place], patient with normal Scr levels from 4/26/16 to 9/24/18. Pt. noted to have an episode of KESHAV during previous/recent hospitalization at [facility] (12/7/18 to 12/27/18). On this admission, Scr was elevated at 2.15 on 1/16/19, peaked to 4.0 on 1/25/19 and remains stable kiko. Pt. on Bumex as per cardiology. Pt. non-oliguric. US Kidney showed increased bilateral renal cortical echogenicity, no hydronephrosis on 1/18/19. Monitor labs and urine output. Avoid nephrotoxins. Dose medications as per eGFR

## 2019-02-14 NOTE — PROGRESS NOTE ADULT - ATTENDING COMMENTS
Patient seen and examined  Agree with above assessment and plan  Currently off dobutamine and on Bumex gtt  Await HF recommendations

## 2019-02-14 NOTE — CHART NOTE - NSCHARTNOTEFT_GEN_A_CORE
Nutrition follow up    Source: Patient [x]    Family [ ]     other [ ]: EMR     Current Diet : Regular + Consistent Carbohydrate ( no snack) + DASH + 1500 mL fluid restriction + Low Sodium + Glucerna Therapeutic Nutrition 240mls 1x daily (220kcals, 10g protein).     Reported:  Patient denies any nausea/vomiting/diarrhea. Patient reported feeling constipated (Last BM: 2/13, patient on bowel regimen.) Denies any difficulty chewing and swallowing.     PO intake: Patient consuming about 75% of meals and is drinking Glucerna in between meals. Patient reported having a fair appetite, however is slowly improving. RDN reviewed consistent carbohydrate diet with patient- patient unable to provide food sources of carbohydrate. Patient receptive to diet education.       Current Weight: 2/14 --> 151 pounds, 2/13 -->146.3 pounds, 2/11 --> 176.5 pounds (standing) 2/9 --> 141.3 pounds. Patient with inconsistent weight. ? bed scale accuracy vs. standing scale.   Patient noted with + 1 right and left  foot.     __________________ Pertinent Medications__________________   MEDICATIONS  (STANDING):  aspirin enteric coated 81 milliGRAM(s) Oral daily  benzonatate 100 milliGRAM(s) Oral three times a day  buMETAnide Infusion 1 mG/Hr (5 mL/Hr) IV Continuous <Continuous>  dextrose 5%. 1000 milliLiter(s) (50 mL/Hr) IV Continuous <Continuous>  dextrose 50% Injectable 12.5 Gram(s) IV Push once  dextrose 50% Injectable 25 Gram(s) IV Push once  dextrose 50% Injectable 25 Gram(s) IV Push once  docusate sodium 100 milliGRAM(s) Oral three times a day  heparin  Injectable 5000 Unit(s) SubCutaneous every 12 hours  hydrALAZINE 100 milliGRAM(s) Oral every 8 hours  insulin glargine Injectable (LANTUS) 15 Unit(s) SubCutaneous at bedtime  insulin lispro (HumaLOG) corrective regimen sliding scale   SubCutaneous three times a day before meals  insulin lispro (HumaLOG) corrective regimen sliding scale   SubCutaneous at bedtime  isosorbide   dinitrate Tablet (ISORDIL) 30 milliGRAM(s) Oral three times a day  polyethylene glycol 3350 17 Gram(s) Oral daily  senna 2 Tablet(s) Oral at bedtime  sevelamer hydrochloride 800 milliGRAM(s) Oral three times a day with meals  simethicone 80 milliGRAM(s) Chew three times a day  sodium chloride 0.9% lock flush 3 milliLiter(s) IV Push every 8 hours  sodium chloride 0.9% lock flush 3 milliLiter(s) IV Push every 8 hours  tamsulosin 0.4 milliGRAM(s) Oral at bedtime    MEDICATIONS  (PRN):  calamine Lotion 1 Application(s) Topical daily PRN Rash and/or Itching  dextrose 40% Gel 15 Gram(s) Oral once PRN Blood Glucose LESS THAN 70 milliGRAM(s)/deciliter  glucagon  Injectable 1 milliGRAM(s) IntraMuscular once PRN Glucose LESS THAN 70 milligrams/deciliter  guaiFENesin   Syrup  (Sugar-Free) 200 milliGRAM(s) Oral every 6 hours PRN Cough  sodium chloride 0.65% Nasal 1 Spray(s) Both Nostrils three times a day PRN Nasal Congestion      __________________ Pertinent Labs__________________   02-14 Na124 mmol/L<L> Glu 30 mg/dL<LL> K+ 3.5 mmol/L Cr  3.09 mg/dL<H> BUN 76 mg/dL<H> 02-13 Phos 4.3 mg/dL 01-17 VoxzktavsiV2D 7.8 %<H> 01-17 Chol 95 mg/dL<L> LDL 59 mg/dL HDL 33 mg/dL<L> Trig 47 mg/dL. POCT: 191, 128, 164, 94 mg/dL.         Skin: intact, no pressure injuries noted     Estimated Needs:   [x] no change since previous assessment  [ ] recalculated:       Previous Nutrition Diagnosis:  [x] Food & Nutrition Related Knowledge Deficit     Nutrition Diagnosis is [x] ongoing  [ ] resolved [ ] not applicable     New Nutrition Diagnosis: [x] not applicable    Nutrition Recommendations:  1. Continue current diet order  2. Recommend Glucerna Therapeutic Nutrition 240mls 3x daily (660kcals, 30g protein)   3. Recommend Vitamin D  4. Monitor weights, labs, BM's, skin integrity, p.o. intake.   5. RD to remain available for further nutritional interventions as indicated.-Rupali Trotter, RDN, CDN

## 2019-02-14 NOTE — PROGRESS NOTE ADULT - PROBLEM SELECTOR PLAN 1
Patient feels much better today, s/p increase in Bumex yesterday. No longer having ORTA (walked with PT today), no SOB at rest.   Abdominal distension improving, per pt.   Continue Bumex gtt 1.0 mg/hr. Will hold off on Dobutamine as d/w Dr. Alexander.  Continue to monitor hyponatremia- can we consider tolvaptan, renally dosed?  Continue hydral 100 mg po TID, and Isordil 30 mg po TID.   If not going back on dobutamine, will start BB prior to d/c.   Strict I/O. Please get standing weight.   Pt refused ICD and does not want an evaluation for LVAD as well.  He is agreeable to PICC line w/ dobutamine gtt at home.

## 2019-02-15 LAB
ANION GAP SERPL CALC-SCNC: 17 MMO/L — HIGH (ref 7–14)
BUN SERPL-MCNC: 78 MG/DL — HIGH (ref 7–23)
CALCIUM SERPL-MCNC: 9 MG/DL — SIGNIFICANT CHANGE UP (ref 8.4–10.5)
CHLORIDE SERPL-SCNC: 80 MMOL/L — LOW (ref 98–107)
CO2 SERPL-SCNC: 28 MMOL/L — SIGNIFICANT CHANGE UP (ref 22–31)
CREAT SERPL-MCNC: 2.98 MG/DL — HIGH (ref 0.5–1.3)
GLUCOSE BLDC GLUCOMTR-MCNC: 145 MG/DL — HIGH (ref 70–99)
GLUCOSE BLDC GLUCOMTR-MCNC: 184 MG/DL — HIGH (ref 70–99)
GLUCOSE BLDC GLUCOMTR-MCNC: 191 MG/DL — HIGH (ref 70–99)
GLUCOSE BLDC GLUCOMTR-MCNC: 198 MG/DL — HIGH (ref 70–99)
GLUCOSE BLDC GLUCOMTR-MCNC: 50 MG/DL — LOW (ref 70–99)
GLUCOSE BLDC GLUCOMTR-MCNC: 75 MG/DL — SIGNIFICANT CHANGE UP (ref 70–99)
GLUCOSE SERPL-MCNC: 88 MG/DL — SIGNIFICANT CHANGE UP (ref 70–99)
HCT VFR BLD CALC: 30 % — LOW (ref 39–50)
HGB BLD-MCNC: 9.5 G/DL — LOW (ref 13–17)
LACTATE SERPL-SCNC: 1.2 MMOL/L — SIGNIFICANT CHANGE UP (ref 0.5–2)
MAGNESIUM SERPL-MCNC: 2.3 MG/DL — SIGNIFICANT CHANGE UP (ref 1.6–2.6)
MCHC RBC-ENTMCNC: 23.1 PG — LOW (ref 27–34)
MCHC RBC-ENTMCNC: 31.7 % — LOW (ref 32–36)
MCV RBC AUTO: 73 FL — LOW (ref 80–100)
NRBC # FLD: 0 K/UL — LOW (ref 25–125)
PLATELET # BLD AUTO: 290 K/UL — SIGNIFICANT CHANGE UP (ref 150–400)
PMV BLD: SIGNIFICANT CHANGE UP FL (ref 7–13)
POTASSIUM SERPL-MCNC: 4 MMOL/L — SIGNIFICANT CHANGE UP (ref 3.5–5.3)
POTASSIUM SERPL-SCNC: 4 MMOL/L — SIGNIFICANT CHANGE UP (ref 3.5–5.3)
RBC # BLD: 4.11 M/UL — LOW (ref 4.2–5.8)
RBC # FLD: 24.2 % — HIGH (ref 10.3–14.5)
SODIUM SERPL-SCNC: 125 MMOL/L — LOW (ref 135–145)
WBC # BLD: 8.58 K/UL — SIGNIFICANT CHANGE UP (ref 3.8–10.5)
WBC # FLD AUTO: 8.58 K/UL — SIGNIFICANT CHANGE UP (ref 3.8–10.5)

## 2019-02-15 PROCEDURE — 99233 SBSQ HOSP IP/OBS HIGH 50: CPT

## 2019-02-15 RX ADMIN — POLYETHYLENE GLYCOL 3350 17 GRAM(S): 17 POWDER, FOR SOLUTION ORAL at 12:41

## 2019-02-15 RX ADMIN — Medication 81 MILLIGRAM(S): at 12:39

## 2019-02-15 RX ADMIN — Medication 2: at 18:14

## 2019-02-15 RX ADMIN — Medication 100 MILLIGRAM(S): at 12:41

## 2019-02-15 RX ADMIN — Medication 100 MILLIGRAM(S): at 06:13

## 2019-02-15 RX ADMIN — ISOSORBIDE DINITRATE 30 MILLIGRAM(S): 5 TABLET ORAL at 21:15

## 2019-02-15 RX ADMIN — Medication 400 UNIT(S): at 12:39

## 2019-02-15 RX ADMIN — INSULIN GLARGINE 15 UNIT(S): 100 INJECTION, SOLUTION SUBCUTANEOUS at 23:11

## 2019-02-15 RX ADMIN — SIMETHICONE 80 MILLIGRAM(S): 80 TABLET, CHEWABLE ORAL at 21:15

## 2019-02-15 RX ADMIN — SODIUM CHLORIDE 3 MILLILITER(S): 9 INJECTION INTRAMUSCULAR; INTRAVENOUS; SUBCUTANEOUS at 06:12

## 2019-02-15 RX ADMIN — SIMETHICONE 80 MILLIGRAM(S): 80 TABLET, CHEWABLE ORAL at 12:39

## 2019-02-15 RX ADMIN — Medication 100 MILLIGRAM(S): at 21:15

## 2019-02-15 RX ADMIN — ISOSORBIDE DINITRATE 30 MILLIGRAM(S): 5 TABLET ORAL at 06:14

## 2019-02-15 RX ADMIN — BUMETANIDE 5 MG/HR: 0.25 INJECTION INTRAMUSCULAR; INTRAVENOUS at 12:40

## 2019-02-15 RX ADMIN — Medication 100 MILLIGRAM(S): at 12:39

## 2019-02-15 RX ADMIN — SODIUM CHLORIDE 3 MILLILITER(S): 9 INJECTION INTRAMUSCULAR; INTRAVENOUS; SUBCUTANEOUS at 12:37

## 2019-02-15 RX ADMIN — Medication 2: at 09:30

## 2019-02-15 RX ADMIN — SODIUM CHLORIDE 3 MILLILITER(S): 9 INJECTION INTRAMUSCULAR; INTRAVENOUS; SUBCUTANEOUS at 21:14

## 2019-02-15 RX ADMIN — SIMETHICONE 80 MILLIGRAM(S): 80 TABLET, CHEWABLE ORAL at 06:13

## 2019-02-15 RX ADMIN — ISOSORBIDE DINITRATE 30 MILLIGRAM(S): 5 TABLET ORAL at 12:39

## 2019-02-15 RX ADMIN — TAMSULOSIN HYDROCHLORIDE 0.4 MILLIGRAM(S): 0.4 CAPSULE ORAL at 21:15

## 2019-02-15 NOTE — PROGRESS NOTE ADULT - PROBLEM SELECTOR PLAN 5
Pt. with chronic, hypervolemic, asymptomatic hyponatremia. Serum sodium stable. Continue diuretics. Monitor serum sodium level

## 2019-02-15 NOTE — PROGRESS NOTE ADULT - PROBLEM SELECTOR PLAN 1
Abdominal distension improving as of yesterday, per pt.   Continue Bumex gtt 1.0 mg/hr. Will hold off on dobutamine.  Continue to monitor hyponatremia- can we consider tolvaptan?  Continue hydral 100 mg po TID and Isordil 30 mg po TID.   If not going back on dobutamine, will start BB prior to d/c.   Strict I/O. Please get standing weight.   Pt refused ICD and does not want an evaluation for LVAD as well.

## 2019-02-15 NOTE — PROGRESS NOTE ADULT - SUBJECTIVE AND OBJECTIVE BOX
[facility] DIVISION OF KIDNEY DISEASES AND HYPERTENSION -- PROGRESS NOTE    Chief complaint: KESHAV    24 hour events/subjective: continued on bumex drip        PAST HISTORY  --------------------------------------------------------------------------------  No significant changes to PMH, PSH, FHx, SHx, unless otherwise noted    ALLERGIES & MEDICATIONS  --------------------------------------------------------------------------------  Allergies    No Known Allergies    Intolerances      Standing Inpatient Medications  aspirin enteric coated 81 milliGRAM(s) Oral daily  benzonatate 100 milliGRAM(s) Oral three times a day  buMETAnide Infusion 1 mG/Hr IV Continuous <Continuous>  cholecalciferol 400 Unit(s) Oral daily  dextrose 5%. 1000 milliLiter(s) IV Continuous <Continuous>  dextrose 50% Injectable 12.5 Gram(s) IV Push once  dextrose 50% Injectable 25 Gram(s) IV Push once  dextrose 50% Injectable 25 Gram(s) IV Push once  docusate sodium 100 milliGRAM(s) Oral three times a day  heparin  Injectable 5000 Unit(s) SubCutaneous every 12 hours  hydrALAZINE 100 milliGRAM(s) Oral every 8 hours  insulin glargine Injectable (LANTUS) 15 Unit(s) SubCutaneous at bedtime  insulin lispro (HumaLOG) corrective regimen sliding scale   SubCutaneous three times a day before meals  insulin lispro (HumaLOG) corrective regimen sliding scale   SubCutaneous at bedtime  isosorbide   dinitrate Tablet (ISORDIL) 30 milliGRAM(s) Oral three times a day  polyethylene glycol 3350 17 Gram(s) Oral daily  senna 2 Tablet(s) Oral at bedtime  sevelamer hydrochloride 800 milliGRAM(s) Oral three times a day with meals  simethicone 80 milliGRAM(s) Chew three times a day  sodium chloride 0.9% lock flush 3 milliLiter(s) IV Push every 8 hours  sodium chloride 0.9% lock flush 3 milliLiter(s) IV Push every 8 hours  tamsulosin 0.4 milliGRAM(s) Oral at bedtime    PRN Inpatient Medications  calamine Lotion 1 Application(s) Topical daily PRN  dextrose 40% Gel 15 Gram(s) Oral once PRN  glucagon  Injectable 1 milliGRAM(s) IntraMuscular once PRN  guaiFENesin   Syrup  (Sugar-Free) 200 milliGRAM(s) Oral every 6 hours PRN  sodium chloride 0.65% Nasal 1 Spray(s) Both Nostrils three times a day PRN      REVIEW OF SYSTEMS  --------------------------------------------------------------------------------  Constitutional: [ ] Fever [ ] Chills [ ] Fatigue [ ] Weight change   HEENT: [ ] Blurred vision [ ] Eye Pain [ ] Headache [ ] Runny nose [ ] Sore Throat   Respiratory: [ ] Cough [ ] Wheezing [ ] Shortness of breath  Cardiovascular: [ ] Chest Pain [ ] Palpitations [ ] ORTA [ ] PND [ ] Orthopnea  Gastrointestinal: [ ] Abdominal Pain [ ] Diarrhea [ ] Constipation [ ] Hemorrhoids [ ] Nausea [ ] Vomiting  Genitourinary: [ ] Nocturia [ ] Dysuria [ ] Incontinence  Extremities: [ ] Swelling [ ] Joint Pain  Neurologic: [ ] Focal deficit [ ] Paresthesias [ ] Syncope  Lymphatic: [ ] Swelling [ ] Lymphadenopathy   Skin: [ ] Rash [ ] Ecchymoses [ ] Wounds [ ] Lesions  Psychiatry: [ ] Depression [ ] Suicidal/Homicidal Ideation [ ] Anxiety [ ] Sleep Disturbances  [x ] 10 point review of systems is otherwise negative except as mentioned above              [ ]Unable to obtain due to   All other systems were reviewed and are negative, except as noted.    VITALS/PHYSICAL EXAM  --------------------------------------------------------------------------------  T(C): 36.8 (02-15-19 @ 06:10), Max: 36.8 (02-14-19 @ 14:24)  HR: 91 (02-15-19 @ 06:10) (91 - 97)  BP: 116/64 (02-15-19 @ 06:10) (108/70 - 116/64)  RR: 18 (02-15-19 @ 06:10) (18 - 18)  SpO2: 100% (02-15-19 @ 06:10) (95% - 100%)  Wt(kg): --        02-14-19 @ 07:01  -  02-15-19 @ 07:00  --------------------------------------------------------  IN: 150 mL / OUT: 900 mL / NET: -750 mL    02-15-19 @ 07:01  -  02-15-19 @ 12:39  --------------------------------------------------------  IN: 0 mL / OUT: 300 mL / NET: -300 mL      Physical Exam:  	Gen: NAD, well-appearing  	HEENT: on nasal cannula  	Pulm: CTA B/L  	CV: normal S1S2; no rub  	Abd: soft                      Back : No sacral edema  	: No mikayla  	LE: bilateral pitting edema  	Skin: Warm, without rashes  	    LABS/STUDIES  --------------------------------------------------------------------------------              9.5    8.58  >-----------<  290      [02-15-19 @ 07:55]              30.0     125  |  80  |  78  ----------------------------<  88      [02-15-19 @ 07:55]  4.0   |  28  |  2.98        Ca     9.0     [02-15-19 @ 07:55]      Mg     2.3     [02-15-19 @ 07:55]          Uric acid 13.3      [02-13-19 @ 14:15]  Serum Osmolality 289      [02-13-19 @ 14:15]    Creatinine Trend:  SCr 2.98 [02-15 @ 07:55]  SCr 3.09 [02-14 @ 06:50]  SCr 3.09 [02-13 @ 06:30]  SCr 3.21 [02-12 @ 06:10]  SCr 3.16 [02-11 @ 05:05]    Urinalysis - [01-24-19 @ 12:10]      Color LIGHT YELLOW / Appearance CLEAR / SG 1.008 / pH 5.0      Gluc NEGATIVE / Ketone NEGATIVE  / Bili NEGATIVE / Urobili NORMAL       Blood NEGATIVE / Protein NEGATIVE / Leuk Est NEGATIVE / Nitrite NEGATIVE      RBC  / WBC  / Hyaline  / Gran  / Sq Epi  / Non Sq Epi  / Bacteria     Urine Sodium < 20      [02-13-19 @ 14:22]  Urine Osmolality 288      [02-13-19 @ 14:22]    Iron 35, TIBC 339, %sat --      [01-21-19 @ 06:30]  Ferritin 114.3      [01-21-19 @ 06:30]  Vitamin D (25OH) 22.8      [01-18-19 @ 06:00]  HbA1c 7.8      [01-17-19 @ 06:34]  TSH 3.27      [02-13-19 @ 14:15]  Lipid: chol 95, TG 47, HDL 33, LDL 59      [01-17-19 @ 06:34]

## 2019-02-15 NOTE — PROGRESS NOTE ADULT - PROBLEM SELECTOR PLAN 1
Pt. with KESHAV in the setting of ADHF. On review of previous records in [facility]/[place], patient with normal Scr levels from 4/26/16 to 9/24/18. Pt. noted to have an episode of KESHAV during previous/recent hospitalization at [facility] (12/7/18 to 12/27/18). On this admission, Scr was elevated at 2.15 on 1/16/19, peaked to 4.0 on 1/25/19 and now has been stable ~3.0. Pt. on Bumex as per cardiology. Pt. non-oliguric. US Kidney showed increased bilateral renal cortical echogenicity, no hydronephrosis on 1/18/19. Monitor labs and urine output. Avoid nephrotoxins. Dose medications as per eGFR

## 2019-02-15 NOTE — PROGRESS NOTE ADULT - SUBJECTIVE AND OBJECTIVE BOX
Patient seen and examined. He feels tired, had low BG fingerstick earlier this AM.     Medications:  aspirin enteric coated 81 milliGRAM(s) Oral daily  benzonatate 100 milliGRAM(s) Oral three times a day  buMETAnide Infusion 1 mG/Hr IV Continuous <Continuous>  calamine Lotion 1 Application(s) Topical daily PRN  dextrose 40% Gel 15 Gram(s) Oral once PRN  dextrose 5%. 1000 milliLiter(s) IV Continuous <Continuous>  dextrose 50% Injectable 12.5 Gram(s) IV Push once  dextrose 50% Injectable 25 Gram(s) IV Push once  dextrose 50% Injectable 25 Gram(s) IV Push once  docusate sodium 100 milliGRAM(s) Oral three times a day  glucagon  Injectable 1 milliGRAM(s) IntraMuscular once PRN  guaiFENesin   Syrup  (Sugar-Free) 200 milliGRAM(s) Oral every 6 hours PRN  heparin  Injectable 5000 Unit(s) SubCutaneous every 12 hours  hydrALAZINE 100 milliGRAM(s) Oral every 8 hours  insulin glargine Injectable (LANTUS) 15 Unit(s) SubCutaneous at bedtime  insulin lispro (HumaLOG) corrective regimen sliding scale   SubCutaneous three times a day before meals  insulin lispro (HumaLOG) corrective regimen sliding scale   SubCutaneous at bedtime  isosorbide   dinitrate Tablet (ISORDIL) 30 milliGRAM(s) Oral three times a day  polyethylene glycol 3350 17 Gram(s) Oral daily  potassium chloride    Tablet ER 40 milliEquivalent(s) Oral once  senna 2 Tablet(s) Oral at bedtime  sevelamer hydrochloride 800 milliGRAM(s) Oral three times a day with meals  simethicone 80 milliGRAM(s) Chew three times a day  sodium chloride 0.65% Nasal 1 Spray(s) Both Nostrils three times a day PRN  sodium chloride 0.9% lock flush 3 milliLiter(s) IV Push every 8 hours  sodium chloride 0.9% lock flush 3 milliLiter(s) IV Push every 8 hours  tamsulosin 0.4 milliGRAM(s) Oral at bedtime    Vitals:  Vital Signs Last 24 Hrs  T(C): 36.7 (2019 05:46), Max: 36.8 (2019 13:46)  T(F): 98 (2019 05:46), Max: 98.3 (2019 13:46)  HR: 89 (2019 05:46) (89 - 97)  BP: 106/67 (2019 05:46) (102/63 - 113/67)  BP(mean): --  RR: 18 (2019 05:46) (18 - 18)  SpO2: 100% (2019 05:46) (100% - 100%)    Daily     Daily Weight in k.5 (2019 07:32)    I&O's Detail    2019 07:01  -  2019 07:00  --------------------------------------------------------  IN:    Oral Fluid: 100 mL  Total IN: 100 mL    OUT:    Voided: 1500 mL  Total OUT: 1500 mL    Total NET: -1400 mL      2019 07:01  -  2019 13:28  --------------------------------------------------------  IN:  Total IN: 0 mL    OUT:    Voided: 500 mL  Total OUT: 500 mL    Total NET: -500 mL    Physical Exam:     General: No distress. Comfortable.  HEENT: EOM intact.  Neck: Neck supple. JVP appears normal.  Chest: Crackles at bases bilaterally  CV: S1 and S2. No murmurs, rub, or gallops. 1+ tight LE edema b/l.   Neurology: Alert and oriented times three.  Psych: Affect normal.    Labs:                        9.3    9.13  )-----------( 272      ( 2019 06:50 )             29.1     02-14    124<L>  |  80<L>  |  76<H>  ----------------------------<  30<LL>  3.5   |  27  |  3.09<H>    Ca    9.1      2019 06:50  Phos  4.3     02-  Mg     2.2     -14    < from: TTE with Doppler (w/Cont) (19 @ 13:36) >  DIMENSIONS:  Dimensions:     Normal Values:  LA:       ---     2.0 - 4.0 cm  Ao:     2.7 cm    2.0 - 3.8 cm  SEPTUM: 0.7 cm    0.6 - 1.2 cm  PWT:    1.0 cm    0.6 - 1.1 cm  LVIDd:  5.5 cm    3.0 - 5.6 cm  LVIDs:    ---     1.8 - 4.0 cm  Derived Variables:  LVMI: 92 g/m2  RWT: 0.36  Ejection Fraction (Modified Mon Rule): 35 %  ------------------------------------------------------------------------  OBSERVATIONS:  Mitral Valve: Tethered mitral valve leaflets. Moderate  mitral regurgitation.  Aortic Root: Normal aortic root.  Aortic Valve: Calcified trileaflet aortic valve with mildly  decreased opening.  Left Atrium: Normal left atrium.  LA volume index = 23  cc/m2.  Left Ventricle: Moderate to severe segmental left  ventricular systolic dysfunction. The basal to mid septum,  basal to mid inferior, basal to mid anterolateral walls are  hypokinetic. Normal left ventricular internal dimensions  and wall thicknesses.  Right Heart: Normal right atrium. Right ventricular  enlargement with decreased right ventricular systolic  function. Normal tricuspid valve. Mild tricuspid  regurgitation. Normal pulmonic valve.  Pericardium/PleuraNormal pericardium with no pericardial  effusion.  Hemodynamic: Estimated right ventricular systolic pressure  equals 43 mm Hg, assuming right atrial pressure equals 10  mm Hg, consistent with mild pulmonary hypertension.    < end of copied text >

## 2019-02-15 NOTE — PROGRESS NOTE ADULT - ATTENDING COMMENTS
Continue Bumex gtt.  Start metolazone 2.5 mg po qd tomorrow AM.  Likely can d/c Bumex gtt and start Bumex 4 mg po bid on Sunday AM.  Will likely require both a loop and a thiazide diuretic as an outpatient.

## 2019-02-16 DIAGNOSIS — I73.9 PERIPHERAL VASCULAR DISEASE, UNSPECIFIED: ICD-10-CM

## 2019-02-16 LAB
ANION GAP SERPL CALC-SCNC: 16 MMO/L — HIGH (ref 7–14)
ANION GAP SERPL CALC-SCNC: 20 MMO/L — HIGH (ref 7–14)
BASOPHILS # BLD AUTO: 0.02 K/UL — SIGNIFICANT CHANGE UP (ref 0–0.2)
BASOPHILS NFR BLD AUTO: 0.3 % — SIGNIFICANT CHANGE UP (ref 0–2)
BUN SERPL-MCNC: 75 MG/DL — HIGH (ref 7–23)
BUN SERPL-MCNC: 77 MG/DL — HIGH (ref 7–23)
CALCIUM SERPL-MCNC: 8.8 MG/DL — SIGNIFICANT CHANGE UP (ref 8.4–10.5)
CALCIUM SERPL-MCNC: 9 MG/DL — SIGNIFICANT CHANGE UP (ref 8.4–10.5)
CHLORIDE SERPL-SCNC: 80 MMOL/L — LOW (ref 98–107)
CHLORIDE SERPL-SCNC: 80 MMOL/L — LOW (ref 98–107)
CO2 SERPL-SCNC: 26 MMOL/L — SIGNIFICANT CHANGE UP (ref 22–31)
CO2 SERPL-SCNC: 27 MMOL/L — SIGNIFICANT CHANGE UP (ref 22–31)
CREAT SERPL-MCNC: 2.76 MG/DL — HIGH (ref 0.5–1.3)
CREAT SERPL-MCNC: 2.85 MG/DL — HIGH (ref 0.5–1.3)
EOSINOPHIL # BLD AUTO: 0.14 K/UL — SIGNIFICANT CHANGE UP (ref 0–0.5)
EOSINOPHIL NFR BLD AUTO: 2.1 % — SIGNIFICANT CHANGE UP (ref 0–6)
GLUCOSE BLDC GLUCOMTR-MCNC: 127 MG/DL — HIGH (ref 70–99)
GLUCOSE BLDC GLUCOMTR-MCNC: 137 MG/DL — HIGH (ref 70–99)
GLUCOSE BLDC GLUCOMTR-MCNC: 201 MG/DL — HIGH (ref 70–99)
GLUCOSE BLDC GLUCOMTR-MCNC: 235 MG/DL — HIGH (ref 70–99)
GLUCOSE SERPL-MCNC: 107 MG/DL — HIGH (ref 70–99)
GLUCOSE SERPL-MCNC: 193 MG/DL — HIGH (ref 70–99)
HCT VFR BLD CALC: 28.2 % — LOW (ref 39–50)
HGB BLD-MCNC: 9 G/DL — LOW (ref 13–17)
IMM GRANULOCYTES NFR BLD AUTO: 0.3 % — SIGNIFICANT CHANGE UP (ref 0–1.5)
LYMPHOCYTES # BLD AUTO: 0.63 K/UL — LOW (ref 1–3.3)
LYMPHOCYTES # BLD AUTO: 9.3 % — LOW (ref 13–44)
MAGNESIUM SERPL-MCNC: 2.3 MG/DL — SIGNIFICANT CHANGE UP (ref 1.6–2.6)
MAGNESIUM SERPL-MCNC: 2.3 MG/DL — SIGNIFICANT CHANGE UP (ref 1.6–2.6)
MCHC RBC-ENTMCNC: 23.2 PG — LOW (ref 27–34)
MCHC RBC-ENTMCNC: 31.9 % — LOW (ref 32–36)
MCV RBC AUTO: 72.7 FL — LOW (ref 80–100)
MONOCYTES # BLD AUTO: 0.84 K/UL — SIGNIFICANT CHANGE UP (ref 0–0.9)
MONOCYTES NFR BLD AUTO: 12.5 % — SIGNIFICANT CHANGE UP (ref 2–14)
NEUTROPHILS # BLD AUTO: 5.09 K/UL — SIGNIFICANT CHANGE UP (ref 1.8–7.4)
NEUTROPHILS NFR BLD AUTO: 75.5 % — SIGNIFICANT CHANGE UP (ref 43–77)
NRBC # FLD: 0 K/UL — LOW (ref 25–125)
PLATELET # BLD AUTO: 228 K/UL — SIGNIFICANT CHANGE UP (ref 150–400)
PMV BLD: SIGNIFICANT CHANGE UP FL (ref 7–13)
POTASSIUM SERPL-MCNC: 3.7 MMOL/L — SIGNIFICANT CHANGE UP (ref 3.5–5.3)
POTASSIUM SERPL-MCNC: 4.1 MMOL/L — SIGNIFICANT CHANGE UP (ref 3.5–5.3)
POTASSIUM SERPL-SCNC: 3.7 MMOL/L — SIGNIFICANT CHANGE UP (ref 3.5–5.3)
POTASSIUM SERPL-SCNC: 4.1 MMOL/L — SIGNIFICANT CHANGE UP (ref 3.5–5.3)
RBC # BLD: 3.88 M/UL — LOW (ref 4.2–5.8)
RBC # FLD: 24 % — HIGH (ref 10.3–14.5)
SODIUM SERPL-SCNC: 123 MMOL/L — LOW (ref 135–145)
SODIUM SERPL-SCNC: 126 MMOL/L — LOW (ref 135–145)
WBC # BLD: 6.74 K/UL — SIGNIFICANT CHANGE UP (ref 3.8–10.5)
WBC # FLD AUTO: 6.74 K/UL — SIGNIFICANT CHANGE UP (ref 3.8–10.5)

## 2019-02-16 PROCEDURE — 93926 LOWER EXTREMITY STUDY: CPT | Mod: 26,50,LT

## 2019-02-16 PROCEDURE — 99253 IP/OBS CNSLTJ NEW/EST LOW 45: CPT

## 2019-02-16 PROCEDURE — 93971 EXTREMITY STUDY: CPT | Mod: 26

## 2019-02-16 PROCEDURE — 72192 CT PELVIS W/O DYE: CPT | Mod: 26

## 2019-02-16 RX ORDER — GABAPENTIN 400 MG/1
100 CAPSULE ORAL ONCE
Qty: 0 | Refills: 0 | Status: COMPLETED | OUTPATIENT
Start: 2019-02-16 | End: 2019-02-16

## 2019-02-16 RX ORDER — POTASSIUM CHLORIDE 20 MEQ
20 PACKET (EA) ORAL ONCE
Qty: 0 | Refills: 0 | Status: COMPLETED | OUTPATIENT
Start: 2019-02-16 | End: 2019-02-16

## 2019-02-16 RX ORDER — OXYCODONE HYDROCHLORIDE 5 MG/1
5 TABLET ORAL ONCE
Qty: 0 | Refills: 0 | Status: DISCONTINUED | OUTPATIENT
Start: 2019-02-16 | End: 2019-02-16

## 2019-02-16 RX ADMIN — SIMETHICONE 80 MILLIGRAM(S): 80 TABLET, CHEWABLE ORAL at 13:27

## 2019-02-16 RX ADMIN — BUMETANIDE 5 MG/HR: 0.25 INJECTION INTRAMUSCULAR; INTRAVENOUS at 16:36

## 2019-02-16 RX ADMIN — Medication 100 MILLIGRAM(S): at 13:30

## 2019-02-16 RX ADMIN — Medication 100 MILLIGRAM(S): at 22:42

## 2019-02-16 RX ADMIN — SODIUM CHLORIDE 3 MILLILITER(S): 9 INJECTION INTRAMUSCULAR; INTRAVENOUS; SUBCUTANEOUS at 05:44

## 2019-02-16 RX ADMIN — ISOSORBIDE DINITRATE 30 MILLIGRAM(S): 5 TABLET ORAL at 22:38

## 2019-02-16 RX ADMIN — SIMETHICONE 80 MILLIGRAM(S): 80 TABLET, CHEWABLE ORAL at 22:38

## 2019-02-16 RX ADMIN — Medication 100 MILLIGRAM(S): at 22:38

## 2019-02-16 RX ADMIN — TAMSULOSIN HYDROCHLORIDE 0.4 MILLIGRAM(S): 0.4 CAPSULE ORAL at 22:38

## 2019-02-16 RX ADMIN — GABAPENTIN 100 MILLIGRAM(S): 400 CAPSULE ORAL at 04:06

## 2019-02-16 RX ADMIN — SODIUM CHLORIDE 3 MILLILITER(S): 9 INJECTION INTRAMUSCULAR; INTRAVENOUS; SUBCUTANEOUS at 22:39

## 2019-02-16 RX ADMIN — Medication 100 MILLIGRAM(S): at 13:28

## 2019-02-16 RX ADMIN — Medication 4: at 18:26

## 2019-02-16 RX ADMIN — ISOSORBIDE DINITRATE 30 MILLIGRAM(S): 5 TABLET ORAL at 05:45

## 2019-02-16 RX ADMIN — Medication 400 UNIT(S): at 12:38

## 2019-02-16 RX ADMIN — SIMETHICONE 80 MILLIGRAM(S): 80 TABLET, CHEWABLE ORAL at 05:45

## 2019-02-16 RX ADMIN — Medication 20 MILLIEQUIVALENT(S): at 18:01

## 2019-02-16 RX ADMIN — OXYCODONE HYDROCHLORIDE 5 MILLIGRAM(S): 5 TABLET ORAL at 01:05

## 2019-02-16 RX ADMIN — SODIUM CHLORIDE 3 MILLILITER(S): 9 INJECTION INTRAMUSCULAR; INTRAVENOUS; SUBCUTANEOUS at 13:31

## 2019-02-16 RX ADMIN — POLYETHYLENE GLYCOL 3350 17 GRAM(S): 17 POWDER, FOR SOLUTION ORAL at 12:39

## 2019-02-16 RX ADMIN — Medication 81 MILLIGRAM(S): at 12:38

## 2019-02-16 RX ADMIN — ISOSORBIDE DINITRATE 30 MILLIGRAM(S): 5 TABLET ORAL at 13:28

## 2019-02-16 RX ADMIN — INSULIN GLARGINE 15 UNIT(S): 100 INJECTION, SOLUTION SUBCUTANEOUS at 22:42

## 2019-02-16 RX ADMIN — SENNA PLUS 2 TABLET(S): 8.6 TABLET ORAL at 22:38

## 2019-02-16 RX ADMIN — Medication 100 MILLIGRAM(S): at 05:45

## 2019-02-16 RX ADMIN — OXYCODONE HYDROCHLORIDE 5 MILLIGRAM(S): 5 TABLET ORAL at 00:35

## 2019-02-16 NOTE — PROGRESS NOTE ADULT - SUBJECTIVE AND OBJECTIVE BOX
24H hour events: No acute events.    MEDICATIONS:  aspirin enteric coated 81 milliGRAM(s) Oral daily  buMETAnide Infusion 1 mG/Hr IV Continuous <Continuous>  heparin  Injectable 5000 Unit(s) SubCutaneous every 12 hours  hydrALAZINE 100 milliGRAM(s) Oral every 8 hours  isosorbide   dinitrate Tablet (ISORDIL) 30 milliGRAM(s) Oral three times a day  metolazone 2.5 milliGRAM(s) Oral daily  tamsulosin 0.4 milliGRAM(s) Oral at bedtime  benzonatate 100 milliGRAM(s) Oral three times a day  guaiFENesin   Syrup  (Sugar-Free) 200 milliGRAM(s) Oral every 6 hours PRN  docusate sodium 100 milliGRAM(s) Oral three times a day  polyethylene glycol 3350 17 Gram(s) Oral daily  senna 2 Tablet(s) Oral at bedtime  simethicone 80 milliGRAM(s) Chew three times a day  dextrose 40% Gel 15 Gram(s) Oral once PRN  dextrose 50% Injectable 12.5 Gram(s) IV Push once  dextrose 50% Injectable 25 Gram(s) IV Push once  dextrose 50% Injectable 25 Gram(s) IV Push once  glucagon  Injectable 1 milliGRAM(s) IntraMuscular once PRN  insulin glargine Injectable (LANTUS) 15 Unit(s) SubCutaneous at bedtime  insulin lispro (HumaLOG) corrective regimen sliding scale   SubCutaneous three times a day before meals  insulin lispro (HumaLOG) corrective regimen sliding scale   SubCutaneous at bedtime  calamine Lotion 1 Application(s) Topical daily PRN  cholecalciferol 400 Unit(s) Oral daily  dextrose 5%. 1000 milliLiter(s) IV Continuous <Continuous>  sodium chloride 0.65% Nasal 1 Spray(s) Both Nostrils three times a day PRN  sodium chloride 0.9% lock flush 3 milliLiter(s) IV Push every 8 hours  sodium chloride 0.9% lock flush 3 milliLiter(s) IV Push every 8 hours    REVIEW OF SYSTEMS:  Complete 10point ROS negative. Complains pain control in hip not adequate.    PHYSICAL EXAM:  T(C): 36.6 (02-16-19 @ 09:22), Max: 36.6 (02-16-19 @ 00:30)  HR: 92 (02-16-19 @ 09:22) (86 - 92)  BP: 119/73 (02-16-19 @ 09:22) (98/65 - 122/81)  RR: 20 (02-16-19 @ 09:22) (18 - 20)  SpO2: 99% (02-16-19 @ 09:22) (98% - 100%)  Wt(kg): --  I&O's Summary    15 Feb 2019 07:01  -  16 Feb 2019 07:00  --------------------------------------------------------  IN: 0 mL / OUT: 1050 mL / NET: -1050 mL    Appearance: NAD  HEENT:   Normal oral mucosa, PERRL, EOMI	  Cardiovascular: Normal S1 S2, No JVD, No murmurs, No edema  Respiratory: Lungs clear to auscultation	  Gastrointestinal:  Soft, Non-tender, + BS		  Neurologic: Non-focal      LABS:	 	    CBC Full  -  ( 16 Feb 2019 06:50 )  WBC Count : 6.74 K/uL  Hemoglobin : 9.0 g/dL  Hematocrit : 28.2 %  Platelet Count - Automated : 228 K/uL  Mean Cell Volume : 72.7 fL  Mean Cell Hemoglobin : 23.2 pg  Mean Cell Hemoglobin Concentration : 31.9 %  Auto Neutrophil # : 5.09 K/uL  Auto Lymphocyte # : 0.63 K/uL  Auto Monocyte # : 0.84 K/uL  Auto Eosinophil # : 0.14 K/uL  Auto Basophil # : 0.02 K/uL  Auto Neutrophil % : 75.5 %  Auto Lymphocyte % : 9.3 %  Auto Monocyte % : 12.5 %  Auto Eosinophil % : 2.1 %  Auto Basophil % : 0.3 %    02-16    123<L>  |  80<L>  |  75<H>  ----------------------------<  107<H>  4.1   |  27  |  2.76<H>  02-15    125<L>  |  80<L>  |  78<H>  ----------------------------<  88  4.0   |  28  |  2.98<H>    Ca    8.8      16 Feb 2019 06:50  Ca    9.0      15 Feb 2019 07:55  Mg     2.3     02-16  Mg     2.3     02-15    TELEMETRY: sinus, vent couplet

## 2019-02-16 NOTE — PROGRESS NOTE ADULT - SUBJECTIVE AND OBJECTIVE BOX
Subjective/Objective: Patient sitting up in bed, denies SOB or dyspnea but c/o L thigh pain at surgical site since yesterday.    MEDICATIONS  (STANDING):  aspirin enteric coated 81 milliGRAM(s) Oral daily  benzonatate 100 milliGRAM(s) Oral three times a day  buMETAnide Infusion 1 mG/Hr (5 mL/Hr) IV Continuous <Continuous>  cholecalciferol 400 Unit(s) Oral daily  dextrose 5%. 1000 milliLiter(s) (50 mL/Hr) IV Continuous <Continuous>  dextrose 50% Injectable 12.5 Gram(s) IV Push once  dextrose 50% Injectable 25 Gram(s) IV Push once  dextrose 50% Injectable 25 Gram(s) IV Push once  docusate sodium 100 milliGRAM(s) Oral three times a day  heparin  Injectable 5000 Unit(s) SubCutaneous every 12 hours  hydrALAZINE 100 milliGRAM(s) Oral every 8 hours  insulin glargine Injectable (LANTUS) 15 Unit(s) SubCutaneous at bedtime  insulin lispro (HumaLOG) corrective regimen sliding scale   SubCutaneous three times a day before meals  insulin lispro (HumaLOG) corrective regimen sliding scale   SubCutaneous at bedtime  isosorbide   dinitrate Tablet (ISORDIL) 30 milliGRAM(s) Oral three times a day  metolazone 2.5 milliGRAM(s) Oral daily  polyethylene glycol 3350 17 Gram(s) Oral daily  senna 2 Tablet(s) Oral at bedtime  sevelamer hydrochloride 800 milliGRAM(s) Oral three times a day with meals  simethicone 80 milliGRAM(s) Chew three times a day  sodium chloride 0.9% lock flush 3 milliLiter(s) IV Push every 8 hours  sodium chloride 0.9% lock flush 3 milliLiter(s) IV Push every 8 hours  tamsulosin 0.4 milliGRAM(s) Oral at bedtime    MEDICATIONS  (PRN):  calamine Lotion 1 Application(s) Topical daily PRN Rash and/or Itching  dextrose 40% Gel 15 Gram(s) Oral once PRN Blood Glucose LESS THAN 70 milliGRAM(s)/deciliter  glucagon  Injectable 1 milliGRAM(s) IntraMuscular once PRN Glucose LESS THAN 70 milligrams/deciliter  guaiFENesin   Syrup  (Sugar-Free) 200 milliGRAM(s) Oral every 6 hours PRN Cough  sodium chloride 0.65% Nasal 1 Spray(s) Both Nostrils three times a day PRN Nasal Congestion          Vital Signs Last 24 Hrs  T(C): 36.6 (16 Feb 2019 09:22), Max: 36.6 (16 Feb 2019 00:30)  T(F): 97.8 (16 Feb 2019 09:22), Max: 97.8 (16 Feb 2019 00:30)  HR: 92 (16 Feb 2019 09:22) (86 - 92)  BP: 119/73 (16 Feb 2019 09:22) (98/65 - 122/81)  BP(mean): --  RR: 20 (16 Feb 2019 09:22) (18 - 20)  SpO2: 99% (16 Feb 2019 09:22) (98% - 100%)  I&O's Detail    15 Feb 2019 07:01  -  16 Feb 2019 07:00  --------------------------------------------------------  IN:  Total IN: 0 mL    OUT:    Voided: 1050 mL  Total OUT: 1050 mL    Total NET: -1050 mL    PHYSICAL EXAM  GEN: NAD, skin W & D  RESP: few crackles at R base  CV: nl S1S2  GI: soft, NT/ND  EXT: no C/C/E but + L thigh tenderness, L LE pulses +  NEURO: A & O X 3    EKG/ TELEM: NSR    LABS:                          9.0    6.74  )-----------( 228      ( 16 Feb 2019 06:50 )             28.2       16 Feb 2019 06:50    123<L>  |  80<L>  |  75<H>  ----------------------------<  107<H>  4.1     |  27     |  2.76<H>    15 Feb 2019 07:55    125<L>  |  80<L>  |  78<H>  ----------------------------<  88     4.0     |  28     |  2.98<H>    Ca    8.8        16 Feb 2019 06:50  Ca    9.0        15 Feb 2019 07:55  Mg     2.3       16 Feb 2019 06:50  Mg     2.3       15 Feb 2019 07:55

## 2019-02-16 NOTE — CONSULT NOTE ADULT - ASSESSMENT
ASSESSMENT: Patient is a 68M pmhx DM2, S CHF with EF= 20%, HTN, Dyslipidemia, CAD, 3v CABG, PVD, L KEI stent & L CFA-Pop bypass with PTFE c/b wound infection requiring washout, sartorius flap and wound vac, now presenting with left thigh pain/weakness x 1-2 days.    PLAN:   - No urgent operative plans at this time  - Recommend arterial & venous duplexes of the left leg to r/o graft failure, and DVT  - WBC scan to r/o infected graft  - Recommend CT noncon of the abdomen with runoff to the left leg  - Please send a set of blood cultures    Discussed with vascular surgery fellow    Nickolas Joyner, PGY-2  C Team Surgery [phone] ASSESSMENT: Patient is a 68M pmhx DM2, S CHF with EF= 20%, HTN, Dyslipidemia, CAD, 3v CABG, PVD, L KEI stent & L CFA-Pop bypass with PTFE c/b wound infection requiring washout, sartorius flap and wound vac, now presenting with left thigh pain/weakness x 1-2 days.    PLAN:   - No urgent operative plans at this time  - Recommend arterial & venous duplexes of the left leg to r/o graft failure, and DVT  - WBC scan to r/o infected graft  please get CT leg s/o graft infection   - Please send a set of blood cultures  will need blood cx

## 2019-02-16 NOTE — CONSULT NOTE ADULT - EXTREMITIES COMMENTS
moderate art insuff w moderate trophic skin changes  lle w edema and induration over  the prev bypass w left groin wound w drainage

## 2019-02-16 NOTE — PROGRESS NOTE ADULT - PROBLEM SELECTOR PLAN 1
HF recs noted and appreciated. Metolazone 2.5 mg po QD added today. Continue Bumex gtt through today and will transition to po Bumex 4 mg po BID in am. Continue hydralazine and Imdur. Strict I & O please. Scr continues to trend down.

## 2019-02-16 NOTE — PROGRESS NOTE ADULT - PROBLEM SELECTOR PLAN 2
s/p vascular surgery for SFA occlusion with L toe amputation. Now c/o L thigh tenderness near surgical site. Will continue to monitor and have vascular re-assess. may need to consider followup U/S studies. s/p vascular surgery for SFA occlusion with L toe amputation. Now c/o L thigh tenderness near surgical site. Will continue to monitor and have vascular re-assess. May need to consider followup U/S studies but will await vascular recs.

## 2019-02-16 NOTE — PROGRESS NOTE ADULT - ASSESSMENT
68M with CAD,s/p CABG/PCI, HFrEF with biventricular HF (refusing ICD), DM, PAD now s/p recent vascular surgery and admitted for worsening SOB, orthopnea, PND, and ORTA -- acute on chronic systolic heart failure exacerbation requiring intermittent inotrope therapy, now stable on IV diuresis.

## 2019-02-16 NOTE — PROGRESS NOTE ADULT - PROBLEM SELECTOR PLAN 3
Serum sodium stable and patient is asymptomatic. Renal is following and input noted and appreciated.

## 2019-02-16 NOTE — PROGRESS NOTE ADULT - PROBLEM SELECTOR PLAN 1
Abdominal distension improving as of yesterday, per pt.   Continue Bumex gtt 1.0 mg/hr. Will hold off on dobutamine.  -dosed metolazone 2.5mg PO x1 today  Continue hydral 100 mg po TID and Isordil 30 mg po TID.   If not going back on dobutamine, will start BB prior to d/c.   Strict I/O. Please get standing weight.   Pt refused ICD and does not want an evaluation for LVAD as well.

## 2019-02-16 NOTE — CONSULT NOTE ADULT - PROBLEM SELECTOR PROBLEM 2
Acute on chronic systolic heart failure
Acute on chronic renal failure
Infection of graft, initial encounter

## 2019-02-16 NOTE — CONSULT NOTE ADULT - ATTENDING COMMENTS
would stop aldactone to allow for more aggressive diuresis    Isaak Mitchell MD, FACP.  Attending Nephrologist  Office: [phone]  Pager: [phone]  Cell:    [phone]  Email: [email or web address]
I performed a history and physical exam of the patient and discussed  the findings and plan with the house officer. I reviewed the resident note and agree with the findings and plan
Pt seen with resident.  Agree with above.  Not interested in AICD or LVAD.  Aware that he could have sudden death due to his underlying heart disease.  Discussed AD.  He is leaning toward dnr/dni but wants to speak with his wife.  Will return tomorrow with MOLST form to help with discussion.  Overall prognosis is poor

## 2019-02-16 NOTE — CHART NOTE - NSCHARTNOTEFT_GEN_A_CORE
Patient complaining of left thigh pain and tightness that began during the day (2/15).  On exam he is noted to have 2+ pitting edema with no lacerations, ecchymosis or erythema.  Patient and family did not want to resume dobutamine.  Elevate leg and continue diuresis with Bumex as ordered.  Pain mgmt with Oxycodone 5mg.  If swelling does not decrease with diuresis will need further evaluation. Patient complaining of left thigh pain and tightness that began during the day (2/15).  On exam he is noted to have 2+ pitting edema with no lacerations, ecchymosis or erythema.  Patient and family did not want to resume dobutamine, plan is to start Metolazone in AM.  Elevate leg and continue diuresis with Bumex as ordered.  Pain mgmt with Oxycodone 5mg.  Pt has h/o LLE CFA to below-knee bypass with PTFE for long-segment SFA occlusion.  Left DP pulse.....If swelling does not decrease with diuresis will need further evaluation. Patient complaining of left thigh pain and tightness that began during the day (2/15).  On exam he is noted to have 2+ pitting edema with no lacerations, ecchymosis or erythema.  Patient and family did not want to resume dobutamine, plan is to start Metolazone in AM.  Elevate leg and continue diuresis with Bumex as ordered.  Pain mgmt with Oxycodone 5mg.  Pt has h/o LLE CFA to below-knee bypass with PTFE for long-segment SFA occlusion.  Left DP pulse faint but palpable.  If swelling does not decrease with diuresis will need further evaluation.

## 2019-02-16 NOTE — CONSULT NOTE ADULT - SUBJECTIVE AND OBJECTIVE BOX
VASCULAR SURGERY CONSULT NOTE  --------------------------------------------------------------------------------------------    HPI:  68M, ambulates with a walker, due to b/L LE bypass and b/L LE multiple toe amputations, history of DM2, S HF with EF= 20%, HTN, Dyslipidemia, CAD, 3v CABG, PVD, groin  sartorius flap, and vac placement, L LE with surgical scar wounds, being treated and followed by VNS. The pt  was discharged from [facility] on 12/27/18 after a 20 day extensive stay for KESHAV on CKD in setting of diuretics use and s/p IV contrast, with course c/b  worsening sob, LE edema and increasing abdominal girth concerning for ADHD. Pt also became hypotensive SBP 90'  and  started on Lasix gtt and dobutamine gtt and transferred to CCU for further management.. The pt comes to the ED due to dyspnea felt while laying flat, dry cough, increased abdominal girth and 30 pound weight gain since discharge from the hospital and chills. The pt denies dietary indiscretions with salt and has been complaint with his mediations.  Denies dysuria, diaphoresis,  palpitations, nausea, vomit body aches.   In the Ed, the pt is found to be in fluid overload with a ProBNP= 4578, CXR  preliminary revealing pulmonary edema, Hyperkalemia @ 5.7 not hemolyzed and treated with repeat K+= 4.7, elevated Troponin  83-->76 in the setting of KESHAV on CKD = Creatinine 1.85 --> 2.15.  The pt was given Lasix 40 mg IV with positive urine out put..  The pt says the treatment help to alleviate  his symptoms. But still with a dry cough and requesting cough suppressant.     Since admission, the patient has undergone extensive diuresis, with improvement in symptoms. Just in the last 1-2 days he reports new onset left thigh pain. The patient also endorses some weakness in that leg. He denies fevers, chills, n/v/d, HA, SOB, CP.      PAST MEDICAL & SURGICAL HISTORY:  Chronic congestive heart failure, unspecified congestive heart failure type  PAD (peripheral artery disease)  Stented coronary artery  Hyperlipidemia  Diabetes  Hypertension  CAD (coronary artery disease)  S/P bypass graft of extremity: Left LE  9/2018  S/P amputation: right great toe and 4th and 5th digit  S/P bypass graft of extremity: RLE  S/P angioplasty with stent: about 5 years ago  S/P CABG x 3    FAMILY HISTORY:  No family history of cancer (Father)  Coronary artery disease (Mother)  [] Family history not pertinent as reviewed with the patient and family      ALLERGIES: No Known Allergies      CURRENT MEDICATIONS  MEDICATIONS (STANDING): aspirin enteric coated 81 milliGRAM(s) Oral daily  benzonatate 100 milliGRAM(s) Oral three times a day  buMETAnide Infusion 1 mG/Hr IV Continuous <Continuous>  cholecalciferol 400 Unit(s) Oral daily  dextrose 5%. 1000 milliLiter(s) IV Continuous <Continuous>  dextrose 50% Injectable 12.5 Gram(s) IV Push once  dextrose 50% Injectable 25 Gram(s) IV Push once  dextrose 50% Injectable 25 Gram(s) IV Push once  docusate sodium 100 milliGRAM(s) Oral three times a day  heparin  Injectable 5000 Unit(s) SubCutaneous every 12 hours  hydrALAZINE 100 milliGRAM(s) Oral every 8 hours  insulin glargine Injectable (LANTUS) 15 Unit(s) SubCutaneous at bedtime  insulin lispro (HumaLOG) corrective regimen sliding scale   SubCutaneous three times a day before meals  insulin lispro (HumaLOG) corrective regimen sliding scale   SubCutaneous at bedtime  isosorbide   dinitrate Tablet (ISORDIL) 30 milliGRAM(s) Oral three times a day  metolazone 2.5 milliGRAM(s) Oral daily  polyethylene glycol 3350 17 Gram(s) Oral daily  senna 2 Tablet(s) Oral at bedtime  sevelamer hydrochloride 800 milliGRAM(s) Oral three times a day with meals  simethicone 80 milliGRAM(s) Chew three times a day  sodium chloride 0.9% lock flush 3 milliLiter(s) IV Push every 8 hours  sodium chloride 0.9% lock flush 3 milliLiter(s) IV Push every 8 hours  tamsulosin 0.4 milliGRAM(s) Oral at bedtime    MEDICATIONS (PRN):dextrose 40% Gel 15 Gram(s) Oral once PRN Blood Glucose LESS THAN 70 milliGRAM(s)/deciliter  glucagon  Injectable 1 milliGRAM(s) IntraMuscular once PRN Glucose LESS THAN 70 milligrams/deciliter  guaiFENesin   Syrup  (Sugar-Free) 200 milliGRAM(s) Oral every 6 hours PRN Cough    --------------------------------------------------------------------------------------------    Vitals:   T(C): 36.6 (02-16-19 @ 13:21), Max: 36.6 (02-16-19 @ 00:30)  HR: 90 (02-16-19 @ 13:21) (86 - 92)  BP: 110/67 (02-16-19 @ 13:21) (98/65 - 122/81)  RR: 20 (02-16-19 @ 13:21) (18 - 20)  SpO2: 99% (02-16-19 @ 13:21) (98% - 100%)  CAPILLARY BLOOD GLUCOSE      POCT Blood Glucose.: 137 mg/dL (16 Feb 2019 12:31)  POCT Blood Glucose.: 127 mg/dL (16 Feb 2019 09:09)  POCT Blood Glucose.: 198 mg/dL (15 Feb 2019 22:38)  POCT Blood Glucose.: 184 mg/dL (15 Feb 2019 18:10)  POCT Blood Glucose.: 145 mg/dL (15 Feb 2019 14:55)      02-15 @ 07:01  -  02-16 @ 07:00  --------------------------------------------------------  IN:  Total IN: 0 mL    OUT:    Voided: 1050 mL  Total OUT: 1050 mL    Total NET: -1050 mL      PHYSICAL EXAM:   General: NAD, Lying in bed comfortably  Neuro: A+Ox3  HEENT: NC/AT, EOMI  Neck: Soft, supple  Cardio: RRR, nml S1/S2  Resp: Good effort, CTA b/l  GI/Abd: Soft, NT/ND, no rebound/guarding, no masses palpated  Skin: Intact, no breakdown  Extremities: Left thigh notably swollen/tense compared to right. Surgical incisions healing appropriately. Left thigh tender. No erythema.   Vascular: DP/PT signals b/l. Signal present over left leg graft  --------------------------------------------------------------------------------------------    LABS  CBC (02-16 @ 06:50)                              9.0<L>                         6.74    )----------------(  228        75.5  % Neutrophils, 9.3<L>% Lymphocytes, ANC: 5.09                                28.2<L>  CBC (02-15 @ 07:55)                              9.5<L>                         8.58    )----------------(  290        --    % Neutrophils, --    % Lymphocytes, ANC: --                                  30.0<L>    BMP (02-16 @ 06:50)             123<L>  |  80<L>   |  75<H> 		Ca++ --      Ca 8.8                ---------------------------------( 107<H>		Mg 2.3                4.1     |  27      |  2.76<H>			Ph --      BMP (02-15 @ 07:55)             125<L>  |  80<L>   |  78<H> 		Ca++ --      Ca 9.0                ---------------------------------( 88    		Mg 2.3                4.0     |  28      |  2.98<H>			Ph --              ABG (02-15 @ 07:55)      /  /  /  /  / %     Lactate:  1.2      --------------------------------------------------------------------------------------------    MICROBIOLOGY      --------------------------------------------------------------------------------------------    IMAGING  < from: US Abdomen Limited (02.13.19 @ 09:37) >  FINDINGS:    Evaluation of the 4 quadrants of the abdomen demonstrates moderate volume   ascites with the largest accumulation in the left lower quadrant.     Right pleural effusion.    IMPRESSION:     Moderate ascites.    Right pleural effusion.    < end of copied text >

## 2019-02-17 DIAGNOSIS — I73.9 PERIPHERAL VASCULAR DISEASE, UNSPECIFIED: ICD-10-CM

## 2019-02-17 DIAGNOSIS — T85.79XA INFECTION AND INFLAMMATORY REACTION DUE TO OTHER INTERNAL PROSTHETIC DEVICES, IMPLANTS AND GRAFTS, INITIAL ENCOUNTER: ICD-10-CM

## 2019-02-17 LAB
ANION GAP SERPL CALC-SCNC: 17 MMO/L — HIGH (ref 7–14)
ANION GAP SERPL CALC-SCNC: 17 MMO/L — HIGH (ref 7–14)
BASOPHILS # BLD AUTO: 0.05 K/UL — SIGNIFICANT CHANGE UP (ref 0–0.2)
BASOPHILS NFR BLD AUTO: 0.7 % — SIGNIFICANT CHANGE UP (ref 0–2)
BASOPHILS NFR SPEC: 0 % — SIGNIFICANT CHANGE UP (ref 0–2)
BLASTS # FLD: 0 % — SIGNIFICANT CHANGE UP (ref 0–0)
BUN SERPL-MCNC: 80 MG/DL — HIGH (ref 7–23)
BUN SERPL-MCNC: 87 MG/DL — HIGH (ref 7–23)
CALCIUM SERPL-MCNC: 8.9 MG/DL — SIGNIFICANT CHANGE UP (ref 8.4–10.5)
CALCIUM SERPL-MCNC: 8.9 MG/DL — SIGNIFICANT CHANGE UP (ref 8.4–10.5)
CHLORIDE SERPL-SCNC: 78 MMOL/L — LOW (ref 98–107)
CHLORIDE SERPL-SCNC: 80 MMOL/L — LOW (ref 98–107)
CO2 SERPL-SCNC: 28 MMOL/L — SIGNIFICANT CHANGE UP (ref 22–31)
CO2 SERPL-SCNC: 28 MMOL/L — SIGNIFICANT CHANGE UP (ref 22–31)
CREAT SERPL-MCNC: 2.86 MG/DL — HIGH (ref 0.5–1.3)
CREAT SERPL-MCNC: 3.05 MG/DL — HIGH (ref 0.5–1.3)
EOSINOPHIL # BLD AUTO: 0.15 K/UL — SIGNIFICANT CHANGE UP (ref 0–0.5)
EOSINOPHIL NFR BLD AUTO: 2.2 % — SIGNIFICANT CHANGE UP (ref 0–6)
EOSINOPHIL NFR FLD: 0.9 % — SIGNIFICANT CHANGE UP (ref 0–6)
GIANT PLATELETS BLD QL SMEAR: PRESENT — SIGNIFICANT CHANGE UP
GLUCOSE BLDC GLUCOMTR-MCNC: 156 MG/DL — HIGH (ref 70–99)
GLUCOSE BLDC GLUCOMTR-MCNC: 178 MG/DL — HIGH (ref 70–99)
GLUCOSE BLDC GLUCOMTR-MCNC: 216 MG/DL — HIGH (ref 70–99)
GLUCOSE BLDC GLUCOMTR-MCNC: 258 MG/DL — HIGH (ref 70–99)
GLUCOSE SERPL-MCNC: 169 MG/DL — HIGH (ref 70–99)
GLUCOSE SERPL-MCNC: 169 MG/DL — HIGH (ref 70–99)
HCT VFR BLD CALC: 28.5 % — LOW (ref 39–50)
HGB BLD-MCNC: 9 G/DL — LOW (ref 13–17)
HYPOCHROMIA BLD QL: SLIGHT — SIGNIFICANT CHANGE UP
IMM GRANULOCYTES NFR BLD AUTO: 0.4 % — SIGNIFICANT CHANGE UP (ref 0–1.5)
LYMPHOCYTES # BLD AUTO: 0.62 K/UL — LOW (ref 1–3.3)
LYMPHOCYTES # BLD AUTO: 9.2 % — LOW (ref 13–44)
LYMPHOCYTES NFR SPEC AUTO: 5.3 % — LOW (ref 13–44)
MAGNESIUM SERPL-MCNC: 2.3 MG/DL — SIGNIFICANT CHANGE UP (ref 1.6–2.6)
MAGNESIUM SERPL-MCNC: 2.4 MG/DL — SIGNIFICANT CHANGE UP (ref 1.6–2.6)
MCHC RBC-ENTMCNC: 23.1 PG — LOW (ref 27–34)
MCHC RBC-ENTMCNC: 31.6 % — LOW (ref 32–36)
MCV RBC AUTO: 73.1 FL — LOW (ref 80–100)
METAMYELOCYTES # FLD: 0 % — SIGNIFICANT CHANGE UP (ref 0–1)
MONOCYTES # BLD AUTO: 0.64 K/UL — SIGNIFICANT CHANGE UP (ref 0–0.9)
MONOCYTES NFR BLD AUTO: 9.5 % — SIGNIFICANT CHANGE UP (ref 2–14)
MONOCYTES NFR BLD: 2.6 % — SIGNIFICANT CHANGE UP (ref 2–9)
MYELOCYTES NFR BLD: 0 % — SIGNIFICANT CHANGE UP (ref 0–0)
NEUTROPHIL AB SER-ACNC: 88.5 % — HIGH (ref 43–77)
NEUTROPHILS # BLD AUTO: 5.27 K/UL — SIGNIFICANT CHANGE UP (ref 1.8–7.4)
NEUTROPHILS NFR BLD AUTO: 78 % — HIGH (ref 43–77)
NEUTS BAND # BLD: 0 % — SIGNIFICANT CHANGE UP (ref 0–6)
NRBC # FLD: 0 K/UL — LOW (ref 25–125)
OTHER - HEMATOLOGY %: 0 — SIGNIFICANT CHANGE UP
OVALOCYTES BLD QL SMEAR: SLIGHT — SIGNIFICANT CHANGE UP
PLATELET # BLD AUTO: 221 K/UL — SIGNIFICANT CHANGE UP (ref 150–400)
PLATELET COUNT - ESTIMATE: NORMAL — SIGNIFICANT CHANGE UP
PMV BLD: SIGNIFICANT CHANGE UP FL (ref 7–13)
POIKILOCYTOSIS BLD QL AUTO: SIGNIFICANT CHANGE UP
POLYCHROMASIA BLD QL SMEAR: SLIGHT — SIGNIFICANT CHANGE UP
POTASSIUM SERPL-MCNC: 3.4 MMOL/L — LOW (ref 3.5–5.3)
POTASSIUM SERPL-MCNC: 3.6 MMOL/L — SIGNIFICANT CHANGE UP (ref 3.5–5.3)
POTASSIUM SERPL-SCNC: 3.4 MMOL/L — LOW (ref 3.5–5.3)
POTASSIUM SERPL-SCNC: 3.6 MMOL/L — SIGNIFICANT CHANGE UP (ref 3.5–5.3)
PROMYELOCYTES # FLD: 0 % — SIGNIFICANT CHANGE UP (ref 0–0)
RBC # BLD: 3.9 M/UL — LOW (ref 4.2–5.8)
RBC # FLD: 23.9 % — HIGH (ref 10.3–14.5)
SODIUM SERPL-SCNC: 123 MMOL/L — LOW (ref 135–145)
SODIUM SERPL-SCNC: 125 MMOL/L — LOW (ref 135–145)
SPECIMEN SOURCE: SIGNIFICANT CHANGE UP
SPECIMEN SOURCE: SIGNIFICANT CHANGE UP
TARGETS BLD QL SMEAR: SLIGHT — SIGNIFICANT CHANGE UP
VARIANT LYMPHS # BLD: 2.7 % — SIGNIFICANT CHANGE UP
WBC # BLD: 6.76 K/UL — SIGNIFICANT CHANGE UP (ref 3.8–10.5)
WBC # FLD AUTO: 6.76 K/UL — SIGNIFICANT CHANGE UP (ref 3.8–10.5)

## 2019-02-17 PROCEDURE — 99232 SBSQ HOSP IP/OBS MODERATE 35: CPT

## 2019-02-17 RX ORDER — BUMETANIDE 0.25 MG/ML
4 INJECTION INTRAMUSCULAR; INTRAVENOUS EVERY 8 HOURS
Qty: 0 | Refills: 0 | Status: DISCONTINUED | OUTPATIENT
Start: 2019-02-17 | End: 2019-02-25

## 2019-02-17 RX ORDER — OXYCODONE HYDROCHLORIDE 5 MG/1
5 TABLET ORAL ONCE
Qty: 0 | Refills: 0 | Status: DISCONTINUED | OUTPATIENT
Start: 2019-02-17 | End: 2019-02-17

## 2019-02-17 RX ORDER — POTASSIUM CHLORIDE 20 MEQ
40 PACKET (EA) ORAL ONCE
Qty: 0 | Refills: 0 | Status: COMPLETED | OUTPATIENT
Start: 2019-02-17 | End: 2019-02-17

## 2019-02-17 RX ADMIN — Medication 100 MILLIGRAM(S): at 14:15

## 2019-02-17 RX ADMIN — POLYETHYLENE GLYCOL 3350 17 GRAM(S): 17 POWDER, FOR SOLUTION ORAL at 12:58

## 2019-02-17 RX ADMIN — Medication 100 MILLIGRAM(S): at 22:58

## 2019-02-17 RX ADMIN — INSULIN GLARGINE 15 UNIT(S): 100 INJECTION, SOLUTION SUBCUTANEOUS at 22:58

## 2019-02-17 RX ADMIN — Medication 2: at 23:00

## 2019-02-17 RX ADMIN — Medication 2: at 17:54

## 2019-02-17 RX ADMIN — Medication 100 MILLIGRAM(S): at 06:20

## 2019-02-17 RX ADMIN — TAMSULOSIN HYDROCHLORIDE 0.4 MILLIGRAM(S): 0.4 CAPSULE ORAL at 22:58

## 2019-02-17 RX ADMIN — OXYCODONE HYDROCHLORIDE 5 MILLIGRAM(S): 5 TABLET ORAL at 03:12

## 2019-02-17 RX ADMIN — OXYCODONE HYDROCHLORIDE 5 MILLIGRAM(S): 5 TABLET ORAL at 02:42

## 2019-02-17 RX ADMIN — ISOSORBIDE DINITRATE 30 MILLIGRAM(S): 5 TABLET ORAL at 22:58

## 2019-02-17 RX ADMIN — SODIUM CHLORIDE 3 MILLILITER(S): 9 INJECTION INTRAMUSCULAR; INTRAVENOUS; SUBCUTANEOUS at 23:04

## 2019-02-17 RX ADMIN — SIMETHICONE 80 MILLIGRAM(S): 80 TABLET, CHEWABLE ORAL at 22:57

## 2019-02-17 RX ADMIN — ISOSORBIDE DINITRATE 30 MILLIGRAM(S): 5 TABLET ORAL at 12:58

## 2019-02-17 RX ADMIN — Medication 100 MILLIGRAM(S): at 13:17

## 2019-02-17 RX ADMIN — SODIUM CHLORIDE 3 MILLILITER(S): 9 INJECTION INTRAMUSCULAR; INTRAVENOUS; SUBCUTANEOUS at 06:20

## 2019-02-17 RX ADMIN — Medication 4: at 13:18

## 2019-02-17 RX ADMIN — SIMETHICONE 80 MILLIGRAM(S): 80 TABLET, CHEWABLE ORAL at 06:20

## 2019-02-17 RX ADMIN — Medication 100 MILLIGRAM(S): at 12:58

## 2019-02-17 RX ADMIN — SIMETHICONE 80 MILLIGRAM(S): 80 TABLET, CHEWABLE ORAL at 12:59

## 2019-02-17 RX ADMIN — Medication 400 UNIT(S): at 12:57

## 2019-02-17 RX ADMIN — ISOSORBIDE DINITRATE 30 MILLIGRAM(S): 5 TABLET ORAL at 06:20

## 2019-02-17 RX ADMIN — SODIUM CHLORIDE 3 MILLILITER(S): 9 INJECTION INTRAMUSCULAR; INTRAVENOUS; SUBCUTANEOUS at 12:59

## 2019-02-17 RX ADMIN — Medication 81 MILLIGRAM(S): at 12:58

## 2019-02-17 RX ADMIN — Medication 2: at 09:29

## 2019-02-17 RX ADMIN — Medication 40 MILLIEQUIVALENT(S): at 22:58

## 2019-02-17 NOTE — PROGRESS NOTE ADULT - SUBJECTIVE AND OBJECTIVE BOX
Patient is a 68y old  Male who presents with a chief complaint of Dyspnea x 2 days (17 Feb 2019 11:19)      Vascular Surgery Attending Progress Note    Interval HPI: pt w/o c/o     Medications:  aspirin enteric coated 81 milliGRAM(s) Oral daily  benzonatate 100 milliGRAM(s) Oral three times a day  buMETAnide 4 milliGRAM(s) Oral every 8 hours  calamine Lotion 1 Application(s) Topical daily PRN  cholecalciferol 400 Unit(s) Oral daily  dextrose 40% Gel 15 Gram(s) Oral once PRN  dextrose 5%. 1000 milliLiter(s) IV Continuous <Continuous>  dextrose 50% Injectable 12.5 Gram(s) IV Push once  dextrose 50% Injectable 25 Gram(s) IV Push once  dextrose 50% Injectable 25 Gram(s) IV Push once  docusate sodium 100 milliGRAM(s) Oral three times a day  glucagon  Injectable 1 milliGRAM(s) IntraMuscular once PRN  guaiFENesin   Syrup  (Sugar-Free) 200 milliGRAM(s) Oral every 6 hours PRN  heparin  Injectable 5000 Unit(s) SubCutaneous every 12 hours  hydrALAZINE 100 milliGRAM(s) Oral every 8 hours  insulin glargine Injectable (LANTUS) 15 Unit(s) SubCutaneous at bedtime  insulin lispro (HumaLOG) corrective regimen sliding scale   SubCutaneous three times a day before meals  insulin lispro (HumaLOG) corrective regimen sliding scale   SubCutaneous at bedtime  isosorbide   dinitrate Tablet (ISORDIL) 30 milliGRAM(s) Oral three times a day  metolazone 2.5 milliGRAM(s) Oral daily  polyethylene glycol 3350 17 Gram(s) Oral daily  senna 2 Tablet(s) Oral at bedtime  sevelamer hydrochloride 800 milliGRAM(s) Oral three times a day with meals  simethicone 80 milliGRAM(s) Chew three times a day  sodium chloride 0.65% Nasal 1 Spray(s) Both Nostrils three times a day PRN  sodium chloride 0.9% lock flush 3 milliLiter(s) IV Push every 8 hours  sodium chloride 0.9% lock flush 3 milliLiter(s) IV Push every 8 hours  tamsulosin 0.4 milliGRAM(s) Oral at bedtime      Vital Signs Last 24 Hrs  T(C): 36.4 (17 Feb 2019 13:00), Max: 36.4 (17 Feb 2019 02:40)  T(F): 97.6 (17 Feb 2019 13:00), Max: 97.6 (17 Feb 2019 13:00)  HR: 85 (17 Feb 2019 14:13) (85 - 97)  BP: 114/73 (17 Feb 2019 14:13) (105/63 - 122/80)  BP(mean): --  RR: 18 (17 Feb 2019 13:00) (18 - 19)  SpO2: 98% (17 Feb 2019 13:00) (97% - 98%)  I&O's Summary    16 Feb 2019 07:01  -  17 Feb 2019 07:00  --------------------------------------------------------  IN: 720 mL / OUT: 3250 mL / NET: -2530 mL    17 Feb 2019 07:01  -  17 Feb 2019 17:12  --------------------------------------------------------  IN: 0 mL / OUT: 1300 mL / NET: -1300 mL        Physical Exam:  Neuro  A&Ox3 VSS  Vascular:  left groin drainage stable  lle edema stable     LABS:                        9.0    6.76  )-----------( 221      ( 17 Feb 2019 06:55 )             28.5     02-17    125<L>  |  80<L>  |  80<H>  ----------------------------<  169<H>  3.6   |  28  |  2.86<H>    Ca    8.9      17 Feb 2019 06:55  Mg     2.3     02-17    CT LLE Reviewed       IRISH YA MD  [phone]

## 2019-02-17 NOTE — PROGRESS NOTE ADULT - SUBJECTIVE AND OBJECTIVE BOX
24H hour events:   no acute events overnight  no complaints this am  denies sob, cp, lightheadedness  tele: sinus 80s    MEDICATIONS:  aspirin enteric coated 81 milliGRAM(s) Oral daily  buMETAnide 4 milliGRAM(s) Oral every 8 hours  heparin  Injectable 5000 Unit(s) SubCutaneous every 12 hours  hydrALAZINE 100 milliGRAM(s) Oral every 8 hours  isosorbide   dinitrate Tablet (ISORDIL) 30 milliGRAM(s) Oral three times a day  metolazone 2.5 milliGRAM(s) Oral daily  tamsulosin 0.4 milliGRAM(s) Oral at bedtime      benzonatate 100 milliGRAM(s) Oral three times a day  guaiFENesin   Syrup  (Sugar-Free) 200 milliGRAM(s) Oral every 6 hours PRN      docusate sodium 100 milliGRAM(s) Oral three times a day  polyethylene glycol 3350 17 Gram(s) Oral daily  senna 2 Tablet(s) Oral at bedtime  simethicone 80 milliGRAM(s) Chew three times a day    dextrose 40% Gel 15 Gram(s) Oral once PRN  dextrose 50% Injectable 12.5 Gram(s) IV Push once  dextrose 50% Injectable 25 Gram(s) IV Push once  dextrose 50% Injectable 25 Gram(s) IV Push once  glucagon  Injectable 1 milliGRAM(s) IntraMuscular once PRN  insulin glargine Injectable (LANTUS) 15 Unit(s) SubCutaneous at bedtime  insulin lispro (HumaLOG) corrective regimen sliding scale   SubCutaneous three times a day before meals  insulin lispro (HumaLOG) corrective regimen sliding scale   SubCutaneous at bedtime    calamine Lotion 1 Application(s) Topical daily PRN  cholecalciferol 400 Unit(s) Oral daily  dextrose 5%. 1000 milliLiter(s) IV Continuous <Continuous>  sodium chloride 0.65% Nasal 1 Spray(s) Both Nostrils three times a day PRN  sodium chloride 0.9% lock flush 3 milliLiter(s) IV Push every 8 hours  sodium chloride 0.9% lock flush 3 milliLiter(s) IV Push every 8 hours          PHYSICAL EXAM:  T(C): 36.4 (02-17-19 @ 02:40), Max: 36.6 (02-16-19 @ 13:21)  HR: 92 (02-17-19 @ 02:40) (90 - 97)  BP: 113/65 (02-17-19 @ 02:40) (105/63 - 113/65)  RR: 19 (02-17-19 @ 02:40) (19 - 20)  SpO2: 98% (02-17-19 @ 02:40) (97% - 99%)  Wt(kg): --  I&O's Summary    16 Feb 2019 07:01  -  17 Feb 2019 07:00  --------------------------------------------------------  IN: 720 mL / OUT: 3250 mL / NET: -2530 mL    17 Feb 2019 07:01  -  17 Feb 2019 11:20  --------------------------------------------------------  IN: 0 mL / OUT: 700 mL / NET: -700 mL        Appearance: Normal	  HEENT:   Normal oral mucosa, PERRL, EOMI	  Lymphatic: No lymphadenopathy  Cardiovascular: Normal S1 S2, No JVD, No murmurs, trace b/l LE edema L > R  Respiratory: mild bibasilar crackles	  Psychiatry: A & O x 3, Mood & affect appropriate  Gastrointestinal:  Soft, Non-tender, + BS	  Skin: No rashes, No ecchymoses, No cyanosis	  Neurologic: Non-focal  Extremities: Normal range of motion, No clubbing, cyanosis       LABS:	 	    CBC Full  -  ( 17 Feb 2019 06:55 )  WBC Count : 6.76 K/uL  Hemoglobin : 9.0 g/dL  Hematocrit : 28.5 %  Platelet Count - Automated : 221 K/uL  Mean Cell Volume : 73.1 fL  Mean Cell Hemoglobin : 23.1 pg  Mean Cell Hemoglobin Concentration : 31.6 %  Auto Neutrophil # : 5.27 K/uL  Auto Lymphocyte # : 0.62 K/uL  Auto Monocyte # : 0.64 K/uL  Auto Eosinophil # : 0.15 K/uL  Auto Basophil # : 0.05 K/uL  Auto Neutrophil % : 78.0 %  Auto Lymphocyte % : 9.2 %  Auto Monocyte % : 9.5 %  Auto Eosinophil % : 2.2 %  Auto Basophil % : 0.7 %    02-17    125<L>  |  80<L>  |  80<H>  ----------------------------<  169<H>  3.6   |  28  |  2.86<H>  02-16    126<L>  |  80<L>  |  77<H>  ----------------------------<  193<H>  3.7   |  26  |  2.85<H>    Ca    8.9      17 Feb 2019 06:55  Ca    9.0      16 Feb 2019 17:41  Mg     2.3     02-17  Mg     2.3     02-16        proBNP:   Lipid Profile:   HgA1c:   TSH:       CARDIAC MARKERS:            TELEMETRY: 	    ECG:  	  RADIOLOGY:  OTHER: 	    PREVIOUS DIAGNOSTIC TESTING:    [ ] Echocardiogram:  [ ]  Catheterization:  [ ] Stress Test:  	  	  ASSESSMENT/PLAN:

## 2019-02-17 NOTE — CHART NOTE - NSCHARTNOTEFT_GEN_A_CORE
Patient complaining of left thigh pain similar to previous episodes.  States that Oxycodone he received last night gave some relief.  Will order Oxycodone 5mg PO x 1.  Await Vascular work-up arterial & venous duplexes of the left leg to r/o graft failure, and DVT, WBC scan to r/o infected graft, CT noncon of the abdomen with runoff to the left leg and a set of blood cultures.

## 2019-02-17 NOTE — PROGRESS NOTE ADULT - ASSESSMENT
ASSESSMENT: Patient is a 68M pmhx DM2, S CHF with EF= 20%, HTN, Dyslipidemia, CAD, 3v CABG, PVD, L KEI stent & L CFA-Pop bypass with PTFE c/b wound infection requiring washout, sartorius flap and wound vac, now presenting with left thigh pain/weakness x 1-2 days.    PLAN:   - Recommend arterial & venous duplexes of the left leg to r/o graft failure, and DVT  - WBC scan to r/o infected graft  - Please send a set of blood cultures  will need blood cx

## 2019-02-17 NOTE — PROGRESS NOTE ADULT - ASSESSMENT
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 His last admission from 12/8-12/27 required CCU admission and placement on dobutamine. He has had 3 admissions in 2018, for various complications from DM.  He comes in this time due to worsening SOB. He admits to 2 pillow orthopnea, frequent PND and ORTA after 1/2 block and walking up 5 steps. No CP, palpitations, dizziness. Patient's son by bedside.  His dry weight s/p last hospitalization was 132 lbs.  NYHA class IV. Moderately hypervolemic     Problem/Plan - 1:  ·  Problem: Acute on chronic systolic heart failure.  Plan: Abdominal distension improving as of yesterday, per pt.   volume status improving, will change bumex from drip to 4 mg po tid  -cont metolazone 2.5mg PO qd, please give prior to morning dose of bumex  Continue hydral 100 mg po TID and Isordil 30 mg po TID.   If not going back on dobutamine, will start BB prior to d/c.   Strict I/O. Please get standing weight.   Pt refused ICD and does not want an evaluation for LVAD as well.     Problem/Plan - 2:  ·  Problem: Acute on chronic renal failure.  Plan: Diuretics as above.  No LV thrombus on TTE.

## 2019-02-18 LAB
ANION GAP SERPL CALC-SCNC: 18 MMO/L — HIGH (ref 7–14)
BASOPHILS # BLD AUTO: 0.04 K/UL — SIGNIFICANT CHANGE UP (ref 0–0.2)
BASOPHILS NFR BLD AUTO: 0.6 % — SIGNIFICANT CHANGE UP (ref 0–2)
BUN SERPL-MCNC: 86 MG/DL — HIGH (ref 7–23)
CALCIUM SERPL-MCNC: 8.7 MG/DL — SIGNIFICANT CHANGE UP (ref 8.4–10.5)
CHLORIDE SERPL-SCNC: 80 MMOL/L — LOW (ref 98–107)
CO2 SERPL-SCNC: 26 MMOL/L — SIGNIFICANT CHANGE UP (ref 22–31)
CREAT SERPL-MCNC: 2.81 MG/DL — HIGH (ref 0.5–1.3)
EOSINOPHIL # BLD AUTO: 0.19 K/UL — SIGNIFICANT CHANGE UP (ref 0–0.5)
EOSINOPHIL NFR BLD AUTO: 2.6 % — SIGNIFICANT CHANGE UP (ref 0–6)
GLUCOSE BLDC GLUCOMTR-MCNC: 124 MG/DL — HIGH (ref 70–99)
GLUCOSE BLDC GLUCOMTR-MCNC: 180 MG/DL — HIGH (ref 70–99)
GLUCOSE BLDC GLUCOMTR-MCNC: 194 MG/DL — HIGH (ref 70–99)
GLUCOSE BLDC GLUCOMTR-MCNC: 201 MG/DL — HIGH (ref 70–99)
GLUCOSE SERPL-MCNC: 127 MG/DL — HIGH (ref 70–99)
HCT VFR BLD CALC: 26.4 % — LOW (ref 39–50)
HGB BLD-MCNC: 8.5 G/DL — LOW (ref 13–17)
IMM GRANULOCYTES NFR BLD AUTO: 0.3 % — SIGNIFICANT CHANGE UP (ref 0–1.5)
LYMPHOCYTES # BLD AUTO: 0.61 K/UL — LOW (ref 1–3.3)
LYMPHOCYTES # BLD AUTO: 8.5 % — LOW (ref 13–44)
MAGNESIUM SERPL-MCNC: 2.3 MG/DL — SIGNIFICANT CHANGE UP (ref 1.6–2.6)
MCHC RBC-ENTMCNC: 23.3 PG — LOW (ref 27–34)
MCHC RBC-ENTMCNC: 32.2 % — SIGNIFICANT CHANGE UP (ref 32–36)
MCV RBC AUTO: 72.3 FL — LOW (ref 80–100)
MONOCYTES # BLD AUTO: 0.85 K/UL — SIGNIFICANT CHANGE UP (ref 0–0.9)
MONOCYTES NFR BLD AUTO: 11.8 % — SIGNIFICANT CHANGE UP (ref 2–14)
NEUTROPHILS # BLD AUTO: 5.47 K/UL — SIGNIFICANT CHANGE UP (ref 1.8–7.4)
NEUTROPHILS NFR BLD AUTO: 76.2 % — SIGNIFICANT CHANGE UP (ref 43–77)
NRBC # FLD: 0 K/UL — LOW (ref 25–125)
PLATELET # BLD AUTO: 187 K/UL — SIGNIFICANT CHANGE UP (ref 150–400)
PMV BLD: SIGNIFICANT CHANGE UP FL (ref 7–13)
POTASSIUM SERPL-MCNC: 4 MMOL/L — SIGNIFICANT CHANGE UP (ref 3.5–5.3)
POTASSIUM SERPL-SCNC: 4 MMOL/L — SIGNIFICANT CHANGE UP (ref 3.5–5.3)
RBC # BLD: 3.65 M/UL — LOW (ref 4.2–5.8)
RBC # FLD: 23.7 % — HIGH (ref 10.3–14.5)
SODIUM SERPL-SCNC: 124 MMOL/L — LOW (ref 135–145)
WBC # BLD: 7.18 K/UL — SIGNIFICANT CHANGE UP (ref 3.8–10.5)
WBC # FLD AUTO: 7.18 K/UL — SIGNIFICANT CHANGE UP (ref 3.8–10.5)

## 2019-02-18 PROCEDURE — 99233 SBSQ HOSP IP/OBS HIGH 50: CPT | Mod: GC

## 2019-02-18 PROCEDURE — 99232 SBSQ HOSP IP/OBS MODERATE 35: CPT

## 2019-02-18 RX ORDER — HYDROCORTISONE 1 %
1 OINTMENT (GRAM) TOPICAL DAILY
Qty: 0 | Refills: 0 | Status: DISCONTINUED | OUTPATIENT
Start: 2019-02-18 | End: 2019-02-25

## 2019-02-18 RX ORDER — ACETAMINOPHEN 500 MG
650 TABLET ORAL ONCE
Qty: 0 | Refills: 0 | Status: COMPLETED | OUTPATIENT
Start: 2019-02-18 | End: 2019-02-18

## 2019-02-18 RX ADMIN — SODIUM CHLORIDE 3 MILLILITER(S): 9 INJECTION INTRAMUSCULAR; INTRAVENOUS; SUBCUTANEOUS at 22:10

## 2019-02-18 RX ADMIN — Medication 650 MILLIGRAM(S): at 19:45

## 2019-02-18 RX ADMIN — Medication 2: at 13:27

## 2019-02-18 RX ADMIN — Medication 4: at 18:12

## 2019-02-18 RX ADMIN — ISOSORBIDE DINITRATE 30 MILLIGRAM(S): 5 TABLET ORAL at 13:29

## 2019-02-18 RX ADMIN — BUMETANIDE 4 MILLIGRAM(S): 0.25 INJECTION INTRAMUSCULAR; INTRAVENOUS at 18:13

## 2019-02-18 RX ADMIN — SODIUM CHLORIDE 3 MILLILITER(S): 9 INJECTION INTRAMUSCULAR; INTRAVENOUS; SUBCUTANEOUS at 06:50

## 2019-02-18 RX ADMIN — Medication 650 MILLIGRAM(S): at 18:55

## 2019-02-18 RX ADMIN — BUMETANIDE 4 MILLIGRAM(S): 0.25 INJECTION INTRAMUSCULAR; INTRAVENOUS at 09:03

## 2019-02-18 RX ADMIN — ISOSORBIDE DINITRATE 30 MILLIGRAM(S): 5 TABLET ORAL at 06:46

## 2019-02-18 RX ADMIN — Medication 1 APPLICATION(S): at 18:12

## 2019-02-18 RX ADMIN — Medication 100 MILLIGRAM(S): at 13:29

## 2019-02-18 RX ADMIN — INSULIN GLARGINE 15 UNIT(S): 100 INJECTION, SOLUTION SUBCUTANEOUS at 22:05

## 2019-02-18 RX ADMIN — ISOSORBIDE DINITRATE 30 MILLIGRAM(S): 5 TABLET ORAL at 22:04

## 2019-02-18 RX ADMIN — SIMETHICONE 80 MILLIGRAM(S): 80 TABLET, CHEWABLE ORAL at 13:29

## 2019-02-18 RX ADMIN — Medication 100 MILLIGRAM(S): at 22:04

## 2019-02-18 RX ADMIN — TAMSULOSIN HYDROCHLORIDE 0.4 MILLIGRAM(S): 0.4 CAPSULE ORAL at 22:04

## 2019-02-18 RX ADMIN — SODIUM CHLORIDE 3 MILLILITER(S): 9 INJECTION INTRAMUSCULAR; INTRAVENOUS; SUBCUTANEOUS at 13:29

## 2019-02-18 RX ADMIN — Medication 81 MILLIGRAM(S): at 12:22

## 2019-02-18 RX ADMIN — SIMETHICONE 80 MILLIGRAM(S): 80 TABLET, CHEWABLE ORAL at 06:46

## 2019-02-18 RX ADMIN — Medication 100 MILLIGRAM(S): at 06:47

## 2019-02-18 RX ADMIN — Medication 400 UNIT(S): at 12:22

## 2019-02-18 RX ADMIN — SIMETHICONE 80 MILLIGRAM(S): 80 TABLET, CHEWABLE ORAL at 22:05

## 2019-02-18 RX ADMIN — Medication 100 MILLIGRAM(S): at 06:46

## 2019-02-18 RX ADMIN — BUMETANIDE 4 MILLIGRAM(S): 0.25 INJECTION INTRAMUSCULAR; INTRAVENOUS at 01:02

## 2019-02-18 RX ADMIN — Medication 100 MILLIGRAM(S): at 22:05

## 2019-02-18 NOTE — PROGRESS NOTE ADULT - PROBLEM SELECTOR PLAN 2
Patient with hypervolemia in the setting of ADHF. Patient currently on PO Bumex. Diuretic and ADHF management per cardiology. Monitor daily weights. Low salt diet

## 2019-02-18 NOTE — PROGRESS NOTE ADULT - PROBLEM SELECTOR PLAN 1
Pt. with KESHAV in the setting of ADHF. On review of previous records in [facility], patient with normal Scr levels from 4/26/16 to 9/24/18. Pt. noted to have an episode of KESHAV during previous/recent hospitalization at [facility] (12/7/18 to 12/27/18). On this admission, Scr was elevated at 2.15 on 1/16/19, peaked to 4.0 on 1/25/19 and now has been stable ~3.0. Pt. now on PO Bumex as per cardiology. Pt. non-oliguric. US Kidney showed increased bilateral renal cortical echogenicity, no hydronephrosis on 1/18/19. Monitor labs and urine output. Avoid nephrotoxins. Dose medications as per eGFR

## 2019-02-18 NOTE — PROGRESS NOTE ADULT - SUBJECTIVE AND OBJECTIVE BOX
[facility] DIVISION OF KIDNEY DISEASES AND HYPERTENSION -- FOLLOW UP NOTE  --------------------------------------------------------------------------------  Chief complaint: KESHAV    24 hour events/subjective: Patient seen and examined at bedside this AM. Patient states that his breathing has improved, however complaints of LE pain. Has been transitioned to PO Bumex. Dinh SALAS, XIOMARA, N/V/F/C.    PAST HISTORY  --------------------------------------------------------------------------------  No significant changes to PMH, PSH, FHx, SHx, unless otherwise noted    ALLERGIES & MEDICATIONS  --------------------------------------------------------------------------------  Allergies    No Known Allergies    Intolerances    Standing Inpatient Medications  aspirin enteric coated 81 milliGRAM(s) Oral daily  benzonatate 100 milliGRAM(s) Oral three times a day  buMETAnide 4 milliGRAM(s) Oral every 8 hours  cholecalciferol 400 Unit(s) Oral daily  dextrose 5%. 1000 milliLiter(s) IV Continuous <Continuous>  dextrose 50% Injectable 12.5 Gram(s) IV Push once  dextrose 50% Injectable 25 Gram(s) IV Push once  dextrose 50% Injectable 25 Gram(s) IV Push once  docusate sodium 100 milliGRAM(s) Oral three times a day  heparin  Injectable 5000 Unit(s) SubCutaneous every 12 hours  hydrALAZINE 100 milliGRAM(s) Oral every 8 hours  insulin glargine Injectable (LANTUS) 15 Unit(s) SubCutaneous at bedtime  insulin lispro (HumaLOG) corrective regimen sliding scale   SubCutaneous three times a day before meals  insulin lispro (HumaLOG) corrective regimen sliding scale   SubCutaneous at bedtime  isosorbide   dinitrate Tablet (ISORDIL) 30 milliGRAM(s) Oral three times a day  metolazone 2.5 milliGRAM(s) Oral daily  polyethylene glycol 3350 17 Gram(s) Oral daily  senna 2 Tablet(s) Oral at bedtime  sevelamer hydrochloride 800 milliGRAM(s) Oral three times a day with meals  simethicone 80 milliGRAM(s) Chew three times a day  sodium chloride 0.9% lock flush 3 milliLiter(s) IV Push every 8 hours  sodium chloride 0.9% lock flush 3 milliLiter(s) IV Push every 8 hours  tamsulosin 0.4 milliGRAM(s) Oral at bedtime    REVIEW OF SYSTEMS  --------------------------------------------------------------------------------  Gen: No lethargy  Respiratory: No dyspnea  CV: No chest pain  GI: No abdominal pain  MSK: + pain  Neuro: No dizziness  Heme: No bleeding    All other systems were reviewed and are negative, except as noted.    VITALS/PHYSICAL EXAM  --------------------------------------------------------------------------------  T(C): 36.7 (02-18-19 @ 13:05), Max: 36.8 (02-18-19 @ 06:42)  HR: 84 (02-18-19 @ 13:05) (84 - 96)  BP: 124/68 (02-18-19 @ 13:05) (100/66 - 133/83)  RR: 18 (02-18-19 @ 13:05) (18 - 18)  SpO2: 99% (02-18-19 @ 13:05) (97% - 100%)  Wt(kg): --    02-17-19 @ 07:01  -  02-18-19 @ 07:00  --------------------------------------------------------  IN: 0 mL / OUT: 2000 mL / NET: -2000 mL    Physical Exam:  	Gen: NAD, well-appearing  	HEENT: No supplemental oxygen  	Pulm: CTA B/L  	CV: normal S1S2; no rub  	Abd: soft  	: No mikayla  	LE: bilateral edema, nonpitting  	Skin: Warm, without rashes    LABS/STUDIES  --------------------------------------------------------------------------------              8.5    7.18  >-----------<  187      [02-18-19 @ 06:34]              26.4     124  |  80  |  86  ----------------------------<  127      [02-18-19 @ 06:34]  4.0   |  26  |  2.81        Ca     8.7     [02-18-19 @ 06:34]      Mg     2.3     [02-18-19 @ 06:34]    Creatinine Trend:  SCr 2.81 [02-18 @ 06:34]  SCr 3.05 [02-17 @ 19:30]  SCr 2.86 [02-17 @ 06:55]  SCr 2.85 [02-16 @ 17:41]  SCr 2.76 [02-16 @ 06:50]

## 2019-02-18 NOTE — PROGRESS NOTE ADULT - ASSESSMENT
Patient is a 68M pmhx DM2, S CHF with EF= 20%, HTN, Dyslipidemia, CAD, 3v CABG, PVD, L KEI stent & L CFA-Pop bypass with PTFE c/b wound infection requiring washout, sartorius flap and wound vac, now admitted with left thigh pain/weakness x 1-2 days.    PLAN:   - Arterial and venous duplexes done - graft patent, no DVT, no collection  - WBC scan to r/o infected graft  - Blood cultures NGTD    Vascular surgery  Pager [phone] Patient is a 68M pmhx DM2, S CHF with EF= 20%, HTN, Dyslipidemia, CAD, 3v CABG, PVD, L KEI stent & L CFA-Pop bypass with PTFE c/b wound infection requiring washout, sartorius flap and wound vac, now admitted with left thigh pain/weakness x 1-2 days.    PLAN:   - Arterial and venous duplexes done - graft patent, no DVT, no collection  - WBC scan to r/o infected graft pending   - Blood cultures NGTD  Dr Jung to d/w pt plan

## 2019-02-18 NOTE — PROGRESS NOTE ADULT - SUBJECTIVE AND OBJECTIVE BOX
24H hour events:   no acute events overnight  no complaints, states breathing improving  tele: sr 90s first degree avb    MEDICATIONS:  aspirin enteric coated 81 milliGRAM(s) Oral daily  buMETAnide 4 milliGRAM(s) Oral every 8 hours  heparin  Injectable 5000 Unit(s) SubCutaneous every 12 hours  hydrALAZINE 100 milliGRAM(s) Oral every 8 hours  isosorbide   dinitrate Tablet (ISORDIL) 30 milliGRAM(s) Oral three times a day  metolazone 2.5 milliGRAM(s) Oral daily  tamsulosin 0.4 milliGRAM(s) Oral at bedtime      benzonatate 100 milliGRAM(s) Oral three times a day  guaiFENesin   Syrup  (Sugar-Free) 200 milliGRAM(s) Oral every 6 hours PRN      docusate sodium 100 milliGRAM(s) Oral three times a day  polyethylene glycol 3350 17 Gram(s) Oral daily  senna 2 Tablet(s) Oral at bedtime  simethicone 80 milliGRAM(s) Chew three times a day    dextrose 40% Gel 15 Gram(s) Oral once PRN  dextrose 50% Injectable 12.5 Gram(s) IV Push once  dextrose 50% Injectable 25 Gram(s) IV Push once  dextrose 50% Injectable 25 Gram(s) IV Push once  glucagon  Injectable 1 milliGRAM(s) IntraMuscular once PRN  insulin glargine Injectable (LANTUS) 15 Unit(s) SubCutaneous at bedtime  insulin lispro (HumaLOG) corrective regimen sliding scale   SubCutaneous three times a day before meals  insulin lispro (HumaLOG) corrective regimen sliding scale   SubCutaneous at bedtime    calamine Lotion 1 Application(s) Topical daily PRN  cholecalciferol 400 Unit(s) Oral daily  dextrose 5%. 1000 milliLiter(s) IV Continuous <Continuous>  sodium chloride 0.65% Nasal 1 Spray(s) Both Nostrils three times a day PRN  sodium chloride 0.9% lock flush 3 milliLiter(s) IV Push every 8 hours  sodium chloride 0.9% lock flush 3 milliLiter(s) IV Push every 8 hours          PHYSICAL EXAM:  T(C): 36.6 (02-18-19 @ 09:06), Max: 36.8 (02-18-19 @ 06:42)  HR: 88 (02-18-19 @ 09:06) (85 - 96)  BP: 109/72 (02-18-19 @ 09:06) (100/66 - 133/83)  RR: 18 (02-18-19 @ 09:06) (18 - 18)  SpO2: 99% (02-18-19 @ 09:06) (97% - 100%)  Wt(kg): --  I&O's Summary    17 Feb 2019 07:01  -  18 Feb 2019 07:00  --------------------------------------------------------  IN: 0 mL / OUT: 2000 mL / NET: -2000 mL        Appearance: Normal	  HEENT:   Normal oral mucosa, PERRL, EOMI	  Lymphatic: No lymphadenopathy  Cardiovascular: Normal S1 S2, No JVD, No murmurs, No edema  Respiratory: bibasilar crackles  Psychiatry: A & O x 3, Mood & affect appropriate  Gastrointestinal:  Soft, Non-tender, + BS	  Skin: No rashes, No ecchymoses, No cyanosis	  Neurologic: Non-focal  Extremities: Normal range of motion, No clubbing, cyanosis      LABS:	 	    CBC Full  -  ( 18 Feb 2019 06:34 )  WBC Count : 7.18 K/uL  Hemoglobin : 8.5 g/dL  Hematocrit : 26.4 %  Platelet Count - Automated : 187 K/uL  Mean Cell Volume : 72.3 fL  Mean Cell Hemoglobin : 23.3 pg  Mean Cell Hemoglobin Concentration : 32.2 %  Auto Neutrophil # : 5.47 K/uL  Auto Lymphocyte # : 0.61 K/uL  Auto Monocyte # : 0.85 K/uL  Auto Eosinophil # : 0.19 K/uL  Auto Basophil # : 0.04 K/uL  Auto Neutrophil % : 76.2 %  Auto Lymphocyte % : 8.5 %  Auto Monocyte % : 11.8 %  Auto Eosinophil % : 2.6 %  Auto Basophil % : 0.6 %    02-18    124<L>  |  80<L>  |  86<H>  ----------------------------<  127<H>  4.0   |  26  |  2.81<H>  02-17    123<L>  |  78<L>  |  87<H>  ----------------------------<  169<H>  3.4<L>   |  28  |  3.05<H>    Ca    8.7      18 Feb 2019 06:34  Ca    8.9      17 Feb 2019 19:30  Mg     2.3     02-18  Mg     2.4     02-17        proBNP:   Lipid Profile:   HgA1c:   TSH:       CARDIAC MARKERS:            TELEMETRY: 	    ECG:  	  RADIOLOGY:  OTHER: 	    PREVIOUS DIAGNOSTIC TESTING:    [ ] Echocardiogram:  [ ]  Catheterization:  [ ] Stress Test:  	  	  ASSESSMENT/PLAN:

## 2019-02-18 NOTE — PROGRESS NOTE ADULT - SUBJECTIVE AND OBJECTIVE BOX
Vascular Surgery Progress Note      SUBJECTIVE: Patient seen and examined on morning rounds. No complaints. Left thigh pain resolved. Denies fevers, chills, or shortness of breath.      OBJECTIVE:     ** Vital signs / I&O's **    Vital Signs Last 24 Hrs  T(C): 36.8 (18 Feb 2019 06:42), Max: 36.8 (18 Feb 2019 06:42)  T(F): 98.2 (18 Feb 2019 06:42), Max: 98.2 (18 Feb 2019 06:42)  HR: 88 (18 Feb 2019 06:42) (85 - 96)  BP: 100/66 (18 Feb 2019 06:42) (100/66 - 133/83)  BP(mean): --  RR: 18 (18 Feb 2019 06:42) (18 - 18)  SpO2: 97% (18 Feb 2019 06:42) (97% - 100%)      17 Feb 2019 07:01  -  18 Feb 2019 07:00  --------------------------------------------------------  IN:  Total IN: 0 mL    OUT:    Voided: 2000 mL  Total OUT: 2000 mL    Total NET: -2000 mL          ** Physical Exam **  Neuro  A&Ox3 VSS  Vascular: LLE edema stable, no erythema, no tenderness    ** Labs **                          9.0    6.76  )-----------( 221      ( 17 Feb 2019 06:55 )             28.5     17 Feb 2019 19:30    123    |  78     |  87     ----------------------------<  169    3.4     |  28     |  3.05     Ca    8.9        17 Feb 2019 19:30  Mg     2.4       17 Feb 2019 19:30        CAPILLARY BLOOD GLUCOSE      POCT Blood Glucose.: 258 mg/dL (17 Feb 2019 22:49)  POCT Blood Glucose.: 156 mg/dL (17 Feb 2019 17:47)  POCT Blood Glucose.: 216 mg/dL (17 Feb 2019 12:37)  POCT Blood Glucose.: 178 mg/dL (17 Feb 2019 09:02)        MEDICATIONS  (STANDING):  aspirin enteric coated 81 milliGRAM(s) Oral daily  benzonatate 100 milliGRAM(s) Oral three times a day  buMETAnide 4 milliGRAM(s) Oral every 8 hours  cholecalciferol 400 Unit(s) Oral daily  dextrose 5%. 1000 milliLiter(s) (50 mL/Hr) IV Continuous <Continuous>  dextrose 50% Injectable 12.5 Gram(s) IV Push once  dextrose 50% Injectable 25 Gram(s) IV Push once  dextrose 50% Injectable 25 Gram(s) IV Push once  docusate sodium 100 milliGRAM(s) Oral three times a day  heparin  Injectable 5000 Unit(s) SubCutaneous every 12 hours  hydrALAZINE 100 milliGRAM(s) Oral every 8 hours  insulin glargine Injectable (LANTUS) 15 Unit(s) SubCutaneous at bedtime  insulin lispro (HumaLOG) corrective regimen sliding scale   SubCutaneous three times a day before meals  insulin lispro (HumaLOG) corrective regimen sliding scale   SubCutaneous at bedtime  isosorbide   dinitrate Tablet (ISORDIL) 30 milliGRAM(s) Oral three times a day  metolazone 2.5 milliGRAM(s) Oral daily  polyethylene glycol 3350 17 Gram(s) Oral daily  senna 2 Tablet(s) Oral at bedtime  sevelamer hydrochloride 800 milliGRAM(s) Oral three times a day with meals  simethicone 80 milliGRAM(s) Chew three times a day  sodium chloride 0.9% lock flush 3 milliLiter(s) IV Push every 8 hours  sodium chloride 0.9% lock flush 3 milliLiter(s) IV Push every 8 hours  tamsulosin 0.4 milliGRAM(s) Oral at bedtime    MEDICATIONS  (PRN):  calamine Lotion 1 Application(s) Topical daily PRN Rash and/or Itching  dextrose 40% Gel 15 Gram(s) Oral once PRN Blood Glucose LESS THAN 70 milliGRAM(s)/deciliter  glucagon  Injectable 1 milliGRAM(s) IntraMuscular once PRN Glucose LESS THAN 70 milligrams/deciliter  guaiFENesin   Syrup  (Sugar-Free) 200 milliGRAM(s) Oral every 6 hours PRN Cough  sodium chloride 0.65% Nasal 1 Spray(s) Both Nostrils three times a day PRN Nasal Congestion Vascular Surgery Progress Note covering for Dr Erich LOTT       SUBJECTIVE: Patient seen and examined on morning rounds. No complaints. Left thigh pain resolved. Denies fevers, chills, or shortness of breath.      OBJECTIVE:     ** Vital signs / I&O's **    Vital Signs Last 24 Hrs  T(C): 36.8 (18 Feb 2019 06:42), Max: 36.8 (18 Feb 2019 06:42)  T(F): 98.2 (18 Feb 2019 06:42), Max: 98.2 (18 Feb 2019 06:42)  HR: 88 (18 Feb 2019 06:42) (85 - 96)  BP: 100/66 (18 Feb 2019 06:42) (100/66 - 133/83)  BP(mean): --  RR: 18 (18 Feb 2019 06:42) (18 - 18)  SpO2: 97% (18 Feb 2019 06:42) (97% - 100%)      17 Feb 2019 07:01  -  18 Feb 2019 07:00  --------------------------------------------------------  IN:  Total IN: 0 mL    OUT:    Voided: 2000 mL  Total OUT: 2000 mL    Total NET: -2000 mL    ** Physical Exam **  Neuro  A&Ox3 VSS  Vascular: LLE edema stable, no erythema, no tenderness    ** Labs **                          9.0    6.76  )-----------( 221      ( 17 Feb 2019 06:55 )             28.5     17 Feb 2019 19:30    123    |  78     |  87     ----------------------------<  169    3.4     |  28     |  3.05     Ca    8.9        17 Feb 2019 19:30  Mg     2.4       17 Feb 2019 19:30        CAPILLARY BLOOD GLUCOSE      POCT Blood Glucose.: 258 mg/dL (17 Feb 2019 22:49)  POCT Blood Glucose.: 156 mg/dL (17 Feb 2019 17:47)  POCT Blood Glucose.: 216 mg/dL (17 Feb 2019 12:37)  POCT Blood Glucose.: 178 mg/dL (17 Feb 2019 09:02)        MEDICATIONS  (STANDING):  aspirin enteric coated 81 milliGRAM(s) Oral daily  benzonatate 100 milliGRAM(s) Oral three times a day  buMETAnide 4 milliGRAM(s) Oral every 8 hours  cholecalciferol 400 Unit(s) Oral daily  dextrose 5%. 1000 milliLiter(s) (50 mL/Hr) IV Continuous <Continuous>  dextrose 50% Injectable 12.5 Gram(s) IV Push once  dextrose 50% Injectable 25 Gram(s) IV Push once  dextrose 50% Injectable 25 Gram(s) IV Push once  docusate sodium 100 milliGRAM(s) Oral three times a day  heparin  Injectable 5000 Unit(s) SubCutaneous every 12 hours  hydrALAZINE 100 milliGRAM(s) Oral every 8 hours  insulin glargine Injectable (LANTUS) 15 Unit(s) SubCutaneous at bedtime  insulin lispro (HumaLOG) corrective regimen sliding scale   SubCutaneous three times a day before meals  insulin lispro (HumaLOG) corrective regimen sliding scale   SubCutaneous at bedtime  isosorbide   dinitrate Tablet (ISORDIL) 30 milliGRAM(s) Oral three times a day  metolazone 2.5 milliGRAM(s) Oral daily  polyethylene glycol 3350 17 Gram(s) Oral daily  senna 2 Tablet(s) Oral at bedtime  sevelamer hydrochloride 800 milliGRAM(s) Oral three times a day with meals  simethicone 80 milliGRAM(s) Chew three times a day  sodium chloride 0.9% lock flush 3 milliLiter(s) IV Push every 8 hours  sodium chloride 0.9% lock flush 3 milliLiter(s) IV Push every 8 hours  tamsulosin 0.4 milliGRAM(s) Oral at bedtime    MEDICATIONS  (PRN):  calamine Lotion 1 Application(s) Topical daily PRN Rash and/or Itching  dextrose 40% Gel 15 Gram(s) Oral once PRN Blood Glucose LESS THAN 70 milliGRAM(s)/deciliter  glucagon  Injectable 1 milliGRAM(s) IntraMuscular once PRN Glucose LESS THAN 70 milligrams/deciliter  guaiFENesin   Syrup  (Sugar-Free) 200 milliGRAM(s) Oral every 6 hours PRN Cough  sodium chloride 0.65% Nasal 1 Spray(s) Both Nostrils three times a day PRN Nasal Congestion

## 2019-02-19 LAB
ANION GAP SERPL CALC-SCNC: 18 MMO/L — HIGH (ref 7–14)
ANION GAP SERPL CALC-SCNC: 19 MMO/L — HIGH (ref 7–14)
BUN SERPL-MCNC: 81 MG/DL — HIGH (ref 7–23)
BUN SERPL-MCNC: 83 MG/DL — HIGH (ref 7–23)
CALCIUM SERPL-MCNC: 8.8 MG/DL — SIGNIFICANT CHANGE UP (ref 8.4–10.5)
CALCIUM SERPL-MCNC: 8.9 MG/DL — SIGNIFICANT CHANGE UP (ref 8.4–10.5)
CHLORIDE SERPL-SCNC: 77 MMOL/L — LOW (ref 98–107)
CHLORIDE SERPL-SCNC: 79 MMOL/L — LOW (ref 98–107)
CO2 SERPL-SCNC: 26 MMOL/L — SIGNIFICANT CHANGE UP (ref 22–31)
CO2 SERPL-SCNC: 27 MMOL/L — SIGNIFICANT CHANGE UP (ref 22–31)
CREAT SERPL-MCNC: 2.69 MG/DL — HIGH (ref 0.5–1.3)
CREAT SERPL-MCNC: 2.76 MG/DL — HIGH (ref 0.5–1.3)
GLUCOSE BLDC GLUCOMTR-MCNC: 110 MG/DL — HIGH (ref 70–99)
GLUCOSE BLDC GLUCOMTR-MCNC: 114 MG/DL — HIGH (ref 70–99)
GLUCOSE BLDC GLUCOMTR-MCNC: 127 MG/DL — HIGH (ref 70–99)
GLUCOSE BLDC GLUCOMTR-MCNC: 223 MG/DL — HIGH (ref 70–99)
GLUCOSE SERPL-MCNC: 208 MG/DL — HIGH (ref 70–99)
GLUCOSE SERPL-MCNC: 63 MG/DL — LOW (ref 70–99)
HCT VFR BLD CALC: 26.2 % — LOW (ref 39–50)
HGB BLD-MCNC: 8.4 G/DL — LOW (ref 13–17)
MAGNESIUM SERPL-MCNC: 2.3 MG/DL — SIGNIFICANT CHANGE UP (ref 1.6–2.6)
MAGNESIUM SERPL-MCNC: 2.4 MG/DL — SIGNIFICANT CHANGE UP (ref 1.6–2.6)
MCHC RBC-ENTMCNC: 23 PG — LOW (ref 27–34)
MCHC RBC-ENTMCNC: 32.1 % — SIGNIFICANT CHANGE UP (ref 32–36)
MCV RBC AUTO: 71.8 FL — LOW (ref 80–100)
NRBC # FLD: 0 K/UL — LOW (ref 25–125)
PHOSPHATE SERPL-MCNC: 4 MG/DL — SIGNIFICANT CHANGE UP (ref 2.5–4.5)
PLATELET # BLD AUTO: 174 K/UL — SIGNIFICANT CHANGE UP (ref 150–400)
PMV BLD: SIGNIFICANT CHANGE UP FL (ref 7–13)
POTASSIUM SERPL-MCNC: 3.1 MMOL/L — LOW (ref 3.5–5.3)
POTASSIUM SERPL-MCNC: 4.4 MMOL/L — SIGNIFICANT CHANGE UP (ref 3.5–5.3)
POTASSIUM SERPL-SCNC: 3.1 MMOL/L — LOW (ref 3.5–5.3)
POTASSIUM SERPL-SCNC: 4.4 MMOL/L — SIGNIFICANT CHANGE UP (ref 3.5–5.3)
RBC # BLD: 3.65 M/UL — LOW (ref 4.2–5.8)
RBC # FLD: 23 % — HIGH (ref 10.3–14.5)
SODIUM SERPL-SCNC: 122 MMOL/L — LOW (ref 135–145)
SODIUM SERPL-SCNC: 124 MMOL/L — LOW (ref 135–145)
WBC # BLD: 6.63 K/UL — SIGNIFICANT CHANGE UP (ref 3.8–10.5)
WBC # FLD AUTO: 6.63 K/UL — SIGNIFICANT CHANGE UP (ref 3.8–10.5)

## 2019-02-19 PROCEDURE — 99233 SBSQ HOSP IP/OBS HIGH 50: CPT | Mod: GC

## 2019-02-19 PROCEDURE — 99233 SBSQ HOSP IP/OBS HIGH 50: CPT

## 2019-02-19 RX ORDER — POTASSIUM CHLORIDE 20 MEQ
40 PACKET (EA) ORAL EVERY 4 HOURS
Qty: 0 | Refills: 0 | Status: COMPLETED | OUTPATIENT
Start: 2019-02-19 | End: 2019-02-19

## 2019-02-19 RX ADMIN — SODIUM CHLORIDE 3 MILLILITER(S): 9 INJECTION INTRAMUSCULAR; INTRAVENOUS; SUBCUTANEOUS at 05:53

## 2019-02-19 RX ADMIN — SODIUM CHLORIDE 3 MILLILITER(S): 9 INJECTION INTRAMUSCULAR; INTRAVENOUS; SUBCUTANEOUS at 13:21

## 2019-02-19 RX ADMIN — ISOSORBIDE DINITRATE 30 MILLIGRAM(S): 5 TABLET ORAL at 13:18

## 2019-02-19 RX ADMIN — Medication 1 APPLICATION(S): at 18:07

## 2019-02-19 RX ADMIN — SODIUM CHLORIDE 3 MILLILITER(S): 9 INJECTION INTRAMUSCULAR; INTRAVENOUS; SUBCUTANEOUS at 21:51

## 2019-02-19 RX ADMIN — Medication 40 MILLIEQUIVALENT(S): at 13:18

## 2019-02-19 RX ADMIN — TAMSULOSIN HYDROCHLORIDE 0.4 MILLIGRAM(S): 0.4 CAPSULE ORAL at 21:52

## 2019-02-19 RX ADMIN — ISOSORBIDE DINITRATE 30 MILLIGRAM(S): 5 TABLET ORAL at 06:36

## 2019-02-19 RX ADMIN — Medication 81 MILLIGRAM(S): at 13:17

## 2019-02-19 RX ADMIN — SIMETHICONE 80 MILLIGRAM(S): 80 TABLET, CHEWABLE ORAL at 13:20

## 2019-02-19 RX ADMIN — Medication 4: at 18:07

## 2019-02-19 RX ADMIN — BUMETANIDE 4 MILLIGRAM(S): 0.25 INJECTION INTRAMUSCULAR; INTRAVENOUS at 00:55

## 2019-02-19 RX ADMIN — SIMETHICONE 80 MILLIGRAM(S): 80 TABLET, CHEWABLE ORAL at 21:53

## 2019-02-19 RX ADMIN — INSULIN GLARGINE 15 UNIT(S): 100 INJECTION, SOLUTION SUBCUTANEOUS at 23:00

## 2019-02-19 RX ADMIN — BUMETANIDE 4 MILLIGRAM(S): 0.25 INJECTION INTRAMUSCULAR; INTRAVENOUS at 18:07

## 2019-02-19 RX ADMIN — Medication 100 MILLIGRAM(S): at 13:17

## 2019-02-19 RX ADMIN — Medication 100 MILLIGRAM(S): at 06:36

## 2019-02-19 RX ADMIN — BUMETANIDE 4 MILLIGRAM(S): 0.25 INJECTION INTRAMUSCULAR; INTRAVENOUS at 09:04

## 2019-02-19 RX ADMIN — Medication 100 MILLIGRAM(S): at 21:53

## 2019-02-19 RX ADMIN — ISOSORBIDE DINITRATE 30 MILLIGRAM(S): 5 TABLET ORAL at 21:55

## 2019-02-19 RX ADMIN — Medication 400 UNIT(S): at 11:23

## 2019-02-19 RX ADMIN — Medication 100 MILLIGRAM(S): at 21:54

## 2019-02-19 RX ADMIN — Medication 40 MILLIEQUIVALENT(S): at 09:05

## 2019-02-19 RX ADMIN — SIMETHICONE 80 MILLIGRAM(S): 80 TABLET, CHEWABLE ORAL at 06:35

## 2019-02-19 NOTE — PROGRESS NOTE ADULT - ASSESSMENT
68M pmhx DM2, S CHF with EF= 20%, HTN, Dyslipidemia, CAD, 3v CABG, PVD, L KEI stent & L CFA-Pop bypass with PTFE c/b wound infection requiring washout, sartorius flap and wound vac, now admitted with left thigh pain/weakness x 1-2 days.    PLAN:   - Arterial and venous duplexes done - graft patent, no DVT, no collection  - WBC scan to r/o infected graft pending   - Blood cultures NGTD

## 2019-02-19 NOTE — PROGRESS NOTE ADULT - ASSESSMENT
68M with CAD,s/p CABG/PCI, HFrEF with biventricular HF (refusing ICD), DM, PAD now s/p recent vascular surgery and admitted for worsening SOB, orthopnea, PND, and ORTA -- acute on chronic systolic heart failure exacerbation requiring intermittent inotrope therapy, now on po diuresis.

## 2019-02-19 NOTE — PROGRESS NOTE ADULT - SUBJECTIVE AND OBJECTIVE BOX
Vascular Surgery Progress Note      SUBJECTIVE: Patient seen and examined. No complaints. Denies fevers, chills, pain, or shortness of breath.      OBJECTIVE:     ** Vital signs / I&O's **    Vital Signs Last 24 Hrs  T(C): 36.4 (19 Feb 2019 05:54), Max: 36.7 (18 Feb 2019 12:22)  T(F): 97.5 (19 Feb 2019 05:54), Max: 98.1 (18 Feb 2019 12:22)  HR: 87 (19 Feb 2019 05:54) (84 - 88)  BP: 129/83 (19 Feb 2019 05:54) (104/67 - 129/83)  BP(mean): --  RR: 18 (19 Feb 2019 05:54) (18 - 18)  SpO2: 100% (19 Feb 2019 05:54) (97% - 100%)      18 Feb 2019 07:01  -  19 Feb 2019 07:00  --------------------------------------------------------  IN:    Oral Fluid: 420 mL  Total IN: 420 mL    OUT:    Voided: 2000 mL  Total OUT: 2000 mL    Total NET: -1580 mL          ** Physical Exam **  Neuro  A&Ox3 VSS  Vascular: LLE w/ well healed surgical scars, no erythema, no tenderness, no drainage    ** Labs **                          8.4    6.63  )-----------( 174      ( 19 Feb 2019 06:33 )             26.2     19 Feb 2019 06:33    124    |  79     |  83     ----------------------------<  63     3.1     |  27     |  2.76     Ca    8.8        19 Feb 2019 06:33  Mg     2.3       19 Feb 2019 06:33        CAPILLARY BLOOD GLUCOSE      POCT Blood Glucose.: 180 mg/dL (18 Feb 2019 21:39)  POCT Blood Glucose.: 201 mg/dL (18 Feb 2019 17:34)  POCT Blood Glucose.: 194 mg/dL (18 Feb 2019 12:46)  POCT Blood Glucose.: 124 mg/dL (18 Feb 2019 08:49)              MEDICATIONS  (STANDING):  aspirin enteric coated 81 milliGRAM(s) Oral daily  benzonatate 100 milliGRAM(s) Oral three times a day  buMETAnide 4 milliGRAM(s) Oral every 8 hours  cholecalciferol 400 Unit(s) Oral daily  dextrose 5%. 1000 milliLiter(s) (50 mL/Hr) IV Continuous <Continuous>  dextrose 50% Injectable 12.5 Gram(s) IV Push once  dextrose 50% Injectable 25 Gram(s) IV Push once  dextrose 50% Injectable 25 Gram(s) IV Push once  docusate sodium 100 milliGRAM(s) Oral three times a day  heparin  Injectable 5000 Unit(s) SubCutaneous every 12 hours  hydrALAZINE 100 milliGRAM(s) Oral every 8 hours  hydrocortisone 0.5% Cream 1 Application(s) Topical daily  insulin glargine Injectable (LANTUS) 15 Unit(s) SubCutaneous at bedtime  insulin lispro (HumaLOG) corrective regimen sliding scale   SubCutaneous three times a day before meals  insulin lispro (HumaLOG) corrective regimen sliding scale   SubCutaneous at bedtime  isosorbide   dinitrate Tablet (ISORDIL) 30 milliGRAM(s) Oral three times a day  metolazone 2.5 milliGRAM(s) Oral daily  polyethylene glycol 3350 17 Gram(s) Oral daily  potassium chloride    Tablet ER 40 milliEquivalent(s) Oral every 4 hours  senna 2 Tablet(s) Oral at bedtime  sevelamer hydrochloride 800 milliGRAM(s) Oral three times a day with meals  simethicone 80 milliGRAM(s) Chew three times a day  sodium chloride 0.9% lock flush 3 milliLiter(s) IV Push every 8 hours  sodium chloride 0.9% lock flush 3 milliLiter(s) IV Push every 8 hours  tamsulosin 0.4 milliGRAM(s) Oral at bedtime    MEDICATIONS  (PRN):  calamine Lotion 1 Application(s) Topical daily PRN Rash and/or Itching  dextrose 40% Gel 15 Gram(s) Oral once PRN Blood Glucose LESS THAN 70 milliGRAM(s)/deciliter  glucagon  Injectable 1 milliGRAM(s) IntraMuscular once PRN Glucose LESS THAN 70 milligrams/deciliter  guaiFENesin   Syrup  (Sugar-Free) 200 milliGRAM(s) Oral every 6 hours PRN Cough  sodium chloride 0.65% Nasal 1 Spray(s) Both Nostrils three times a day PRN Nasal Congestion

## 2019-02-19 NOTE — PROGRESS NOTE ADULT - PROBLEM SELECTOR PLAN 1
Pt. with KESHAV in the setting of ADHF. On review of previous records in [facility], patient with normal Scr levels from 4/26/16 to 9/24/18. Pt. noted to have an episode of KESHAV during previous/recent hospitalization at [facility] (12/7/18 to 12/27/18). On this admission, Scr was elevated at 2.15 on 1/16/19, peaked to 4.0 on 1/25/19 and now has been stable to 2.76 today.   Pt. now on PO Bumex as per cardiology. Pt. non-oliguric. US Kidney showed increased bilateral renal cortical echogenicity, no hydronephrosis on 1/18/19. Monitor labs and urine output. Avoid nephrotoxins. Dose medications as per eGFR

## 2019-02-19 NOTE — PROGRESS NOTE ADULT - SUBJECTIVE AND OBJECTIVE BOX
[facility] DIVISION OF KIDNEY DISEASES AND HYPERTENSION -- FOLLOW UP NOTE  --------------------------------------------------------------------------------    Chief complaint: KESHAV    24 hour events/subjective: Patient seen and examined at bedside this AM. Pt on PO Bumex. Dinh CP, SOB, N/V/F/C.    PAST HISTORY  --------------------------------------------------------------------------------  No significant changes to PMH, PSH, FHx, SHx, unless otherwise noted    ALLERGIES & MEDICATIONS  --------------------------------------------------------------------------------  Allergies    No Known Allergies    Intolerance    Standing Inpatient Medications  aspirin enteric coated 81 milliGRAM(s) Oral daily  benzonatate 100 milliGRAM(s) Oral three times a day  buMETAnide 4 milliGRAM(s) Oral every 8 hours  cholecalciferol 400 Unit(s) Oral daily  dextrose 5%. 1000 milliLiter(s) IV Continuous <Continuous>  dextrose 50% Injectable 12.5 Gram(s) IV Push once  dextrose 50% Injectable 25 Gram(s) IV Push once  dextrose 50% Injectable 25 Gram(s) IV Push once  docusate sodium 100 milliGRAM(s) Oral three times a day  heparin  Injectable 5000 Unit(s) SubCutaneous every 12 hours  hydrALAZINE 100 milliGRAM(s) Oral every 8 hours  hydrocortisone 0.5% Cream 1 Application(s) Topical daily  insulin glargine Injectable (LANTUS) 15 Unit(s) SubCutaneous at bedtime  insulin lispro (HumaLOG) corrective regimen sliding scale   SubCutaneous three times a day before meals  insulin lispro (HumaLOG) corrective regimen sliding scale   SubCutaneous at bedtime  isosorbide   dinitrate Tablet (ISORDIL) 30 milliGRAM(s) Oral three times a day  metolazone 2.5 milliGRAM(s) Oral daily  polyethylene glycol 3350 17 Gram(s) Oral daily  senna 2 Tablet(s) Oral at bedtime  sevelamer hydrochloride 800 milliGRAM(s) Oral three times a day with meals  simethicone 80 milliGRAM(s) Chew three times a day  sodium chloride 0.9% lock flush 3 milliLiter(s) IV Push every 8 hours  sodium chloride 0.9% lock flush 3 milliLiter(s) IV Push every 8 hours  tamsulosin 0.4 milliGRAM(s) Oral at bedtime    PRN Inpatient Medications  calamine Lotion 1 Application(s) Topical daily PRN  dextrose 40% Gel 15 Gram(s) Oral once PRN  glucagon  Injectable 1 milliGRAM(s) IntraMuscular once PRN  guaiFENesin   Syrup  (Sugar-Free) 200 milliGRAM(s) Oral every 6 hours PRN  sodium chloride 0.65% Nasal 1 Spray(s) Both Nostrils three times a day PRN      REVIEW OF SYSTEMS  --------------------------------------------------------------------------------  Gen: No lethargy  Respiratory: No dyspnea  CV: No chest pain  GI: No abdominal pain  MSK: + pain  Neuro: No dizziness  Heme: No bleeding    All other systems were reviewed and are negative, except as noted.    VITALS/PHYSICAL EXAM  --------------------------------------------------------------------------------  T(C): 36.6 (02-19-19 @ 13:21), Max: 36.7 (02-19-19 @ 12:18)  HR: 88 (02-19-19 @ 13:21) (85 - 90)  BP: 108/79 (02-19-19 @ 13:21) (104/67 - 135/78)  RR: 18 (02-19-19 @ 13:21) (18 - 18)  SpO2: 97% (02-19-19 @ 13:21) (97% - 100%)  Wt(kg): --    02-18-19 @ 07:01  -  02-19-19 @ 07:00  --------------------------------------------------------  IN: 420 mL / OUT: 2000 mL / NET: -1580 mL    02-19-19 @ 07:01  -  02-19-19 @ 15:13  --------------------------------------------------------  IN: 0 mL / OUT: 1050 mL / NET: -1050 mL      Physical Exam:  	Gen: NAD, well-appearing  	HEENT: No supplemental oxygen  	Pulm: CTA B/L  	CV: normal S1S2; no rub  	Abd: soft  	: No mikayla  	LE: bilateral edema, nonpitting  	Skin: Warm, without rashes    LABS/STUDIES  --------------------------------------------------------------------------------              8.4    6.63  >-----------<  174      [02-19-19 @ 06:33]              26.2     124  |  79  |  83  ----------------------------<  63      [02-19-19 @ 06:33]  3.1   |  27  |  2.76        Ca     8.8     [02-19-19 @ 06:33]      Mg     2.3     [02-19-19 @ 06:33]    Creatinine Trend:  SCr 2.76 [02-19 @ 06:33]  SCr 2.81 [02-18 @ 06:34]  SCr 3.05 [02-17 @ 19:30]  SCr 2.86 [02-17 @ 06:55]  SCr 2.85 [02-16 @ 17:41]    Urinalysis - [01-24-19 @ 12:10]      Color LIGHT YELLOW / Appearance CLEAR / SG 1.008 / pH 5.0      Gluc NEGATIVE / Ketone NEGATIVE  / Bili NEGATIVE / Urobili NORMAL       Blood NEGATIVE / Protein NEGATIVE / Leuk Est NEGATIVE / Nitrite NEGATIVE      RBC  / WBC  / Hyaline  / Gran  / Sq Epi  / Non Sq Epi  / Bacteria     Urine Sodium < 20      [02-13-19 @ 14:22]  Urine Osmolality 288      [02-13-19 @ 14:22]    Iron 35, TIBC 339, %sat --      [01-21-19 @ 06:30]  Ferritin 114.3      [01-21-19 @ 06:30]  Vitamin D (25OH) 22.8      [01-18-19 @ 06:00]  HbA1c 7.8      [01-17-19 @ 06:34]  TSH 3.27      [02-13-19 @ 14:15]  Lipid: chol 95, TG 47, HDL 33, LDL 59      [01-17-19 @ 06:34]

## 2019-02-19 NOTE — PROGRESS NOTE ADULT - PROBLEM SELECTOR PLAN 2
Vascular re-assessment noted and appreciated for L thigh pain. Doppler studies normal with patent graft, no DVT. Cultures negative at 48 hours. WBC scan requested but unclear if patient can lie flat for test.

## 2019-02-19 NOTE — PROGRESS NOTE ADULT - SUBJECTIVE AND OBJECTIVE BOX
Subjective/Objective: Resting comfortably at present, no overnight events noted. Transitioned to po diuretics over the weekend.    MEDICATIONS  (STANDING):  aspirin enteric coated 81 milliGRAM(s) Oral daily  benzonatate 100 milliGRAM(s) Oral three times a day  buMETAnide 4 milliGRAM(s) Oral every 8 hours  cholecalciferol 400 Unit(s) Oral daily  dextrose 5%. 1000 milliLiter(s) (50 mL/Hr) IV Continuous <Continuous>  dextrose 50% Injectable 12.5 Gram(s) IV Push once  dextrose 50% Injectable 25 Gram(s) IV Push once  dextrose 50% Injectable 25 Gram(s) IV Push once  docusate sodium 100 milliGRAM(s) Oral three times a day  heparin  Injectable 5000 Unit(s) SubCutaneous every 12 hours  hydrALAZINE 100 milliGRAM(s) Oral every 8 hours  hydrocortisone 0.5% Cream 1 Application(s) Topical daily  insulin glargine Injectable (LANTUS) 15 Unit(s) SubCutaneous at bedtime  insulin lispro (HumaLOG) corrective regimen sliding scale   SubCutaneous three times a day before meals  insulin lispro (HumaLOG) corrective regimen sliding scale   SubCutaneous at bedtime  isosorbide   dinitrate Tablet (ISORDIL) 30 milliGRAM(s) Oral three times a day  metolazone 2.5 milliGRAM(s) Oral daily  polyethylene glycol 3350 17 Gram(s) Oral daily  potassium chloride    Tablet ER 40 milliEquivalent(s) Oral every 4 hours  senna 2 Tablet(s) Oral at bedtime  sevelamer hydrochloride 800 milliGRAM(s) Oral three times a day with meals  simethicone 80 milliGRAM(s) Chew three times a day  sodium chloride 0.9% lock flush 3 milliLiter(s) IV Push every 8 hours  sodium chloride 0.9% lock flush 3 milliLiter(s) IV Push every 8 hours  tamsulosin 0.4 milliGRAM(s) Oral at bedtime    MEDICATIONS  (PRN):  calamine Lotion 1 Application(s) Topical daily PRN Rash and/or Itching  dextrose 40% Gel 15 Gram(s) Oral once PRN Blood Glucose LESS THAN 70 milliGRAM(s)/deciliter  glucagon  Injectable 1 milliGRAM(s) IntraMuscular once PRN Glucose LESS THAN 70 milligrams/deciliter  guaiFENesin   Syrup  (Sugar-Free) 200 milliGRAM(s) Oral every 6 hours PRN Cough  sodium chloride 0.65% Nasal 1 Spray(s) Both Nostrils three times a day PRN Nasal Congestion          Vital Signs Last 24 Hrs  T(C): 36.2 (19 Feb 2019 09:03), Max: 36.7 (18 Feb 2019 12:22)  T(F): 97.1 (19 Feb 2019 09:03), Max: 98.1 (18 Feb 2019 12:22)  HR: 86 (19 Feb 2019 09:03) (84 - 88)  BP: 111/58 (19 Feb 2019 09:03) (104/67 - 129/83)  BP(mean): --  RR: 18 (19 Feb 2019 09:03) (18 - 18)  SpO2: 97% (19 Feb 2019 09:03) (97% - 100%)  I&O's Detail    18 Feb 2019 07:01  -  19 Feb 2019 07:00  --------------------------------------------------------  IN:    Oral Fluid: 420 mL  Total IN: 420 mL    OUT:    Voided: 2000 mL  Total OUT: 2000 mL    Total NET: -1580 mL      PHYSICAL EXAM  GEN: NAD, skin W & D  RESP: few scattered crackles  CV: nl S1S2  GI: soft, NT/ND  EXT: no C/C/E    EKG/ TELEM: NSR    LABS:                          8.4    6.63  )-----------( 174      ( 19 Feb 2019 06:33 )             26.2       19 Feb 2019 06:33    124<L>  |  79<L>  |  83<H>  ----------------------------<  63<L>  3.1<L>   |  27     |  2.76<H>    18 Feb 2019 06:34    124<L>  |  80<L>  |  86<H>  ----------------------------<  127<H>  4.0     |  26     |  2.81<H>    Ca    8.8        19 Feb 2019 06:33  Ca    8.7        18 Feb 2019 06:34  Mg     2.3       19 Feb 2019 06:33  Mg     2.3       18 Feb 2019 06:34

## 2019-02-19 NOTE — PROGRESS NOTE ADULT - PROBLEM SELECTOR PLAN 1
Continue Bumex, metolazone, hydralazine, and Imdur. Strict I & O. Scr trending down. Would favor eventual addition of coreg however will await HF assessment and recs.

## 2019-02-19 NOTE — CHART NOTE - NSCHARTNOTEFT_GEN_A_CORE
Vascular recommended WBC scan to evaluate for infected LLE graft.  As per NM, patient will need to be able to lay flat for 3-4 hours.  As per patient, he does not think he will be able to lay flat for this amount of time.  Notified vascular team (#[id number]) and waiting for further recommendations.

## 2019-02-20 LAB
ANION GAP SERPL CALC-SCNC: 16 MMO/L — HIGH (ref 7–14)
BASOPHILS # BLD AUTO: 0.05 K/UL — SIGNIFICANT CHANGE UP (ref 0–0.2)
BASOPHILS NFR BLD AUTO: 0.8 % — SIGNIFICANT CHANGE UP (ref 0–2)
BUN SERPL-MCNC: 84 MG/DL — HIGH (ref 7–23)
CALCIUM SERPL-MCNC: 9.1 MG/DL — SIGNIFICANT CHANGE UP (ref 8.4–10.5)
CHLORIDE SERPL-SCNC: 80 MMOL/L — LOW (ref 98–107)
CO2 SERPL-SCNC: 27 MMOL/L — SIGNIFICANT CHANGE UP (ref 22–31)
CREAT SERPL-MCNC: 2.83 MG/DL — HIGH (ref 0.5–1.3)
EOSINOPHIL # BLD AUTO: 0.17 K/UL — SIGNIFICANT CHANGE UP (ref 0–0.5)
EOSINOPHIL NFR BLD AUTO: 2.9 % — SIGNIFICANT CHANGE UP (ref 0–6)
GLUCOSE BLDC GLUCOMTR-MCNC: 115 MG/DL — HIGH (ref 70–99)
GLUCOSE BLDC GLUCOMTR-MCNC: 124 MG/DL — HIGH (ref 70–99)
GLUCOSE BLDC GLUCOMTR-MCNC: 142 MG/DL — HIGH (ref 70–99)
GLUCOSE BLDC GLUCOMTR-MCNC: 142 MG/DL — HIGH (ref 70–99)
GLUCOSE SERPL-MCNC: 77 MG/DL — SIGNIFICANT CHANGE UP (ref 70–99)
HCT VFR BLD CALC: 29 % — LOW (ref 39–50)
HGB BLD-MCNC: 9.2 G/DL — LOW (ref 13–17)
IMM GRANULOCYTES NFR BLD AUTO: 0.3 % — SIGNIFICANT CHANGE UP (ref 0–1.5)
LYMPHOCYTES # BLD AUTO: 0.59 K/UL — LOW (ref 1–3.3)
LYMPHOCYTES # BLD AUTO: 9.9 % — LOW (ref 13–44)
MAGNESIUM SERPL-MCNC: 2.5 MG/DL — SIGNIFICANT CHANGE UP (ref 1.6–2.6)
MCHC RBC-ENTMCNC: 22.9 PG — LOW (ref 27–34)
MCHC RBC-ENTMCNC: 31.7 % — LOW (ref 32–36)
MCV RBC AUTO: 72.1 FL — LOW (ref 80–100)
MONOCYTES # BLD AUTO: 0.82 K/UL — SIGNIFICANT CHANGE UP (ref 0–0.9)
MONOCYTES NFR BLD AUTO: 13.8 % — SIGNIFICANT CHANGE UP (ref 2–14)
NEUTROPHILS # BLD AUTO: 4.3 K/UL — SIGNIFICANT CHANGE UP (ref 1.8–7.4)
NEUTROPHILS NFR BLD AUTO: 72.3 % — SIGNIFICANT CHANGE UP (ref 43–77)
NRBC # FLD: 0 K/UL — LOW (ref 25–125)
PHOSPHATE SERPL-MCNC: 4.1 MG/DL — SIGNIFICANT CHANGE UP (ref 2.5–4.5)
PLATELET # BLD AUTO: 174 K/UL — SIGNIFICANT CHANGE UP (ref 150–400)
PMV BLD: SIGNIFICANT CHANGE UP FL (ref 7–13)
POTASSIUM SERPL-MCNC: 3.5 MMOL/L — SIGNIFICANT CHANGE UP (ref 3.5–5.3)
POTASSIUM SERPL-SCNC: 3.5 MMOL/L — SIGNIFICANT CHANGE UP (ref 3.5–5.3)
RBC # BLD: 4.02 M/UL — LOW (ref 4.2–5.8)
RBC # FLD: 23.4 % — HIGH (ref 10.3–14.5)
SODIUM SERPL-SCNC: 123 MMOL/L — LOW (ref 135–145)
WBC # BLD: 5.95 K/UL — SIGNIFICANT CHANGE UP (ref 3.8–10.5)
WBC # FLD AUTO: 5.95 K/UL — SIGNIFICANT CHANGE UP (ref 3.8–10.5)

## 2019-02-20 PROCEDURE — 99233 SBSQ HOSP IP/OBS HIGH 50: CPT

## 2019-02-20 PROCEDURE — 99233 SBSQ HOSP IP/OBS HIGH 50: CPT | Mod: GC

## 2019-02-20 RX ORDER — OXYCODONE HYDROCHLORIDE 5 MG/1
5 TABLET ORAL EVERY 8 HOURS
Qty: 0 | Refills: 0 | Status: DISCONTINUED | OUTPATIENT
Start: 2019-02-20 | End: 2019-02-25

## 2019-02-20 RX ORDER — POTASSIUM CHLORIDE 20 MEQ
20 PACKET (EA) ORAL ONCE
Qty: 0 | Refills: 0 | Status: COMPLETED | OUTPATIENT
Start: 2019-02-20 | End: 2019-02-20

## 2019-02-20 RX ADMIN — INSULIN GLARGINE 15 UNIT(S): 100 INJECTION, SOLUTION SUBCUTANEOUS at 22:17

## 2019-02-20 RX ADMIN — Medication 100 MILLIGRAM(S): at 22:07

## 2019-02-20 RX ADMIN — SODIUM CHLORIDE 3 MILLILITER(S): 9 INJECTION INTRAMUSCULAR; INTRAVENOUS; SUBCUTANEOUS at 22:06

## 2019-02-20 RX ADMIN — ISOSORBIDE DINITRATE 30 MILLIGRAM(S): 5 TABLET ORAL at 13:15

## 2019-02-20 RX ADMIN — Medication 100 MILLIGRAM(S): at 05:33

## 2019-02-20 RX ADMIN — SODIUM CHLORIDE 3 MILLILITER(S): 9 INJECTION INTRAMUSCULAR; INTRAVENOUS; SUBCUTANEOUS at 05:35

## 2019-02-20 RX ADMIN — BUMETANIDE 4 MILLIGRAM(S): 0.25 INJECTION INTRAMUSCULAR; INTRAVENOUS at 18:01

## 2019-02-20 RX ADMIN — Medication 1 APPLICATION(S): at 11:04

## 2019-02-20 RX ADMIN — Medication 400 UNIT(S): at 13:19

## 2019-02-20 RX ADMIN — ISOSORBIDE DINITRATE 30 MILLIGRAM(S): 5 TABLET ORAL at 05:32

## 2019-02-20 RX ADMIN — SIMETHICONE 80 MILLIGRAM(S): 80 TABLET, CHEWABLE ORAL at 05:34

## 2019-02-20 RX ADMIN — TAMSULOSIN HYDROCHLORIDE 0.4 MILLIGRAM(S): 0.4 CAPSULE ORAL at 22:07

## 2019-02-20 RX ADMIN — SIMETHICONE 80 MILLIGRAM(S): 80 TABLET, CHEWABLE ORAL at 22:07

## 2019-02-20 RX ADMIN — SIMETHICONE 80 MILLIGRAM(S): 80 TABLET, CHEWABLE ORAL at 13:19

## 2019-02-20 RX ADMIN — OXYCODONE HYDROCHLORIDE 5 MILLIGRAM(S): 5 TABLET ORAL at 23:45

## 2019-02-20 RX ADMIN — Medication 81 MILLIGRAM(S): at 13:15

## 2019-02-20 RX ADMIN — Medication 100 MILLIGRAM(S): at 13:15

## 2019-02-20 RX ADMIN — HEPARIN SODIUM 5000 UNIT(S): 5000 INJECTION INTRAVENOUS; SUBCUTANEOUS at 05:34

## 2019-02-20 RX ADMIN — ISOSORBIDE DINITRATE 30 MILLIGRAM(S): 5 TABLET ORAL at 22:07

## 2019-02-20 RX ADMIN — SODIUM CHLORIDE 3 MILLILITER(S): 9 INJECTION INTRAMUSCULAR; INTRAVENOUS; SUBCUTANEOUS at 14:01

## 2019-02-20 RX ADMIN — OXYCODONE HYDROCHLORIDE 5 MILLIGRAM(S): 5 TABLET ORAL at 22:57

## 2019-02-20 RX ADMIN — Medication 100 MILLIGRAM(S): at 13:16

## 2019-02-20 RX ADMIN — SODIUM CHLORIDE 3 MILLILITER(S): 9 INJECTION INTRAMUSCULAR; INTRAVENOUS; SUBCUTANEOUS at 22:05

## 2019-02-20 RX ADMIN — Medication 20 MILLIEQUIVALENT(S): at 18:02

## 2019-02-20 RX ADMIN — BUMETANIDE 4 MILLIGRAM(S): 0.25 INJECTION INTRAMUSCULAR; INTRAVENOUS at 00:48

## 2019-02-20 RX ADMIN — BUMETANIDE 4 MILLIGRAM(S): 0.25 INJECTION INTRAMUSCULAR; INTRAVENOUS at 09:14

## 2019-02-20 NOTE — PROGRESS NOTE ADULT - SUBJECTIVE AND OBJECTIVE BOX
Vascular Surgery Progress Note      SUBJECTIVE: Patient seen and examined. Afebrile. No complaints. Pain and swelling in left thigh improved.      OBJECTIVE:     ** Vital signs / I&O's **    Vital Signs Last 24 Hrs  T(C): 36.8 (20 Feb 2019 05:28), Max: 37.1 (19 Feb 2019 21:58)  T(F): 98.2 (20 Feb 2019 05:28), Max: 98.7 (19 Feb 2019 21:58)  HR: 83 (20 Feb 2019 09:12) (80 - 90)  BP: 111/71 (20 Feb 2019 09:12) (101/62 - 135/78)  BP(mean): --  RR: 198 (20 Feb 2019 09:12) (18 - 198)  SpO2: 100% (20 Feb 2019 05:28) (97% - 100%)      19 Feb 2019 07:01  -  20 Feb 2019 07:00  --------------------------------------------------------  IN:    Oral Fluid: 620 mL  Total IN: 620 mL    OUT:    Voided: 2750 mL  Total OUT: 2750 mL    Total NET: -2130 mL      ** Physical Exam **  Neuro  A&Ox3 VSS  Vascular: LLE w/ well healed surgical scars, no erythema, no tenderness, no drainage    ** Labs **                          9.2    5.95  )-----------( 174      ( 20 Feb 2019 07:35 )             29.0     20 Feb 2019 07:35    123    |  80     |  84     ----------------------------<  77     3.5     |  27     |  2.83     Ca    9.1        20 Feb 2019 07:35  Phos  4.1       20 Feb 2019 07:35  Mg     2.5       20 Feb 2019 07:35        CAPILLARY BLOOD GLUCOSE      POCT Blood Glucose.: 115 mg/dL (20 Feb 2019 09:01)  POCT Blood Glucose.: 127 mg/dL (19 Feb 2019 22:20)  POCT Blood Glucose.: 223 mg/dL (19 Feb 2019 17:59)  POCT Blood Glucose.: 114 mg/dL (19 Feb 2019 12:24)              MEDICATIONS  (STANDING):  aspirin enteric coated 81 milliGRAM(s) Oral daily  benzonatate 100 milliGRAM(s) Oral three times a day  buMETAnide 4 milliGRAM(s) Oral every 8 hours  cholecalciferol 400 Unit(s) Oral daily  dextrose 5%. 1000 milliLiter(s) (50 mL/Hr) IV Continuous <Continuous>  dextrose 50% Injectable 12.5 Gram(s) IV Push once  dextrose 50% Injectable 25 Gram(s) IV Push once  dextrose 50% Injectable 25 Gram(s) IV Push once  docusate sodium 100 milliGRAM(s) Oral three times a day  heparin  Injectable 5000 Unit(s) SubCutaneous every 12 hours  hydrALAZINE 100 milliGRAM(s) Oral every 8 hours  hydrocortisone 0.5% Cream 1 Application(s) Topical daily  insulin glargine Injectable (LANTUS) 15 Unit(s) SubCutaneous at bedtime  insulin lispro (HumaLOG) corrective regimen sliding scale   SubCutaneous three times a day before meals  insulin lispro (HumaLOG) corrective regimen sliding scale   SubCutaneous at bedtime  isosorbide   dinitrate Tablet (ISORDIL) 30 milliGRAM(s) Oral three times a day  metolazone 2.5 milliGRAM(s) Oral daily  polyethylene glycol 3350 17 Gram(s) Oral daily  potassium chloride    Tablet ER 20 milliEquivalent(s) Oral once  senna 2 Tablet(s) Oral at bedtime  sevelamer hydrochloride 800 milliGRAM(s) Oral three times a day with meals  simethicone 80 milliGRAM(s) Chew three times a day  sodium chloride 0.9% lock flush 3 milliLiter(s) IV Push every 8 hours  sodium chloride 0.9% lock flush 3 milliLiter(s) IV Push every 8 hours  tamsulosin 0.4 milliGRAM(s) Oral at bedtime    MEDICATIONS  (PRN):  calamine Lotion 1 Application(s) Topical daily PRN Rash and/or Itching  dextrose 40% Gel 15 Gram(s) Oral once PRN Blood Glucose LESS THAN 70 milliGRAM(s)/deciliter  glucagon  Injectable 1 milliGRAM(s) IntraMuscular once PRN Glucose LESS THAN 70 milligrams/deciliter  guaiFENesin   Syrup  (Sugar-Free) 200 milliGRAM(s) Oral every 6 hours PRN Cough  sodium chloride 0.65% Nasal 1 Spray(s) Both Nostrils three times a day PRN Nasal Congestion

## 2019-02-20 NOTE — PROVIDER CONTACT NOTE (CRITICAL VALUE NOTIFICATION) - ASSESSMENT
A/Ox4, denies SOB or ORTA at this time, VSS, maintained currently on 4LNC O2 with 98% O2 while eating lunch

## 2019-02-20 NOTE — PROGRESS NOTE ADULT - PROBLEM SELECTOR PLAN 5
Pt. with chronic, hypervolemic, asymptomatic hyponatremia in the setting of metolazone. Serum sodium low today. Advise to discontinue metolazone today. Monitor serum sodium level

## 2019-02-20 NOTE — PROGRESS NOTE ADULT - ATTENDING COMMENTS
Patient seen and examined  Agree with above assessment and plan  Renal input appreciated  Will dc metolazone per their recommendations  Monitor Na  Cont bumex  Vascular input appreciated  Attempt at WBC tagged scan  May need assistance of dobutamine

## 2019-02-20 NOTE — PROGRESS NOTE ADULT - PROBLEM SELECTOR PLAN 1
Continues to slowly feel better on po Bumex and metolazone. Continue hydralazine and isordil. Scr continues to trend down. Will hold off on adding coreg at this time.

## 2019-02-20 NOTE — PROGRESS NOTE ADULT - ASSESSMENT
68M pmhx DM2, S CHF with EF= 20%, HTN, Dyslipidemia, CAD, 3v CABG, PVD, L KEI stent & L CFA-Pop bypass with PTFE c/b wound infection requiring washout, sartorius flap and wound vac, now admitted with left thigh pain/weakness x 1-2 days.    PLAN:   - Arterial and venous duplexes done - graft patent, no DVT, no collection  - Patient unable to tolerate lying flat for WBC scan - if able to optimize patient for study, please obtain scan  - Blood cultures NGTD    Vascular surgery  Pager [phone]

## 2019-02-20 NOTE — PROGRESS NOTE ADULT - PROBLEM SELECTOR PLAN 1
Pt. with KESHAV in the setting of ADHF. On review of previous records in [facility], patient with normal Scr levels from 4/26/16 to 9/24/18. Pt. noted to have an episode of KESHAV during previous/recent hospitalization at [facility] (12/7/18 to 12/27/18). On this admission, Scr was elevated at 2.15 on 1/16/19, peaked to 4.0 on 1/25/19 and now has been stable to 2.83 today.   Pt. now on PO Bumex and metolazone as per cardiology. Advise to discontinue metolazone today if possible.   Pt. non-oliguric. US Kidney showed increased bilateral renal cortical echogenicity, no hydronephrosis on 1/18/19. Monitor labs and urine output. Avoid nephrotoxins. Dose medications as per eGFR

## 2019-02-20 NOTE — PROGRESS NOTE ADULT - SUBJECTIVE AND OBJECTIVE BOX
[facility] DIVISION OF KIDNEY DISEASES AND HYPERTENSION -- FOLLOW UP NOTE  --------------------------------------------------------------------------------    Chief complaint: KESHAV    24 hour events/subjective: Patient seen and examined at bedside this AM. Pt on PO Bumex/metolazone. Pt with improved LE edema. Dinh CP, SOB, N/V/F/C.    PAST HISTORY  --------------------------------------------------------------------------------  No significant changes to PMH, PSH, FHx, SHx, unless otherwise noted    ALLERGIES & MEDICATIONS  --------------------------------------------------------------------------------  Allergies    No Known Allergies    Intolerances      Standing Inpatient Medications  aspirin enteric coated 81 milliGRAM(s) Oral daily  benzonatate 100 milliGRAM(s) Oral three times a day  buMETAnide 4 milliGRAM(s) Oral every 8 hours  cholecalciferol 400 Unit(s) Oral daily  dextrose 5%. 1000 milliLiter(s) IV Continuous <Continuous>  dextrose 50% Injectable 12.5 Gram(s) IV Push once  dextrose 50% Injectable 25 Gram(s) IV Push once  dextrose 50% Injectable 25 Gram(s) IV Push once  docusate sodium 100 milliGRAM(s) Oral three times a day  heparin  Injectable 5000 Unit(s) SubCutaneous every 12 hours  hydrALAZINE 100 milliGRAM(s) Oral every 8 hours  hydrocortisone 0.5% Cream 1 Application(s) Topical daily  insulin glargine Injectable (LANTUS) 15 Unit(s) SubCutaneous at bedtime  insulin lispro (HumaLOG) corrective regimen sliding scale   SubCutaneous three times a day before meals  insulin lispro (HumaLOG) corrective regimen sliding scale   SubCutaneous at bedtime  isosorbide   dinitrate Tablet (ISORDIL) 30 milliGRAM(s) Oral three times a day  metolazone 2.5 milliGRAM(s) Oral daily  polyethylene glycol 3350 17 Gram(s) Oral daily  potassium chloride    Tablet ER 20 milliEquivalent(s) Oral once  senna 2 Tablet(s) Oral at bedtime  sevelamer hydrochloride 800 milliGRAM(s) Oral three times a day with meals  simethicone 80 milliGRAM(s) Chew three times a day  sodium chloride 0.9% lock flush 3 milliLiter(s) IV Push every 8 hours  sodium chloride 0.9% lock flush 3 milliLiter(s) IV Push every 8 hours  tamsulosin 0.4 milliGRAM(s) Oral at bedtime    PRN Inpatient Medications  calamine Lotion 1 Application(s) Topical daily PRN  dextrose 40% Gel 15 Gram(s) Oral once PRN  glucagon  Injectable 1 milliGRAM(s) IntraMuscular once PRN  guaiFENesin   Syrup  (Sugar-Free) 200 milliGRAM(s) Oral every 6 hours PRN  sodium chloride 0.65% Nasal 1 Spray(s) Both Nostrils three times a day PRN      REVIEW OF SYSTEMS  --------------------------------------------------------------------------------  Gen: No lethargy  Respiratory: No dyspnea  CV: No chest pain  GI: No abdominal pain  MSK: + feet pain, no edema  Neuro: No dizziness  Heme: No bleeding    All other systems were reviewed and are negative, except as noted.    VITALS/PHYSICAL EXAM  --------------------------------------------------------------------------------  T(C): 36.8 (02-20-19 @ 05:28), Max: 37.1 (02-19-19 @ 21:58)  HR: 83 (02-20-19 @ 09:12) (80 - 90)  BP: 111/71 (02-20-19 @ 09:12) (101/62 - 135/78)  RR: 198 (02-20-19 @ 09:12) (18 - 198)  SpO2: 100% (02-20-19 @ 05:28) (97% - 100%)  Wt(kg): --    02-19-19 @ 07:01  -  02-20-19 @ 07:00  --------------------------------------------------------  IN: 620 mL / OUT: 2750 mL / NET: -2130 mL      Physical Exam:  	Gen: NAD, well-appearing  	HEENT: No supplemental oxygen  	Pulm: CTA B/L  	CV: normal S1S2; no rub  	Abd: soft  	: No mikayla  	LE: bilateral edema, nonpitting  	Skin: Warm, without rashes    LABS/STUDIES  --------------------------------------------------------------------------------              9.2    5.95  >-----------<  174      [02-20-19 @ 07:35]              29.0     123  |  80  |  84  ----------------------------<  77      [02-20-19 @ 07:35]  3.5   |  27  |  2.83        Ca     9.1     [02-20-19 @ 07:35]      Mg     2.5     [02-20-19 @ 07:35]      Phos  4.1     [02-20-19 @ 07:35]    Creatinine Trend:  SCr 2.83 [02-20 @ 07:35]  SCr 2.69 [02-19 @ 19:21]  SCr 2.76 [02-19 @ 06:33]  SCr 2.81 [02-18 @ 06:34]  SCr 3.05 [02-17 @ 19:30]    Urinalysis - [01-24-19 @ 12:10]      Color LIGHT YELLOW / Appearance CLEAR / SG 1.008 / pH 5.0      Gluc NEGATIVE / Ketone NEGATIVE  / Bili NEGATIVE / Urobili NORMAL       Blood NEGATIVE / Protein NEGATIVE / Leuk Est NEGATIVE / Nitrite NEGATIVE      RBC  / WBC  / Hyaline  / Gran  / Sq Epi  / Non Sq Epi  / Bacteria     Urine Sodium < 20      [02-13-19 @ 14:22]  Urine Osmolality 288      [02-13-19 @ 14:22]    Iron 35, TIBC 339, %sat --      [01-21-19 @ 06:30]  Ferritin 114.3      [01-21-19 @ 06:30]  Vitamin D (25OH) 22.8      [01-18-19 @ 06:00]  HbA1c 7.8      [01-17-19 @ 06:34]  TSH 3.27      [02-13-19 @ 14:15]  Lipid: chol 95, TG 47, HDL 33, LDL 59      [01-17-19 @ 06:34]

## 2019-02-20 NOTE — PROGRESS NOTE ADULT - SUBJECTIVE AND OBJECTIVE BOX
Subjective/Objective: Resting in bed, states he is feeling a little better, denies SOB. No further L leg pain.    MEDICATIONS  (STANDING):  aspirin enteric coated 81 milliGRAM(s) Oral daily  benzonatate 100 milliGRAM(s) Oral three times a day  buMETAnide 4 milliGRAM(s) Oral every 8 hours  cholecalciferol 400 Unit(s) Oral daily  dextrose 5%. 1000 milliLiter(s) (50 mL/Hr) IV Continuous <Continuous>  dextrose 50% Injectable 12.5 Gram(s) IV Push once  dextrose 50% Injectable 25 Gram(s) IV Push once  dextrose 50% Injectable 25 Gram(s) IV Push once  docusate sodium 100 milliGRAM(s) Oral three times a day  heparin  Injectable 5000 Unit(s) SubCutaneous every 12 hours  hydrALAZINE 100 milliGRAM(s) Oral every 8 hours  hydrocortisone 0.5% Cream 1 Application(s) Topical daily  insulin glargine Injectable (LANTUS) 15 Unit(s) SubCutaneous at bedtime  insulin lispro (HumaLOG) corrective regimen sliding scale   SubCutaneous three times a day before meals  insulin lispro (HumaLOG) corrective regimen sliding scale   SubCutaneous at bedtime  isosorbide   dinitrate Tablet (ISORDIL) 30 milliGRAM(s) Oral three times a day  metolazone 2.5 milliGRAM(s) Oral daily  polyethylene glycol 3350 17 Gram(s) Oral daily  senna 2 Tablet(s) Oral at bedtime  sevelamer hydrochloride 800 milliGRAM(s) Oral three times a day with meals  simethicone 80 milliGRAM(s) Chew three times a day  sodium chloride 0.9% lock flush 3 milliLiter(s) IV Push every 8 hours  sodium chloride 0.9% lock flush 3 milliLiter(s) IV Push every 8 hours  tamsulosin 0.4 milliGRAM(s) Oral at bedtime    MEDICATIONS  (PRN):  calamine Lotion 1 Application(s) Topical daily PRN Rash and/or Itching  dextrose 40% Gel 15 Gram(s) Oral once PRN Blood Glucose LESS THAN 70 milliGRAM(s)/deciliter  glucagon  Injectable 1 milliGRAM(s) IntraMuscular once PRN Glucose LESS THAN 70 milligrams/deciliter  guaiFENesin   Syrup  (Sugar-Free) 200 milliGRAM(s) Oral every 6 hours PRN Cough  sodium chloride 0.65% Nasal 1 Spray(s) Both Nostrils three times a day PRN Nasal Congestion          Vital Signs Last 24 Hrs  T(C): 36.8 (20 Feb 2019 05:28), Max: 37.1 (19 Feb 2019 21:58)  T(F): 98.2 (20 Feb 2019 05:28), Max: 98.7 (19 Feb 2019 21:58)  HR: 81 (20 Feb 2019 05:28) (80 - 90)  BP: 101/62 (20 Feb 2019 05:28) (101/62 - 135/78)  BP(mean): --  RR: 18 (20 Feb 2019 05:28) (18 - 18)  SpO2: 100% (20 Feb 2019 05:28) (97% - 100%)  I&O's Detail    19 Feb 2019 07:01  -  20 Feb 2019 07:00  --------------------------------------------------------  IN:    Oral Fluid: 620 mL  Total IN: 620 mL    OUT:    Voided: 2750 mL  Total OUT: 2750 mL    Total NET: -2130 mL    PHYSICAL EXAM  GEN: NAD, skin W & D  RESP: minimal scattered crackles  CV: nl S1S2  GI: soft, NT/ND  EXT: no C/C/E  NEURO: A & O X 3    EKG/ TELEM: NSR occ [facility]    LABS: 2/20 2019 pending                          8.4    6.63  )-----------( 174      ( 19 Feb 2019 06:33 )             26.2       19 Feb 2019 19:21    122<L>  |  77<L>  |  81<H>  ----------------------------<  208<H>  4.4     |  26     |  2.69<H>    19 Feb 2019 06:33    124<L>  |  79<L>  |  83<H>  ----------------------------<  63<L>  3.1<L>   |  27     |  2.76<H>    Ca    8.9        19 Feb 2019 19:21  Ca    8.8        19 Feb 2019 06:33  Phos  4.0       19 Feb 2019 19:21  Mg     2.4       19 Feb 2019 19:21  Mg     2.3       19 Feb 2019 06:33

## 2019-02-20 NOTE — PROGRESS NOTE ADULT - PROBLEM SELECTOR PLAN 2
No further L thigh pain. Doppler studies with patent graft and no DVT. Cultures negative. Await vascular decision re: need for WBC scan, patient is unable to lie flat at this time.

## 2019-02-20 NOTE — PROGRESS NOTE ADULT - PROBLEM SELECTOR PLAN 3
Appears chronic and stable, today's BMP pending. Followup any renal recs. Appears chronic and stable, today's BMP pending. Followup any renal recs. 1217 addendum: renal recs noted, will discontinue metolazone today and monitor serum sodium. Strict I & O please.

## 2019-02-21 LAB
ANION GAP SERPL CALC-SCNC: 16 MMO/L — HIGH (ref 7–14)
ANION GAP SERPL CALC-SCNC: 16 MMO/L — HIGH (ref 7–14)
BACTERIA BLD CULT: SIGNIFICANT CHANGE UP
BACTERIA BLD CULT: SIGNIFICANT CHANGE UP
BUN SERPL-MCNC: 84 MG/DL — HIGH (ref 7–23)
BUN SERPL-MCNC: 87 MG/DL — HIGH (ref 7–23)
CALCIUM SERPL-MCNC: 9 MG/DL — SIGNIFICANT CHANGE UP (ref 8.4–10.5)
CALCIUM SERPL-MCNC: 9 MG/DL — SIGNIFICANT CHANGE UP (ref 8.4–10.5)
CHLORIDE SERPL-SCNC: 78 MMOL/L — LOW (ref 98–107)
CHLORIDE SERPL-SCNC: 82 MMOL/L — LOW (ref 98–107)
CO2 SERPL-SCNC: 28 MMOL/L — SIGNIFICANT CHANGE UP (ref 22–31)
CO2 SERPL-SCNC: 28 MMOL/L — SIGNIFICANT CHANGE UP (ref 22–31)
CREAT SERPL-MCNC: 2.69 MG/DL — HIGH (ref 0.5–1.3)
CREAT SERPL-MCNC: 2.86 MG/DL — HIGH (ref 0.5–1.3)
GLUCOSE BLDC GLUCOMTR-MCNC: 115 MG/DL — HIGH (ref 70–99)
GLUCOSE BLDC GLUCOMTR-MCNC: 197 MG/DL — HIGH (ref 70–99)
GLUCOSE BLDC GLUCOMTR-MCNC: 277 MG/DL — HIGH (ref 70–99)
GLUCOSE BLDC GLUCOMTR-MCNC: 84 MG/DL — SIGNIFICANT CHANGE UP (ref 70–99)
GLUCOSE BLDC GLUCOMTR-MCNC: 93 MG/DL — SIGNIFICANT CHANGE UP (ref 70–99)
GLUCOSE SERPL-MCNC: 260 MG/DL — HIGH (ref 70–99)
GLUCOSE SERPL-MCNC: 43 MG/DL — CRITICAL LOW (ref 70–99)
HCT VFR BLD CALC: 27.6 % — LOW (ref 39–50)
HGB BLD-MCNC: 8.8 G/DL — LOW (ref 13–17)
MAGNESIUM SERPL-MCNC: 2.3 MG/DL — SIGNIFICANT CHANGE UP (ref 1.6–2.6)
MAGNESIUM SERPL-MCNC: 2.4 MG/DL — SIGNIFICANT CHANGE UP (ref 1.6–2.6)
MCHC RBC-ENTMCNC: 22.7 PG — LOW (ref 27–34)
MCHC RBC-ENTMCNC: 31.9 % — LOW (ref 32–36)
MCV RBC AUTO: 71.3 FL — LOW (ref 80–100)
NRBC # FLD: 0 K/UL — LOW (ref 25–125)
PHOSPHATE SERPL-MCNC: 4.7 MG/DL — HIGH (ref 2.5–4.5)
PLATELET # BLD AUTO: 186 K/UL — SIGNIFICANT CHANGE UP (ref 150–400)
PMV BLD: SIGNIFICANT CHANGE UP FL (ref 7–13)
POTASSIUM SERPL-MCNC: 2.9 MMOL/L — CRITICAL LOW (ref 3.5–5.3)
POTASSIUM SERPL-MCNC: 4.5 MMOL/L — SIGNIFICANT CHANGE UP (ref 3.5–5.3)
POTASSIUM SERPL-SCNC: 2.9 MMOL/L — CRITICAL LOW (ref 3.5–5.3)
POTASSIUM SERPL-SCNC: 4.5 MMOL/L — SIGNIFICANT CHANGE UP (ref 3.5–5.3)
RBC # BLD: 3.87 M/UL — LOW (ref 4.2–5.8)
RBC # FLD: 22.8 % — HIGH (ref 10.3–14.5)
SODIUM SERPL-SCNC: 122 MMOL/L — LOW (ref 135–145)
SODIUM SERPL-SCNC: 126 MMOL/L — LOW (ref 135–145)
WBC # BLD: 5.99 K/UL — SIGNIFICANT CHANGE UP (ref 3.8–10.5)
WBC # FLD AUTO: 5.99 K/UL — SIGNIFICANT CHANGE UP (ref 3.8–10.5)

## 2019-02-21 PROCEDURE — 78805: CPT | Mod: 26

## 2019-02-21 PROCEDURE — 99233 SBSQ HOSP IP/OBS HIGH 50: CPT

## 2019-02-21 RX ORDER — POTASSIUM CHLORIDE 20 MEQ
40 PACKET (EA) ORAL EVERY 4 HOURS
Qty: 0 | Refills: 0 | Status: COMPLETED | OUTPATIENT
Start: 2019-02-21 | End: 2019-02-21

## 2019-02-21 RX ADMIN — SIMETHICONE 80 MILLIGRAM(S): 80 TABLET, CHEWABLE ORAL at 05:40

## 2019-02-21 RX ADMIN — Medication 100 MILLIGRAM(S): at 22:07

## 2019-02-21 RX ADMIN — Medication 40 MILLIEQUIVALENT(S): at 07:58

## 2019-02-21 RX ADMIN — Medication 1 APPLICATION(S): at 17:19

## 2019-02-21 RX ADMIN — SODIUM CHLORIDE 3 MILLILITER(S): 9 INJECTION INTRAMUSCULAR; INTRAVENOUS; SUBCUTANEOUS at 22:09

## 2019-02-21 RX ADMIN — Medication 2: at 17:20

## 2019-02-21 RX ADMIN — TAMSULOSIN HYDROCHLORIDE 0.4 MILLIGRAM(S): 0.4 CAPSULE ORAL at 22:08

## 2019-02-21 RX ADMIN — SIMETHICONE 80 MILLIGRAM(S): 80 TABLET, CHEWABLE ORAL at 13:48

## 2019-02-21 RX ADMIN — SODIUM CHLORIDE 3 MILLILITER(S): 9 INJECTION INTRAMUSCULAR; INTRAVENOUS; SUBCUTANEOUS at 05:22

## 2019-02-21 RX ADMIN — Medication 2: at 22:42

## 2019-02-21 RX ADMIN — Medication 400 UNIT(S): at 13:47

## 2019-02-21 RX ADMIN — OXYCODONE HYDROCHLORIDE 5 MILLIGRAM(S): 5 TABLET ORAL at 22:08

## 2019-02-21 RX ADMIN — BUMETANIDE 4 MILLIGRAM(S): 0.25 INJECTION INTRAMUSCULAR; INTRAVENOUS at 02:02

## 2019-02-21 RX ADMIN — Medication 81 MILLIGRAM(S): at 13:47

## 2019-02-21 RX ADMIN — BUMETANIDE 4 MILLIGRAM(S): 0.25 INJECTION INTRAMUSCULAR; INTRAVENOUS at 09:18

## 2019-02-21 RX ADMIN — ISOSORBIDE DINITRATE 30 MILLIGRAM(S): 5 TABLET ORAL at 05:40

## 2019-02-21 RX ADMIN — ISOSORBIDE DINITRATE 30 MILLIGRAM(S): 5 TABLET ORAL at 13:48

## 2019-02-21 RX ADMIN — SODIUM CHLORIDE 3 MILLILITER(S): 9 INJECTION INTRAMUSCULAR; INTRAVENOUS; SUBCUTANEOUS at 14:02

## 2019-02-21 RX ADMIN — Medication 100 MILLIGRAM(S): at 05:40

## 2019-02-21 RX ADMIN — ISOSORBIDE DINITRATE 30 MILLIGRAM(S): 5 TABLET ORAL at 22:07

## 2019-02-21 RX ADMIN — Medication 40 MILLIEQUIVALENT(S): at 13:48

## 2019-02-21 RX ADMIN — Medication 100 MILLIGRAM(S): at 13:47

## 2019-02-21 RX ADMIN — BUMETANIDE 4 MILLIGRAM(S): 0.25 INJECTION INTRAMUSCULAR; INTRAVENOUS at 17:19

## 2019-02-21 RX ADMIN — SIMETHICONE 80 MILLIGRAM(S): 80 TABLET, CHEWABLE ORAL at 22:08

## 2019-02-21 RX ADMIN — OXYCODONE HYDROCHLORIDE 5 MILLIGRAM(S): 5 TABLET ORAL at 23:06

## 2019-02-21 RX ADMIN — HEPARIN SODIUM 5000 UNIT(S): 5000 INJECTION INTRAVENOUS; SUBCUTANEOUS at 05:39

## 2019-02-21 RX ADMIN — INSULIN GLARGINE 15 UNIT(S): 100 INJECTION, SOLUTION SUBCUTANEOUS at 22:42

## 2019-02-21 NOTE — PROGRESS NOTE ADULT - PROBLEM SELECTOR PLAN 2
No further L thigh pain. Doppler studies with patent graft and no DVT. Cultures negative. Pending  WBC scan.

## 2019-02-21 NOTE — PROGRESS NOTE ADULT - PROBLEM SELECTOR PLAN 1
Continues to slowly improving on po Bumex. Continue hydralazine and isordil. Scr continues to trend down. Consider adding low coreg.  Keep K+ 4.0 and mag 2.0  Daily standing weight

## 2019-02-21 NOTE — PROGRESS NOTE ADULT - ATTENDING COMMENTS
Patient seen and examined  Agree with above assessment and plan  Await nuc medicine scan  Cont current meds

## 2019-02-21 NOTE — PROGRESS NOTE ADULT - SUBJECTIVE AND OBJECTIVE BOX
Tele: NSR w/ PVCs     Overnight: No chest pain, SOB or leg pain     MEDICATIONS  (STANDING):  aspirin enteric coated 81 milliGRAM(s) Oral daily  benzonatate 100 milliGRAM(s) Oral three times a day  buMETAnide 4 milliGRAM(s) Oral every 8 hours  cholecalciferol 400 Unit(s) Oral daily  docusate sodium 100 milliGRAM(s) Oral three times a day  heparin  Injectable 5000 Unit(s) SubCutaneous every 12 hours  hydrALAZINE 100 milliGRAM(s) Oral every 8 hours  hydrocortisone 0.5% Cream 1 Application(s) Topical daily  insulin glargine Injectable (LANTUS) 15 Unit(s) SubCutaneous at bedtime  insulin lispro (HumaLOG) corrective regimen sliding scale   SubCutaneous three times a day before meals  insulin lispro (HumaLOG) corrective regimen sliding scale   SubCutaneous at bedtime  isosorbide   dinitrate Tablet (ISORDIL) 30 milliGRAM(s) Oral three times a day  potassium chloride    Tablet ER 40 milliEquivalent(s) Oral every 4 hours  senna 2 Tablet(s) Oral at bedtime  sevelamer hydrochloride 800 milliGRAM(s) Oral three times a day with meals  simethicone 80 milliGRAM(s) Chew three times a day  tamsulosin 0.4 milliGRAM(s) Oral at bedtime    MEDICATIONS  (PRN):  calamine Lotion 1 Application(s) Topical daily PRN Rash and/or Itching  dextrose 40% Gel 15 Gram(s) Oral once PRN Blood Glucose LESS THAN 70 milliGRAM(s)/deciliter  glucagon  Injectable 1 milliGRAM(s) IntraMuscular once PRN Glucose LESS THAN 70 milligrams/deciliter  guaiFENesin   Syrup  (Sugar-Free) 200 milliGRAM(s) Oral every 6 hours PRN Cough  oxyCODONE    IR 5 milliGRAM(s) Oral every 8 hours PRN Severe Pain (7 - 10)  sodium chloride 0.65% Nasal 1 Spray(s) Both Nostrils three times a day PRN Nasal Congestion          Vital Signs Last 24 Hrs  T(C): 36.9 (21 Feb 2019 05:37), Max: 36.9 (20 Feb 2019 22:04)  T(F): 98.4 (21 Feb 2019 05:37), Max: 98.4 (20 Feb 2019 22:04)  HR: 76 (21 Feb 2019 05:37) (76 - 86)  BP: 121/72 (21 Feb 2019 05:37) (111/71 - 136/81)  RR: 16 (21 Feb 2019 05:37) (15 - 18)  SpO2: 100% (21 Feb 2019 05:37) (98% - 100%)  I&O's Detail    20 Feb 2019 07:01  -  21 Feb 2019 07:00  --------------------------------------------------------  IN:    Oral Fluid: 150 mL  Total IN: 150 mL    OUT:    Voided: 1900 mL  Total OUT: 1900 mL    Total NET: -1750 mL      21 Feb 2019 07:01  -  21 Feb 2019 08:22  --------------------------------------------------------  IN:  Total IN: 0 mL    OUT:    Voided: 400 mL  Total OUT: 400 mL    Total NET: -400 mL      Physical exam:   Gen- NAD  Resp- Clear   CV- S1S2 RRR  ABD- +BSx4 ND/NT  EXT- No edema   Neuro- A&Ox3                             8.8    5.99  )-----------( 186      ( 21 Feb 2019 05:45 )             27.6       21 Feb 2019 05:45    126<L>  |  82<L>  |  84<H>  ----------------------------<  43<LL>  2.9<LL>   |  28     |  2.69<H>  20 Feb 2019 07:35    123<L>  |  80<L>  |  84<H>  ----------------------------<  77     3.5     |  27     |  2.83<H>    Ca    9.0        21 Feb 2019 05:45  Ca    9.1        20 Feb 2019 07:35  Phos  4.7<H>     21 Feb 2019 05:45  Phos  4.1       20 Feb 2019 07:35  Mg     2.4       21 Feb 2019 05:45  Mg     2.5       20 Feb 2019 07:35

## 2019-02-22 LAB
ANION GAP SERPL CALC-SCNC: 18 MMO/L — HIGH (ref 7–14)
BUN SERPL-MCNC: 86 MG/DL — HIGH (ref 7–23)
CALCIUM SERPL-MCNC: 8.9 MG/DL — SIGNIFICANT CHANGE UP (ref 8.4–10.5)
CHLORIDE SERPL-SCNC: 81 MMOL/L — LOW (ref 98–107)
CO2 SERPL-SCNC: 27 MMOL/L — SIGNIFICANT CHANGE UP (ref 22–31)
CREAT SERPL-MCNC: 2.71 MG/DL — HIGH (ref 0.5–1.3)
GLUCOSE BLDC GLUCOMTR-MCNC: 110 MG/DL — HIGH (ref 70–99)
GLUCOSE BLDC GLUCOMTR-MCNC: 140 MG/DL — HIGH (ref 70–99)
GLUCOSE BLDC GLUCOMTR-MCNC: 152 MG/DL — HIGH (ref 70–99)
GLUCOSE BLDC GLUCOMTR-MCNC: 214 MG/DL — HIGH (ref 70–99)
GLUCOSE BLDC GLUCOMTR-MCNC: 225 MG/DL — HIGH (ref 70–99)
GLUCOSE SERPL-MCNC: 99 MG/DL — SIGNIFICANT CHANGE UP (ref 70–99)
HCT VFR BLD CALC: 28.7 % — LOW (ref 39–50)
HGB BLD-MCNC: 9.1 G/DL — LOW (ref 13–17)
MAGNESIUM SERPL-MCNC: 2.3 MG/DL — SIGNIFICANT CHANGE UP (ref 1.6–2.6)
MCHC RBC-ENTMCNC: 23.4 PG — LOW (ref 27–34)
MCHC RBC-ENTMCNC: 31.7 % — LOW (ref 32–36)
MCV RBC AUTO: 73.8 FL — LOW (ref 80–100)
NRBC # FLD: 0 K/UL — LOW (ref 25–125)
PLATELET # BLD AUTO: 160 K/UL — SIGNIFICANT CHANGE UP (ref 150–400)
PMV BLD: SIGNIFICANT CHANGE UP FL (ref 7–13)
POTASSIUM SERPL-MCNC: 3.5 MMOL/L — SIGNIFICANT CHANGE UP (ref 3.5–5.3)
POTASSIUM SERPL-SCNC: 3.5 MMOL/L — SIGNIFICANT CHANGE UP (ref 3.5–5.3)
RBC # BLD: 3.89 M/UL — LOW (ref 4.2–5.8)
RBC # FLD: 22.4 % — HIGH (ref 10.3–14.5)
SODIUM SERPL-SCNC: 126 MMOL/L — LOW (ref 135–145)
WBC # BLD: 5.19 K/UL — SIGNIFICANT CHANGE UP (ref 3.8–10.5)
WBC # FLD AUTO: 5.19 K/UL — SIGNIFICANT CHANGE UP (ref 3.8–10.5)

## 2019-02-22 PROCEDURE — 99233 SBSQ HOSP IP/OBS HIGH 50: CPT | Mod: GC

## 2019-02-22 PROCEDURE — 99233 SBSQ HOSP IP/OBS HIGH 50: CPT

## 2019-02-22 RX ORDER — POTASSIUM CHLORIDE 20 MEQ
40 PACKET (EA) ORAL ONCE
Qty: 0 | Refills: 0 | Status: COMPLETED | OUTPATIENT
Start: 2019-02-22 | End: 2019-02-22

## 2019-02-22 RX ADMIN — SIMETHICONE 80 MILLIGRAM(S): 80 TABLET, CHEWABLE ORAL at 21:46

## 2019-02-22 RX ADMIN — OXYCODONE HYDROCHLORIDE 5 MILLIGRAM(S): 5 TABLET ORAL at 22:38

## 2019-02-22 RX ADMIN — Medication 100 MILLIGRAM(S): at 06:07

## 2019-02-22 RX ADMIN — BUMETANIDE 4 MILLIGRAM(S): 0.25 INJECTION INTRAMUSCULAR; INTRAVENOUS at 01:10

## 2019-02-22 RX ADMIN — Medication 100 MILLIGRAM(S): at 13:37

## 2019-02-22 RX ADMIN — ISOSORBIDE DINITRATE 30 MILLIGRAM(S): 5 TABLET ORAL at 21:46

## 2019-02-22 RX ADMIN — SIMETHICONE 80 MILLIGRAM(S): 80 TABLET, CHEWABLE ORAL at 13:37

## 2019-02-22 RX ADMIN — Medication 81 MILLIGRAM(S): at 11:53

## 2019-02-22 RX ADMIN — Medication 40 MILLIEQUIVALENT(S): at 12:07

## 2019-02-22 RX ADMIN — SODIUM CHLORIDE 3 MILLILITER(S): 9 INJECTION INTRAMUSCULAR; INTRAVENOUS; SUBCUTANEOUS at 06:05

## 2019-02-22 RX ADMIN — BUMETANIDE 4 MILLIGRAM(S): 0.25 INJECTION INTRAMUSCULAR; INTRAVENOUS at 11:45

## 2019-02-22 RX ADMIN — Medication 100 MILLIGRAM(S): at 21:46

## 2019-02-22 RX ADMIN — SODIUM CHLORIDE 3 MILLILITER(S): 9 INJECTION INTRAMUSCULAR; INTRAVENOUS; SUBCUTANEOUS at 21:45

## 2019-02-22 RX ADMIN — Medication 2: at 19:03

## 2019-02-22 RX ADMIN — ISOSORBIDE DINITRATE 30 MILLIGRAM(S): 5 TABLET ORAL at 06:07

## 2019-02-22 RX ADMIN — OXYCODONE HYDROCHLORIDE 5 MILLIGRAM(S): 5 TABLET ORAL at 21:46

## 2019-02-22 RX ADMIN — Medication 1 SPRAY(S): at 11:56

## 2019-02-22 RX ADMIN — Medication 4: at 13:36

## 2019-02-22 RX ADMIN — SODIUM CHLORIDE 3 MILLILITER(S): 9 INJECTION INTRAMUSCULAR; INTRAVENOUS; SUBCUTANEOUS at 13:42

## 2019-02-22 RX ADMIN — INSULIN GLARGINE 15 UNIT(S): 100 INJECTION, SOLUTION SUBCUTANEOUS at 21:52

## 2019-02-22 RX ADMIN — ISOSORBIDE DINITRATE 30 MILLIGRAM(S): 5 TABLET ORAL at 13:38

## 2019-02-22 RX ADMIN — Medication 1 APPLICATION(S): at 11:53

## 2019-02-22 RX ADMIN — SIMETHICONE 80 MILLIGRAM(S): 80 TABLET, CHEWABLE ORAL at 06:07

## 2019-02-22 RX ADMIN — BUMETANIDE 4 MILLIGRAM(S): 0.25 INJECTION INTRAMUSCULAR; INTRAVENOUS at 20:22

## 2019-02-22 RX ADMIN — Medication 400 UNIT(S): at 11:53

## 2019-02-22 RX ADMIN — TAMSULOSIN HYDROCHLORIDE 0.4 MILLIGRAM(S): 0.4 CAPSULE ORAL at 21:46

## 2019-02-22 NOTE — PROGRESS NOTE ADULT - PROBLEM SELECTOR PLAN 1
Pt. with KESHAV in the setting of ADHF. On review of previous records in [facility], patient with normal Scr levels from 4/26/16 to 9/24/18. Pt. noted to have an episode of KESHAV during previous/recent hospitalization at [facility] (12/7/18 to 12/27/18). On this admission, Scr was elevated at 2.15 on 1/16/19, peaked to 4.0 on 1/25/19 and now has been stable to 2.71 today.   Pt. now on PO Bumex as per cardiology.  Pt. non-oliguric. US Kidney showed increased bilateral renal cortical echogenicity, no hydronephrosis on 1/18/19. Monitor labs and urine output. Avoid nephrotoxins. Dose medications as per eGFR

## 2019-02-22 NOTE — PROGRESS NOTE ADULT - PROBLEM SELECTOR PLAN 5
Pt. with chronic, hypervolemic, asymptomatic hyponatremia in the setting of metolazone. Metolazone was discontinued. Monitor serum sodium level

## 2019-02-22 NOTE — PROGRESS NOTE ADULT - SUBJECTIVE AND OBJECTIVE BOX
[facility] DIVISION OF KIDNEY DISEASES AND HYPERTENSION -- FOLLOW UP NOTE  --------------------------------------------------------------------------------    Chief complaint: KESHAV    24 hour events/subjective: Patient seen and examined at bedside this AM. Pt on PO Bumex. Pt with improved LE edema. Denies Dizziness. Denies CP, SOB, N/V/F/C.    PAST HISTORY  --------------------------------------------------------------------------------  No significant changes to PMH, PSH, FHx, SHx, unless otherwise noted    ALLERGIES & MEDICATIONS  --------------------------------------------------------------------------------  Allergies    No Known Allergies    Intolerances      Standing Inpatient Medications  aspirin enteric coated 81 milliGRAM(s) Oral daily  benzonatate 100 milliGRAM(s) Oral three times a day  buMETAnide 4 milliGRAM(s) Oral every 8 hours  cholecalciferol 400 Unit(s) Oral daily  dextrose 5%. 1000 milliLiter(s) IV Continuous <Continuous>  dextrose 50% Injectable 12.5 Gram(s) IV Push once  dextrose 50% Injectable 25 Gram(s) IV Push once  dextrose 50% Injectable 25 Gram(s) IV Push once  docusate sodium 100 milliGRAM(s) Oral three times a day  heparin  Injectable 5000 Unit(s) SubCutaneous every 12 hours  hydrALAZINE 100 milliGRAM(s) Oral every 8 hours  hydrocortisone 0.5% Cream 1 Application(s) Topical daily  insulin glargine Injectable (LANTUS) 15 Unit(s) SubCutaneous at bedtime  insulin lispro (HumaLOG) corrective regimen sliding scale   SubCutaneous three times a day before meals  insulin lispro (HumaLOG) corrective regimen sliding scale   SubCutaneous at bedtime  isosorbide   dinitrate Tablet (ISORDIL) 30 milliGRAM(s) Oral three times a day  polyethylene glycol 3350 17 Gram(s) Oral daily  potassium chloride    Tablet ER 40 milliEquivalent(s) Oral once  senna 2 Tablet(s) Oral at bedtime  sevelamer hydrochloride 800 milliGRAM(s) Oral three times a day with meals  simethicone 80 milliGRAM(s) Chew three times a day  sodium chloride 0.9% lock flush 3 milliLiter(s) IV Push every 8 hours  sodium chloride 0.9% lock flush 3 milliLiter(s) IV Push every 8 hours  tamsulosin 0.4 milliGRAM(s) Oral at bedtime    PRN Inpatient Medications  calamine Lotion 1 Application(s) Topical daily PRN  dextrose 40% Gel 15 Gram(s) Oral once PRN  glucagon  Injectable 1 milliGRAM(s) IntraMuscular once PRN  guaiFENesin   Syrup  (Sugar-Free) 200 milliGRAM(s) Oral every 6 hours PRN  oxyCODONE    IR 5 milliGRAM(s) Oral every 8 hours PRN  sodium chloride 0.65% Nasal 1 Spray(s) Both Nostrils three times a day PRN      REVIEW OF SYSTEMS  --------------------------------------------------------------------------------  Gen: No lethargy  Respiratory: No dyspnea  CV: No chest pain  GI: No abdominal pain  MSK: + feet pain, no edema  Neuro: No dizziness  Heme: No bleeding  All other systems were reviewed and are negative, except as noted.    VITALS/PHYSICAL EXAM  --------------------------------------------------------------------------------  T(C): 36.6 (02-22-19 @ 11:49), Max: 36.7 (02-22-19 @ 01:08)  HR: 89 (02-22-19 @ 11:49) (81 - 92)  BP: 117/71 (02-22-19 @ 11:49) (110/51 - 126/68)  RR: 18 (02-22-19 @ 11:49) (18 - 18)  SpO2: 97% (02-22-19 @ 11:49) (96% - 100%)  Wt(kg): --    02-21-19 @ 07:01  -  02-22-19 @ 07:00  --------------------------------------------------------  IN: 90 mL / OUT: 1300 mL / NET: -1210 mL    Physical Exam:  	Gen: NAD, well-appearing  	HEENT: No supplemental oxygen  	Pulm: CTA B/L  	CV: normal S1S2; no rub  	Abd: soft  	: No mikayla  	LE: bilateral edema, nonpitting  	Skin: Warm, without rashes    LABS/STUDIES  --------------------------------------------------------------------------------              9.1    5.19  >-----------<  160      [02-22-19 @ 05:44]              28.7     126  |  81  |  86  ----------------------------<  99      [02-22-19 @ 05:44]  3.5   |  27  |  2.71        Ca     8.9     [02-22-19 @ 05:44]      Mg     2.3     [02-22-19 @ 05:44]      Phos  4.7     [02-21-19 @ 05:45]    Creatinine Trend:  SCr 2.71 [02-22 @ 05:44]  SCr 2.86 [02-21 @ 18:00]  SCr 2.69 [02-21 @ 05:45]  SCr 2.83 [02-20 @ 07:35]  SCr 2.69 [02-19 @ 19:21]    Urinalysis - [01-24-19 @ 12:10]      Color LIGHT YELLOW / Appearance CLEAR / SG 1.008 / pH 5.0      Gluc NEGATIVE / Ketone NEGATIVE  / Bili NEGATIVE / Urobili NORMAL       Blood NEGATIVE / Protein NEGATIVE / Leuk Est NEGATIVE / Nitrite NEGATIVE      RBC  / WBC  / Hyaline  / Gran  / Sq Epi  / Non Sq Epi  / Bacteria       Iron 35, TIBC 339, %sat --      [01-21-19 @ 06:30]  Ferritin 114.3      [01-21-19 @ 06:30]  Vitamin D (25OH) 22.8      [01-18-19 @ 06:00]  HbA1c 7.8      [01-17-19 @ 06:34]  TSH 3.27      [02-13-19 @ 14:15]  Lipid: chol 95, TG 47, HDL 33, LDL 59      [01-17-19 @ 06:34]

## 2019-02-22 NOTE — PROGRESS NOTE ADULT - NSHPATTENDINGPLANDISCUSS_GEN_ALL_CORE
Dr. Frieda Helms, Dr. Zia Box and Elodia Kunz, NP
Dr. Rogelio Welch, Dr Keith Burns and Elodia Kunz, NP
RN at bedside
surg ho
team
RN at bedside
patient
team
team
patient
surg ho
team
team
CCU team
patient and patient's son at bedside.
team
RN at bedside
Team
patient
CCU team
pt, team
cardiology
RN at bedside
Team
cards
pt, team, family
patient
pt, pa cardiology

## 2019-02-22 NOTE — PROGRESS NOTE ADULT - SUBJECTIVE AND OBJECTIVE BOX
Subjective/Objective: Resting comfortably at present, states breathing okay overnight but c/o leg pain.     MEDICATIONS  (STANDING):  aspirin enteric coated 81 milliGRAM(s) Oral daily  benzonatate 100 milliGRAM(s) Oral three times a day  buMETAnide 4 milliGRAM(s) Oral every 8 hours  cholecalciferol 400 Unit(s) Oral daily  dextrose 5%. 1000 milliLiter(s) (50 mL/Hr) IV Continuous <Continuous>  dextrose 50% Injectable 12.5 Gram(s) IV Push once  dextrose 50% Injectable 25 Gram(s) IV Push once  dextrose 50% Injectable 25 Gram(s) IV Push once  docusate sodium 100 milliGRAM(s) Oral three times a day  heparin  Injectable 5000 Unit(s) SubCutaneous every 12 hours  hydrALAZINE 100 milliGRAM(s) Oral every 8 hours  hydrocortisone 0.5% Cream 1 Application(s) Topical daily  insulin glargine Injectable (LANTUS) 15 Unit(s) SubCutaneous at bedtime  insulin lispro (HumaLOG) corrective regimen sliding scale   SubCutaneous three times a day before meals  insulin lispro (HumaLOG) corrective regimen sliding scale   SubCutaneous at bedtime  isosorbide   dinitrate Tablet (ISORDIL) 30 milliGRAM(s) Oral three times a day  polyethylene glycol 3350 17 Gram(s) Oral daily  potassium chloride    Tablet ER 40 milliEquivalent(s) Oral once  senna 2 Tablet(s) Oral at bedtime  sevelamer hydrochloride 800 milliGRAM(s) Oral three times a day with meals  simethicone 80 milliGRAM(s) Chew three times a day  sodium chloride 0.9% lock flush 3 milliLiter(s) IV Push every 8 hours  sodium chloride 0.9% lock flush 3 milliLiter(s) IV Push every 8 hours  tamsulosin 0.4 milliGRAM(s) Oral at bedtime    MEDICATIONS  (PRN):  calamine Lotion 1 Application(s) Topical daily PRN Rash and/or Itching  dextrose 40% Gel 15 Gram(s) Oral once PRN Blood Glucose LESS THAN 70 milliGRAM(s)/deciliter  glucagon  Injectable 1 milliGRAM(s) IntraMuscular once PRN Glucose LESS THAN 70 milligrams/deciliter  guaiFENesin   Syrup  (Sugar-Free) 200 milliGRAM(s) Oral every 6 hours PRN Cough  oxyCODONE    IR 5 milliGRAM(s) Oral every 8 hours PRN Severe Pain (7 - 10)  sodium chloride 0.65% Nasal 1 Spray(s) Both Nostrils three times a day PRN Nasal Congestion          Vital Signs Last 24 Hrs  T(C): 36.5 (22 Feb 2019 06:02), Max: 36.7 (21 Feb 2019 13:57)  T(F): 97.7 (22 Feb 2019 06:02), Max: 98.1 (21 Feb 2019 13:57)  HR: 81 (22 Feb 2019 06:02) (81 - 92)  BP: 110/51 (22 Feb 2019 06:02) (102/65 - 126/68)  BP(mean): --  RR: 18 (22 Feb 2019 06:02) (18 - 18)  SpO2: 100% (22 Feb 2019 06:02) (96% - 100%)  I&O's Detail    21 Feb 2019 07:01  -  22 Feb 2019 07:00  --------------------------------------------------------  IN:    Oral Fluid: 90 mL  Total IN: 90 mL    OUT:    Voided: 1300 mL  Total OUT: 1300 mL    Total NET: -1210 mL    PHYSICAL EXAM  GEN: NAD, skin W & D  RESP: minimal crackles at base  CV: nl S1S2  GI: soft, NT/ND  EXT: no C/C/E  NEURO: A & O X         EKG/ TELEM: NSR    LABS:                          9.1    5.19  )-----------( 160      ( 22 Feb 2019 05:44 )             28.7       22 Feb 2019 05:44    126<L>  |  81<L>  |  86<H>  ----------------------------<  99     3.5     |  27     |  2.71<H>    21 Feb 2019 18:00    122<L>  |  78<L>  |  87<H>  ----------------------------<  260<H>  4.5     |  28     |  2.86<H>    Ca    8.9        22 Feb 2019 05:44  Ca    9.0        21 Feb 2019 18:00  Phos  4.7<H>     21 Feb 2019 05:45  Mg     2.3       22 Feb 2019 05:44  Mg     2.3       21 Feb 2019 18:00

## 2019-02-22 NOTE — PROGRESS NOTE ADULT - PROBLEM SELECTOR PLAN 2
Some mild Leg pain overnight but workup negative so far including WBC scan. Continue to monitor. Some mild Leg pain overnight but workup negative so far including WBC scan. Continue to monitor. Followup any further vascular recs.

## 2019-02-22 NOTE — PROGRESS NOTE ADULT - PROBLEM SELECTOR PLAN 3
Has been off metolazone X 2 days (last dose 2/20) per renal recs. Serum sodium improved today to 126. Continue to monitor, followup renal recs. Has been off metolazone X 2 days (last dose 2/20) per renal recs. Serum sodium improved today to 126. Continue to monitor, followup any further renal recs.

## 2019-02-22 NOTE — PROGRESS NOTE ADULT - PROBLEM SELECTOR PLAN 1
Continues on po bumex, hydralazine, and isordil with slow improvement. Scr stable. Metolazone discontinued on 2/20 per renal recs given hyponatremia. Continue strict I & O. Will continue to hold restart of coreg to monitor response off metolazone.

## 2019-02-22 NOTE — PROGRESS NOTE ADULT - PROBLEM SELECTOR PROBLEM 5
Hyponatremia
Essential hypertension
Essential hypertension
Hyponatremia
Essential hypertension

## 2019-02-22 NOTE — PROGRESS NOTE ADULT - ATTENDING COMMENTS
Patient seen and examined  Agree with above assessment and plan  Patient with acute on chronic systolic heart failure  Continue current meds however continue to hold coreg and metolazone

## 2019-02-23 LAB
ANION GAP SERPL CALC-SCNC: 18 MMO/L — HIGH (ref 7–14)
BUN SERPL-MCNC: 88 MG/DL — HIGH (ref 7–23)
CALCIUM SERPL-MCNC: 9 MG/DL — SIGNIFICANT CHANGE UP (ref 8.4–10.5)
CHLORIDE SERPL-SCNC: 79 MMOL/L — LOW (ref 98–107)
CO2 SERPL-SCNC: 28 MMOL/L — SIGNIFICANT CHANGE UP (ref 22–31)
CREAT SERPL-MCNC: 2.78 MG/DL — HIGH (ref 0.5–1.3)
GLUCOSE BLDC GLUCOMTR-MCNC: 110 MG/DL — HIGH (ref 70–99)
GLUCOSE BLDC GLUCOMTR-MCNC: 143 MG/DL — HIGH (ref 70–99)
GLUCOSE BLDC GLUCOMTR-MCNC: 190 MG/DL — HIGH (ref 70–99)
GLUCOSE BLDC GLUCOMTR-MCNC: 285 MG/DL — HIGH (ref 70–99)
GLUCOSE SERPL-MCNC: 78 MG/DL — SIGNIFICANT CHANGE UP (ref 70–99)
HCT VFR BLD CALC: 27.8 % — LOW (ref 39–50)
HGB BLD-MCNC: 8.7 G/DL — LOW (ref 13–17)
MAGNESIUM SERPL-MCNC: 2.3 MG/DL — SIGNIFICANT CHANGE UP (ref 1.6–2.6)
MCHC RBC-ENTMCNC: 22.8 PG — LOW (ref 27–34)
MCHC RBC-ENTMCNC: 31.3 % — LOW (ref 32–36)
MCV RBC AUTO: 73 FL — LOW (ref 80–100)
NRBC # FLD: 0 K/UL — LOW (ref 25–125)
PHOSPHATE SERPL-MCNC: 4.1 MG/DL — SIGNIFICANT CHANGE UP (ref 2.5–4.5)
PLATELET # BLD AUTO: 161 K/UL — SIGNIFICANT CHANGE UP (ref 150–400)
PMV BLD: SIGNIFICANT CHANGE UP FL (ref 7–13)
POTASSIUM SERPL-MCNC: 3.2 MMOL/L — LOW (ref 3.5–5.3)
POTASSIUM SERPL-SCNC: 3.2 MMOL/L — LOW (ref 3.5–5.3)
RBC # BLD: 3.81 M/UL — LOW (ref 4.2–5.8)
RBC # FLD: 22.4 % — HIGH (ref 10.3–14.5)
SODIUM SERPL-SCNC: 125 MMOL/L — LOW (ref 135–145)
WBC # BLD: 5.38 K/UL — SIGNIFICANT CHANGE UP (ref 3.8–10.5)
WBC # FLD AUTO: 5.38 K/UL — SIGNIFICANT CHANGE UP (ref 3.8–10.5)

## 2019-02-23 PROCEDURE — 99233 SBSQ HOSP IP/OBS HIGH 50: CPT | Mod: GC

## 2019-02-23 RX ORDER — POTASSIUM CHLORIDE 20 MEQ
40 PACKET (EA) ORAL EVERY 4 HOURS
Qty: 0 | Refills: 0 | Status: COMPLETED | OUTPATIENT
Start: 2019-02-23 | End: 2019-02-23

## 2019-02-23 RX ADMIN — Medication 400 UNIT(S): at 13:13

## 2019-02-23 RX ADMIN — ISOSORBIDE DINITRATE 30 MILLIGRAM(S): 5 TABLET ORAL at 22:06

## 2019-02-23 RX ADMIN — Medication 81 MILLIGRAM(S): at 13:14

## 2019-02-23 RX ADMIN — SIMETHICONE 80 MILLIGRAM(S): 80 TABLET, CHEWABLE ORAL at 22:06

## 2019-02-23 RX ADMIN — Medication 100 MILLIGRAM(S): at 05:21

## 2019-02-23 RX ADMIN — SIMETHICONE 80 MILLIGRAM(S): 80 TABLET, CHEWABLE ORAL at 05:21

## 2019-02-23 RX ADMIN — BUMETANIDE 4 MILLIGRAM(S): 0.25 INJECTION INTRAMUSCULAR; INTRAVENOUS at 05:21

## 2019-02-23 RX ADMIN — Medication 40 MILLIEQUIVALENT(S): at 13:13

## 2019-02-23 RX ADMIN — SIMETHICONE 80 MILLIGRAM(S): 80 TABLET, CHEWABLE ORAL at 13:17

## 2019-02-23 RX ADMIN — SODIUM CHLORIDE 3 MILLILITER(S): 9 INJECTION INTRAMUSCULAR; INTRAVENOUS; SUBCUTANEOUS at 22:07

## 2019-02-23 RX ADMIN — SODIUM CHLORIDE 3 MILLILITER(S): 9 INJECTION INTRAMUSCULAR; INTRAVENOUS; SUBCUTANEOUS at 05:20

## 2019-02-23 RX ADMIN — Medication 40 MILLIEQUIVALENT(S): at 09:32

## 2019-02-23 RX ADMIN — Medication 100 MILLIGRAM(S): at 13:17

## 2019-02-23 RX ADMIN — SODIUM CHLORIDE 3 MILLILITER(S): 9 INJECTION INTRAMUSCULAR; INTRAVENOUS; SUBCUTANEOUS at 14:36

## 2019-02-23 RX ADMIN — Medication 2: at 08:57

## 2019-02-23 RX ADMIN — OXYCODONE HYDROCHLORIDE 5 MILLIGRAM(S): 5 TABLET ORAL at 19:56

## 2019-02-23 RX ADMIN — INSULIN GLARGINE 15 UNIT(S): 100 INJECTION, SOLUTION SUBCUTANEOUS at 22:07

## 2019-02-23 RX ADMIN — ISOSORBIDE DINITRATE 30 MILLIGRAM(S): 5 TABLET ORAL at 13:17

## 2019-02-23 RX ADMIN — Medication 100 MILLIGRAM(S): at 22:06

## 2019-02-23 RX ADMIN — Medication 1 APPLICATION(S): at 14:20

## 2019-02-23 RX ADMIN — OXYCODONE HYDROCHLORIDE 5 MILLIGRAM(S): 5 TABLET ORAL at 22:01

## 2019-02-23 RX ADMIN — BUMETANIDE 4 MILLIGRAM(S): 0.25 INJECTION INTRAMUSCULAR; INTRAVENOUS at 13:12

## 2019-02-23 RX ADMIN — SODIUM CHLORIDE 3 MILLILITER(S): 9 INJECTION INTRAMUSCULAR; INTRAVENOUS; SUBCUTANEOUS at 22:01

## 2019-02-23 RX ADMIN — Medication 6: at 13:14

## 2019-02-23 RX ADMIN — BUMETANIDE 4 MILLIGRAM(S): 0.25 INJECTION INTRAMUSCULAR; INTRAVENOUS at 22:06

## 2019-02-23 RX ADMIN — TAMSULOSIN HYDROCHLORIDE 0.4 MILLIGRAM(S): 0.4 CAPSULE ORAL at 22:07

## 2019-02-23 RX ADMIN — ISOSORBIDE DINITRATE 30 MILLIGRAM(S): 5 TABLET ORAL at 05:21

## 2019-02-23 NOTE — PROGRESS NOTE ADULT - SUBJECTIVE AND OBJECTIVE BOX
Subjective/Objective: Patient feels better today, denies SOB. Minimal leg discomfort.    MEDICATIONS  (STANDING):  aspirin enteric coated 81 milliGRAM(s) Oral daily  benzonatate 100 milliGRAM(s) Oral three times a day  buMETAnide 4 milliGRAM(s) Oral every 8 hours  cholecalciferol 400 Unit(s) Oral daily  dextrose 5%. 1000 milliLiter(s) (50 mL/Hr) IV Continuous <Continuous>  dextrose 50% Injectable 12.5 Gram(s) IV Push once  dextrose 50% Injectable 25 Gram(s) IV Push once  dextrose 50% Injectable 25 Gram(s) IV Push once  docusate sodium 100 milliGRAM(s) Oral three times a day  heparin  Injectable 5000 Unit(s) SubCutaneous every 12 hours  hydrALAZINE 100 milliGRAM(s) Oral every 8 hours  hydrocortisone 0.5% Cream 1 Application(s) Topical daily  insulin glargine Injectable (LANTUS) 15 Unit(s) SubCutaneous at bedtime  insulin lispro (HumaLOG) corrective regimen sliding scale   SubCutaneous three times a day before meals  insulin lispro (HumaLOG) corrective regimen sliding scale   SubCutaneous at bedtime  isosorbide   dinitrate Tablet (ISORDIL) 30 milliGRAM(s) Oral three times a day  polyethylene glycol 3350 17 Gram(s) Oral daily  potassium chloride    Tablet ER 40 milliEquivalent(s) Oral every 4 hours  senna 2 Tablet(s) Oral at bedtime  sevelamer hydrochloride 800 milliGRAM(s) Oral three times a day with meals  simethicone 80 milliGRAM(s) Chew three times a day  sodium chloride 0.9% lock flush 3 milliLiter(s) IV Push every 8 hours  sodium chloride 0.9% lock flush 3 milliLiter(s) IV Push every 8 hours  tamsulosin 0.4 milliGRAM(s) Oral at bedtime    MEDICATIONS  (PRN):  calamine Lotion 1 Application(s) Topical daily PRN Rash and/or Itching  dextrose 40% Gel 15 Gram(s) Oral once PRN Blood Glucose LESS THAN 70 milliGRAM(s)/deciliter  glucagon  Injectable 1 milliGRAM(s) IntraMuscular once PRN Glucose LESS THAN 70 milligrams/deciliter  guaiFENesin   Syrup  (Sugar-Free) 200 milliGRAM(s) Oral every 6 hours PRN Cough  oxyCODONE    IR 5 milliGRAM(s) Oral every 8 hours PRN Severe Pain (7 - 10)  sodium chloride 0.65% Nasal 1 Spray(s) Both Nostrils three times a day PRN Nasal Congestion          Vital Signs Last 24 Hrs  T(C): 36.7 (23 Feb 2019 05:14), Max: 36.7 (23 Feb 2019 05:14)  T(F): 98 (23 Feb 2019 05:14), Max: 98 (23 Feb 2019 05:14)  HR: 91 (23 Feb 2019 09:01) (83 - 91)  BP: 107/57 (23 Feb 2019 09:01) (107/57 - 137/63)  BP(mean): --  RR: 18 (23 Feb 2019 09:01) (18 - 18)  SpO2: 99% (23 Feb 2019 09:01) (97% - 100%)  I&O's Detail    22 Feb 2019 07:01  -  23 Feb 2019 07:00  --------------------------------------------------------  IN:    Oral Fluid: 30 mL  Total IN: 30 mL    OUT:    Voided: 1525 mL  Total OUT: 1525 mL    Total NET: -1495 mL      23 Feb 2019 07:01  -  23 Feb 2019 11:08  --------------------------------------------------------  IN:  Total IN: 0 mL    OUT:    Voided: 700 mL  Total OUT: 700 mL    Total NET: -700 mL    PHYSICAL EXAM  GEN: NAD, skin W & D  RESP: remains with bibasalar crackles  CV: nl S1S2  GI: soft, NT/ND  EXT: mild LE edema  NEURO: A & O X 3      EKG/ TELEM: NSR    LABS:                          8.7    5.38  )-----------( 161      ( 23 Feb 2019 05:29 )             27.8       23 Feb 2019 05:29    125<L>  |  79<L>  |  88<H>  ----------------------------<  78     3.2<L>   |  28     |  2.78<H>    22 Feb 2019 05:44    126<L>  |  81<L>  |  86<H>  ----------------------------<  99     3.5     |  27     |  2.71<H>    Ca    9.0        23 Feb 2019 05:29  Ca    8.9        22 Feb 2019 05:44  Phos  4.1       23 Feb 2019 05:29  Mg     2.3       23 Feb 2019 05:29  Mg     2.3       22 Feb 2019 05:44

## 2019-02-23 NOTE — PROGRESS NOTE ADULT - PROBLEM SELECTOR PLAN 1
Continues to diurese fairly well on bumex 4 mg po Q8H. Continue hydralazine and isordil. Strict I & O. Scr stable. Await HF followup next week for continued plan.

## 2019-02-24 LAB
ANION GAP SERPL CALC-SCNC: 17 MMO/L — HIGH (ref 7–14)
BUN SERPL-MCNC: 86 MG/DL — HIGH (ref 7–23)
CALCIUM SERPL-MCNC: 9.4 MG/DL — SIGNIFICANT CHANGE UP (ref 8.4–10.5)
CHLORIDE SERPL-SCNC: 79 MMOL/L — LOW (ref 98–107)
CO2 SERPL-SCNC: 30 MMOL/L — SIGNIFICANT CHANGE UP (ref 22–31)
CREAT SERPL-MCNC: 2.81 MG/DL — HIGH (ref 0.5–1.3)
GLUCOSE BLDC GLUCOMTR-MCNC: 159 MG/DL — HIGH (ref 70–99)
GLUCOSE BLDC GLUCOMTR-MCNC: 168 MG/DL — HIGH (ref 70–99)
GLUCOSE BLDC GLUCOMTR-MCNC: 188 MG/DL — HIGH (ref 70–99)
GLUCOSE BLDC GLUCOMTR-MCNC: 89 MG/DL — SIGNIFICANT CHANGE UP (ref 70–99)
GLUCOSE SERPL-MCNC: 92 MG/DL — SIGNIFICANT CHANGE UP (ref 70–99)
HCT VFR BLD CALC: 28.7 % — LOW (ref 39–50)
HGB BLD-MCNC: 8.9 G/DL — LOW (ref 13–17)
MAGNESIUM SERPL-MCNC: 2.4 MG/DL — SIGNIFICANT CHANGE UP (ref 1.6–2.6)
MCHC RBC-ENTMCNC: 22.9 PG — LOW (ref 27–34)
MCHC RBC-ENTMCNC: 31 % — LOW (ref 32–36)
MCV RBC AUTO: 74 FL — LOW (ref 80–100)
NRBC # FLD: 0 K/UL — LOW (ref 25–125)
PHOSPHATE SERPL-MCNC: 4.2 MG/DL — SIGNIFICANT CHANGE UP (ref 2.5–4.5)
PLATELET # BLD AUTO: 146 K/UL — LOW (ref 150–400)
PMV BLD: SIGNIFICANT CHANGE UP FL (ref 7–13)
POTASSIUM SERPL-MCNC: 3.4 MMOL/L — LOW (ref 3.5–5.3)
POTASSIUM SERPL-SCNC: 3.4 MMOL/L — LOW (ref 3.5–5.3)
RBC # BLD: 3.88 M/UL — LOW (ref 4.2–5.8)
RBC # FLD: 22.2 % — HIGH (ref 10.3–14.5)
SODIUM SERPL-SCNC: 126 MMOL/L — LOW (ref 135–145)
WBC # BLD: 5.48 K/UL — SIGNIFICANT CHANGE UP (ref 3.8–10.5)
WBC # FLD AUTO: 5.48 K/UL — SIGNIFICANT CHANGE UP (ref 3.8–10.5)

## 2019-02-24 PROCEDURE — 99232 SBSQ HOSP IP/OBS MODERATE 35: CPT | Mod: GC

## 2019-02-24 RX ORDER — POTASSIUM CHLORIDE 20 MEQ
40 PACKET (EA) ORAL EVERY 4 HOURS
Qty: 0 | Refills: 0 | Status: COMPLETED | OUTPATIENT
Start: 2019-02-24 | End: 2019-02-24

## 2019-02-24 RX ADMIN — BUMETANIDE 4 MILLIGRAM(S): 0.25 INJECTION INTRAMUSCULAR; INTRAVENOUS at 11:03

## 2019-02-24 RX ADMIN — Medication 81 MILLIGRAM(S): at 11:04

## 2019-02-24 RX ADMIN — BUMETANIDE 4 MILLIGRAM(S): 0.25 INJECTION INTRAMUSCULAR; INTRAVENOUS at 03:06

## 2019-02-24 RX ADMIN — SODIUM CHLORIDE 3 MILLILITER(S): 9 INJECTION INTRAMUSCULAR; INTRAVENOUS; SUBCUTANEOUS at 06:57

## 2019-02-24 RX ADMIN — Medication 100 MILLIGRAM(S): at 13:24

## 2019-02-24 RX ADMIN — Medication 100 MILLIGRAM(S): at 07:00

## 2019-02-24 RX ADMIN — SIMETHICONE 80 MILLIGRAM(S): 80 TABLET, CHEWABLE ORAL at 07:00

## 2019-02-24 RX ADMIN — SODIUM CHLORIDE 3 MILLILITER(S): 9 INJECTION INTRAMUSCULAR; INTRAVENOUS; SUBCUTANEOUS at 22:43

## 2019-02-24 RX ADMIN — ISOSORBIDE DINITRATE 30 MILLIGRAM(S): 5 TABLET ORAL at 13:25

## 2019-02-24 RX ADMIN — ISOSORBIDE DINITRATE 30 MILLIGRAM(S): 5 TABLET ORAL at 06:59

## 2019-02-24 RX ADMIN — BUMETANIDE 4 MILLIGRAM(S): 0.25 INJECTION INTRAMUSCULAR; INTRAVENOUS at 18:17

## 2019-02-24 RX ADMIN — OXYCODONE HYDROCHLORIDE 5 MILLIGRAM(S): 5 TABLET ORAL at 22:40

## 2019-02-24 RX ADMIN — OXYCODONE HYDROCHLORIDE 5 MILLIGRAM(S): 5 TABLET ORAL at 23:30

## 2019-02-24 RX ADMIN — Medication 400 UNIT(S): at 11:04

## 2019-02-24 RX ADMIN — Medication 2: at 18:17

## 2019-02-24 RX ADMIN — SODIUM CHLORIDE 3 MILLILITER(S): 9 INJECTION INTRAMUSCULAR; INTRAVENOUS; SUBCUTANEOUS at 15:49

## 2019-02-24 RX ADMIN — SIMETHICONE 80 MILLIGRAM(S): 80 TABLET, CHEWABLE ORAL at 13:25

## 2019-02-24 RX ADMIN — Medication 40 MILLIEQUIVALENT(S): at 18:47

## 2019-02-24 RX ADMIN — TAMSULOSIN HYDROCHLORIDE 0.4 MILLIGRAM(S): 0.4 CAPSULE ORAL at 22:34

## 2019-02-24 RX ADMIN — Medication 100 MILLIGRAM(S): at 22:34

## 2019-02-24 RX ADMIN — Medication 2: at 13:24

## 2019-02-24 RX ADMIN — Medication 40 MILLIEQUIVALENT(S): at 14:25

## 2019-02-24 RX ADMIN — Medication 100 MILLIGRAM(S): at 13:25

## 2019-02-24 RX ADMIN — SIMETHICONE 80 MILLIGRAM(S): 80 TABLET, CHEWABLE ORAL at 22:34

## 2019-02-24 RX ADMIN — INSULIN GLARGINE 15 UNIT(S): 100 INJECTION, SOLUTION SUBCUTANEOUS at 22:33

## 2019-02-24 RX ADMIN — SODIUM CHLORIDE 3 MILLILITER(S): 9 INJECTION INTRAMUSCULAR; INTRAVENOUS; SUBCUTANEOUS at 21:33

## 2019-02-24 RX ADMIN — ISOSORBIDE DINITRATE 30 MILLIGRAM(S): 5 TABLET ORAL at 22:34

## 2019-02-24 NOTE — PROGRESS NOTE ADULT - I WAS PHYSICALLY PRESENT FOR THE KEY PORTIONS OF THE EVALUATION AND MANAGEMENT (E/M) SERVICE PROVIDED.  I AGREE WITH THE ABOVE HISTORY, PHYSICAL, AND PLAN WHICH I HAVE REVIEWED AND EDITED WHERE APPROPRIATE

## 2019-02-24 NOTE — PROGRESS NOTE ADULT - SUBJECTIVE AND OBJECTIVE BOX
Tele: NSR    Overnight: No complaints     MEDICATIONS  (STANDING):  aspirin enteric coated 81 milliGRAM(s) Oral daily  benzonatate 100 milliGRAM(s) Oral three times a day  buMETAnide 4 milliGRAM(s) Oral every 8 hours  cholecalciferol 400 Unit(s) Oral daily  docusate sodium 100 milliGRAM(s) Oral three times a day  heparin  Injectable 5000 Unit(s) SubCutaneous every 12 hours  hydrALAZINE 100 milliGRAM(s) Oral every 8 hours  hydrocortisone 0.5% Cream 1 Application(s) Topical daily  insulin glargine Injectable (LANTUS) 15 Unit(s) SubCutaneous at bedtime  insulin lispro (HumaLOG) corrective regimen sliding scale   SubCutaneous three times a day before meals  insulin lispro (HumaLOG) corrective regimen sliding scale   SubCutaneous at bedtime  isosorbide   dinitrate Tablet (ISORDIL) 30 milliGRAM(s) Oral three times a day  polyethylene glycol 3350 17 Gram(s) Oral daily  senna 2 Tablet(s) Oral at bedtime  sevelamer hydrochloride 800 milliGRAM(s) Oral three times a day with meals  simethicone 80 milliGRAM(s) Chew three times a day  sodium chloride 0.9% lock flush 3 milliLiter(s) IV Push every 8 hours  sodium chloride 0.9% lock flush 3 milliLiter(s) IV Push every 8 hours  tamsulosin 0.4 milliGRAM(s) Oral at bedtime    MEDICATIONS  (PRN):  calamine Lotion 1 Application(s) Topical daily PRN Rash and/or Itching  dextrose 40% Gel 15 Gram(s) Oral once PRN Blood Glucose LESS THAN 70 milliGRAM(s)/deciliter  glucagon  Injectable 1 milliGRAM(s) IntraMuscular once PRN Glucose LESS THAN 70 milligrams/deciliter  guaiFENesin   Syrup  (Sugar-Free) 200 milliGRAM(s) Oral every 6 hours PRN Cough  oxyCODONE    IR 5 milliGRAM(s) Oral every 8 hours PRN Severe Pain (7 - 10)  sodium chloride 0.65% Nasal 1 Spray(s) Both Nostrils three times a day PRN Nasal Congestion          Vital Signs Last 24 Hrs  T(C): 36.7 (24 Feb 2019 06:55), Max: 36.7 (24 Feb 2019 03:05)  T(F): 98 (24 Feb 2019 06:55), Max: 98 (24 Feb 2019 03:05)  HR: 81 (24 Feb 2019 03:05) (81 - 89)  BP: 115/69 (24 Feb 2019 06:55) (107/64 - 129/80)  RR: 18 (24 Feb 2019 06:55) (18 - 18)  SpO2: 100% (24 Feb 2019 06:55) (99% - 100%)  I&O's Detail    23 Feb 2019 07:01  -  24 Feb 2019 07:00  --------------------------------------------------------  IN:  Total IN: 0 mL    OUT:    Voided: 1500 mL  Total OUT: 1500 mL    Total NET: -1500 mL      24 Feb 2019 07:01  -  24 Feb 2019 10:22  --------------------------------------------------------  IN:  Total IN: 0 mL    OUT:    Voided: 820 mL  Total OUT: 820 mL    Total NET: -820 mL      Physical exam:   Gen- NAD  Resp- Diminished the bases  CV- S1S2 RRR  ABD- +BSx4 ND/NT  EXT- + Edema   Neuro- A&Ox3                             8.7    5.38  )-----------( 161      ( 23 Feb 2019 05:29 )             27.8       23 Feb 2019 05:29    125<L>  |  79<L>  |  88<H>  ----------------------------<  78     3.2<L>   |  28     |  2.78<H>    Ca    9.0        23 Feb 2019 05:29  Phos  4.1       23 Feb 2019 05:29  Mg     2.3       23 Feb 2019 05:29

## 2019-02-24 NOTE — PROGRESS NOTE ADULT - PROBLEM SELECTOR PLAN 3
Pt. with chronic, hypervolemic, asymptomatic hyponatremia in the setting of metolazone. Metolazone was discontinued. send uosm and ultyes.  continue water restriction and continue loop diuretics.

## 2019-02-24 NOTE — PROGRESS NOTE ADULT - SUBJECTIVE AND OBJECTIVE BOX
[facility] Division of Kidney Diseases & Hypertension  FOLLOW UP NOTE  --------------------------------------------------------------------------------  Chief Complaint: Shortness of breath    24 hour events/subjective: The patient was seen and evaluated at bedside this morning.  He was eating breakfast and felt well without dyspnea and he reported that his puffiness has gotten better.  He has a good appetite no nvd, no cp.         PAST HISTORY  --------------------------------------------------------------------------------  No significant changes to PMH, PSH, FHx, SHx, unless otherwise noted    ALLERGIES & MEDICATIONS  --------------------------------------------------------------------------------  Allergies    No Known Allergies    Intolerances      Standing Inpatient Medications  aspirin enteric coated 81 milliGRAM(s) Oral daily  benzonatate 100 milliGRAM(s) Oral three times a day  buMETAnide 4 milliGRAM(s) Oral every 8 hours  cholecalciferol 400 Unit(s) Oral daily  dextrose 5%. 1000 milliLiter(s) IV Continuous <Continuous>  dextrose 50% Injectable 12.5 Gram(s) IV Push once  dextrose 50% Injectable 25 Gram(s) IV Push once  dextrose 50% Injectable 25 Gram(s) IV Push once  docusate sodium 100 milliGRAM(s) Oral three times a day  heparin  Injectable 5000 Unit(s) SubCutaneous every 12 hours  hydrALAZINE 100 milliGRAM(s) Oral every 8 hours  hydrocortisone 0.5% Cream 1 Application(s) Topical daily  insulin glargine Injectable (LANTUS) 15 Unit(s) SubCutaneous at bedtime  insulin lispro (HumaLOG) corrective regimen sliding scale   SubCutaneous three times a day before meals  insulin lispro (HumaLOG) corrective regimen sliding scale   SubCutaneous at bedtime  isosorbide   dinitrate Tablet (ISORDIL) 30 milliGRAM(s) Oral three times a day  polyethylene glycol 3350 17 Gram(s) Oral daily  senna 2 Tablet(s) Oral at bedtime  sevelamer hydrochloride 800 milliGRAM(s) Oral three times a day with meals  simethicone 80 milliGRAM(s) Chew three times a day  sodium chloride 0.9% lock flush 3 milliLiter(s) IV Push every 8 hours  sodium chloride 0.9% lock flush 3 milliLiter(s) IV Push every 8 hours  tamsulosin 0.4 milliGRAM(s) Oral at bedtime    PRN Inpatient Medications  calamine Lotion 1 Application(s) Topical daily PRN  dextrose 40% Gel 15 Gram(s) Oral once PRN  glucagon  Injectable 1 milliGRAM(s) IntraMuscular once PRN  guaiFENesin   Syrup  (Sugar-Free) 200 milliGRAM(s) Oral every 6 hours PRN  oxyCODONE    IR 5 milliGRAM(s) Oral every 8 hours PRN  sodium chloride 0.65% Nasal 1 Spray(s) Both Nostrils three times a day PRN      REVIEW OF SYSTEMS  --------------------------------------------------------------------------------  Gen: no fever  Respiratory: improved dyspnea  CV: no cp   GI: good appetite  : no difficulty urinating     VITALS/PHYSICAL EXAM  --------------------------------------------------------------------------------  T(C): 36.7 (02-24-19 @ 11:01), Max: 36.7 (02-24-19 @ 03:05)  HR: 94 (02-24-19 @ 11:01) (81 - 94)  BP: 117/64 (02-24-19 @ 11:01) (107/64 - 129/80)  ABP: --  ABP(mean): --  RR: 18 (02-24-19 @ 11:01) (18 - 18)  SpO2: 96% (02-24-19 @ 11:01) (96% - 100%)  CVP(mm Hg): --        02-23-19 @ 07:01  -  02-24-19 @ 07:00  --------------------------------------------------------  IN: 0 mL / OUT: 1500 mL / NET: -1500 mL    02-24-19 @ 07:01  -  02-24-19 @ 11:10  --------------------------------------------------------  IN: 0 mL / OUT: 820 mL / NET: -820 mL      Physical Exam:  	Gen: NAD, well-appearing  	HEENT: No supplemental oxygen  	Pulm: CTA B/L  	CV: normal S1S2; no rub  	Abd: soft  	: No mikayla  	LE: bilateral edema, nonpitting  	Skin: Warm, without rashes      LABS/STUDIES  --------------------------------------------------------------------------------              8.9    5.48  >-----------<  146      [02-24-19 @ 10:12]              28.7     126  |  79  |  86  ----------------------------<  92      [02-24-19 @ 10:12]  3.4   |  30  |  2.81        Ca     9.4     [02-24-19 @ 10:12]      Mg     2.4     [02-24-19 @ 10:12]      Phos  4.2     [02-24-19 @ 10:12]            Creatinine Trend:  SCr 2.81 [02-24 @ 10:12]  SCr 2.78 [02-23 @ 05:29]  SCr 2.71 [02-22 @ 05:44]  SCr 2.86 [02-21 @ 18:00]  SCr 2.69 [02-21 @ 05:45]

## 2019-02-24 NOTE — PROGRESS NOTE ADULT - PROBLEM SELECTOR PLAN 1
Continues to slowly improving on po Bumex. Continue hydralazine and isordil. Scr remains stable. Consider adding low coreg.  Keep K+ 4.0 and mag 2.0  Daily standing weight, Monitor I&Os  f/u BMP today

## 2019-02-24 NOTE — PROGRESS NOTE ADULT - PROBLEM SELECTOR PLAN 2
No further L thigh pain. Doppler studies with patent graft and no DVT. Cultures negative. WBC scan negative.

## 2019-02-24 NOTE — PROVIDER CONTACT NOTE (OTHER) - SITUATION
Pt refused am labs with night nurse Vanessa. I tried to encourage him during my shift. Pt stated he might allow later in the evening.

## 2019-02-24 NOTE — PROGRESS NOTE ADULT - PROBLEM SELECTOR PLAN 1
Pt. with KESHAV in the setting of ADHF. On review of previous records in [facility], patient with normal Scr levels from 4/26/16 to 9/24/18. Pt. noted to have an episode of KESHAV during previous/recent hospitalization at [facility] (12/7/18 to 12/27/18). On this admission, Scr was elevated at 2.15 on 1/16/19, peaked to 4.0 on 1/25/19 and now has been stable.  Pt. now on PO Bumex as per cardiology.  Pt. non-oliguric. US Kidney showed increased bilateral renal cortical echogenicity, no hydronephrosis on 1/18/19. Monitor labs and urine output. Avoid nephrotoxins. Dose medications as per eGFR

## 2019-02-24 NOTE — PROGRESS NOTE ADULT - ATTENDING COMMENTS
If no peripheral vascular issues outstanding, possible d/c home tomorrow with metolazone 2.5 mg po 1-2x/week PRN.  He can f/u with me in the office.

## 2019-02-25 VITALS
TEMPERATURE: 98 F | SYSTOLIC BLOOD PRESSURE: 116 MMHG | DIASTOLIC BLOOD PRESSURE: 64 MMHG | OXYGEN SATURATION: 98 % | HEART RATE: 86 BPM | RESPIRATION RATE: 18 BRPM

## 2019-02-25 LAB
ANION GAP SERPL CALC-SCNC: 16 MMO/L — HIGH (ref 7–14)
BUN SERPL-MCNC: 86 MG/DL — HIGH (ref 7–23)
CALCIUM SERPL-MCNC: 9.1 MG/DL — SIGNIFICANT CHANGE UP (ref 8.4–10.5)
CHLORIDE SERPL-SCNC: 85 MMOL/L — LOW (ref 98–107)
CO2 SERPL-SCNC: 27 MMOL/L — SIGNIFICANT CHANGE UP (ref 22–31)
CREAT SERPL-MCNC: 2.77 MG/DL — HIGH (ref 0.5–1.3)
GLUCOSE BLDC GLUCOMTR-MCNC: 118 MG/DL — HIGH (ref 70–99)
GLUCOSE BLDC GLUCOMTR-MCNC: 176 MG/DL — HIGH (ref 70–99)
GLUCOSE SERPL-MCNC: 141 MG/DL — HIGH (ref 70–99)
HCT VFR BLD CALC: 28 % — LOW (ref 39–50)
HGB BLD-MCNC: 9 G/DL — LOW (ref 13–17)
MAGNESIUM SERPL-MCNC: 2.3 MG/DL — SIGNIFICANT CHANGE UP (ref 1.6–2.6)
MCHC RBC-ENTMCNC: 23.2 PG — LOW (ref 27–34)
MCHC RBC-ENTMCNC: 32.1 % — SIGNIFICANT CHANGE UP (ref 32–36)
MCV RBC AUTO: 72.2 FL — LOW (ref 80–100)
NRBC # FLD: 0 K/UL — LOW (ref 25–125)
PHOSPHATE SERPL-MCNC: 4.1 MG/DL — SIGNIFICANT CHANGE UP (ref 2.5–4.5)
PLATELET # BLD AUTO: 146 K/UL — LOW (ref 150–400)
PMV BLD: SIGNIFICANT CHANGE UP FL (ref 7–13)
POTASSIUM SERPL-MCNC: 4.7 MMOL/L — SIGNIFICANT CHANGE UP (ref 3.5–5.3)
POTASSIUM SERPL-SCNC: 4.7 MMOL/L — SIGNIFICANT CHANGE UP (ref 3.5–5.3)
RBC # BLD: 3.88 M/UL — LOW (ref 4.2–5.8)
RBC # FLD: 22.5 % — HIGH (ref 10.3–14.5)
SODIUM SERPL-SCNC: 128 MMOL/L — LOW (ref 135–145)
WBC # BLD: 4.74 K/UL — SIGNIFICANT CHANGE UP (ref 3.8–10.5)
WBC # FLD AUTO: 4.74 K/UL — SIGNIFICANT CHANGE UP (ref 3.8–10.5)

## 2019-02-25 PROCEDURE — 99232 SBSQ HOSP IP/OBS MODERATE 35: CPT | Mod: GC

## 2019-02-25 PROCEDURE — 99239 HOSP IP/OBS DSCHRG MGMT >30: CPT

## 2019-02-25 RX ORDER — HYDRALAZINE HCL 50 MG
1 TABLET ORAL
Qty: 90 | Refills: 0 | OUTPATIENT
Start: 2019-02-25 | End: 2019-03-26

## 2019-02-25 RX ORDER — CHOLECALCIFEROL (VITAMIN D3) 125 MCG
400 CAPSULE ORAL
Qty: 0 | Refills: 0 | COMMUNITY
Start: 2019-02-25

## 2019-02-25 RX ORDER — TAMSULOSIN HYDROCHLORIDE 0.4 MG/1
1 CAPSULE ORAL
Qty: 30 | Refills: 0 | OUTPATIENT
Start: 2019-02-25 | End: 2019-03-26

## 2019-02-25 RX ORDER — ENOXAPARIN SODIUM 100 MG/ML
15 INJECTION SUBCUTANEOUS
Qty: 5 | Refills: 0 | OUTPATIENT
Start: 2019-02-25

## 2019-02-25 RX ORDER — ENOXAPARIN SODIUM 100 MG/ML
20 INJECTION SUBCUTANEOUS
Qty: 0 | Refills: 0 | COMMUNITY

## 2019-02-25 RX ORDER — SEVELAMER CARBONATE 2400 MG/1
1 POWDER, FOR SUSPENSION ORAL
Qty: 0 | Refills: 0 | COMMUNITY

## 2019-02-25 RX ORDER — SEVELAMER CARBONATE 2400 MG/1
1 POWDER, FOR SUSPENSION ORAL
Qty: 90 | Refills: 0 | OUTPATIENT
Start: 2019-02-25 | End: 2019-03-26

## 2019-02-25 RX ORDER — CHOLECALCIFEROL (VITAMIN D3) 125 MCG
1 CAPSULE ORAL
Qty: 30 | Refills: 0 | OUTPATIENT
Start: 2019-02-25 | End: 2019-03-26

## 2019-02-25 RX ORDER — ASPIRIN/CALCIUM CARB/MAGNESIUM 324 MG
1 TABLET ORAL
Qty: 30 | Refills: 0 | OUTPATIENT
Start: 2019-02-25 | End: 2019-03-26

## 2019-02-25 RX ORDER — SIMETHICONE 80 MG/1
1 TABLET, CHEWABLE ORAL
Qty: 90 | Refills: 0 | OUTPATIENT
Start: 2019-02-25 | End: 2019-03-26

## 2019-02-25 RX ORDER — ISOSORBIDE MONONITRATE 60 MG/1
1.5 TABLET, EXTENDED RELEASE ORAL
Qty: 45 | Refills: 0 | OUTPATIENT
Start: 2019-02-25 | End: 2019-03-26

## 2019-02-25 RX ORDER — BUMETANIDE 0.25 MG/ML
2 INJECTION INTRAMUSCULAR; INTRAVENOUS
Qty: 180 | Refills: 0 | OUTPATIENT
Start: 2019-02-25 | End: 2019-03-26

## 2019-02-25 RX ADMIN — ISOSORBIDE DINITRATE 30 MILLIGRAM(S): 5 TABLET ORAL at 13:42

## 2019-02-25 RX ADMIN — SODIUM CHLORIDE 3 MILLILITER(S): 9 INJECTION INTRAMUSCULAR; INTRAVENOUS; SUBCUTANEOUS at 06:27

## 2019-02-25 RX ADMIN — BUMETANIDE 4 MILLIGRAM(S): 0.25 INJECTION INTRAMUSCULAR; INTRAVENOUS at 04:32

## 2019-02-25 RX ADMIN — Medication 81 MILLIGRAM(S): at 11:22

## 2019-02-25 RX ADMIN — SIMETHICONE 80 MILLIGRAM(S): 80 TABLET, CHEWABLE ORAL at 06:29

## 2019-02-25 RX ADMIN — BUMETANIDE 4 MILLIGRAM(S): 0.25 INJECTION INTRAMUSCULAR; INTRAVENOUS at 11:22

## 2019-02-25 RX ADMIN — Medication 400 UNIT(S): at 11:22

## 2019-02-25 RX ADMIN — Medication 100 MILLIGRAM(S): at 06:29

## 2019-02-25 RX ADMIN — SIMETHICONE 80 MILLIGRAM(S): 80 TABLET, CHEWABLE ORAL at 13:41

## 2019-02-25 RX ADMIN — SODIUM CHLORIDE 3 MILLILITER(S): 9 INJECTION INTRAMUSCULAR; INTRAVENOUS; SUBCUTANEOUS at 13:40

## 2019-02-25 RX ADMIN — Medication 100 MILLIGRAM(S): at 13:42

## 2019-02-25 RX ADMIN — Medication 2: at 09:08

## 2019-02-25 RX ADMIN — ISOSORBIDE DINITRATE 30 MILLIGRAM(S): 5 TABLET ORAL at 06:29

## 2019-02-25 RX ADMIN — Medication 1 APPLICATION(S): at 11:23

## 2019-02-25 NOTE — PROGRESS NOTE ADULT - ASSESSMENT
68M with CAD,s/p CABG/PCI, HFrEF with biventricular HF (refusing ICD), DM, PAD now s/p recent vascular surgery and admitted for worsening SOB, orthopnea, PND, and ORTA -- acute on chronic systolic heart failure exacerbation requiring intermittent inotrope therapy, now on po diuresis. Refusing advanced HF therapy at this time.

## 2019-02-25 NOTE — PROGRESS NOTE ADULT - SUBJECTIVE AND OBJECTIVE BOX
Subjective/Objective: Patient sitting up at side of bed, looks and feels well. Denies SOB but c/o leg stiffness. No issues at vascular surgery site.    MEDICATIONS  (STANDING):  aspirin enteric coated 81 milliGRAM(s) Oral daily  benzonatate 100 milliGRAM(s) Oral three times a day  buMETAnide 4 milliGRAM(s) Oral every 8 hours  cholecalciferol 400 Unit(s) Oral daily  dextrose 5%. 1000 milliLiter(s) (50 mL/Hr) IV Continuous <Continuous>  dextrose 50% Injectable 12.5 Gram(s) IV Push once  dextrose 50% Injectable 25 Gram(s) IV Push once  dextrose 50% Injectable 25 Gram(s) IV Push once  docusate sodium 100 milliGRAM(s) Oral three times a day  heparin  Injectable 5000 Unit(s) SubCutaneous every 12 hours  hydrALAZINE 100 milliGRAM(s) Oral every 8 hours  hydrocortisone 0.5% Cream 1 Application(s) Topical daily  insulin glargine Injectable (LANTUS) 15 Unit(s) SubCutaneous at bedtime  insulin lispro (HumaLOG) corrective regimen sliding scale   SubCutaneous three times a day before meals  insulin lispro (HumaLOG) corrective regimen sliding scale   SubCutaneous at bedtime  isosorbide   dinitrate Tablet (ISORDIL) 30 milliGRAM(s) Oral three times a day  polyethylene glycol 3350 17 Gram(s) Oral daily  senna 2 Tablet(s) Oral at bedtime  sevelamer hydrochloride 800 milliGRAM(s) Oral three times a day with meals  simethicone 80 milliGRAM(s) Chew three times a day  sodium chloride 0.9% lock flush 3 milliLiter(s) IV Push every 8 hours  sodium chloride 0.9% lock flush 3 milliLiter(s) IV Push every 8 hours  tamsulosin 0.4 milliGRAM(s) Oral at bedtime    MEDICATIONS  (PRN):  calamine Lotion 1 Application(s) Topical daily PRN Rash and/or Itching  dextrose 40% Gel 15 Gram(s) Oral once PRN Blood Glucose LESS THAN 70 milliGRAM(s)/deciliter  glucagon  Injectable 1 milliGRAM(s) IntraMuscular once PRN Glucose LESS THAN 70 milligrams/deciliter  guaiFENesin   Syrup  (Sugar-Free) 200 milliGRAM(s) Oral every 6 hours PRN Cough  oxyCODONE    IR 5 milliGRAM(s) Oral every 8 hours PRN Severe Pain (7 - 10)  sodium chloride 0.65% Nasal 1 Spray(s) Both Nostrils three times a day PRN Nasal Congestion          Vital Signs Last 24 Hrs  T(C): 36.5 (25 Feb 2019 06:28), Max: 37 (24 Feb 2019 18:16)  T(F): 97.7 (25 Feb 2019 06:28), Max: 98.6 (24 Feb 2019 18:16)  HR: 84 (25 Feb 2019 04:30) (84 - 94)  BP: 103/64 (25 Feb 2019 06:28) (102/85 - 117/64)  BP(mean): --  RR: 17 (25 Feb 2019 06:28) (17 - 18)  SpO2: 100% (25 Feb 2019 06:28) (96% - 100%)  I&O's Detail    24 Feb 2019 07:01  -  25 Feb 2019 07:00  --------------------------------------------------------  IN:  Total IN: 0 mL    OUT:    Voided: 1590 mL  Total OUT: 1590 mL    Total NET: -1590 mL      25 Feb 2019 07:01  -  25 Feb 2019 08:51  --------------------------------------------------------  IN:  Total IN: 0 mL    OUT:    Voided: 500 mL  Total OUT: 500 mL    Total NET: -500 mL      PHYSICAL EXAM  GEN: NAD, skin W & D  RESP: dec at bases but otherwise CTA  CV: nl S1S2  GI: soft, NT/ND  EXT: no C/C/E  NEURO: A & O X 3      EKG/ TELEM: NSR    LABS:                          9.0    4.74  )-----------( 146      ( 25 Feb 2019 07:20 )             28.0       25 Feb 2019 07:20    128<L>  |  85<L>  |  86<H>  ----------------------------<  141<H>  4.7     |  27     |  2.77<H>    24 Feb 2019 10:12    126<L>  |  79<L>  |  86<H>  ----------------------------<  92     3.4<L>   |  30     |  2.81<H>    Ca    9.1        25 Feb 2019 07:20  Ca    9.4        24 Feb 2019 10:12  Phos  4.1       25 Feb 2019 07:20  Phos  4.2       24 Feb 2019 10:12  Mg     2.3       25 Feb 2019 07:20  Mg     2.4       24 Feb 2019 10:12

## 2019-02-25 NOTE — PROGRESS NOTE ADULT - REASON FOR ADMISSION
Dyspnea x 2 days
Dyspnea
Dyspnea x 2 days
Dyspnea
Dyspnea x 2 days
Dyspnea
Dyspnea x 2 days

## 2019-02-25 NOTE — PROGRESS NOTE ADULT - PROBLEM SELECTOR PROBLEM 3
Discharge planning issues
Hypervolemia
Encounter for palliative care
Encounter for palliative care
Hyperphosphatemia
Hyperphosphatemia
Hyponatremia
Hyponatremia
Discharge planning issues
Epistaxis
Hyperphosphatemia
Hypervolemia
Hyponatremia
KESHAV (acute kidney injury)
KESHAV (acute kidney injury)
PAD (peripheral artery disease)
Hyponatremia
KESHAV (acute kidney injury)

## 2019-02-25 NOTE — PROGRESS NOTE ADULT - PROBLEM SELECTOR PROBLEM 4
Advanced care planning/counseling discussion
Metabolic alkalosis
Metabolic alkalosis
Diabetes
Diabetes mellitus, type 2
Diabetes mellitus, type 2
Discharge planning issues
Metabolic alkalosis
Diabetes mellitus, type 2

## 2019-02-25 NOTE — PROGRESS NOTE ADULT - PROBLEM SELECTOR PLAN 4
Filled out form with patient designating his son (Gaby Gonzalez) as HCP. Copy provided to patient.
Noted to have elevated serum CO2 levels in the setting of loop diuretic use. Dose of bumex decreased yesterday, with improved CO2 levels today. Monitor serum CO2 levels.
D/C planning with home services if indicated. Followup with Dr. Alexander,  in 1-2 weeks. [phone]. Metolazone 2.5 mg po 1 or 2X per week prn.
DM, on 25 Lantus and ISS.   Fingerstick stable, continue to monitor.
Glucose on CMP = 170  Last HgbA1c = 7.7 in 12/2018  FS QID  Continue Lantus, HISS.  DM diet.
Glucose on CMP = 170  Last HgbA1c = 7.7 in 12/2018  FS QID  Continue Lantus, HISS.  DM diet.
Noted to have elevated serum CO2 levels in the setting of loop diuretic use. Current serum CO2 27. Monitor serum CO2
Noted to have elevated serum CO2 levels in the setting of loop diuretic use. Current serum CO2 WNL (26). Monitor serum CO2
Noted to have elevated serum CO2 levels in the setting of loop diuretic use. Dose of bumex decreased today. Monitor serum CO2 levels.
Noted to have elevated serum CO2 levels in the setting of loop diuretic use. Dose of bumex decreased yesterday, with improved CO2 levels today. Monitor serum CO2 levels.
Noted to have elevated serum CO2 levels in the setting of loop diuretic use. Dose of bumex decreased yesterday, with improved CO2 levels today. Monitor serum CO2 levels.
Glucose on CMP = 170  Last HgbA1c = 7.7 in 12/2018  FS QID  Continue Lantus, HISS.  DM diet.

## 2019-02-25 NOTE — PROGRESS NOTE ADULT - PROBLEM SELECTOR PROBLEM 1
Acute on chronic systolic heart failure
Dyspnea
KESHAV (acute kidney injury)
KESHAV (acute kidney injury)
Acute on chronic systolic congestive heart failure
Acute on chronic systolic heart failure
Arterial insufficiency of lower extremity
Dyspnea
KESHAV (acute kidney injury)
Acute on chronic systolic heart failure
Arterial insufficiency of lower extremity
Acute on chronic systolic heart failure
KESHAV (acute kidney injury)

## 2019-02-25 NOTE — PROGRESS NOTE ADULT - PROVIDER SPECIALTY LIST ADULT
CCU
Cardiology
Heart Failure
Hospitalist
Nephrology
Palliative Care
Vascular Surgery
Heart Failure
Nephrology
CCU
CCU
Heart Failure
Nephrology
CCU
Cardiology
Nephrology
Palliative Care
Nephrology

## 2019-02-25 NOTE — PROGRESS NOTE ADULT - PROBLEM SELECTOR PROBLEM 2
Hyperkalemia
Acute on chronic renal failure
Acute on chronic systolic heart failure
Acute on chronic systolic heart failure
Hypervolemia
Hypervolemia
PAD (peripheral artery disease)
PAD (peripheral artery disease)
Acute on chronic renal failure
Hyperkalemia
Hypervolemia
Hyponatremia
Infection of graft, initial encounter
KESHAV (acute kidney injury)
PAD (peripheral artery disease)
Hypervolemia
Acute on chronic renal failure
Infection of graft, initial encounter
Hyperkalemia
KESHAV (acute kidney injury)

## 2019-11-20 NOTE — PROGRESS NOTE ADULT - ASSESSMENT
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 His last admission from 12/8-12/27 required CCU admission and placement on dobutamine. He has had 3 admissions in 2018, for various complications from DM.  He comes in this time due to worsening SOB. He admits to 2 pillow orthopnea, frequent PND and ORTA after 1/2 block and walking up 5 steps. No CP, palpitations, dizziness. Patient's son by bedside.  His dry weight s/p last hospitalization was 132 lbs.  NYHA class IV. Moderately hypervolemic. 1/22/18 PCWP was 23, RA 18 in AM.  CVP 1/24/19 was 14. Billing Type: Third-Party Bill Bill For Surgical Tray: no Expected Date Of Service: 11/20/2019

## 2019-12-21 NOTE — PRE-OP CHECKLIST - COMMENTS
[facility] [address] - [place] [address]  Phone:  [phone] - Fax:  [phone]   [facility] Progress Report and Disability Certification  Date of Service: 2019   No Show:  No  Date / Time of Next Visit: 2019   Claim Information   Patient Name: Aicha Choudhary  Claim Number:     Employer: [employer]  Date of Injury: 12/10/2019     Insurer / TPA: [employer]  ID / SSN:     Occupation: Production  Diagnosis: The encounter diagnosis was Strain of left knee, subsequent encounter.    Medical Information   Related to Industrial Injury? Yes    Subjective Complaints:  Date of Injury 12/10/2019.  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  The employee denies improvement  In the left and continues to complain of instability without the knee brace and  persistent intermittent pain in the medial aspect of the left knee.  The employee has not been working as there is no light duty available to the current work restrictions.    Objective Findings: Left knee: extension to 10 degrees, flexion to 90 degrees, limitations from pain, tenderness to palpation medial aspect, laxity and guarding noted with valgus stress, no bony point tenderness, limping while wearing brace   Pre-Existing Condition(s): n/a   Assessment:   Condition Same    Status: Additional Care Required  Permanent Disability:No    Plan:   [facility]    Diagnostics:      Comments:  n/a    Disability Information   Status: Released to Restricted Duty    From:  2019  Through: 2019 Restrictions are: Temporary   Physical Restrictions   Sitting:  < or = to 6 hrs/day Standin hrs/day Stooping:    Bending:      Squattin hrs/day Walkin hrs/day Climbin hrs/day Pushing:      Pulling:    Other:    Reaching Above Shoulder (L):    Reaching Above Shoulder (R):       Reaching Below Shoulder (L):    Reaching Below Shoulder (R):      Not to exceed Weight Limits   Carrying(hrs): 0 Weight Limit(lb):   Lifting(hrs): 0 Weight  Limit(lb):     Comments:      Repetitive Actions   Hands: i.e. Fine Manipulations from Grasping:     Feet: i.e. Operating Foot Controls:     Driving / Operate Machinery:     Physician Name: Sha Boss M.D. Physician Signature: SHA Luong M.D. e-Signature: Dr. Connor Aldana, Medical Director   Clinic Name / Location: [address] [place] [address] [place] [address] Clinic Phone Number: Dept: [phone]   Appointment Time: 11:00 Am Visit Start Time: 11:15 AM   Check-In Time:  10:44 Am Visit Discharge Time:  11:55 AM   Original-Treating Physician or Chiropractor    Page 2-Insurer/TPA    Page 3-Employer    Page 4-Employee     Coreg and Lasix given with sips of water

## 2020-03-13 NOTE — PATIENT PROFILE ADULT. - CHOOSE INDICATION TO IMMUNIZE (AN ORDER WILL BE GENERATED WHEN THIS NOTE IS SAVED):
Pt called requesting to speak to a nurse regarding his refill on his medications. Pt stated that he typically needs his labs done before he can fill his prescriptions and wants to know if that is still needed during this virus out break. Caller also stated that he was recently prescribed Glipizide 5MG, and would like to be prescribed again, please advise.    [facility]/pharmacy #[id number] - [place], [place] - [address] AT [address]   
Returned call to pt, no answer. Left detailed message including pt will need to complete diabetic labs. Explained importance of monitoring to be sure blood sugars are well controlled. Advised this would be especially important for any medication changes. Reviewed hygiene tips/to prevent infection. Pt to call with additional questions.  
Patient is 18 years or older (and not pregnant)

## 2020-08-01 NOTE — DISCHARGE NOTE ADULT - CARE PLAN
Offered and patient declined Principal Discharge DX:	Dyspnea and respiratory abnormalities  Goal:	improved; + respiratory virus panel  Instructions for follow-up, activity and diet:	Rx levaquin 500mg oral x 10 days  Secondary Diagnosis:	Diabetes  Instructions for follow-up, activity and diet:	controlled carb diet  continue present Insulin/ FSBS  Secondary Diagnosis:	Chronic congestive heart failure, unspecified congestive heart failure type  Instructions for follow-up, activity and diet:	2 gram sodium diet; continue home meds.  Secondary Diagnosis:	PAD (peripheral artery disease) Principal Discharge DX:	Dyspnea and respiratory abnormalities  Goal:	improved; + respiratory virus panel  Instructions for follow-up, activity and diet:	Complete the antibiotic course as ordered.  Secondary Diagnosis:	Diabetes  Instructions for follow-up, activity and diet:	HgA1C this admission 9.5.  Make sure you get your HgA1c checked every three months.  If you take oral diabetes medications, check your blood glucose two times a day.  If you take insulin, check your blood glucose before meals and at bedtime.  It's important not to skip any meals.  Keep a log of your blood glucose results and always take it with you to your doctor appointments.  Keep a list of your current medications including injectables and over the counter medications and bring this medication list with you to all your doctor appointments.  If you have not seen your opthalmologist this year call for appointment.  Check your feet daily for redness, sores, or openings. Do not self treat. If no improvement in two days call your primary care physician for an appointment.  Low blood sugar (hypoglycemia) is a blood sugar below 70mg/dl. Check your blood sugar if you feel signs/symptoms of hypoglycemia. If your blood sugar is below 70 take 15 grams of carbohydrates (ex 4 oz of apple juice, 3-4 glucosr tablets, or 4-6 oz of regular soda) wait 15 minutes and repeat blood sugar to make sure it comes up above 70.  If your blood sugar is above 70 and you are due for a meal, have a meal.  If you are not due for a meal have a snack.  This snack helps keeps your blood sugar at a safe range.  controlled carb diet  continue present Insulin/ FSBS  Secondary Diagnosis:	Chronic congestive heart failure, unspecified congestive heart failure type  Instructions for follow-up, activity and diet:	Weigh yourself daily.  If you gain 3lbs in 3 days, or 5lbs in a week call your Health Care Provider.  Do not eat or drink foods containing more than 2000mg of salt (sodium) in your diet every day.  Call your Health Care Provider if you have any swelling or increased swelling in your feet, ankles, and/or stomach.  Take all of your medication as directed.  If you become dizzy call your Health Care Provider.

## 2020-12-30 NOTE — PACU DISCHARGE NOTE - PAIN:
Patient's mother calling due to pt having longer period than usual - about 3 weeks. Bleeding is not heavy. Only changing pad 1-2 times a day. Denies daughter having pain or symptoms of weakness or dizziness. Patient not sexually active. Tried ibuprofen 600mg every 6 hours and seemed to improve at first; however, bleeding started to increase again after couple of days. Patient was seen in March for similar problem and prescribed Provera 5 mg to stop bleeding.  Discussed with Alejandra Thorpe. Recommends prescription for Provera 5mg for 5 days to be taken at bedtime to stop bleeding as well as follow up with PCP to discuss family history of Factor V clotting disorder. Patient's mother agreeable with POC.    Controlled with current regime

## 2021-01-14 NOTE — DIETITIAN INITIAL EVALUATION ADULT. - PROBLEM SELECTOR PROBLEM 8
Doing well, no complaints  Denies bleeding, cramping or LOF     Level II scheduled- 1/15/21  +fm Icterus

## 2021-08-12 NOTE — PROGRESS NOTE ADULT - PROBLEM SELECTOR PLAN 1
NYHA class IV due to ischemic cardiomyopathy.   -milrinone 0.25 as yesterday when trying to wean off pt's urine output dropped, and Cr went up.   -Echo with increased EF and diffuse LV hypokinesis   - changed bumex to 2mg PO BID  -Strict I/O and daily standing weights. Fluid restriction to 1500mL  -Once hemodynamically optimized, ideally would consider ICD placement, given low EF and ischemic cardiomyopathy - pt has previously refused invasive procedures including ICD  - will stay on milirinone, will f/u HF NYHA class IV due to ischemic cardiomyopathy.   -milrinone weaning to 0.125  -Echo with increased EF and diffuse LV hypokinesis   - bumex 2mg PO BID  - Strict I/O and daily standing weights. Fluid restriction to 1500mL  - Once hemodynamically optimized, ideally would consider ICD placement, given low EF and ischemic cardiomyopathy - pt has previously refused invasive procedures including ICD  - will check BMP and lactate at 2pm as we went down on milirinone today. Xenograft Text: The defect edges were debeveled with a #15 scalpel blade.  Given the location of the defect, shape of the defect and the proximity to free margins a xenograft was deemed most appropriate.  The graft was then trimmed to fit the size of the defect.  The graft was then placed in the primary defect and oriented appropriately.

## 2021-08-25 NOTE — H&P ADULT - PROBLEM/PLAN-3
LOS skin assessment completed by myself and Jaida Manuel RN. No pressure ulcers. Cuffs to bilateral hands and feet. DISPLAY PLAN FREE TEXT

## 2021-11-18 NOTE — PROGRESS NOTE ADULT - PROBLEM SELECTOR PLAN 3
12/10/18-- Renal Sono  *  Sonographically normal kidneys.  *  No hydronephrosis.  Hold nephrotoxic agents.   Monitor Creatinine.  Continue Sevelamer.   Would obtain renal consult You can access the FollowMyHealth Patient Portal offered by [facility] by registering at the following website: [email or web address] By joining [facility]’s FollowMyHealth portal, you will also be able to view your health information using other applications (apps) compatible with our system. Superimposed on CKD stage 3-4. Likely due to cardiorenal syndrome in the setting of HF exacerbation  Hold nephrotoxic agents.   Monitor Creatinine.  Continue Sevelamer.   nephrology consulted, recs reviewed and appreciated  - renally dose meds accordingly

## 2022-02-19 NOTE — DISCHARGE NOTE ADULT - PATIENT PORTAL LINK FT
· Rate is currently controlled  · The patient is not on any AV node blocker  · Continue anticoagulation with warfarin You can access the [facility] Patient Portal, offered by [facility], by registering with the following website: [email or web address]

## 2022-05-16 NOTE — ED PROVIDER NOTE - SIGN-OUT TIME
[Chaperone Declined] : Patient declined chaperone [Appropriately responsive] : appropriately responsive [Alert] : alert [No Acute Distress] : no acute distress [No Lymphadenopathy] : no lymphadenopathy [Soft] : soft [Non-tender] : non-tender [Non-distended] : non-distended [No HSM] : No HSM [No Lesions] : no lesions [No Mass] : no mass [Oriented x3] : oriented x3 [Examination Of The Breasts] : a normal appearance 07-Dec-2018 23:59 [No Masses] : no breast masses were palpable [Labia Majora] : normal [Labia Minora] : normal [Normal] : normal [Uterine Adnexae] : normal

## 2022-07-25 NOTE — ED ADULT NURSE NOTE - TEMPLATE
See how doing with fluids.  See how heart rate doing.  Make sure he called cardiology to update them.  Hold metoprolol.  Please update me after you have talked to him.    Past Medical History:   Diagnosis Date   • Basal cell carcinoma 2015    left lower forehead   • Basal cell carcinoma 2014    left cheek   • CAD (coronary artery disease)     S/P CABG   • Cardiomyopathy (CMS/HCC)    • COPD (chronic obstructive pulmonary disease) (CMS/HCC)    • Depression    • Diabetes mellitus (CMS/HCC)    • Fractures 2017    LEFT FIBULA FRACTURE   • GE reflux    • Gout    • HTN (hypertension)    • Hyperlipidemia    • Keratosis, actinic    • Lymphoma, Hodgkin's (CMS/HCC)     stage IV   • Myocardial infarction (CMS/HCC)    • Prostate cancer (CMS/HCC)    • Prostatitis       Past Surgical History:   Procedure Laterality Date   • Cardiac defibrillator placement     • Colonoscopy w/ polypectomy  2008   • Coronary artery bypass graft     • Excision of lingual tonsil     • Prostate biopsy     • Skin biopsy        Social History     Socioeconomic History   • Marital status: /Civil Union     Spouse name: Not on file   • Number of children: Not on file   • Years of education: Not on file   • Highest education level: Not on file   Occupational History   • Not on file   Tobacco Use   • Smoking status: Former Smoker     Packs/day: 1.50     Years: 42.00     Pack years: 63.00     Types: Cigarettes, Cigars     Quit date: 2002     Years since quittin.5   • Smokeless tobacco: Never Used   Vaping Use   • Vaping Use: never used   Substance and Sexual Activity   • Alcohol use: Yes     Alcohol/week: 21.0 standard drinks     Types: 21 Shots of liquor per week     Comment: drinks 3 mixed drinks daily   • Drug use: No   • Sexual activity: Not on file   Other Topics Concern   • Not on file   Social History Narrative    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 He is  and lives with his  wife. He quit smoking in  hour prior to this smoked a pack a day for over 40 years. He is about 2-3 alcoholic beverages daily.     Social Determinants of Health     Financial Resource Strain: Not on file   Food Insecurity: Not on file   Transportation Needs: Not on file   Physical Activity: Not on file   Stress: Not on file   Social Connections: Not on file   Intimate Partner Violence: Not on file      Family History   Problem Relation Age of Onset   • Cancer Mother 80        colon cancer -   • Heart disease Mother 70   • Cancer Father         lung   • Heart disease Father       Current Outpatient Medications   Medication Sig Dispense Refill   • B-D U/F PEN NEEDLE \" 31G X 8 MM Misc USE WITH INSULIN FOUR TIMES DAILY AS DIRECTED 400 each 0   • gabapentin (NEURONTIN) 100 MG capsule TAKE 1 CAPSULE BY MOUTH EVERY MORNING AND 2 CAPSULES BY MOUTH AT NIGHT. 90 capsule 2   • Insulin Lispro, 1 Unit Dial, (HumaLOG KwikPen) 100 UNIT/ML pen-injector Inject 18 Units into the skin in the morning and 18 Units before bedtime. Prime 2 units before each dose. 45 mL 0   • oxygen (O2) gas Spray 4 L in each nostril as needed.     • bisacodyl (DULCOLAX) 5 MG EC tablet Take 5 mg by mouth daily as needed.     • clopidogrel (PLAVIX) 75 MG tablet Take 75 mg by mouth daily.     • cyanocobalamin 500 MCG tablet Take 500 mcg by mouth daily.     • insulin glargine (Lantus SoloStar) 100 UNIT/ML pen-injector Inject 26 Units into the skin daily.     • allopurinol (ZYLOPRIM) 100 MG tablet Take 0.5 tablets by mouth daily. 45 tablet 1   • citalopram (CeleXA) 20 MG tablet TAKE 1 TABLET BY MOUTH DAILY 90 tablet 0   • metOLazone (ZAROXOLYN) 2.5 MG tablet Take 2.5 mg by mouth as needed.      • omeprazole (PriLOSEC) 40 MG capsule TAKE 1 CAPSULE BY MOUTH DAILY 90 capsule 1   • traMADol (ULTRAM) 50 MG tablet Take 1 tablet by mouth every 8 hours as needed for Pain. 20 tablet 0   • bumetanide (BUMEX) 1 MG tablet Take 1 mg by mouth 2 times daily.      • metoPROLOL succinate (TOPROL-XL) 50 MG 24 hr tablet Take 1 tablet by mouth nightly.     • nystatin (MYCOSTATIN) 600542 UNIT/GM powder Apply 1 application topically 2 times daily as needed.     • apixaBAN (ELIQUIS) 5 MG Tab Take 1 tablet by mouth every 12 hours. 180 tablet 3   • simvastatin (ZOCOR) 10 MG tablet TAKE 1 TABLET BY MOUTH DAILY 90 tablet 0   • aspirin 81 MG EC tablet Take 81 mg by mouth daily.     • Xiidra 5 % Solution Apply 1 drop to eye as needed.     • dicyclomine (BENTYL) 10 MG capsule TAKE 1 CAPSULE BY MOUTH FOUR TIMES DAILY BEFORE MEALS AND AT NIGHT 120 capsule 6   • lidocaine (LIDODERM) 5 % patch Place 1 patch onto the skin every 24 hours. Remove patch 12 hours after applying 30 patch 1   • Ferrous Sulfate (IRON HIGH-POTENCY) 325 MG Tab Take 325 mg by mouth 2 times daily.  11   • ONETOUCH VERIO test strip TEST ONCE DAILY, AS DIRECTED 100 strip 0   • eplerenone (INSPRA) 25 MG tablet Take 12.5 mg by mouth daily. taking 1/2 tablet daily     • DISPENSE Accu-Check Cristiane Plus Strips - Test Blood Sugars Twice Daily - DX Diabetes DC Code 250.00 100 strip 4   • Glucosamine-Chondroitin (GLUCOSAMINE CHONDR COMPLEX PO) Take 1 tablet by mouth daily.      • carvedilol (COREG) 12.5 MG tablet Take 12.5 mg by mouth 2 times daily (with meals).     • Multiple Vitamin (MULTIVITAMIN) capsule Take 1 capsule by mouth daily.       No current facility-administered medications for this visit.         General

## 2022-08-19 NOTE — CONSULT NOTE ADULT - ASSESSMENT
Left patient message to return call     FYI to Dr. Herndon   Patient is a 62M with extensive cardiac history, HTN, DM, s/p fem pop bypass with graft 1 month ago presenting with drainage from incisions, found to have collections surrounding graft, s/p washout and sartorius flap placement.  Hemodynamically stable and extubated in the OR.    Neuro:  - pain control with prn tylenol and oxy    Resp:  - no active issues  - prn supplemental O2 via NC to maintain sat >94%    Cardiac:  - follow up post op trops   - will get baseline EKG  - restart carvedilol, will hold other antihypertensives  - will restart ASA if repeat labs are stable  - off levo    GI:  - NPO for now  - will restart regular diet later if patient remains stable    :  - difficult degroot placement, will keep for monitoring  - holding home lasix at this time    Heme:  - will restart Hep gtt this afternoon  - trend H/H  - monitor VAC output    ID:  - continue vanc/zosyn for infected graft  - VAC change Thurs to be done by Vasc team    Endo:  - insulin sliding scale  - will restart lantus once tolerating PO diet    Dispo: SICU

## 2022-10-13 NOTE — CONSULT NOTE ADULT - CONSULT REQUESTED DATE/TIME
Left a message for Croa in regards to the admission paperwork that is completed and waiting for the patient to come in to have a TB test done.  The patient stated she is going to come in today or tomorrow to the lab.  Waiting for a return call from Cora.    18-Jan-2019 11:14

## 2022-10-19 NOTE — PROGRESS NOTE ADULT - SUBJECTIVE AND OBJECTIVE BOX
Patient is a 68y old  Male who presents with a chief complaint of b/l LE swelling      SUBJECTIVE / OVERNIGHT EVENTS: wants to go home; insists he wants only 1 VAC site; informed him he needs 2; overall feeling much better      MEDICATIONS  (STANDING):  aspirin enteric coated 81 milliGRAM(s) Oral daily  atorvastatin 40 milliGRAM(s) Oral at bedtime  carvedilol 6.25 milliGRAM(s) Oral every 12 hours  dextrose 5%. 1000 milliLiter(s) (50 mL/Hr) IV Continuous <Continuous>  dextrose 50% Injectable 12.5 Gram(s) IV Push once  dextrose 50% Injectable 25 Gram(s) IV Push once  dextrose 50% Injectable 25 Gram(s) IV Push once  docusate sodium 100 milliGRAM(s) Oral daily  heparin  Injectable 5000 Unit(s) SubCutaneous every 8 hours  hydrALAZINE 25 milliGRAM(s) Oral every 8 hours  insulin glargine Injectable (LANTUS) 30 Unit(s) SubCutaneous at bedtime  insulin lispro (HumaLOG) corrective regimen sliding scale   SubCutaneous at bedtime  insulin lispro (HumaLOG) corrective regimen sliding scale   SubCutaneous three times a day before meals  isosorbide   dinitrate Tablet (ISORDIL) 10 milliGRAM(s) Oral three times a day  senna 2 Tablet(s) Oral at bedtime  sevelamer hydrochloride 800 milliGRAM(s) Oral three times a day with meals  spironolactone 25 milliGRAM(s) Oral daily  torsemide 20 milliGRAM(s) Oral daily    MEDICATIONS  (PRN):  acetaminophen   Tablet .. 325 milliGRAM(s) Oral every 4 hours PRN Mild Pain (1 - 3)  calamine Lotion 1 Application(s) Topical daily PRN Rash and/or Itching  dextrose 40% Gel 15 Gram(s) Oral once PRN Blood Glucose LESS THAN 70 milliGRAM(s)/deciliter  dextrose 40% Gel 15 Gram(s) Oral once PRN Blood Glucose LESS THAN 70 milliGRAM(s)/deciliter  glucagon  Injectable 1 milliGRAM(s) IntraMuscular once PRN Glucose LESS THAN 70 milligrams/deciliter  glucagon  Injectable 1 milliGRAM(s) IntraMuscular once PRN Glucose LESS THAN 70 milligrams/deciliter  oxyCODONE    IR 10 milliGRAM(s) Oral every 6 hours PRN Severe Pain (7 - 10)  oxyCODONE    IR 5 milliGRAM(s) Oral every 6 hours PRN Moderate Pain (4 - 6)      Vital Signs Last 24 Hrs  T(F): 98.3 (27 Dec 2018 05:04), Max: 98.4 (26 Dec 2018 21:13)  HR: 86 (27 Dec 2018 05:04) (86 - 101)  BP: 113/70 (27 Dec 2018 05:04) (113/69 - 125/83)  RR: 18 (27 Dec 2018 05:04) (16 - 18)  SpO2: 99% (27 Dec 2018 05:04) (98% - 100%)      CAPILLARY BLOOD GLUCOSE  POCT Blood Glucose.: 129 mg/dL (27 Dec 2018 08:22)  POCT Blood Glucose.: 277 mg/dL (26 Dec 2018 22:08)  POCT Blood Glucose.: 217 mg/dL (26 Dec 2018 17:14)  POCT Blood Glucose.: 198 mg/dL (26 Dec 2018 12:03)          PHYSICAL EXAM  GENERAL: NAD, well-developed  EYES: EOMI, PERRLA, conjunctiva and sclera clear  CHEST/LUNG: Clear to auscultation bilaterally;  HEART: Regular rate and rhythm;  ABDOMEN: Soft, Nontender, Nondistended; Bowel sounds present  EXTREMITIES:  multiple toe amps b/l feet; LLE with 2 VAC sites        LABS: No

## 2022-11-27 NOTE — DIETITIAN INITIAL EVALUATION ADULT. - PERTINENT LABORATORY DATA
Patient asleep most of night, minimal complaints of pain. Pt c/o mild pain but denied need for medication. Discussed with daughter via telephone that toradol will be the only pain medication that will be given during this shift for pain. If any changes in medication, daughter wants to be informed. Purewick in place. No BM overnight. Pt did not eat anything overnight. Was told very poor appetite in report. Pt has had difficulty with swallowing, coughing when swallowing, therefore SLP eval placed. Pain assessed q4 hour and patient continues to decline medication. Call light within reach. Fall risk precautions in place. Will continue to monitor.     Problem: METABOLIC/FLUID AND ELECTROLYTES - ADULT  Goal: Electrolytes maintained within normal limits  Description: INTERVENTIONS:  - Monitor labs and rhythm and assess patient for signs and symptoms of electrolyte imbalances  - Administer electrolyte replacement as ordered  - Monitor response to electrolyte replacements, including rhythm and repeat lab results as appropriate  - Fluid restriction as ordered  - Instruct patient on fluid and nutrition restrictions as appropriate  Outcome: Progressing     Problem: Impaired Functional Mobility  Goal: Achieve highest/safest level of mobility/gait  Description: Interventions:  - Assess patient's functional ability and stability  - Promote increasing activity/tolerance for mobility and gait  - Educate and engage patient/family in tolerated activity level and precautions  Outcome: Progressing     Problem: GASTROINTESTINAL - ADULT  Goal: Minimal or absence of nausea and vomiting  Description: INTERVENTIONS:  - Maintain adequate hydration with IV or PO as ordered and tolerated  - Nasogastric tube to low intermittent suction as ordered  - Evaluate effectiveness of ordered antiemetic medications  - Provide nonpharmacologic comfort measures as appropriate  - Advance diet as tolerated, if ordered  - Obtain nutritional consult as needed  - Evaluate fluid balance  Outcome: Progressing  Goal: Maintains adequate nutritional intake (undernourished)  Description: INTERVENTIONS:  - Monitor percentage of each meal consumed  - Identify factors contributing to decreased intake, treat as appropriate  - Assist with meals as needed  - Monitor I&O, WT and lab values  - Obtain nutritional consult as needed  - Optimize oral hygiene and moisture  - Encourage food from home; allow for food preferences  - Enhance eating environment  Outcome: Progressing 12/14/18 Sodium 133 low,  high, Cr. 4.01 high, Glu 138 high, K 6.2 high, Phos. 7.3 high, Mag. 2.5 high , H/H 9.6/31.0 low

## 2022-11-30 NOTE — ED ADULT TRIAGE NOTE - MEANS OF ARRIVAL
stretcher Propranolol Counseling:  I discussed with the patient the risks of propranolol including but not limited to low heart rate, low blood pressure, low blood sugar, restlessness and increased cold sensitivity. They should call the office if they experience any of these side effects.

## 2023-01-14 NOTE — PATIENT PROFILE ADULT. - PATIENT REPRESENTATIVE NAME
- Maintenance caffeine 5mg/kg daily   - Monitor episodes of apnea/bradycardia/desaturations Gaby De La Paz (son)

## 2023-01-18 NOTE — PROGRESS NOTE ADULT - PROBLEM SELECTOR PROBLEM 6
Prophylactic measure
Prophylactic measure
Anticoagulation management encounter
Prophylactic measure
Prophylactic measure
Anticoagulation management encounter
No

## 2023-09-08 NOTE — PROGRESS NOTE ADULT - PROBLEM SELECTOR PLAN 1
NYHA class IV. ICM. Remains moderately hypervolemic.   -cont Lasix gtt 15 mg/hr.   Strict I/O and daily standing weights.  Dry weight is 132 lbs.   Continue Coreg 6.25 mg po BID.  Hyperkalemic, hold spironolactone. Resume if <4  Continue hydral 25 mg po TID.   Continue Isordil 10 mg po TID.  -Would discuss w EP for possible ICD placement. ICM <35% w fractionated QRS on EKG.  -May require milrinone infusion if no improvement.  -maintain legs elevated. Avoid rapid position changes NYHA class IV. ICM. Remains moderately hypervolemic.   -cont Lasix gtt 15 mg/hr.   Strict I/O and daily standing weights.  Dry weight is 132 lbs.   Continue Coreg 6.25 mg po BID.  Hyperkalemic, hold spironolactone. Resume if <4  Continue hydral 25 mg po TID.   Continue Isordil 10 mg po TID.  -Would discuss w EP for possible ICD placement. ICM <35% w fractionated QRS on EKG.  -Cr inc without inc negative balance, setting sx dizziness. May require milrinone infusion if no improvement. Will monitor  -maintain legs elevated. Avoid rapid position changes oral

## 2023-09-19 NOTE — PROGRESS NOTE ADULT - PROBLEM SELECTOR PROBLEM 3
PAD (peripheral artery disease)
None

## 2023-11-10 NOTE — ED ADULT NURSE NOTE - NSIMPLEMENTINTERV_GEN_ALL_ED
----- Message from Kimmy Page sent at 11/10/2023 11:40 AM CST -----  Contact: [facility] - [phone]  Pharmacy is calling to clarify or change an RX.  RX name:  dexmethylphenidate (FOCALIN XR) 20 MG 24 hr capsule  What do they need to clarify:  medication is out of stock  Additional comments:   [facility] is calling to say the generic is out of stock and  insurance won't cover name brand. They would like it to be sent to another pharmacy for filling that brand.     Pharmacy:  [facility] [address] [place] [address] [place] - [address] [facility] [address]  [address] [address] [address]  Phone: [phone] Fax: [phone]         Implemented All Universal Safety Interventions:  [place] to call system. Call bell, personal items and telephone within reach. Instruct patient to call for assistance. Room bathroom lighting operational. Non-slip footwear when patient is off stretcher. Physically safe environment: no spills, clutter or unnecessary equipment. Stretcher in lowest position, wheels locked, appropriate side rails in place.

## 2023-11-30 NOTE — DIETITIAN INITIAL EVALUATION ADULT. - PROBLEM SELECTOR PLAN 3
cont lasix for now as pt tolerating PO and no s/s dehydration  cont arb/beta blocker for optimum regimen <-- Enter Past Medical, Past Surgical and Family History

## 2024-02-09 NOTE — PATIENT PROFILE ADULT - LIVES WITH
2024       RE: Scotty Oliveira  [address]  [place] [address]     Dear Colleague,    Thank you for referring your patient, Scotty Oliveira, to the [facility] NEUROLOGY [facility] [place] at [facility]. Please see a copy of my visit note below.        Diagnosis/Summary/Recommendations:    PATIENT: Scotty Oliveira  72 year old male     : 1951    TED: 2024    MRN: [id number]  [address]      [place] [address]     [phone] (H)   [phone] ()   [email or web address]     Shikha Sommer relative       Xi     Hernán Bacon      Renal dialysis  Myoclonus    Assessment:  (G25.3) Myoclonus  (primary encounter diagnosis)  See note from Tatyana below    Pertinent Investigations:  EEG 2023   The 2 days of VEEG was abnormal due to the presences of a generalized, continuous theta slowing consistent with a mild, diffuse encephalopathy. On day 1 he had events consistent with single myoclonic jerks and on day 2 the jerks appears to be more complex. All of the movements (including the single myoclonus jerks) were not associated with underlying seizures on EEG. Of note even though the EEG did not show seizures during this movement this does not rule out subcortical myoclonus. No electrographic seizures or epileptiform discharges were recorded. Clinical correlation is advised.      MRI Brain  1. Significant motion artifact limits the spatial resolution on multiple sequences, however there is no mass effect, midline shift or diffusion restriction.    2023  1. Significant motion artifact limits the spatial resolution on multiple sequences, however there is no mass effect, midline shift or diffusion restriction.   2. Avidly enhancing dural based mass overlying the left frontal lobe is new from most recent MRI in . Most often this is representative of a meningioma, however given the patient's  history of renal cell carcinoma a dural based metastasis is also a consideration. Reimaging with a contrast enhanced MRI (tumor protocol) is recommended in 2-3 months time.  3. Diffuse cerebral volume loss with the sequelae of chronic small vessel ischemic disease and multiple lacunar type infarcts as described.  4. The lack of CSF suppression on the T2/FLAIR sequences is likely artefactual.      Review of diagnosis    myoclonus    Avoidance of dopamine blockers   Not taking    Motor complication review   N/a    Review of Impulse control disorders   N/a    Review of surgical or medication options   Reviewed.     Gait/Balance/Falls   Denies     Exercise/Therapy performed/offered   Walks everywhere  Does not stop to rest    Cognitive/Driving   No memory problems - has some problems  Not driving - blind in one eye and barely sees out of the other     Mood   Lives by himself  Grew up in [facility]   No kids  Moved to [place] at age 18 years  [facility] 3 years   Studied [employer]  Worked   No siblings.   No mood issues.     Hallucinations/delusions   Denies     Sleep   Denies sleep issues.   Denies RLS    Bladder/Renal/Prostate/Gyn/Other  Chronic renal disease  Hematuria syndrome  ESRD on hemodialysis 3 days a week - tues/thurs/Saturday   Started dialysis 9 or 10 years ago.   Hematuria  Gout on medication - allopurinol   Renal cancer   Had one kidney removed.   Did not remember reason why   Prostate cancer   #RCC s/p R percutaneous cryoablation  #Prostate cancer s/p TURP + radiotherapy    GI/Constipation/GERD   Hepatitis C  Gi hemorrhage  Radiation proctitis  On dialysis days he takes imodium  9 years after dialysis gets diarrhea - may be 10 years.     ENDO/Lipid/DM/Bone density/Thyroid  Secondary hyperparathyroidism  Thyroid is okay  No diabetes  Taking lipitor for cholesterol - not sure if he filled his prescription.     Cardio/heart/Hyper or Hypotensive   AV fistula thrombosis   Hypertension  Lunar  stroke   Chest pain   Blood pressure is a bit high today   Use to be okay     Has a catheter in his chest and no longer has a fistual    Vision/Dry Eyes/Cataracts/Glaucoma/Macular   Right eye blindness  Primary Open angle glaucoma left eye   Left eye anteriod scleroitis   Hemorrhagic choroidal detachment of left eye       Heme/Anticoagulation/Antiplatelet/Anemia/Other  Anemia with kidney disease   Blood loss  On epo  Ferritin 496 recently 1/2024  Rbc folate - normal  Haptoglobin - normal  Iron and iron binding capacity - normal   Cbc - hemoglobin 6.5 on 1/19/2024 --->10.2 as of yesterday   7.9 on 1/20/2024  Got 3 units of blood 2 months ago   Vit B12 normal    ENT/Resp  COPD  Smoker - pack per day   Supraglottic edema    Skin/Cancer/Seborrhea/other  Denies problems    Musculoskeletal/Pain/Headache  Thumbs and neck to shoulders     Other:  Meningioma   Lacunar stroke    Has some neuropathy - toes only     Medications            Acetaminophen tylenol 325mg prn       Allopurinol zyloprim 100mg  1       Atorvastatin lipitor 40mg  1       Calcitriol rocaltrol 0.5mcg Off?       Calcium acetate off       Diclofenac voltaren 1% gel Off/       DIphenhydramine benadryl 50mg  contrast       Epoetin kalli dialysis       Iron sucrose IV tues       Methylprednisoline medrol 32mg contrast       MVI renavite  1       Oxycodone roxicodone 5mg  off       Pantoprazole protonix 40mg  off                                                                 Plan:    Not on aspirin     Myoclonic movements (treated with benadryl) ? Related to metabolic cause of involuntary movements or a functional disorder (see notes below)  No movements noticed today - he was hospitalized and Had EEG with no epileptic abnormalities.  The brain mass was noticed. HE has not had recurrent movements and therefore clinical monitoring is merited. He is not taking seizure medications and does not seem like it was epileptic. He had tremors/jerking that affecting his  ability to sleep. Not clear if related to his iron/hemoglobin, etc. It was more myoclonic  and probably related to low iron/anemia, etc.     He had an abnormal brain mri 11/27/2023 and needs followup scan. The scan yesterday was cancelled and to be done on 3/6/2024 - reviewed his scan with him today     Has return to Dr. Rueda his pcp on 2/12/2024 2/21/2024 Lee Pretty neurosurgical appointment will need to be rescheduled till after the brain mri is done on 3/6/2024. Will have this rescheduled. Messaged lee pretty     Coding statement:   Medical Decision Making:  #  Chronic progressive medical conditions addressed  - see above --   Review and/or interpretation of unique test or documentation from a provider outside of neurology yes   Independent historian provided additional details  no I  Prescription drug management and review of potential side effects and/or monitoring for side effects  -- see above ---  Health impacted by social determinants of health  no    I have reviewed the note as documented above.  This accurately captures the substance of my conversation with the patient and total time spent preparing for visit, executing visit and completing visit on the day of the visit:  45 minutes.  The portion of this total time included face to face time 30 minutes - additional spent in chart review, preparation and coordination of care - getting neurosurgical appointment rescheduled after brain mri that was delayed due to pharmacy issues per patient.     The longitudinal plan of care for @patientname@ was addressed during this visit. Due to the added complexity in care, I will continue to support Scotty in the subsequent management of this condition(s) and with the ongoing continuity of care of this condition(s).      Jay Up MD     ______________________________________    Last visit date and details:          72-year-old assisted for follow-up evaluation after he has couple hospital visits.  He was  seen with episodes of myoclonus last month.  Evaluation showed evidence of small strokes and small meningioma.  He has follow-up scheduled with neurology and neurosurgery.  He currently is complaining predominantly of pain that started in both thumbs.  Then subsequently developed pain radiating from the neck into the right arm to the elbow.  This along the lateral aspect of the arm.  Does not seem to be aggravated by neck motion and it is improved being somewhat recently.  He has not been taking anything other than an occasional oxycodone for this and is doing no therapy or rehabilitation exercises.  He is continuing to smoke and we discussed the importance of cutting back or eliminating the smoking in order to have his more effective stroke prevention.     Attestation signed by Gabriel Joe MD at 11/28/2023 10:37 PM     Attending Attestation     I saw and evaluated the patient on 11/28/2023 and agree with the findings and the plan of care as documented in the resident's note.       Patient is a 71 y/o male who presented for evaluation of abnormal movements. Movements consisted of synchronous rapid jerks involving all 4 extremities. MRI brain showed incidental small meningioma, non contributory to movements. EEG captured jerks and non electrocerebral correlate was seen. There were no myelopathic signs to suggestive a spinal cord generator of myoclonus. Apart from baseline metabolic of ESRD, there was not a metabolic/toxic explanation for myoclonus. We discussed the possibility of these movements represented a functional movement disorder given some variability in characteristics and distractibility. Movements improved on their own by the morning of discharge. If symptoms are to return, patient can follow-up in neurology clinic outpatient.      Gabriel Joe MD   of Neurology  [facility]  NEUROLOGY DISCHARGE SUMMARY     Patient  Name:  Scotty Oliveira  MRN:  [id number]      :  1951        Date of Admission:                  2023  Date of Discharge:                  2023  3:58 PM  Admitting Physician:               Gabriel Joe MD  Discharge Physician:                Primary Care Provider:           Arthur Maldonado  Discharge Disposition:            Discharged to home     Admission Diagnoses:  #Myoclonus, unknown etiology   #ESRD ()   #Gout:   #Anemia of chronic disease:   #HTN:   #Diverticulosis:   #RCC s/p R percutaneous cryoablation  #Prostate cancer s/p TURP + radiotherapy  #Tobacco use     Discharge Diagnoses:    Functional Myoclonus looking jerks  #ESRD ()   #Gout:   #Anemia of chronic disease:   #HTN:   #Diverticulosis:   #RCC s/p R percutaneous cryoablation  #Prostate cancer s/p TURP + radiotherapy  #Tobacco use     Brief History of Illness:   Scotty Oliveira is a 72 year old male with history of RCC s/p R percutaneous cryoablation, prostate cancer s/p TURP + radiotherapy, ESRD (), chronic hepatitis C, diverticulitis, HTN, who presented to the ED today with a first time presentation of myoclonic appearing movements for 5 days.      The patient reports that he was in his usual state of health prior to Tuesday night 11/23/23. No new medications, no changes in dialysis plan, no recent trauma, stroke or infection. On Tuesday night after dialysis he was doing well, but was woken up by violent 4 extremity movements at 2am. He says that the following day he actually had fewer of these movements. However, he went to dialysis the following day, and said that after this his movements intensified and became unbearable. There, he received benadryl, which he said reduced the intensity again for about 1 day. However, starting again on Saturday, these events began to progress again. They stop him from sleeping. They are not stimulus induced, they are not suppressible.      The movements are as  follows: occur several times per minute. No involvement above the neck. In the extremities, his arms flex up synchronously into his chest involuntarily. They become tonic with increased tone during this time. His legs extend during the movements, again synchronously with the movements in the uppers.      Hospital Course:  Patient had myoclonic appearing jerks, which were b/l and generalized. The pattern seemed inconsistent and variable. The differentials were broad including cortical myoclonus, metabolic derangement causing myoclonus vs functional jerks. We obtained MRI of the brain that revealed dural based mass overlying left frontal lobe c/f meningioma vs mets. No other finding that correlated with Myoclonus. EEG was obtained. There was no EEG correlate with the jerks and no underlying epileptiform discharges. Patient was only given Keppra 125mg. Interestingly, his Jerks stopped raising concerns for functional disorder. MRI also revealed old infarcts, and .  Neurologic exam remained non-focal.  Patient received one dialysis session during this hospitalization as scheduled.     Recommendations and Follow-up:  -If the jerky movement happen again, patient should follow up with PCP and should be referred to Neurology outpatient  -Aspirin 81mg daily for secondary prevention of stroke and atorvastatin 40mg daily for hyperlipidemia  -Neurosurgery follow up for Meningioma.        Pertinent Investigations:  EEG  The 2 days of VEEG was abnormal due to the presences of a generalized, continuous theta slowing consistent with a mild, diffuse encephalopathy. On day 1 he had events consistent with single myoclonic jerks and on day 2 the jerks appears to be more complex. All of the movements (including the single myoclonus jerks) were not associated with underlying seizures on EEG. Of note even though the EEG did not show seizures during this movement this does not rule out subcortical myoclonus. No electrographic  seizures or epileptiform discharges were recorded. Clinical correlation is advised.      MRI Brain  Impression:  1. Significant motion artifact limits the spatial resolution on  multiple sequences, however there is no mass effect, midline shift or  diffusion restriction.     2. Avidly enhancing dural based mass overlying the left frontal lobe  is new from most recent MRI in 2013. Most often this is representative  of a meningioma, however given the patient's history of renal cell  carcinoma a dural based metastasis is also a consideration. Reimaging  with a contrast enhanced MRI (tumor protocol) is recommended in 2-3  months time.     3. Diffuse cerebral volume loss with the sequelae of chronic small  vessel ischemic disease and multiple lacunar type infarcts as  described.     4. The lack of CSF suppression on the T2/FLAIR sequences is likely  artefactual.         US Carotid  IMPRESSION:     1. RIGHT ICA: Less than 50% diameter narrowing by grayscale imaging  and sonographic velocity criteria.     2. LEFT ICA:  Less than 50% diameter narrowing by grayscale imaging  and sonographic velocity criteria.     Consultations:    Nephrology/Dialysis           12/20/2023 neurosurgery Diana Gibson [facility] spine evaluation  [facility] 2/2024        Narrative & Impression   Brain MRI without and with contrast     Provided History:  Myoclonus r/o cortical involvent/stricutural  lesions.     Comparison:  4/20/2013 brain MRI , 10/21/2019 head CT     Technique: Sagittal T1-weighted, axial diffusion, susceptibility,  FLAIR, and oblique coronal FLAIR and T2-weighted images obtained  without intravenous contrast. Following gadolinium-based intravenous  contrast administration, axial and coronal T1-weighted images were  obtained.     Contrast: 6mL Gadavist     Findings:      There is no intracranial midline shift, hemorrhage or abnormal  diffusion restriction. Lack of CSF signal suppression within the  lateral ventricles on  the FLAIR sequences is well-visualized. There is  a moderate degree of diffuse cerebral parenchymal volume loss with  expansion of the supratentorial ventricular system. Multiple chronic  appearing lacunar type infarcts within the right globus pallidus, left  thalamus and right cerebellar hemisphere. Multifocal confluent and  patchy T2 hyperintense signal throughout the periventricular white  matter is nonspecific though most likely representative of sequelae of  chronic small vessel ischemic disease given the patient's age and  medical history. Cavum septum pellucidum et vergae.      There is no abnormal intra-axial enhancement however the postcontrast  sequences are markedly degraded by motion artifact. Well-circumscribed  and avidly enhancing dural based mass overlying the high left frontal  lobe measuring 12 x 8 mm (series 16, image 130 and series 17, image  92).                                                                      Impression:  1. Significant motion artifact limits the spatial resolution on  multiple sequences, however there is no mass effect, midline shift or  diffusion restriction.     2. Avidly enhancing dural based mass overlying the left frontal lobe  is new from most recent MRI in 2013. Most often this is representative  of a meningioma, however given the patient's history of renal cell  carcinoma a dural based metastasis is also a consideration. Reimaging  with a contrast enhanced MRI (tumor protocol) is recommended in 2-3  months time.     3. Diffuse cerebral volume loss with the sequelae of chronic small  vessel ischemic disease and multiple lacunar type infarcts as  described.     4. The lack of CSF suppression on the T2/FLAIR sequences is likely  artefactual.      I have personally reviewed the examination and initial interpretation  and I agree with the findings.     ALEXY PARKER MD         SYSTEM ID:  [id number]               December 8, 2023  Yesenia Emanuel    12/8/23  1:32  PM  Note             RECORDS RECEIVED FROM: internal   REASON FOR VISIT: Myoclonus    Date of Appt: 2/9/24   NOTES (FOR ALL VISITS) STATUS DETAILS   OFFICE NOTE from referring provider Internal SEE INPATIENT NOTES   DISCHARGE SUMMARY from hospital Internal [facility]:  12/5/23-12/6/23 11/27/23-11/28/23   MEDICATION LIST Internal     IMAGING  (FOR ALL VISITS)       MRI (HEAD, NECK, SPINE) Internal [facility]:  MRI Brain 11/27/23                       ______________________________________      Patient was asked about 14 Review of systems including changes in vision (dry eyes, double vision), hearing, heart, lungs, musculoskeletal, depression, anxiety, snoring, RBD, insomnia, urinary frequency, urinary urgency, constipation, swallowing problems, hematological, ID, allergies, skin problems: seborrhea, endocrinological: thyroid, diabetes, cholesterol; balance, weight changes, and other neurological problems and these were not significant at this time except for   Patient Active Problem List   Diagnosis    Prostate cancer (H)    Gout    Hypertension    Anemia of chronic kidney failure    Radiation proctitis    Hematuria syndrome    Anemia, iron deficiency    ESRD on hemodialysis (H)    Primary open angle glaucoma of both eyes, moderate stage    Senile nuclear sclerosis, bilateral    Dialysis patient (H24)    Pseudoexfoliation (PXF) glaucoma, severe stage    Anaphylactic reaction    Secondary hyperparathyroidism (H24)    COPD (chronic obstructive pulmonary disease) (H)    AV fistula thrombosis, initial encounter (H24)    Thrombosis of arteriovenous dialysis fistula, initial encounter (H24)    Arteriovenous fistula thrombosis (H24)    Gastrointestinal hemorrhage, unspecified gastrointestinal hemorrhage type    Acute on chronic anemia    ESRD (end stage renal disease) on dialysis (H)    Renal cell cancer, unspecified laterality (H)    Hypotension, unspecified hypotension type    Anterior scleritis, left eye    Primary open angle  glaucoma (POAG) of left eye, severe stage    Aqueous misdirection    Postoperative eye state    Hemorrhagic choroidal detachment of left eye    Hyperkalemia    Primary open angle glaucoma, left eye    Blindness of both eyes    No one available at home to care for patient    Supraglottic edema    Myoclonus    Meningioma (H)    Lacunar stroke (H)    Hyperlipidemia LDL goal <100    Anemia, unspecified type    Chest pain, unspecified type    Chronic kidney disease, stage V (H)    ESRD (end stage renal disease) (H)    Anemia in chronic kidney disease    Viral hepatitis C    HTN (hypertension)    Melena    Anemia due to blood loss, acute          Allergies   Allergen Reactions    Blood Transfusion Related (Informational Only) Other (See Comments)     Patient has a complex history of clinically significant antibodies against RBC antigens (Anti-K, Fya, Fy3, Jkb, and UID).  Finding compatible RBCs may take up to 24 hours or more.  Consult with the Blood Bank MD for transfusion guidance.    Contrast Dye Other (See Comments)     Tongue swelling and difficulty swallowing following fistulogram    Diatrizoate Other (See Comments)     Tongue swelling and difficulty swallowing    Penicillins Anaphylaxis    Sulfa Antibiotics Unknown     Past Surgical History:   Procedure Laterality Date    COLONOSCOPY  8/20/2012    Procedure: COLONOSCOPY;;  Surgeon: Zulay Newby MD;  Location: [facility]    CREATE FISTULA ARTERIOVENOUS UPPER EXTREMITY  5/25/2012    Procedure:CREATE FISTULA ARTERIOVENOUS UPPER EXTREMITY; Right Brachio-Cephalic Arteriovenous Fistula Creation; Surgeon:FLACA CUTLER; Location:[facility] OR    CREATE FISTULA ARTERIOVENOUS UPPER EXTREMITY  1/8/2018    Procedure: CREATE FISTULA ARTERIOVENOUS UPPER EXTREMITY;  Creation of brachial artery to cephalic vein fistula;  Surgeon: Flaca Cutler MD;  [facility]: [facility]    CYSTOSCOPY, RETROGRADES, COMBINED  10/30/2012    Procedure: COMBINED CYSTOSCOPY, RETROGRADES;  Cystoscopy  with Clot Evaluatation, Fulgeration of bleeders, Bladder neck Biopsy transurethral resection of bladder neck;  Surgeon: Sunday Montalvo MD;  Location: [facility]    EXCISE FISTULA ARTERIOVENOUS UPPER EXTREMITY Right 4/6/2018    Procedure: EXCISE FISTULA ARTERIOVENOUS UPPER EXTREMITY;  Exise Right Upper Arm Arteriovenous Fistula, Anesthesia Block;  Surgeon: Flaca Cutler MD;  Location: [facility]    IMPLANT VALVE EYE Left 9/19/2022    Procedure: LEFT EYE AHMED GLAUCOMA VALVE PLACEMENT AND OPTIGRAFT CORNEAL PATCH GRAFT;  Surgeon: Dasia Garza MD;  Location: [facility]    INSERT RADIATION SEEDS PROSTATE  12/9/2011    Procedure:INSERT RADIATION SEEDS PROSTATE; Implantation of Radioactive seeds into Prostate  Surgeon requests choice anesthesia; Surgeon:MADELYN MANCUSO; Location:[facility] OR    IR CVC TUNNEL PLACEMENT < 5 YRS OF AGE  9/16/2020    IR CVC TUNNEL PLACEMENT > 5 YRS OF AGE  4/13/2021    IR CVC TUNNEL REMOVAL LEFT  1/15/2021    IR CVC TUNNEL REVISION RIGHT  5/11/2021    IR CVC TUNNEL REVISION RIGHT  3/10/2023    IR DIALYSIS FISTULOGRAM LEFT  12/4/2018    IR DIALYSIS FISTULOGRAM LEFT  6/14/2019    IR DIALYSIS FISTULOGRAM LEFT  10/21/2019    IR DIALYSIS FISTULOGRAM LEFT  11/25/2020    IR DIALYSIS MECH THROMB, PTA  12/4/2018    IR DIALYSIS MECH THROMB, PTA  10/21/2019    IR DIALYSIS PTA  6/14/2019    IR DIALYSIS PTA  11/25/2020    IR FINE NEEDLE ASPIRATION W ULTRASOUND  11/25/2020    IRIDECTOMY Left 9/23/2022    Procedure: Left Eye Peripheral Iridectomy;  Surgeon: Beth Joy MD;  [facility]    IRRIGATION AND DEBRIDEMENT UPPER EXTREMITY, COMBINED Left 9/18/2020    Procedure: Left  UPPER EXTREMITY Evacuation;  Surgeon: Bruce Wagoner MD;  [facility]    LAPAROSCOPIC NEPHRECTOMY Left 9/24/2014    Procedure: LAPAROSCOPIC NEPHRECTOMY;  Surgeon: Arthur Jones MD;  [facility]    PHACOEMULSIFICATION WITH STANDARD INTRAOCULAR LENS IMPLANT Left 10/17/2022    Procedure: LEFT  PHACOEMULSIFICATION, CATARACT, WITH STANDARD INTRAOCULAR LENS IMPLANT INSERTION / Complex/ Posterior synechiolysis;  Surgeon: Katt Hollis MD;  [facility]    RECONSTRUCT ANTERIOR CHAMBER Left 9/23/2022    Procedure: LEFT EYE ANTERIOR CHAMBER REFORMATION;  Surgeon: Beth Joy MD;  [facility]    REVISION FISTULA ARTERIOVENOUS UPPER EXTREMITY Left 9/18/2020    Procedure: LEFT REVISION, Brachial axillary ARTERIOVENOUS FISTULA Graft and ligation of malfunctioning arteriovenous fistula, UPPER EXTREMITY;  Surgeon: Bruce Wagoner MD;  [facility]    VITRECTOMY PARSPLANA WITH 25 GAUGE SYSTEM Left 9/23/2022    Procedure: LEFT EYE 25-GAUGE PARS PLANA VITRECTOMY;  Surgeon: Beth Joy MD;  [facility]    ZZC OPEN RX ANKLE DISLOCATN+FIXATN      RIGHT ANKLE     Past Medical History:   Diagnosis Date    Chronic hepatitis C (H)     S/p succesful eradication therapy    COPD (chronic obstructive pulmonary disease) (H)     Diverticulosis     ESRD (end stage renal disease)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

## 2024-02-19 NOTE — PROGRESS NOTE ADULT - SUBJECTIVE AND OBJECTIVE BOX
Patient seen and examined. He states he feels well.     Medications:  aspirin enteric coated 81 milliGRAM(s) Oral daily  benzonatate 100 milliGRAM(s) Oral three times a day  buMETAnide 2 milliGRAM(s) Oral two times a day  calamine Lotion 1 Application(s) Topical daily PRN  chlorhexidine 4% Liquid 1 Application(s) Topical <User Schedule>  dextrose 40% Gel 15 Gram(s) Oral once PRN  dextrose 5%. 1000 milliLiter(s) IV Continuous <Continuous>  dextrose 50% Injectable 12.5 Gram(s) IV Push once  dextrose 50% Injectable 25 Gram(s) IV Push once  dextrose 50% Injectable 25 Gram(s) IV Push once  docusate sodium 100 milliGRAM(s) Oral daily  glucagon  Injectable 1 milliGRAM(s) IntraMuscular once PRN  guaiFENesin   Syrup  (Sugar-Free) 200 milliGRAM(s) Oral every 6 hours PRN  heparin  Injectable 5000 Unit(s) SubCutaneous every 12 hours  hydrALAZINE 37.5 milliGRAM(s) Oral three times a day  insulin glargine Injectable (LANTUS) 25 Unit(s) SubCutaneous at bedtime  insulin lispro (HumaLOG) corrective regimen sliding scale   SubCutaneous three times a day before meals  insulin lispro (HumaLOG) corrective regimen sliding scale   SubCutaneous at bedtime  isosorbide   dinitrate Tablet (ISORDIL) 10 milliGRAM(s) Oral three times a day  metoprolol succinate ER 12.5 milliGRAM(s) Oral daily  oxyCODONE    IR 5 milliGRAM(s) Oral every 6 hours PRN  senna 2 Tablet(s) Oral at bedtime  sevelamer hydrochloride 800 milliGRAM(s) Oral three times a day with meals  simethicone 80 milliGRAM(s) Chew three times a day  sodium chloride 0.65% Nasal 1 Spray(s) Both Nostrils three times a day PRN  sodium chloride 0.9% lock flush 3 milliLiter(s) IV Push every 8 hours  tamsulosin 0.4 milliGRAM(s) Oral at bedtime      Vitals:  Vital Signs Last 24 Hrs  T(C): 36.7 (2019 12:00), Max: 36.8 (2019 00:00)  T(F): 98 (2019 12:00), Max: 98.3 (2019 00:00)  HR: 95 (2019 15:00) (85 - 102)  BP: 150/79 (2019 15:00) (97/52 - 150/79)  BP(mean): 90 (2019 15:00) (58 - 108)  RR: 19 (2019 15:00) (11 - 26)  SpO2: 97% (2019 10:00) (94% - 100%)    Daily     Daily Weight in k.1 (2019 10:00)    I&O's Detail    2019 07:01  -  2019 07:00  --------------------------------------------------------  IN:    milrinone  Infusion: 18 mL    milrinone  Infusion: 60 mL    Oral Fluid: 1200 mL  Total IN: 1278 mL    OUT:    Voided: 2220 mL  Total OUT: 2220 mL    Total NET: -942 mL      2019 07:01  -  2019 15:54  --------------------------------------------------------  IN:    milrinone  Infusion: 12 mL  Total IN: 12 mL    OUT:    Voided: 600 mL  Total OUT: 600 mL    Total NET: -588 mL        Physical Exam:     General: No distress. Comfortable.  HEENT: EOM intact.  Neck: Neck supple. JVP not elevated. No masses  Chest: Clear to auscultation bilaterally  CV: Normal S1 and S2. No murmurs, rub, or gallops. Radial pulses normal. No LE edema b/l.   Abdomen: Soft, non-distended, non-tender  Skin: No rashes or skin breakdown  Neurology: Alert and oriented times three. Sensation intact  Psych: Affect normal    Labs:                        8.5    7.57  )-----------( 194      ( 2019 05:20 )             26.8         133<L>  |  87<L>  |  99<H>  ----------------------------<  112<H>  3.7   |  29  |  3.31<H>    Ca    9.4      2019 05:20  Phos  3.9       Mg     2.3         TPro  7.6  /  Alb  3.4  /  TBili  1.8<H>  /  DBili  x   /  AST  24  /  ALT  16  /  AlkPhos  365<H>      < from: TTE with Doppler (w/Cont) (19 @ 13:36) >  DIMENSIONS:  Dimensions:     Normal Values:  LA:       ---     2.0 - 4.0 cm  Ao:     2.7 cm    2.0 - 3.8 cm  SEPTUM: 0.7 cm    0.6 - 1.2 cm  PWT:    1.0 cm    0.6 - 1.1 cm  LVIDd:  5.5 cm    3.0 - 5.6 cm  LVIDs:    ---     1.8 - 4.0 cm  Derived Variables:  LVMI: 92 g/m2  RWT: 0.36  Ejection Fraction (Modified Mon Rule): 35 %  ------------------------------------------------------------------------  OBSERVATIONS:  Mitral Valve: Tethered mitral valve leaflets. Moderate  mitral regurgitation.  Aortic Root: Normal aortic root.  Aortic Valve: Calcified trileaflet aortic valve with mildly  decreased opening.  Left Atrium: Normal left atrium.  LA volume index = 23  cc/m2.  Left Ventricle: Moderate to severe segmental left  ventricular systolic dysfunction. The basal to mid septum,  basal to mid inferior, basal to mid anterolateral walls are  hypokinetic. Normal left ventricular internal dimensions  and wall thicknesses.  Right Heart: Normal right atrium. Right ventricular  enlargement with decreased right ventricular systolic  function. Normal tricuspid valve. Mild tricuspid  regurgitation. Normal pulmonic valve.  Pericardium/PleuraNormal pericardium with no pericardial  effusion.  Hemodynamic: Estimated right ventricular systolic pressure  equals 43 mm Hg, assuming right atrial pressure equals 10  mm Hg, consistent with mild pulmonary hypertension.    < end of copied text > poor balance

## 2024-05-20 NOTE — PROGRESS NOTE ADULT - PROBLEM SELECTOR PLAN 2
Patient with hypervolemia in the setting of ADHF. Patient currently on PO Bumex. Diuretic and ADHF management per cardiology. Monitor daily weights. Low salt diet no

## 2024-07-23 NOTE — CONSULT NOTE ADULT - PROBLEM SELECTOR PROBLEM 1
Dyspnea
Acute on chronic systolic heart failure
KESHAV (acute kidney injury)
Arterial insufficiency of lower extremity
Medications

## 2024-08-08 NOTE — ED ADULT TRIAGE NOTE - SOURCE OF INFORMATION
PHYSICAL THERAPY NOTE          Patient Name: Art Joseph  Today's Date: 8/8/2024 08/08/24 0828   PT Last Visit   PT Visit Date 08/08/24   Note Type   Note Type Treatment   Pain Assessment   Pain Assessment Tool 0-10   Pain Score No Pain   Restrictions/Precautions   Weight Bearing Precautions Per Order No   Braces or Orthoses   (L surgical shoe; R prosthesis)   Other Precautions   (Fall Risk; Bed Alarm; Contact/isolation)   General   Chart Reviewed Yes   Response to Previous Treatment Patient with no complaints from previous session.   Family/Caregiver Present No   Cognition   Overall Cognitive Status WFL   Arousal/Participation Alert;Cooperative   Attention Within functional limits   Orientation Level Oriented X4   Following Commands Follows all commands and directions without difficulty   Subjective   Subjective Agreeable to therapy. States he feels a little down today.   Bed Mobility   Additional Comments seated EOB start/end PT session   Transfers   Sit to Stand 5  Supervision   Additional items Increased time required;Verbal cues   Stand to Sit 5  Supervision   Additional items Increased time required;Verbal cues   Additional Comments SBQC used   Ambulation/Elevation   Gait pattern   (Fall Risk; Bed Alarm; Contact/isolation)   Gait Assistance 5  Supervision   Additional items Verbal cues   Assistive Device Small base quad cane   Distance 200'   Balance   Static Sitting Good   Dynamic Sitting Good   Static Standing Fair +   Dynamic Standing Fair   Ambulatory Fair -  (SBQC)   Endurance Deficit   Endurance Deficit Yes   Endurance Deficit Description SpO2 WFL's RA, HR  with ambulation, mild SOB   Activity Tolerance   Activity Tolerance Patient limited by fatigue   Exercises   Hip Flexion Sitting;25 reps;AROM;Bilateral   Hip Abduction Sitting;25 reps;AROM;Bilateral   Hip Adduction Sitting;25 reps;AROM;Bilateral  (+ add sets)    Knee AROM Long Arc Quad Sitting;25 reps;AROM;Bilateral   Ankle Pumps Sitting;25 reps;AROM;Bilateral   Assessment   Prognosis Good   Problem List   (Improper Weight shift; Inconsistent alo; Decreased foot clearance; Wide YOANNA)   Assessment Pt. seen for PT treatment session this date with interventions consisting of  therapeutic exercises, bed mobility, transfers and  gait training w/ emphasis on improving pt's ability to ambulate. Pt. Requiring occasional cues for sequence and safety. In comparison to previous session, Pt. With improvements in activity tolerance. Demonstrates improving endurance.  Pt is in need of continued activity in PT to improve strength balance endurance mobility transfers and ambulation with return to maximize LOF. From PT/mobility standpoint, recommendation at time of d/c would be level II: moderate resource intensity in order to promote return to PLOF and independence.   The patient's AM-[facility] Basic Mobility Inpatient Short Form Raw Score is 21. A Raw score of greater than 16 suggests the patient may benefit from discharge to home.  Please also refer to physical therapy recommendation for safe DC planning.   Goals   LTG Expiration Date 08/20/24   PT Treatment Day 2   Plan   Treatment/Interventions Functional transfer training;LE strengthening/ROM;Therapeutic exercise;Endurance training;Bed mobility;Gait training;Spoke to nursing   Progress Progressing toward goals   PT Frequency 3-5x/wk   Discharge Recommendation   Rehab Resource Intensity Level, PT II (Moderate Resource Intensity)   AM-PAC Basic Mobility Inpatient   Turning in Flat Bed Without Bedrails 4   Lying on Back to Sitting on Edge of Flat Bed Without Bedrails 4   Moving Bed to Chair 3   Standing Up From Chair Using Arms 4   Walk in Room 3   Climb 3-5 Stairs With Railing 3   Basic Mobility Inpatient Raw Score 21   Basic Mobility Standardized Score 45.55   [facility] Highest Level Of Mobility   [facility] Goal 6: Walk 10 steps or more    TEE-GARY Achieved 7: Walk 25 feet or more   Education   Education Provided Mobility training   Patient Demonstrates verbal understanding   End of Consult   Patient Position at End of Consult Seated edge of bed;All needs within reach   End of Consult Comments discussed POC with PT      EMS

## 2024-10-24 NOTE — PROGRESS NOTE ADULT - PROBLEM SELECTOR PLAN 1
Is Methotrexate Contraindicated?: No Pregnancy And Lactation Warning Text: The risk during pregnancy and breastfeeding is uncertain with this medication. Diagnosis (Required): Psoriasis Skyrizi Monitoring Guidelines: A yearly test for tuberculosis is required while taking Skyrizi. Detail Level: Zone sig improved  - TTE 12/12 unchanged, EF 24% with severe systolic and diastolic dysfxn  - Pt was offered AICD placement 2 years ago, but pt refused.  cont torsemide 40mg PO Q12H  seems euvolemic Skyrizi Dosing: 150 mg SC week 0 and week 4 then every 12 weeks thereafter Include Weight Question: Yes - Pounds

## 2025-04-03 NOTE — PROGRESS NOTE ADULT - SUBJECTIVE AND OBJECTIVE BOX
Detail Level: Simple Subjective:  net negative 1.4L     Medications:  aspirin enteric coated 81 milliGRAM(s) Oral daily  benzonatate 100 milliGRAM(s) Oral three times a day  buMETAnide 2 milliGRAM(s) Oral two times a day  calamine Lotion 1 Application(s) Topical daily PRN  chlorhexidine 4% Liquid 1 Application(s) Topical <User Schedule>  dextrose 40% Gel 15 Gram(s) Oral once PRN  dextrose 5%. 1000 milliLiter(s) IV Continuous <Continuous>  dextrose 50% Injectable 12.5 Gram(s) IV Push once  dextrose 50% Injectable 25 Gram(s) IV Push once  dextrose 50% Injectable 25 Gram(s) IV Push once  docusate sodium 100 milliGRAM(s) Oral daily  glucagon  Injectable 1 milliGRAM(s) IntraMuscular once PRN  guaiFENesin   Syrup  (Sugar-Free) 200 milliGRAM(s) Oral every 6 hours PRN  heparin  Injectable 5000 Unit(s) SubCutaneous every 12 hours  hydrALAZINE 10 milliGRAM(s) Oral three times a day  insulin glargine Injectable (LANTUS) 25 Unit(s) SubCutaneous at bedtime  insulin lispro (HumaLOG) corrective regimen sliding scale   SubCutaneous three times a day before meals  insulin lispro (HumaLOG) corrective regimen sliding scale   SubCutaneous at bedtime  isosorbide   dinitrate Tablet (ISORDIL) 10 milliGRAM(s) Oral three times a day  metoprolol succinate ER 12.5 milliGRAM(s) Oral daily  milrinone Infusion 0.25 MICROgram(s)/kG/Min IV Continuous <Continuous>  oxyCODONE    IR 5 milliGRAM(s) Oral every 6 hours PRN  oxymetazoline 0.05% Nasal Spray 1 Spray(s) Left Nostril two times a day  senna 2 Tablet(s) Oral at bedtime  sevelamer hydrochloride 800 milliGRAM(s) Oral three times a day with meals  simethicone 80 milliGRAM(s) Chew three times a day  sodium chloride 0.65% Nasal 1 Spray(s) Both Nostrils three times a day PRN  sodium chloride 0.9% lock flush 3 milliLiter(s) IV Push every 8 hours  tamsulosin 0.4 milliGRAM(s) Oral at bedtime      Physical Exam:    Vitals:  T(C): 36.7 (19 @ 12:00), Max: 36.8 (19 @ 04:00)  HR: 88 (19 @ 12:00) (86 - 101)  BP: 123/72 (19 @ 12:00) (94/45 - 135/68)  BP(mean): 81 (19 @ 12:00) (56 - 86)  ABP: --  ABP(mean): --  RR: 14 (19 @ 12:00) (9 - 26)  SpO2: 98% (19 @ 12:00) (92% - 100%)  Wt(kg): --  CVP(cm H2O): --  CO: --  CI: --  PA: --  PA(mean): --  PCWP: --  SVR: --  PVR: --  Vital Signs Last 24 Hrs  T(C): 36.7 (2019 12:00), Max: 36.8 (2019 04:00)  T(F): 98 (2019 12:00), Max: 98.3 (2019 07:38)  HR: 88 (2019 12:00) (86 - 101)  BP: 123/72 (2019 12:00) (94/45 - 135/68)  BP(mean): 81 (2019 12:00) (56 - 86)  RR: 14 (2019 12:00) (9 - )  SpO2: 98% (2019 12:00) (92% - 100%)    Daily     Daily Weight in k.9 (2019 12:29)    I&O's Summary    2019 07:  -  2019 07:00  --------------------------------------------------------  IN: 840 mL / OUT: 2250 mL / NET: -1410 mL    2019 07:01  -  2019 13:00  --------------------------------------------------------  IN: 330 mL / OUT: 600 mL / NET: -270 mL        General: No distress. Comfortable.  HEENT: EOM intact.  Neck: Neck supple. JVP not elevated. No masses  Chest: Clear to auscultation bilaterally  CV: Normal S1 and S2. No murmurs, rub, or gallops. Radial pulses normal.  Abdomen: Soft, non-distended, non-tender  Skin: No rashes or skin breakdown  Neurology: Alert and oriented times three. Sensation intact  Psych: Affect normal    Labs:                        8.2    6.09  )-----------( 155      ( 2019 04:55 )             26.0         134<L>  |  86<L>  |  106<H>  ----------------------------<  150<H>  3.9   |  29  |  3.71<H>    Ca    9.6      2019 04:55  Phos  4.8       Mg     2.3                       Lactate, Blood: 1.4 mmol/L ( @ 04:55)  Lactate, Blood: 1.4 mmol/L ( @ 22:30) Detail Level: Zone Detail Level: Detailed

## 2025-06-05 NOTE — DISCHARGE NOTE ADULT - INFLUENZA IMMUNIZATION DATE (APPROXIMATE):
Next dose of Tylenol will be @  _______________ ,today/tonight and every 6 hours afterwards for pain management, do not take any Tylenol containing products until this time_. Do not exceed more than 4000mg of Tylenol in one 24 hour setting. If no contraindications, you may alternate with Ibuprofen 3 hours after dose of Tylenol. Ibuprofen can be taken every 6 hours. Next dose @_________________ 30-Oct-2018